# Patient Record
Sex: FEMALE | Race: AMERICAN INDIAN OR ALASKA NATIVE
[De-identification: names, ages, dates, MRNs, and addresses within clinical notes are randomized per-mention and may not be internally consistent; named-entity substitution may affect disease eponyms.]

---

## 2017-04-22 ENCOUNTER — HOSPITAL ENCOUNTER (INPATIENT)
Dept: HOSPITAL 31 - C.ER | Age: 77
LOS: 12 days | Discharge: NURSING, REHAB OR LONG-TERM CARE | DRG: 871 | End: 2017-05-04
Attending: FAMILY MEDICINE | Admitting: FAMILY MEDICINE
Payer: MEDICARE

## 2017-04-22 VITALS — BODY MASS INDEX: 24 KG/M2

## 2017-04-22 DIAGNOSIS — G93.41: ICD-10-CM

## 2017-04-22 DIAGNOSIS — N18.4: ICD-10-CM

## 2017-04-22 DIAGNOSIS — R65.20: ICD-10-CM

## 2017-04-22 DIAGNOSIS — G31.84: ICD-10-CM

## 2017-04-22 DIAGNOSIS — J45.909: ICD-10-CM

## 2017-04-22 DIAGNOSIS — J18.9: ICD-10-CM

## 2017-04-22 DIAGNOSIS — I13.0: ICD-10-CM

## 2017-04-22 DIAGNOSIS — I50.9: ICD-10-CM

## 2017-04-22 DIAGNOSIS — D63.1: ICD-10-CM

## 2017-04-22 DIAGNOSIS — E10.649: ICD-10-CM

## 2017-04-22 DIAGNOSIS — Z79.4: ICD-10-CM

## 2017-04-22 DIAGNOSIS — Z86.73: ICD-10-CM

## 2017-04-22 DIAGNOSIS — M19.90: ICD-10-CM

## 2017-04-22 DIAGNOSIS — N39.0: ICD-10-CM

## 2017-04-22 DIAGNOSIS — E46: ICD-10-CM

## 2017-04-22 DIAGNOSIS — J43.9: ICD-10-CM

## 2017-04-22 DIAGNOSIS — Z88.0: ICD-10-CM

## 2017-04-22 DIAGNOSIS — E87.5: ICD-10-CM

## 2017-04-22 DIAGNOSIS — Z85.038: ICD-10-CM

## 2017-04-22 DIAGNOSIS — E10.22: ICD-10-CM

## 2017-04-22 DIAGNOSIS — A41.9: Primary | ICD-10-CM

## 2017-04-22 DIAGNOSIS — F41.9: ICD-10-CM

## 2017-04-22 DIAGNOSIS — E78.00: ICD-10-CM

## 2017-04-22 LAB
ALBUMIN/GLOB SERPL: 1.2 {RATIO} (ref 1–2.1)
ALP SERPL-CCNC: 83 U/L (ref 38–126)
ALT SERPL-CCNC: 22 U/L (ref 9–52)
AST SERPL-CCNC: 41 U/L (ref 14–36)
BACTERIA #/AREA URNS HPF: (no result) /[HPF]
BASE EXCESS BLDV CALC-SCNC: -6.8 MMOL/L (ref 0–2)
BASOPHILS # BLD AUTO: 0 K/UL (ref 0–0.2)
BASOPHILS NFR BLD: 0.5 % (ref 0–2)
BILIRUB SERPL-MCNC: 0.6 MG/DL (ref 0.2–1.3)
BILIRUB UR-MCNC: NEGATIVE MG/DL
BUN SERPL-MCNC: 44 MG/DL (ref 7–17)
CALCIUM SERPL-MCNC: 9.1 MG/DL (ref 8.6–10.4)
CHLORIDE SERPL-SCNC: 109 MMOL/L (ref 98–107)
CO2 SERPL-SCNC: 20 MMOL/L (ref 22–30)
EOSINOPHIL # BLD AUTO: 0.1 K/UL (ref 0–0.7)
EOSINOPHIL NFR BLD: 1.2 % (ref 0–4)
EOSINOPHIL NFR BLD: 2 % (ref 0–4)
ERYTHROCYTE [DISTWIDTH] IN BLOOD BY AUTOMATED COUNT: 16.1 % (ref 11.5–14.5)
GLOBULIN SER-MCNC: 3.5 GM/DL (ref 2.2–3.9)
GLUCOSE SERPL-MCNC: 118 MG/DL (ref 65–105)
GLUCOSE UR STRIP-MCNC: (no result) MG/DL
HCT VFR BLD CALC: 33.6 % (ref 34–47)
KETONES UR STRIP-MCNC: NEGATIVE MG/DL
LEUKOCYTE ESTERASE UR-ACNC: (no result) LEU/UL
LG PLATELETS BLD QL SMEAR: PRESENT
LYMPHOCYTES # BLD AUTO: 0.7 K/UL (ref 1–4.3)
LYMPHOCYTES NFR BLD AUTO: 7.6 % (ref 20–40)
MCH RBC QN AUTO: 26.7 PG (ref 27–31)
MCHC RBC AUTO-ENTMCNC: 30.9 G/DL (ref 33–37)
MCV RBC AUTO: 86.6 FL (ref 81–99)
MONOCYTES # BLD: 0.2 K/UL (ref 0–0.8)
MONOCYTES NFR BLD: 2.8 % (ref 0–10)
NEUTROPHILS NFR BLD AUTO: 88 % (ref 50–75)
NRBC BLD AUTO-RTO: 0 % (ref 0–2)
PCO2 BLDV: 52 MMHG (ref 40–60)
PH BLDV: 7.22 [PH] (ref 7.32–7.43)
PH UR STRIP: 5 [PH] (ref 5–8)
PLATELET # BLD: 155 K/UL (ref 130–400)
PMV BLD AUTO: 8.9 FL (ref 7.2–11.7)
POTASSIUM SERPL-SCNC: 4.9 MMOL/L (ref 3.6–5.2)
PROT SERPL-MCNC: 7.9 G/DL (ref 6.3–8.3)
PROT UR STRIP-MCNC: (no result) MG/DL
RBC # UR STRIP: (no result) /UL
RBC #/AREA URNS HPF: 4 /HPF (ref 0–3)
SODIUM SERPL-SCNC: 143 MMOL/L (ref 132–148)
SP GR UR STRIP: 1.01 (ref 1–1.03)
TOTAL CELLS COUNTED BLD: 100
TRANS CELLS #/AREA URNS HPF: 3 /HPF (ref 0–3)
TROPONIN I SERPL-MCNC: 0.02 NG/ML (ref 0–0.12)
UROBILINOGEN UR-MCNC: NORMAL MG/DL (ref 0.2–1)
WBC # BLD AUTO: 8.6 K/UL (ref 4.8–10.8)
WBC #/AREA URNS HPF: 53 /HPF (ref 0–5)

## 2017-04-22 RX ADMIN — DEXTROSE AND SODIUM CHLORIDE SCH MLS/HR: 5; 450 INJECTION, SOLUTION INTRAVENOUS at 14:30

## 2017-04-22 NOTE — RAD
PROCEDURE:  CHEST RADIOGRAPH, 1 VIEW



HISTORY:

ams



COMPARISON:

Comparison chest 12/06/2016



FINDINGS:



LUNGS:

Diffuse bilateral infiltrates likely representing pulmonary edema/CHF.



PLEURA:

No pneumothorax or pleural fluid seen.



CARDIOVASCULAR:

Cardiomegaly



OSSEOUS STRUCTURES:

No significant abnormalities.



VISUALIZED UPPER ABDOMEN:

Normal.



OTHER FINDINGS:

None. 



IMPRESSION:

Diffuse bilateral infiltrates likely representing pulmonary edema/CHF.

## 2017-04-22 NOTE — CP.PCM.CON
History of Present Illness





- History of Present Illness


History of Present Illness: 


78 y/o female with Stage 1V kidney disease, HTN , DM TIAs was admitted 

yesterday for change in mental status & severe hypoglycemia. Pt is known to us 

previous admissions & office visits. Last yr a Lt arm AVF was done by Dr Alvarado.   Pt tends to have hyperkalemia & had quite significant HPT She was 

started on Rocaltrol & was councelled on low K+ diet Did not come for f/u for 

sometime





Past Patient History





- Infectious Disease


Hx of Infectious Diseases: None





- Tetanus Immunizations


Tetanus Immunization: Unknown





- Past Medical History & Family History


Past Medical History?: Yes





- Past Social History


Smoking Status: Never Smoked





- CARDIAC


Hx Hypercholesterolemia: Yes


Hx Hypertension: Yes


Hx Peripheral Edema: Yes





- PULMONARY


Hx Asthma: Yes (Dx 15 yrs ago,does not use home o2 anymore)


Hx Pneumonia: Yes (x2)





- NEUROLOGICAL


Hx Transient Ischemic Attacks (TIA): Yes (x3 about 20 yrs ago, 10 yrs ago, 7 

yrs ago)





- HEENT


Hx HEENT Problems: Yes


Hx Cataracts: Yes (Tahir IOL)





- RENAL


Hx Chronic Kidney Disease: Yes





- ENDOCRINE/METABOLIC


Hx Diabetes Mellitus Type 2: Yes





- HEMATOLOGICAL/ONCOLOGICAL


Hx Anemia: Yes





- INTEGUMENTARY


Hx Dermatological Problems: Yes


Other/Comment: Bilateral lower legs dry skin





- MUSCULOSKELETAL/RHEUMATOLOGICAL


Hx Arthritis: Yes


Hx Falls: No


Hx Fractures: Yes (Right ankle. No sx, casted)





- GASTROINTESTINAL


Hx Gastrointestinal Disorders: Yes


Other/Comment: Hx colon cancer





- GENITOURINARY/GYNECOLOGICAL


Hx Genitourinary Disorders: No





- PSYCHIATRIC


Hx Depression: No


Hx Substance Use: No





- SURGICAL HISTORY


Hx Cataract Extraction: Yes (LEFT)





- ANESTHESIA


Hx Anesthesia: Yes


Hx Anesthesia Reactions: No


Hx Malignant Hyperthermia: No





Meds


Allergies/Adverse Reactions: 


 Allergies











Allergy/AdvReac Type Severity Reaction Status Date / Time


 


Penicillins Allergy Intermediate RASH Verified 12/06/16 11:49














- Medications


Medications: 


 Current Medications





Aspirin (Ecotrin)  81 mg PO DAILY [facility]


Heparin Sodium (Porcine) (Heparin)  5,000 units SC Q8 [facility]


Azithromycin 500 mg/ Sodium (Chloride)  250 mls @ 250 mls/hr IVPB Q24H [facility]


Ceftriaxone Sodium 1 gm/ (Sodium Chloride)  100 mls @ 100 mls/hr IVPB DAILY [facility]


Dextrose/Sodium Chloride (Dextrose 5%/0.45% Ns 1000 Ml)  1,000 mls @ 40 mls/hr 

IV .Q24H [facility]


Labetalol HCl (Trandate)  200 mg PO BID [facility]


Lisinopril (Zestril)  5 mg PO DAILY [facility]


   Last Admin: 04/22/17 12:56 Dose:  5 mg


Ondansetron HCl (Zofran Inj)  4 mg IVP Q6 PRN


   PRN Reason: Nausea/Vomiting


Pantoprazole Sodium (Protonix Ec Tab)  20 mg PO DAILY [facility]











Physical Exam





- Constitutional


Appears: No Acute Distress





- Head Exam


Head Exam: ATRAUMATIC, NORMOCEPHALIC





- Neck Exam


Additional comments: 


Neck supple





- Respiratory Exam


Respiratory Exam: NORMAL BREATHING PATTERN


Additional comments: 


Lungs clear





- Cardiovascular Exam


Cardiovascular Exam: REGULAR RHYTHM





- GI/Abdominal Exam


GI & Abdominal Exam: Soft





- Rectal Exam


Rectal Exam: Deferred





- Extremities Exam


Additional comments: 


No eema or cyanosis


+ bruit over Lt arm AVF





Results





- Vital Signs


Recent Vital Signs: 


 Last Vital Signs











Temp  97.7 F   04/22/17 14:04


 


Pulse  90   04/22/17 14:04


 


Resp  20   04/22/17 14:04


 


BP  169/69 H  04/22/17 14:04


 


Pulse Ox  99   04/22/17 14:04














- Labs


Result Diagrams: 


 04/22/17 10:17





 04/22/17 10:17





Assessment & Plan





- Assessment and Plan (Free Text)


Assessment: 


Stage 1V kidney disease.


renal function has remained stable


DM 11. 


HTN


Pneumonitis


Plan: 


Will repeat PTH, phos


Low K+ diet


Monitor BP

## 2017-04-23 LAB
ALBUMIN/GLOB SERPL: 1 {RATIO} (ref 1–2.1)
ALP SERPL-CCNC: 59 U/L (ref 38–126)
ALT SERPL-CCNC: 17 U/L (ref 9–52)
AST SERPL-CCNC: 20 U/L (ref 14–36)
BASOPHILS # BLD AUTO: 0 K/UL (ref 0–0.2)
BASOPHILS NFR BLD: 0.5 % (ref 0–2)
BILIRUB SERPL-MCNC: 0.5 MG/DL (ref 0.2–1.3)
BUN SERPL-MCNC: 43 MG/DL (ref 7–17)
CALCIUM SERPL-MCNC: 8.4 MG/DL (ref 8.6–10.4)
CHLORIDE SERPL-SCNC: 113 MMOL/L (ref 98–107)
CO2 SERPL-SCNC: 16 MMOL/L (ref 22–30)
EOSINOPHIL # BLD AUTO: 0.2 K/UL (ref 0–0.7)
EOSINOPHIL NFR BLD: 2.2 % (ref 0–4)
ERYTHROCYTE [DISTWIDTH] IN BLOOD BY AUTOMATED COUNT: 15.6 % (ref 11.5–14.5)
GLOBULIN SER-MCNC: 3 GM/DL (ref 2.2–3.9)
GLUCOSE SERPL-MCNC: 132 MG/DL (ref 65–105)
HCT VFR BLD CALC: 27.4 % (ref 34–47)
LYMPHOCYTES # BLD AUTO: 1.2 K/UL (ref 1–4.3)
LYMPHOCYTES NFR BLD AUTO: 15.1 % (ref 20–40)
MAGNESIUM SERPL-MCNC: 2 MG/DL (ref 1.6–2.3)
MCH RBC QN AUTO: 27.2 PG (ref 27–31)
MCHC RBC AUTO-ENTMCNC: 31.8 G/DL (ref 33–37)
MCV RBC AUTO: 85.5 FL (ref 81–99)
MONOCYTES # BLD: 0.4 K/UL (ref 0–0.8)
MONOCYTES NFR BLD: 5.5 % (ref 0–10)
NRBC BLD AUTO-RTO: 0.1 % (ref 0–2)
PHOSPHATE SERPL-MCNC: 3.7 MG/DL (ref 2.5–4.5)
PLATELET # BLD: 138 K/UL (ref 130–400)
PMV BLD AUTO: 9.3 FL (ref 7.2–11.7)
POTASSIUM SERPL-SCNC: 4.9 MMOL/L (ref 3.6–5.2)
PROT SERPL-MCNC: 6 G/DL (ref 6.3–8.3)
SODIUM SERPL-SCNC: 140 MMOL/L (ref 132–148)
TSH SERPL-ACNC: 1.44 MIU/L (ref 0.46–4.68)
WBC # BLD AUTO: 7.8 K/UL (ref 4.8–10.8)

## 2017-04-23 RX ADMIN — DEXTROSE AND SODIUM CHLORIDE SCH MLS/HR: 5; 450 INJECTION, SOLUTION INTRAVENOUS at 16:05

## 2017-04-23 RX ADMIN — HUMAN INSULIN SCH UNIT: 100 INJECTION, SOLUTION SUBCUTANEOUS at 12:44

## 2017-04-23 RX ADMIN — PANTOPRAZOLE SODIUM SCH MG: 20 TABLET, DELAYED RELEASE ORAL at 09:38

## 2017-04-23 RX ADMIN — HUMAN INSULIN SCH UNIT: 100 INJECTION, SOLUTION SUBCUTANEOUS at 17:35

## 2017-04-23 RX ADMIN — HUMAN INSULIN SCH: 100 INJECTION, SOLUTION SUBCUTANEOUS at 21:23

## 2017-04-23 NOTE — CP.PCM.PN
<EllisLuacs H - Last Filed: 04/23/17 13:40>





Subjective





- Date & Time of Evaluation


Date of Evaluation: 04/23/17


Time of Evaluation: 13:00





- Subjective


Subjective: 





Dr. Gaxiola progress note:





Patient seen and examined with primary medical attending.  Patient is alert and 

oriented to person, place and time.  She is not complaining of any pain but she 

does say she does not remember how she got the hospital.  





Objective





- Vital Signs/Intake and Output


Vital Signs (last 24 hours): 


 











Temp Pulse Resp BP Pulse Ox


 


 97.8 F   82   20   150/65   98 


 


 04/23/17 00:10  04/23/17 04:20  04/23/17 00:10  04/23/17 00:10  04/23/17 00:10











- Medications


Medications: 


 Current Medications





Aspirin (Ecotrin)  81 mg PO DAILY [facility]


Heparin Sodium (Porcine) (Heparin)  5,000 units SC Q8 [facility]


   Last Admin: 04/23/17 05:19 Dose:  5,000 units


Azithromycin 500 mg/ Sodium (Chloride)  250 mls @ 250 mls/hr IVPB Q24H [facility]


Ceftriaxone Sodium 1 gm/ (Sodium Chloride)  100 mls @ 100 mls/hr IVPB DAILY [facility]


Dextrose/Sodium Chloride (Dextrose 5%/0.45% Ns 1000 Ml)  1,000 mls @ 40 mls/hr 

IV .Q24H [facility]


   Last Admin: 04/22/17 14:30 Dose:  40 mls/hr


Labetalol HCl (Trandate)  200 mg PO BID [facility]


   Last Admin: 04/22/17 20:41 Dose:  200 mg


Lisinopril (Zestril)  5 mg PO DAILY [facility]


   Last Admin: 04/22/17 12:56 Dose:  5 mg


Ondansetron HCl (Zofran Inj)  4 mg IVP Q6 PRN


   PRN Reason: Nausea/Vomiting


Pantoprazole Sodium (Protonix Ec Tab)  20 mg PO DAILY [facility]











- Labs


Labs: 


 





 04/23/17 06:06 











- Constitutional


Appears: Non-toxic, No Acute Distress





- Head Exam


Head Exam: ATRAUMATIC, NORMAL INSPECTION, NORMOCEPHALIC





- Eye Exam


Eye Exam: Normal appearance


Pupil Exam: NORMAL ACCOMODATION





- ENT Exam


ENT Exam: Normal Exam





- Neck Exam


Neck Exam: Normal Inspection





- Respiratory Exam


Respiratory Exam: Clear to Ausculation Bilateral.  absent: Rales, Rhonchi, 

Wheezes





- Cardiovascular Exam


Cardiovascular Exam: REGULAR RHYTHM, RRR, +S1, +S2.  absent: Gallop, Rubs





- GI/Abdominal Exam


GI & Abdominal Exam: Soft, Normal Bowel Sounds.  absent: Tenderness





- Back Exam


Back Exam: NORMAL INSPECTION





- Neurological Exam


Neurological Exam: Alert, CN II-XII Intact, Oriented x3


Neuro motor strength exam: Left Upper Extremity: 5, Right Upper Extremity: 5, 

Left Lower Extremity: 5, Right Lower Extremity: 5





- Psychiatric Exam


Psychiatric exam: Normal Affect, Normal Mood





- Skin


Skin Exam: Dry, Normal Color





Assessment and Plan


(1) Altered mental status


Assessment & Plan: 


Most likely secondary from infection and also hypoglycemia.  Patient did say 

she did not eat the night before admission.  


Status: Resolved





(2) UTI (urinary tract infection)


Assessment & Plan: 


IV Rocephin, urine culture negative, blood culture pending. 


Status: Acute





(3) Pneumonia


Assessment & Plan: 


Zithromax, patient is afebrile with no white count. 


Status: Acute





(4) Diabetic hypoglycemia


Status: Acute





(5) Anemia


Assessment & Plan: 


Most likely anemia of chronic disease


Status: Chronic





(6) Chronic kidney disease (CKD)


Assessment & Plan: 


Dr. Munoz consulted, patient may need dialysisi at some point in the future, 

will follow up consult. 


Status: Chronic





(7) Hypertension


Assessment & Plan: 


continue current htn medication


Status: Chronic





(8) Prophylactic measure


Assessment & Plan: 


continue her current Heparin and protonix. 


Status: Acute








<Samson Gaxiola H - Last Filed: 04/23/17 16:16>





Objective





- Vital Signs/Intake and Output


Vital Signs (last 24 hours): 


 











Temp Pulse Resp BP Pulse Ox


 


 98.6 F   91 H  20   157/68 H  96 


 


 04/23/17 07:00  04/23/17 11:42  04/23/17 07:00  04/23/17 07:00  04/23/17 11:42











- Medications


Medications: 


 Current Medications





Aspirin (Ecotrin)  81 mg PO DAILY [facility]


   Last Admin: 04/23/17 09:38 Dose:  81 mg


Heparin Sodium (Porcine) (Heparin)  5,000 units SC Q8 [facility]


   Last Admin: 04/23/17 13:32 Dose:  5,000 units


Azithromycin 500 mg/ Sodium (Chloride)  250 mls @ 250 mls/hr IVPB Q24H [facility]


   Last Admin: 04/23/17 11:37 Dose:  250 mls/hr


Ceftriaxone Sodium 1 gm/ (Sodium Chloride)  100 mls @ 100 mls/hr IVPB DAILY [facility]


   Last Admin: 04/23/17 09:37 Dose:  100 mls/hr


Dextrose/Sodium Chloride (Dextrose 5%/0.45% Ns 1000 Ml)  1,000 mls @ 40 mls/hr 

IV .Q24H [facility]


   Last Admin: 04/23/17 16:05 Dose:  40 mls/hr


Insulin Human Regular (Novolin R)  0 unit SC ACHS [facility]


   PRN Reason: Protocol


   Last Admin: 04/23/17 12:44 Dose:  1 unit


Labetalol HCl (Trandate)  200 mg PO BID [facility]


   Last Admin: 04/23/17 09:38 Dose:  200 mg


Lisinopril (Zestril)  5 mg PO DAILY [facility]


   Last Admin: 04/23/17 09:38 Dose:  5 mg


Ondansetron HCl (Zofran Inj)  4 mg IVP Q6 PRN


   PRN Reason: Nausea/Vomiting


   Last Admin: 04/23/17 12:47 Dose:  4 mg


Pantoprazole Sodium (Protonix Ec Tab)  20 mg PO DAILY [facility]


   Last Admin: 04/23/17 09:38 Dose:  20 mg











- Labs


Labs: 


 





 04/23/17 06:06 





 04/23/17 06:06 











Attending/Attestation





- Attestation


I have personally seen and examined this patient.: Yes


I have fully participated in the care of the patient.: Yes


I have reviewed all pertinent clinical information, including history, physical 

exam and plan: Yes


Notes (Text): 





04/23/17 16:11


Medical attending: Patient was seen and examined by me, agrees the above note 

by medical resident.


When I saw the patient today she was alert and orientated 3. She was very 

conversational, very pleasant affect. She explains to us that she does not 

remember coming to the hospital. And that she does not remember the medical 

resident admitting her despite me meeting the patient with the same medical 

resident.





Later in the day the patient's family members came, and they explained to me 

that the patient does take insulin. The patient was able to recall to me that 

she took 15 units of insulin earlier in the day but that she had not been 

eating properly. Family members at bedside explained to me that they noticed 

that her appetite was poor that day. But she still took her insulin. The family 

members explained that she now looks back to her baseline mental status





The patient explains that she does not have enough test strips, and she thinks 

that her glucometer is not properly functioning. So she did not actually know 

what her blood sugar was before she gave for self insulin, and on top of that 

her appetite was very poor.





So I explained to the patient as well as the patient's family that at this time 

regarding continue the intravenous fluids with dextrose, I encouraged her to 

eat. And at some point we should turn off the intravenous fluids with dextrose.





Were also continue the antibiotics for the urinary tract infection. I explained 

to them that it is possible that perhaps she was feeling sick from urinary 

tract infection and there by her appetite was very poor. And so when she gave 

herself the insulin injection it made her blood sugar very very low she is not 

eating





Thank you very much,


Samson Gaxiola

## 2017-04-23 NOTE — CP.PCM.PN
Subjective





- Date & Time of Evaluation


Date of Evaluation: 04/23/17


Time of Evaluation: 02:45





- Subjective


Subjective: 


Alert. No complaints





Objective





- Vital Signs/Intake and Output


Vital Signs (last 24 hours): 


 











Temp Pulse Resp BP Pulse Ox


 


 98.6 F   91 H  20   157/68 H  96 


 


 04/23/17 07:00  04/23/17 11:42  04/23/17 07:00  04/23/17 07:00  04/23/17 11:42











- Medications


Medications: 


 Current Medications





Aspirin (Ecotrin)  81 mg PO DAILY [facility]


   Last Admin: 04/23/17 09:38 Dose:  81 mg


Heparin Sodium (Porcine) (Heparin)  5,000 units SC Q8 [facility]


   Last Admin: 04/23/17 13:32 Dose:  5,000 units


Azithromycin 500 mg/ Sodium (Chloride)  250 mls @ 250 mls/hr IVPB Q24H [facility]


   Last Admin: 04/23/17 11:37 Dose:  250 mls/hr


Ceftriaxone Sodium 1 gm/ (Sodium Chloride)  100 mls @ 100 mls/hr IVPB DAILY [facility]


   Last Admin: 04/23/17 09:37 Dose:  100 mls/hr


Dextrose/Sodium Chloride (Dextrose 5%/0.45% Ns 1000 Ml)  1,000 mls @ 40 mls/hr 

IV .Q24H [facility]


   Last Admin: 04/22/17 14:30 Dose:  40 mls/hr


Insulin Human Regular (Novolin R)  0 unit SC ACHS [facility]


   PRN Reason: Protocol


   Last Admin: 04/23/17 12:44 Dose:  1 unit


Labetalol HCl (Trandate)  200 mg PO BID [facility]


   Last Admin: 04/23/17 09:38 Dose:  200 mg


Lisinopril (Zestril)  5 mg PO DAILY [facility]


   Last Admin: 04/23/17 09:38 Dose:  5 mg


Ondansetron HCl (Zofran Inj)  4 mg IVP Q6 PRN


   PRN Reason: Nausea/Vomiting


   Last Admin: 04/23/17 12:47 Dose:  4 mg


Pantoprazole Sodium (Protonix Ec Tab)  20 mg PO DAILY [facility]


   Last Admin: 04/23/17 09:38 Dose:  20 mg











- Labs


Labs: 


 





 04/23/17 06:06 





 04/23/17 06:06 











- Respiratory Exam


Additional comments: 


Lungs clear





- Cardiovascular Exam


Cardiovascular Exam: REGULAR RHYTHM





- Extremities Exam


Additional comments: 


No edema





Assessment and Plan





- Assessment and Plan (Free Text)


Assessment: 


Stage 1V kidney dis. Renalfunction is stable


Bicarb is low today


Anemia of CKD


IDDM


Plan: 


Continue to monitor renal function


Will need procrit

## 2017-04-24 LAB
BUN SERPL-MCNC: 42 MG/DL (ref 7–17)
CALCIUM SERPL-MCNC: 8.5 MG/DL (ref 8.6–10.4)
CHLORIDE SERPL-SCNC: 112 MMOL/L (ref 98–107)
CO2 SERPL-SCNC: 17 MMOL/L (ref 22–30)
ERYTHROCYTE [DISTWIDTH] IN BLOOD BY AUTOMATED COUNT: 15.5 % (ref 11.5–14.5)
GLUCOSE SERPL-MCNC: 246 MG/DL (ref 65–105)
HCT VFR BLD CALC: 27.3 % (ref 34–47)
MCH RBC QN AUTO: 27.7 PG (ref 27–31)
MCHC RBC AUTO-ENTMCNC: 32.4 G/DL (ref 33–37)
MCV RBC AUTO: 85.4 FL (ref 81–99)
PLATELET # BLD: 135 K/UL (ref 130–400)
PMV BLD AUTO: 8.9 FL (ref 7.2–11.7)
POTASSIUM SERPL-SCNC: 5.4 MMOL/L (ref 3.6–5.2)
SODIUM SERPL-SCNC: 140 MMOL/L (ref 132–148)
WBC # BLD AUTO: 9.1 K/UL (ref 4.8–10.8)

## 2017-04-24 RX ADMIN — HUMAN INSULIN SCH UNIT: 100 INJECTION, SOLUTION SUBCUTANEOUS at 22:12

## 2017-04-24 RX ADMIN — HUMAN INSULIN SCH UNIT: 100 INJECTION, SOLUTION SUBCUTANEOUS at 08:51

## 2017-04-24 RX ADMIN — DEXTROSE AND SODIUM CHLORIDE SCH MLS/HR: 5; 450 INJECTION, SOLUTION INTRAVENOUS at 18:15

## 2017-04-24 RX ADMIN — HUMAN INSULIN SCH UNIT: 100 INJECTION, SOLUTION SUBCUTANEOUS at 13:41

## 2017-04-24 RX ADMIN — PANTOPRAZOLE SODIUM SCH MG: 20 TABLET, DELAYED RELEASE ORAL at 09:18

## 2017-04-24 RX ADMIN — HUMAN INSULIN SCH UNIT: 100 INJECTION, SOLUTION SUBCUTANEOUS at 18:06

## 2017-04-24 NOTE — CARD
--------------- APPROVED REPORT --------------





EKG Measurement

Heart [id number]

ID [id number]

[id number]

[id number]

[id number]



<Conclusion>

Sinus rhythm with occasional premature ventricular complexes

Nonspecific T wave abnormality

Prolonged QT

Abnormal ECG

## 2017-04-24 NOTE — CT
PROCEDURE:  CT Chest, Abdomen and Pelvis without intravenous contrast



HISTORY:

weight loss with anorexia, hx of colon cancer



COMPARISON:

None.



TECHNIQUE:

Radiation dose:



Total exam DLP = 716.35 mGy-cm.



This CT exam was performed using one or more of the following dose 

reduction techniques: Automated exposure control, adjustment of the 

mA and/or kV according to patient size, and/or use of iterative 

reconstruction technique.



Please note that due to lack of intravenous and oral contrast, 

evaluation of soft tissue structures and bowel is limited. 



FINDINGS:



CT CHEST WITHOUT CONTRAST:



LUNGS:

Patchy airspace opacities in the bilateral upper lobes.



MEDIASTINUM:

The heart is enlarged.  Coronary artery and valvular calcifications. 

Mitral annular calcifications seen. 



Mild enlargement of the pulmonary artery which can be seen in setting 

of pulmonary arterial hypertension. 



No aneurysmal dilatation of the ascending aorta.  Scattered calcific 

plaque throughout the visualized portions of the aorta. 



Visualized aspects of the esophagus appears thickened. Underlying 

inflammation/infection should be considered. Fluid seen in the distal 

esophagus. 



LYMPH NODES:

Mild mediastinal lymphadenopathy.  Hilar lymphadenopathy suboptimally 

evaluated due to lack of intravenous contrast.



PLEURA:

Moderate-sized bilateral pleural effusions with associated 

compressive atelectasis.



BONES:

Generalized osteopenia.



OTHER FINDINGS:

Scattered left breast calcifications.



CT ABDOMEN AND PELVIS:



LIVER:

Unremarkable. No gross lesion or ductal dilatation. 



GALLBLADDER AND BILE DUCTS:

Near completely collapsed gallbladder limiting evaluation. 



PANCREAS:

Unremarkable. No gross lesion or ductal dilatation.



SPLEEN:

Calcified granulomas was a vascular calcifications in the spleen. 



ADRENALS:

Diffuse thickening of both adrenal glands. 



KIDNEYS AND URETERS:

Shrunken size of both kidneys. No hydronephrosis. The ureters could 

not be evaluated optimally. 



VASCULATURE:

Scattered calcification of the abdominal aorta and its main branches. 

The patency of the vasculature cannot be evaluated. 



IVC filter noted. 



Extensive calcific plaque throughout the abdominal aorta and its main 

branches and the pelvic arteries. 



BOWEL:

Lack of oral contrast and distal bowel limits evaluation.  Small 

hiatal hernia.  High-density material within the stomach could be 

minimally ingested oral contrast. No definite bowel obstruction 

identified. Thickening of the rectal wall noted. This could be due to 

underdistention. There is mild stranding of the perirectal fat.  

Underlying proctitis can be considered if clinically relevant. 

Dilatation focally seen in the descending aorta with surgical suture 

line surrounding it.  This could be the area of anastomosis. Moderate 

amount of stool seen throughout the colon suggestive of constipation. 







APPENDIX:

Normal appendix. 



PERITONEUM:

Unremarkable. No free fluid. No free air. 



LYMPH NODES:

Unremarkable. No enlarged lymph nodes. 



BLADDER:

Unremarkable. 



REPRODUCTIVE:

Please note that evaluation of gynecologic organs is not optimal on 

CT imaging. The uterus is not seen. No suspicious adnexal masses 

identified. 



BONES:

Degenerative changes in the spine most prominent at L4-L5. 



OTHER FINDINGS:

Surgical clips in the abdomen. 



Minimal perihepatic fluid. 



IMPRESSION:

Bilateral moderate-sized pleural effusions with associated 

compressive atelectasis.  Bilateral upper lobe airspace patchy 

opacities.  Underlying infection should be considered.



Minimal perihepatic fluid.



Scattered calcifications in the left breast.

## 2017-04-24 NOTE — CARD
--------------- APPROVED REPORT --------------





EXAM: Two-dimensional and M-mode echocardiogram with Doppler and 

color Doppler.



Other Information 

Quality : GoodRhythm : NSR



INDICATION

ckd



RISK FACTORS

Hypertension 

Hyperlipidemia

Diabetes



M-Mode DIMENSIONS 

RVDd1.17   (2.1-3.2cm)Left Atrium (MM)3.64   (2.5-4.0cm)

IVSd1.11   (0.7-1.1cm)Aortic Root2.77   (2.2-3.7cm)

LVDd4.43   (4.0-5.6cm)Aortic Cusp Exc.1.40   (1.5-2.0cm)

PWd1.27   (0.7-1.1cm)FS (%) 34   %

LVDs2.93   (2.0-3.8cm)LVEF (%)63   (>50%)



Aortic Valve

AoV Peak Ywfbmxbq741.5cm/Mulugeta Peak GR.14mmHgAI P 1/2 [id number]



Mitral Valve

MV E Kqxwipkl601.5cm/sMV A Tscbgjck46.7cm/sE/A ratio1.7



TDI

E/Lateral E'0.0E/Medial E'0.0



Tricuspid Valve

TR Peak Pqihahlf416pk/sTR Peak Gr.43itNeOVNM22hiTz



 LEFT VENTRICLE 

The left ventricle is normal size.

There is mild concentric left ventricular hypertrophy.

The left ventricular function is normal.

The left ventricular ejection fraction is within the normal range.

There is normal LV segmental wall motion.

The left ventricular diastolic function is normal.



 RIGHT VENTRICLE 

The right ventricle is normal size.

There is normal right ventricular wall thickness.

The right ventricular systolic function is normal.



 ATRIA 

The left atrium size is normal.

The right atrium size is normal.



 AORTIC VALVE 

The aortic valve is severely thickened.

There is mild aortic regurgitation.



 MITRAL VALVE 

The mitral valve is severly thickened. a vegitation can not be 

completely ruled out



 TRICUSPID VALVE 

There is moderate pulmonary hypertension.



 PERICARDIAL EFFUSION 

There is a small loculated posterior pericardial effusion.



<Conclusion>

The left ventricle is normal size.

There is mild concentric left ventricular hypertrophy.

The left ventricular function is normal.

The left ventricular ejection fraction is within the normal range.

There is normal LV segmental wall motion.

The aortic valve is severely thickened.

There is mild aortic regurgitation.

The mitral valve is severly thickened. a vegitation can not be 

completely ruled out

## 2017-04-24 NOTE — CP.PCM.PN
Subjective





- Date & Time of Evaluation


Date of Evaluation: 04/24/17


Time of Evaluation: 10:20





- Subjective


Subjective: 


No new complaints reported


Comfortable in bed





Objective





- Vital Signs/Intake and Output


Vital Signs (last 24 hours): 


 











Temp Pulse Resp BP Pulse Ox


 


 98.2 F   97 H  24   161/88 H  95 


 


 04/24/17 07:05  04/24/17 07:05  04/24/17 07:05  04/24/17 07:05  04/24/17 07:05








Intake and Output: 


 











 04/24/17 04/24/17





 06:59 18:59


 


Intake Total 830 


 


Output Total 450 


 


Balance 380 














- Medications


Medications: 


 Current Medications





Aspirin (Ecotrin)  81 mg PO DAILY [facility]


   Last Admin: 04/24/17 09:17 Dose:  81 mg


Heparin Sodium (Porcine) (Heparin)  5,000 units SC Q8 [facility]


   Last Admin: 04/24/17 06:01 Dose:  5,000 units


Azithromycin 500 mg/ Sodium (Chloride)  250 mls @ 250 mls/hr IVPB Q24H [facility]


   Last Admin: 04/23/17 11:37 Dose:  250 mls/hr


Ceftriaxone Sodium 1 gm/ (Sodium Chloride)  100 mls @ 100 mls/hr IVPB DAILY [facility]


   Last Admin: 04/24/17 09:16 Dose:  100 mls/hr


Dextrose/Sodium Chloride (Dextrose 5%/0.45% Ns 1000 Ml)  1,000 mls @ 40 mls/hr 

IV .Q24H [facility]


   Last Admin: 04/23/17 16:05 Dose:  40 mls/hr


Insulin Human Regular (Novolin R)  0 unit SC ACHS [facility]


   PRN Reason: Protocol


   Last Admin: 04/24/17 08:51 Dose:  2 unit


Labetalol HCl (Trandate)  200 mg PO BID [facility]


   Last Admin: 04/24/17 09:17 Dose:  200 mg


Lisinopril (Zestril)  5 mg PO DAILY [facility]


   Last Admin: 04/24/17 09:17 Dose:  5 mg


Ondansetron HCl (Zofran Inj)  4 mg IVP Q6 PRN


   PRN Reason: Nausea/Vomiting


   Last Admin: 04/23/17 12:47 Dose:  4 mg


Pantoprazole Sodium (Protonix Ec Tab)  20 mg PO DAILY [facility]


   Last Admin: 04/24/17 09:18 Dose:  20 mg











- Labs


Labs: 


 





 04/24/17 06:58 





 04/24/17 06:58 











- Respiratory Exam


Respiratory Exam: NORMAL BREATHING PATTERN


Additional comments: 


Lungs clear





- Cardiovascular Exam


Cardiovascular Exam: REGULAR RHYTHM





- Extremities Exam


Additional comments: 


No edema





Assessment and Plan





- Assessment and Plan (Free Text)


Assessment: 


Stage 1V kidney disease


Hyperkalemia on low K+ diet


HTN


IDDM


Plan: 


Kayexallate 15 g today


Start procrit


PTH pending

## 2017-04-24 NOTE — CP.PCM.PN
<Marysol Amor - Last Filed: 04/24/17 18:22>





Subjective





- Date & Time of Evaluation


Date of Evaluation: 04/24/17


Time of Evaluation: 08:59





- Subjective


Subjective: 


PGY 1 Medicine Note- Dr. Gaxiola's service





Pt seen and examined in no acute distress. She states hat her appetite is still 

poor. Patient reiterates that she has lost some weight over the past few 

mmonths. She also states that she has a prior history of colon cancer for which 

she was treated. At the moment, patient denies chest pain, dyspnea, palpitations

, diarrhea, paresthesias, headaches or lightheadedness. 





Objective





- Vital Signs/Intake and Output


Vital Signs (last 24 hours): 


 











Temp Pulse Resp BP Pulse Ox


 


 98.9 F   87   20   149/69   97 


 


 04/24/17 04:00  04/24/17 04:00  04/24/17 04:00  04/24/17 04:00  04/24/17 04:00








Intake and Output: 


 











 04/24/17 04/24/17





 06:59 18:59


 


Intake Total 830 


 


Output Total 450 


 


Balance 380 














- Medications


Medications: 


 Current Medications





Aspirin (Ecotrin)  81 mg PO DAILY [facility]


   Last Admin: 04/23/17 09:38 Dose:  81 mg


Heparin Sodium (Porcine) (Heparin)  5,000 units SC Q8 [facility]


   Last Admin: 04/24/17 06:01 Dose:  5,000 units


Azithromycin 500 mg/ Sodium (Chloride)  250 mls @ 250 mls/hr IVPB Q24H [facility]


   Last Admin: 04/23/17 11:37 Dose:  250 mls/hr


Ceftriaxone Sodium 1 gm/ (Sodium Chloride)  100 mls @ 100 mls/hr IVPB DAILY [facility]


   Last Admin: 04/23/17 09:37 Dose:  100 mls/hr


Dextrose/Sodium Chloride (Dextrose 5%/0.45% Ns 1000 Ml)  1,000 mls @ 40 mls/hr 

IV .Q24H [facility]


   Last Admin: 04/23/17 16:05 Dose:  40 mls/hr


Insulin Human Regular (Novolin R)  0 unit SC ACHS [facility]


   PRN Reason: Protocol


   Last Admin: 04/23/17 21:23 Dose:  Not Given


Labetalol HCl (Trandate)  200 mg PO BID [facility]


   Last Admin: 04/23/17 17:35 Dose:  200 mg


Lisinopril (Zestril)  5 mg PO DAILY [facility]


   Last Admin: 04/23/17 09:38 Dose:  5 mg


Ondansetron HCl (Zofran Inj)  4 mg IVP Q6 PRN


   PRN Reason: Nausea/Vomiting


   Last Admin: 04/23/17 12:47 Dose:  4 mg


Pantoprazole Sodium (Protonix Ec Tab)  20 mg PO DAILY [facility]


   Last Admin: 04/23/17 09:38 Dose:  20 mg











- Labs


Labs: 


 





 04/24/17 06:58 





 04/23/17 06:06 











- Constitutional


Appears: Non-toxic, No Acute Distress





- Head Exam


Head Exam: ATRAUMATIC, NORMAL INSPECTION, NORMOCEPHALIC





- Eye Exam


Eye Exam: EOMI, Normal appearance, PERRL


Pupil Exam: NORMAL ACCOMODATION





- ENT Exam


ENT Exam: Mucous Membranes Moist





- Neck Exam


Neck Exam: Full ROM





- Respiratory Exam


Respiratory Exam: NORMAL BREATHING PATTERN.  absent: Wheezes





- Cardiovascular Exam


Cardiovascular Exam: +S1, +S2





- GI/Abdominal Exam


GI & Abdominal Exam: Soft, Normal Bowel Sounds.  absent: Tenderness





- Extremities Exam


Extremities Exam: Full ROM, Normal Capillary Refill.  absent: Pedal Edema





- Back Exam


Back Exam: Full ROM





- Neurological Exam


Neurological Exam: Alert, Awake, CN II-XII Intact, Oriented x3





- Psychiatric Exam


Psychiatric exam: Normal Affect, Normal Mood





- Skin


Skin Exam: Dry, Intact, Warm


Additional comments: 


venous stasis changes in lower extremities, b/c





Assessment and Plan





- Assessment and Plan (Free Text)


Assessment: 


Diabetes Mellitus


Hypoglycemia now resolved, Cont to monitor.


Encourage intake


Status: Acute





Anorexia with Weight Loss


Hx of Colon cancer- prev treated


F/U CT chest, abd,pelvis w/o contrast


consider GI referral pending results





Pneumonia


Assessment & Plan: 


Zithromax, patient is afebrile with no white count. 


Status: Acute





UTI (urinary tract infection)


Assessment & Plan: 


IV Rocephin, urine culture negative, blood culture no growth to date 


Status: Acute





Chronic kidney disease (CKD)


Assessment & Plan: 


Dr. Munoz consulted, patient may need dialysis at some point in the future; 

Start procrit, PTH pending


Status: Chronic





Hypertension


Assessment & Plan: 


Labetalol 200 mg PO BID, Zestril 5 mg PO daily


Echo (4/24) approx greater than 50% EF; severely thickened mitral valve; cannot 

rule out vegetation; aortic valve severely thickened. refer to full report.


Status: Chronic





Anemia


Assessment & Plan: 


Most likely anemia of chronic disease secondarily to CKD


Ferrous sulfate daily


Status: Chronic





Hyperkalemia


Assessment & Plan: 


Kayexelate administered


Cont to monitor





Prophylactic measure


Assessment & Plan: 


Heparin SC Q8


PPI


PT/OT eval- F/U


OOB to chair


Status: Acute





<GaxiolaSamson gabriel H - Last Filed: 04/29/17 07:21>





Objective





- Vital Signs/Intake and Output


Vital Signs (last 24 hours): 


 











Temp Pulse Resp BP Pulse Ox


 


 99.1 F   86   20   139/69   97 


 


 04/28/17 23:00  04/28/17 23:00  04/28/17 23:00  04/28/17 23:00  04/28/17 23:00








Intake and Output: 


 











 04/29/17 04/29/17





 06:59 18:59


 


Intake Total 100 


 


Balance 100 














- Medications


Medications: 


 Current Medications





Aspirin (Ecotrin)  81 mg PO DAILY [facility]


   Last Admin: 04/28/17 10:00 Dose:  Not Given


Epoetin Vamsi (Procrit)  10,000 unit SC QWK [facility]


Ferrous Sulfate (Feosol)  325 mg PO BID [facility]


   Last Admin: 04/28/17 19:06 Dose:  325 mg


Furosemide (Lasix)  20 mg IVP BID [facility]


   Last Admin: 04/28/17 10:00 Dose:  Not Given


Furosemide (Lasix)  20 mg PO BID [facility]


   Last Admin: 04/28/17 22:23 Dose:  20 mg


Heparin Sodium (Porcine) (Heparin)  5,000 units SC Q8 [facility]


   Last Admin: 04/29/17 06:30 Dose:  Not Given


Azithromycin 500 mg/ Sodium (Chloride)  250 mls @ 250 mls/hr IVPB Q24H [facility]


   Last Admin: 04/28/17 10:00 Dose:  Not Given


Ceftriaxone Sodium 1 gm/ (Sodium Chloride)  100 mls @ 100 mls/hr IVPB DAILY [facility]


   Last Admin: 04/28/17 10:00 Dose:  Not Given


Insulin Aspart (Novolog)  0 unit SC ACHS [facility]


   PRN Reason: Protocol


   Last Admin: 04/28/17 22:25 Dose:  4 unit


Insulin Glargine (Lantus)  10 unit SC HS [facility]


   Last Admin: 04/28/17 22:24 Dose:  10 units


Labetalol HCl (Trandate)  200 mg PO BID [facility]


   Last Admin: 04/28/17 19:06 Dose:  200 mg


Lisinopril (Zestril)  5 mg PO DAILY [facility]


   Last Admin: 04/28/17 10:00 Dose:  Not Given


Megestrol Acetate (Megace)  40 mg PO DAILY [facility]


   Last Admin: 04/28/17 10:00 Dose:  Not Given


Moxifloxacin HCl (Avelox)  400 mg PO DAILY [facility]


   Last Admin: 04/28/17 22:23 Dose:  400 mg


Ondansetron HCl (Zofran Inj)  4 mg IVP Q6 [facility]N


   PRN Reason: Nausea/Vomiting


   Last Admin: 04/23/17 12:47 Dose:  4 mg


Pantoprazole Sodium (Protonix Ec Tab)  20 mg PO DAILY [facility]


   Last Admin: 04/28/17 10:00 Dose:  Not Given


Sodium Bicarbonate (Sodium Bicarbonate Tab)  1,300 mg PO BID [facility]


   Last Admin: 04/28/17 19:06 Dose:  1,300 mg











- Labs


Labs: 


 





 04/28/17 07:12 





 04/28/17 07:12 











Attending/Attestation





- Attestation


I have personally seen and examined this patient.: Yes


I have fully participated in the care of the patient.: Yes


I have reviewed all pertinent clinical information, including history, physical 

exam and plan: Yes

## 2017-04-25 LAB
ALBUMIN/GLOB SERPL: 1.1 {RATIO} (ref 1–2.1)
ALP SERPL-CCNC: 77 U/L (ref 38–126)
ALT SERPL-CCNC: 23 U/L (ref 9–52)
AST SERPL-CCNC: 20 U/L (ref 14–36)
BASOPHILS # BLD AUTO: 0 K/UL (ref 0–0.2)
BASOPHILS NFR BLD: 0.3 % (ref 0–2)
BASOPHILS NFR BLD: 2 % (ref 0–2)
BILIRUB SERPL-MCNC: 0.8 MG/DL (ref 0.2–1.3)
BUN SERPL-MCNC: 42 MG/DL (ref 7–17)
CALCIUM SERPL-MCNC: 8.6 MG/DL (ref 8.6–10.4)
CHLORIDE SERPL-SCNC: 108 MMOL/L (ref 98–107)
CO2 SERPL-SCNC: 17 MMOL/L (ref 22–30)
EOSINOPHIL # BLD AUTO: 0 K/UL (ref 0–0.7)
EOSINOPHIL NFR BLD: 0.4 % (ref 0–4)
ERYTHROCYTE [DISTWIDTH] IN BLOOD BY AUTOMATED COUNT: 15.5 % (ref 11.5–14.5)
GLOBULIN SER-MCNC: 3.1 GM/DL (ref 2.2–3.9)
GLUCOSE SERPL-MCNC: 243 MG/DL (ref 65–105)
HCT VFR BLD CALC: 27 % (ref 34–47)
LYMPHOCYTES # BLD AUTO: 0.7 K/UL (ref 1–4.3)
LYMPHOCYTES NFR BLD AUTO: 6.6 % (ref 20–40)
MAGNESIUM SERPL-MCNC: 1.9 MG/DL (ref 1.6–2.3)
MCH RBC QN AUTO: 27.3 PG (ref 27–31)
MCHC RBC AUTO-ENTMCNC: 32.1 G/DL (ref 33–37)
MCV RBC AUTO: 85.2 FL (ref 81–99)
MONOCYTES # BLD: 0.5 K/UL (ref 0–0.8)
MONOCYTES NFR BLD: 5.4 % (ref 0–10)
NEUTROPHILS NFR BLD AUTO: 83 % (ref 50–75)
NRBC BLD AUTO-RTO: 0 % (ref 0–2)
PHOSPHATE SERPL-MCNC: 4 MG/DL (ref 2.5–4.5)
PLATELET # BLD: 138 K/UL (ref 130–400)
PMV BLD AUTO: 9.3 FL (ref 7.2–11.7)
POTASSIUM SERPL-SCNC: 4.8 MMOL/L (ref 3.6–5.2)
PROT SERPL-MCNC: 6.5 G/DL (ref 6.3–8.3)
SODIUM SERPL-SCNC: 138 MMOL/L (ref 132–148)
TOTAL CELLS COUNTED BLD: 100
WBC # BLD AUTO: 10.1 K/UL (ref 4.8–10.8)

## 2017-04-25 RX ADMIN — HUMAN INSULIN SCH UNIT: 100 INJECTION, SOLUTION SUBCUTANEOUS at 08:05

## 2017-04-25 RX ADMIN — HUMAN INSULIN SCH UNIT: 100 INJECTION, SOLUTION SUBCUTANEOUS at 18:17

## 2017-04-25 RX ADMIN — HUMAN INSULIN SCH UNIT: 100 INJECTION, SOLUTION SUBCUTANEOUS at 22:28

## 2017-04-25 RX ADMIN — PANTOPRAZOLE SODIUM SCH MG: 20 TABLET, DELAYED RELEASE ORAL at 09:20

## 2017-04-25 RX ADMIN — HUMAN INSULIN SCH UNIT: 100 INJECTION, SOLUTION SUBCUTANEOUS at 11:30

## 2017-04-25 NOTE — CP.PCM.PN
Subjective





- Date & Time of Evaluation


Date of Evaluation: 04/25/17


Time of Evaluation: 10:00





- Subjective


Subjective: 





Appears moderately dyspneic @ 45 degrees





Objective





- Vital Signs/Intake and Output


Vital Signs (last 24 hours): 


 











Temp Pulse Resp BP Pulse Ox


 


 98.6 F   101 H  20   178/66 H  95 


 


 04/25/17 07:19  04/25/17 07:19  04/25/17 07:19  04/25/17 07:19  04/25/17 07:19











- Medications


Medications: 


 Current Medications





Aspirin (Ecotrin)  81 mg PO DAILY [facility]


   Last Admin: 04/25/17 09:19 Dose:  81 mg


Ferrous Sulfate (Feosol)  325 mg PO BID [facility]


   Last Admin: 04/25/17 09:19 Dose:  325 mg


Heparin Sodium (Porcine) (Heparin)  5,000 units SC Q8 [facility]


   Last Admin: 04/24/17 21:15 Dose:  Not Given


Azithromycin 500 mg/ Sodium (Chloride)  250 mls @ 250 mls/hr IVPB Q24H [facility]


   Last Admin: 04/24/17 13:42 Dose:  250 mls/hr


Ceftriaxone Sodium 1 gm/ (Sodium Chloride)  100 mls @ 100 mls/hr IVPB DAILY [facility]


   Last Admin: 04/25/17 09:21 Dose:  100 mls/hr


Insulin Human Regular (Novolin R)  0 unit SC ACHS [facility]


   PRN Reason: Protocol


Labetalol HCl (Trandate)  200 mg PO BID [facility]


   Last Admin: 04/25/17 09:19 Dose:  200 mg


Lisinopril (Zestril)  5 mg PO DAILY [facility]


   Last Admin: 04/25/17 09:20 Dose:  5 mg


Ondansetron HCl (Zofran Inj)  4 mg IVP Q6 [facility]N


   PRN Reason: Nausea/Vomiting


   Last Admin: 04/23/17 12:47 Dose:  4 mg


Pantoprazole Sodium (Protonix Ec Tab)  20 mg PO DAILY [facility]


   Last Admin: 04/25/17 09:20 Dose:  20 mg











- Labs


Labs: 


 





 04/25/17 06:30 





 04/25/17 06:48 











- Respiratory Exam


Respiratory Exam: Respiratory Distress


Additional comments: 





bibasilar crackles





- Cardiovascular Exam


Cardiovascular Exam: REGULAR RHYTHM





- Extremities Exam


Additional comments: 





No edema





Assessment and Plan





- Assessment and Plan (Free Text)


Assessment: 





Shortness of breath acute onset. CHF vs pneumonia


B/L pleural effusions


Stage 1V kidney dis stable


K+ is controlled


IDDM


Plan: 





Will order IV Lasix, CXR

## 2017-04-25 NOTE — RAD
HISTORY:

Shortness of breath. Portable study 10:40.



COMPARISON:

04/22/2017. Single-view chest. 



FINDINGS:



LUNGS:

Stable multifocal airspace disease bilaterally.



PLEURA:

Increasing bilateral pleural effusions.



CARDIOVASCULAR:

No significant interval change compared to the prior examination(s).



OSSEOUS STRUCTURES:

No significant abnormalities.



VISUALIZED UPPER ABDOMEN:

Normal.



OTHER FINDINGS:

None.



IMPRESSION:

Persistent bilateral multifocal infiltrates.



New bilateral pleural effusions.

## 2017-04-25 NOTE — CP.PCM.PN
<ZuleymaLctracee - Last Filed: 04/25/17 15:46>





Subjective





- Date & Time of Evaluation


Date of Evaluation: 04/25/17


Time of Evaluation: 07:15





- Subjective


Subjective: 





PGY 1 Medicine note- Dr. Willard's service


Pt seen and examined in no acute distress. She states that her appetite is 

mildly improved. She was able to tolerate most of her dinner per nursing team.  

She denies abdominal pain at this time as well as chest pain, palpitations, 

shortness of breath, urinary changes, diarrhea or constipation at this time.





Objective





- Vital Signs/Intake and Output


Vital Signs (last 24 hours): 


 











Temp Pulse Resp BP Pulse Ox


 


 99.5 F   99 H  20   171/73 H  96 


 


 04/24/17 23:45  04/25/17 00:00  04/24/17 23:35  04/24/17 23:35  04/24/17 23:35











- Medications


Medications: 


 Current Medications





Aspirin (Ecotrin)  81 mg PO DAILY [facility]


   Last Admin: 04/24/17 09:17 Dose:  81 mg


Ferrous Sulfate (Feosol)  325 mg PO BID [facility]


   Last Admin: 04/24/17 18:04 Dose:  325 mg


Heparin Sodium (Porcine) (Heparin)  5,000 units SC Q8 [facility]


   Last Admin: 04/24/17 21:15 Dose:  Not Given


Azithromycin 500 mg/ Sodium (Chloride)  250 mls @ 250 mls/hr IVPB Q24H [facility]


   Last Admin: 04/24/17 13:42 Dose:  250 mls/hr


Ceftriaxone Sodium 1 gm/ (Sodium Chloride)  100 mls @ 100 mls/hr IVPB DAILY [facility]


   Last Admin: 04/24/17 09:16 Dose:  100 mls/hr


Insulin Human Regular (Novolin R)  0 unit SC ACHS [facility]


   PRN Reason: Protocol


   Last Admin: 04/24/17 22:12 Dose:  2 unit


Labetalol HCl (Trandate)  200 mg PO BID [facility]


   Last Admin: 04/24/17 18:04 Dose:  200 mg


Lisinopril (Zestril)  5 mg PO DAILY [facility]


   Last Admin: 04/24/17 09:17 Dose:  5 mg


Ondansetron HCl (Zofran Inj)  4 mg IVP Q6 [facility]


   PRN Reason: Nausea/Vomiting


   Last Admin: 04/23/17 12:47 Dose:  4 mg


Pantoprazole Sodium (Protonix Ec Tab)  20 mg PO DAILY [facility]


   Last Admin: 04/24/17 09:18 Dose:  20 mg











- Labs


Labs: 


 





 04/25/17 06:30 





 04/24/17 06:58 











- Constitutional


Appears: Non-toxic, No Acute Distress





- Head Exam


Head Exam: ATRAUMATIC, NORMAL INSPECTION, NORMOCEPHALIC





- Eye Exam


Eye Exam: EOMI, Normal appearance, PERRL


Pupil Exam: NORMAL ACCOMODATION, PERRL





- ENT Exam


ENT Exam: Mucous Membranes Moist, Normal Exam





- Neck Exam


Neck Exam: Normal Inspection





- Respiratory Exam


Respiratory Exam: Wheezes, NORMAL BREATHING PATTERN





- Cardiovascular Exam


Cardiovascular Exam: +S1, +S2





- GI/Abdominal Exam


GI & Abdominal Exam: Soft, Normal Bowel Sounds





- Extremities Exam


Extremities Exam: Full ROM, Normal Capillary Refill.  absent: Pedal Edema





- Back Exam


Back Exam: Full ROM





- Neurological Exam


Neurological Exam: Alert, Awake, CN II-XII Intact, Oriented x3





- Psychiatric Exam


Psychiatric exam: Normal Affect, Normal Mood





- Skin


Skin Exam: Dry, Intact, Normal Color, Warm


Additional comments: 





venous stasis changes in lower extremities, b/l





Assessment and Plan





- Assessment and Plan (Free Text)


Assessment: 





Diabetes Mellitus


Hypoglycemia on admission now resolved, Cont to monitor.


Patient's sugar on the higher end of normal. Previously on ISS Low dose- 

increased to high dosing.


Encourage intake. Diet improved last night per nursing although patient states 

that she still does not have an appetite. She is unsure of the nature of this 

presentation.





Status: Acute





Anorexia with Weight Loss


Hx of colon or stomach cancer- prev treated


CT chest, abd,pelvis w/o contrast- no apparent mass noted. thickening of rectal 

wall noted; mild stranding of perirectal fat; underlying proctitis can be 

considered. Surgical suture lines noted in bowel area. Refer to complete report


Dietary supplements. 


Calorie count


GI referral to Dr. Kulkarni- F/U





Pneumonia


Assessment & Plan: 


Zithromax, patient is afebrile with no white count. 


Chest XRAY 4/25: Bilateral pleural effusions ( new )


Lasix 40mg IV once given


Monitor


Status: Acute





UTI (urinary tract infection)


Assessment & Plan: 


IV Rocephin, urine culture negative, blood culture no growth to date 


Status: Acute





Chronic kidney disease (CKD)


Assessment & Plan: 


Dr. Munoz consulted, patient may need dialysis at some point in the future; On 

procrit, 


Status: Chronic





Hypertension


Assessment & Plan: 


Labetalol 200 mg PO BID, Zestril 5 mg PO daily


Echo (4/24) approx greater than 50% EF; severely thickened mitral valve; cannot 

rule out vegetation; aortic valve severely thickened. refer to full report.


Status: Chronic





Anemia


Assessment & Plan: 


Most likely anemia of chronic disease secondarily to CKD


Ferrous sulfate daily


Monitor


Status: Chronic





Hyperkalemia


Assessment & Plan: 


Resolved


Cont to monitor





Prophylactic measure


Assessment & Plan: 


Heparin SC Q8


PPI


PT/OT eval- F/U


OOB to chair


Status: Acute








<Vera Willard V - Last Filed: 04/26/17 15:24>





Objective





- Vital Signs/Intake and Output


Vital Signs (last 24 hours): 


 











Temp Pulse Resp BP Pulse Ox


 


 99.8 F H  94 H  18   150/67   95 


 


 04/26/17 08:43  04/26/17 08:43  04/26/17 08:43  04/26/17 08:43  04/26/17 08:43








Intake and Output: 


 











 04/26/17 04/26/17





 06:59 18:59


 


Intake Total 240 


 


Balance 240 














- Medications


Medications: 


 Current Medications





Aspirin (Ecotrin)  81 mg PO DAILY [facility]


   Last Admin: 04/26/17 10:56 Dose:  81 mg


Ferrous Sulfate (Feosol)  325 mg PO BID [facility]


   Last Admin: 04/26/17 10:56 Dose:  325 mg


Heparin Sodium (Porcine) (Heparin)  5,000 units SC Q8 [facility]


   Last Admin: 04/26/17 13:15 Dose:  5,000 units


Azithromycin 500 mg/ Sodium (Chloride)  250 mls @ 250 mls/hr IVPB Q24H [facility]


   Last Admin: 04/26/17 10:59 Dose:  250 mls/hr


Ceftriaxone Sodium 1 gm/ (Sodium Chloride)  100 mls @ 100 mls/hr IVPB DAILY [facility]


   Last Admin: 04/26/17 10:56 Dose:  100 mls/hr


Insulin Human Regular (Novolin R)  0 unit SC ACHS [facility]


   PRN Reason: Protocol


   Last Admin: 04/26/17 11:10 Dose:  12 unit


Labetalol HCl (Trandate)  200 mg PO BID [facility]


   Last Admin: 04/26/17 10:56 Dose:  200 mg


Lisinopril (Zestril)  5 mg PO DAILY [facility]


   Last Admin: 04/26/17 10:56 Dose:  5 mg


Megestrol Acetate (Megace)  40 mg PO DAILY [facility]


   Last Admin: 04/26/17 10:55 Dose:  40 mg


Ondansetron HCl (Zofran Inj)  4 mg IVP Q6 [facility]


   PRN Reason: Nausea/Vomiting


   Last Admin: 04/23/17 12:47 Dose:  4 mg


Pantoprazole Sodium (Protonix Ec Tab)  20 mg PO DAILY [facility]


   Last Admin: 04/26/17 10:56 Dose:  20 mg











- Labs


Labs: 


 





 04/26/17 07:16 





 04/26/17 07:16 











Attending/Attestation





- Attestation


I have personally seen and examined this patient.: Yes


I have fully participated in the care of the patient.: Yes


I have reviewed all pertinent clinical information, including history, physical 

exam and plan: Yes


Notes (Text): 


This is late computer entry for 4/25/17.


Patient seen, examined, and case discussed with day-time resident.


Patient reporting lack of appetite and not eating. Patient ordered for calorie 

count, nutritional supplements, and GI consult to see if patient requires peg 

tube.


Will follow-up with patient's primary care doctor and family in regards to 

prior history of colon vs stomach cancer.





Assessment/Plan


1) Pneumonia 


* Chest xray (4/22/17) Diffuse bilateral infiltrates likely pulmonary edema/CHF


* Chest xray (4/25/17): new bilateral pleural effusions; persistent bilateral 

multifocal infiltrates


* Chest/Abdomen/Pelvis (4/24/17): bilateral moderate size pleural effusion with 

associated compressive atelectasis. Bilateral upper lobe airspace patchy 

opacities. Minimal perihepatic fluid. Scattered calcifications in the left 

breast


* Rocephin 1 gram IV qdaily (active since 4/23/17)


* Azithromycin 500mg IV Q24 hours (active since 4/23/17)


* Blood cultures (4/22/17): no growth thus far X2


* Urine culture (4/22/17): no growth


* monitor CBC, and temperature





2) Metabolic encephalopathy


* Etiologies including: hypoglycemia, pneumonia, and urinary tract infection





3) Chronic Kidney Disease


* Nephrology (Dr. Munoz) on the case


* monitor BUN/Cr





4) UTI (urinary tract infection)


* Rocephin 1 gram IV qdaily (active since 4/23/17)


* Urine culture (4/22/17): no growth 


* Abnormal UA (4/22/17): positive protein, glucose, blood, and pyuria, hematuria





5) Hypertension


Assessment & Plan: 


* Labetalol 200 mg PO BID, Zestril 5 mg PO daily


* Monitor vital signs


* Echo (4/24) approx greater than 50% EF; severely thickened mitral valve; 

cannot rule out vegetation; aortic valve severely thickened. refer to full 

report





6) Anemia


Assessment & Plan: 


* suspecting secondary to anemia of chronic kidney disease


* monitor H/H


* Feosol 325mg PO bid





7) Unintentional weight loss; hx of colon cancer


* Monitor calorie counts


* GI consult (Dr. Kulkarni) on board


* CT chest, abd,pelvis w/o contrast- no apparent mass noted. thickening of 

rectal wall noted; mild stranding of perirectal fat; underlying proctitis can 

bconsidered. Surgical suture lines noted in bowel area. Refer to complete report





8) Prophylactic measure


Assessment & Plan: 


* Heparin SC Q8 hours 


* Protonix 40mg PO daily


* PT/OT eval- F/U


* OOB to chair


Status: Acute

## 2017-04-25 NOTE — CP.PCM.CON
History of Present Illness





- History of Present Illness


History of Present Illness: 





I was asked today tosee patient for poor appetite and consider PEG.


Pt was admitted a few days ago with confusion and hypoglycemia.  Found to have 

pneumonia, UTI,


She has had a poor appetite- not eating well.


She reports SOB this am.


Reports colon cancer surgery years ago- had last follow up coloocopy at [facility] 3 

years ago.


SH: No smoking. no etoh.  FH- noted.





Review of Systems





- Constitutional


Constitutional: Anorexia, Fatigue, Weakness.  absent: Weight Gain





- Cardiovascular


Cardiovascular: Dyspnea





- Respiratory


Respiratory: Dyspnea.  absent: Hemoptysis, Wheezing





- Gastrointestinal


Gastrointestinal: absent: Abdominal Pain, Coffee Ground Emesis, Constipation, 

Diarrhea, Dysphagia, Hematemesis, Hematochezia, Loose Stools, Melena, Vomiting





- Genitourinary


Genitourinary: absent: Hematuria





- Musculoskeletal


Musculoskeletal: absent: Myalgias





- Integumentary


Integumentary: absent: Jaundice





- Neurological


Neurological: Confusion.  absent: Convulsions





Past Patient History





- Infectious Disease


Hx of Infectious Diseases: None





- Tetanus Immunizations


Tetanus Immunization: Unknown





- Past Medical History & Family History


Past Medical History?: Yes





- Past Social History


Smoking Status: Never Smoked





- CARDIAC


Hx Hypercholesterolemia: Yes


Hx Hypertension: Yes


Hx Peripheral Edema: Yes





- PULMONARY


Hx Asthma: Yes (Dx 15 yrs ago,does not use home o2 anymore)


Hx Pneumonia: Yes (x2)





- NEUROLOGICAL


Hx Transient Ischemic Attacks (TIA): Yes (x3 about 20 yrs ago, 10 yrs ago, 7 

yrs ago)





- HEENT


Hx HEENT Problems: Yes


Hx Cataracts: Yes (Tahir IOL)





- RENAL


Hx Chronic Kidney Disease: Yes





- ENDOCRINE/METABOLIC


Hx Diabetes Mellitus Type 2: Yes





- HEMATOLOGICAL/ONCOLOGICAL


Hx Anemia: Yes





- INTEGUMENTARY


Hx Dermatological Problems: Yes


Other/Comment: Bilateral lower legs dry skin





- MUSCULOSKELETAL/RHEUMATOLOGICAL


Hx Arthritis: Yes


Hx Fractures: Yes (Right ankle. No sx, casted)





- GASTROINTESTINAL


Hx Gastrointestinal Disorders: Yes


Other/Comment: Hx colon cancer





- GENITOURINARY/GYNECOLOGICAL


Hx Genitourinary Disorders: No





- PSYCHIATRIC


Hx Substance Use: No





- SURGICAL HISTORY


Hx Cataract Extraction: Yes (LEFT)





- ANESTHESIA


Hx Anesthesia: Yes


Hx Anesthesia Reactions: No


Hx Malignant Hyperthermia: No





Meds


Allergies/Adverse Reactions: 


 Allergies











Allergy/AdvReac Type Severity Reaction Status Date / Time


 


Penicillins Allergy Intermediate RASH Verified 12/06/16 11:49














- Medications


Medications: 


 Current Medications





Aspirin (Ecotrin)  81 mg PO DAILY [facility]


   Last Admin: 04/25/17 09:19 Dose:  81 mg


Ferrous Sulfate (Feosol)  325 mg PO BID [facility]


   Last Admin: 04/25/17 09:19 Dose:  325 mg


Heparin Sodium (Porcine) (Heparin)  5,000 units SC Q8 [facility]


   Last Admin: 04/25/17 13:29 Dose:  5,000 units


Azithromycin 500 mg/ Sodium (Chloride)  250 mls @ 250 mls/hr IVPB Q24H [facility]


   Last Admin: 04/25/17 11:32 Dose:  250 mls/hr


Ceftriaxone Sodium 1 gm/ (Sodium Chloride)  100 mls @ 100 mls/hr IVPB DAILY [facility]


   Last Admin: 04/25/17 09:21 Dose:  100 mls/hr


Insulin Human Regular (Novolin R)  0 unit SC ACHS [facility]


   PRN Reason: Protocol


   Last Admin: 04/25/17 11:30 Dose:  4 unit


Labetalol HCl (Trandate)  200 mg PO BID [facility]


   Last Admin: 04/25/17 09:19 Dose:  200 mg


Lisinopril (Zestril)  5 mg PO DAILY [facility]


   Last Admin: 04/25/17 09:20 Dose:  5 mg


Ondansetron HCl (Zofran Inj)  4 mg IVP Q6 PRN


   PRN Reason: Nausea/Vomiting


   Last Admin: 04/23/17 12:47 Dose:  4 mg


Pantoprazole Sodium (Protonix Ec Tab)  20 mg PO DAILY [facility]


   Last Admin: 04/25/17 09:20 Dose:  20 mg











Physical Exam





- Constitutional


Additional comments: 





weak





- Neck Exam


Neck exam: Negative for: Tenderness





- Respiratory Exam


Respiratory Exam: Rales





- Cardiovascular Exam


Cardiovascular Exam: RRR





- GI/Abdominal Exam


GI & Abdominal Exam: Normal Bowel Sounds, Soft.  absent: Guarding, Mass, Rebound

, Tenderness





- Extremities Exam


Extremities exam: Positive for: pedal edema





- Neurological Exam


Neurological exam: Alert, Oriented x3


Additional comments: 





weak





Results





- Vital Signs


Recent Vital Signs: 


 Last Vital Signs











Temp  98.6 F   04/25/17 15:00


 


Pulse  91 H  04/25/17 16:00


 


Resp  20   04/25/17 15:00


 


BP  148/71   04/25/17 15:00


 


Pulse Ox  96   04/25/17 16:35














- Labs


Result Diagrams: 


 04/25/17 06:30





 04/25/17 06:48


Labs: 


 Laboratory Results - last 24 hr











  04/24/17 04/25/17 04/25/17





  21:40 01:48 06:30


 


WBC    10.1


 


RBC    3.17 L


 


Hgb    8.7 L


 


Hct    27.0 L


 


MCV    85.2


 


MCH    27.3


 


MCHC    32.1 L


 


RDW    15.5 H


 


Plt Count    138


 


MPV    9.3


 


Neut % (Auto)    87.3 H


 


Lymph % (Auto)    6.6 L


 


Mono % (Auto)    5.4


 


Eos % (Auto)    0.4


 


Baso % (Auto)    0.3


 


Neut #    8.9 H


 


Lymph #    0.7 L


 


Mono #    0.5


 


Eos #    0.0


 


Baso #    0.0


 


Neutrophils % (Manual)    83 H


 


Lymphocytes % (Manual)    10 L


 


Monocytes % (Manual)    5


 


Basophils % (Manual)    2


 


Platelet Estimate    Normal


 


Hypochromasia (manual)    Slight


 


Poikilocytosis (manual    Slight


 


Anisocytosis (manual)    Slight


 


Tear Drop Cells    Slight


 


Ovalocytes    Slight


 


[place] Cells    Slight


 


Sodium   


 


Potassium   


 


Chloride   


 


Carbon Dioxide   


 


Anion Gap   


 


BUN   


 


Creatinine   


 


Est GFR ( Amer)   


 


Est GFR (Non-Af Amer)   


 


POC Glucose (mg/dL)  371 H  302 H 


 


Random Glucose   


 


Calcium   


 


Phosphorus   


 


Magnesium   


 


Total Bilirubin   


 


AST   


 


ALT   


 


Alkaline Phosphatase   


 


NT-Pro-B Natriuret Pep   


 


Total Protein   


 


Albumin   


 


Globulin   


 


Albumin/Globulin Ratio   














  04/25/17 04/25/17 04/25/17





  06:33 06:48 11:53


 


WBC   


 


RBC   


 


Hgb   


 


Hct   


 


MCV   


 


MCH   


 


MCHC   


 


RDW   


 


Plt Count   


 


MPV   


 


Neut % (Auto)   


 


Lymph % (Auto)   


 


Mono % (Auto)   


 


Eos % (Auto)   


 


Baso % (Auto)   


 


Neut #   


 


Lymph #   


 


Mono #   


 


Eos #   


 


Baso #   


 


Neutrophils % (Manual)   


 


Lymphocytes % (Manual)   


 


Monocytes % (Manual)   


 


Basophils % (Manual)   


 


Platelet Estimate   


 


Hypochromasia (manual)   


 


Poikilocytosis (manual   


 


Anisocytosis (manual)   


 


Tear Drop Cells   


 


Ovalocytes   


 


[place] Cells   


 


Sodium   138 


 


Potassium   4.8 


 


Chloride   108 H 


 


Carbon Dioxide   17 L 


 


Anion Gap   18 


 


BUN   42 H 


 


Creatinine   3.5 H 


 


Est GFR ( Amer)   15 


 


Est GFR (Non-Af Amer)   13 


 


POC Glucose (mg/dL)  254 H   315 H


 


Random Glucose   243 H 


 


Calcium   8.6 


 


Phosphorus   4.0 


 


Magnesium   1.9 


 


Total Bilirubin   0.8 


 


AST   20 


 


ALT   23 


 


Alkaline Phosphatase   77 


 


NT-Pro-B Natriuret Pep   


 


Total Protein   6.5 


 


Albumin   3.4 L 


 


Globulin   3.1 


 


Albumin/Globulin Ratio   1.1 














  04/25/17 04/25/17





  14:07 16:02


 


WBC  


 


RBC  


 


Hgb  


 


Hct  


 


MCV  


 


MCH  


 


MCHC  


 


RDW  


 


Plt Count  


 


MPV  


 


Neut % (Auto)  


 


Lymph % (Auto)  


 


Mono % (Auto)  


 


Eos % (Auto)  


 


Baso % (Auto)  


 


Neut #  


 


Lymph #  


 


Mono #  


 


Eos #  


 


Baso #  


 


Neutrophils % (Manual)  


 


Lymphocytes % (Manual)  


 


Monocytes % (Manual)  


 


Basophils % (Manual)  


 


Platelet Estimate  


 


Hypochromasia (manual)  


 


Poikilocytosis (manual  


 


Anisocytosis (manual)  


 


Tear Drop Cells  


 


Ovalocytes  


 


[place] Cells  


 


Sodium  


 


Potassium  


 


Chloride  


 


Carbon Dioxide  


 


Anion Gap  


 


BUN  


 


Creatinine  


 


Est GFR ( Amer)  


 


Est GFR (Non-Af Amer)  


 


POC Glucose (mg/dL)   396 H


 


Random Glucose  


 


Calcium  


 


Phosphorus  


 


Magnesium  


 


Total Bilirubin  


 


AST  


 


ALT  


 


Alkaline Phosphatase  


 


NT-Pro-B Natriuret Pep  45539 H 


 


Total Protein  


 


Albumin  


 


Globulin  


 


Albumin/Globulin Ratio  














Assessment & Plan


(1) Poor appetite


Assessment and Plan: 


Likely related to underlying current medical conditions.  I feel when her 

medical condition improves, she will eat better.


Status: Acute   





(2) Altered mental status


Assessment and Plan: 


Had hypoglycemia.  Also pneumonia and pl effusions. and CRF, and anemia


Status: Acute   





(3) Diabetic hypoglycemia


Status: Acute   





(4) Pneumonia


Status: Acute   





(5) UTI (urinary tract infection)


Status: Acute   





(6) Anemia


Assessment and Plan: 


Axel chronic disease.  CRF.


Status: Chronic   





(7) Chronic kidney disease (CKD)


Assessment and Plan: 


Had recent AV shunt placed LUE


Status: Chronic   





(8) Hypertension


Status: Chronic   





(9) Colon cancer


Assessment and Plan: 


Pt reports h/o colon cancer in past.  She reports had f/u colonoscopy 3 yrs ago.


Status: Acute

## 2017-04-26 LAB
ALBUMIN/GLOB SERPL: 1 {RATIO} (ref 1–2.1)
ALP SERPL-CCNC: 89 U/L (ref 38–126)
ALT SERPL-CCNC: 23 U/L (ref 9–52)
AST SERPL-CCNC: 15 U/L (ref 14–36)
BASOPHILS # BLD AUTO: 0 K/UL (ref 0–0.2)
BASOPHILS NFR BLD: 0.5 % (ref 0–2)
BILIRUB SERPL-MCNC: 0.9 MG/DL (ref 0.2–1.3)
BUN SERPL-MCNC: 48 MG/DL (ref 7–17)
CALCIUM SERPL-MCNC: 8.8 MG/DL (ref 8.6–10.4)
CHLORIDE SERPL-SCNC: 106 MMOL/L (ref 98–107)
CO2 SERPL-SCNC: 17 MMOL/L (ref 22–30)
EOSINOPHIL # BLD AUTO: 0.1 K/UL (ref 0–0.7)
EOSINOPHIL NFR BLD: 1 % (ref 0–4)
EOSINOPHIL NFR BLD: 1.3 % (ref 0–4)
ERYTHROCYTE [DISTWIDTH] IN BLOOD BY AUTOMATED COUNT: 15.4 % (ref 11.5–14.5)
FOLATE SERPL-MCNC: 10.6 NG/ML
GLOBULIN SER-MCNC: 3.3 GM/DL (ref 2.2–3.9)
GLUCOSE SERPL-MCNC: 269 MG/DL (ref 65–105)
HCT VFR BLD CALC: 26.8 % (ref 34–47)
IRON SERPL-MCNC: 22 UG/DL (ref 37–170)
LYMPHOCYTES # BLD AUTO: 0.8 K/UL (ref 1–4.3)
LYMPHOCYTES NFR BLD AUTO: 8.7 % (ref 20–40)
MAGNESIUM SERPL-MCNC: 1.9 MG/DL (ref 1.6–2.3)
MCH RBC QN AUTO: 27 PG (ref 27–31)
MCHC RBC AUTO-ENTMCNC: 31.5 G/DL (ref 33–37)
MCV RBC AUTO: 85.8 FL (ref 81–99)
MONOCYTES # BLD: 0.5 K/UL (ref 0–0.8)
MONOCYTES NFR BLD: 5.5 % (ref 0–10)
NEUTROPHILS NFR BLD AUTO: 86 % (ref 50–75)
NRBC BLD AUTO-RTO: 0 % (ref 0–2)
PHOSPHATE SERPL-MCNC: 3.9 MG/DL (ref 2.5–4.5)
PLATELET # BLD: 142 K/UL (ref 130–400)
PMV BLD AUTO: 9.6 FL (ref 7.2–11.7)
POTASSIUM SERPL-SCNC: 4.5 MMOL/L (ref 3.6–5.2)
PROT SERPL-MCNC: 6.6 G/DL (ref 6.3–8.3)
SODIUM SERPL-SCNC: 138 MMOL/L (ref 132–148)
TOTAL CELLS COUNTED BLD: 100
WBC # BLD AUTO: 9.4 K/UL (ref 4.8–10.8)

## 2017-04-26 RX ADMIN — HUMAN INSULIN SCH UNIT: 100 INJECTION, SOLUTION SUBCUTANEOUS at 11:10

## 2017-04-26 RX ADMIN — INSULIN ASPART SCH UNIT: 100 INJECTION, SOLUTION INTRAVENOUS; SUBCUTANEOUS at 22:32

## 2017-04-26 RX ADMIN — PANTOPRAZOLE SODIUM SCH MG: 20 TABLET, DELAYED RELEASE ORAL at 10:56

## 2017-04-26 RX ADMIN — INSULIN GLARGINE SCH UNITS: 100 INJECTION, SOLUTION SUBCUTANEOUS at 22:31

## 2017-04-26 RX ADMIN — HUMAN INSULIN SCH UNIT: 100 INJECTION, SOLUTION SUBCUTANEOUS at 08:15

## 2017-04-26 NOTE — CP.PCM.PN
<Vera Willard V - Last Filed: 04/26/17 15:49>





Objective





- Vital Signs/Intake and Output


Vital Signs (last 24 hours): 


 











Temp Pulse Resp BP Pulse Ox


 


 99.8 F H  94 H  18   150/67   95 


 


 04/26/17 08:43  04/26/17 08:43  04/26/17 08:43  04/26/17 08:43  04/26/17 08:43








Intake and Output: 


 











 04/26/17 04/26/17





 06:59 18:59


 


Intake Total 240 


 


Balance 240 














- Medications


Medications: 


 Current Medications





Aspirin (Ecotrin)  81 mg PO DAILY [facility]


   Last Admin: 04/26/17 10:56 Dose:  81 mg


Ferrous Sulfate (Feosol)  325 mg PO BID [facility]


   Last Admin: 04/26/17 10:56 Dose:  325 mg


Heparin Sodium (Porcine) (Heparin)  5,000 units SC Q8 [facility]


   Last Admin: 04/26/17 13:15 Dose:  5,000 units


Azithromycin 500 mg/ Sodium (Chloride)  250 mls @ 250 mls/hr IVPB Q24H [facility]


   Last Admin: 04/26/17 10:59 Dose:  250 mls/hr


Ceftriaxone Sodium 1 gm/ (Sodium Chloride)  100 mls @ 100 mls/hr IVPB DAILY [facility]


   Last Admin: 04/26/17 10:56 Dose:  100 mls/hr


Insulin Human Regular (Novolin R)  0 unit SC ACHS [facility]


   PRN Reason: Protocol


   Last Admin: 04/26/17 11:10 Dose:  12 unit


Labetalol HCl (Trandate)  200 mg PO BID [facility]


   Last Admin: 04/26/17 10:56 Dose:  200 mg


Lisinopril (Zestril)  5 mg PO DAILY [facility]


   Last Admin: 04/26/17 10:56 Dose:  5 mg


Megestrol Acetate (Megace)  40 mg PO DAILY [facility]


   Last Admin: 04/26/17 10:55 Dose:  40 mg


Ondansetron HCl (Zofran Inj)  4 mg IVP Q6 PRN


   PRN Reason: Nausea/Vomiting


   Last Admin: 04/23/17 12:47 Dose:  4 mg


Pantoprazole Sodium (Protonix Ec Tab)  20 mg PO DAILY [facility]


   Last Admin: 04/26/17 10:56 Dose:  20 mg











- Labs


Labs: 


 





 04/26/17 07:16 





 04/26/17 07:16 











Attending/Attestation





- Attestation


I have personally seen and examined this patient.: Yes


I have fully participated in the care of the patient.: Yes


I have reviewed all pertinent clinical information, including history, physical 

exam and plan: Yes


Notes (Text): 


Patient seen, examined, and case discussed with day-time resident.


Patient started on nutritional supplement, tolerating over night, monitor 

calorie counts. Patient reports lack of desire to eat. No family present at 

bedside. Patient not appropriate for peg at this supplement. Will monitor eating

; may need NGT tube if does have adequate nutrition. Patient started on Megace 

to help stimulant appetite. Psych consulted to determine if there is an element 

of depression as well.





Assessment/Plan


1) Pneumonia 


* Chest xray (4/22/17) Diffuse bilateral infiltrates likely pulmonary edema/CHF


* Chest xray (4/25/17): new bilateral pleural effusions; persistent bilateral 

multifocal infiltrates


* Chest/Abdomen/Pelvis (4/24/17): bilateral moderate size pleural effusion with 

associated compressive atelectasis. Bilateral upper lobe airspace patchy 

opacities. Minimal perihepatic fluid. Scattered calcifications in the left 

breast


* Rocephin 1 gram IV qdaily (active since 4/23/17)


* Azithromycin 500mg IV Q24 hours (active since 4/23/17)


* Blood cultures (4/22/17): no growth X2


* Urine culture (4/22/17): no growth


* monitor CBC, and temperature


* Tmax: 99.8F; order for procalcitonin





2) Pleural effusions


* Chest xray (4/22/17) Diffuse bilateral infiltrates likely pulmonary edema/CHF


* Chest xray (4/25/17): new bilateral pleural effusions; persistent bilateral 

multifocal infiltrates


* Chest/Abdomen/Pelvis (4/24/17): bilateral moderate size pleural effusion with 

associated compressive atelectasis. Bilateral upper lobe airspace patchy 

opacities. Minimal perihepatic fluid. Scattered calcifications in the left 

breast


* Lasix 40mg IV q daily 





3) Metabolic encephalopathy


* Etiologies including: hypoglycemia, pneumonia, and urinary tract infection





4) Chronic Kidney Disease


* Nephrology (Dr. Munoz) on the case


* monitor BUN/Cr





5) UTI (urinary tract infection)


* Rocephin 1 gram IV qdaily (active since 4/23/17)


* Urine culture (4/22/17): no growth 


* Abnormal UA (4/22/17): positive protein, glucose, blood, and pyuria, hematuria





6) Hypertension


Assessment & Plan: 


* Labetalol 200 mg PO BID, Zestril 5 mg PO daily


* Monitor vital signs


* Echo (4/24) approx greater than 50% EF; severely thickened mitral valve; 

cannot rule out vegetation; aortic valve severely thickened. refer to full 

report





7) Diabetes 


* [id number]: 7.0


* Patient is off insulin regimen given she came in for altered mental status 

secondary to hypoglycemia


* Monitor nutrition prior to starting insulin regimen


* Start Lantus 5 units subqHS


* start novolog insulin sliding scale


* monitor accuchecks QAC and HS





8) Anemia


Assessment & Plan: 


* suspecting secondary to anemia of chronic kidney disease


* monitor H/H


* Feosol 325mg PO bid


* Order for iron studies, reti count, b12, folate, haptoglobin, stool occult 

blood, ferritin





9) Unintentional weight loss; hx of colon cancer


* Monitor calorie counts


* GI consult (Dr. Kulkarni) on board


* CT chest, abd,pelvis w/o contrast- no apparent mass noted. thickening of 

rectal wall noted; mild stranding of perirectal fat; underlying proctitis can 

bconsidered. Surgical suture lines noted in bowel area. Refer to complete report





10) Prophylactic measure


Assessment & Plan: 


* Heparin SC Q8 hours 


* Protonix 40mg PO daily


* PT/OT eval- F/U


* OOB to chair


* IVC filter


Status: Acute





<Marysol Amor - Last Filed: 04/26/17 16:27>





Subjective





- Date & Time of Evaluation


Date of Evaluation: 04/26/17


Time of Evaluation: 07:40





- Subjective


Subjective: 





PGY 1 Medicine Note- Dr. Willard's service





Pt seen and examined earlier. Patient still a bit somnolent. Patient states 

that she consumed the dietary supplement given. She still admits to having poor 

appetite. She denies any abdominal pain, subjective chills, nausea, vomiting, 

chest pain, dyspnea, palpitations, paresthesias, headaches at this time. 





Objective





- Vital Signs/Intake and Output


Vital Signs (last 24 hours): 


 











Temp Pulse Resp BP Pulse Ox


 


 99.0 F   88   20   151/69 H  96 


 


 04/25/17 23:35  04/26/17 00:10  04/25/17 23:35  04/25/17 23:35  04/25/17 23:35








Intake and Output: 


 











 04/26/17 04/26/17





 06:59 18:59


 


Intake Total 240 


 


Balance 240 














- Medications


Medications: 


 Current Medications





Aspirin (Ecotrin)  81 mg PO DAILY [facility]


   Last Admin: 04/25/17 09:19 Dose:  81 mg


Ferrous Sulfate (Feosol)  325 mg PO BID [facility]


   Last Admin: 04/25/17 18:17 Dose:  325 mg


Heparin Sodium (Porcine) (Heparin)  5,000 units SC Q8 [facility]


   Last Admin: 04/26/17 05:40 Dose:  Not Given


Azithromycin 500 mg/ Sodium (Chloride)  250 mls @ 250 mls/hr IVPB Q24H [facility]


   Last Admin: 04/25/17 11:32 Dose:  250 mls/hr


Ceftriaxone Sodium 1 gm/ (Sodium Chloride)  100 mls @ 100 mls/hr IVPB DAILY [facility]


   Last Admin: 04/25/17 09:21 Dose:  100 mls/hr


Insulin Human Regular (Novolin R)  0 unit SC ACHS [facility]


   PRN Reason: Protocol


   Last Admin: 04/25/17 22:28 Dose:  2 unit


Labetalol HCl (Trandate)  200 mg PO BID [facility]


   Last Admin: 04/25/17 18:17 Dose:  200 mg


Lisinopril (Zestril)  5 mg PO DAILY [facility]


   Last Admin: 04/25/17 09:20 Dose:  5 mg


Ondansetron HCl (Zofran Inj)  4 mg IVP Q6 PRN


   PRN Reason: Nausea/Vomiting


   Last Admin: 04/23/17 12:47 Dose:  4 mg


Pantoprazole Sodium (Protonix Ec Tab)  20 mg PO DAILY [facility]


   Last Admin: 04/25/17 09:20 Dose:  20 mg











- Labs


Labs: 


 





 04/25/17 06:30 





 04/25/17 06:48 











- Constitutional


Appears: Non-toxic, No Acute Distress





- Head Exam


Head Exam: ATRAUMATIC, NORMAL INSPECTION, NORMOCEPHALIC





- Eye Exam


Eye Exam: EOMI, Normal appearance, PERRL


Pupil Exam: NORMAL ACCOMODATION, PERRL





- ENT Exam


ENT Exam: Mucous Membranes Moist, Normal Exam





- Neck Exam


Neck Exam: Full ROM, Normal Inspection





- Respiratory Exam


Respiratory Exam: Wheezes, NORMAL BREATHING PATTERN





- Cardiovascular Exam


Cardiovascular Exam: REGULAR RHYTHM, +S1, +S2





- GI/Abdominal Exam


GI & Abdominal Exam: Soft, Normal Bowel Sounds





- Extremities Exam


Extremities Exam: Full ROM, Normal Inspection





- Back Exam


Back Exam: Full ROM, NORMAL INSPECTION





- Neurological Exam


Neurological Exam: Alert, Awake, CN II-XII Intact, Oriented x3





- Psychiatric Exam


Psychiatric exam: Normal Affect, Normal Mood





- Skin


Skin Exam: Dry, Intact, Normal Color, Warm





Assessment and Plan





- Assessment and Plan (Free Text)


Assessment: 





Pneumonia w/ pleural effusions noted


Assessment & Plan: 


Chest xray (4/25/17): new bilateral pleural effusions; persistent bilateral 

multifocal infiltrates


Zithromax,(Started 4/23/17)  Patient with low grade fever of 99.8 in AM. Will 

monitor  temps and check for procalcitonin and repeat cultures if febrile.


Rocephin 1 gram IV daily (Started 4/23/17)


Blood, urine cultures negative to date.


One dose of Lasix 40 mg IV today. Lasix 20 mg IV BID  daily


Elevated BNP. 


Status: Acute





Diabetes Mellitus


Hypoglycemia now resolved, Cont to monitor.


Begin Lantus 5 units SC HS


ISS high dose


Encourage intake


Monitor


Status: Acute





Anorexia with Weight Loss


Hx of Colon cancer- prev [place] [facility] [place] be located 

per GI.


Monitor calorie counts, Megace given.


GI consult (Dr. Kulkarni/Lori) on board. No immediate plans for PEG tube. 

Encourage patient to eat. Appetite may return upon treatment of underling 

pneumonia/UTI. NG tube feeds suggested if patient's lack of appetite continued.


CT chest, abd,pelvis w/o contrast- no apparent mass noted. thickening of rectal 

wall noted; mild stranding of perirectal fat; underlying proctitis can 

bconsidered. Surgical suture lines noted in bowel area. Refer to complete report


Consult to Psych (Dr. Parikh) to rule out depression as reason for sudden lack 

of appetite.





UTI (urinary tract infection)


Assessment & Plan: 


IV Rocephin, urine culture negative, blood culture no growth to date 


Status: Acute





Chronic kidney disease (CKD)


Assessment & Plan: 


Dr. Munoz consulted, patient may need dialysis at some point in the future; 

Start procrit, PTH pending


Status: Chronic





Hypertension


Assessment & Plan: 


Labetalol 200 mg PO BID, Zestril 5 mg PO daily


Echo (4/24) approx greater than 50% EF; severely thickened mitral valve; cannot 

rule out vegetation; aortic valve severely thickened. refer to full report.


Status: Chronic





Anemia


Assessment & Plan: 


Most likely anemia of chronic disease secondarily to CKD


Ferrous sulfate daily


Iron studies- F/U


Status: Chronic





Hyperkalemia


Assessment & Plan: 


Resolved.


Cont to monitor





Prophylactic measure


Assessment & Plan: 


Heparin SC Q8


PPI


PT/OT eval- F/U


OOB to chair


Status: Acute

## 2017-04-26 NOTE — CP.PCM.PN
Subjective





- Date & Time of Evaluation


Date of Evaluation: 04/26/17


Time of Evaluation: 10:13





- Subjective


Subjective: 





CC: follow up poor feeding





Ate a little bit. Dyspneic, Chronically ill appearing.





Objective





- Vital Signs/Intake and Output


Vital Signs (last 24 hours): 


 











Temp Pulse Resp BP Pulse Ox


 


 99.8 F H  94 H  18   150/67   95 


 


 04/26/17 08:43  04/26/17 08:43  04/26/17 08:43  04/26/17 08:43  04/26/17 08:43








Intake and Output: 


 











 04/26/17 04/26/17





 06:59 18:59


 


Intake Total 240 


 


Balance 240 














- Medications


Medications: 


 Current Medications





Aspirin (Ecotrin)  81 mg PO DAILY [facility]


   Last Admin: 04/25/17 09:19 Dose:  81 mg


Ferrous Sulfate (Feosol)  325 mg PO BID [facility]


   Last Admin: 04/25/17 18:17 Dose:  325 mg


Heparin Sodium (Porcine) (Heparin)  5,000 units SC Q8 [facility]


   Last Admin: 04/26/17 05:40 Dose:  Not Given


Azithromycin 500 mg/ Sodium (Chloride)  250 mls @ 250 mls/hr IVPB Q24H [facility]


   Last Admin: 04/25/17 11:32 Dose:  250 mls/hr


Ceftriaxone Sodium 1 gm/ (Sodium Chloride)  100 mls @ 100 mls/hr IVPB DAILY [facility]


   Last Admin: 04/25/17 09:21 Dose:  100 mls/hr


Insulin Human Regular (Novolin R)  0 unit SC ACHS [facility]


   PRN Reason: Protocol


   Last Admin: 04/25/17 22:28 Dose:  2 unit


Labetalol HCl (Trandate)  200 mg PO BID [facility]


   Last Admin: 04/25/17 18:17 Dose:  200 mg


Lisinopril (Zestril)  5 mg PO DAILY [facility]


   Last Admin: 04/25/17 09:20 Dose:  5 mg


Megestrol Acetate (Megace)  40 mg PO DAILY [facility]


Ondansetron HCl (Zofran Inj)  4 mg IVP Q6 PRN


   PRN Reason: Nausea/Vomiting


   Last Admin: 04/23/17 12:47 Dose:  4 mg


Pantoprazole Sodium (Protonix Ec Tab)  20 mg PO DAILY [facility]


   Last Admin: 04/25/17 09:20 Dose:  20 mg











- Labs


Labs: 


 





 04/26/17 07:16 





 04/26/17 07:16 











- Constitutional


Appears: Chronically Ill





- Head Exam


Additional comments: 





Bitemporal wasting





- Cardiovascular Exam


Cardiovascular Exam: REGULAR RHYTHM





- GI/Abdominal Exam


GI & Abdominal Exam: Soft.  absent: Tenderness





Assessment and Plan


(1) CHF (congestive heart failure)


Status: Acute   





(2) Colon cancer


Assessment & Plan: 


Attempted to get records from [facility] [place], but they say they have no records on this 

patient


Had Colonoscopy 3 years ago


Status: Inactive   





(3) Poor appetite


Assessment & Plan: 


Due to acute illness


Recommend NG feeds if not eating adequately


Would not recommend PEG at this point. Will follow.


Status: Acute   





(4) Chronic kidney disease (CKD)


Status: Chronic

## 2017-04-26 NOTE — CP.PCM.PN
Subjective





- Date & Time of Evaluation


Date of Evaluation: 04/26/17


Time of Evaluation: 04:45





- Subjective


Subjective: 





Less sob today





Objective





- Vital Signs/Intake and Output


Vital Signs (last 24 hours): 


 











Temp Pulse Resp BP Pulse Ox


 


 99.8 F H  94 H  18   150/67   95 


 


 04/26/17 08:43  04/26/17 08:43  04/26/17 08:43  04/26/17 08:43  04/26/17 08:43








Intake and Output: 


 











 04/26/17 04/26/17





 06:59 18:59


 


Intake Total 240 


 


Balance 240 














- Medications


Medications: 


 Current Medications





Aspirin (Ecotrin)  81 mg PO DAILY [facility]


   Last Admin: 04/26/17 10:56 Dose:  81 mg


Ferrous Sulfate (Feosol)  325 mg PO BID [facility]


   Last Admin: 04/26/17 10:56 Dose:  325 mg


Furosemide (Lasix)  20 mg IVP BID [facility]


Heparin Sodium (Porcine) (Heparin)  5,000 units SC Q8 [facility]


   Last Admin: 04/26/17 13:15 Dose:  5,000 units


Azithromycin 500 mg/ Sodium (Chloride)  250 mls @ 250 mls/hr IVPB Q24H [facility]


   Last Admin: 04/26/17 10:59 Dose:  250 mls/hr


Ceftriaxone Sodium 1 gm/ (Sodium Chloride)  100 mls @ 100 mls/hr IVPB DAILY [facility]


   Last Admin: 04/26/17 10:56 Dose:  100 mls/hr


Insulin Aspart (Novolog)  0 unit SC ACHS [facility]


   PRN Reason: Protocol


Insulin Glargine (Lantus)  5 unit SC HS [facility]


Labetalol HCl (Trandate)  200 mg PO BID [facility]


   Last Admin: 04/26/17 10:56 Dose:  200 mg


Lisinopril (Zestril)  5 mg PO DAILY [facility]


   Last Admin: 04/26/17 10:56 Dose:  5 mg


Megestrol Acetate (Megace)  40 mg PO DAILY [facility]


   Last Admin: 04/26/17 10:55 Dose:  40 mg


Ondansetron HCl (Zofran Inj)  4 mg IVP Q6 PRN


   PRN Reason: Nausea/Vomiting


   Last Admin: 04/23/17 12:47 Dose:  4 mg


Pantoprazole Sodium (Protonix Ec Tab)  20 mg PO DAILY [facility]


   Last Admin: 04/26/17 10:56 Dose:  20 mg











- Labs


Labs: 


 





 04/26/17 07:16 





 04/26/17 07:16 











- Respiratory Exam


Additional comments: 





Lungs clear





- Cardiovascular Exam


Cardiovascular Exam: REGULAR RHYTHM





- Extremities Exam


Additional comments: 





No edema





Assessment and Plan





- Assessment and Plan (Free Text)


Assessment: 





Stable Stage 1V kidney disease


? pneumonia


B/L pleural effusions


Anemia of CKD


Plan: 





Continue to monitor renal function


Maintain on lasix

## 2017-04-26 NOTE — PCM.PSYCH
Initial Psychiatric Evaluation





- Initial Psychiatric Evaluation


Type of Admission: Voluntary


Chief Complaint (in patient's own words): 





"tired"


History of Present Illness and Precipitating Events: 





The pt is seen, chart reviewed. Consult was requested for her psych 

presentation.





The pt is a 78 yo AAF, living alone, retired 


Writer spoke to her daughter too, with pt's permission


She is forgetful and inattentive. However, she is oriented x3


Feels anxious but denies depressiona nd no AVH/del elicited


She denies drug/alcohol use 


Self care is questionable but daughter says she was "OK" so far but 

deteriorating and that she will need home aide..


It's likely she is mixing her meds and "forgetting"





Past psych hx: Denied


Medical hx: Multiple 


Family psych hx: Denied


Current Medications: 





Active Medications











Generic Name Dose Route Start Last Admin





  Trade Name Freq  PRN Reason Stop Dose Admin


 


Aspirin  81 mg  04/23/17 10:00  04/26/17 10:56





  Ecotrin  PO   81 mg





  DAILY ABHILASH   Administration


 


Ferrous Sulfate  325 mg  04/24/17 18:00  04/26/17 10:56





  Feosol  PO   325 mg





  BID ABHILASH   Administration


 


Heparin Sodium (Porcine)  5,000 units  04/22/17 14:00  04/26/17 05:40





  Heparin  SC   Not Given





  Q8 ABHILASH   


 


Azithromycin 500 mg/ Sodium  250 mls @ 250 mls/hr  04/23/17 12:00  04/26/17 10:

59





  Chloride  IVPB   250 mls/hr





  Q24H ABHILASH   Administration


 


Ceftriaxone Sodium 1 gm/  100 mls @ 100 mls/hr  04/23/17 10:00  04/26/17 10:56





  Sodium Chloride  IVPB   100 mls/hr





  DAILY ABHILASH   Administration


 


Insulin Human Regular  0 unit  04/25/17 11:00  04/26/17 11:10





  Novolin R  SC   12 unit





  [facility]   Administration





  Protocol   


 


Labetalol HCl  200 mg  04/22/17 18:00  04/26/17 10:56





  Trandate  PO   200 mg





  BID ABHILASH   Administration


 


Lisinopril  5 mg  04/22/17 12:15  04/26/17 10:56





  Zestril  PO   5 mg





  DAILY ABHILASH   Administration


 


Megestrol Acetate  40 mg  04/26/17 10:30  04/26/17 10:55





  Megace  PO   40 mg





  DAILY ABHILASH   Administration


 


Ondansetron HCl  4 mg  04/22/17 12:55  04/23/17 12:47





  Zofran Inj  IVP   4 mg





  Q6 PRN   Administration





  Nausea/Vomiting   


 


Pantoprazole Sodium  20 mg  04/23/17 10:00  04/26/17 10:56





  Protonix Ec Tab  PO   20 mg





  DAILY ABHILASH   Administration














Past Psychiatric History





- Past Psychiatric History


Previous Treatment History: None


Pertinent Medical Hx (Current Medical&Sleep Prob, Allergies): 





 Allergies











Allergy/AdvReac Type Severity Reaction Status Date / Time


 


Penicillins Allergy Intermediate RASH Verified 12/06/16 11:49








 





Aspirin [Ecotrin] 81 mg PO DAILY #0 tabec 11/22/16 


Calcitriol [Rocaltrol] 0.25 mcg PO MWF #12 sgl 11/22/16 


Furosemide [Lasix] 40 mg PO DAILY #30 tab 11/22/16 


Insulin Aspart [Novolog Flexpen] 10 unit SQ BID #0  11/22/16 


Labetalol [Trandate] 200 mg PO BID #0 tab 11/22/16 


Lisinopril [Zestril] 5 mg PO DAILY #30 tab 11/22/16 











Review of Systems





- Psychiatric


Psychiatric: Change in Appetite, Memory Loss





Mental Status Examination





- Personal Presentation


Personal Presentation: Looks stated age





- Affect


Affect: Constricted





- Reliability in Providing Information


Reliability in Providing Information: Poor, due to cognitve impairment





- Speech


Speech: Organized





- Mood


Mood: Anxious





- Formal Thought Process


Formal Thought Process: Perservation





- Cognitive Functions


Orientation: Person, Place, Time


Sensorium: Drowsy


Attention/Concentration: Easily distracted


Estimate of Intelligence: Average


Judgement: Intact, as evidence by: Insight regarding need for hospitalization


Memory: Recent impaired, as evidence by: Inability to recall events of the day (

mild), Remote impaired as evidenced by: Inability to recall sig life events





- Risk


Risk: Diminished functioning





DSM 5 DX





- DSM 5


DSM 5 Diagnosis: 





Mild cognitive impairment


Anxiety unspecified





- Recommended/Plan of Treatment


Treatment Recommendations and Plan of Treatment: 





No psych meds needed for now


Support and psychoed


Monitor for developing dementia


Daughter says she needs home health aid





32 min

## 2017-04-27 LAB
ALBUMIN/GLOB SERPL: 1 {RATIO} (ref 1–2.1)
ALP SERPL-CCNC: 103 U/L (ref 38–126)
ALT SERPL-CCNC: 21 U/L (ref 9–52)
AST SERPL-CCNC: 21 U/L (ref 14–36)
BASOPHILS # BLD AUTO: 0 K/UL (ref 0–0.2)
BASOPHILS NFR BLD: 0.6 % (ref 0–2)
BILIRUB SERPL-MCNC: 0.9 MG/DL (ref 0.2–1.3)
BUN SERPL-MCNC: 58 MG/DL (ref 7–17)
CALCIUM SERPL-MCNC: 9 MG/DL (ref 8.6–10.4)
CHLORIDE SERPL-SCNC: 108 MMOL/L (ref 98–107)
CO2 SERPL-SCNC: 19 MMOL/L (ref 22–30)
EOSINOPHIL # BLD AUTO: 0.3 K/UL (ref 0–0.7)
EOSINOPHIL NFR BLD: 3.3 % (ref 0–4)
ERYTHROCYTE [DISTWIDTH] IN BLOOD BY AUTOMATED COUNT: 15.4 % (ref 11.5–14.5)
GLOBULIN SER-MCNC: 3.3 GM/DL (ref 2.2–3.9)
GLUCOSE SERPL-MCNC: 179 MG/DL (ref 65–105)
HCT VFR BLD CALC: 24.4 % (ref 34–47)
LYMPHOCYTES # BLD AUTO: 0.9 K/UL (ref 1–4.3)
LYMPHOCYTES NFR BLD AUTO: 10.7 % (ref 20–40)
MAGNESIUM SERPL-MCNC: 2.1 MG/DL (ref 1.6–2.3)
MCH RBC QN AUTO: 27.1 PG (ref 27–31)
MCHC RBC AUTO-ENTMCNC: 32.1 G/DL (ref 33–37)
MCV RBC AUTO: 84.5 FL (ref 81–99)
MONOCYTES # BLD: 0.4 K/UL (ref 0–0.8)
MONOCYTES NFR BLD: 5.3 % (ref 0–10)
NRBC BLD AUTO-RTO: 0 % (ref 0–2)
PHOSPHATE SERPL-MCNC: 3.2 MG/DL (ref 2.5–4.5)
PLATELET # BLD: 151 K/UL (ref 130–400)
PMV BLD AUTO: 9.5 FL (ref 7.2–11.7)
POTASSIUM SERPL-SCNC: 4.6 MMOL/L (ref 3.6–5.2)
PROT SERPL-MCNC: 6.4 G/DL (ref 6.3–8.3)
SODIUM SERPL-SCNC: 139 MMOL/L (ref 132–148)
WBC # BLD AUTO: 8.1 K/UL (ref 4.8–10.8)

## 2017-04-27 RX ADMIN — INSULIN ASPART SCH UNIT: 100 INJECTION, SOLUTION INTRAVENOUS; SUBCUTANEOUS at 18:00

## 2017-04-27 RX ADMIN — INSULIN ASPART SCH UNIT: 100 INJECTION, SOLUTION INTRAVENOUS; SUBCUTANEOUS at 08:16

## 2017-04-27 RX ADMIN — INSULIN ASPART SCH UNIT: 100 INJECTION, SOLUTION INTRAVENOUS; SUBCUTANEOUS at 22:57

## 2017-04-27 RX ADMIN — INSULIN GLARGINE SCH UNITS: 100 INJECTION, SOLUTION SUBCUTANEOUS at 22:57

## 2017-04-27 RX ADMIN — INSULIN ASPART SCH UNIT: 100 INJECTION, SOLUTION INTRAVENOUS; SUBCUTANEOUS at 12:30

## 2017-04-27 RX ADMIN — PANTOPRAZOLE SODIUM SCH MG: 20 TABLET, DELAYED RELEASE ORAL at 09:36

## 2017-04-27 NOTE — CP.PCM.PN
<Vera Willard V - Last Filed: 04/27/17 16:09>





Objective





- Vital Signs/Intake and Output


Vital Signs (last 24 hours): 


 











Temp Pulse Resp BP Pulse Ox


 


 98.7 F   98 H  20   152/67 H  99 


 


 04/27/17 15:29  04/27/17 15:29  04/27/17 15:29  04/27/17 15:29  04/27/17 15:29








Intake and Output: 


 











 04/27/17 04/27/17





 06:59 18:59


 


Intake Total 120 


 


Balance 120 














- Medications


Medications: 


 Current Medications





Aspirin (Ecotrin)  81 mg PO DAILY [facility]


   Last Admin: 04/27/17 09:36 Dose:  81 mg


Epoetin Vamsi (Procrit)  10,000 unit SC QWK [facility]


Ferrous Sulfate (Feosol)  325 mg PO BID [facility]


   Last Admin: 04/27/17 09:35 Dose:  325 mg


Furosemide (Lasix)  20 mg IVP BID [facility]


   Last Admin: 04/27/17 09:39 Dose:  Not Given


Heparin Sodium (Porcine) (Heparin)  5,000 units SC Q8 [facility]


   Last Admin: 04/27/17 14:12 Dose:  Not Given


Azithromycin 500 mg/ Sodium (Chloride)  250 mls @ 250 mls/hr IVPB Q24H [facility]


   Last Admin: 04/27/17 12:00 Dose:  Not Given


Ceftriaxone Sodium 1 gm/ (Sodium Chloride)  100 mls @ 100 mls/hr IVPB DAILY [facility]


   Last Admin: 04/27/17 09:34 Dose:  100 mls/hr


Insulin Aspart (Novolog)  0 unit SC ACHS [facility]


   PRN Reason: Protocol


   Last Admin: 04/27/17 12:30 Dose:  1 unit


Insulin Glargine (Lantus)  5 unit SC HS [facility]


   Last Admin: 04/26/17 22:31 Dose:  5 units


Labetalol HCl (Trandate)  200 mg PO BID [facility]


   Last Admin: 04/27/17 09:36 Dose:  200 mg


Lisinopril (Zestril)  5 mg PO DAILY [facility]


   Last Admin: 04/27/17 09:36 Dose:  5 mg


Megestrol Acetate (Megace)  40 mg PO DAILY [facility]


   Last Admin: 04/27/17 09:36 Dose:  40 mg


Ondansetron HCl (Zofran Inj)  4 mg IVP Q6 PRN


   PRN Reason: Nausea/Vomiting


   Last Admin: 04/23/17 12:47 Dose:  4 mg


Pantoprazole Sodium (Protonix Ec Tab)  20 mg PO DAILY [facility]


   Last Admin: 04/27/17 09:36 Dose:  20 mg


Sodium Bicarbonate (Sodium Bicarbonate Tab)  1,300 mg PO BID [facility]











- Labs


Labs: 


 





 04/27/17 06:56 





 04/27/17 06:56 











Attending/Attestation





- Attestation


I have personally seen and examined this patient.: Yes


I have fully participated in the care of the patient.: Yes


I have reviewed all pertinent clinical information, including history, physical 

exam and plan: Yes


Notes (Text): 


Patient seen, examined, and case discussed with day-time resident.


Patient looks better, more lively than previously seen yesterday. Patient 

sitting upright. Patient very conversant. Patient reports everyone is 

encouraging her to eat. Discussed with patient she may need NGT tube if her 

nutrition does not improve and patient reports she does not want one and 

reports ate dinner and breakfast this morning. Patient's hemoglobin 7.8, type 

and cross 1 unit of PRBC for today.


Monitor calorie count


Continue IV abx for community acquired pneumonia; lung exam clinically improving

, repeat chest xray tomorrow.





Assessment/Plan


1) Pneumonia 


* Chest xray (4/22/17) Diffuse bilateral infiltrates likely pulmonary edema/CHF


* Chest xray (4/25/17): new bilateral pleural effusions; persistent bilateral 

multifocal infiltrates


* Chest/Abdomen/Pelvis (4/24/17): bilateral moderate size pleural effusion with 

associated compressive atelectasis. Bilateral upper lobe airspace patchy 

opacities. Minimal perihepatic fluid. Scattered calcifications in the left 

breast


* Rocephin 1 gram IV qdaily (active since 4/23/17)


* Azithromycin 500mg IV Q24 hours (active since 4/23/17)


* Blood cultures (4/22/17): no growth X2


* Urine culture (4/22/17): no growth


* monitor CBC, and temperature


* Tmax: 99.8F; order for procalcitonin--pending





2) Pleural effusions


* Chest xray (4/22/17) Diffuse bilateral infiltrates likely pulmonary edema/CHF


* Chest xray (4/25/17): new bilateral pleural effusions; persistent bilateral 

multifocal infiltrates


* Chest/Abdomen/Pelvis (4/24/17): bilateral moderate size pleural effusion with 

associated compressive atelectasis. Bilateral upper lobe airspace patchy 

opacities. Minimal perihepatic fluid. Scattered calcifications in the left 

breast


* Lasix 40mg IV q daily 





3) Metabolic encephalopathy


* Etiologies including: hypoglycemia, pneumonia, and urinary tract infection





4) Chronic Kidney Disease


* Nephrology (Dr. Munoz) on the case


* monitor BUN/Cr





5) UTI (urinary tract infection)


* Rocephin 1 gram IV qdaily (active since 4/23/17)


* Urine culture (4/22/17): no growth 


* Abnormal UA (4/22/17): positive protein, glucose, blood, and pyuria, hematuria





6) Hypertension


Assessment & Plan: 


* Labetalol 200 mg PO BID, Zestril 5 mg PO daily


* Monitor vital signs


* Echo (4/24) approx greater than 50% EF; severely thickened mitral valve; 

cannot rule out vegetation; aortic valve severely thickened. refer to full 

report





7) Diabetes 


* [id number]: 7.0


* Patient is off insulin regimen given she came in for altered mental status 

secondary to hypoglycemia


* Monitor nutrition prior to starting insulin regimen


* Start Lantus 5 units subqHS


* start novolog insulin sliding scale


* monitor accuchecks QAC and HS





8) Anemia


Assessment & Plan: 


* suspecting secondary to anemia of chronic kidney disease


* monitor H/H


* Feosol 325mg PO bid (active since 4/24/17)


* low iron and low iron stores


* type and cross 1 unit of PRBC for today





9) Unintentional weight loss; hx of colon cancer


* Monitor calorie counts


* GI consult (Dr. Kulkarni) on board


* CT chest, abd,pelvis w/o contrast- no apparent mass noted. thickening of 

rectal wall noted; mild stranding of perirectal fat; underlying proctitis can 

bconsidered. Surgical suture lines noted in bowel area. Refer to complete report


* Monitor calorie count





10) Prophylactic measure


Assessment & Plan: 


* Heparin SC Q8 hours 


* Protonix 40mg PO daily


* PT/OT eval- F/U


* OOB to chair


* IVC filter


* Monitor calorie count











<Marysol Amor - Last Filed: 04/27/17 20:07>





Subjective





- Date & Time of Evaluation


Date of Evaluation: 04/27/17


Time of Evaluation: 07:30





- Subjective


Subjective: 





PGY 1 Medicine Note- Dr. Willard's service





Pt seen and examined in no acute distress. Patient conversational and shared 

that she was able to eat more of her dinner last night with family present. 

Patient expressed not wanting an ng tube placed to facilitate feedings. She 

agreed to attempt eating more despite lack of appetite. Management was 

discussed with nephew in person and daughter over the phone. Concerns were 

addressed. At this time patient denied subjective fevers or chills, nausea, 

vomiting, abdominal pain, diarrhea, constipation, shortness of breath, chest 

pain, palpitations or paresthesias.





Objective





- Vital Signs/Intake and Output


Vital Signs (last 24 hours): 


 











Temp Pulse Resp BP Pulse Ox


 


 99.0 F   93 H  20   146/60   99 


 


 04/27/17 07:25  04/27/17 07:25  04/27/17 07:25  04/27/17 07:25  04/27/17 07:25








Intake and Output: 


 











 04/27/17 04/27/17





 06:59 18:59


 


Intake Total 120 


 


Balance 120 














- Medications


Medications: 


 Current Medications





Aspirin (Ecotrin)  81 mg PO DAILY [facility]


   Last Admin: 04/26/17 10:56 Dose:  81 mg


Ferrous Sulfate (Feosol)  325 mg PO BID [facility]


   Last Admin: 04/26/17 17:59 Dose:  325 mg


Furosemide (Lasix)  20 mg IVP BID [facility]


Heparin Sodium (Porcine) (Heparin)  5,000 units SC Q8 [facility]


   Last Admin: 04/27/17 06:27 Dose:  Not Given


Azithromycin 500 mg/ Sodium (Chloride)  250 mls @ 250 mls/hr IVPB Q24H [facility]


   Last Admin: 04/26/17 10:59 Dose:  250 mls/hr


Ceftriaxone Sodium 1 gm/ (Sodium Chloride)  100 mls @ 100 mls/hr IVPB DAILY [facility]


   Last Admin: 04/27/17 09:34 Dose:  100 mls/hr


Insulin Aspart (Novolog)  0 unit SC ACHS [facility]


   PRN Reason: Protocol


   Last Admin: 04/27/17 08:16 Dose:  2 unit


Insulin Glargine (Lantus)  5 unit SC HS [facility]


   Last Admin: 04/26/17 22:31 Dose:  5 units


Labetalol HCl (Trandate)  200 mg PO BID [facility]


   Last Admin: 04/26/17 17:59 Dose:  200 mg


Lisinopril (Zestril)  5 mg PO DAILY [facility]


   Last Admin: 04/26/17 10:56 Dose:  5 mg


Megestrol Acetate (Megace)  40 mg PO DAILY [facility]


   Last Admin: 04/26/17 10:55 Dose:  40 mg


Ondansetron HCl (Zofran Inj)  4 mg IVP Q6 PRN


   PRN Reason: Nausea/Vomiting


   Last Admin: 04/23/17 12:47 Dose:  4 mg


Pantoprazole Sodium (Protonix Ec Tab)  20 mg PO DAILY [facility]


   Last Admin: 04/26/17 10:56 Dose:  20 mg











- Labs


Labs: 


 





 04/27/17 06:56 





 04/27/17 06:56 











- Constitutional


Appears: Non-toxic, No Acute Distress





- Head Exam


Head Exam: ATRAUMATIC, NORMAL INSPECTION, NORMOCEPHALIC





- Eye Exam


Eye Exam: EOMI, Normal appearance, PERRL


Pupil Exam: NORMAL ACCOMODATION





- ENT Exam


ENT Exam: Mucous Membranes Moist





- Neck Exam


Neck Exam: Full ROM





- Respiratory Exam


Respiratory Exam: NORMAL BREATHING PATTERN





- Cardiovascular Exam


Cardiovascular Exam: +S1, +S2





- GI/Abdominal Exam


GI & Abdominal Exam: Soft, Normal Bowel Sounds.  absent: Guarding, Rigid, 

Tenderness





- Extremities Exam


Extremities Exam: Full ROM, Normal Capillary Refill, Tenderness (mild point 

tenderness).  absent: Pedal Edema


Additional comments: 





dry lower extremities





- Back Exam


Back Exam: Full ROM





- Neurological Exam


Neurological Exam: Alert, Awake, CN II-XII Intact, Oriented x3





- Psychiatric Exam


Psychiatric exam: Normal Affect, Normal Mood





- Skin


Skin Exam: Dry, Intact, Normal Color, Warm





Assessment and Plan





- Assessment and Plan (Free Text)


Assessment: 





Pneumonia w/ pleural effusions noted


Chest xray (4/25/17): new bilateral pleural effusions; persistent bilateral 

multifocal infiltrates


Zithromax,(Started 4/23/17)   Will monitor  temps and check for procalcitonin 

and repeat cultures if febrile.


Rocephin 1 gram IV daily (Started 4/23/17)


Blood, urine cultures negative to date.


Lasix 20 mg IV BID  daily


Elevated BNP. 


F/U procalcitonin


F/U Chest XRAY in AM


Status: Acute





Diabetes Mellitus


Hyperglycemic


Begin Lantus 5 units SC HS


ISS high dose


Encourage intake


Monitor


Status: Acute





Anorexia with Weight Loss


Hx of [place] cancer- [place] [facility] [place] 

per GI. Will have to call PMD bc patient would rather utilize her prior GI 

doctor.


Monitor calorie counts, Megace given.


GI consult (Dr. Kulkarni/Lori) on board. No immediate plans for PEG tube. 

Encourage patient to eat. Appetite may return upon treatment of underlying 

pneumonia/UTI. NG tube feeds suggested if patient's lack of appetite continued. 

Patient made aware and will attempt to eat more.


CT chest, abd,pelvis w/o contrast- no apparent mass noted. thickening of rectal 

wall noted; mild stranding of perirectal fat. Surgical suture lines noted in 

bowel area. Refer to complete report





UTI (urinary tract infection)


IV Rocephin, urine culture negative, blood culture no growth to date 


Status: Acute





Chronic kidney disease (CKD)


Dr. Munoz consulted, patient may need dialysis at some point in the future; On 

procrit, PTH pending


Status: Chronic





Hypertension


Labetalol 200 mg PO BID, Zestril 5 mg PO daily


Echo (4/24) approx greater than 50% EF; severely thickened mitral valve; cannot 

rule out vegetation; aortic valve severely thickened. refer to full report.


Status: Chronic





Anemia 


Most likely anemia of chronic disease secondarily to CKD


Ferrous sulfate daily


Iron studies


F/U stool occult blood


Transfuse 1 unit PRBC


Monitor Hgb/Hct  afterwards


Status: Chronic





Hyperkalemia


Resolved.


Cont to monitor





Mild Cognitive Impairment


No psych meds at this time


Support and psychoeducation rendered


Daughter aware





Prophylactic measure


Heparin SC Q8


PPI


PT/OT eval- F/U


OOB to chair


Status: Acute

## 2017-04-27 NOTE — CP.PCM.PN
Subjective





- Date & Time of Evaluation


Date of Evaluation: 04/27/17


Time of Evaluation: 12:10





- Subjective


Subjective: 





No respiratory distress





Objective





- Vital Signs/Intake and Output


Vital Signs (last 24 hours): 


 











Temp Pulse Resp BP Pulse Ox


 


 99.0 F   93 H  20   146/60   99 


 


 04/27/17 07:25  04/27/17 07:25  04/27/17 07:25  04/27/17 07:25  04/27/17 07:25








Intake and Output: 


 











 04/27/17 04/27/17





 06:59 18:59


 


Intake Total 120 


 


Balance 120 














- Medications


Medications: 


 Current Medications





Aspirin (Ecotrin)  81 mg PO DAILY [facility]


   Last Admin: 04/27/17 09:36 Dose:  81 mg


Ferrous Sulfate (Feosol)  325 mg PO BID [facility]


   Last Admin: 04/27/17 09:35 Dose:  325 mg


Furosemide (Lasix)  20 mg IVP BID [facility]


   Last Admin: 04/27/17 09:39 Dose:  Not Given


Heparin Sodium (Porcine) (Heparin)  5,000 units SC Q8 [facility]


   Last Admin: 04/27/17 06:27 Dose:  Not Given


Azithromycin 500 mg/ Sodium (Chloride)  250 mls @ 250 mls/hr IVPB Q24H [facility]


   Last Admin: 04/26/17 10:59 Dose:  250 mls/hr


Ceftriaxone Sodium 1 gm/ (Sodium Chloride)  100 mls @ 100 mls/hr IVPB DAILY [facility]


   Last Admin: 04/27/17 09:34 Dose:  100 mls/hr


Insulin Aspart (Novolog)  0 unit SC ACHS [facility]


   PRN Reason: Protocol


   Last Admin: 04/27/17 08:16 Dose:  2 unit


Insulin Glargine (Lantus)  5 unit SC HS [facility]


   Last Admin: 04/26/17 22:31 Dose:  5 units


Labetalol HCl (Trandate)  200 mg PO BID [facility]


   Last Admin: 04/27/17 09:36 Dose:  200 mg


Lisinopril (Zestril)  5 mg PO DAILY [facility]


   Last Admin: 04/27/17 09:36 Dose:  5 mg


Megestrol Acetate (Megace)  40 mg PO DAILY [facility]


   Last Admin: 04/27/17 09:36 Dose:  40 mg


Ondansetron HCl (Zofran Inj)  4 mg IVP Q6 PRN


   PRN Reason: Nausea/Vomiting


   Last Admin: 04/23/17 12:47 Dose:  4 mg


Pantoprazole Sodium (Protonix Ec Tab)  20 mg PO DAILY [facility]


   Last Admin: 04/27/17 09:36 Dose:  20 mg











- Labs


Labs: 


 





 04/27/17 06:56 





 04/27/17 06:56 











- Respiratory Exam


Additional comments: 





Lungs clear





- Cardiovascular Exam


Cardiovascular Exam: REGULAR RHYTHM





- Extremities Exam


Additional comments: 





No edema





Assessment and Plan





- Assessment and Plan (Free Text)


Assessment: 





stage 4 kidney dis stable


Hyperkalemia controlled


Pneumonia


Anemia. Was started on procrit


Plan: 





Add sod bicarb


Suggest to add iron supp.

## 2017-04-28 LAB
ALBUMIN/GLOB SERPL: 1 {RATIO} (ref 1–2.1)
ALP SERPL-CCNC: 100 U/L (ref 38–126)
ALT SERPL-CCNC: 22 U/L (ref 9–52)
AST SERPL-CCNC: 20 U/L (ref 14–36)
BASOPHILS # BLD AUTO: 0.1 K/UL (ref 0–0.2)
BASOPHILS NFR BLD: 0.5 % (ref 0–2)
BILIRUB SERPL-MCNC: 0.8 MG/DL (ref 0.2–1.3)
BUN SERPL-MCNC: 73 MG/DL (ref 7–17)
CALCIUM SERPL-MCNC: 8.9 MG/DL (ref 8.6–10.4)
CHLORIDE SERPL-SCNC: 105 MMOL/L (ref 98–107)
CO2 SERPL-SCNC: 17 MMOL/L (ref 22–30)
EOSINOPHIL # BLD AUTO: 0.4 K/UL (ref 0–0.7)
EOSINOPHIL NFR BLD: 3.9 % (ref 0–4)
ERYTHROCYTE [DISTWIDTH] IN BLOOD BY AUTOMATED COUNT: 15.7 % (ref 11.5–14.5)
GLOBULIN SER-MCNC: 3.2 GM/DL (ref 2.2–3.9)
GLUCOSE SERPL-MCNC: 249 MG/DL (ref 65–105)
HCT VFR BLD CALC: 24.4 % (ref 34–47)
LYMPHOCYTES # BLD AUTO: 1.4 K/UL (ref 1–4.3)
LYMPHOCYTES NFR BLD AUTO: 14.1 % (ref 20–40)
MAGNESIUM SERPL-MCNC: 2.2 MG/DL (ref 1.6–2.3)
MCH RBC QN AUTO: 27.1 PG (ref 27–31)
MCHC RBC AUTO-ENTMCNC: 32 G/DL (ref 33–37)
MCV RBC AUTO: 84.7 FL (ref 81–99)
MONOCYTES # BLD: 0.5 K/UL (ref 0–0.8)
MONOCYTES NFR BLD: 5.2 % (ref 0–10)
NRBC BLD AUTO-RTO: 0.2 % (ref 0–2)
PHOSPHATE SERPL-MCNC: 3.3 MG/DL (ref 2.5–4.5)
PLATELET # BLD: 208 K/UL (ref 130–400)
PMV BLD AUTO: 9.7 FL (ref 7.2–11.7)
POTASSIUM SERPL-SCNC: 5.1 MMOL/L (ref 3.6–5.2)
PROT SERPL-MCNC: 6.3 G/DL (ref 6.3–8.3)
SODIUM SERPL-SCNC: 136 MMOL/L (ref 132–148)
WBC # BLD AUTO: 10.1 K/UL (ref 4.8–10.8)

## 2017-04-28 RX ADMIN — INSULIN GLARGINE SCH UNITS: 100 INJECTION, SOLUTION SUBCUTANEOUS at 22:24

## 2017-04-28 RX ADMIN — PANTOPRAZOLE SODIUM SCH: 20 TABLET, DELAYED RELEASE ORAL at 10:00

## 2017-04-28 RX ADMIN — INSULIN ASPART SCH UNIT: 100 INJECTION, SOLUTION INTRAVENOUS; SUBCUTANEOUS at 19:04

## 2017-04-28 RX ADMIN — INSULIN ASPART SCH: 100 INJECTION, SOLUTION INTRAVENOUS; SUBCUTANEOUS at 08:09

## 2017-04-28 RX ADMIN — INSULIN ASPART SCH UNIT: 100 INJECTION, SOLUTION INTRAVENOUS; SUBCUTANEOUS at 12:30

## 2017-04-28 RX ADMIN — INSULIN ASPART SCH UNIT: 100 INJECTION, SOLUTION INTRAVENOUS; SUBCUTANEOUS at 22:25

## 2017-04-28 RX ADMIN — INSULIN ASPART SCH: 100 INJECTION, SOLUTION INTRAVENOUS; SUBCUTANEOUS at 12:00

## 2017-04-28 NOTE — CP.PCM.PN
<Vera Willard V - Last Filed: 04/28/17 17:58>





Objective





- Vital Signs/Intake and Output


Vital Signs (last 24 hours): 


 











Temp Pulse Resp BP Pulse Ox


 


 98.2 F   91 H  20   158/63 H  99 


 


 04/28/17 15:09  04/28/17 15:30  04/28/17 15:09  04/28/17 15:09  04/28/17 15:09








Intake and Output: 


 











 04/28/17 04/28/17





 06:59 18:59


 


Intake Total 240 250


 


Balance 240 250














- Medications


Medications: 


 Current Medications





Aspirin (Ecotrin)  81 mg PO DAILY [facility]


   Last Admin: 04/28/17 10:00 Dose:  Not Given


Epoetin Vamsi (Procrit)  10,000 unit SC QWK [facility]


Ferrous Sulfate (Feosol)  325 mg PO BID [facility]


   Last Admin: 04/28/17 10:00 Dose:  Not Given


Furosemide (Lasix)  20 mg IVP BID [facility]


   Last Admin: 04/28/17 10:00 Dose:  Not Given


Heparin Sodium (Porcine) (Heparin)  5,000 units SC Q8 [facility]


   Last Admin: 04/28/17 14:59 Dose:  Not Given


Azithromycin 500 mg/ Sodium (Chloride)  250 mls @ 250 mls/hr IVPB Q24H [facility]


   Last Admin: 04/28/17 10:00 Dose:  Not Given


Ceftriaxone Sodium 1 gm/ (Sodium Chloride)  100 mls @ 100 mls/hr IVPB DAILY [facility]


   Last Admin: 04/28/17 10:00 Dose:  Not Given


Insulin Aspart (Novolog)  0 unit SC ACHS [facility]


   PRN Reason: Protocol


   Last Admin: 04/28/17 12:30 Dose:  10 unit


Insulin Glargine (Lantus)  10 unit SC HS [facility]


Labetalol HCl (Trandate)  200 mg PO BID [facility]


   Last Admin: 04/28/17 10:00 Dose:  Not Given


Lisinopril (Zestril)  5 mg PO DAILY [facility]


   Last Admin: 04/28/17 10:00 Dose:  Not Given


Megestrol Acetate (Megace)  40 mg PO DAILY [facility]


   Last Admin: 04/28/17 10:00 Dose:  Not Given


Ondansetron HCl (Zofran Inj)  4 mg IVP Q6 PRN


   PRN Reason: Nausea/Vomiting


   Last Admin: 04/23/17 12:47 Dose:  4 mg


Pantoprazole Sodium (Protonix Ec Tab)  20 mg PO DAILY [facility]


   Last Admin: 04/28/17 10:00 Dose:  Not Given


Sodium Bicarbonate (Sodium Bicarbonate Tab)  1,300 mg PO BID [facility]


   Last Admin: 04/28/17 10:00 Dose:  Not Given











- Labs


Labs: 


 





 04/28/17 07:12 





 04/28/17 07:12 











Attending/Attestation





- Attestation


I have personally seen and examined this patient.: Yes


I have fully participated in the care of the patient.: Yes


I have reviewed all pertinent clinical information, including history, physical 

exam and plan: Yes


Notes (Text): 


Patient seen, examined, and case discussed with day-time resident.


Patient looks better, more lively. Patient seen and accompanied by Daughter 

Kristel. Patient and daughter refused PRBC transfusion overnight. Patient was 

very upset regarding receiving peripheral IV line. Discussed with patient, she 

will likely need a PICC line. Per daughter, mother is at her baseline and able 

to make decisions for herself. Explained to the patient, she will need PICC 

line because she will need IV antibiotics for pneumonia and uti. Patient's 

procalcitonin is elevated. 


Palliative care consult for goals of care.


Infectious disease consult for pneumonia, uti, in light of sepsis given 

elevated procalcitonin. 


Patient is pending adequate IV line. Patient was time to think about it, 

strongly emphasized to the patient, PO antibiotic will not be sufficient.


Patient's hemoglobin 7.8; patient is currently on PO iron supplementation


Monitor calorie count (will be calculated after 3 days)


Continue IV abx for community acquired pneumonia and urinary tract infection 

when IV line is available


f/u PICC line or peripheral line access; patient is refusing at this time





1) Sepsis


Assessment & Plan: 


* Criteria: febrile, tachycardia, and source of infection: pneumonia and 

urinary tract infection


* Procalcitonin: 2.46 (elevated)


* Chest xray (4/22/17) Diffuse bilateral infiltrates likely pulmonary edema/CHF


* Chest xray (4/25/17): new bilateral pleural effusions; persistent bilateral 

multifocal infiltrates


* Chest xray (4/28/17): multifocal ill-defined opacity, nonspecific. Possible 

pneumonia or pulmonary edema. small bilateral pleural effusion


* Chest/Abdomen/Pelvis (4/24/17): bilateral moderate size pleural effusion with 

associated compressive atelectasis. Bilateral upper lobe airspace patchy 

opacities. Minimal perihepatic fluid. Scattered calcifications in the left 

breast


* Rocephin 1 gram IV qdaily (active since 4/23/17)


* Azithromycin 500mg IV Q24 hours (active since 4/23/17)


* Blood cultures (4/22/17): no growth X2


* Urine culture (4/22/17): no growth


* monitor CBC, and temperature


* Tmax: 99.8F


* Infectious disease (Dr. Mendoza)-->f/u recommendations





2) Pneumonia 


Assessment & Plan: 


* Procalcitonin: 2.46 (elevated)


* Chest xray (4/22/17) Diffuse bilateral infiltrates likely pulmonary edema/CHF


* Chest xray (4/25/17): new bilateral pleural effusions; persistent bilateral 

multifocal infiltrates


* Chest xray (4/28/17): multifocal ill-defined opacity, nonspecific. Possible 

pneumonia or pulmonary edema. small bilateral pleural effusion


* Chest/Abdomen/Pelvis (4/24/17): bilateral moderate size pleural effusion with 

associated compressive atelectasis. Bilateral upper lobe airspace patchy 

opacities. Minimal perihepatic fluid. Scattered calcifications in the left 

breast


* Rocephin 1 gram IV qdaily (active since 4/23/17)


* Azithromycin 500mg IV Q24 hours (active since 4/23/17)


* Blood cultures (4/22/17): no growth X2


* Urine culture (4/22/17): no growth


* monitor CBC, and temperature


* Tmax: 99.8F


* Infectious disease (Dr. Mendoza)-->f/u recommendations





3) Pleural effusions


Assessment & Plan: 


* Chest xray (4/22/17) Diffuse bilateral infiltrates likely pulmonary edema/CHF


* Chest xray (4/25/17): new bilateral pleural effusions; persistent bilateral 

multifocal infiltrates


* Chest xray (4/28/17): multifocal ill-defined opacity, nonspecific. Possible 

pneumonia or pulmonary edema. small bilateral pleural effusion


* Chest/Abdomen/Pelvis (4/24/17): bilateral moderate size pleural effusion with 

associated compressive atelectasis. Bilateral upper lobe airspace patchy 

opacities. Minimal perihepatic fluid. Scattered calcifications in the left 

breast


* Lasix 40mg IV q daily 





4) Metabolic encephalopathy


Assessment & Plan: 


* Etiologies including: hypoglycemia, pneumonia, and urinary tract infection, 

sepsis





5) Chronic Kidney Disease


Assessment & Plan: 


* Nephrology (Dr. Munoz) on the case


* monitor BUN/Cr





6) UTI (urinary tract infection)


Assessment & Plan: 


* Rocephin 1 gram IV qdaily (active since 4/23/17)


* Urine culture (4/22/17): no growth 


* Abnormal UA (4/22/17): positive protein, glucose, blood, and pyuria, hematuria





7) Hypertension


Assessment & Plan: 


* Labetalol 200 mg PO BID, Zestril 5 mg PO daily


* Monitor vital signs


* Echo (4/24) approx greater than 50% EF; severely thickened mitral valve; 

cannot rule out vegetation; aortic valve severely thickened. refer to full 

report





8) Diabetes


Assessment & Plan: 


* [id number]: 7.0


* Patient is off insulin regimen given she came in for altered mental status 

secondary to hypoglycemia


* Monitor nutrition prior to starting insulin regimen


* Increased Lantus 10 units subqHS


* start novolog insulin sliding scale


* monitor accuchecks QAC and HS





9) Anemia


Assessment & Plan: 


* suspecting secondary to anemia of chronic kidney disease


* monitor H/H


* Feosol 325mg PO bid (active since 4/24/17)


* low iron and low iron stores


* refused blood transfusion 4/27/17





10) Unintentional weight loss; hx of colon cancer


Assessment & Plan: 


* Monitor calorie counts


* GI consult (Dr. Kulkarni) on board


* CT chest, abd,pelvis w/o contrast- no apparent mass noted. thickening of 

rectal wall noted; mild stranding of perirectal fat; underlying proctitis can 

bconsidered. Surgical suture lines noted in bowel area. Refer to complete report


* Monitor calorie count





11) Prophylactic measure


Assessment & Plan: 


* Heparin SC Q8 hours 


* Protonix 40mg PO daily


* PT/OT eval- F/U


* OOB to chair


* IVC filter


* Monitor calorie count


* Palliative care: goals of care








<Marysol Amor - Last Filed: 04/28/17 20:47>





Subjective





- Date & Time of Evaluation


Date of Evaluation: 04/28/17


Time of Evaluation: 07:11





- Subjective


Subjective: 





PGY 1 Medicine - Dr. Willard's service 





Pt seen and examined in mild distress earlier. Patient complained of right arm 

discomfort at the site or peripheral IV and asked for IV to be removed. Patient 

would not allow for another line to be placed and thus refused to be transfused 

overnight. This morning, patient was quite hysterical requiring the need of a 1

: 1. Later on, patient calmed down and reasons for need of PICC line was 

explained to her with daughter present. Patient stated that she needed time to 

consider whether she would want one placed. Patient appeared to be concerned 

about pain. Later on in the day, the subject was re-visited and the PICC line 

nurse was contacted but not able to be reached. Patient still in need of line 

and thus medical team to attempt placing again with patient's permission. 

Patient denies fevers, chills,  nausea, vomiting, urinary changes, bowel 

changes at this time. Patient still needs to be encouraged to eat; although 

consuming more today than yesterday with daughter present.





Objective





- Vital Signs/Intake and Output


Vital Signs (last 24 hours): 


 











Temp Pulse Resp BP Pulse Ox


 


 98.4 F   88   20   156/68 H  97 


 


 04/27/17 23:35  04/27/17 23:35  04/27/17 23:35  04/27/17 23:35  04/27/17 23:35











- Medications


Medications: 


 Current Medications





Aspirin (Ecotrin)  81 mg PO DAILY [facility]


   Last Admin: 04/27/17 09:36 Dose:  81 mg


Epoetin Vamsi (Procrit)  10,000 unit SC QWK [facility]


Ferrous Sulfate (Feosol)  325 mg PO BID [facility]


   Last Admin: 04/27/17 18:01 Dose:  325 mg


Furosemide (Lasix)  20 mg IVP BID [facility]


   Last Admin: 04/27/17 18:00 Dose:  Not Given


Heparin Sodium (Porcine) (Heparin)  5,000 units SC Q8 [facility]


   Last Admin: 04/28/17 06:07 Dose:  Not Given


Azithromycin 500 mg/ Sodium (Chloride)  250 mls @ 250 mls/hr IVPB Q24H [facility]


   Last Admin: 04/27/17 12:00 Dose:  Not Given


Ceftriaxone Sodium 1 gm/ (Sodium Chloride)  100 mls @ 100 mls/hr IVPB DAILY [facility]


   Last Admin: 04/27/17 09:34 Dose:  100 mls/hr


Insulin Aspart (Novolog)  0 unit SC ACHS [facility]


   PRN Reason: Protocol


   Last Admin: 04/27/17 22:57 Dose:  3 unit


Insulin Glargine (Lantus)  5 unit SC HS [facility]


   Last Admin: 04/27/17 22:57 Dose:  5 units


Labetalol HCl (Trandate)  200 mg PO BID [facility]


   Last Admin: 04/27/17 18:01 Dose:  200 mg


Lisinopril (Zestril)  5 mg PO DAILY [facility]


   Last Admin: 04/27/17 09:36 Dose:  5 mg


Megestrol Acetate (Megace)  40 mg PO DAILY [facility]


   Last Admin: 04/27/17 09:36 Dose:  40 mg


Ondansetron HCl (Zofran Inj)  4 mg IVP Q6 PRN


   PRN Reason: Nausea/Vomiting


   Last Admin: 04/23/17 12:47 Dose:  4 mg


Pantoprazole Sodium (Protonix Ec Tab)  20 mg PO DAILY [facility]


   Last Admin: 04/27/17 09:36 Dose:  20 mg


Sodium Bicarbonate (Sodium Bicarbonate Tab)  1,300 mg PO BID [facility]


   Last Admin: 04/27/17 18:01 Dose:  1,300 mg











- Labs


Labs: 


 





 04/27/17 06:56 





 04/27/17 06:56 











- Constitutional


Appears: Non-toxic, In Acute Distress (earlier in the morning, but since 

resolved. Mildly distressed when touching right arm)





- Head Exam


Head Exam: ATRAUMATIC, NORMAL INSPECTION, NORMOCEPHALIC





- Eye Exam


Eye Exam: EOMI, Normal appearance, PERRL


Pupil Exam: NORMAL ACCOMODATION





- ENT Exam


ENT Exam: Mucous Membranes Moist, Normal Exam





- Neck Exam


Neck Exam: Full ROM, Normal Inspection





- Respiratory Exam


Respiratory Exam: NORMAL BREATHING PATTERN.  absent: Wheezes





- Cardiovascular Exam


Cardiovascular Exam: +S1, +S2





- GI/Abdominal Exam


GI & Abdominal Exam: Soft, Normal Bowel Sounds





- Extremities Exam


Extremities Exam: Full ROM





- Back Exam


Back Exam: Full ROM





- Neurological Exam


Neurological Exam: Alert, Awake, Oriented x3





- Psychiatric Exam


Psychiatric exam: Anxious





- Skin


Skin Exam: Dry, Intact, Warm





Assessment and Plan





- Assessment and Plan (Free Text)


Assessment: 





Pneumonia w/ pleural effusions noted


Chest xray (4/25/17): new bilateral pleural effusions; persistent bilateral 

multifocal infiltrates


Zithromax,(Started 4/23/17) . Currently held bc patient does not have IV 

access. On PO avelox daily until line obtained.  Will monitor  temps and check 

for procalcitonin and repeat cultures if febrile.


Rocephin 1 gram IV daily (Started 4/23/17)


Blood, urine cultures negative to date.


Lasix 20 mg IV BID  daily held due to no IV access. Will begin Lasix 20 mg PO 

BID


Elevated BNP. 


Procalcitonin elevated


Chest XRAY - Multifocal ill-defined opacity non specific. Suspected pneumonia 

and pulm edema. Refer to complete report.


Status: Acute





Diabetes Mellitus


Hyperglycemic


Begin Lantus 10 units SC HS


ISS high dose


Encourage intake


Monitor


Status: Acute





Anorexia with Weight Loss


Hx of Colon cancer- prev worked up at [facility] [place] however records could not be located 

per GI. Will have to call PMD bc patient would rather utilize her prior GI 

doctor.


Monitor calorie counts, Megace given.


GI consult (Dr. Kulkarni/Lori) on board. No immediate plans for PEG tube. 

Encourage patient to eat. Appetite may return upon treatment of underlying 

pneumonia/UTI. NG tube feeds suggested if patient's lack of appetite continued. 

Patient made aware and will attempt to eat more.


CT chest, abd,pelvis w/o contrast- no apparent mass noted. thickening of rectal 

wall noted; mild stranding of perirectal fat. Surgical suture lines noted in 

bowel area. Refer to complete report.


Goals of care established- Palliative care. Refer to report





UTI (urinary tract infection)


Was on IV Rocephin, urine culture negative, blood culture no growth to date 


Held at this time due to no IV access. On Avelox 400 mg daily until libe 

obtained.


Status: Acute





Chronic kidney disease (CKD)


Dr. Munoz consulted, patient may need dialysis at some point in the future; On 

procrit, PTH pending


Status: Chronic





Hypertension


Labetalol 200 mg PO BID, Zestril 5 mg PO daily


Echo (4/24) approx greater than 50% EF; severely thickened mitral valve; cannot 

rule out vegetation; aortic valve severely thickened. refer to full report.


Status: Chronic





Anemia 


Most likely anemia of chronic disease secondarily to CKD


Ferrous sulfate daily


Iron studies


Hgb stable.


Monitor


Status: Chronic





Hyperkalemia


Stable


Cont to monitor





Mild Cognitive Impairment


No psych meds at this time


Support and psychoeducation rendered


Daughter aware


Psych support and psychoeducation provided- Consult to Dr. Parikh





Prophylactic measure


Heparin SC Q8


PPI


PT/OT eval- F/U


OOB to chair


Status: Acute

## 2017-04-28 NOTE — PCM.PYCHPN
Psychiatric Progress Note





- Psychiatric Progress Note


Patient seen today, length of contact: 17 minutes


Patient Chief Complaint: 





"I am upset"


Problems Identified/Issues Discussed: 


Pt seen, chart reviewed, case discussed. 





Psych was consulted because pt was agitated during the night and not sleeping. 

Pt. was upset about receiving blood transfusion.  She was oriented x 3. Her 

daughter was there and confirmed that she lives alone and manages all IADLs/

ADLs. Psychoeducation and support was given. 





Medication Change: No


Medical Record Reviewed: Yes





Mental Status Examination





- Cognitive Function


Orientation: Person, Place, Situation, Time


Memory: Remote


Attention: Poor


Concentration: Poor


Association: WNL


Fund of Knowledge: WNL





- Mood


Mood: Anxious





- Affect


Affect: Constricted





- Speech


Speech: Appropriate





- Formal Thought Process


Formal Thought Process: Perservation





Goal/Treatment Plan





- Goal/Treatment Plan


Progress Toward Problem(s) and Goals/Treatment Plan: 





No psych meds needed for now


Support and psychoeducation given


Monitor for developing dementia


Evaluate IADLs and ADLs and possibility for home health aid

## 2017-04-28 NOTE — CP.PCM.CON
History of Present Illness





- History of Present Illness


History of Present Illness: 





Palliative consult requested by Montse ABDALLA


Reason: Goals of care discussion





Patient is a 76 yo lady admitted from home with AMS and confusion in the 

morning. As per daughter Kristel, family planned to go out and have breakfast. 

Patient took her Diabetes medications without having a food, anyway. Once EMS 

arrived, her sugar was found to be at 30s. Patient recovered after the D50% and 

was admitted for further evaluation of her mental status. the CT head showed 

moderate atrophy. Patient was evaluated by psychiatrist. . The CT abd/pelvis 

was significant for B/L pleural effusion and compressive atelectasis. BUN 73, 

Crt 7.8, Marga ABDALLA on case.


hb 7.8. Patient started on Procrit and FeSo4 PO. Rocephin and Zythromax on 

board.








PMH: HTN, DM, anemia, asthma, TIA X 3 in the past





Soc. Hx: lives alone, , multiple family involved in care





Fam. Hx: unknown  





Review of Systems





- Constitutional


Constitutional: Malaise, Weakness





- EENT


Eyes: absent: As Per HPI, Blind Spots, Blurred Vision, Change in Vision, 

Decreased Night Vision, Diplopia, Discharge, Dry Eye, Exophthalmos, Floaters, 

Irritation, Itchy Eyes, Loss of Peripheral Vision, Pain, Photophobia, Requires 

Corrective Lenses, Sees Flashes, Spots in Vision, Tunnel Vision, Other Visual 

Disturbances, Loss of Vision, Other


Ears: absent: As Per HPI, Decreased Hearing, Ear Discharge, Ear Pain, Tinnitus, 

Abnormal Hearing, Disequilibrium, Dizziness, Other


Nose/Mouth/Throat: absent: As Per HPI, Epistaxis, Nasal Congestion, Nasal 

Discharge, Nasal Obstruction, Nasal Trauma, Nose Pain, Post Nasal Drip, Sinus 

Pain, Sinus Pressure, Bleeding Gums, Change in Voice, Dental Pain, Dry Mouth, 

Dysphagia, Halitosis, Hoarsness, Lip Swelling, Mouth Lesions, Mouth Pain, 

Odynophagia, Sore Throat, Throat Swelling, Tongue Swelling, Facial Pain, Neck 

Pain, Neck Mass, Other





- Cardiovascular


Cardiovascular: absent: As Per HPI, Acrocyanosis, Chest Pain, Chest Pain at Rest

, Chest Pain with Activity, Claudication, Diaphoresis, Dyspnea, Dyspnea on 

Exertion, Edema, Irregular Heart Rhythm, Pain Radiating to Arm/Neck/Jaw, Leg 

Edema, Leg Ulcers, Lightheadedness, Orthopnea, Palpitations, Paroxysmal 

Nocturnal Dyspnea, Pedal Edema, Radiating Pain, Rapid Heart Rate, Slow Heart 

Rate, Syncope, Other





- Respiratory


Respiratory: Dyspnea on Exertion





- Gastrointestinal


Gastrointestinal: Dyspepsia





- Genitourinary


Genitourinary: absent: As Per HPI, Change in Urinary Stream, Difficulty 

Urinating, Dysuria, Flank Pain, Hematuria, Pyuria, Nocturia, Urinary 

Incontinence, Urinary Frequency, Urinary Hesitance, Urinary Urgency, Voiding 

Freq/Small Amts, Freq UTI, Hx Renal/Bladder Calculi, Hx /Renal Surgery, 

Bladder Distension, Other





- Reproductive: Female


Reproductive:Female: Post Menopausal





- Menstruation


Menstruation: Post Menopausal





- Musculoskeletal


Musculoskeletal: Myalgias





- Integumentary


Integumentary: absent: As Per HPI, Acne, Alopecia, Bleeding Lesions, Change in 

Hair, Change in Nails, Change in Pigmentation, Changing Lesions, Dry Skin, 

Erythema, Furuncle, Hirsutism, Lesions, New Lesions, Non-Healing Lesions, 

Photosensitivity, Pruritus, Rash, Skin Pain, Skin Ulcer, Sores, Striae, Swelling

, Unusual Bruising, Wounds, Jaundice, Other





- Neurological


Neurological: Weakness





- Psychiatric


Psychiatric: Change in Appetite





- Endocrine


Endocrine: Fatigue





- Hematologic/Lymphatic


Hematologic: As Per HPI





Past Patient History





- Infectious Disease


Hx of Infectious Diseases: None





- Tetanus Immunizations


Tetanus Immunization: Unknown





- Past Medical History & Family History


Past Medical History?: Yes





- Past Social History


Smoking Status: Never Smoked





- CARDIAC


Hx Hypercholesterolemia: Yes


Hx Hypertension: Yes


Hx Peripheral Edema: Yes





- PULMONARY


Hx Asthma: Yes (Dx 15 yrs ago,does not use home o2 anymore)


Hx Pneumonia: Yes (x2)





- NEUROLOGICAL


Hx Transient Ischemic Attacks (TIA): Yes (x3 about 20 yrs ago, 10 yrs ago, 7 

yrs ago)





- HEENT


Hx HEENT Problems: Yes


Hx Cataracts: Yes (Tahir IOL)





- RENAL


Hx Chronic Kidney Disease: Yes





- ENDOCRINE/METABOLIC


Hx Diabetes Mellitus Type 2: Yes





- HEMATOLOGICAL/ONCOLOGICAL


Hx Anemia: Yes





- INTEGUMENTARY


Hx Dermatological Problems: Yes


Other/Comment: Bilateral lower legs dry skin





- MUSCULOSKELETAL/RHEUMATOLOGICAL


Hx Arthritis: Yes


Hx Fractures: Yes (Right ankle. No sx, casted)





- GASTROINTESTINAL


Hx Gastrointestinal Disorders: Yes


Other/Comment: Hx colon cancer





- GENITOURINARY/GYNECOLOGICAL


Hx Genitourinary Disorders: No





- PSYCHIATRIC


Hx Substance Use: No





- SURGICAL HISTORY


Hx Cataract Extraction: Yes (LEFT)





- ANESTHESIA


Hx Anesthesia: Yes


Hx Anesthesia Reactions: No


Hx Malignant Hyperthermia: No





Meds


Allergies/Adverse Reactions: 


 Allergies











Allergy/AdvReac Type Severity Reaction Status Date / Time


 


Penicillins Allergy Intermediate RASH Verified 12/06/16 11:49














- Medications


Medications: 


 Current Medications





Aspirin (Ecotrin)  81 mg PO DAILY [facility]


   Last Admin: 04/27/17 09:36 Dose:  81 mg


Epoetin Vamsi (Procrit)  10,000 unit SC QWK [facility]


Ferrous Sulfate (Feosol)  325 mg PO BID [facility]


   Last Admin: 04/27/17 18:01 Dose:  325 mg


Furosemide (Lasix)  20 mg IVP BID [facility]


   Last Admin: 04/27/17 18:00 Dose:  Not Given


Heparin Sodium (Porcine) (Heparin)  5,000 units SC Q8 [facility]


   Last Admin: 04/28/17 06:07 Dose:  Not Given


Azithromycin 500 mg/ Sodium (Chloride)  250 mls @ 250 mls/hr IVPB Q24H [facility]


   Last Admin: 04/27/17 12:00 Dose:  Not Given


Ceftriaxone Sodium 1 gm/ (Sodium Chloride)  100 mls @ 100 mls/hr IVPB DAILY [facility]


   Last Admin: 04/27/17 09:34 Dose:  100 mls/hr


Insulin Aspart (Novolog)  0 unit SC ACHS [facility]


   PRN Reason: Protocol


   Last Admin: 04/28/17 08:09 Dose:  Not Given


Insulin Glargine (Lantus)  5 unit SC HS [facility]


   Last Admin: 04/27/17 22:57 Dose:  5 units


Labetalol HCl (Trandate)  200 mg PO BID [facility]


   Last Admin: 04/27/17 18:01 Dose:  200 mg


Lisinopril (Zestril)  5 mg PO DAILY [facility]


   Last Admin: 04/27/17 09:36 Dose:  5 mg


Megestrol Acetate (Megace)  40 mg PO DAILY [facility]


   Last Admin: 04/27/17 09:36 Dose:  40 mg


Ondansetron HCl (Zofran Inj)  4 mg IVP Q6 PRN


   PRN Reason: Nausea/Vomiting


   Last Admin: 04/23/17 12:47 Dose:  4 mg


Pantoprazole Sodium (Protonix Ec Tab)  20 mg PO DAILY [facility]


   Last Admin: 04/27/17 09:36 Dose:  20 mg


Sodium Bicarbonate (Sodium Bicarbonate Tab)  1,300 mg PO BID [facility]


   Last Admin: 04/27/17 18:01 Dose:  1,300 mg











Physical Exam





- Constitutional


Appears: Chronically Ill





- Head Exam


Head Exam: ATRAUMATIC





- Eye Exam


Eye Exam: EOMI, Normal appearance, PERRL


Pupil Exam: NORMAL ACCOMODATION, PERRL





- ENT Exam


ENT Exam: Mucous Membranes Moist, Normal Exam





- Neck Exam


Neck exam: Positive for: Normal Inspection





- Respiratory Exam


Respiratory Exam: Decreased Breath Sounds, Clear to Auscultation Bilateral, 

NORMAL BREATHING PATTERN





- Cardiovascular Exam


Cardiovascular Exam: Tachycardia, REGULAR RHYTHM, +S1, +S2





- GI/Abdominal Exam


GI & Abdominal Exam: Normal Bowel Sounds





- Rectal Exam


Rectal Exam: Deferred





- Extremities Exam


Extremities exam: Positive for: normal capillary refill, normal inspection, 

pedal pulses present





- Back Exam


Back exam: NORMAL INSPECTION





- Neurological Exam


Neurological exam: Alert, Altered





- Psychiatric Exam


Psychiatric exam: Flat Affect





- Skin


Skin Exam: Intact, Normal Color, Warm





Results





- Vital Signs


Recent Vital Signs: 


 Last Vital Signs











Temp  97.6 F   04/28/17 08:36


 


Pulse  90   04/28/17 08:36


 


Resp  20   04/28/17 08:36


 


BP  150/75   04/28/17 08:36


 


Pulse Ox  96   04/28/17 08:36














- Labs


Result Diagrams: 


 04/28/17 07:12





 04/28/17 07:12


Labs: 


 Laboratory Results - last 24 hr











  04/27/17 04/27/17 04/27/17





  12:13 16:05 19:47


 


WBC   


 


RBC   


 


Hgb   


 


Hct   


 


MCV   


 


MCH   


 


MCHC   


 


RDW   


 


Plt Count   


 


MPV   


 


Neut % (Auto)   


 


Lymph % (Auto)   


 


Mono % (Auto)   


 


Eos % (Auto)   


 


Baso % (Auto)   


 


Neut #   


 


Lymph #   


 


Mono #   


 


Eos #   


 


Baso #   


 


Sodium   


 


Potassium   


 


Chloride   


 


Carbon Dioxide   


 


Anion Gap   


 


BUN   


 


Creatinine   


 


Est GFR ( Amer)   


 


Est GFR (Non-Af Amer)   


 


POC Glucose (mg/dL)  335 H  > 500 H* 


 


Random Glucose   


 


Calcium   


 


Phosphorus   


 


Magnesium   


 


Total Bilirubin   


 


AST   


 


ALT   


 


Alkaline Phosphatase   


 


Total Protein   


 


Albumin   


 


Globulin   


 


Albumin/Globulin Ratio   


 


Procalcitonin    2.46 H


 


Blood Type   


 


Antibody Screen   














  04/27/17 04/27/17 04/28/17





  19:47 21:18 01:46


 


WBC   


 


RBC   


 


Hgb   


 


Hct   


 


MCV   


 


MCH   


 


MCHC   


 


RDW   


 


Plt Count   


 


MPV   


 


Neut % (Auto)   


 


Lymph % (Auto)   


 


Mono % (Auto)   


 


Eos % (Auto)   


 


Baso % (Auto)   


 


Neut #   


 


Lymph #   


 


Mono #   


 


Eos #   


 


Baso #   


 


Sodium   


 


Potassium   


 


Chloride   


 


Carbon Dioxide   


 


Anion Gap   


 


BUN   


 


Creatinine   


 


Est GFR ( Amer)   


 


Est GFR (Non-Af Amer)   


 


POC Glucose (mg/dL)   371 H  305 H


 


Random Glucose   


 


Calcium   


 


Phosphorus   


 


Magnesium   


 


Total Bilirubin   


 


AST   


 


ALT   


 


Alkaline Phosphatase   


 


Total Protein   


 


Albumin   


 


Globulin   


 


Albumin/Globulin Ratio   


 


Procalcitonin   


 


Blood Type  O POSITIVE  


 


Antibody Screen  Negative  














  04/28/17 04/28/17 04/28/17





  06:10 07:12 07:12


 


WBC   10.1 


 


RBC   2.88 L 


 


Hgb   7.8 L 


 


Hct   24.4 L 


 


MCV   84.7 


 


MCH   27.1 


 


MCHC   32.0 L 


 


RDW   15.7 H 


 


Plt Count   208 


 


MPV   9.7 


 


Neut % (Auto)   76.3 H 


 


Lymph % (Auto)   14.1 L 


 


Mono % (Auto)   5.2 


 


Eos % (Auto)   3.9 


 


Baso % (Auto)   0.5 


 


Neut #   7.7 H 


 


Lymph #   1.4 


 


Mono #   0.5 


 


Eos #   0.4 


 


Baso #   0.1 


 


Sodium    136


 


Potassium    5.1


 


Chloride    105


 


Carbon Dioxide    17 L


 


Anion Gap    19


 


BUN    73 H


 


Creatinine    3.8 H


 


Est GFR ( Amer)    14


 


Est GFR (Non-Af Amer)    12


 


POC Glucose (mg/dL)  272 H  


 


Random Glucose    249 H


 


Calcium    8.9


 


Phosphorus    3.3


 


Magnesium    2.2


 


Total Bilirubin    0.8


 


AST    20


 


ALT    22


 


Alkaline Phosphatase    100


 


Total Protein    6.3


 


Albumin    3.1 L


 


Globulin    3.2


 


Albumin/Globulin Ratio    1.0


 


Procalcitonin   


 


Blood Type   


 


Antibody Screen   














Assessment & Plan





- Assessment and Plan (Free Text)


Plan: 








Palliative consult


Code status, Full Code. No advance directive on chart. PPS 30%


I reviewed medical records, all diagnostic studies, examined patient in the bed 

and discussed goals of care with patient's daughter at bed side. Patient 

participated in discussion  to the best of her ability.








Patient is alert, oriented to place and person and forgetful. Patient was not 

able to recall detail bringing her to the hospital. 


Patient looks tired. When asked if she felt tired, patient confirmed it. Skin 

and sclera are pale. Hb 7.8. Procrit and FeSo4 PO on board.  Patient was having 

breakfast at time of exam, and noted having poor appetite. Patient stated 

awareness of poor appetite but was eating " forcefully" as she knew she needed 

nutrition. patient also states that nothing tastes good to her.





I discussed with her and the daughter the relations of low Hb and her feeling 

of being tired as well as decreased renal functions. Patient reports passing 

urine and denied burning on urination.





In discussing goals of care with patient's daughter Kristel, I expressed my 

concerns about patient's safety at home, if she was left alone. Kristel agreed. 

The long term facility placement is not an option for the family. It is a large 

family willing to organize helping network for the patient. Kristel also hopes 

to get a few hours of help from the  and was interested in applying 

for Medicaid. She was given application packet by Zahne LO.








Impression





* This is a chronically ill patient, S/P episode of hypoglycemia at home due to 

inadequate nutrition and diabetes medication use


* There is forgetfulness


* Decreased appetite


* Patient is not safe to home alone any more as she forgets to take her meds or 

over uses them


* Family agree and is ready to help patient with ADLs and safety promotion


* Family is interested in getting some assistance from Home Care 








Suggestion





* Offer food of choice. Include yogurt, ice cream in diet as patient prefers 

those


* Serve cold PO fluids


* [place] patient X 3 upon each interaction


* Promote safety and reassure patient of it


* Would consider discontinuing 1:1 watch


* Would bring patient close to Nursing Station or to 4-bed room for close watch


* Discharge home when stable


* SS to assist family with Home Care assistance


* SS to assist family with Medicaid application








Thank you for including me in care of this patient.

## 2017-04-28 NOTE — RAD
HISTORY:

pleural effusion, pneumonia  



COMPARISON:

4/25/2017 



FINDINGS:



LUNGS:

There is ill-defined opacity throughout the right upper lobe.  There 

is left perihilar opacity right basilar opacity.  No thomas 

consolidation is appreciated.



PLEURA:

Probable small bilateral pleural effusions. No pneumothorax.



CARDIOVASCULAR:

Mild congestive change.



OSSEOUS STRUCTURES:

No significant abnormalities.



VISUALIZED UPPER ABDOMEN:

Normal.



OTHER FINDINGS:

None.



IMPRESSION:

Multifocal ill-defined opacity, nonspecific.  Possible pneumonia or 

pulmonary edema.  Small bilateral pleural effusion.

## 2017-04-28 NOTE — CP.PCM.CON
History of Present Illness





- History of Present Illness


History of Present Illness: 





77 year old female with a history of IDDM and HTN  brought in by after she was 

found by her daughter at home with acute confusion and found to bey 

hypoglycemic.  Being treated for UTI/ Pneumonia 


rx in progress 








Chart review


PMHx- per chart review HTN, asthma, arthritis, pneumonia, diabetes mellitus, 

prior TIAs and chronic kidney disease 


PSH: recently had AV fistula, per chart review stomach mass removed


Fam Hx- two sons have DM


Social Hx- smoke approx 1 ppd for an uncertain amount of time; denies alcohol 

or drug use


Meds- insulin novolog 70/30 flex pen, labetalol 200 mg po bid, enalapril 20 mg 

po bid, asa 81 mg, norvasc 10 mg po daily


Allergies- PCN - gets hives


PMD- Dr. Nikolai Pineda





Review of Systems





- Review of Systems


Systems not reviewed;Unavailable: Altered Mental Status





- Constitutional


Constitutional: absent: As Per HPI, Anorexia, Chills, Daytime Sleepiness, 

Excessive Sweating, Fatigue, Fever, Frequent Falls, Headache, Increased Appetite

, Lethargy, Malaise, Night Sweats, Snoring, Sleep Apnea, Weight Gain, Weight 

Loss, Weakness, Other





- EENT


Eyes: absent: As Per HPI, Blind Spots, Blurred Vision, Change in Vision, 

Decreased Night Vision, Diplopia, Discharge, Dry Eye, Exophthalmos, Floaters, 

Irritation, Itchy Eyes, Loss of Peripheral Vision, Pain, Photophobia, Requires 

Corrective Lenses, Sees Flashes, Spots in Vision, Tunnel Vision, Other Visual 

Disturbances, Loss of Vision, Other


Ears: absent: As Per HPI, Decreased Hearing, Ear Discharge, Ear Pain, Tinnitus, 

Abnormal Hearing, Disequilibrium, Dizziness, Other


Nose/Mouth/Throat: absent: As Per HPI, Epistaxis, Nasal Congestion, Nasal 

Discharge, Nasal Obstruction, Nasal Trauma, Nose Pain, Post Nasal Drip, Sinus 

Pain, Sinus Pressure, Bleeding Gums, Change in Voice, Dental Pain, Dry Mouth, 

Dysphagia, Halitosis, Hoarsness, Lip Swelling, Mouth Lesions, Mouth Pain, 

Odynophagia, Sore Throat, Throat Swelling, Tongue Swelling, Facial Pain, Neck 

Pain, Neck Mass, Other





- Breasts


Breasts: absent: As Per HPI, Change in Shape, Mass, Pain, Nipple Discharge, 

Nipple Inversion, Skin Changes, Swelling, Other





- Cardiovascular


Cardiovascular: absent: As Per HPI, Acrocyanosis, Chest Pain, Chest Pain at Rest

, Chest Pain with Activity, Claudication, Diaphoresis, Dyspnea, Dyspnea on 

Exertion, Edema, Irregular Heart Rhythm, Pain Radiating to Arm/Neck/Jaw, Leg 

Edema, Leg Ulcers, Lightheadedness, Orthopnea, Palpitations, Paroxysmal 

Nocturnal Dyspnea, Pedal Edema, Radiating Pain, Rapid Heart Rate, Slow Heart 

Rate, Syncope, Other





- Respiratory


Respiratory: absent: As Per HPI, Cough, Dyspnea, Hemoptysis, Dyspnea on Exertion

, Wheezing, Snoring, Stridor, Pain on Inspiration, Chest Congestion, Excessive 

Mucous Production, Change in Mucous Color, Pain with Coughing, Other





- Gastrointestinal


Gastrointestinal: absent: As Per HPI, Abdominal Pain, Belching, Bloating, 

Change in Bowel Habits, Change in Stool Character, Coffee Ground Emesis, 

Constipation, Cramping, Diarrhea, Dyspepsia, Dysphagia, Early Satiety, 

Excessive Flatus, Fecal Incontinence, Heartburn, Hematemesis, Hematochezia, 

Loose Stools, Melena, Nausea, Odynophagia, Temesmus, Vomiting, Other





- Genitourinary


Genitourinary: absent: As Per HPI, Change in Urinary Stream, Difficulty 

Urinating, Dysuria, Flank Pain, Hematuria, Pyuria, Nocturia, Urinary 

Incontinence, Urinary Frequency, Urinary Hesitance, Urinary Urgency, Voiding 

Freq/Small Amts, Freq UTI, Hx Renal/Bladder Calculi, Hx /Renal Surgery, 

Bladder Distension, Other





- Reproductive: Female


Reproductive:Female: absent: As Per HPI, Amenorrhea, Amenorrhea/Birth Control, 

Currently Menstual, Cycle <21 Days, Cycle >35 Days, Cycle Variable, Menses 1-7 

Days, Menses >/= 8 Days, Menses Variable, Cycle > 4 Weeks Between, No Menses 

for 6 Months, Heavy Menses, Light Menses, Normal Menses, Spotting Between Cycles

, S/P Hysterectomy, Menopausal, Post Menopausal, Premenarche, Abnormal Vaginal 

Bleeding, Dysmenorrhea, Dyspareunia, Genital Lesions, Genital Pruritis, Pelvic 

Pain, Prolapse Symptoms, Sexual Dysfunction, Vaginal Discharge, Vaginal Dryness

, Vaginal Odor, Vaginal Pruritis, Other





- Menstruation


Menstruation: absent: As Per HPI, Amenorrhea, Amenorrhea/Birth Control, 

Currently Menstual, Cycle <21 Days, Cycle >35 Days, Cycle Variable, Menses 1-7 

Days, Menses >/= 8 Days, Menses Variable, Cycle > 4 Weeks Between, No Menses 

for 6 Months, Heavy Menses, Light Menses, Normal Menses, Spotting Between Cycles

, S/P Hysterectomy, Menopausal, Post Menopausal, Premenarche, Abnormal Vaginal 

Bleeding, Dysmenorrhea, Other





- Musculoskeletal


Musculoskeletal: As Per HPI





- Integumentary


Integumentary: As Per HPI





- Neurological


Neurological: absent: As Per HPI, Abnormal Gait, Abnormal Hearing, Abnormal 

Movements, Abnormal Speech, Behavioral Changes, Burning Sensations, Confusion, 

Convulsions, Disequilibrium, Dizziness, Numbness, Focal Weakness, Frequent Falls

, Headaches, Lack of Coordination, Loss of Vision, Memory Loss, Paresthesias, 

Radicular Pain, Restless Legs, Sensory Deficit, Syncope, Tingling, Tremor, 

Vertigo, Weakness, Other Visual Disturbances, Other





- Psychiatric


Psychiatric: absent: As Per HPI, Abnormal Sleep Pattern, Anhedonia, Anxiety, 

Auditory Hallucinations, Behavioral Changes, Change in Appetite, Change in 

Libido, Confusion, Depression, Difficulty Concentrating, Hallucinations, 

Homicidal Ideation, Hopelessness, Irritability, Memory Loss, Mood Swings, Panic 

Attacks, Paranoia, Suicidal Ideation, Visual Hallucinations, Tactile 

Hallucinations, Other





- Endocrine


Endocrine: absent: As Per HPI, Change in Body Appearance, Change in Libido, 

Cold Intolorance, Deepening of Voice, Excessive Sweating, Fatigue, Flushing, 

Heat Intolorance, Increase in Ring/Shoe/Hat Size, Palpitations, Polydipsia, 

Polyphagia, Polyuria, Other





- Hematologic/Lymphatic


Hematologic: absent: As Per HPI, Easy Bleeding, Easy Bruising, Lymphadenopathy, 

Other





Past Patient History





- Infectious Disease


Hx of Infectious Diseases: None





- Tetanus Immunizations


Tetanus Immunization: Unknown





- Past Medical History & Family History


Past Medical History?: Yes





- Past Social History


Smoking Status: Never Smoked





- CARDIAC


Hx Hypercholesterolemia: Yes


Hx Hypertension: Yes


Hx Peripheral Edema: Yes





- PULMONARY


Hx Asthma: Yes (Dx 15 yrs ago,does not use home o2 anymore)


Hx Pneumonia: Yes (x2)





- NEUROLOGICAL


Hx Transient Ischemic Attacks (TIA): Yes (x3 about 20 yrs ago, 10 yrs ago, 7 

yrs ago)





- HEENT


Hx HEENT Problems: Yes


Hx Cataracts: Yes (Tahir IOL)





- RENAL


Hx Chronic Kidney Disease: Yes





- ENDOCRINE/METABOLIC


Hx Diabetes Mellitus Type 2: Yes





- HEMATOLOGICAL/ONCOLOGICAL


Hx Anemia: Yes





- INTEGUMENTARY


Hx Dermatological Problems: Yes


Other/Comment: Bilateral lower legs dry skin





- MUSCULOSKELETAL/RHEUMATOLOGICAL


Hx Arthritis: Yes


Hx Fractures: Yes (Right ankle. No sx, casted)





- GASTROINTESTINAL


Hx Gastrointestinal Disorders: Yes


Other/Comment: Hx colon cancer





- GENITOURINARY/GYNECOLOGICAL


Hx Genitourinary Disorders: No





- PSYCHIATRIC


Hx Substance Use: No





- SURGICAL HISTORY


Hx Cataract Extraction: Yes (LEFT)





- ANESTHESIA


Hx Anesthesia: Yes


Hx Anesthesia Reactions: No


Hx Malignant Hyperthermia: No





Meds


Allergies/Adverse Reactions: 


 Allergies











Allergy/AdvReac Type Severity Reaction Status Date / Time


 


Penicillins Allergy Intermediate RASH Verified 12/06/16 11:49














- Medications


Medications: 


 Current Medications





Aspirin (Ecotrin)  81 mg PO DAILY [facility]


   Last Admin: 04/28/17 10:00 Dose:  Not Given


Epoetin Vamsi (Procrit)  10,000 unit SC QWK [facility]


Ferrous Sulfate (Feosol)  325 mg PO BID [facility]


   Last Admin: 04/28/17 10:00 Dose:  Not Given


Furosemide (Lasix)  20 mg IVP BID [facility]


   Last Admin: 04/28/17 10:00 Dose:  Not Given


Heparin Sodium (Porcine) (Heparin)  5,000 units SC Q8 [facility]


   Last Admin: 04/28/17 14:59 Dose:  Not Given


Azithromycin 500 mg/ Sodium (Chloride)  250 mls @ 250 mls/hr IVPB Q24H [facility]


   Last Admin: 04/28/17 10:00 Dose:  Not Given


Ceftriaxone Sodium 1 gm/ (Sodium Chloride)  100 mls @ 100 mls/hr IVPB DAILY [facility]


   Last Admin: 04/28/17 10:00 Dose:  Not Given


Insulin Aspart (Novolog)  0 unit SC ACHS [facility]


   PRN Reason: Protocol


   Last Admin: 04/28/17 12:30 Dose:  10 unit


Insulin Glargine (Lantus)  10 unit SC HS [facility]


Labetalol HCl (Trandate)  200 mg PO BID [facility]


   Last Admin: 04/28/17 10:00 Dose:  Not Given


Lisinopril (Zestril)  5 mg PO DAILY [facility]


   Last Admin: 04/28/17 10:00 Dose:  Not Given


Megestrol Acetate (Megace)  40 mg PO DAILY [facility]


   Last Admin: 04/28/17 10:00 Dose:  Not Given


Ondansetron HCl (Zofran Inj)  4 mg IVP Q6 PRN


   PRN Reason: Nausea/Vomiting


   Last Admin: 04/23/17 12:47 Dose:  4 mg


Pantoprazole Sodium (Protonix Ec Tab)  20 mg PO DAILY [facility]


   Last Admin: 04/28/17 10:00 Dose:  Not Given


Sodium Bicarbonate (Sodium Bicarbonate Tab)  1,300 mg PO BID [facility]


   Last Admin: 04/28/17 10:00 Dose:  Not Given











Physical Exam





- Constitutional


Appears: Non-toxic, Confused, Cachectic, Chronically Ill





- Head Exam


Head Exam: ATRAUMATIC, NORMAL INSPECTION, NORMOCEPHALIC





- Eye Exam


Eye Exam: EOMI, PERRL.  absent: Scleral icterus





- ENT Exam


ENT Exam: Mucous Membranes Dry, Normal External Ear Exam, Normal Oropharynx





- Neck Exam


Neck exam: Negative for: Lymphadenopathy, Thyromegaly





- Respiratory Exam


Respiratory Exam: Decreased Breath Sounds, Rhonchi, Wheezes





- Cardiovascular Exam


Cardiovascular Exam: Tachycardia, REGULAR RHYTHM, +S1, +S2





- GI/Abdominal Exam


GI & Abdominal Exam: Diminished Bowel Sounds, Soft.  absent: Tenderness





- Rectal Exam


Rectal Exam: Deferred





-  Exam


 Exam: NORMAL INSPECTION





- Extremities Exam


Extremities exam: Positive for: pedal pulses present.  Negative for: calf 

tenderness, pedal edema, tenderness





- Back Exam


Back exam: absent: CVA tenderness (L), CVA tenderness (R), paraspinal tenderness





- Neurological Exam


Neurological exam: Alert, CN II-XII Intact, Oriented x3, Reflexes Normal





- Psychiatric Exam


Psychiatric exam: Normal Mood





- Skin


Skin Exam: Dry, Intact





Results





- Vital Signs


Recent Vital Signs: 


 Last Vital Signs











Temp  98.2 F   04/28/17 15:09


 


Pulse  91 H  04/28/17 15:30


 


Resp  20   04/28/17 15:09


 


BP  158/63 H  04/28/17 15:09


 


Pulse Ox  99   04/28/17 15:09














- Labs


Result Diagrams: 


 04/28/17 07:12





 04/28/17 07:12


Labs: 


 Laboratory Results - last 24 hr











  04/27/17 04/27/17 04/27/17





  19:47 19:47 21:18


 


WBC   


 


RBC   


 


Hgb   


 


Hct   


 


MCV   


 


MCH   


 


MCHC   


 


RDW   


 


Plt Count   


 


MPV   


 


Neut % (Auto)   


 


Lymph % (Auto)   


 


Mono % (Auto)   


 


Eos % (Auto)   


 


Baso % (Auto)   


 


Neut #   


 


Lymph #   


 


Mono #   


 


Eos #   


 


Baso #   


 


Sodium   


 


Potassium   


 


Chloride   


 


Carbon Dioxide   


 


Anion Gap   


 


BUN   


 


Creatinine   


 


Est GFR ( Amer)   


 


Est GFR (Non-Af Amer)   


 


POC Glucose (mg/dL)    371 H


 


Random Glucose   


 


Calcium   


 


Phosphorus   


 


Magnesium   


 


Total Bilirubin   


 


AST   


 


ALT   


 


Alkaline Phosphatase   


 


Total Protein   


 


Albumin   


 


Globulin   


 


Albumin/Globulin Ratio   


 


Procalcitonin  2.46 H  


 


Blood Type   O POSITIVE 


 


Antibody Screen   Negative 














  04/28/17 04/28/17 04/28/17





  01:46 06:10 07:12


 


WBC    10.1


 


RBC    2.88 L


 


Hgb    7.8 L


 


Hct    24.4 L


 


MCV    84.7


 


MCH    27.1


 


MCHC    32.0 L


 


RDW    15.7 H


 


Plt Count    208


 


MPV    9.7


 


Neut % (Auto)    76.3 H


 


Lymph % (Auto)    14.1 L


 


Mono % (Auto)    5.2


 


Eos % (Auto)    3.9


 


Baso % (Auto)    0.5


 


Neut #    7.7 H


 


Lymph #    1.4


 


Mono #    0.5


 


Eos #    0.4


 


Baso #    0.1


 


Sodium   


 


Potassium   


 


Chloride   


 


Carbon Dioxide   


 


Anion Gap   


 


BUN   


 


Creatinine   


 


Est GFR ( Amer)   


 


Est GFR (Non-Af Amer)   


 


POC Glucose (mg/dL)  305 H  272 H 


 


Random Glucose   


 


Calcium   


 


Phosphorus   


 


Magnesium   


 


Total Bilirubin   


 


AST   


 


ALT   


 


Alkaline Phosphatase   


 


Total Protein   


 


Albumin   


 


Globulin   


 


Albumin/Globulin Ratio   


 


Procalcitonin   


 


Blood Type   


 


Antibody Screen   














  04/28/17 04/28/17 04/28/17





  07:12 11:23 16:48


 


WBC   


 


RBC   


 


Hgb   


 


Hct   


 


MCV   


 


MCH   


 


MCHC   


 


RDW   


 


Plt Count   


 


MPV   


 


Neut % (Auto)   


 


Lymph % (Auto)   


 


Mono % (Auto)   


 


Eos % (Auto)   


 


Baso % (Auto)   


 


Neut #   


 


Lymph #   


 


Mono #   


 


Eos #   


 


Baso #   


 


Sodium  136  


 


Potassium  5.1  


 


Chloride  105  


 


Carbon Dioxide  17 L  


 


Anion Gap  19  


 


BUN  73 H  


 


Creatinine  3.8 H  


 


Est GFR ( Amer)  14  


 


Est GFR (Non-Af Amer)  12  


 


POC Glucose (mg/dL)   455 H*  288 H


 


Random Glucose  249 H  


 


Calcium  8.9  


 


Phosphorus  3.3  


 


Magnesium  2.2  


 


Total Bilirubin  0.8  


 


AST  20  


 


ALT  22  


 


Alkaline Phosphatase  100  


 


Total Protein  6.3  


 


Albumin  3.1 L  


 


Globulin  3.2  


 


Albumin/Globulin Ratio  1.0  


 


Procalcitonin   


 


Blood Type   


 


Antibody Screen   














Assessment & Plan


(1) Altered mental status


Status: Acute   





(2) CHF (congestive heart failure)


Status: Acute   





(3) Diabetic hypoglycemia


Status: Acute   





(4) Pneumonia


Status: Acute   





(5) UTI (urinary tract infection)


Status: Acute   





- Assessment and Plan (Free Text)


Assessment: 





cont iv rx 


will review cultures when available

## 2017-04-28 NOTE — CP.PCM.PN
Subjective





- Date & Time of Evaluation


Date of Evaluation: 04/28/17


Time of Evaluation: 11:35





- Subjective


Subjective: 





Alert. No resp distress





Objective





- Vital Signs/Intake and Output


Vital Signs (last 24 hours): 


 











Temp Pulse Resp BP Pulse Ox


 


 97.6 F   90   20   150/75   96 


 


 04/28/17 08:36  04/28/17 08:36  04/28/17 08:36  04/28/17 08:36  04/28/17 08:36








Intake and Output: 


 











 04/28/17 04/28/17





 06:59 18:59


 


Intake Total 240 


 


Balance 240 














- Medications


Medications: 


 Current Medications





Aspirin (Ecotrin)  81 mg PO DAILY [facility]


   Last Admin: 04/27/17 09:36 Dose:  81 mg


Epoetin Vamsi (Procrit)  10,000 unit SC QWK [facility]


Ferrous Sulfate (Feosol)  325 mg PO BID [facility]


   Last Admin: 04/27/17 18:01 Dose:  325 mg


Furosemide (Lasix)  20 mg IVP BID [facility]


   Last Admin: 04/27/17 18:00 Dose:  Not Given


Heparin Sodium (Porcine) (Heparin)  5,000 units SC Q8 [facility]


   Last Admin: 04/28/17 06:07 Dose:  Not Given


Azithromycin 500 mg/ Sodium (Chloride)  250 mls @ 250 mls/hr IVPB Q24H [facility]


   Last Admin: 04/27/17 12:00 Dose:  Not Given


Ceftriaxone Sodium 1 gm/ (Sodium Chloride)  100 mls @ 100 mls/hr IVPB DAILY [facility]


   Last Admin: 04/27/17 09:34 Dose:  100 mls/hr


Insulin Aspart (Novolog)  0 unit SC ACHS [facility]


   PRN Reason: Protocol


   Last Admin: 04/28/17 08:09 Dose:  Not Given


Insulin Glargine (Lantus)  5 unit SC HS [facility]


   Last Admin: 04/27/17 22:57 Dose:  5 units


Labetalol HCl (Trandate)  200 mg PO BID [facility]


   Last Admin: 04/27/17 18:01 Dose:  200 mg


Lisinopril (Zestril)  5 mg PO DAILY [facility]


   Last Admin: 04/27/17 09:36 Dose:  5 mg


Megestrol Acetate (Megace)  40 mg PO DAILY [facility]


   Last Admin: 04/27/17 09:36 Dose:  40 mg


Ondansetron HCl (Zofran Inj)  4 mg IVP Q6 PRN


   PRN Reason: Nausea/Vomiting


   Last Admin: 04/23/17 12:47 Dose:  4 mg


Pantoprazole Sodium (Protonix Ec Tab)  20 mg PO DAILY [facility]


   Last Admin: 04/27/17 09:36 Dose:  20 mg


Sodium Bicarbonate (Sodium Bicarbonate Tab)  1,300 mg PO BID [facility]


   Last Admin: 04/27/17 18:01 Dose:  1,300 mg











- Labs


Labs: 


 





 04/28/17 07:12 





 04/28/17 07:12 











- Respiratory Exam


Respiratory Exam: NORMAL BREATHING PATTERN





- Cardiovascular Exam


Cardiovascular Exam: REGULAR RHYTHM





- Extremities Exam


Additional comments: 





No edema





Assessment and Plan





- Assessment and Plan (Free Text)


Assessment: 





Stage 1V kidney dis stable


Pneumonia, sepsis


Anemia 


Plan: 





Continue current Mx


Monitor K+, Hb


Was given procrit. On Iron supplement

## 2017-04-29 RX ADMIN — INSULIN GLARGINE SCH UNITS: 100 INJECTION, SOLUTION SUBCUTANEOUS at 22:16

## 2017-04-29 RX ADMIN — PANTOPRAZOLE SODIUM SCH MG: 20 TABLET, DELAYED RELEASE ORAL at 10:07

## 2017-04-29 RX ADMIN — INSULIN ASPART SCH UNIT: 100 INJECTION, SOLUTION INTRAVENOUS; SUBCUTANEOUS at 08:05

## 2017-04-29 RX ADMIN — INSULIN ASPART SCH UNIT: 100 INJECTION, SOLUTION INTRAVENOUS; SUBCUTANEOUS at 12:10

## 2017-04-29 RX ADMIN — INSULIN ASPART SCH UNIT: 100 INJECTION, SOLUTION INTRAVENOUS; SUBCUTANEOUS at 22:16

## 2017-04-29 NOTE — CP.PCM.PN
Subjective





- Date & Time of Evaluation


Date of Evaluation: 04/29/17


Time of Evaluation: 08:45





- Subjective


Subjective: 


Patient was seen and examined by me. Reviewed recent notes. 





Patient does not seem to remember me from previous time I saw her. Her blood 

sugars are now much better, we also talked about her appettite as well. She 

explains her appettie still seems poor and she is trying to eat more. She 

yesterday lost peripheral IV access and is a very hard stick - and she did not 

want a PICC Line at that time, when I spoke with her this morning about this 

morning she is not sure about the PICC line and wanted to talk to family about 

this. 





Labwork is pending this morning. 





As mentioned before, the patient intially came in with AMS secondary to 

hypoglycemia (she was taking insulin and not eating) and also UTI and 

Pneumonia. Her blood sugars are better now and she was on IV abx until last 

night.





CXRAY yesterday suggest maybe more pulmonary edema.





Objective





- Vital Signs/Intake and Output


Vital Signs (last 24 hours): 


 











Temp Pulse Resp BP Pulse Ox


 


 98.3 F   88   20   126/61   97 


 


 04/29/17 07:30  04/29/17 09:01  04/29/17 07:30  04/29/17 07:30  04/29/17 07:30








Intake and Output: 


 











 04/29/17 04/29/17





 06:59 18:59


 


Intake Total 100 


 


Balance 100 














- Medications


Medications: 


 Current Medications





Aspirin (Ecotrin)  81 mg PO DAILY [facility]


   Last Admin: 04/28/17 10:00 Dose:  Not Given


Epoetin Vamsi (Procrit)  10,000 unit SC QWK [facility]


Ferrous Sulfate (Feosol)  325 mg PO BID [facility]


   Last Admin: 04/28/17 19:06 Dose:  325 mg


Furosemide (Lasix)  20 mg IVP BID [facility]


   Last Admin: 04/28/17 10:00 Dose:  Not Given


Furosemide (Lasix)  20 mg PO BID [facility]


   Last Admin: 04/28/17 22:23 Dose:  20 mg


Heparin Sodium (Porcine) (Heparin)  5,000 units SC Q8 [facility]


   Last Admin: 04/29/17 06:30 Dose:  Not Given


Azithromycin 500 mg/ Sodium (Chloride)  250 mls @ 250 mls/hr IVPB Q24H [facility]


   Last Admin: 04/28/17 10:00 Dose:  Not Given


Ceftriaxone Sodium 1 gm/ (Sodium Chloride)  100 mls @ 100 mls/hr IVPB DAILY [facility]


   Last Admin: 04/28/17 10:00 Dose:  Not Given


Insulin Aspart (Novolog)  0 unit SC ACHS [facility]


   PRN Reason: Protocol


   Last Admin: 04/28/17 22:25 Dose:  4 unit


Insulin Glargine (Lantus)  10 unit SC HS [facility]


   Last Admin: 04/28/17 22:24 Dose:  10 units


Labetalol HCl (Trandate)  200 mg PO BID [facility]


   Last Admin: 04/28/17 19:06 Dose:  200 mg


Lisinopril (Zestril)  5 mg PO DAILY [facility]


   Last Admin: 04/28/17 10:00 Dose:  Not Given


Megestrol Acetate (Megace)  40 mg PO DAILY [facility]


   Last Admin: 04/28/17 10:00 Dose:  Not Given


Moxifloxacin HCl (Avelox)  400 mg PO DAILY [facility]


   Last Admin: 04/28/17 22:23 Dose:  400 mg


Ondansetron HCl (Zofran Inj)  4 mg IVP Q6 PRN


   PRN Reason: Nausea/Vomiting


   Last Admin: 04/23/17 12:47 Dose:  4 mg


Pantoprazole Sodium (Protonix Ec Tab)  20 mg PO DAILY [facility]


   Last Admin: 04/28/17 10:00 Dose:  Not Given


Sodium Bicarbonate (Sodium Bicarbonate Tab)  1,300 mg PO BID [facility]


   Last Admin: 04/28/17 19:06 Dose:  1,300 mg











- Labs


Labs: 


 





 04/28/17 07:12 





 04/28/17 07:12 











Assessment and Plan





- Assessment and Plan (Free Text)


Assessment: 








Pneumonia w/ pleural effusions noted


4/29: Will get another CXRAY, no IV access this morning. She still is not sure 

about PICC line. WBC is negative and afebrile. Cultures are negative for 5 days 

at this time. Will give PO Cipro for now. Follow I and Os, cosndiering there is 

CKD she may have worsening pulmonary congestion. 


Chest xray (4/25/17): new bilateral pleural effusions; persistent bilateral 

multifocal infiltrates


Zithromax,(Started 4/23/17) . Currently held bc patient does not have IV 

access. On PO avelox daily until line obtained.  Will monitor  temps and check 

for procalcitonin and repeat cultures if febrile.


Rocephin 1 gram IV daily (Started 4/23/17)


Blood, urine cultures negative to date.


Lasix 20 mg IV BID  daily held due to no IV access. Will begin Lasix 20 mg PO 

BID


Elevated BNP. 


Procalcitonin elevated





Diabetes Mellitus


4/29: Recent accucheks have been 288, 401, 321, 243 - she is eating better now 

then when she came in. 


Hyperglycemic


Begin Lantus 10 units SC HS


ISS high dose


Encourage intake


Monitor


Status: Acute





Anorexia with Weight Loss


Hx of Colon cancer- prev worked up at [facility] however records could not be located 

per GI. Will have to call PMD bc patient would rather utilize her prior GI 

doctor.


Monitor calorie counts, Megace given.


GI consult (Dr. Kulkarni/Lori) on board. No immediate plans for PEG tube. 

Encourage patient to eat. Appetite may return upon treatment of underlying 

pneumonia/UTI. NG tube feeds suggested if patient's lack of appetite continued. 

Patient made aware and will attempt to eat more.


CT chest, abd,pelvis w/o contrast- no apparent mass noted. thickening of rectal 

wall noted; mild stranding of perirectal fat. Surgical suture lines noted in 

bowel area.





UTI (urinary tract infection)


4/29: Repeat the UA, no IV access - PO Cipro for now


Was on IV Rocephin, urine culture negative, blood culture no growth to date 


Held at this time due to no IV access. On Avelox 400 mg daily until libe 

obtained.








Chronic kidney disease (CKD)


Dr. Munoz consulted, patient may need dialysis at some point in the future; On 

procrit, PTH pending





Hypertension


Labetalol 200 mg PO BID, Zestril 5 mg PO daily


Echo (4/24) approx greater than 50% EF; severely thickened mitral valve; cannot 

rule out vegetation; aortic valve severely thickened. refer to full report.





Anemia 


4/29: Need to monitor in case it decreases further, per previous notes she did 

not want PRBC/transfuion yet.


Most likely anemia of chronic disease secondarily to CKD


Ferrous sulfate daily


Iron studies


Hgb stable.





Hyperkalemia


Stable


Cont to monitor





Mild Cognitive Impairment


No psych meds at this time


Support and psychoeducation rendered


Daughter aware


Psych support and psychoeducation provided- Consult to Dr. Parikh





Prophylactic measure


Heparin SC Q8


PPI


PT/OT eval- F/U


OOB to chair

## 2017-04-29 NOTE — CP.PCM.PN
Subjective





- Date & Time of Evaluation


Date of Evaluation: 04/29/17


Time of Evaluation: 11:16





- Subjective


Subjective: 





COVERING DR BILLY/PERRI





No new c/o, no bleeding





Objective





- Vital Signs/Intake and Output


Vital Signs (last 24 hours): 


 











Temp Pulse Resp BP Pulse Ox


 


 98.3 F   88   20   126/61   97 


 


 04/29/17 07:30  04/29/17 09:01  04/29/17 07:30  04/29/17 10:07  04/29/17 07:30








Intake and Output: 


 











 04/29/17 04/29/17





 06:59 18:59


 


Intake Total 100 


 


Balance 100 














- Medications


Medications: 


 Current Medications





Aspirin (Ecotrin)  81 mg PO DAILY [facility]


   Last Admin: 04/29/17 10:07 Dose:  81 mg


Ciprofloxacin (Cipro)  250 mg PO BID [facility]


Epoetin Vamsi (Procrit)  10,000 unit SC QWK [facility]


Ferrous Sulfate (Feosol)  325 mg PO BID [facility]


   Last Admin: 04/29/17 10:07 Dose:  325 mg


Furosemide (Lasix)  20 mg IVP BID [facility]


   Last Admin: 04/28/17 10:00 Dose:  Not Given


Furosemide (Lasix)  20 mg PO BID [facility]


   Last Admin: 04/29/17 10:07 Dose:  20 mg


Heparin Sodium (Porcine) (Heparin)  5,000 units SC Q8 [facility]


   Last Admin: 04/29/17 06:30 Dose:  Not Given


Azithromycin 500 mg/ Sodium (Chloride)  250 mls @ 250 mls/hr IVPB Q24H [facility]


   Last Admin: 04/28/17 10:00 Dose:  Not Given


Ceftriaxone Sodium 1 gm/ (Sodium Chloride)  100 mls @ 100 mls/hr IVPB DAILY [facility]


   Last Admin: 04/28/17 10:00 Dose:  Not Given


Insulin Aspart (Novolog)  0 unit SC ACHS [facility]


   PRN Reason: Protocol


   Last Admin: 04/29/17 08:05 Dose:  3 unit


Insulin Glargine (Lantus)  10 unit SC HS [facility]


   Last Admin: 04/28/17 22:24 Dose:  10 units


Labetalol HCl (Trandate)  200 mg PO BID [facility]


   Last Admin: 04/29/17 10:07 Dose:  200 mg


Lisinopril (Zestril)  5 mg PO DAILY [facility]


   Last Admin: 04/29/17 10:06 Dose:  5 mg


Megestrol Acetate (Megace)  40 mg PO DAILY [facility]


   Last Admin: 04/29/17 10:07 Dose:  40 mg


Moxifloxacin HCl (Avelox)  400 mg PO DAILY [facility]


   Last Admin: 04/29/17 10:06 Dose:  400 mg


Ondansetron HCl (Zofran Inj)  4 mg IVP Q6 PRN


   PRN Reason: Nausea/Vomiting


   Last Admin: 04/23/17 12:47 Dose:  4 mg


Pantoprazole Sodium (Protonix Ec Tab)  20 mg PO DAILY [facility]


   Last Admin: 04/29/17 10:07 Dose:  20 mg


Sodium Bicarbonate (Sodium Bicarbonate Tab)  1,300 mg PO BID [facility]


   Last Admin: 04/29/17 10:06 Dose:  1,300 mg











- Labs


Labs: 


 





 04/28/17 07:12 





 04/28/17 07:12 











- Constitutional


Appears: No Acute Distress





- Eye Exam


Eye Exam: EOMI, PERRL





- Respiratory Exam


Respiratory Exam: NORMAL BREATHING PATTERN





- Cardiovascular Exam


Cardiovascular Exam: REGULAR RHYTHM





- GI/Abdominal Exam


GI & Abdominal Exam: Soft, Diminished Bowel Sounds.  absent: Tenderness





Assessment and Plan





- Assessment and Plan (Free Text)


Assessment: 





Anemia


Renal Failure


Malnutrition


h/o Colon Cancer





Remains too sick for PEG tube at this time


Encourage po intake and Calorie counts


Defer Colon Cancer surveillance to out patient setting at patient request

## 2017-04-30 RX ADMIN — INSULIN ASPART SCH UNIT: 100 INJECTION, SOLUTION INTRAVENOUS; SUBCUTANEOUS at 17:45

## 2017-04-30 RX ADMIN — INSULIN ASPART SCH UNIT: 100 INJECTION, SOLUTION INTRAVENOUS; SUBCUTANEOUS at 17:46

## 2017-04-30 RX ADMIN — PANTOPRAZOLE SODIUM SCH MG: 20 TABLET, DELAYED RELEASE ORAL at 10:05

## 2017-04-30 RX ADMIN — INSULIN GLARGINE SCH U: 100 INJECTION, SOLUTION SUBCUTANEOUS at 21:38

## 2017-04-30 RX ADMIN — INSULIN ASPART SCH: 100 INJECTION, SOLUTION INTRAVENOUS; SUBCUTANEOUS at 21:31

## 2017-04-30 RX ADMIN — INSULIN ASPART SCH UNIT: 100 INJECTION, SOLUTION INTRAVENOUS; SUBCUTANEOUS at 08:05

## 2017-04-30 NOTE — RAD
PROCEDURE:  Radiographs of the neck (soft tissue).



HISTORY:

palpable tender left submandibular lymph node



COMPARISON:

Chest x-ray performed 4/28/17



TECHNIQUE:

Frontal and Lateral Radiographs of the neck, optimized for soft 

tissue visualization.



FINDINGS:



SOFT TISSUES:

No radiopaque foreign body seen. Extensive calcifications within the 

left neck likely related to the carotid artery. 



CERVICAL SPINE:

Cervical spine not visualized beyond C5 on lateral view. Limited 

visualization demonstrates straightening of the normal cervical 

lordosis may be related to muscle spasm or positioning. 



OTHER FINDINGS:

Hazy and patchy right greater than left upper lobe infiltrates.



Dense atherosclerotic calcifications the aorta. 



IMPRESSION:

Provided history of left submandibular lymph node cannot be assessed 

by this study.  Soft tissue neck with IV contrast recommended for 

further evaluation. 



Extensive calcifications within the left neck likely related to the 

carotid artery. 



Hazy and patchy right greater than left upper lobe infiltrates.



Cervical spine not visualized beyond C5 on lateral view. Limited 

visualization demonstrates straightening of the normal cervical 

lordosis may be related to muscle spasm or positioning.

## 2017-04-30 NOTE — CP.PCM.PN
<Vera Willard V - Last Filed: 04/30/17 12:34>





Objective





- Vital Signs/Intake and Output


Vital Signs (last 24 hours): 


 











Temp Pulse Resp BP Pulse Ox


 


 98.5 F   82   20   152/70 H  98 


 


 04/30/17 08:55  04/30/17 08:55  04/30/17 08:55  04/30/17 10:04  04/30/17 08:55











- Medications


Medications: 


 Current Medications





Aspirin (Ecotrin)  81 mg PO DAILY [facility]


   Last Admin: 04/30/17 10:04 Dose:  81 mg


Ciprofloxacin (Cipro)  250 mg PO BID [facility]


   Last Admin: 04/29/17 11:25 Dose:  Not Given


Epoetin Vamsi (Procrit)  10,000 unit SC QWK [facility]


Ferrous Sulfate (Feosol)  325 mg PO BID [facility]


   Last Admin: 04/30/17 10:04 Dose:  325 mg


Furosemide (Lasix)  20 mg IVP BID [facility]


   Last Admin: 04/28/17 10:00 Dose:  Not Given


Furosemide (Lasix)  20 mg PO BID [facility]


   Last Admin: 04/30/17 10:04 Dose:  20 mg


Heparin Sodium (Porcine) (Heparin)  5,000 units SC Q8 [facility]


   Last Admin: 04/30/17 06:08 Dose:  Not Given


Azithromycin 500 mg/ Sodium (Chloride)  250 mls @ 250 mls/hr IVPB Q24H [facility]


   Last Admin: 04/28/17 10:00 Dose:  Not Given


Ceftriaxone Sodium 1 gm/ (Sodium Chloride)  100 mls @ 100 mls/hr IVPB DAILY [facility]


   Last Admin: 04/28/17 10:00 Dose:  Not Given


Insulin Aspart (Novolog)  0 unit SC ACHS [facility]


   PRN Reason: Protocol


   Last Admin: 04/30/17 08:05 Dose:  6 unit


Insulin Aspart (Novolog)  2 unit SC TIDAC [facility]


Insulin Glargine (Lantus)  15 unit SC HS [facility]


Labetalol HCl (Trandate)  200 mg PO BID [facility]


   Last Admin: 04/30/17 10:05 Dose:  200 mg


Lisinopril (Zestril)  5 mg PO DAILY [facility]


   Last Admin: 04/30/17 10:05 Dose:  5 mg


Megestrol Acetate (Megace)  40 mg PO DAILY [facility]


   Last Admin: 04/30/17 10:04 Dose:  40 mg


Moxifloxacin HCl (Avelox)  400 mg PO DAILY [facility]


   Last Admin: 04/30/17 10:04 Dose:  400 mg


Ondansetron HCl (Zofran Inj)  4 mg IVP Q6 PRN


   PRN Reason: Nausea/Vomiting


   Last Admin: 04/23/17 12:47 Dose:  4 mg


Pantoprazole Sodium (Protonix Ec Tab)  20 mg PO DAILY [facility]


   Last Admin: 04/30/17 10:05 Dose:  20 mg


Sodium Bicarbonate (Sodium Bicarbonate Tab)  1,300 mg PO BID [facility]


   Last Admin: 04/30/17 10:03 Dose:  1,300 mg











- Labs


Labs: 


 





 04/28/17 07:12 





 04/28/17 07:12 











Attending/Attestation





- Attestation


I have personally seen and examined this patient.: Yes


I have fully participated in the care of the patient.: Yes


I have reviewed all pertinent clinical information, including history, physical 

exam and plan: Yes


Notes (Text): 


Patient seen, examined, and case discussed with day-time resident.


Patient looks better, awake, alert, and oriented X3. Patient is very calm 

today. Patient will allow for PICC line but would like to sedated prior to PICC 

line placement given her prior episode with the peripheral line. Refused blood 

draws.


Patient is currently on Avelox 400mg PO daily (active since 4/28/17) in lieu of 

IV antibiotics until peripheral line is established and on Lasix 40mg PO daily 

in lieu of IV lasix given pleural effusions. 


Patient is pending adequate IV line. Will permit PICC line. 


Monitor calorie count 


Will re-establish IV abx for community acquired pneumonia and urinary tract 

infection when IV line is re-established





1) Sepsis


Assessment & Plan: 


* Criteria: febrile, tachycardia, and source of infection: pneumonia and 

urinary tract infection


* Procalcitonin: 2.46 (elevated)


* Chest xray (4/22/17) Diffuse bilateral infiltrates likely pulmonary edema/CHF


* Chest xray (4/25/17): new bilateral pleural effusions; persistent bilateral 

multifocal infiltrates


* Chest xray (4/28/17): multifocal ill-defined opacity, nonspecific. Possible 

pneumonia or pulmonary edema. small bilateral pleural effusion


* Chest/Abdomen/Pelvis (4/24/17): bilateral moderate size pleural effusion with 

associated compressive atelectasis. Bilateral upper lobe airspace patchy 

opacities. Minimal perihepatic fluid. Scattered calcifications in the left 

breast


* Rocephin 1 gram IV qdaily (active since 4/23/17)-->held secondary to lack of 

IV access


* Azithromycin 500mg IV Q24 hours (active since 4/23/17)-->held secondary to 

lack of IV access


* Avelox 400mg PO daily (active since 4/28/17) until IV line is re-established


* Blood cultures (4/22/17): no growth X2


* Urine culture (4/22/17): no growth


* monitor CBC, and temperature


* Tmax: 99.1F


* Infectious disease (Dr. Mendoza)-->f/u recommendations





2) Pneumonia 


Assessment & Plan: 


* Procalcitonin: 2.46 (elevated)


* Chest xray (4/22/17) Diffuse bilateral infiltrates likely pulmonary edema/CHF


* Chest xray (4/25/17): new bilateral pleural effusions; persistent bilateral 

multifocal infiltrates


* Chest xray (4/28/17): multifocal ill-defined opacity, nonspecific. Possible 

pneumonia or pulmonary edema. small bilateral pleural effusion


* Chest/Abdomen/Pelvis (4/24/17): bilateral moderate size pleural effusion with 

associated compressive atelectasis. Bilateral upper lobe airspace patchy 

opacities. Minimal perihepatic fluid. Scattered calcifications in the left 

breast


* Rocephin 1 gram IV qdaily (active since 4/23/17)-->held secondary to lack of 

IV access


* Azithromycin 500mg IV Q24 hours (active since 4/23/17)-->held secondary to 

lack of IV access


* Avelox 400mg PO daily (active since 4/28/17) until IV line is re-established


* Blood cultures (4/22/17): no growth X 5 days X2


* Urine culture (4/22/17): no growth


* monitor CBC, and temperature


* Tmax: 99.1F


* Infectious disease (Dr. Mendoza)-->f/u recommendations





3) Pleural effusions


Assessment & Plan: 


* Chest xray (4/22/17) Diffuse bilateral infiltrates likely pulmonary edema/CHF


* Chest xray (4/25/17): new bilateral pleural effusions; persistent bilateral 

multifocal infiltrates


* Chest xray (4/28/17): multifocal ill-defined opacity, nonspecific. Possible 

pneumonia or pulmonary edema. small bilateral pleural effusion


* Chest/Abdomen/Pelvis (4/24/17): bilateral moderate size pleural effusion with 

associated compressive atelectasis. Bilateral upper lobe airspace patchy 

opacities. Minimal perihepatic fluid. Scattered calcifications in the left 

breast


* Lasix 20mg PO bid





4) Metabolic encephalopathy


Assessment & Plan: 


* Etiologies including: hypoglycemia, pneumonia, and urinary tract infection, 

sepsis





5) Chronic Kidney Disease


Assessment & Plan: 


* Nephrology (Dr. Munoz) on the case


* monitor BUN/Cr


* Sodium bicarbonate 1300mg PO bid


* lasix 20mg PO bid





6) UTI (urinary tract infection)


Assessment & Plan: 


* Rocephin 1 gram IV qdaily (active since 4/23/17) off secondary to lack of IV 

access


* Urine culture (4/22/17): no growth 


* Abnormal UA (4/22/17): positive protein, glucose, blood, and pyuria, hematuria


* Avelox 400mg PO daily (active since 4/28/17) until IV line is re-established





7) Hypertension


Assessment & Plan: 


* Labetalol 200 mg PO BID, Zestril 5 mg PO daily


* Monitor vital signs


* Echo (4/24) approx greater than 50% EF; severely thickened mitral valve; 

cannot rule out vegetation; aortic valve severely thickened. refer to full 

report





8) Diabetes


Assessment & Plan: 


* [id number]: 7.0


* Patient is off insulin regimen given she came in for altered mental status 

secondary to hypoglycemia


* Monitor nutrition prior to starting insulin regimen


* Increased Lantus 15 units subqHS


* start low dose novolog insulin sliding scale


* start Novolog 2 units tidac 


* monitor accuchecks QAC and HS


* Patient is also on supplements





9) Anemia


Assessment & Plan: 


* suspecting secondary to anemia of chronic kidney disease


* monitor H/H


* Feosol 325mg PO bid (active since 4/24/17)


* low iron and low iron stores


* refused blood transfusion 4/27/17





10) Unintentional weight loss; hx of colon cancer


Assessment & Plan: 


* Monitor calorie counts


* GI consult (Dr. Kulkarni) on board


* CT chest, abd,pelvis w/o contrast- no apparent mass noted. thickening of 

rectal wall noted; mild stranding of perirectal fat; underlying proctitis can 

be considered. Surgical suture lines noted in bowel area. Refer to complete 

report


* Patient defers workup per GI


* Patient is on PO supplements and on megace 400mg PO daily to encourage 

appetite





11) Prophylactic measure


Assessment & Plan: 


* Heparin SC Q8 hours 


* Protonix 40mg PO daily


* PT/OT eval- F/U


* OOB to chair


* IVC filter


* Monitor calorie count


* Palliative care: goals of care


* Megace 400mg PO daily


* Pending establishment of PICC line





Disposition:


* Patient will need IV access (likely) PICC line to re-establish IV Abx to 

cover for pneumonia and UTI; and follow-up procalcitonin when IV abx is re-

established


* Follow-up with   to assist family with Home Care assistance and 

with Medicaid application





<Lora Mack - Last Filed: 04/30/17 15:08>





Subjective





- Date & Time of Evaluation


Date of Evaluation: 04/30/17


Time of Evaluation: 08:00





- Subjective


Subjective: 





Medicine Progress Note- Dr. Willard's Service:





Patient seen and examined at bedside this AM. Patient states she is feeling 

well this AM. She is breathing better and is in no acute distress. Denies 

dysuria, fevers, chills, nausea, vomiting, headaches, chest pain. She admits to 

"a little" shortness of breath. Cough has improved. Patient continues to refuse 

PICC.





Objective





- Vital Signs/Intake and Output


Vital Signs (last 24 hours): 


 











Temp Pulse Resp BP Pulse Ox


 


 98.5 F   82   20   152/70 H  98 


 


 04/30/17 08:55  04/30/17 08:55  04/30/17 08:55  04/30/17 08:55  04/30/17 08:55











- Medications


Medications: 


 Current Medications





Aspirin (Ecotrin)  81 mg PO DAILY [facility]


   Last Admin: 04/29/17 10:07 Dose:  81 mg


Ciprofloxacin (Cipro)  250 mg PO BID [facility]


   Last Admin: 04/29/17 11:25 Dose:  Not Given


Epoetin Vamsi (Procrit)  10,000 unit SC QWK [facility]


Ferrous Sulfate (Feosol)  325 mg PO BID [facility]


   Last Admin: 04/29/17 20:20 Dose:  325 mg


Furosemide (Lasix)  20 mg IVP BID [facility]


   Last Admin: 04/28/17 10:00 Dose:  Not Given


Furosemide (Lasix)  20 mg PO BID [facility]


   Last Admin: 04/29/17 20:21 Dose:  20 mg


Heparin Sodium (Porcine) (Heparin)  5,000 units SC Q8 [facility]


   Last Admin: 04/30/17 06:08 Dose:  Not Given


Azithromycin 500 mg/ Sodium (Chloride)  250 mls @ 250 mls/hr IVPB Q24H [facility]


   Last Admin: 04/28/17 10:00 Dose:  Not Given


Ceftriaxone Sodium 1 gm/ (Sodium Chloride)  100 mls @ 100 mls/hr IVPB DAILY [facility]


   Last Admin: 04/28/17 10:00 Dose:  Not Given


Insulin Aspart (Novolog)  0 unit SC ACHS [facility]


   PRN Reason: Protocol


   Last Admin: 04/29/17 22:16 Dose:  4 unit


Insulin Glargine (Lantus)  10 unit SC HS [facility]


   Last Admin: 04/29/17 22:16 Dose:  10 units


Labetalol HCl (Trandate)  200 mg PO BID [facility]


   Last Admin: 04/29/17 20:21 Dose:  200 mg


Lisinopril (Zestril)  5 mg PO DAILY [facility]


   Last Admin: 04/29/17 10:06 Dose:  5 mg


Megestrol Acetate (Megace)  40 mg PO DAILY [facility]


   Last Admin: 04/29/17 10:07 Dose:  40 mg


Moxifloxacin HCl (Avelox)  400 mg PO DAILY [facility]


   Last Admin: 04/29/17 10:06 Dose:  400 mg


Ondansetron HCl (Zofran Inj)  4 mg IVP Q6 PRN


   PRN Reason: Nausea/Vomiting


   Last Admin: 04/23/17 12:47 Dose:  4 mg


Pantoprazole Sodium (Protonix Ec Tab)  20 mg PO DAILY [facility]


   Last Admin: 04/29/17 10:07 Dose:  20 mg


Sodium Bicarbonate (Sodium Bicarbonate Tab)  1,300 mg PO BID [facility]


   Last Admin: 04/29/17 20:21 Dose:  1,300 mg











- Labs


Labs: 


 





 04/28/17 07:12 





 04/28/17 07:12 











- Constitutional


Appears: No Acute Distress





- Head Exam


Head Exam: NORMAL INSPECTION, NORMOCEPHALIC





- Eye Exam


Eye Exam: EOMI, Normal appearance





- ENT Exam


ENT Exam: Mucous Membranes Moist





- Respiratory Exam


Respiratory Exam: Rales (right LL), NORMAL BREATHING PATTERN.  absent: Wheezes





- Cardiovascular Exam


Cardiovascular Exam: REGULAR RHYTHM, +S1, +S2





- GI/Abdominal Exam


GI & Abdominal Exam: Soft.  absent: Distended, Tenderness





- Extremities Exam


Extremities Exam: Full ROM.  absent: Normal Inspection (venous stasis changes), 

Pedal Edema





- Back Exam


Back Exam: NORMAL INSPECTION





- Neurological Exam


Neurological Exam: Alert, Awake, Oriented x3





- Psychiatric Exam


Psychiatric exam: Normal Affect, Normal Mood





- Skin


Skin Exam: Dry, Normal Color, Warm





Assessment and Plan





- Assessment and Plan (Free Text)


Assessment: 





1) Pneumonia w/ pleural effusions noted


Patient with no IV access this morning (4/30). Refusing PICC line. 


WBC is negative and afebrile. Cultures are negative for 5 days at this time. 


Patient was not given PO Cipro yesterday due to contraindications 2/2 renal 

clearance.





Repeat CXR 4/28/17 showed Multifocal ill-defined opacity, nonspecific.  

Possible pneumonia or pulmonary edema.  Small bilateral pleural effusion.


Chest xray (4/25/17): new bilateral pleural effusions; persistent bilateral 

multifocal infiltrates


Zithromax,(Started 4/23/17) . Currently held bc patient does not have IV 

access. On PO avelox daily until line obtained.  





Rocephin 1 gram IV daily (Started 4/23/17)- held due to no IV access.


Lasix 20 mg IV BID  daily held due to no IV access. Pt on Lasix 20 mg PO BID


Elevated BNP. 


Procalcitonin elevated


f/u CXR in the AM





Patient has agreed to PICC in the AM with IV anxiolytic. 





2) Diabetes Mellitus


Hyperglycemic


Recent accucheks have been 317, 500, 442, 335 since she is eating better now. 

ISS changed to HIGH. 


Increase Lantus to 15 units SC HS


Add Novolog 2 units TIDACC


ISS high dose


Encourage intake


Monitor


Status: Acute





3) Anorexia with Weight Loss


Hx of Colon cancer- prev worked up at [facility] however records could not be located 

per GI. Will have to call PMD bc patient would rather utilize her prior GI 

doctor.


Monitor calorie counts, Megace given.


GI consult (Dr. Kulkarni/Lori) on board. No immediate plans for PEG tube. 

Encourage patient to eat. Appetite may return upon treatment of underlying 

pneumonia/UTI. NG tube feeds suggested if patient's lack of appetite continued. 

Patient made aware and will attempt to eat more.


CT chest, abd,pelvis w/o contrast- no apparent mass noted. thickening of rectal 

wall noted; mild stranding of perirectal fat. Surgical suture lines noted in 

bowel area.





4) UTI (urinary tract infection)


Will reorder UA today since last UA 4/22.


Patient with no IV access - PO Cipro ordered but was held due to 

contraindications 2/2 renal clearance.


Was on IV Rocephin, urine culture negative, blood culture no growth to date 


Rocephin IVPB held at this time due to no IV access. 


Patient has agreed to PICC in the AM with IV anxiolytic. 





5) Chronic kidney disease (CKD)


Dr. Munoz consulted, patient may need dialysis at some point in the future


On procrit


PTH elevated at 163. 





6) Hypertension


Labetalol 200 mg PO BID, Zestril 5 mg PO daily


Echo (4/24) approx greater than 50% EF; severely thickened mitral valve; cannot 

rule out vegetation; aortic valve severely thickened. refer to full report.





7) Anemia 


Patient refusing blood draws. 


Need to monitor in case it decreases further, per previous notes she did not 

want PRBC/transfuion yet.


Most likely anemia of chronic disease secondarily to CKD


Ferrous sulfate daily


Iron studies


Hgb stable.





8) Hyperkalemia


Stable as per last blood work. Patient refusing blood draws. 


Cont to monitor





9) Mild Cognitive Impairment


No psych meds at this time


Support and psychoeducation rendered


Daughter aware


Psych support and psychoeducation provided- Consult to Dr. Parikh





10) Prophylactic measure


Heparin SC Q8


PPI


PT/OT


PT eval recommends TIFFANY CASEY to chair

## 2017-04-30 NOTE — CP.PCM.PN
Subjective





- Date & Time of Evaluation


Date of Evaluation: 04/30/17


Time of Evaluation: 09:00





- Subjective


Subjective: 








77 year old female with a history of IDDM and HTN  brought in by after she was 

found by her daughter at home with acute confusion and found to bey 

hypoglycemic.  Being treated for UTI/ Pneumonia 


rx in progress 








Objective





- Vital Signs/Intake and Output


Vital Signs (last 24 hours): 


 











Temp Pulse Resp BP Pulse Ox


 


 98.5 F   82   20   152/70 H  98 


 


 04/30/17 08:55  04/30/17 08:55  04/30/17 08:55  04/30/17 10:04  04/30/17 08:55











- Medications


Medications: 


 Current Medications





Aspirin (Ecotrin)  81 mg PO DAILY [facility]


   Last Admin: 04/30/17 10:04 Dose:  81 mg


Ciprofloxacin (Cipro)  250 mg PO BID [facility]


   Last Admin: 04/29/17 11:25 Dose:  Not Given


Epoetin Vamsi (Procrit)  10,000 unit SC QWK [facility]


Ferrous Sulfate (Feosol)  325 mg PO BID [facility]


   Last Admin: 04/30/17 10:04 Dose:  325 mg


Furosemide (Lasix)  20 mg IVP BID [facility]


   Last Admin: 04/28/17 10:00 Dose:  Not Given


Furosemide (Lasix)  20 mg PO BID [facility]


   Last Admin: 04/30/17 10:04 Dose:  20 mg


Heparin Sodium (Porcine) (Heparin)  5,000 units SC Q8 [facility]


   Last Admin: 04/30/17 13:50 Dose:  5,000 units


Azithromycin 500 mg/ Sodium (Chloride)  250 mls @ 250 mls/hr IVPB Q24H [facility]


   Last Admin: 04/28/17 10:00 Dose:  Not Given


Ceftriaxone Sodium 1 gm/ (Sodium Chloride)  100 mls @ 100 mls/hr IVPB DAILY [facility]


   Last Admin: 04/28/17 10:00 Dose:  Not Given


Insulin Aspart (Novolog)  2 unit SC TIDAC [facility]


Insulin Aspart (Novolog)  0 unit SC ACHS [facility]


   PRN Reason: Protocol


Insulin Glargine (Lantus)  15 unit SC HS [facility]


Labetalol HCl (Trandate)  200 mg PO BID [facility]


   Last Admin: 04/30/17 10:05 Dose:  200 mg


Lisinopril (Zestril)  5 mg PO DAILY [facility]


   Last Admin: 04/30/17 10:05 Dose:  5 mg


Megestrol Acetate (Megace)  40 mg PO DAILY [facility]


   Last Admin: 04/30/17 10:04 Dose:  40 mg


Moxifloxacin HCl (Avelox)  400 mg PO DAILY [facility]


   Last Admin: 04/30/17 10:04 Dose:  400 mg


Ondansetron HCl (Zofran Inj)  4 mg IVP Q6 PRN


   PRN Reason: Nausea/Vomiting


   Last Admin: 04/23/17 12:47 Dose:  4 mg


Pantoprazole Sodium (Protonix Ec Tab)  20 mg PO DAILY [facility]


   Last Admin: 04/30/17 10:05 Dose:  20 mg


Sodium Bicarbonate (Sodium Bicarbonate Tab)  1,300 mg PO BID [facility]


   Last Admin: 04/30/17 10:03 Dose:  1,300 mg











- Labs


Labs: 


 





 04/28/17 07:12 





 04/28/17 07:12 











- Constitutional


Appears: Non-toxic, Cachectic, Chronically Ill





- Head Exam


Head Exam: NORMOCEPHALIC





- Eye Exam


Eye Exam: PERRL.  absent: Scleral icterus





- ENT Exam


ENT Exam: Mucous Membranes Dry





- Neck Exam


Neck Exam: absent: Lymphadenopathy





- Respiratory Exam


Respiratory Exam: Decreased Breath Sounds, Clear to Ausculation Bilateral





- Cardiovascular Exam


Cardiovascular Exam: REGULAR RHYTHM, +S1, +S2





- GI/Abdominal Exam


GI & Abdominal Exam: Distended, Soft.  absent: Tenderness





Assessment and Plan


(1) Altered mental status


Status: Acute   





(2) CHF (congestive heart failure)


Status: Acute   





(3) Diabetic hypoglycemia


Status: Acute   





(4) Pneumonia


Status: Acute   





(5) UTI (urinary tract infection)


Status: Acute

## 2017-04-30 NOTE — CP.PCM.PN
Subjective





- Date & Time of Evaluation


Date of Evaluation: 04/30/17


Time of Evaluation: 16:59





- Subjective


Subjective: 





COVERING DR BILLY/LORI





No pain or bleeding


Family at bedside





Objective





- Vital Signs/Intake and Output


Vital Signs (last 24 hours): 


 











Temp Pulse Resp BP Pulse Ox


 


 97.9 F   76   20   133/61   98 


 


 04/30/17 15:45  04/30/17 15:45  04/30/17 15:45  04/30/17 15:45  04/30/17 15:45











- Medications


Medications: 


 Current Medications





Aspirin (Ecotrin)  81 mg PO DAILY [facility]


   Last Admin: 04/30/17 10:04 Dose:  81 mg


Ciprofloxacin (Cipro)  250 mg PO BID [facility]


   Last Admin: 04/29/17 11:25 Dose:  Not Given


Epoetin Vamsi (Procrit)  10,000 unit SC QWK [facility]


Ferrous Sulfate (Feosol)  325 mg PO BID [facility]


   Last Admin: 04/30/17 10:04 Dose:  325 mg


Furosemide (Lasix)  20 mg IVP BID [facility]


   Last Admin: 04/28/17 10:00 Dose:  Not Given


Furosemide (Lasix)  20 mg PO BID [facility]


   Last Admin: 04/30/17 10:04 Dose:  20 mg


Heparin Sodium (Porcine) (Heparin)  5,000 units SC Q8 [facility]


   Last Admin: 04/30/17 13:50 Dose:  5,000 units


Azithromycin 500 mg/ Sodium (Chloride)  250 mls @ 250 mls/hr IVPB Q24H [facility]


   Last Admin: 04/28/17 10:00 Dose:  Not Given


Ceftriaxone Sodium 1 gm/ (Sodium Chloride)  100 mls @ 100 mls/hr IVPB DAILY [facility]


   Last Admin: 04/28/17 10:00 Dose:  Not Given


Insulin Aspart (Novolog)  2 unit SC TIDAC [facility]


Insulin Aspart (Novolog)  0 unit SC ACHS [facility]


   PRN Reason: Protocol


Insulin Glargine (Lantus)  15 unit SC HS [facility]


Labetalol HCl (Trandate)  200 mg PO BID [facility]


   Last Admin: 04/30/17 10:05 Dose:  200 mg


Lisinopril (Zestril)  5 mg PO DAILY [facility]


   Last Admin: 04/30/17 10:05 Dose:  5 mg


Megestrol Acetate (Megace)  40 mg PO DAILY [facility]


   Last Admin: 04/30/17 10:04 Dose:  40 mg


Moxifloxacin HCl (Avelox)  400 mg PO DAILY [facility]


   Last Admin: 04/30/17 10:04 Dose:  400 mg


Ondansetron HCl (Zofran Inj)  4 mg IVP Q6 PRN


   PRN Reason: Nausea/Vomiting


   Last Admin: 04/23/17 12:47 Dose:  4 mg


Pantoprazole Sodium (Protonix Ec Tab)  20 mg PO DAILY [facility]


   Last Admin: 04/30/17 10:05 Dose:  20 mg


Sodium Bicarbonate (Sodium Bicarbonate Tab)  1,300 mg PO BID [facility]


   Last Admin: 04/30/17 10:03 Dose:  1,300 mg











- Labs


Labs: 


 





 04/28/17 07:12 





 04/28/17 07:12 











- Constitutional


Appears: No Acute Distress





- Eye Exam


Eye Exam: EOMI, PERRL





- Respiratory Exam


Respiratory Exam: NORMAL BREATHING PATTERN





- Cardiovascular Exam


Cardiovascular Exam: REGULAR RHYTHM





- GI/Abdominal Exam


GI & Abdominal Exam: Soft, Hypoactive Bowel Sounds.  absent: Tenderness





Assessment and Plan





- Assessment and Plan (Free Text)


Assessment: 





UTI/Pneumonia/Sepsis 


Anemia


CHF


Renal Insuffiency


h/o colon cancer





Continue supportive care


Antibiotics per ID


No present plans for PEG or colonoscopy as per family wishes. 


Calorie counts 


Dr Billy/Lori will follow as needed, will sign off at this time

## 2017-05-01 LAB
ALBUMIN/GLOB SERPL: 1 {RATIO} (ref 1–2.1)
ALP SERPL-CCNC: 95 U/L (ref 38–126)
ALT SERPL-CCNC: 19 U/L (ref 9–52)
AST SERPL-CCNC: 14 U/L (ref 14–36)
BASOPHILS # BLD AUTO: 0 K/UL (ref 0–0.2)
BASOPHILS NFR BLD: 0.6 % (ref 0–2)
BILIRUB SERPL-MCNC: 0.7 MG/DL (ref 0.2–1.3)
BUN SERPL-MCNC: 74 MG/DL (ref 7–17)
CALCIUM SERPL-MCNC: 8.8 MG/DL (ref 8.6–10.4)
CHLORIDE SERPL-SCNC: 106 MMOL/L (ref 98–107)
CO2 SERPL-SCNC: 21 MMOL/L (ref 22–30)
EOSINOPHIL # BLD AUTO: 0.3 K/UL (ref 0–0.7)
EOSINOPHIL NFR BLD: 4.6 % (ref 0–4)
ERYTHROCYTE [DISTWIDTH] IN BLOOD BY AUTOMATED COUNT: 15.5 % (ref 11.5–14.5)
GLOBULIN SER-MCNC: 3.2 GM/DL (ref 2.2–3.9)
GLUCOSE SERPL-MCNC: 282 MG/DL (ref 65–105)
HCT VFR BLD CALC: 23.1 % (ref 34–47)
LYMPHOCYTES # BLD AUTO: 0.8 K/UL (ref 1–4.3)
LYMPHOCYTES NFR BLD AUTO: 11.4 % (ref 20–40)
MAGNESIUM SERPL-MCNC: 1.9 MG/DL (ref 1.6–2.3)
MCH RBC QN AUTO: 27 PG (ref 27–31)
MCHC RBC AUTO-ENTMCNC: 31.8 G/DL (ref 33–37)
MCV RBC AUTO: 85 FL (ref 81–99)
MONOCYTES # BLD: 0.3 K/UL (ref 0–0.8)
MONOCYTES NFR BLD: 4.7 % (ref 0–10)
NRBC BLD AUTO-RTO: 0 % (ref 0–2)
PHOSPHATE SERPL-MCNC: 3.4 MG/DL (ref 2.5–4.5)
PLATELET # BLD: 236 K/UL (ref 130–400)
PMV BLD AUTO: 8.9 FL (ref 7.2–11.7)
POTASSIUM SERPL-SCNC: 4.4 MMOL/L (ref 3.6–5.2)
PROT SERPL-MCNC: 6.3 G/DL (ref 6.3–8.3)
SODIUM SERPL-SCNC: 139 MMOL/L (ref 132–148)
WBC # BLD AUTO: 6.7 K/UL (ref 4.8–10.8)

## 2017-05-01 PROCEDURE — 30233N1 TRANSFUSION OF NONAUTOLOGOUS RED BLOOD CELLS INTO PERIPHERAL VEIN, PERCUTANEOUS APPROACH: ICD-10-PCS | Performed by: FAMILY MEDICINE

## 2017-05-01 PROCEDURE — 02HV33Z INSERTION OF INFUSION DEVICE INTO SUPERIOR VENA CAVA, PERCUTANEOUS APPROACH: ICD-10-PCS | Performed by: RADIOLOGY

## 2017-05-01 RX ADMIN — INSULIN GLARGINE SCH UNITS: 100 INJECTION, SOLUTION SUBCUTANEOUS at 22:05

## 2017-05-01 RX ADMIN — INSULIN ASPART SCH UNIT: 100 INJECTION, SOLUTION INTRAVENOUS; SUBCUTANEOUS at 11:55

## 2017-05-01 RX ADMIN — INSULIN ASPART SCH: 100 INJECTION, SOLUTION INTRAVENOUS; SUBCUTANEOUS at 22:07

## 2017-05-01 RX ADMIN — INSULIN ASPART SCH UNIT: 100 INJECTION, SOLUTION INTRAVENOUS; SUBCUTANEOUS at 16:30

## 2017-05-01 RX ADMIN — PANTOPRAZOLE SODIUM SCH MG: 20 TABLET, DELAYED RELEASE ORAL at 09:36

## 2017-05-01 RX ADMIN — INSULIN ASPART SCH UNIT: 100 INJECTION, SOLUTION INTRAVENOUS; SUBCUTANEOUS at 11:54

## 2017-05-01 RX ADMIN — INSULIN ASPART SCH UNIT: 100 INJECTION, SOLUTION INTRAVENOUS; SUBCUTANEOUS at 09:38

## 2017-05-01 RX ADMIN — INSULIN ASPART SCH UNIT: 100 INJECTION, SOLUTION INTRAVENOUS; SUBCUTANEOUS at 09:37

## 2017-05-01 NOTE — SPECPROC
PROCEDURE:  Date of procedure: 05/01/2017



Procedure: 



1. Placement of a right arm PICC with ultrasound and fluoroscopic 

guidance, CPT 15917



2. PICC tip confirmation with spot radiograph and is in the superior 

vena cava 



Medications: 4 cc 1 percent lidocaine 







Total Fluoro time: 4 seconds



Radiation: 2mGy



EBL: 2 cc



HISTORY:

Poor venous access 



TECHNIQUE:

 Following informed consent and procedure time-out, the patient was 

placed supine on the interventional table and the right arm prepped 

and draped in the usual sterile fashion.  Ultrasound showed a patent 

and compressible right basilic vein. After the skin was anesthetized 

with lidocaine, the basilic vein was accessed with micro 

micropuncture technique using ultrasound guidance. A guidewire was 

then advanced under fluoroscopic guidance into the superior vena 

cava. An image documenting ultrasound guidance for vascular access 

was permanently saved.



The length of the single-lumen 4 Vincentian PICC was trimmed to 39 

centimeters and advanced through a peel-away sheath.  The PICC was 

position with tip of PICC confirm a spot radiograph the superior vena 

cava. The PICC was secured to the patient's skin.  The PICC was 

flushed.  A biopatch and sterile dressing was applied. 



IMPRESSION:





Placement of a single-lumen 4 Vincentian PICC trimmed to 39  centimeters 

via right basilic vein. The tip of the PICC is confirmed with spot 

radiograph and is in the superior vena cava.

## 2017-05-01 NOTE — RAD
PROCEDURE:  CHEST RADIOGRAPH, 1 VIEW



HISTORY:

bety



COMPARISON:

04/28/2017



FINDINGS:



LUNGS:

Prominent bibasilar airspace opacities with bilateral pleural 

effusions ; left greater than right.  Right midlung atelectasis.  

Biapical pleural thickening with upper lobe granulomatous changes.



PLEURA:

As above.



CARDIOVASCULAR:

Cardiomegaly.  Calcification at the aortic knob.



OSSEOUS STRUCTURES:

No significant abnormalities.



VISUALIZED UPPER ABDOMEN:

Normal.



OTHER FINDINGS:

None. 



IMPRESSION:

Prominent bibasilar airspace opacities with bilateral pleural 

effusions ; left greater than right.  Right midlung atelectasis.  

Biapical pleural thickening with upper lobe granulomatous changes.

## 2017-05-01 NOTE — CP.PCM.PN
Subjective





- Date & Time of Evaluation


Date of Evaluation: 05/01/17


Time of Evaluation: 10:50





- Subjective


Subjective: 





No respiratory distress





Objective





- Vital Signs/Intake and Output


Vital Signs (last 24 hours): 


 











Temp Pulse Resp BP Pulse Ox


 


 98 F   90   20   128/59 L  95 


 


 05/01/17 08:40  05/01/17 08:40  05/01/17 08:40  05/01/17 09:35  05/01/17 08:40








Intake and Output: 


 











 05/01/17 05/01/17





 06:59 18:59


 


Output Total 500 


 


Balance -500 














- Medications


Medications: 


 Current Medications





Aspirin (Ecotrin)  81 mg PO DAILY [facility]


   Last Admin: 05/01/17 09:35 Dose:  81 mg


Ciprofloxacin (Cipro)  250 mg PO BID [facility]


   Last Admin: 04/29/17 11:25 Dose:  Not Given


Epoetin Vamsi (Procrit)  10,000 unit SC QWK [facility]


Ferrous Sulfate (Feosol)  325 mg PO BID [facility]


   Last Admin: 05/01/17 09:35 Dose:  325 mg


Furosemide (Lasix)  20 mg IVP BID [facility]


   Last Admin: 04/28/17 10:00 Dose:  Not Given


Furosemide (Lasix)  20 mg PO BID [facility]


   Last Admin: 05/01/17 09:35 Dose:  20 mg


Heparin Sodium (Porcine) (Heparin)  5,000 units SC Q8 [facility]


   Last Admin: 05/01/17 05:21 Dose:  Not Given


Azithromycin 500 mg/ Sodium (Chloride)  250 mls @ 250 mls/hr IVPB Q24H [facility]


   Last Admin: 04/28/17 10:00 Dose:  Not Given


Ceftriaxone Sodium 1 gm/ (Sodium Chloride)  100 mls @ 100 mls/hr IVPB DAILY [facility]


   Last Admin: 04/28/17 10:00 Dose:  Not Given


Insulin Aspart (Novolog)  2 unit SC TIDAC [facility]


   Last Admin: 05/01/17 09:37 Dose:  2 unit


Insulin Aspart (Novolog)  0 unit SC ACHS [facility]


   PRN Reason: Protocol


   Last Admin: 05/01/17 09:38 Dose:  3 unit


Insulin Glargine (Lantus)  15 unit SC HS [facility]


   Last Admin: 04/30/17 21:38 Dose:  15 u


Labetalol HCl (Trandate)  200 mg PO BID [facility]


   Last Admin: 05/01/17 09:35 Dose:  200 mg


Lisinopril (Zestril)  5 mg PO DAILY [facility]


   Last Admin: 05/01/17 09:36 Dose:  5 mg


Megestrol Acetate (Megace)  40 mg PO DAILY [facility]


   Last Admin: 05/01/17 09:34 Dose:  40 mg


Moxifloxacin HCl (Avelox)  400 mg PO DAILY [facility]


   Last Admin: 05/01/17 09:34 Dose:  400 mg


Ondansetron HCl (Zofran Inj)  4 mg IVP Q6 PRN


   PRN Reason: Nausea/Vomiting


   Last Admin: 04/23/17 12:47 Dose:  4 mg


Pantoprazole Sodium (Protonix Ec Tab)  20 mg PO DAILY [facility]


   Last Admin: 05/01/17 09:36 Dose:  20 mg


Sodium Bicarbonate (Sodium Bicarbonate Tab)  1,300 mg PO BID [facility]


   Last Admin: 05/01/17 09:36 Dose:  1,300 mg











- Labs


Labs: 


 





 04/28/17 07:12 





 04/28/17 07:12 











- Respiratory Exam


Additional comments: 





Lungs clear





- Cardiovascular Exam


Cardiovascular Exam: REGULAR RHYTHM





- Extremities Exam


Additional comments: 





No edema





Assessment and Plan





- Assessment and Plan (Free Text)


Assessment: 





Stage 1V kidney disease. Renal function has been stable


High BUN noted today ? prerenal from not eating. Check FOB to r/o blood in GI 

tract


Anemia


Pneumonia


DM


Plan: 





Continue to monitor renal function


Monitor Hb

## 2017-05-01 NOTE — CP.PCM.PN
<ZuleymaLctracee - Last Filed: 05/01/17 15:30>





Subjective





- Date & Time of Evaluation


Date of Evaluation: 05/01/17


Time of Evaluation: 11:39





- Subjective


Subjective: 





PGY 1 Med Note- Dr. Willard's service





Pt seen and examined in no apparent acute distress. Patient received PICC line 

earlier today. Patient still a little tearful with palpation of right arm but 

consolable. Patient denies any chest pain, palpations, shortness of breath, 

fevers, chils, nausea, vomiting or diarrhea at this time.





Objective





- Vital Signs/Intake and Output


Vital Signs (last 24 hours): 


 











Temp Pulse Resp BP Pulse Ox


 


 98 F   90   20   128/59 L  95 


 


 05/01/17 08:40  05/01/17 08:40  05/01/17 08:40  05/01/17 09:35  05/01/17 08:40








Intake and Output: 


 











 05/01/17 05/01/17





 06:59 18:59


 


Output Total 500 


 


Balance -500 














- Medications


Medications: 


 Current Medications





Aspirin (Ecotrin)  81 mg PO DAILY [facility]


   Last Admin: 05/01/17 09:35 Dose:  81 mg


Ciprofloxacin (Cipro)  250 mg PO BID [facility]


   Last Admin: 04/29/17 11:25 Dose:  Not Given


Epoetin Vamsi (Procrit)  10,000 unit SC QWK [facility]


Ferrous Sulfate (Feosol)  325 mg PO BID [facility]


   Last Admin: 05/01/17 09:35 Dose:  325 mg


Furosemide (Lasix)  20 mg IVP BID [facility]


   Last Admin: 04/28/17 10:00 Dose:  Not Given


Furosemide (Lasix)  20 mg PO BID [facility]


   Last Admin: 05/01/17 09:35 Dose:  20 mg


Heparin Sodium (Porcine) (Heparin)  5,000 units SC Q8 [facility]


   Last Admin: 05/01/17 05:21 Dose:  Not Given


Azithromycin 500 mg/ Sodium (Chloride)  250 mls @ 250 mls/hr IVPB Q24H [facility]


   Last Admin: 04/28/17 10:00 Dose:  Not Given


Ceftriaxone Sodium 1 gm/ (Sodium Chloride)  100 mls @ 100 mls/hr IVPB DAILY [facility]


   Last Admin: 04/28/17 10:00 Dose:  Not Given


Insulin Aspart (Novolog)  2 unit SC TIDAC [facility]


   Last Admin: 05/01/17 09:37 Dose:  2 unit


Insulin Aspart (Novolog)  0 unit SC ACHS [facility]


   PRN Reason: Protocol


   Last Admin: 05/01/17 09:38 Dose:  3 unit


Insulin Glargine (Lantus)  15 unit SC HS [facility]


   Last Admin: 04/30/17 21:38 Dose:  15 u


Labetalol HCl (Trandate)  200 mg PO BID [facility]


   Last Admin: 05/01/17 09:35 Dose:  200 mg


Lisinopril (Zestril)  5 mg PO DAILY [facility]


   Last Admin: 05/01/17 09:36 Dose:  5 mg


Megestrol Acetate (Megace)  40 mg PO DAILY [facility]


   Last Admin: 05/01/17 09:34 Dose:  40 mg


Moxifloxacin HCl (Avelox)  400 mg PO DAILY [facility]


   Last Admin: 05/01/17 09:34 Dose:  400 mg


Ondansetron HCl (Zofran Inj)  4 mg IVP Q6 PRN


   PRN Reason: Nausea/Vomiting


   Last Admin: 04/23/17 12:47 Dose:  4 mg


Pantoprazole Sodium (Protonix Ec Tab)  20 mg PO DAILY [facility]


   Last Admin: 05/01/17 09:36 Dose:  20 mg


Sodium Bicarbonate (Sodium Bicarbonate Tab)  1,300 mg PO BID [facility]


   Last Admin: 05/01/17 09:36 Dose:  1,300 mg











- Labs


Labs: 


 





 04/28/17 07:12 





 04/28/17 07:12 











- Constitutional


Appears: Non-toxic, No Acute Distress





- Head Exam


Head Exam: ATRAUMATIC, NORMAL INSPECTION, NORMOCEPHALIC





- Eye Exam


Eye Exam: EOMI, Normal appearance, PERRL


Pupil Exam: NORMAL ACCOMODATION





- ENT Exam


ENT Exam: Mucous Membranes Moist





- Neck Exam


Neck Exam: Full ROM





- Respiratory Exam


Respiratory Exam: NORMAL BREATHING PATTERN.  absent: Wheezes





- Cardiovascular Exam


Cardiovascular Exam: +S1, +S2





- GI/Abdominal Exam


GI & Abdominal Exam: Soft, Normal Bowel Sounds





- Extremities Exam


Extremities Exam: Full ROM.  absent: Pedal Edema


Additional comments: 





PICC line in right arm in place, site, c/d/i





- Back Exam


Back Exam: Full ROM





- Neurological Exam


Neurological Exam: Alert, Awake, CN II-XII Intact, Oriented x3





- Psychiatric Exam


Psychiatric exam: Anxious, Normal Affect, Normal Mood





- Skin


Skin Exam: Dry, Warm





Assessment and Plan





- Assessment and Plan (Free Text)


Assessment: 





 Pneumonia w/ pleural effusions noted


PICC line placed by IR this morning 5/1/17


WBC is negative and afebrile. Cultures remain negative at this time. 


IV abx restarted Azithromycin and Rocephin 


5/1 XRAY: F/U final report


Repeat CXR 4/28/17 showed Multifocal ill-defined opacity, nonspecific.  

Possible pneumonia or pulmonary edema.  Small bilateral pleural effusion.


Chest xray (4/25/17): new bilateral pleural effusions; persistent bilateral 

multifocal infiltrates


Lasix 20 mg IV BID  continued


Elevated BNP. 


Procalcitonin elevated





Diabetes Mellitus


Hyperglycemic


Recent accucheks improved to 200s ISS high dose with improved intake.


Lantus to 15 units SC HS


 Novolog 2 units TIDACC


ISS high dose


Encourage intake


Monitor


Status: Acute





Anorexia with Weight Loss


Hx of Colon [place]- [place] [facility] [place] 

per GI. Will have to call PMD bc patient would rather utilize her prior GI 

doctor.


Monitor calorie counts, Megace given.


GI consult (Dr. Kulkarni/Lori) on board. No immediate plans for PEG tube. 

Encourage patient to eat. Appetite may return upon treatment of underlying 

pneumonia/UTI. NG tube feeds suggested if patient's lack of appetite continued. 

Patient made aware and will attempt to eat more.


CT chest, abd,pelvis w/o contrast- no apparent mass noted. thickening of rectal 

wall noted; mild stranding of perirectal fat. Surgical suture lines noted in 

bowel area.





UTI (urinary tract infection)


F/U UA studies. Still uncollected.


Patient with no IV access - PO Cipro ordered but was held due to 

contraindications 2/2 renal clearance.


Was on IV Rocephin, urine culture negative, blood culture no growth to date 


Rocephin IVPB held at this time due to no IV access. 


Patient has agreed to PICC in the AM with IV anxiolytic. 





Chronic kidney disease (CKD)


Dr. Munoz consulted, patient may need dialysis at some point in the future


On procrit


PTH elevated at 163. 





Hypertension


Labetalol 200 mg PO BID, Zestril 5 mg PO daily


Echo (4/24) approx greater than 50% EF; severely thickened mitral valve; cannot 

rule out vegetation; aortic valve severely thickened. refer to full report.





Anemia 


May require transfusion. Hgb decreased to 7.3. Will re-visit conversation with 

patient and patient's daughter.


Consent obtained from last week prior to need for new IV access.


Most likely anemia of chronic disease secondarily to CKD


Ferrous sulfate daily


Iron studies


Hgb stable.


F/U stool occult





Hyperkalemia


Stable as per last blood work.


Cont to monitor





Mild Cognitive Impairment


No psych meds at this time


Support and psychoeducation rendered


Daughter aware


Psych support and psychoeducation provided- Consult to Dr. Parikh





Prophylactic measure


Heparin SC Q8


PPI


PT/OT


PT efrain recommends TIFFANY CASEY to chair





<MontseVera V - Last Filed: 05/02/17 00:42>





Objective





- Vital Signs/Intake and Output


Vital Signs (last 24 hours): 


 











Temp Pulse Resp BP Pulse Ox


 


 98.9 F   82   20   159/71 H  96 


 


 05/01/17 22:32  05/01/17 22:32  05/01/17 22:32  05/01/17 22:32  05/01/17 15:42








Intake and Output: 


 











 05/01/17 05/02/17





 18:59 06:59


 


Intake Total 480 0


 


Balance 480 0














- Medications


Medications: 


 Current Medications





Aspirin (Ecotrin)  81 mg PO DAILY [facility]


   Last Admin: 05/01/17 09:35 Dose:  81 mg


Epoetin Vamsi (Procrit)  10,000 unit SC QWK [facility]


Ferrous Sulfate (Feosol)  325 mg PO BID [facility]


   Last Admin: 05/01/17 20:40 Dose:  325 mg


Furosemide (Lasix)  20 mg IVP BID [facility]


   Last Admin: 05/01/17 20:48 Dose:  20 mg


Heparin Sodium (Porcine) (Heparin)  5,000 units SC Q8 [facility]


   Last Admin: 05/01/17 22:02 Dose:  Not Given


Azithromycin 500 mg/ Sodium (Chloride)  250 mls @ 250 mls/hr IVPB Q24H [facility]


   Last Admin: 04/28/17 10:00 Dose:  Not Given


Ceftriaxone Sodium 1 gm/ (Sodium Chloride)  100 mls @ 100 mls/hr IVPB DAILY [facility]


   Last Admin: 04/28/17 10:00 Dose:  Not Given


Insulin Aspart (Novolog)  0 unit SC ACHS [facility]


   PRN Reason: Protocol


   Last Admin: 05/01/17 22:07 Dose:  Not Given


Insulin Aspart (Novolog)  5 unit SC TIDAC [facility]


Insulin Glargine (Lantus)  20 unit SC HS [facility]


Labetalol HCl (Trandate)  200 mg PO BID [facility]


   Last Admin: 05/01/17 20:40 Dose:  200 mg


Lisinopril (Zestril)  5 mg PO DAILY [facility]


   Last Admin: 05/01/17 09:36 Dose:  5 mg


Megestrol Acetate (Megace)  40 mg PO DAILY [facility]


   Last Admin: 05/01/17 09:34 Dose:  40 mg


Ondansetron HCl (Zofran Inj)  4 mg IVP Q6 PRN


   PRN Reason: Nausea/Vomiting


   Last Admin: 04/23/17 12:47 Dose:  4 mg


Pantoprazole Sodium (Protonix Ec Tab)  20 mg PO DAILY [facility]


   Last Admin: 05/01/17 09:36 Dose:  20 mg


Sodium Bicarbonate (Sodium Bicarbonate Tab)  1,300 mg PO BID [facility]


   Last Admin: 05/01/17 20:40 Dose:  1,300 mg











- Labs


Labs: 


 





 05/01/17 11:40 





 05/01/17 11:40 











Attending/Attestation





- Attestation


I have personally seen and examined this patient.: Yes


I have fully participated in the care of the patient.: Yes


I have reviewed all pertinent clinical information, including history, physical 

exam and plan: Yes


Notes (Text): 


This is late computer entry for 5/1/17.


Patient seen, examined, and case discussed with day-time resident.


Patient looks better, awake, alert, and oriented X3. Patient is very calm 

today. Patient seen status post placement of PICC line. Patient denies acute 

complaints. Reports she would like to sleep but everyone keeps waking her.


Patient ordered for blood work since have not able to draw since lack of access.


Reviewed chest xray noting for opacities and pleural effusions.


Restarted IV abx and IV Lasix for CAP and pleural effusions


Ordered for repeat procalcitonin and chest xray portable for tomorrow.


Patient's hgb 7.3; recommend for type 1unit PRBC to transfuse. 


Soft tissue xray no acute findings; noted possible reactive, lymph node pain 

considering patient is being treated for pneumonia, will need monitoring





1) Sepsis


Assessment & Plan: 


* Criteria: febrile, tachycardia, and source of infection: pneumonia and 

urinary tract infection


* Procalcitonin: 2.46 (elevated)-->f/u repeat procalcitionin


* Chest xray (4/22/17) Diffuse bilateral infiltrates likely pulmonary edema/CHF


* Chest xray (4/25/17): new bilateral pleural effusions; persistent bilateral 

multifocal infiltrates


* Chest xray (4/28/17): multifocal ill-defined opacity, nonspecific. Possible 

pneumonia or pulmonary edema. small bilateral pleural effusion


* Chest/Abdomen/Pelvis (4/24/17): bilateral moderate size pleural effusion with 

associated compressive atelectasis. Bilateral upper lobe airspace patchy 

opacities. Minimal perihepatic fluid. Scattered calcifications in the left 

breast


* Rocephin 1 gram IV qdaily (active since 4/23/17)--> restarted on 5/1/17


* Azithromycin 500mg IV Q24 hours (active since 4/23/17)-->restarted on 5/1/17


* Blood cultures (4/22/17): no growth X2


* Urine culture (4/22/17): no growth


* monitor CBC, and temperature


* Infectious disease (Dr. Mendoza) on the case





2) Pneumonia 


Assessment & Plan: 


* Procalcitonin: 2.46 (elevated)


* Chest xray (4/22/17) Diffuse bilateral infiltrates likely pulmonary edema/CHF


* Chest xray (4/25/17): new bilateral pleural effusions; persistent bilateral 

multifocal infiltrates


* Chest xray (4/28/17): multifocal ill-defined opacity, nonspecific. Possible 

pneumonia or pulmonary edema. small bilateral pleural effusion


* Chest/Abdomen/Pelvis (4/24/17): bilateral moderate size pleural effusion with 

associated compressive atelectasis. Bilateral upper lobe airspace patchy 

opacities. Minimal perihepatic fluid. Scattered calcifications in the left 

breast


* Chest xray (5/1/17): b/l airspace opacities and b/l pleural effusions


* Rocephin 1 gram IV qdaily (active since 4/23/17)


* Azithromycin 500mg IV Q24 hours (active since 4/23/17)


* Blood cultures (4/22/17): no growth X 5 days X2


* Urine culture (4/22/17): no growth


* monitor CBC, and temperature


* Tmax: 99.1F


* Infectious disease (Dr. Mendoza)-->on board





3) Pleural effusions


Assessment & Plan: 


* Chest xray (4/22/17) Diffuse bilateral infiltrates likely pulmonary edema/CHF


* Chest xray (4/25/17): new bilateral pleural effusions; persistent bilateral 

multifocal infiltrates


* Chest xray (4/28/17): multifocal ill-defined opacity, nonspecific. Possible 

pneumonia or pulmonary edema. small bilateral pleural effusion


* Chest/Abdomen/Pelvis (4/24/17): bilateral moderate size pleural effusion with 

associated compressive atelectasis. Bilateral upper lobe airspace patchy 

opacities. Minimal perihepatic fluid. Scattered calcifications in the left 

breast


* Chest xray (5/1/17): b/l airspace opacities and b/l pleural effusions


* Lasix 20mg IV bid





4) Metabolic encephalopathy


Assessment & Plan: 


* Etiologies including: hypoglycemia, pneumonia, and urinary tract infection, 

sepsis





5) Chronic Kidney Disease


Assessment & Plan: 


* Nephrology (Dr. Munoz) on the case


* monitor BUN/Cr


* Sodium bicarbonate 1300mg PO bid


* lasix 20mg IV bid





6) UTI (urinary tract infection)


Assessment & Plan: 


* Rocephin 1 gram IV qdaily (active since 4/23/17) off secondary to lack of IV 

access


* Urine culture (4/22/17): no growth 


* Abnormal UA (4/22/17): positive protein, glucose, blood, and pyuria, hematuria


* Avelox 400mg PO daily (active since 4/28/17) until IV line is re-established





7) Hypertension


Assessment & Plan: 


* Labetalol 200 mg PO BID, Zestril 5 mg PO daily


* Monitor vital signs


* Echo (4/24) approx greater than 50% EF; severely thickened mitral valve; 

cannot rule out vegetation; aortic valve severely thickened. refer to full 

report





8) Diabetes


Assessment & Plan: 


* [id number]: 7.0


* Patient is off insulin regimen given she came in for altered mental status 

secondary to hypoglycemia


* Monitor nutrition prior to starting insulin regimen


* Increased Lantus 15 units subqHS


* start low dose novolog insulin sliding scale


* start Novolog 2 units tidac 


* monitor accuchecks QAC and HS


* Patient is also on supplements





9) Anemia


Assessment & Plan: 


* suspecting secondary to anemia of chronic kidney disease


* monitor H/H


* Feosol 325mg PO bid (active since 4/24/17)


* low iron and low iron stores


* refused blood transfusion 4/27/17; permitted for 1 unit of PRBC to be 

transfused


* ordered for stool occult blood





10) Unintentional weight loss; hx of colon cancer


Assessment & Plan: 


* Monitor calorie counts


* GI consult (Dr. Kulkarni) on board


* CT chest, abd,pelvis w/o contrast- no apparent mass noted. thickening of 

rectal wall noted; mild stranding of perirectal fat; underlying proctitis can 

be considered. Surgical suture lines noted in bowel area. Refer to complete 

report


* Patient defers workup per GI


* Patient is on PO supplements and on megace 400mg PO daily to encourage 

appetite





11) Prophylactic measure


Assessment & Plan: 


* Heparin SC Q8 hours 


* Protonix 40mg PO daily


* PT/OT eval- F/U


* OOB to chair


* IVC filter


* Monitor calorie count


* Palliative care: goals of care


* Megace 400mg PO daily


* PICC line





Disposition:


* Patient has IV access, restart IV abx see if effective for patient's 

pneumonia and UTI; and follow-up procalcitonin when IV abx is re-established 

and portable chest xray for pleural effusion


* Follow-up with   to assist family with Home Care assistance and 

with Medicaid application


* Discharge planning likely to subacute rehab when procalcitonin improves and 

patient is clincally improving

## 2017-05-01 NOTE — PCM.SURG1
Surgeon's Initial Post Op Note





- Surgeon's Notes


Surgeon: Gray Mckeon MD


Assistant: NONE


Type of Anesthesia: Local


Pre-Operative Diagnosis: Poor venous access


Operative Findings: Small right basilic vein. Patent right  brachial vein.


Post-Operative Diagnosis: Poor venous access


Operation Performed: Right brachial vein single lumen picc placement, 39 cm. 

Tip in SVC.


Specimen/Specimens Removed: NONE


Estimated Blood Loss: EBL {In ML}: 2


Blood Products Given: N/A


Drains Used: No Drains


Post-Op Condition: Fair


Date of Surgery/Procedure: 05/01/17


Time of Surgery/Procedure: 10:20

## 2017-05-02 LAB
ALBUMIN/GLOB SERPL: 1 {RATIO} (ref 1–2.1)
ALP SERPL-CCNC: 92 U/L (ref 38–126)
ALT SERPL-CCNC: 20 U/L (ref 9–52)
AST SERPL-CCNC: 15 U/L (ref 14–36)
BASOPHILS # BLD AUTO: 0.1 K/UL (ref 0–0.2)
BASOPHILS NFR BLD: 0.8 % (ref 0–2)
BILIRUB SERPL-MCNC: 0.9 MG/DL (ref 0.2–1.3)
BUN SERPL-MCNC: 74 MG/DL (ref 7–17)
CALCIUM SERPL-MCNC: 8.5 MG/DL (ref 8.6–10.4)
CHLORIDE SERPL-SCNC: 106 MMOL/L (ref 98–107)
CO2 SERPL-SCNC: 24 MMOL/L (ref 22–30)
EOSINOPHIL # BLD AUTO: 0.3 K/UL (ref 0–0.7)
EOSINOPHIL NFR BLD: 4.2 % (ref 0–4)
ERYTHROCYTE [DISTWIDTH] IN BLOOD BY AUTOMATED COUNT: 15.2 % (ref 11.5–14.5)
GLOBULIN SER-MCNC: 3.2 GM/DL (ref 2.2–3.9)
GLUCOSE SERPL-MCNC: 96 MG/DL (ref 65–105)
HCT VFR BLD CALC: 26.9 % (ref 34–47)
LYMPHOCYTES # BLD AUTO: 1.3 K/UL (ref 1–4.3)
LYMPHOCYTES NFR BLD AUTO: 18.4 % (ref 20–40)
MAGNESIUM SERPL-MCNC: 1.9 MG/DL (ref 1.6–2.3)
MCH RBC QN AUTO: 27.3 PG (ref 27–31)
MCHC RBC AUTO-ENTMCNC: 32.5 G/DL (ref 33–37)
MCV RBC AUTO: 83.8 FL (ref 81–99)
MONOCYTES # BLD: 0.4 K/UL (ref 0–0.8)
MONOCYTES NFR BLD: 5.1 % (ref 0–10)
NRBC BLD AUTO-RTO: 0 % (ref 0–2)
PHOSPHATE SERPL-MCNC: 4.2 MG/DL (ref 2.5–4.5)
PLATELET # BLD: 245 K/UL (ref 130–400)
PMV BLD AUTO: 8.8 FL (ref 7.2–11.7)
POTASSIUM SERPL-SCNC: 4.2 MMOL/L (ref 3.6–5.2)
PROT SERPL-MCNC: 6.4 G/DL (ref 6.3–8.3)
SODIUM SERPL-SCNC: 141 MMOL/L (ref 132–148)
WBC # BLD AUTO: 6.9 K/UL (ref 4.8–10.8)

## 2017-05-02 RX ADMIN — INSULIN ASPART SCH UNIT: 100 INJECTION, SOLUTION INTRAVENOUS; SUBCUTANEOUS at 18:00

## 2017-05-02 RX ADMIN — INSULIN ASPART SCH UNIT: 100 INJECTION, SOLUTION INTRAVENOUS; SUBCUTANEOUS at 08:38

## 2017-05-02 RX ADMIN — PANTOPRAZOLE SODIUM SCH MG: 20 TABLET, DELAYED RELEASE ORAL at 10:34

## 2017-05-02 RX ADMIN — INSULIN ASPART SCH: 100 INJECTION, SOLUTION INTRAVENOUS; SUBCUTANEOUS at 12:37

## 2017-05-02 RX ADMIN — INSULIN ASPART SCH: 100 INJECTION, SOLUTION INTRAVENOUS; SUBCUTANEOUS at 07:59

## 2017-05-02 RX ADMIN — INSULIN GLARGINE SCH UNITS: 100 INJECTION, SOLUTION SUBCUTANEOUS at 21:58

## 2017-05-02 RX ADMIN — INSULIN ASPART SCH: 100 INJECTION, SOLUTION INTRAVENOUS; SUBCUTANEOUS at 21:51

## 2017-05-02 NOTE — CP.PCM.PN
Subjective





- Date & Time of Evaluation


Date of Evaluation: 05/02/17


Time of Evaluation: 10:00





- Subjective


Subjective: 





Mildly dyspneic. Pt states that her breathing is better than before





Objective





- Vital Signs/Intake and Output


Vital Signs (last 24 hours): 


 











Temp Pulse Resp BP Pulse Ox


 


 97.3 F L  85   18   167/79 H  97 


 


 05/02/17 07:05  05/02/17 07:05  05/02/17 07:05  05/02/17 10:44  05/02/17 07:05








Intake and Output: 


 











 05/02/17 05/02/17





 06:59 18:59


 


Intake Total 685 


 


Balance 685 














- Medications


Medications: 


 Current Medications





Aspirin (Ecotrin)  81 mg PO DAILY [facility]


   Last Admin: 05/02/17 10:33 Dose:  81 mg


Epoetin Vamsi (Procrit)  10,000 unit SC QWK [facility]


Ferrous Sulfate (Feosol)  325 mg PO BID [facility]


   Last Admin: 05/02/17 10:33 Dose:  325 mg


Furosemide (Lasix)  20 mg IVP BID [facility]


   Last Admin: 05/02/17 10:44 Dose:  20 mg


Heparin Sodium (Porcine) (Heparin)  5,000 units SC Q8 [facility]


   Last Admin: 05/02/17 06:18 Dose:  Not Given


Azithromycin 500 mg/ Sodium (Chloride)  250 mls @ 250 mls/hr IVPB Q24H [facility]


   Last Admin: 04/28/17 10:00 Dose:  Not Given


Ceftriaxone Sodium 1 gm/ (Sodium Chloride)  100 mls @ 100 mls/hr IVPB DAILY [facility]


   Last Admin: 05/02/17 10:34 Dose:  100 mls/hr


Insulin Aspart (Novolog)  0 unit SC ACHS [facility]


   PRN Reason: Protocol


   Last Admin: 05/02/17 07:59 Dose:  Not Given


Insulin Aspart (Novolog)  5 unit SC TIDAC [facility]


   Last Admin: 05/02/17 08:38 Dose:  5 unit


Insulin Glargine (Lantus)  20 unit SC HS [facility]


Labetalol HCl (Trandate)  200 mg PO BID [facility]


   Last Admin: 05/02/17 10:36 Dose:  200 mg


Lisinopril (Zestril)  5 mg PO DAILY [facility]


   Last Admin: 05/02/17 10:36 Dose:  5 mg


Megestrol Acetate (Megace)  40 mg PO DAILY [facility]


   Last Admin: 05/02/17 10:34 Dose:  40 mg


Ondansetron HCl (Zofran Inj)  4 mg IVP Q6 PRN


   PRN Reason: Nausea/Vomiting


   Last Admin: 04/23/17 12:47 Dose:  4 mg


Pantoprazole Sodium (Protonix Ec Tab)  20 mg PO DAILY [facility]


   Last Admin: 05/02/17 10:34 Dose:  20 mg


Sodium Bicarbonate (Sodium Bicarbonate Tab)  1,300 mg PO BID [facility]


   Last Admin: 05/02/17 10:35 Dose:  1,300 mg











- Labs


Labs: 


 





 05/02/17 06:28 





 05/02/17 06:28 











- Cardiovascular Exam


Cardiovascular Exam: REGULAR RHYTHM





- Rectal Exam


Additional comments: 





No rales noted No wheezing





- Extremities Exam


Additional comments: 





No edema





Assessment and Plan





- Assessment and Plan (Free Text)


Assessment: 





Stage !V kidney diseas


K+ is controlled. Acidosis imroved with bicarb supplementation


Anemia


Pneumonia B/L pleural effusions ? volume overload


IDDM


Plan: 





Will order 24 hr urine for creat. clearance & total protein


Continue current Mx

## 2017-05-02 NOTE — RAD
HISTORY:

pleural effusions  



COMPARISON:

05/01/2017 



FINDINGS:



LUNGS:

Persistent moderate venous congestion with small bilateral pleural 

effusions.  Persistent right hilar consolidation and or prominence. 

Right PICC line with tip extending to the cavoatrial junction. 



PLEURA:

As above.



CARDIOVASCULAR:

Cardiomegaly.  Calcification at the aortic knob.



OSSEOUS STRUCTURES:

No significant abnormalities.



VISUALIZED UPPER ABDOMEN:

Normal.



OTHER FINDINGS:

None.



IMPRESSION:

Persistent moderate venous congestion with small bilateral pleural 

effusions.  Persistent right hilar consolidation and or prominence. 

Right PICC line with tip extending to the cavoatrial junction.

## 2017-05-02 NOTE — CP.PCM.PN
<ZuleymaLctracee - Last Filed: 05/02/17 14:43>





Subjective





- Date & Time of Evaluation


Date of Evaluation: 05/02/17


Time of Evaluation: 06:20





- Subjective


Subjective: 





PGY 1 Medicine Note- Dr. Gaxiola's service





Pt seen and examined in no acute distress. Pt alert and conversational.  

Patient received one unit of PRBC yesterday and tolerated the procedure without 

complaints. At this time, she is tolerating more of her diet. Family present 

bedside. Patient counseled on benefits of going to subacute rehab. Patient 

previously was not interested however she would like some time to think it 

over. Case management also on the case and has been speaking with patient in 

hopes of encouraging her.  She denies chest pain, palpitations, dyspnea, nausea

, vomiting, diarrhea or constipation at time. 





Objective





- Vital Signs/Intake and Output


Vital Signs (last 24 hours): 


 











Temp Pulse Resp BP Pulse Ox


 


 98.3 F   80   20   154/74 H  98 


 


 05/02/17 00:40  05/02/17 00:40  05/02/17 00:40  05/02/17 00:40  05/01/17 23:30








Intake and Output: 


 











 05/02/17 05/02/17





 06:59 18:59


 


Intake Total 685 


 


Balance 685 














- Medications


Medications: 


 Current Medications





Aspirin (Ecotrin)  81 mg PO DAILY [facility]


   Last Admin: 05/01/17 09:35 Dose:  81 mg


Epoetin Vamsi (Procrit)  10,000 unit SC QWK [facility]


Ferrous Sulfate (Feosol)  325 mg PO BID [facility]


   Last Admin: 05/01/17 20:40 Dose:  325 mg


Furosemide (Lasix)  20 mg IVP BID [facility]


   Last Admin: 05/01/17 20:48 Dose:  20 mg


Heparin Sodium (Porcine) (Heparin)  5,000 units SC Q8 [facility]


   Last Admin: 05/02/17 06:18 Dose:  Not Given


Azithromycin 500 mg/ Sodium (Chloride)  250 mls @ 250 mls/hr IVPB Q24H [facility]


   Last Admin: 04/28/17 10:00 Dose:  Not Given


Ceftriaxone Sodium 1 gm/ (Sodium Chloride)  100 mls @ 100 mls/hr IVPB DAILY [facility]


   Last Admin: 04/28/17 10:00 Dose:  Not Given


Insulin Aspart (Novolog)  0 unit SC ACHS [facility]


   PRN Reason: Protocol


   Last Admin: 05/01/17 22:07 Dose:  Not Given


Insulin Aspart (Novolog)  5 unit SC TIDAC [facility]


Insulin Glargine (Lantus)  20 unit SC HS [facility]


Labetalol HCl (Trandate)  200 mg PO BID [facility]


   Last Admin: 05/01/17 20:40 Dose:  200 mg


Lisinopril (Zestril)  5 mg PO DAILY [facility]


   Last Admin: 05/01/17 09:36 Dose:  5 mg


Megestrol Acetate (Megace)  40 mg PO DAILY [facility]


   Last Admin: 05/01/17 09:34 Dose:  40 mg


Ondansetron HCl (Zofran Inj)  4 mg IVP Q6 PRN


   PRN Reason: Nausea/Vomiting


   Last Admin: 04/23/17 12:47 Dose:  4 mg


Pantoprazole Sodium (Protonix Ec Tab)  20 mg PO DAILY [facility]


   Last Admin: 05/01/17 09:36 Dose:  20 mg


Sodium Bicarbonate (Sodium Bicarbonate Tab)  1,300 mg PO BID [facility]


   Last Admin: 05/01/17 20:40 Dose:  1,300 mg











- Labs


Labs: 


 





 05/01/17 11:40 





 05/01/17 11:40 











- Constitutional


Appears: Non-toxic, No Acute Distress





- Head Exam


Head Exam: ATRAUMATIC, NORMAL INSPECTION, NORMOCEPHALIC





- Eye Exam


Eye Exam: EOMI, Normal appearance, PERRL


Pupil Exam: NORMAL ACCOMODATION





- ENT Exam


ENT Exam: Mucous Membranes Moist





- Neck Exam


Neck Exam: Full ROM





- Respiratory Exam


Respiratory Exam: NORMAL BREATHING PATTERN.  absent: Wheezes





- Cardiovascular Exam


Cardiovascular Exam: +S1, +S2





- GI/Abdominal Exam


GI & Abdominal Exam: Soft, Normal Bowel Sounds





- Extremities Exam


Extremities Exam: Full ROM, Normal Capillary Refill, Normal Inspection





- Back Exam


Back Exam: Full ROM





- Neurological Exam


Neurological Exam: Alert, Awake, CN II-XII Intact, Oriented x3





- Psychiatric Exam


Psychiatric exam: Normal Affect, Normal Mood





- Skin


Skin Exam: Dry, Intact, Warm





Assessment and Plan





- Assessment and Plan (Free Text)


Assessment: 





Pneumonia w/ pleural effusions noted


PICC line placed by IR  5/1/17


WBC is negative and afebrile. Cultures remain negative at this time. 


IV abx restarted Azithromycin and Rocephin 


5/1 XRAY:  prominent bibasilar airspace opacities w/ bilateral pleural effusions

; left greater than right. Right midlung atelectasis. Biapical pleural 

thickening with upper lobe granulomatous changes.


Repeat CXR 4/28/17 showed Multifocal ill-defined opacity, nonspecific.  

Possible pneumonia or pulmonary edema.  Small bilateral pleural effusion.


Chest xray (4/25/17): new bilateral pleural effusions; persistent bilateral 

multifocal infiltrates


Lasix 20 mg IV BID  continued


Elevated BNP. 


Procalcitonin elevated





Diabetes Mellitus


Hyperglycemic


Recent accuchecks improved. ISS high dose with improved intake.


Lantus to 20 units SC HS


Novolog 5 units TIDACC


ISS high dose


Encourage intake


Monitor


Status: Acute





Anorexia with Weight Loss


Hx of Colon cancer- prev worked up at [facility] however records could not be located 

per GI. Will have to call PMD bc patient would rather utilize her prior GI 

doctor.


Monitor calorie counts, Megace given.


GI consult (Dr. Kulkarni/Lori) on board. No immediate plans for PEG tube. 

Encourage patient to eat. Appetite may return upon treatment of underlying 

pneumonia/UTI. NG tube feeds suggested if patient's lack of appetite continued. 

Patient made aware and will attempt to eat more.


CT chest, abd,pelvis w/o contrast- no apparent mass noted. thickening of rectal 

wall noted; mild stranding of perirectal fat. Surgical suture lines noted in 

bowel area.





UTI (urinary tract infection)


F/U UA and UC studies. Will be obtained via brewster.


Patient on IV Rocephin, urine culture negative, blood culture no growth to date 

. F/U repeat





Chronic kidney disease (CKD)


Dr. Munoz consulted, patient may need dialysis at some point in the future


On procrit


PTH elevated at 163. 


Will order 24 hr urine for creat. clearance & total protein


Brewster to be inserted for urine collection and discontinued after 24 hrs.





Hypertension


Labetalol 200 mg PO BID, Zestril 5 mg PO daily


Echo (4/24) approx greater than 50% EF; severely thickened mitral valve; cannot 

rule out vegetation; aortic valve severely thickened. refer to full report.





Anemia 


Hgb 5/1 7.3. s/p transfusion. Hgb 8.8


Most likely anemia of chronic disease secondarily to CKD


Ferrous sulfate daily


Iron studies


F/U stool occult





Hyperkalemia


Stable as per last blood work.


Cont to monitor





Mild Cognitive Impairment


No psych meds at this time


Support and psychoeducation rendered


Emotional at times


Daughter aware


Psych support and psychoeducation provided- Consult to Dr. Parikh





Prophylactic measure


Heparin SC Q8


PPI


PT/OT


PT efrain recommends TIFFANY CASEY to chair





Disp: TIFFANY once patient is agreeable. She would like to think about it today. At 

this point, it would be unsafe to discharge patient immediately home as she 

would be in need of physical support services.





<Samson Gaxiola H - Last Filed: 05/02/17 17:45>





Objective





- Vital Signs/Intake and Output


Vital Signs (last 24 hours): 


 











Temp Pulse Resp BP Pulse Ox


 


 97.8 F   85   20   149/71   98 


 


 05/02/17 16:29  05/02/17 16:29  05/02/17 16:29  05/02/17 16:29  05/02/17 16:29








Intake and Output: 


 











 05/02/17 05/02/17





 06:59 18:59


 


Intake Total 685 1200


 


Balance 685 1200














- Medications


Medications: 


 Current Medications





Aspirin (Ecotrin)  81 mg PO DAILY [facility]


   Last Admin: 05/02/17 10:33 Dose:  81 mg


Epoetin Vamsi (Procrit)  10,000 unit SC QWK [facility]


Ferrous Sulfate (Feosol)  325 mg PO BID [facility]


   Last Admin: 05/02/17 10:33 Dose:  325 mg


Furosemide (Lasix)  20 mg IVP BID [facility]


   Last Admin: 05/02/17 10:44 Dose:  20 mg


Heparin Sodium (Porcine) (Heparin)  5,000 units SC Q8 [facility]


   Last Admin: 05/02/17 14:19 Dose:  Not Given


Azithromycin 500 mg/ Sodium (Chloride)  250 mls @ 250 mls/hr IVPB Q24H [facility]


   Last Admin: 05/02/17 12:54 Dose:  250 mls/hr


Ceftriaxone Sodium 1 gm/ (Sodium Chloride)  100 mls @ 100 mls/hr IVPB DAILY [facility]


   Last Admin: 05/02/17 10:34 Dose:  100 mls/hr


Insulin Aspart (Novolog)  0 unit SC ACHS [facility]


   PRN Reason: Protocol


   Last Admin: 05/02/17 12:37 Dose:  Not Given


Insulin Aspart (Novolog)  5 unit SC TIDAC [facility]


   Last Admin: 05/02/17 12:37 Dose:  Not Given


Insulin Glargine (Lantus)  20 unit SC HS [facility]


Labetalol HCl (Trandate)  200 mg PO BID [facility]


   Last Admin: 05/02/17 10:36 Dose:  200 mg


Lisinopril (Zestril)  5 mg PO DAILY [facility]


   Last Admin: 05/02/17 10:36 Dose:  5 mg


Megestrol Acetate (Megace)  40 mg PO DAILY [facility]


   Last Admin: 05/02/17 10:34 Dose:  40 mg


Ondansetron HCl (Zofran Inj)  4 mg IVP Q6 PRN


   PRN Reason: Nausea/Vomiting


   Last Admin: 04/23/17 12:47 Dose:  4 mg


Pantoprazole Sodium (Protonix Ec Tab)  20 mg PO DAILY [facility]


   Last Admin: 05/02/17 10:34 Dose:  20 mg


Sodium Bicarbonate (Sodium Bicarbonate Tab)  1,300 mg PO BID [facility]


   Last Admin: 05/02/17 10:35 Dose:  1,300 mg











- Labs


Labs: 


 





 05/02/17 06:28 





 05/02/17 06:28 











Attending/Attestation





- Attestation


I have personally seen and examined this patient.: Yes


I have fully participated in the care of the patient.: Yes


I have reviewed all pertinent clinical information, including history, physical 

exam and plan: Yes


Notes (Text): 





05/02/17 17:40


Medical attending: Patient was seen and examined by me, agrees the above note 

by medical resident. We have came and spoke with with the patient as well as 

family members at bedside yesterday she was convinced to have a PICC line 

placed. She also agreed to have a blood transfusion as well since she was 

anemic. She is now back on IV a biotics for her pneumonia as well as UTI. 

Furthermore the patient is eating more her blood sugars have been higher. Her 

family has been faithfully encouraging her to eat and we are very fortunate 

that she has very good family help.





I explained to the patient as well as to the family members present that at 

some point she would very much benefit from going to a subacute rehabilitation. 

The reason being is I did not feel that she would do well if she were to go 

home upon discharge from hospital. Since my concern is she's been mostly 

bedbound here at the hospital besides a couple physical therapy treatments and 

evaluations.





Thank you very much,  has been discussing the patient with regards 

to rehabilitation hopefully she'll be able to go soon





Samson Gaxiola

## 2017-05-03 VITALS — OXYGEN SATURATION: 96 %

## 2017-05-03 LAB
ALBUMIN/GLOB SERPL: 1 {RATIO} (ref 1–2.1)
ALP SERPL-CCNC: 92 U/L (ref 38–126)
ALT SERPL-CCNC: 26 U/L (ref 9–52)
AST SERPL-CCNC: 26 U/L (ref 14–36)
BASOPHILS # BLD AUTO: 0 K/UL (ref 0–0.2)
BASOPHILS NFR BLD: 0.7 % (ref 0–2)
BILIRUB SERPL-MCNC: 0.8 MG/DL (ref 0.2–1.3)
BUN SERPL-MCNC: 75 MG/DL (ref 7–17)
CALCIUM SERPL-MCNC: 8.3 MG/DL (ref 8.6–10.4)
CHLORIDE SERPL-SCNC: 102 MMOL/L (ref 98–107)
CO2 SERPL-SCNC: 25 MMOL/L (ref 22–30)
EOSINOPHIL # BLD AUTO: 0.3 K/UL (ref 0–0.7)
EOSINOPHIL NFR BLD: 3.7 % (ref 0–4)
ERYTHROCYTE [DISTWIDTH] IN BLOOD BY AUTOMATED COUNT: 15.2 % (ref 11.5–14.5)
GLOBULIN SER-MCNC: 3.1 GM/DL (ref 2.2–3.9)
GLUCOSE SERPL-MCNC: 97 MG/DL (ref 65–105)
HCT VFR BLD CALC: 26.7 % (ref 34–47)
LYMPHOCYTES # BLD AUTO: 1.4 K/UL (ref 1–4.3)
LYMPHOCYTES NFR BLD AUTO: 19.5 % (ref 20–40)
MAGNESIUM SERPL-MCNC: 1.8 MG/DL (ref 1.6–2.3)
MCH RBC QN AUTO: 27.5 PG (ref 27–31)
MCHC RBC AUTO-ENTMCNC: 32.6 G/DL (ref 33–37)
MCV RBC AUTO: 84.2 FL (ref 81–99)
MONOCYTES # BLD: 0.3 K/UL (ref 0–0.8)
MONOCYTES NFR BLD: 4.5 % (ref 0–10)
NRBC BLD AUTO-RTO: 0 % (ref 0–2)
PHOSPHATE SERPL-MCNC: 4.2 MG/DL (ref 2.5–4.5)
PLATELET # BLD: 240 K/UL (ref 130–400)
PMV BLD AUTO: 8.5 FL (ref 7.2–11.7)
POTASSIUM SERPL-SCNC: 4.3 MMOL/L (ref 3.6–5.2)
PROT SERPL-MCNC: 6.2 G/DL (ref 6.3–8.3)
SODIUM SERPL-SCNC: 137 MMOL/L (ref 132–148)
WBC # BLD AUTO: 7.1 K/UL (ref 4.8–10.8)

## 2017-05-03 RX ADMIN — INSULIN ASPART SCH: 100 INJECTION, SOLUTION INTRAVENOUS; SUBCUTANEOUS at 12:04

## 2017-05-03 RX ADMIN — INSULIN ASPART SCH: 100 INJECTION, SOLUTION INTRAVENOUS; SUBCUTANEOUS at 07:52

## 2017-05-03 RX ADMIN — INSULIN ASPART SCH: 100 INJECTION, SOLUTION INTRAVENOUS; SUBCUTANEOUS at 22:24

## 2017-05-03 RX ADMIN — INSULIN ASPART SCH UNIT: 100 INJECTION, SOLUTION INTRAVENOUS; SUBCUTANEOUS at 13:11

## 2017-05-03 RX ADMIN — INSULIN ASPART SCH: 100 INJECTION, SOLUTION INTRAVENOUS; SUBCUTANEOUS at 16:30

## 2017-05-03 RX ADMIN — INSULIN GLARGINE SCH UNITS: 100 INJECTION, SOLUTION SUBCUTANEOUS at 21:41

## 2017-05-03 RX ADMIN — PANTOPRAZOLE SODIUM SCH MG: 20 TABLET, DELAYED RELEASE ORAL at 09:17

## 2017-05-03 RX ADMIN — INSULIN ASPART SCH UNIT: 100 INJECTION, SOLUTION INTRAVENOUS; SUBCUTANEOUS at 08:35

## 2017-05-03 NOTE — CP.PCM.PN
<Marysol Amor - Last Filed: 05/03/17 13:37>





Subjective





- Date & Time of Evaluation


Date of Evaluation: 05/03/17


Time of Evaluation: 06:25





- Subjective


Subjective: 








PGY 1 Medicine Note- Dr. Gaxiola's service





Pt seen and examined in no acute distress. Patient states that she is agreeable 

to TIFFANY. Patient having 24 hr urine studies which will end after 6pm tonight. 

She is tolerating more of a diet. Pt admitted to some left sided neck 

discomfort.  Pt denies fevers, chills, chest pain, palpitations, headaches, 

dyspnea, abdominal pain, urinary frequency or urgency at this time.





Objective





- Vital Signs/Intake and Output


Vital Signs (last 24 hours): 


 











Temp Pulse Resp BP Pulse Ox


 


 98.0 F   83   18   145/55 L  94 L


 


 05/03/17 07:07  05/03/17 07:55  05/03/17 07:07  05/03/17 07:07  05/03/17 07:07








Intake and Output: 


 











 05/03/17 05/03/17





 06:59 18:59


 


Intake Total 240 


 


Balance 240 














- Medications


Medications: 


 Current Medications





Aspirin (Ecotrin)  81 mg PO DAILY [facility]


   Last Admin: 05/02/17 10:33 Dose:  81 mg


Epoetin Vamsi (Procrit)  10,000 unit SC QWK [facility]


Ferrous Sulfate (Feosol)  325 mg PO BID [facility]


   Last Admin: 05/02/17 17:58 Dose:  325 mg


Furosemide (Lasix)  20 mg IVP BID [facility]


   Last Admin: 05/02/17 17:59 Dose:  20 mg


Heparin Sodium (Porcine) (Heparin)  5,000 units SC Q8 [facility]


   Last Admin: 05/02/17 21:59 Dose:  Not Given


Azithromycin 500 mg/ Sodium (Chloride)  250 mls @ 250 mls/hr IVPB Q24H [facility]


   Last Admin: 05/02/17 12:54 Dose:  250 mls/hr


Ceftriaxone Sodium 1 gm/ (Sodium Chloride)  100 mls @ 100 mls/hr IVPB DAILY [facility]


   Last Admin: 05/02/17 10:34 Dose:  100 mls/hr


Insulin Aspart (Novolog)  0 unit SC ACHS [facility]


   PRN Reason: Protocol


   Last Admin: 05/03/17 07:52 Dose:  Not Given


Insulin Aspart (Novolog)  5 unit SC TIDAC [facility]


   Last Admin: 05/03/17 08:35 Dose:  5 unit


Insulin Glargine (Lantus)  20 unit SC HS [facility]


   Last Admin: 05/02/17 21:58 Dose:  10 units


Labetalol HCl (Trandate)  200 mg PO BID [facility]


   Last Admin: 05/02/17 17:59 Dose:  200 mg


Lisinopril (Zestril)  5 mg PO DAILY [facility]


   Last Admin: 05/02/17 10:36 Dose:  5 mg


Megestrol Acetate (Megace)  40 mg PO DAILY [facility]


   Last Admin: 05/02/17 10:34 Dose:  40 mg


Ondansetron HCl (Zofran Inj)  4 mg IVP Q6 PRN


   PRN Reason: Nausea/Vomiting


   Last Admin: 04/23/17 12:47 Dose:  4 mg


Pantoprazole Sodium (Protonix Ec Tab)  20 mg PO DAILY [facility]


   Last Admin: 05/02/17 10:34 Dose:  20 mg


Sodium Bicarbonate (Sodium Bicarbonate Tab)  1,300 mg PO BID [facility]


   Last Admin: 05/02/17 17:59 Dose:  1,300 mg











- Labs


Labs: 


 





 05/03/17 06:27 





 05/03/17 06:27 











- Constitutional


Appears: Non-toxic, No Acute Distress





- Head Exam


Head Exam: ATRAUMATIC, NORMAL INSPECTION, NORMOCEPHALIC





- Eye Exam


Eye Exam: EOMI, Normal appearance, PERRL


Pupil Exam: NORMAL ACCOMODATION, PERRL





- ENT Exam


ENT Exam: Mucous Membranes Moist, Normal Exam





- Neck Exam


Neck Exam: Full ROM, Normal Inspection





- Cardiovascular Exam


Cardiovascular Exam: REGULAR RHYTHM, +S1, +S2





- GI/Abdominal Exam


GI & Abdominal Exam: Soft, Normal Bowel Sounds





- Extremities Exam


Extremities Exam: Full ROM, Normal Capillary Refill, Normal Inspection





- Back Exam


Back Exam: Full ROM, NORMAL INSPECTION





- Neurological Exam


Neurological Exam: Alert, Awake





- Psychiatric Exam


Psychiatric exam: Normal Affect, Normal Mood





- Skin


Skin Exam: Dry, Warm


Additional comments: 





dec skin turgor





Assessment and Plan





- Assessment and Plan (Free Text)


Assessment: 





Pneumonia w/ pleural effusions noted


PICC line placed by IR  5/1/17


WBC is negative and afebrile. Cultures remain negative at this time. 


IV abx  Azithromycin and Rocephin 


5/1 XRAY:  prominent bibasilar airspace opacities w/ bilateral pleural effusions

; left greater than right. Right midlung atelectasis. Biapical pleural 

thickening with upper lobe granulomatous changes.


Repeat CXR 4/28/17 showed Multifocal ill-defined opacity, nonspecific.  

Possible pneumonia or pulmonary edema.  Small bilateral pleural effusion.


Chest xray (4/25/17): new bilateral pleural effusions; persistent bilateral 

multifocal infiltrates


Lasix 20 mg IV BID  continued


Elevated BNP. 


Procalcitonin elevated





Diabetes Mellitus


Hypoglycemic once yesterday but resolved with administration of juice.


Recent accuchecks improved. ISS high dose with improved intake.


Lantus to 20 units SC HS


Novolog 5 units TIDACC


ISS high dose


Encourage intake


Monitor


Status: Acute





Anorexia with Weight Loss


H[place] [facility] [place] be [place] 

per GI. Will have to call PMD bc patient would rather utilize her prior GI 

doctor.


Monitor calorie counts, Megace given.


GI consult (Dr. Kulkarni/Lori) on board. No immediate plans for PEG tube. 

Encourage patient to eat. Appetite may return upon treatment of underlying 

pneumonia/UTI. NG tube feeds suggested if patient's lack of appetite continued. 

Patient made aware and will attempt to eat more.


CT chest, abd,pelvis w/o contrast- no apparent mass noted. thickening of rectal 

wall noted; mild stranding of perirectal fat. Surgical suture lines noted in 

bowel area.





UTI (urinary tract infection)


F/U UA and UC studies. Will be obtained via brewster.


Patient on IV Rocephin, urine culture negative, blood culture no growth to date 

. F/U repeat





Chronic kidney disease (CKD)


Dr. Munoz consulted, patient may need dialysis at some point in the future


On procrit


PTH elevated at 163. 


Currently undergoing 24 urine studies- Will end after 6pm tonight.





Neck Discomfort


Noted discomfort with coughs


Soft Tissue Neck Xray- Left submandibular lymph node not assessed by study. IV 

contrast CT may help/ Extensive calcifications noted likely related to carotid 

etiology. 


Will follow up with CT soft tissue neck (no IV contrast due to poor creatinine 

clearance). F/U results





Hypertension


Labetalol 200 mg PO BID, Zestril 5 mg PO daily


Echo (4/24) approx greater than 50% EF; severely thickened mitral valve; cannot 

rule out vegetation; aortic valve severely thickened. refer to full report.





Anemia 


Hgb stable


Most likely anemia of chronic disease secondarily to CKD


Ferrous sulfate daily


Iron studies


F/U stool occult





Hyperkalemia


Stable as per last blood work.


Cont to monitor





Mild Cognitive Impairment


No psych meds at this time


Support and psychoeducation rendered


Emotional at times


Daughter aware


Psych support and psychoeducation provided- Consult to Dr. Parikh





Prophylactic measure


Heparin SC Q8


PPI


PT/OT


PT tommyal recommends TIFFANY 


OOB to chair





Disp: Patient agreeable to TIFFANY. Likely discharge after pre-auth is finalized.








<Samson Gaxiola H - Last Filed: 05/03/17 14:43>





Objective





- Vital Signs/Intake and Output


Vital Signs (last 24 hours): 


 











Temp Pulse Resp BP Pulse Ox


 


 98.0 F   77   18   156/54 H  96 


 


 05/03/17 07:07  05/03/17 13:29  05/03/17 07:07  05/03/17 09:15  05/03/17 13:29








Intake and Output: 


 











 05/03/17 05/03/17





 06:59 18:59


 


Intake Total 240 


 


Balance 240 














- Medications


Medications: 


 Current Medications





Aspirin (Ecotrin)  81 mg PO DAILY [facility]


   Last Admin: 05/03/17 09:14 Dose:  81 mg


Epoetin Vamsi (Procrit)  10,000 unit SC QWK [facility]


Ferrous Sulfate (Feosol)  325 mg PO BID [facility]


   Last Admin: 05/03/17 09:14 Dose:  325 mg


Furosemide (Lasix)  20 mg IVP BID [facility]


   Last Admin: 05/03/17 09:15 Dose:  20 mg


Heparin Sodium (Porcine) (Heparin)  5,000 units SC Q8 [facility]


   Last Admin: 05/03/17 13:34 Dose:  Not Given


Azithromycin 500 mg/ Sodium (Chloride)  250 mls @ 250 mls/hr IVPB Q24H [facility]


   Last Admin: 05/03/17 12:06 Dose:  250 mls/hr


Ceftriaxone Sodium 1 gm/ (Sodium Chloride)  100 mls @ 100 mls/hr IVPB DAILY [facility]


   Last Admin: 05/03/17 09:17 Dose:  100 mls/hr


Insulin Aspart (Novolog)  0 unit SC ACHS [facility]


   PRN Reason: Protocol


   Last Admin: 05/03/17 12:04 Dose:  Not Given


Insulin Aspart (Novolog)  5 unit SC TIDAC [facility]


   Last Admin: 05/03/17 13:11 Dose:  5 unit


Insulin Glargine (Lantus)  20 unit SC HS [facility]


   Last Admin: 05/02/17 21:58 Dose:  10 units


Labetalol HCl (Trandate)  200 mg PO BID [facility]


   Last Admin: 05/03/17 09:16 Dose:  200 mg


Lisinopril (Zestril)  5 mg PO DAILY [facility]


   Last Admin: 05/03/17 09:14 Dose:  5 mg


Megestrol Acetate (Megace)  40 mg PO DAILY [facility]


   Last Admin: 05/03/17 09:17 Dose:  40 mg


Ondansetron HCl (Zofran Inj)  4 mg IVP Q6 PRN


   PRN Reason: Nausea/Vomiting


   Last Admin: 04/23/17 12:47 Dose:  4 mg


Pantoprazole Sodium (Protonix Ec Tab)  20 mg PO DAILY [facility]


   Last Admin: 05/03/17 09:17 Dose:  20 mg


Sodium Bicarbonate (Sodium Bicarbonate Tab)  1,300 mg PO BID [facility]


   Last Admin: 05/03/17 09:14 Dose:  1,300 mg











- Labs


Labs: 


 





 05/03/17 06:27 





 05/03/17 06:27 











Attending/Attestation





- Attestation


I have personally seen and examined this patient.: Yes


I have fully participated in the care of the patient.: Yes


I have reviewed all pertinent clinical information, including history, physical 

exam and plan: Yes


Notes (Text): 





05/03/17 14:41


Medical attending: Patient was seen and examined by me, agree with the above 

note by medical resident. She is currently doing okay at this time, her lab 

work appears to be stable she does not have white blood cell count does not 

have fevers however she still mostly bedbound she does come out with physical 

therapy.





Family was not present today however as mentioned yesterday we did have a 

meeting with family bedside explained that she would benefit from subacute 

rehabilitation she needs additional days of IV biotics. The hemoglobin as well 

as the creatinine are currently stable at this time. She does have underlying 

CK D and probably anemia of chronic disease. As mentioned before she is had a 

blood transfusion earlier





Thank you very much, hopefully she'll be able to be moved to rehabilitation soon





Samson Gaxiola

## 2017-05-03 NOTE — CP.PCM.PN
Subjective





- Date & Time of Evaluation


Date of Evaluation: 05/03/17


Time of Evaluation: 03:30





- Subjective


Subjective: 





Ledd sob today





Objective





- Vital Signs/Intake and Output


Vital Signs (last 24 hours): 


 











Temp Pulse Resp BP Pulse Ox


 


 98.0 F   77   18   156/54 H  96 


 


 05/03/17 07:07  05/03/17 13:29  05/03/17 07:07  05/03/17 09:15  05/03/17 13:29








Intake and Output: 


 











 05/03/17 05/03/17





 06:59 18:59


 


Intake Total 240 720


 


Balance 240 720














- Medications


Medications: 


 Current Medications





Aspirin (Ecotrin)  81 mg PO DAILY [facility]


   Last Admin: 05/03/17 09:14 Dose:  81 mg


Epoetin Vamsi (Procrit)  10,000 unit SC QWK [facility]


Ferrous Sulfate (Feosol)  325 mg PO BID [facility]


   Last Admin: 05/03/17 09:14 Dose:  325 mg


Furosemide (Lasix)  20 mg IVP BID [facility]


   Last Admin: 05/03/17 09:15 Dose:  20 mg


Heparin Sodium (Porcine) (Heparin)  5,000 units SC Q8 [facility]


   Last Admin: 05/03/17 13:34 Dose:  Not Given


Azithromycin 500 mg/ Sodium (Chloride)  250 mls @ 250 mls/hr IVPB Q24H [facility]


   Last Admin: 05/03/17 12:06 Dose:  250 mls/hr


Ceftriaxone Sodium 1 gm/ (Sodium Chloride)  100 mls @ 100 mls/hr IVPB DAILY [facility]


   Last Admin: 05/03/17 09:17 Dose:  100 mls/hr


Insulin Aspart (Novolog)  0 unit SC ACHS [facility]


   PRN Reason: Protocol


   Last Admin: 05/03/17 12:04 Dose:  Not Given


Insulin Aspart (Novolog)  5 unit SC TIDAC [facility]


   Last Admin: 05/03/17 13:11 Dose:  5 unit


Insulin Glargine (Lantus)  20 unit SC HS [facility]


   Last Admin: 05/02/17 21:58 Dose:  10 units


Labetalol HCl (Trandate)  200 mg PO BID [facility]


   Last Admin: 05/03/17 09:16 Dose:  200 mg


Lisinopril (Zestril)  5 mg PO DAILY [facility]


   Last Admin: 05/03/17 09:14 Dose:  5 mg


Megestrol Acetate (Megace)  40 mg PO DAILY [facility]


   Last Admin: 05/03/17 09:17 Dose:  40 mg


Ondansetron HCl (Zofran Inj)  4 mg IVP Q6 PRN


   PRN Reason: Nausea/Vomiting


   Last Admin: 04/23/17 12:47 Dose:  4 mg


Pantoprazole Sodium (Protonix Ec Tab)  20 mg PO DAILY [facility]


   Last Admin: 05/03/17 09:17 Dose:  20 mg


Sodium Bicarbonate (Sodium Bicarbonate Tab)  1,300 mg PO BID [facility]


   Last Admin: 05/03/17 09:14 Dose:  1,300 mg











- Labs


Labs: 


 





 05/03/17 06:27 





 05/03/17 06:27 











- Respiratory Exam


Additional comments: 





Fine crackles  at bases





- Cardiovascular Exam


Cardiovascular Exam: REGULAR RHYTHM





- Extremities Exam


Additional comments: 





No pedal edema





Assessment and Plan





- Assessment and Plan (Free Text)


Assessment: 





Stage 1V kidney dis Renal function is stable


K+ controlled


Anemia


HTN


Pneumonia/? volume overload


DM


Plan: 





Continue to monitor renal parameters


24 hr urine in progress

## 2017-05-04 VITALS
RESPIRATION RATE: 18 BRPM | DIASTOLIC BLOOD PRESSURE: 63 MMHG | TEMPERATURE: 98.9 F | SYSTOLIC BLOOD PRESSURE: 164 MMHG | HEART RATE: 86 BPM

## 2017-05-04 LAB
ALBUMIN/GLOB SERPL: 1 {RATIO} (ref 1–2.1)
ALP SERPL-CCNC: 93 U/L (ref 38–126)
ALT SERPL-CCNC: 27 U/L (ref 9–52)
AST SERPL-CCNC: 24 U/L (ref 14–36)
BASOPHILS # BLD AUTO: 0.1 K/UL (ref 0–0.2)
BASOPHILS NFR BLD: 0.8 % (ref 0–2)
BILIRUB SERPL-MCNC: 0.7 MG/DL (ref 0.2–1.3)
BUN SERPL-MCNC: 73 MG/DL (ref 7–17)
CALCIUM SERPL-MCNC: 8.3 MG/DL (ref 8.6–10.4)
CHLORIDE SERPL-SCNC: 102 MMOL/L (ref 98–107)
CO2 SERPL-SCNC: 23 MMOL/L (ref 22–30)
COLLECT DURATION TIME UR: 24 HRS
EOSINOPHIL # BLD AUTO: 0.2 K/UL (ref 0–0.7)
EOSINOPHIL NFR BLD: 2.9 % (ref 0–4)
ERYTHROCYTE [DISTWIDTH] IN BLOOD BY AUTOMATED COUNT: 15.2 % (ref 11.5–14.5)
GLOBULIN SER-MCNC: 3.1 GM/DL (ref 2.2–3.9)
GLUCOSE SERPL-MCNC: 125 MG/DL (ref 65–105)
HCT VFR BLD CALC: 26.7 % (ref 34–47)
LYMPHOCYTES # BLD AUTO: 1.1 K/UL (ref 1–4.3)
LYMPHOCYTES NFR BLD AUTO: 14.5 % (ref 20–40)
MAGNESIUM SERPL-MCNC: 1.8 MG/DL (ref 1.6–2.3)
MCH RBC QN AUTO: 27.6 PG (ref 27–31)
MCHC RBC AUTO-ENTMCNC: 33 G/DL (ref 33–37)
MCV RBC AUTO: 83.7 FL (ref 81–99)
MONOCYTES # BLD: 0.4 K/UL (ref 0–0.8)
MONOCYTES NFR BLD: 5.4 % (ref 0–10)
NRBC BLD AUTO-RTO: 0 % (ref 0–2)
PHOSPHATE SERPL-MCNC: 4.6 MG/DL (ref 2.5–4.5)
PLATELET # BLD: 244 K/UL (ref 130–400)
PMV BLD AUTO: 8.6 FL (ref 7.2–11.7)
POTASSIUM SERPL-SCNC: 4.6 MMOL/L (ref 3.6–5.2)
PROT SERPL-MCNC: 6.3 G/DL (ref 6.3–8.3)
SODIUM SERPL-SCNC: 138 MMOL/L (ref 132–148)
WBC # BLD AUTO: 7.3 K/UL (ref 4.8–10.8)

## 2017-05-04 RX ADMIN — INSULIN ASPART SCH: 100 INJECTION, SOLUTION INTRAVENOUS; SUBCUTANEOUS at 10:17

## 2017-05-04 RX ADMIN — INSULIN ASPART SCH: 100 INJECTION, SOLUTION INTRAVENOUS; SUBCUTANEOUS at 07:44

## 2017-05-04 RX ADMIN — INSULIN ASPART SCH UNIT: 100 INJECTION, SOLUTION INTRAVENOUS; SUBCUTANEOUS at 11:44

## 2017-05-04 RX ADMIN — PANTOPRAZOLE SODIUM SCH MG: 20 TABLET, DELAYED RELEASE ORAL at 10:03

## 2017-05-04 RX ADMIN — INSULIN ASPART SCH UNIT: 100 INJECTION, SOLUTION INTRAVENOUS; SUBCUTANEOUS at 11:45

## 2017-05-04 NOTE — CT
PROCEDURE:  CT NECK WITHOUT  CONTRAST



HISTORY:

neck tenderness ;not well visualized on xray



COMPARISON:

None.



TECHNIQUE:

CT of the neck without intravenous contrast. Coronal and sagittal 

reformats generated. 



Radiation dose: .50 mGy-cm



This CT exam was performed using one or more of the following dose 

reduction techniques: Automated exposure control, adjustment of the 

mA and/or kV according to patient size, and/or use of iterative 

reconstruction technique.



FINDINGS:



NASOPHARYNX:

Unremarkable.



SUPRAHYOID NECK:

Unremarkable oropharynx, oral cavity, parapharyngeal space and 

retropharyngeal space.



INFRAHYOID NECK:

Unremarkable larynx, hypopharynx, and supraglottic space. Vocal cords 

intact.



MASS:

None.



GLANDS:

Parotid and submandibular glands unremarkable. Normal size thyroid 

gland, without nodule.



LYMPH NODES:

Normal. No lymphadenopathy.



CERVICAL SPINE:

No fracture or focal lesion. 



OTHER FINDINGS:

Incompletely visualized pleural effusions. Lung apices demonstrate 

focal scarring, of emphysematous changes/bronchitis.



IMPRESSION:

No significant or acute  findings to account for/ related to the 

clinical presentation.



________________________________________________



Concordant results (preliminary interpretation) provided by [employer].



Procedure Completed: 20:09.



Preliminary ([facility]) Report: Dictated and Authenticated: 20:51.



Final Interpretation: 17:47. May 4, 2017.

## 2017-05-04 NOTE — PCM.HF
Heart Failure Core Measure





- Heart Failure


Ejection Fraction: 40 % or Greater (LVEF 63%)


ACE Inhibitor Prescribed: Yes


Beta-Blocker Prescribed: Metoprolol Succinate


Angiotensin II Receptor Blocker Prescribed: No


Contraindication/Reason for not providing: ON ACE


AnticoagulationTherapy for Atrial Fibrillation/Atrialflutter: No


Contraindication/Reason for not providing: NO AFIB


Aldosterone Antagonist Prescribed: No


Contraindication/Reason for not providing: LVEF >40% 


Hydralazine Nitrate Prescribed: No


Contraindication/Reason for not providing: LVEF >40% 


Implantable Cardioverter Defibrillator Therapy: No


Contraindication/Reason for not providing: LVEF >40% 


Cardiac Resynchronization Therapy Prescribed: No


Contraindication/Reason for not providing: LVEF >40% 





- Follow up


Will be discharged to: Skilled Nursing Facility


Follow Up Date (must be within 7 days from discharge): 05/08/17


Follow Up Time: 09:00

## 2017-05-04 NOTE — CP.PCM.PN
Subjective





- Date & Time of Evaluation


Date of Evaluation: 05/04/17


Time of Evaluation: 12:20





- Subjective


Subjective: 





Appears comfortable sitting up in bed





Objective





- Vital Signs/Intake and Output


Vital Signs (last 24 hours): 


 











Temp Pulse Resp BP Pulse Ox


 


 98.9 F   86   18   164/63 H  96 


 


 05/04/17 07:07  05/04/17 07:07  05/04/17 07:07  05/04/17 10:02  05/04/17 07:07








Intake and Output: 


 











 05/04/17 05/04/17





 06:59 18:59


 


Intake Total 240 


 


Balance 240 














- Medications


Medications: 


 Current Medications





Aspirin (Ecotrin)  81 mg PO DAILY [facility]


   Last Admin: 05/04/17 10:03 Dose:  81 mg


Epoetin Vamsi (Procrit)  10,000 unit SC QWK [facility]


   Last Admin: 05/04/17 10:20 Dose:  10,000 unit


Ferrous Sulfate (Feosol)  325 mg PO BID [facility]


   Last Admin: 05/04/17 10:02 Dose:  325 mg


Furosemide (Lasix)  20 mg IVP BID [facility]


   Last Admin: 05/04/17 10:02 Dose:  20 mg


Heparin Sodium (Porcine) (Heparin)  5,000 units SC Q8 [facility]


   Last Admin: 05/03/17 21:48 Dose:  Not Given


Azithromycin 500 mg/ Sodium (Chloride)  250 mls @ 250 mls/hr IVPB Q24H [facility]


   Last Admin: 05/04/17 11:51 Dose:  250 mls/hr


Ceftriaxone Sodium 1 gm/ (Sodium Chloride)  100 mls @ 100 mls/hr IVPB DAILY [facility]


   Last Admin: 05/04/17 10:19 Dose:  100 mls/hr


Insulin Aspart (Novolog)  0 unit SC ACHS [facility]


   PRN Reason: Protocol


   Last Admin: 05/04/17 11:44 Dose:  4 unit


Insulin Aspart (Novolog)  5 unit SC TIDAC [facility]


   Last Admin: 05/04/17 11:45 Dose:  5 unit


Insulin Glargine (Lantus)  20 unit SC HS [facility]


   Last Admin: 05/03/17 21:41 Dose:  20 units


Labetalol HCl (Trandate)  200 mg PO BID [facility]


   Last Admin: 05/04/17 10:03 Dose:  200 mg


Lisinopril (Zestril)  5 mg PO DAILY [facility]


   Last Admin: 05/04/17 10:03 Dose:  5 mg


Megestrol Acetate (Megace)  40 mg PO DAILY [facility]


   Last Admin: 05/04/17 10:02 Dose:  40 mg


Ondansetron HCl (Zofran Inj)  4 mg IVP Q6 PRN


   PRN Reason: Nausea/Vomiting


   Last Admin: 04/23/17 12:47 Dose:  4 mg


Pantoprazole Sodium (Protonix Ec Tab)  20 mg PO DAILY [facility]


   Last Admin: 05/04/17 10:03 Dose:  20 mg


Sodium Bicarbonate (Sodium Bicarbonate Tab)  1,300 mg PO BID [facility]


   Last Admin: 05/04/17 10:02 Dose:  1,300 mg











- Labs


Labs: 


 





 05/04/17 06:12 





 05/04/17 06:12 











- Respiratory Exam


Additional comments: 





Lungs clear





- Cardiovascular Exam


Cardiovascular Exam: REGULAR RHYTHM





- Extremities Exam


Additional comments: 





No edema or cyanosis





Assessment and Plan





- Assessment and Plan (Free Text)


Assessment: 





Stage 1V kidney Dis stable


24 hr urine results noted. Urine volume is 2250 ml & 


calculated Creat Clearance is 15


Anemia. Hb is stable


Pneumonia


Plan: 





Continue to monitor renal function


Phos is low secodary to poor oral intake

## 2017-05-04 NOTE — CP.PCM.DIS
<Marysol Amor - Last Filed: 05/05/17 03:31>





Provider





- Provider


Date of Admission: 


04/22/17 11:45





Attending physician: 


DO Samson Rodríguez DO


Primary care physician: 





Dr. Pineda


Consults: 





GI: Dr. Kulkarni/Lori


Nephro: Dr. Munoz


Palliative: Sherleyholden Rivera


Psych: Ozden


ID: [id number]


Time Spent in preparation of Discharge (in minutes): 40





Diagnosis





- Discharge Diagnosis


(1) Mild cognitive impairment


Status: Acute   





(2) Anorexia


Status: Resolved   





(3) Diabetic hypoglycemia


Status: Acute   





(4) Pneumonia


Status: Acute   





(5) UTI (urinary tract infection)


Status: Acute   





(6) Anemia


Status: Chronic   





(7) Chronic kidney disease (CKD)


Status: Chronic   





(8) Hypertension


Status: Chronic   





Hospital Course





- Lab Results


Lab Results: 


 Micro Results





04/22/17 Unknown   Urine   Urine Culture - Final


                                No Growth (<1,000 CFU/ML)





 Most Recent Lab Values











WBC  7.3 K/uL (4.8-10.8)   05/04/17  06:12    


 


RBC  3.19 Mil/uL (3.80-5.20)  L  05/04/17  06:12    


 


Hgb  8.8 g/dL (11.0-16.0)  L  05/04/17  06:12    


 


Hct  26.7 % (34.0-47.0)  L  05/04/17  06:12    


 


MCV  83.7 fL (81.0-99.0)   05/04/17  06:12    


 


MCH  27.6 pg (27.0-31.0)   05/04/17  06:12    


 


MCHC  33.0 g/dL (33.0-37.0)   05/04/17  06:12    


 


RDW  15.2 % (11.5-14.5)  H  05/04/17  06:12    


 


Plt Count  244 K/uL (130-400)   05/04/17  06:12    


 


MPV  8.6 fL (7.2-11.7)   05/04/17  06:12    


 


Neut % (Auto)  76.4 % (50.0-75.0)  H  05/04/17  06:12    


 


Lymph % (Auto)  14.5 % (20.0-40.0)  L  05/04/17  06:12    


 


Mono % (Auto)  5.4 % (0.0-10.0)   05/04/17  06:12    


 


Eos % (Auto)  2.9 % (0.0-4.0)   05/04/17  06:12    


 


Baso % (Auto)  0.8 % (0.0-2.0)   05/04/17  06:12    


 


Neut #  5.6 K/uL (1.8-7.0)   05/04/17  06:12    


 


Lymph #  1.1 K/uL (1.0-4.3)   05/04/17  06:12    


 


Mono #  0.4 K/uL (0.0-0.8)   05/04/17  06:12    


 


Eos #  0.2 K/uL (0.0-0.7)   05/04/17  06:12    


 


Baso #  0.1 K/uL (0.0-0.2)   05/04/17  06:12    


 


Neutrophils % (Manual)  86 % (50-75)  H  04/26/17  07:16    


 


Lymphocytes % (Manual)  5 % (20-40)  L  04/26/17  07:16    


 


Monocytes % (Manual)  8 % (0-10)   04/26/17  07:16    


 


Eosinophils % (Manual)  1 % (0-4)   04/26/17  07:16    


 


Basophils % (Manual)  2 % (0-2)   04/25/17  06:30    


 


Toxic Granulation  Present   04/22/17  10:17    


 


Platelet Estimate  Normal  (NORMAL)   04/26/17  07:16    


 


Large Platelets  Present   04/22/17  10:17    


 


Polychromasia  Slight   04/22/17  10:17    


 


Hypochromasia (manual)  Slight   04/26/17  07:16    


 


Poikilocytosis (manual  Slight   04/26/17  07:16    


 


Anisocytosis (manual)  Slight   04/26/17  07:16    


 


Microcytosis (manual)  Slight   04/26/17  07:16    


 


Macrocytosis (manual)  Slight   04/26/17  07:16    


 


Target Cells  Slight   04/26/17  07:16    


 


Tear Drop Cells  Slight   04/25/17  06:30    


 


Ovalocytes  Slight   04/26/17  07:16    


 


Pray Cells  Slight   04/26/17  07:16    


 


Schistocytes  Slight   04/22/17  10:17    


 


Retic Count  1.1 % (0.5-1.5)   04/26/17  16:37    


 


pO2  15 mm/Hg (30-55)  L  04/22/17  10:15    


 


VBG pH  7.22  (7.32-7.43)  L  04/22/17  10:15    


 


VBG pCO2  52 mmHg (40-60)   04/22/17  10:15    


 


VBG HCO3  17.3 mmol/L  04/22/17  10:15    


 


VBG Total CO2  22.9 mmol/L (22-28)   04/22/17  10:15    


 


VBG O2 Sat (Calc)  22.4 % (40-65)  L  04/22/17  10:15    


 


VBG Base Excess  -6.8 mmol/L (0.0-2.0)  L  04/22/17  10:15    


 


VBG Potassium  5.1 mmol/L (3.6-5.2)   04/22/17  10:15    


 


Sodium  142.0 mmol/l (132-148)   04/22/17  10:15    


 


Chloride  113.0 mmol/L ()  H  04/22/17  10:15    


 


Glucose  121 mg/dl ()  H  04/22/17  10:15    


 


Lactate  1.3 mmol/L (0.7-2.1)   04/22/17  10:15    


 


Sodium  138 mmol/L (132-148)   05/04/17  06:12    


 


Potassium  4.6 mmol/L (3.6-5.2)   05/04/17  06:12    


 


Chloride  102 mmol/L ()   05/04/17  06:12    


 


Carbon Dioxide  23 mmol/L (22-30)   05/04/17  06:12    


 


Anion Gap  17  (10-20)   05/04/17  06:12    


 


BUN  73 mg/dL (7-17)  H  05/04/17  06:12    


 


Creatinine  4.0 MG/DL (0.7-1.2)  H  05/04/17  06:12    


 


Est GFR ( Amer)  13   05/04/17  06:12    


 


Est GFR (Non-Af Amer)  11   05/04/17  06:12    


 


POC Glucose (mg/dL)  336 mg/dL ()  H  05/04/17  11:25    


 


Random Glucose  125 mg/dL ()  H  05/04/17  06:12    


 


Hemoglobin A1c  7.0 % (4.2-6.5)  H  04/23/17  06:06    


 


Calcium  8.3 mg/dl (8.6-10.4)  L  05/04/17  06:12    


 


Phosphorus  4.6 mg/dL (2.5-4.5)  H  05/04/17  06:12    


 


Magnesium  1.8 mg/dL (1.6-2.3)   05/04/17  06:12    


 


Iron  22 ug/dL ()  L  04/26/17  16:37    


 


TIBC  185 ug/dL (250-450)  L  04/26/17  16:37    


 


% Saturation  12  (20-55)  L  04/26/17  16:37    


 


Ferritin  179.0 ng/mL  04/26/17  16:37    


 


Total Bilirubin  0.7 mg/dL (0.2-1.3)   05/04/17  06:12    


 


AST  24 U/L (14-36)   05/04/17  06:12    


 


ALT  27 U/L (9-52)   05/04/17  06:12    


 


Alkaline Phosphatase  93 U/L ()   05/04/17  06:12    


 


Total Creatine Kinase  383 U/L ()  H  04/22/17  23:05    


 


CK-MB (Mass)  2.79 ng/mL (0.0-3.38)   04/22/17  23:05    


 


Troponin I  0.0230 ng/mL (0.00-0.120)   04/22/17  10:17    


 


Troponin I, Quant  0.0240 ng/mL (0.00-0.120)   04/22/17  23:05    


 


NT-Pro-B Natriuret Pep  91619 pg/mL (0-900)  H  04/25/17  14:07    


 


Total Protein  6.3 g/dL (6.3-8.3)   05/04/17  06:12    


 


Albumin  3.2 g/dL (3.5-5.0)  L  05/04/17  06:12    


 


Globulin  3.1 gm/dL (2.2-3.9)   05/04/17  06:12    


 


Albumin/Globulin Ratio  1.0  (1.0-2.1)   05/04/17  06:12    


 


Vitamin B12  680 pg/mL (239-931)   04/26/17  16:37    


 


Folate  10.6 ng/mL  04/26/17  16:37    


 


Procalcitonin  0.52 NG/ML (0.19-0.49)  H  05/02/17  06:28    


 


TSH 3rd Generation  1.44 mIU/L (0.46-4.68)   04/23/17  06:06    


 


PTH Intact Whole Molec  163 pg/mL (14-64)  H  04/22/17  16:54    


 


Venous Blood Potassium  5.1 mmol/L (3.6-5.2)   04/22/17  10:15    


 


Urine Color  Yellow  (YELLOW)   04/22/17  10:29    


 


Urine Clarity  Hazy  (Clear)   04/22/17  10:29    


 


Urine pH  5.0  (5.0-8.0)   04/22/17  10:29    


 


Ur Specific Gravity  1.013  (1.003-1.030)   04/22/17  10:29    


 


Urine Protein  2+ mg/dL (NEGATIVE)  H  04/22/17  10:29    


 


Urine Glucose (UA)  2+ mg/dL (Normal)  H  04/22/17  10:29    


 


Urine Ketones  Negative mg/dL (NEGATIVE)   04/22/17  10:29    


 


Urine Blood  1+  (NEGATIVE)  H  04/22/17  10:29    


 


Urine Nitrate  Negative  (NEGATIVE)   04/22/17  10:29    


 


Urine Bilirubin  Negative  (NEGATIVE)   04/22/17  10:29    


 


Urine Urobilinogen  Normal mg/dL (0.2-1.0)   04/22/17  10:29    


 


Ur Leukocyte Esterase  3+ Arlene/uL (Negative)  H  04/22/17  10:29    


 


Urine WBC (Auto)  53 /hpf (0-5)  H  04/22/17  10:29    


 


Urine RBC (Auto)  4 /hpf (0-3)  H  04/22/17  10:29    


 


Ur Squamous Epith Cells  4 /hpf (0-5)   04/22/17  10:29    


 


Ur Transition Epith Cell  3 /hpf (0-3)   04/22/17  10:29    


 


Amorphous Sediment  Rare /ul (<OCC)  H  04/22/17  10:29    


 


Urine Bacteria  Rare  (<OCC)   04/22/17  10:29    


 


Urine Collection Time  24 HRS  05/03/17  19:48    


 


Urine Total Volume  2250 mL  05/03/17  19:48    


 


Creatinine Clearance  15.0 mL/min ()  L  05/03/17  19:48    


 


Ur Protein 24 Hr Calc  855.0 mg/24hr ()  H  05/03/17  19:48    


 


Stool Occult Blood  Negative  (NEGATIVE)   05/03/17  22:57    


 


Blood Type  O POSITIVE   05/01/17  14:41    


 


Antibody Screen  Negative   05/01/17  14:41    














- Hospital Course


Hospital Course: 





On admission:


Patient is a 77 year old female with a history of IDDM and HTN who lives alone 

is brought in by ambulance after she was found by her daughter at home with 

acute confusion and found to bey hypoglycemic.  Patient is seen at bedside with 

daughter present who came over around 7 am to her mother's house.  She found 

her mother was very confused and to have very low blood sugar.  She then called 

911 and she was then taken to the hospital.  Patient's daughter was unsure of 

her medical history or her medications.  Patient's daughter reports no 

shortness of breath, cough, fever, chills, vomiting





Hospital Course:





Patient initially presented for management of hypoglycemia. Patient responded 

to D50 and monitored. Patient did become hyperglycemic during  later course 

requiring tighter monitoring of blood glucose levels. At this time, patient's 

intake was quite poor and thus she was encouraged to eat day by day. This was 

difficult because patient stated that she did not have much of an appetite and 

thus family helped to encourage her to eat as well. Patient stated that she 

lost weight as well over the past couple of weeks while at home which was 

concerning for other pathology at play. Thus, GI was consulted with 

recommendations for management of patient's decreased appetite. PEG was 

suggested at the time; however it was decided that if patient could be 

encouraged to eat, that procedure could likely be avoided. CT imaging was also 

ordered because patient stated that she had a history of colon cancer in the 

past. It was unclear what kind of cancer; and records were unable to be 

obtained from [facility] where a prior procedure to remove a suspected mass was 

stated to be performed. Patient did state that she wanted to follow up with her 

primary care physician as well as GI doctor regarding that matter. 


In addition, patient was found to have a pneumonia and concurrent UTI for which 

she was treated on IV antibiotics. Of note, patient did lose IV access for a 

few days due to complaints of a malfunctioning IV and thus a PICC line ended up 

being placed. Patient had to be counseled on the benefits of a PICC line. 

Patient was at first very hesitant due aversion of needles and complaints of 

arm soreness at IV insertion site.


Psych was consulted as well, because patient became quite hysterical  during 

the situation regarding the PICC line. It was noted that patient may have had 

some beginning stages of mid cognitive defects. 


Nephrology also consulted regarding worsening kidney functioning. Urine studies 

were collected which confirmed worsening creatinine clearance. Patient to 

follow up outpatient . Patient will likely require dialysis at some point in 

the future if compliant.


Patient complained of left sided neck discomfort associated with coughs for 

which thyroid studies and imaging was performed. Thyroid studies came back 

negative. XRAY imaging recommended CT with IV contrast however due to poor GFR 

and creatinine levels, CT w/o IV contrast was performed. Again, there were no 

clear identifiable structures and thus patient to follow up outpatient for 

further workup if deemed appropriate.


Patient deemed stable for discharge to subacute rehab for further strengthening 

and completion of antibiotics.


This is a brief summary of events. For a complete course, refer to the medical 

record.





Discharge Exam





- Head Exam


Head Exam: ATRAUMATIC, NORMAL INSPECTION, NORMOCEPHALIC





- Eye Exam


Eye Exam: EOMI, Normal appearance, PERRL


Pupil Exam: NORMAL ACCOMODATION, PERRL





- ENT Exam


ENT Exam: Mucous Membranes Moist





- Neck Exam


Neck exam: Full Rom





- Respiratory Exam


Respiratory Exam: NORMAL BREATHING PATTERN





- Cardiovascular Exam


Cardiovascular Exam: +S1, +S2





- GI/Abdominal Exam


GI & Abdominal Exam: Normal Bowel Sounds





- Extremities Exam


Extremities exam: full ROM, pedal pulses present





- Back Exam


Back exam: FULL ROM





- Neurological Exam


Neurological exam: Alert, CN II-XII Intact





- Psychiatric Exam


Psychiatric exam: Normal Affect, Normal Mood





- Skin


Skin Exam: Dry, Intact, Warm





Discharge Plan





- Follow Up Plan


Condition: IMPROVED


Disposition: REHAB FACILITY/REHAB UNIT


Instructions:  Heart Failure (DC), Urinary Tract Infection in Women (DC), 

Altered Mental Status (GEN), Pneumonia (DC)


Additional Instructions: 


Patient is medically stable for discharge to [facility].


Patient should follow up with Primary Medical doctor within one week of 

discharge from [facility]. Patient should follow up with Nephrologist Dr. Munoz within 

two weeks of discharge from rehab. Patient may resume medications that were 

listed in discharge list 


Patient will take antibiotics for two more days. She will take her last 

antibiotic dose on Saturday. Patient should eat a probiotic yogurt while 

finishing up antibiotics (once daily).


If symptoms return, go to the emergency room.


Instructions were explained to patient and family member who are aware.


Referrals: 


Marga Munoz MD [Staff Provider] - 





Clinical Quality Measures





- CQM - Heart Failure


Ejection Fraction: 40 % or Greater





<Samson Gaxiola - Last Filed: 05/05/17 16:03>





Provider





- Provider


Date of Admission: 


04/22/17 11:45





Attending physician: 


Vera Willard DO








Hospital Course





- Lab Results


Lab Results: 


 Micro Results





04/22/17 Unknown   Urine   Urine Culture - Final


                                No Growth (<1,000 CFU/ML)





 Most Recent Lab Values











WBC  7.3 K/uL (4.8-10.8)   05/04/17  06:12    


 


RBC  3.19 Mil/uL (3.80-5.20)  L  05/04/17  06:12    


 


Hgb  8.8 g/dL (11.0-16.0)  L  05/04/17  06:12    


 


Hct  26.7 % (34.0-47.0)  L  05/04/17  06:12    


 


MCV  83.7 fL (81.0-99.0)   05/04/17  06:12    


 


MCH  27.6 pg (27.0-31.0)   05/04/17  06:12    


 


MCHC  33.0 g/dL (33.0-37.0)   05/04/17  06:12    


 


RDW  15.2 % (11.5-14.5)  H  05/04/17  06:12    


 


Plt Count  244 K/uL (130-400)   05/04/17  06:12    


 


MPV  8.6 fL (7.2-11.7)   05/04/17  06:12    


 


Neut % (Auto)  76.4 % (50.0-75.0)  H  05/04/17  06:12    


 


Lymph % (Auto)  14.5 % (20.0-40.0)  L  05/04/17  06:12    


 


Mono % (Auto)  5.4 % (0.0-10.0)   05/04/17  06:12    


 


Eos % (Auto)  2.9 % (0.0-4.0)   05/04/17  06:12    


 


Baso % (Auto)  0.8 % (0.0-2.0)   05/04/17  06:12    


 


Neut #  5.6 K/uL (1.8-7.0)   05/04/17  06:12    


 


Lymph #  1.1 K/uL (1.0-4.3)   05/04/17  06:12    


 


Mono #  0.4 K/uL (0.0-0.8)   05/04/17  06:12    


 


Eos #  0.2 K/uL (0.0-0.7)   05/04/17  06:12    


 


Baso #  0.1 K/uL (0.0-0.2)   05/04/17  06:12    


 


Neutrophils % (Manual)  86 % (50-75)  H  04/26/17  07:16    


 


Lymphocytes % (Manual)  5 % (20-40)  L  04/26/17  07:16    


 


Monocytes % (Manual)  8 % (0-10)   04/26/17  07:16    


 


Eosinophils % (Manual)  1 % (0-4)   04/26/17  07:16    


 


Basophils % (Manual)  2 % (0-2)   04/25/17  06:30    


 


Toxic Granulation  Present   04/22/17  10:17    


 


Platelet Estimate  Normal  (NORMAL)   04/26/17  07:16    


 


Large Platelets  Present   04/22/17  10:17    


 


Polychromasia  Slight   04/22/17  10:17    


 


Hypochromasia (manual)  Slight   04/26/17  07:16    


 


Poikilocytosis (manual  Slight   04/26/17  07:16    


 


Anisocytosis (manual)  Slight   04/26/17  07:16    


 


Microcytosis (manual)  Slight   04/26/17  07:16    


 


Macrocytosis (manual)  Slight   04/26/17  07:16    


 


Target Cells  Slight   04/26/17  07:16    


 


Tear Drop Cells  Slight   04/25/17  06:30    


 


Ovalocytes  Slight   04/26/17  07:16    


 


Claar Cells  Slight   04/26/17  07:16    


 


Schistocytes  Slight   04/22/17  10:17    


 


Retic Count  1.1 % (0.5-1.5)   04/26/17  16:37    


 


pO2  15 mm/Hg (30-55)  L  04/22/17  10:15    


 


VBG pH  7.22  (7.32-7.43)  L  04/22/17  10:15    


 


VBG pCO2  52 mmHg (40-60)   04/22/17  10:15    


 


VBG HCO3  17.3 mmol/L  04/22/17  10:15    


 


VBG Total CO2  22.9 mmol/L (22-28)   04/22/17  10:15    


 


VBG O2 Sat (Calc)  22.4 % (40-65)  L  04/22/17  10:15    


 


VBG Base Excess  -6.8 mmol/L (0.0-2.0)  L  04/22/17  10:15    


 


VBG Potassium  5.1 mmol/L (3.6-5.2)   04/22/17  10:15    


 


Sodium  142.0 mmol/l (132-148)   04/22/17  10:15    


 


Chloride  113.0 mmol/L ()  H  04/22/17  10:15    


 


Glucose  121 mg/dl ()  H  04/22/17  10:15    


 


Lactate  1.3 mmol/L (0.7-2.1)   04/22/17  10:15    


 


Sodium  138 mmol/L (132-148)   05/04/17  06:12    


 


Potassium  4.6 mmol/L (3.6-5.2)   05/04/17  06:12    


 


Chloride  102 mmol/L ()   05/04/17  06:12    


 


Carbon Dioxide  23 mmol/L (22-30)   05/04/17  06:12    


 


Anion Gap  17  (10-20)   05/04/17  06:12    


 


BUN  73 mg/dL (7-17)  H  05/04/17  06:12    


 


Creatinine  4.0 MG/DL (0.7-1.2)  H  05/04/17  06:12    


 


Est GFR ( Amer)  13   05/04/17  06:12    


 


Est GFR (Non-Af Amer)  11   05/04/17  06:12    


 


POC Glucose (mg/dL)  336 mg/dL ()  H  05/04/17  11:25    


 


Random Glucose  125 mg/dL ()  H  05/04/17  06:12    


 


Hemoglobin A1c  7.0 % (4.2-6.5)  H  04/23/17  06:06    


 


Calcium  8.3 mg/dl (8.6-10.4)  L  05/04/17  06:12    


 


Phosphorus  4.6 mg/dL (2.5-4.5)  H  05/04/17  06:12    


 


Magnesium  1.8 mg/dL (1.6-2.3)   05/04/17  06:12    


 


Iron  22 ug/dL ()  L  04/26/17  16:37    


 


TIBC  185 ug/dL (250-450)  L  04/26/17  16:37    


 


% Saturation  12  (20-55)  L  04/26/17  16:37    


 


Ferritin  179.0 ng/mL  04/26/17  16:37    


 


Total Bilirubin  0.7 mg/dL (0.2-1.3)   05/04/17  06:12    


 


AST  24 U/L (14-36)   05/04/17  06:12    


 


ALT  27 U/L (9-52)   05/04/17  06:12    


 


Alkaline Phosphatase  93 U/L ()   05/04/17  06:12    


 


Total Creatine Kinase  383 U/L ()  H  04/22/17  23:05    


 


CK-MB (Mass)  2.79 ng/mL (0.0-3.38)   04/22/17  23:05    


 


Troponin I  0.0230 ng/mL (0.00-0.120)   04/22/17  10:17    


 


Troponin I, Quant  0.0240 ng/mL (0.00-0.120)   04/22/17  23:05    


 


NT-Pro-B Natriuret Pep  11766 pg/mL (0-900)  H  04/25/17  14:07    


 


Total Protein  6.3 g/dL (6.3-8.3)   05/04/17  06:12    


 


Albumin  3.2 g/dL (3.5-5.0)  L  05/04/17  06:12    


 


Globulin  3.1 gm/dL (2.2-3.9)   05/04/17  06:12    


 


Albumin/Globulin Ratio  1.0  (1.0-2.1)   05/04/17  06:12    


 


Vitamin B12  680 pg/mL (239-931)   04/26/17  16:37    


 


Folate  10.6 ng/mL  04/26/17  16:37    


 


Procalcitonin  0.52 NG/ML (0.19-0.49)  H  05/02/17  06:28    


 


TSH 3rd Generation  1.44 mIU/L (0.46-4.68)   04/23/17  06:06    


 


PTH Intact Whole Molec  163 pg/mL (14-64)  H  04/22/17  16:54    


 


Venous Blood Potassium  5.1 mmol/L (3.6-5.2)   04/22/17  10:15    


 


Urine Color  Yellow  (YELLOW)   04/22/17  10:29    


 


Urine Clarity  Hazy  (Clear)   04/22/17  10:29    


 


Urine pH  5.0  (5.0-8.0)   04/22/17  10:29    


 


Ur Specific Gravity  1.013  (1.003-1.030)   04/22/17  10:29    


 


Urine Protein  2+ mg/dL (NEGATIVE)  H  04/22/17  10:29    


 


Urine Glucose (UA)  2+ mg/dL (Normal)  H  04/22/17  10:29    


 


Urine Ketones  Negative mg/dL (NEGATIVE)   04/22/17  10:29    


 


Urine Blood  1+  (NEGATIVE)  H  04/22/17  10:29    


 


Urine Nitrate  Negative  (NEGATIVE)   04/22/17  10:29    


 


Urine Bilirubin  Negative  (NEGATIVE)   04/22/17  10:29    


 


Urine Urobilinogen  Normal mg/dL (0.2-1.0)   04/22/17  10:29    


 


Ur Leukocyte Esterase  3+ Arlene/uL (Negative)  H  04/22/17  10:29    


 


Urine WBC (Auto)  53 /hpf (0-5)  H  04/22/17  10:29    


 


Urine RBC (Auto)  4 /hpf (0-3)  H  04/22/17  10:29    


 


Ur Squamous Epith Cells  4 /hpf (0-5)   04/22/17  10:29    


 


Ur Transition Epith Cell  3 /hpf (0-3)   04/22/17  10:29    


 


Amorphous Sediment  Rare /ul (<OCC)  H  04/22/17  10:29    


 


Urine Bacteria  Rare  (<OCC)   04/22/17  10:29    


 


Urine Collection Time  24 HRS  05/03/17  19:48    


 


Urine Total Volume  2250 mL  05/03/17  19:48    


 


Creatinine Clearance  15.0 mL/min ()  L  05/03/17  19:48    


 


Ur Protein 24 Hr Calc  855.0 mg/24hr ()  H  05/03/17  19:48    


 


Stool Occult Blood  Negative  (NEGATIVE)   05/03/17  22:57    


 


Blood Type  O POSITIVE   05/01/17  14:41    


 


Antibody Screen  Negative   05/01/17  14:41    














Attending/Attestation





- Attestation


I have personally seen and examined this patient.: Yes


I have fully participated in the care of the patient.: Yes


I have reviewed all pertinent clinical information, including history, physical 

exam and plan: Yes


Notes (Text): 





05/05/17 15:56


Medical Attending: Patient was seen and examined by me. Agree with the above 

note by the resident. This is a later entry. Pt was discharged previous day.





She was tolerating diet - and we had to urge her to eat. Her blood glucoses 

were not low. Her family has been faithfully encouraging her to eat.


She also had a blood transfusion and her Hgb has been stable for several days 

as well


She also will be continued with several more days of IV abx, she currently has 

a PICC line for easier access - it took a while to convince her to have PICC 

line


She is also needs to have blood work to monitor her CKD as well





thank you


Samson Gaxiola

## 2017-08-28 ENCOUNTER — HOSPITAL ENCOUNTER (INPATIENT)
Dept: HOSPITAL 31 - C.ER | Age: 77
LOS: 4 days | Discharge: HOME | DRG: 638 | End: 2017-09-01
Attending: INTERNAL MEDICINE | Admitting: INTERNAL MEDICINE
Payer: MEDICARE

## 2017-08-28 VITALS — BODY MASS INDEX: 24 KG/M2

## 2017-08-28 DIAGNOSIS — E87.2: ICD-10-CM

## 2017-08-28 DIAGNOSIS — I27.2: ICD-10-CM

## 2017-08-28 DIAGNOSIS — I13.0: ICD-10-CM

## 2017-08-28 DIAGNOSIS — I50.9: ICD-10-CM

## 2017-08-28 DIAGNOSIS — D64.9: ICD-10-CM

## 2017-08-28 DIAGNOSIS — Z86.73: ICD-10-CM

## 2017-08-28 DIAGNOSIS — E11.649: Primary | ICD-10-CM

## 2017-08-28 DIAGNOSIS — N18.4: ICD-10-CM

## 2017-08-28 DIAGNOSIS — Z87.891: ICD-10-CM

## 2017-08-28 DIAGNOSIS — J45.909: ICD-10-CM

## 2017-08-28 DIAGNOSIS — Z79.4: ICD-10-CM

## 2017-08-28 DIAGNOSIS — Z88.0: ICD-10-CM

## 2017-08-28 DIAGNOSIS — I08.1: ICD-10-CM

## 2017-08-28 DIAGNOSIS — L60.2: ICD-10-CM

## 2017-08-28 DIAGNOSIS — E11.22: ICD-10-CM

## 2017-08-28 DIAGNOSIS — M19.90: ICD-10-CM

## 2017-08-28 LAB
ALBUMIN/GLOB SERPL: 1.1 {RATIO} (ref 1–2.1)
ALP SERPL-CCNC: 100 U/L (ref 38–126)
ALT SERPL-CCNC: 32 U/L (ref 9–52)
AST SERPL-CCNC: 22 U/L (ref 14–36)
BACTERIA #/AREA URNS HPF: (no result) /[HPF]
BASE EXCESS BLDV CALC-SCNC: -3 MMOL/L (ref 0–2)
BASOPHILS # BLD AUTO: 0.1 K/UL (ref 0–0.2)
BASOPHILS NFR BLD: 0.7 % (ref 0–2)
BILIRUB SERPL-MCNC: 1.1 MG/DL (ref 0.2–1.3)
BILIRUB UR-MCNC: NEGATIVE MG/DL
BUN SERPL-MCNC: 52 MG/DL (ref 7–17)
CALCIUM SERPL-MCNC: 10.4 MG/DL (ref 8.6–10.4)
CHLORIDE SERPL-SCNC: 103 MMOL/L (ref 98–107)
CO2 SERPL-SCNC: 22 MMOL/L (ref 22–30)
EOSINOPHIL # BLD AUTO: 0 K/UL (ref 0–0.7)
EOSINOPHIL NFR BLD: 0.6 % (ref 0–4)
ERYTHROCYTE [DISTWIDTH] IN BLOOD BY AUTOMATED COUNT: 16.5 % (ref 11.5–14.5)
GLOBULIN SER-MCNC: 4.1 GM/DL (ref 2.2–3.9)
GLUCOSE SERPL-MCNC: 47 MG/DL (ref 65–105)
GLUCOSE UR STRIP-MCNC: (no result) MG/DL
HCT VFR BLD CALC: 35.3 % (ref 34–47)
KETONES UR STRIP-MCNC: NEGATIVE MG/DL
LEUKOCYTE ESTERASE UR-ACNC: (no result) LEU/UL
LYMPHOCYTES # BLD AUTO: 0.8 K/UL (ref 1–4.3)
LYMPHOCYTES NFR BLD AUTO: 9.3 % (ref 20–40)
MCH RBC QN AUTO: 28.2 PG (ref 27–31)
MCHC RBC AUTO-ENTMCNC: 32.2 G/DL (ref 33–37)
MCV RBC AUTO: 87.5 FL (ref 81–99)
MONOCYTES # BLD: 0.2 K/UL (ref 0–0.8)
MONOCYTES NFR BLD: 2.6 % (ref 0–10)
NEUTROPHILS NFR BLD AUTO: 93 % (ref 50–75)
NRBC BLD AUTO-RTO: 0.2 % (ref 0–2)
PCO2 BLDV: 52 MMHG (ref 40–60)
PH BLDV: 7.28 [PH] (ref 7.32–7.43)
PH UR STRIP: 7 [PH] (ref 5–8)
PLATELET # BLD: 279 K/UL (ref 130–400)
PMV BLD AUTO: 8.3 FL (ref 7.2–11.7)
POTASSIUM SERPL-SCNC: 4.4 MMOL/L (ref 3.6–5.2)
PROT SERPL-MCNC: 8.6 G/DL (ref 6.3–8.3)
PROT UR STRIP-MCNC: (no result) MG/DL
RBC # UR STRIP: (no result) /UL
RBC #/AREA URNS HPF: 1 /HPF (ref 0–3)
SODIUM SERPL-SCNC: 142 MMOL/L (ref 132–148)
SP GR UR STRIP: 1.01 (ref 1–1.03)
TOTAL CELLS COUNTED BLD: 100
TROPONIN I SERPL-MCNC: 0.03 NG/ML (ref 0–0.12)
UROBILINOGEN UR-MCNC: NORMAL MG/DL (ref 0.2–1)
WBC # BLD AUTO: 8.4 K/UL (ref 4.8–10.8)
WBC #/AREA URNS HPF: < 1 /HPF (ref 0–5)

## 2017-08-28 RX ADMIN — INSULIN ASPART SCH: 100 INJECTION, SOLUTION INTRAVENOUS; SUBCUTANEOUS at 22:31

## 2017-08-28 NOTE — CT
EXAM:

  CT Head Without Intravenous Contrast



EXAM DATE/TIME:

  8/28/2017 8:10 PM



CLINICAL HISTORY:

  77 years old, female; Signs and symptoms; Altered mental status/memory loss; 

Additional info: AMS



TECHNIQUE:

  Axial computed tomography images of the head/brain without intravenous 

contrast.  All CT scans at this facility use one or more dose reduction 

techniques, viz.: automated exposure control; ma/kV adjustment per patient size 

(including targeted exams where dose is matched to indication; ie. head); or 

iterative reconstruction technique.



COMPARISON:

  No relevant prior studies available.



FINDINGS:

  BRAIN:  Areas of encephalomalacia in the right frontal lobe and the right 

occipital lobe, which are most likely related to old/chronic infarcts.  

Evidence of a large chronic lacunar infarct in the right basal ganglia, with 

associated ex vacuo dilatation of the right lateral ventricle.  Findings 

compatible with an old lacunar infarct in the right cerebellum inferiorly.  

Areas of hypodensity seen in the white matter bilaterally, nonspecific in 

appearance, but most likely representing chronic small vessel ischemic changes, 

in a patient of this age.  Diffuse, marked, age-related cortical atrophy and 

ventriculomegaly. 

   No significant acute abnormality identified.  No acute hemorrhage seen 

within the brain.  No acute extra-axial fluid collections visualized.  No 

evidence of significant mass effect within the brain. No CT findings to suggest 

an acute, large territorial infarct, however, small or early acute infarcts may 

not be visible on CT.  

  VENTRICLES:  See above.

  BONES/JOINTS:  No acute fractures or other acute bony abnormality noted.

  SOFT TISSUES:  No acute abnormality of the visualized soft tissues is seen.

  SINUSES:  Visualized paranasal sinuses appear clear.

  MASTOID AIR CELLS:  Mastoid air cells appear clear.



IMPRESSION:     

- No acute findings seen within the brain.

- Evidence of chronic ischemic changes, as described.

- See above for remaining findings.

## 2017-08-28 NOTE — CP.PCM.HP
<Mandy Son E - Last Filed: 08/28/17 23:25>





History of Present Illness





- History of Present Illness


History of Present Illness: 


CC:  Transient altered mental status change 





HPI: 76 year old female with PMHx significant for HTN, asthma, arthritis, 

pneumonia, diabetes mellitus, prior TIAs and chronic kidney disease presents to 

the ED with complaint of transient altered mental status. Patient was found to 

be confused this morning by her son. Thereafter, patient's son called his 

sister who then instructed him to call the ambulance. As per EMS, patient was 

found to have a blood glucose of 42 and was administered D50 on the field. Upon 

arrival to the ED, patient's blood glucose was 118. Currently, patient is 

without symptoms and she is awake, alert and oriented. Patient's daughter 

states that her mother has been without visiting care givers for approximately 

a week and she is unsure of the reason. Patient reports that she has not been 

eating well and tends to skip meals even though she is hungry. Patient denies 

fever, chills, nausea, vomiting, headache, visual disturbances, chest pain, 

palpitations, diarrhea, urinary symptoms but does have complains of bilateral 

leg cramping. 





PMD: Dr. Vickers 


PMHx: As per chart review and patient's daughter: HTN, asthma, arthritis, 

pneumonia, diabetes mellitus, prior TIAs and chronic kidney disease 


PSHx: left arm AV fistula, and as per chart review: stomach mass removal


FHx: Positive for DM


Medications: Aspirin 81mg PO daily, Calcitriol 0.25mcg MWF, Feosol 325mg PO BID

, Lasix 40mg PO Daily, Novolog 100units TIDAC, Lantus 20 units SC HS, Labetalol 

200mg PO BID, Lisinopril 5mg PO daily, Megace 40mg PO daily, Pantoprazole 20mg 

PO daily 


Allergies: Penicillin with rash reaction 





Social History: Lives alone, former smoker (for 20 years, 1PPD), denies ETOH 

and illicit drugs. 





Medication given: D50 by EMS, labetalol 100mg PO, Tylenol 650mg PO, 

Azithromycin 500mg IVPB, Rocephin 1gm IVPB and Dextrose 50ml IVP














Present on Admission





- Present on Admission


Any Indicators Present on Admission: No





Review of Systems





- Constitutional


Constitutional: absent: Chills, Fever, Headache, Malaise, Night Sweats, Weakness





- EENT


Eyes: absent: Blurred Vision, Change in Vision


Ears: absent: Dizziness





- Cardiovascular


Cardiovascular: absent: Chest Pain, Diaphoresis, Dyspnea, Leg Edema, 

Palpitations





- Respiratory


Respiratory: absent: Cough, Dyspnea





- Gastrointestinal


Gastrointestinal: absent: Abdominal Pain, Diarrhea, Nausea, Vomiting





- Genitourinary


Genitourinary: absent: Dysuria, Pyuria, Urinary Frequency





- Musculoskeletal


Musculoskeletal: Muscle Cramps.  absent: Numbness, Tingling


Additional comments: 


B/L leg cramps 








- Neurological


Neurological: absent: Dizziness, Numbness, Focal Weakness, Headaches, 

Paresthesias, Syncope, Tingling





- Psychiatric


Psychiatric: Change in Appetite, Confusion





- Endocrine


Endocrine: absent: Palpitations





Past Patient History





- Infectious Disease


Hx of Infectious Diseases: None





- Tetanus Immunizations


Tetanus Immunization: Unknown





- Past Medical History & Family History


Past Medical History?: Yes





- Past Social History


Smoking Status: Never Smoked





- CARDIAC


Hx Hypercholesterolemia: Yes


Hx Hypertension: Yes


Hx Peripheral Edema: Yes





- PULMONARY


Hx Asthma: Yes (Dx 15 yrs ago,does not use home o2 anymore)


Hx Pneumonia: Yes (x2)





- NEUROLOGICAL


Hx Transient Ischemic Attacks (TIA): Yes (x3 about 20 yrs ago, 10 yrs ago, 7 

yrs ago)





- HEENT


Hx HEENT Problems: Yes


Hx Cataracts: Yes (Tahir IOL)





- RENAL


Hx Chronic Kidney Disease: Yes





- HEMATOLOGICAL/ONCOLOGICAL


Hx Anemia: Yes





- INTEGUMENTARY


Hx Dermatological Problems: Yes


Other/Comment: Bilateral lower legs dry skin





- MUSCULOSKELETAL/RHEUMATOLOGICAL


Hx Arthritis: Yes


Hx Fractures: Yes (Right ankle. No sx, casted)





- GASTROINTESTINAL


Hx Gastrointestinal Disorders: Yes


Other/Comment: Hx colon cancer





- GENITOURINARY/GYNECOLOGICAL


Hx Genitourinary Disorders: No





- PSYCHIATRIC


Hx Substance Use: No





- SURGICAL HISTORY


Hx Cataract Extraction: Yes (LEFT)





- ANESTHESIA


Hx Anesthesia: Yes


Hx Anesthesia Reactions: No


Hx Malignant Hyperthermia: No





Meds


Allergies/Adverse Reactions: 


 Allergies











Allergy/AdvReac Type Severity Reaction Status Date / Time


 


Penicillins Allergy Intermediate RASH Verified 12/06/16 11:49














Physical Exam





- Constitutional


Appears: No Acute Distress





- Head Exam


Head Exam: ATRAUMATIC, NORMAL INSPECTION





- Eye Exam


Eye Exam: EOMI, Normal appearance





- Respiratory Exam


Respiratory Exam: Clear to Auscultation Bilateral, NORMAL BREATHING PATTERN





- Cardiovascular Exam


Cardiovascular Exam: REGULAR RHYTHM, +S1, +S2





- GI/Abdominal Exam


GI & Abdominal Exam: Normal Bowel Sounds, Soft.  absent: Distended, Tenderness





- Extremities Exam


Extremities exam: Negative for: pedal edema


Additional comments: 


Severe B/L leg dryness 








- Neurological Exam


Neurological exam: Alert, CN II-XII Intact, Oriented x3


Additional comments: 


Motor strength intact (+5/5)








- Psychiatric Exam


Psychiatric exam: Normal Affect, Normal Mood





- Skin


Skin Exam: Dry, Normal Color, Warm





Results





- Vital Signs


Recent Vital Signs: 





 Last Vital Signs











Temp  96.0 F L  08/28/17 18:33


 


Pulse  83   08/28/17 18:33


 


Resp  24   08/28/17 18:33


 


BP  161/93 H  08/28/17 18:33


 


Pulse Ox  92 L  08/28/17 18:51














- Labs


Result Diagrams: 


 08/28/17 18:11





 08/28/17 17:14





Assessment & Plan


(1) Altered mental status


Assessment and Plan: 


Possibly secondary to diabetic hypoglycemic episode


* Dextrose 50mg IVP given 


* Blood glucose stable 


* Supplement diet ( due to decrease PO intake)





Labs:


UA: Negative 


F/U urine culture and blood culture 





Imaging:


-CT head without contrast: No acute findings seen within the brain. Evidence of 

chronic ischemic changes


- Chest X-ray: The central pulmonary vasculature is slightly increased though 

improved from prior study ; rule out chronic compensated pulmonary edema/CHF. 

Suspect minor right basilar atelectasis 





Status: Acute   





(2) Insulin dependent diabetes mellitus


Assessment and Plan: 


Hemoglobin A1c (4/23/17): 7.0


* F/u repeat HgbA1c 


ISS ( Moderate) 


Accuchecks


Encourage intake 


Monitor 


Status: Acute   





(3) Hypertension


Assessment and Plan: 


Continue home medication:


* Labetatol 200mg PO BID 


* Lisinopril 5mg PO daily 


* Monitor BP 





Status: Acute   





(4) Chronic kidney disease


Assessment and Plan: 


Nephrology Consult---> Dr. Marga Munoz ( Help appreciated)


BUN/CR:


* 52/3.8


GFR: 14


- Left AV fistula in place for possible future dialysis 





Continue home medication:


* Calcitriol 0.25mcg PO MWF 


* Feosol 325mg PO BID 


Status: Acute   





(5) History of anemia


Assessment and Plan: 


H/H stable 


Continue home medication:


* Feosol 325mg PO BID 


Status: Acute   





(6) Prophylactic measure


Assessment and Plan: 


Scds


Heparin 5000 units SC Q8H 


Protonix 20mg PO daily





Status: Acute   





<Abundio Prado - Last Filed: 08/29/17 06:04>





Results





- Vital Signs


Recent Vital Signs: 





 Last Vital Signs











Temp  97.5 F L  08/29/17 04:34


 


Pulse  83   08/29/17 04:34


 


Resp  20   08/28/17 23:05


 


BP  178/69 H  08/29/17 04:34


 


Pulse Ox  98   08/29/17 04:34














- Labs


Result Diagrams: 


 08/28/17 18:11





 08/28/17 17:14


Labs: 





 Laboratory Results - last 24 hr











  08/28/17 08/29/17





  20:31 02:10


 


POC Glucose (mg/dL)  184 H  197 H














Assessment & Plan





- Date & Time


Date: 08/29/17 (I have seen and examined the patient.  I agree with the 

findings and plan of care as documented by Dr. Son.  Patient with change in 

mental status.  History of diabetes and chronic kidney disease.  Consult to 

nephro.  Poor oral intake.  Ensure with meals.  Monitor blood sugar for 

hypoglycemia.  CT head.  Also with uncontrolled hypertension.  Continue home 

meds and adjust as necessary.  Monitor for acute changes.)


Time: 06:02





Attending/Attestation





- Attestation


I have personally seen and examined this patient.: Yes


I have fully participated in the care of the patient.: Yes


I have reviewed all pertinent clinical information: Yes

## 2017-08-28 NOTE — RAD
PROCEDURE:  CHEST RADIOGRAPH, 1 VIEW



HISTORY:

SOB



COMPARISON:

Comparison made with prior chest radiograph 05/02/2017 



FINDINGS:



LUNGS:

The central pulmonary vasculature is slightly increased though 

improved from prior study ; rule out chronic compensated pulmonary 

edema/CHF 



Left lower lobe opacity which may represent some combination of 

effusion with atelectasis and/or infiltrate.  The central pulmonary 

vasculature is minimally increased.  Suspect minor right basilar 

atelectasis. 



PLEURA:

No pneumothorax or pleural fluid seen.



CARDIOVASCULAR:

Heart size is upper limits of normal/ borderline enlarged.



OSSEOUS STRUCTURES:

No significant abnormalities.



VISUALIZED UPPER ABDOMEN:

Normal.



OTHER FINDINGS:

None. 



IMPRESSION:

The central pulmonary vasculature is slightly increased though 

improved from prior study ; rule out chronic compensated pulmonary 

edema/CHF Left lower lobe opacity likely representing some 

combination of effusion with atelectasis/ infiltrate. Suspect minor 

right basilar atelectasis

## 2017-08-28 NOTE — C.PDOC
History Of Present Illness





Patient BIBA for evaluation of hypoglycemia.  Patient was found appearing 

confused, less responsive than usual at home by a family member.  Patient has h/

o IDDM, asthma, HTN, hyperlipidemia, PNA, CKD (has AV fistula but not currently 

on HD).  Accucheck was 46 on EMS arrival, patient given an amp of D50 in the 

field.  Accucheck 118 on ED arrival.


Time Seen by Provider: 08/28/17 16:05


Chief Complaint (Nursing): High Blood Sugar


History Per: Patient, EMS, Family


History/Exam Limitations: clinical condition


Current Symptoms Are (Timing): Still Present


Severity: Moderate


Current Diabetic Medications: Insulin





Past Medical History


Reviewed: Historical Data, Nursing Documentation, Vital Signs


Vital Signs: 


 Last Vital Signs











Temp  98.1 F   09/01/17 07:00


 


Pulse  79   09/01/17 07:00


 


Resp  18   09/01/17 07:00


 


BP  147/61   09/01/17 09:12


 


Pulse Ox  97   09/01/17 07:00














- Medical History


PMH: Anemia, Arthritis, Asthma (Dx 15 yrs ago,does not use home o2 anymore), 

Diabetes, Fractures (Right ankle. No sx, casted), HTN, Hypercholesterolemia, 

Peripheral Edema, Pneumonia (x2), Chronic Kidney Disease, TIA (x3 about 20 yrs 

ago, 10 yrs ago, 7 yrs ago)


Surgical History: Endoscopy





- [facility] Procedures








INJECT/INFUSE NEC (01/22/15)


INSERTION OF INFUSION DEV INTO SUP VENA CAVA, PERC APPROACH (04/22/17)


TRANSFUSE NONAUT RED BLOOD CELLS IN PERIPH VEIN, PERC (04/22/17)








Family History: States: No Known Family Hx





- Social History


Hx Tobacco Use: No


Hx Alcohol Use: No


Hx Substance Use: No





- Immunization History


Hx Tetanus Toxoid Vaccination: No


Hx Influenza Vaccination: No


Hx Pneumococcal Vaccination: No





Review Of Systems


Review Of Systems: ROS cannot be obtained secondary to pt's inabilty to answer 

questions.





Physical Exam





- Physical Exam


Appears: Non-toxic, In Acute Distress (moaning, awake, alert)


Skin: Normal Color, Dry, Other (skin cool to the touch)


Eye(s): bilateral: Normal Inspection, PERRL, EOMI


Oral Mucosa: Moist


Cardiovascular: Rhythm Regular


Respiratory: Normal Breath Sounds, No Rales, No Rhonchi, No Wheezing


Gastrointestinal/Abdominal: Normal Exam, Bowel Sounds, Soft, No Tenderness


Extremity: Normal ROM, No Tenderness, No Calf Tenderness, Other (AV fistula 

left arm with palpable thrill)


Neurological/Psych: Other (awake, alert, confused, moving all 4 extremities 

spontaneously)





ED Course And Treatment





- Laboratory Results


Result Diagrams: 


 08/31/17 06:10





 08/31/17 06:10


ECG: Interpreted By Me, Viewed By Me (NSR 70 bpm, normal axis, no acute ST/T 

wave changes)


ECG Interpretation: Normal


O2 Sat by Pulse Oximetry: 92 (RA)


Pulse Ox Interpretation: Abnormal





- Other Rad


  ** CXR 


X-Ray: Viewed By Me, Read By Radiologist


Interpretation: Accession No. : [id number].  Patient Name / ID : LATRICE URRUTIA  / [id number].  Exam Date : 08/28/2017 16:37:15 ( Approved ).  Study 

Comment :  Sex / Age : F  / 77Y.  Creator : Frank Rayo MD.  Dictator : 

Frank Rayo MD.   :  Approver : Frank Rayo MD.  

Approver2 :  Report Date : 08/28/2017 17:17:10.  My Comment :  *****************

******************************************************************.  PROCEDURE:

  CHEST RADIOGRAPH, 1 VIEW.  HISTORY:  SOB.  COMPARISON:  Comparison made with 

prior chest radiograph 05/02/2017.  FINDINGS:  LUNGS:  The central pulmonary 

vasculature is slightly increased though improved from prior study ; rule out 

chronic compensated pulmonary edema/CHF.  Left lower lobe opacity which may 

represent some combination of effusion with atelectasis and/or infiltrate.  The 

central pulmonary vasculature is minimally increased.  Suspect minor right 

basilar atelectasis.  PLEURA:  No pneumothorax or pleural fluid seen.  

CARDIOVASCULAR:  Heart size is upper limits of normal/ borderline enlarged.  

OSSEOUS STRUCTURES:  No significant abnormalities.  VISUALIZED UPPER ABDOMEN:  

Normal.  OTHER FINDINGS:  None.  IMPRESSION:  The central pulmonary vasculature 

is slightly increased though improved from prior study ; rule out chronic 

compensated pulmonary edema/CHF Left lower lobe opacity likely representing 

some combination of effusion with atelectasis/ infiltrate. Suspect minor right 

basilar atelectasis


Progress Note: Blood work, UA, CXR, EKG ordered and reviewed.    Blood sugar on 

VBG/CMP again in 40s  - amp D50 given and IV NS + D5 ordered.  CXr shows left 

sided effusion, ? possible infiltrate - IV rocephin and Azithromycin ordered.  

Avelox currently back ordered and unavailable.  Patient states her PCN allergy 

is rash/itching.  Will order benadryl IV PRN in case of cross reaction with 

rocephin.


Reevaluation Time: 18:00


Reassessment Condition: Improved (Patient appears significantly improved, awake 

& alert.)





- Physician Consult Information


Physician Contacted: Vera Willard


Outcome Of Conversation: Discussed patient with hospitalist, agrees with 

admission to their service.





Critical Care Time





- Critical Care Note


Total Time (in mins): 40


Documented critical care: time excludes all time spent performing seperately 

billable procedures.





Disposition





- Disposition


Disposition: HOSPITALIZED


Disposition Time: 18:42


Condition: STABLE





- Clinical Impression


Clinical Impression: 


 Pneumonia, Pleural effusion, left, Elevated brain natriuretic peptide (BNP) 

level, Hypothermia, Hypoglycemia








Decision To Admit





- Pt Status Changed To:


Hospital Disposition Of: Inpatient





- Admit Certification


Admit to Inpatient:: After my assessment, the patient will require 

hospitalization for at least two midnights.  This is because of the severity of 

symptoms shown, intensity of services needed, and/or the medical risk in this 

patient being treated as an outpatient.





- InPatient:


Physician Admission Certification: I certify that this patient requires 2 or 

more midnights of care for the following reason:: see notes





- .


Bed Request Type: Telemetry


Admitting Physician: Vera Willard


Patient Diagnosis: 


 Hypoglycemia, Pleural effusion, left, Elevated brain natriuretic peptide (BNP) 

level, Hypothermia, Pneumonia

## 2017-08-29 LAB
ALBUMIN/GLOB SERPL: 1.1 {RATIO} (ref 1–2.1)
ALP SERPL-CCNC: 84 U/L (ref 38–126)
ALT SERPL-CCNC: 22 U/L (ref 9–52)
AST SERPL-CCNC: 20 U/L (ref 14–36)
BASOPHILS # BLD AUTO: 0.1 K/UL (ref 0–0.2)
BASOPHILS NFR BLD: 1 % (ref 0–2)
BILIRUB SERPL-MCNC: 0.8 MG/DL (ref 0.2–1.3)
BUN SERPL-MCNC: 50 MG/DL (ref 7–17)
CALCIUM SERPL-MCNC: 9.5 MG/DL (ref 8.6–10.4)
CHLORIDE SERPL-SCNC: 105 MMOL/L (ref 98–107)
CO2 SERPL-SCNC: 23 MMOL/L (ref 22–30)
EOSINOPHIL # BLD AUTO: 0.1 K/UL (ref 0–0.7)
EOSINOPHIL NFR BLD: 1.9 % (ref 0–4)
ERYTHROCYTE [DISTWIDTH] IN BLOOD BY AUTOMATED COUNT: 16 % (ref 11.5–14.5)
GLOBULIN SER-MCNC: 3.2 GM/DL (ref 2.2–3.9)
GLUCOSE SERPL-MCNC: 137 MG/DL (ref 65–105)
HCT VFR BLD CALC: 29.1 % (ref 34–47)
LYMPHOCYTES # BLD AUTO: 1.1 K/UL (ref 1–4.3)
LYMPHOCYTES NFR BLD AUTO: 16.9 % (ref 20–40)
MCH RBC QN AUTO: 28.3 PG (ref 27–31)
MCHC RBC AUTO-ENTMCNC: 32.6 G/DL (ref 33–37)
MCV RBC AUTO: 86.9 FL (ref 81–99)
MONOCYTES # BLD: 0.3 K/UL (ref 0–0.8)
MONOCYTES NFR BLD: 4.8 % (ref 0–10)
NRBC BLD AUTO-RTO: 0 % (ref 0–2)
PLATELET # BLD: 199 K/UL (ref 130–400)
PMV BLD AUTO: 8.3 FL (ref 7.2–11.7)
POTASSIUM SERPL-SCNC: 4.2 MMOL/L (ref 3.6–5.2)
PROT SERPL-MCNC: 6.7 G/DL (ref 6.3–8.3)
SODIUM SERPL-SCNC: 141 MMOL/L (ref 132–148)
WBC # BLD AUTO: 6.5 K/UL (ref 4.8–10.8)

## 2017-08-29 RX ADMIN — INSULIN ASPART SCH UNIT: 100 INJECTION, SOLUTION INTRAVENOUS; SUBCUTANEOUS at 12:30

## 2017-08-29 RX ADMIN — PANTOPRAZOLE SODIUM SCH MG: 20 TABLET, DELAYED RELEASE ORAL at 10:13

## 2017-08-29 RX ADMIN — INSULIN ASPART SCH UNIT: 100 INJECTION, SOLUTION INTRAVENOUS; SUBCUTANEOUS at 08:40

## 2017-08-29 RX ADMIN — INSULIN ASPART SCH UNIT: 100 INJECTION, SOLUTION INTRAVENOUS; SUBCUTANEOUS at 17:29

## 2017-08-29 RX ADMIN — INSULIN ASPART SCH: 100 INJECTION, SOLUTION INTRAVENOUS; SUBCUTANEOUS at 22:18

## 2017-08-29 NOTE — CP.PCM.CON
History of Present Illness





- History of Present Illness


History of Present Illness: 





76 y/o female with Hx/o Stage 1V kidney disease s/p placement of Lt 

brachiobasilic AV shunt in 2/2017,HTN,DM,Dementia hx/o TIAs, CHF is admitted 

for altered mental status & hypoglycemia ( BS 42 ).


Pt was followed in our office for CKD . 





Past Patient History





- Infectious Disease


Hx of Infectious Diseases: None





- Tetanus Immunizations


Tetanus Immunization: Unknown





- Past Medical History & Family History


Past Medical History?: Yes





- Past Social History


Smoking Status: Former Smoker





- CARDIAC


Hx Hypercholesterolemia: Yes


Hx Hypertension: Yes


Hx Peripheral Edema: Yes





- PULMONARY


Hx Asthma: Yes (Dx 15 yrs ago,does not use home o2 anymore)


Hx Pneumonia: Yes (x2)





- NEUROLOGICAL


Hx Transient Ischemic Attacks (TIA): Yes (x3 about 20 yrs ago, 10 yrs ago, 7 

yrs ago)





- HEENT


Hx HEENT Problems: Yes


Hx Cataracts: Yes (Tahir IOL)





- RENAL


Hx Chronic Kidney Disease: Yes





- ENDOCRINE/METABOLIC


Hx Diabetes Mellitus Type 2: Yes





- HEMATOLOGICAL/ONCOLOGICAL


Hx Anemia: Yes





- INTEGUMENTARY


Hx Dermatological Problems: Yes


Other/Comment: Bilateral lower legs dry skin





- MUSCULOSKELETAL/RHEUMATOLOGICAL


Hx Falls: No





- GASTROINTESTINAL


Hx Gastrointestinal Disorders: Yes


Other/Comment: Hx colon cancer





- GENITOURINARY/GYNECOLOGICAL


Hx Genitourinary Disorders: No





- PSYCHIATRIC


Hx Substance Use: No





- SURGICAL HISTORY


Hx Cataract Extraction: Yes (LEFT)





- ANESTHESIA


Hx Anesthesia: Yes


Hx Anesthesia Reactions: No


Hx Malignant Hyperthermia: No





Meds


Allergies/Adverse Reactions: 


 Allergies











Allergy/AdvReac Type Severity Reaction Status Date / Time


 


Penicillins Allergy Intermediate RASH Verified 12/06/16 11:49














- Medications


Medications: 


 Current Medications





Calcitriol (Rocaltrol)  0.25 mcg PO MWF [facility]


Ferrous Sulfate (Feosol)  325 mg PO BID [facility]


   Last Admin: 08/29/17 10:12 Dose:  325 mg


Heparin Sodium (Porcine) (Heparin)  5,000 units SC Q8 [facility]


   Last Admin: 08/29/17 06:53 Dose:  5,000 units


Insulin Aspart (Novolog)  0 unit SC ACHS [facility]


   PRN Reason: Protocol


   Last Admin: 08/29/17 08:40 Dose:  2 unit


Labetalol HCl (Trandate)  200 mg PO BID [facility]


   Last Admin: 08/29/17 10:13 Dose:  200 mg


Lisinopril (Zestril)  10 mg PO DAILY [facility]


Megestrol Acetate (Megace)  40 mg PO DAILY [facility]


   Last Admin: 08/29/17 10:13 Dose:  40 mg


Pantoprazole Sodium (Protonix Ec Tab)  20 mg PO DAILY [facility]


   Last Admin: 08/29/17 10:13 Dose:  20 mg


Pneumococcal Polyvalent Vaccine (Pneumovax 23 Vaccine)  0.5 ml IM .ONCE ONE


   Stop: 08/31/17 10:01











Physical Exam





- Constitutional


Appears: No Acute Distress


Additional comments: 





Pt is alert but slow in answering questions


No dyspnea noted





- Head Exam


Head Exam: ATRAUMATIC, NORMOCEPHALIC





- Eye Exam


Additional comments: 





Sclerae anicteric





- ENT Exam


ENT Exam: Mucous Membranes Dry





- Respiratory Exam


Additional comments: 





Clear. Poor inspiratory effort





- Cardiovascular Exam


Cardiovascular Exam: REGULAR RHYTHM


Additional comments: 





No rub





- GI/Abdominal Exam


GI & Abdominal Exam: Soft


Additional comments: 





Abd. nontender





- Extremities Exam


Additional comments: 





No pedal edema. Good bruit over Lt arm AV shunt. Lt radial palp & hand is warm





Results





- Vital Signs


Recent Vital Signs: 


 Last Vital Signs











Temp  98.3 F   08/29/17 07:45


 


Pulse  83   08/29/17 07:45


 


Resp  18   08/29/17 07:45


 


BP  162/64 H  08/29/17 07:45


 


Pulse Ox  95   08/29/17 07:45














- Labs


Result Diagrams: 


 08/29/17 06:25





 08/29/17 07:12


Labs: 


 Laboratory Results - last 24 hr











  08/28/17 08/29/17 08/29/17





  20:31 02:10 06:25


 


WBC    6.5


 


RBC    3.35 L


 


Hgb    9.5 L


 


Hct    29.1 L


 


MCV    86.9


 


MCH    28.3


 


MCHC    32.6 L


 


RDW    16.0 H


 


Plt Count    199


 


MPV    8.3


 


Neut % (Auto)    75.4 H


 


Lymph % (Auto)    16.9 L


 


Mono % (Auto)    4.8


 


Eos % (Auto)    1.9


 


Baso % (Auto)    1.0


 


Neut #    4.9


 


Lymph #    1.1


 


Mono #    0.3


 


Eos #    0.1


 


Baso #    0.1


 


Sodium   


 


Potassium   


 


Chloride   


 


Carbon Dioxide   


 


Anion Gap   


 


BUN   


 


Creatinine   


 


Est GFR ( Amer)   


 


Est GFR (Non-Af Amer)   


 


POC Glucose (mg/dL)  184 H  197 H 


 


Random Glucose   


 


Hemoglobin A1c   


 


Calcium   


 


Total Bilirubin   


 


AST   


 


ALT   


 


Alkaline Phosphatase   


 


Total Protein   


 


Albumin   


 


Globulin   


 


Albumin/Globulin Ratio   














  08/29/17 08/29/17 08/29/17





  07:12 07:12 07:36


 


WBC   


 


RBC   


 


Hgb   


 


Hct   


 


MCV   


 


MCH   


 


MCHC   


 


RDW   


 


Plt Count   


 


MPV   


 


Neut % (Auto)   


 


Lymph % (Auto)   


 


Mono % (Auto)   


 


Eos % (Auto)   


 


Baso % (Auto)   


 


Neut #   


 


Lymph #   


 


Mono #   


 


Eos #   


 


Baso #   


 


Sodium  141  


 


Potassium  4.2  


 


Chloride  105  


 


Carbon Dioxide  23  


 


Anion Gap  18  


 


BUN  50 H  


 


Creatinine  3.7 H  


 


Est GFR ( Amer)  14  


 


Est GFR (Non-Af Amer)  12  


 


POC Glucose (mg/dL)    153 H


 


Random Glucose  137 H  


 


Hemoglobin A1c   7.6 H 


 


Calcium  9.5  


 


Total Bilirubin  0.8  


 


AST  20  


 


ALT  22  


 


Alkaline Phosphatase  84  


 


Total Protein  6.7  


 


Albumin  3.4 L D  


 


Globulin  3.2  


 


Albumin/Globulin Ratio  1.1  














Assessment & Plan





- Assessment and Plan (Free Text)


Assessment: 





Stage 1V kidney disease. Labs are stable


HTN uncontrolled


CHF


Aortic insufficiency, ? mitral valve vegetation


DM11


Dementia


Plan: 





Suggest to maintain on Lasix


Will order 24 hr urine for Cr Clearance PTH


Will need dialysis if no satisfactory response to Lasix

## 2017-08-29 NOTE — CP.PCM.CON
History of Present Illness





- History of Present Illness


History of Present Illness: 





Consulation for evaluation of thickened mitral valve, concern for vegetation 





HPI : 


77 year old -American female with past medical history significant for 

hypertension and diabetes mellitus chronic kidney disease prior TIA asthma 

arthritis presented to the ED with altered mental status. Patient apparently 

was found by her son who stated that she was confused patient's son called her 

a sister for EMS. Patient on presentation by EMS was noted to be hypoglycemic 

with a blood glucose of 42 for which she was given an amp of D50 in the field. 

At the time of my evaluation she denies having any complaints denies having any 

chest pain shortness of breath palpitations fevers chills. As per review of 

records family members mentioned that she was without any caregivers for a week 

and has been feeling very lethargic and tired. I was concentrated for 

evaluation of mitral regurgitation and concerns for mitral valve endocarditis. 

She had an echocardiogram done on the last hospital that hospitalization which 

raised concerns for thickened mitral valve leaflets. On examination she is 

noted to have a tricuspid regurgitation murmur with a loud P2 component of the 

second heart sound.


Past surgical history significant for left arm AV fistula and is stomach mass 

removal.


Family history significant for diabetes mellitus.


Patient's home medications: Aspirin 81 mg by mouth daily, calcitriol 0.25 g on 

Mondays Wednesdays and Fridays, iron sulfate 325 mg by mouth twice a day, Lasix 

40 mg by mouth daily, insulin labetalol 200 mg by mouth twice a day, lisinopril 

5 mg by mouth daily, Megace and Protonix.


Allergies allergic to penicillin.


Social history ex-smoker 20-pack-year history of smoking denies any alcohol or 

illicit drug use.


Medications given by EMS amp of D50 labetalol 100 mg azithromycin and 

ceftriaxone.








Review of Systems





- Review of Systems


Systems not reviewed;Unavailable: Altered Mental Status


All systems: reviewed and no additional remarkable complaints except





- Constitutional


Constitutional: As Per HPI, Fatigue, Lethargy, Malaise





- EENT


Eyes: As Per HPI


Ears: As Per HPI, Decreased Hearing


Nose/Mouth/Throat: As Per HPI





- Breasts


Breasts: As Per HPI





- Cardiovascular


Cardiovascular: As Per HPI





- Respiratory


Respiratory: As Per HPI





- Gastrointestinal


Gastrointestinal: As Per HPI





- Genitourinary


Genitourinary: As Per HPI





- Reproductive: Female


Reproductive:Female: As Per HPI





- Menstruation


Menstruation: As Per HPI





- Musculoskeletal


Musculoskeletal: As Per HPI





- Integumentary


Integumentary: As Per HPI





- Neurological


Neurological: As Per HPI





- Psychiatric


Psychiatric: As Per HPI





- Endocrine


Endocrine: As Per HPI





- Hematologic/Lymphatic


Hematologic: As Per HPI





Past Patient History





- Infectious Disease


Hx of Infectious Diseases: None





- Tetanus Immunizations


Tetanus Immunization: Unknown





- Past Medical History & Family History


Past Medical History?: Yes





- Past Social History


Smoking Status: Former Smoker





- CARDIAC


Hx Hypercholesterolemia: Yes


Hx Hypertension: Yes


Hx Peripheral Edema: Yes





- PULMONARY


Hx Asthma: Yes (Dx 15 yrs ago,does not use home o2 anymore)


Hx Pneumonia: Yes (x2)





- NEUROLOGICAL


Hx Transient Ischemic Attacks (TIA): Yes (x3 about 20 yrs ago, 10 yrs ago, 7 

yrs ago)





- HEENT


Hx HEENT Problems: Yes


Hx Cataracts: Yes (Tahir IOL)





- RENAL


Hx Chronic Kidney Disease: Yes





- ENDOCRINE/METABOLIC


Hx Diabetes Mellitus Type 2: Yes





- HEMATOLOGICAL/ONCOLOGICAL


Hx Anemia: Yes





- INTEGUMENTARY


Hx Dermatological Problems: Yes


Other/Comment: Bilateral lower legs dry skin





- MUSCULOSKELETAL/RHEUMATOLOGICAL


Hx Falls: No





- GASTROINTESTINAL


Hx Gastrointestinal Disorders: Yes


Other/Comment: Hx colon cancer





- GENITOURINARY/GYNECOLOGICAL


Hx Genitourinary Disorders: No





- PSYCHIATRIC


Hx Substance Use: No





- SURGICAL HISTORY


Hx Cataract Extraction: Yes (LEFT)





- ANESTHESIA


Hx Anesthesia: Yes


Hx Anesthesia Reactions: No


Hx Malignant Hyperthermia: No





Meds


Allergies/Adverse Reactions: 


 Allergies











Allergy/AdvReac Type Severity Reaction Status Date / Time


 


Penicillins Allergy Intermediate RASH Verified 12/06/16 11:49














- Medications


Medications: 


 Current Medications





Calcitriol (Rocaltrol)  0.25 mcg PO MWF [facility]


Ferrous Sulfate (Feosol)  325 mg PO BID [facility]


   Last Admin: 08/29/17 10:12 Dose:  325 mg


Heparin Sodium (Porcine) (Heparin)  5,000 units SC Q8 [facility]


   Last Admin: 08/29/17 14:35 Dose:  5,000 units


Insulin Aspart (Novolog)  0 unit SC ACHS [facility]


   PRN Reason: Protocol


   Last Admin: 08/29/17 12:30 Dose:  3 unit


Labetalol HCl (Trandate)  200 mg PO BID [facility]


   Last Admin: 08/29/17 10:13 Dose:  200 mg


Lisinopril (Zestril)  10 mg PO DAILY [facility]


Megestrol Acetate (Megace)  40 mg PO DAILY [facility]


   Last Admin: 08/29/17 10:13 Dose:  40 mg


Pantoprazole Sodium (Protonix Ec Tab)  20 mg PO DAILY [facility]


   Last Admin: 08/29/17 10:13 Dose:  20 mg


Pneumococcal Polyvalent Vaccine (Pneumovax 23 Vaccine)  0.5 ml IM .ONCE ONE


   Stop: 08/31/17 10:01











Physical Exam





- Constitutional


Appears: Well





- Head Exam


Head Exam: ATRAUMATIC, NORMAL INSPECTION, NORMOCEPHALIC





- Eye Exam


Eye Exam: EOMI, Normal appearance, PERRL


Pupil Exam: NORMAL ACCOMODATION, PERRL





- ENT Exam


ENT Exam: Mucous Membranes Moist, Normal Exam





- Neck Exam


Neck exam: Positive for: Normal Inspection





- Respiratory Exam


Respiratory Exam: Clear to Auscultation Bilateral, NORMAL BREATHING PATTERN





- Cardiovascular Exam


Cardiovascular Exam: REGULAR RHYTHM, RRR, +S1, +S2, Systolic Murmur





- GI/Abdominal Exam


GI & Abdominal Exam: Normal Bowel Sounds, Soft.  absent: Tenderness





- Extremities Exam


Extremities exam: Positive for: normal inspection





- Back Exam


Back exam: NORMAL INSPECTION





- Neurological Exam


Neurological exam: Alert, CN II-XII Intact, Oriented x3, Reflexes Normal





- Psychiatric Exam


Psychiatric exam: Normal Affect, Normal Mood





- Skin


Skin Exam: Dry, Intact, Normal Color, Warm





Results





- Vital Signs


Recent Vital Signs: 


 Last Vital Signs











Temp  98.3 F   08/29/17 15:14


 


Pulse  85   08/29/17 15:14


 


Resp  20   08/29/17 15:14


 


BP  161/68 H  08/29/17 15:14


 


Pulse Ox  94 L  08/29/17 15:14














- Labs


Result Diagrams: 


 08/30/17 07:52





 08/30/17 21:29


Labs: 


 Laboratory Results - last 24 hr











  08/28/17 08/29/17 08/29/17





  20:31 02:10 06:25


 


WBC    6.5


 


RBC    3.35 L


 


Hgb    9.5 L


 


Hct    29.1 L


 


MCV    86.9


 


MCH    28.3


 


MCHC    32.6 L


 


RDW    16.0 H


 


Plt Count    199


 


MPV    8.3


 


Neut % (Auto)    75.4 H


 


Lymph % (Auto)    16.9 L


 


Mono % (Auto)    4.8


 


Eos % (Auto)    1.9


 


Baso % (Auto)    1.0


 


Neut #    4.9


 


Lymph #    1.1


 


Mono #    0.3


 


Eos #    0.1


 


Baso #    0.1


 


Sodium   


 


Potassium   


 


Chloride   


 


Carbon Dioxide   


 


Anion Gap   


 


BUN   


 


Creatinine   


 


Est GFR ( Amer)   


 


Est GFR (Non-Af Amer)   


 


POC Glucose (mg/dL)  184 H  197 H 


 


Random Glucose   


 


Hemoglobin A1c   


 


Calcium   


 


Phosphorus   


 


Total Bilirubin   


 


AST   


 


ALT   


 


Alkaline Phosphatase   


 


Total Protein   


 


Albumin   


 


Globulin   


 


Albumin/Globulin Ratio   














  08/29/17 08/29/17 08/29/17





  07:12 07:12 07:36


 


WBC   


 


RBC   


 


Hgb   


 


Hct   


 


MCV   


 


MCH   


 


MCHC   


 


RDW   


 


Plt Count   


 


MPV   


 


Neut % (Auto)   


 


Lymph % (Auto)   


 


Mono % (Auto)   


 


Eos % (Auto)   


 


Baso % (Auto)   


 


Neut #   


 


Lymph #   


 


Mono #   


 


Eos #   


 


Baso #   


 


Sodium  141  


 


Potassium  4.2  


 


Chloride  105  


 


Carbon Dioxide  23  


 


Anion Gap  18  


 


BUN  50 H  


 


Creatinine  3.7 H  


 


Est GFR ( Amer)  14  


 


Est GFR (Non-Af Amer)  12  


 


POC Glucose (mg/dL)    153 H


 


Random Glucose  137 H  


 


Hemoglobin A1c   7.6 H 


 


Calcium  9.5  


 


Phosphorus   


 


Total Bilirubin  0.8  


 


AST  20  


 


ALT  22  


 


Alkaline Phosphatase  84  


 


Total Protein  6.7  


 


Albumin  3.4 L D  


 


Globulin  3.2  


 


Albumin/Globulin Ratio  1.1  














  08/29/17 08/29/17





  11:06 13:47


 


WBC  


 


RBC  


 


Hgb  


 


Hct  


 


MCV  


 


MCH  


 


MCHC  


 


RDW  


 


Plt Count  


 


MPV  


 


Neut % (Auto)  


 


Lymph % (Auto)  


 


Mono % (Auto)  


 


Eos % (Auto)  


 


Baso % (Auto)  


 


Neut #  


 


Lymph #  


 


Mono #  


 


Eos #  


 


Baso #  


 


Sodium  


 


Potassium  


 


Chloride  


 


Carbon Dioxide  


 


Anion Gap  


 


BUN  


 


Creatinine  


 


Est GFR ( Amer)  


 


Est GFR (Non-Af Amer)  


 


POC Glucose (mg/dL)  225 H 


 


Random Glucose  


 


Hemoglobin A1c  


 


Calcium  


 


Phosphorus   3.9


 


Total Bilirubin  


 


AST  


 


ALT  


 


Alkaline Phosphatase  


 


Total Protein  


 


Albumin  


 


Globulin  


 


Albumin/Globulin Ratio  














Assessment & Plan


(1) Cardiac murmur due to mitral valve disorder


Assessment and Plan: 


echo reviewed from last admission - mitral valve shows normal structure with 

MAC 


also has moderate TR 2' to pulmonary HTN


no evidence clinically to suggest endocarditis 


Status: Acute   





(2) Tricuspid regurgitation


Assessment and Plan: 


2' to pulmonary HTN 





Status: Acute   





(3) Pulmonary HTN


Assessment and Plan: 


may benefit from RHCx 


Status: Acute   





(4) CHF (congestive heart failure)


Assessment and Plan: 


diastolic 


GDMT for HTN 


lasix


lisinopril , change labetalol to coreg 


Status: Acute   





(5) Weakness


Status: Acute   





(6) Chronic kidney disease (CKD)


Status: Chronic   





(7) Hypertension


Status: Chronic

## 2017-08-29 NOTE — CP.PCM.PN
<PereyraGayle lopes - Last Filed: 08/29/17 16:13>





Subjective





- Date & Time of Evaluation


Date of Evaluation: 08/29/17


Time of Evaluation: 09:30





- Subjective


Subjective: 





Medicine Progress Note:





Patient was seen and examined at bedside in the AM.  Patient denies any 

complaints of chest pain, difficulty breathing, shortness of breath, pain on 

urination, nausea or vomiting.  Patient states she does live home alone and 

does cook, clean, complete the grocery shopping and does take her medications.  

Patient states she sometimes forgets to take her medications in the morning and 

will take them at night.  Patient states she does not drive and her three 

children live in close by her. Patient states she did not receive dialysis yet 

and is still confused as to why she does need dialysis.  Patient denies any 

other complaints. 





Objective





- Vital Signs/Intake and Output


Vital Signs (last 24 hours): 


 











Temp Pulse Resp BP Pulse Ox


 


 97.5 F L  83   20   178/69 H  98 


 


 08/29/17 04:34  08/29/17 04:34  08/28/17 23:05  08/29/17 04:34  08/29/17 04:34








Intake and Output: 


 











 08/29/17 08/29/17





 06:59 18:59


 


Intake Total 500 


 


Output Total 400 


 


Balance 100 














- Medications


Medications: 


 Current Medications





Calcitriol (Rocaltrol)  0.25 mcg PO MWF [facility]


Ferrous Sulfate (Feosol)  325 mg PO BID [facility]


Heparin Sodium (Porcine) (Heparin)  5,000 units SC Q8 [facility]


   Last Admin: 08/29/17 06:53 Dose:  5,000 units


Insulin Aspart (Novolog)  0 unit SC ACHS [facility]


   PRN Reason: Protocol


   Last Admin: 08/28/17 22:31 Dose:  Not Given


Labetalol HCl (Trandate)  200 mg PO BID [facility]


Lisinopril (Zestril)  5 mg PO DAILY [facility]


Megestrol Acetate (Megace)  40 mg PO DAILY [facility]


Pantoprazole Sodium (Protonix Ec Tab)  20 mg PO DAILY [facility]


Pneumococcal Polyvalent Vaccine (Pneumovax 23 Vaccine)  0.5 ml IM .ONCE ONE


   Stop: 08/31/17 10:01











- Labs


Labs: 


 





 08/29/17 06:25 





 08/29/17 07:12 











- Constitutional


Appears: Well, No Acute Distress





- Head Exam


Head Exam: ATRAUMATIC, NORMAL INSPECTION, NORMOCEPHALIC





- Eye Exam


Eye Exam: EOMI, Normal appearance, PERRL


Pupil Exam: NORMAL ACCOMODATION





- ENT Exam


ENT Exam: Mucous Membranes Moist





- Respiratory Exam


Respiratory Exam: Clear to Ausculation Bilateral, NORMAL BREATHING PATTERN.  

absent: Rales, Rhonchi, Wheezes, Respiratory Distress, Stridor





- Cardiovascular Exam


Cardiovascular Exam: REGULAR RHYTHM, RRR, +S1, +S2, Murmur (systolic murmur )





- GI/Abdominal Exam


GI & Abdominal Exam: Soft, Normal Bowel Sounds.  absent: Distended, Tenderness





- Extremities Exam


Extremities Exam: Normal Inspection.  absent: Pedal Edema, Tenderness





- Neurological Exam


Neurological Exam: Alert, Awake, Oriented x3





- Psychiatric Exam


Psychiatric exam: Normal Affect, Normal Mood





- Skin


Skin Exam: Normal Color, Warm





Assessment and Plan





- Assessment and Plan (Free Text)


Plan: 





1.) Altered mental status - possibly secondary to diabetic hypoglycemic episode


* Dextrose 50mg IVP given 


* Blood glucose stable 


* Supplement diet ( due to decrease PO intake)


UA: Negative 


Urine culture: negative


f/u blood culture 


CT head without contrast: No acute findings seen within the brain. Evidence of 

chronic ischemic changes


Chest X-ray: The central pulmonary vasculature is slightly increased though 

improved from prior study ; rule out chronic compensated pulmonary edema/CHF. 

Suspect minor right basilar atelectasis 





2.) Aortic Stenosis - possible CHF


Chest X-ray: The central pulmonary vasculature is slightly increased though 

improved from prior study ; rule out chronic compensated pulmonary edema/CHF. 

Suspect minor right basilar atelectasis 


Previous ECHO 4/24/17: EF 63%, aortic valve is severely thickened; mild aortic 

regurgitation; Mitral valve is severely thickened, a vegetation cannot be ruled 

out 


Cardiology Consult: Dr. Tavarez --> help appreciated





3.) History of Diabetes 


Hemoglobin A1c (4/23/17): 7.0


* F/u repeat HgbA1c 


ISS ( Moderate) 


Accuchecks


Encourage intake 


Monitor 





4.) History of Hypertension


Labetatol 200mg PO BID 


Increased Lisinopril 5mg to 10mg PO daily on 8/29


Monitor BP 





5.) History of chronic kidney disease - Stage V


Nephrology Consult---> Dr. Marga Munoz --> Help appreciated


   - f/u 24 hour urine for Cr Clearance PTH


BUN/CR:


* 50/3.7


GFR: 14


- Left AV fistula in place for possible future dialysis 





Continue home medication:


* Calcitriol 0.25mcg PO MWF 


* Feosol 325mg PO BID 





6.) History of Anemia


H/H stable 


Continue home medication:


* Feosol 325mg PO BID 





7.) Prophylactic measure


Scds


Heparin 5000 units SC Q8H 


Protonix 20mg PO daily


Patient is also currently taking 40mg of Megestrol from home.  Per her pharmacy 

states she did not fill that medication at her current pharmacy ([facility] [phone]).  Will follow up with her PMD Dr. Roscoe Vickers 

tomorrow as she was out of the office 8/29.





Case discussed with Dr. Polly Pereyra PGY-1





<Bakari Matias - Last Filed: 08/30/17 12:48>





Objective





- Vital Signs/Intake and Output


Vital Signs (last 24 hours): 


 











Temp Pulse Resp BP Pulse Ox


 


 98.4 F   85   18   191/73 H  95 


 


 08/30/17 07:45  08/30/17 07:50  08/30/17 07:45  08/30/17 07:45  08/30/17 07:45








Intake and Output: 


 











 08/30/17 08/30/17





 06:59 18:59


 


Output Total 500 


 


Balance -500 














- Medications


Medications: 


 Current Medications





Calcitriol (Rocaltrol)  0.25 mcg PO MWF [facility]


   Last Admin: 08/30/17 09:24 Dose:  0.25 mcg


Ferrous Sulfate (Feosol)  325 mg PO BID [facility]


   Last Admin: 08/30/17 09:24 Dose:  325 mg


Heparin Sodium (Porcine) (Heparin)  5,000 units SC Q8 [facility]


   Last Admin: 08/30/17 05:16 Dose:  5,000 units


Insulin Aspart (Novolog)  0 unit SC ACHS [facility]


   PRN Reason: Protocol


   Last Admin: 08/30/17 08:30 Dose:  2 unit


Insulin Glargine (Lantus)  10 unit SC DAILY [facility]


Labetalol HCl (Normodyne)  300 mg PO BID [facility]


Lisinopril (Zestril)  10 mg PO DAILY [facility]


   Last Admin: 08/30/17 09:24 Dose:  10 mg


Pantoprazole Sodium (Protonix Ec Tab)  20 mg PO DAILY [facility]


   Last Admin: 08/30/17 09:25 Dose:  20 mg


Pneumococcal Polyvalent Vaccine (Pneumovax 23 Vaccine)  0.5 ml IM .ONCE ONE


   Stop: 08/31/17 10:01











- Labs


Labs: 


 





 08/30/17 07:52 





 08/30/17 07:52 











Attending/Attestation





- Attestation


I have personally seen and examined this patient.: Yes


I have fully participated in the care of the patient.: Yes


I have reviewed all pertinent clinical information, including history, physical 

exam and plan: Yes


Notes (Text): 





08/30/17 12:47


Patient was seen and examined at bedside with the resident


Patient appears comfortable.


Follow up cardiology recommendations the for valvular disease.


Renal workup in progress for possible hemodialysis.


I discussed the plan of care with the resident and I agree with the assessment 

and plan documented

## 2017-08-30 LAB
ALBUMIN/GLOB SERPL: 1 {RATIO} (ref 1–2.1)
ALP SERPL-CCNC: 77 U/L (ref 38–126)
ALT SERPL-CCNC: 24 U/L (ref 9–52)
AST SERPL-CCNC: 19 U/L (ref 14–36)
BASOPHILS # BLD AUTO: 0.1 K/UL (ref 0–0.2)
BASOPHILS NFR BLD: 1 % (ref 0–2)
BILIRUB SERPL-MCNC: 0.6 MG/DL (ref 0.2–1.3)
BUN SERPL-MCNC: 58 MG/DL (ref 7–17)
CALCIUM SERPL-MCNC: 9.4 MG/DL (ref 8.6–10.4)
CHLORIDE SERPL-SCNC: 106 MMOL/L (ref 98–107)
CO2 SERPL-SCNC: 20 MMOL/L (ref 22–30)
COLLECT DURATION TIME UR: 24 HRS
EOSINOPHIL # BLD AUTO: 0.2 K/UL (ref 0–0.7)
EOSINOPHIL NFR BLD: 2.4 % (ref 0–4)
ERYTHROCYTE [DISTWIDTH] IN BLOOD BY AUTOMATED COUNT: 16 % (ref 11.5–14.5)
GLOBULIN SER-MCNC: 3.3 GM/DL (ref 2.2–3.9)
GLUCOSE SERPL-MCNC: 178 MG/DL (ref 65–105)
HCT VFR BLD CALC: 27.6 % (ref 34–47)
LYMPHOCYTES # BLD AUTO: 1.4 K/UL (ref 1–4.3)
LYMPHOCYTES NFR BLD AUTO: 20.7 % (ref 20–40)
MAGNESIUM SERPL-MCNC: 1.9 MG/DL (ref 1.6–2.3)
MCH RBC QN AUTO: 28.6 PG (ref 27–31)
MCHC RBC AUTO-ENTMCNC: 33.1 G/DL (ref 33–37)
MCV RBC AUTO: 86.5 FL (ref 81–99)
MONOCYTES # BLD: 0.3 K/UL (ref 0–0.8)
MONOCYTES NFR BLD: 4.9 % (ref 0–10)
NRBC BLD AUTO-RTO: 0 % (ref 0–2)
PHOSPHATE SERPL-MCNC: 3.7 MG/DL (ref 2.5–4.5)
PLATELET # BLD: 204 K/UL (ref 130–400)
PMV BLD AUTO: 8.2 FL (ref 7.2–11.7)
POTASSIUM SERPL-SCNC: 4.5 MMOL/L (ref 3.6–5.2)
PROT SERPL-MCNC: 6.5 G/DL (ref 6.3–8.3)
SODIUM SERPL-SCNC: 141 MMOL/L (ref 132–148)
WBC # BLD AUTO: 6.6 K/UL (ref 4.8–10.8)

## 2017-08-30 PROCEDURE — 0HBRXZZ EXCISION OF TOE NAIL, EXTERNAL APPROACH: ICD-10-PCS

## 2017-08-30 RX ADMIN — PANTOPRAZOLE SODIUM SCH MG: 20 TABLET, DELAYED RELEASE ORAL at 09:25

## 2017-08-30 RX ADMIN — INSULIN ASPART SCH UNIT: 100 INJECTION, SOLUTION INTRAVENOUS; SUBCUTANEOUS at 13:00

## 2017-08-30 RX ADMIN — INSULIN ASPART SCH UNIT: 100 INJECTION, SOLUTION INTRAVENOUS; SUBCUTANEOUS at 18:45

## 2017-08-30 RX ADMIN — LABETALOL HYDROCHLORIDE SCH MG: 300 TABLET, FILM COATED ORAL at 18:45

## 2017-08-30 RX ADMIN — INSULIN ASPART SCH: 100 INJECTION, SOLUTION INTRAVENOUS; SUBCUTANEOUS at 21:55

## 2017-08-30 RX ADMIN — INSULIN ASPART SCH UNIT: 100 INJECTION, SOLUTION INTRAVENOUS; SUBCUTANEOUS at 08:30

## 2017-08-30 NOTE — CP.PCM.CON
<Erinn Abarca - Last Filed: 08/30/17 12:27>





History of Present Illness





- History of Present Illness


History of Present Illness: 


Consult note for Dr. Xiao





77 year old female with PMHx including HTN, DM, asthma, arthritis, CKD, TIA was 

seen at bedside for complaint of painful toenails.  Patient states that her 

toes also hurt because they are overlapping.  She states that the last time her 

toenails were cut was a few months ago when she was in the hospital.  She 

denies any n/v/f/c/sob/cp.





Past Patient History





- Infectious Disease


Hx of Infectious Diseases: None





- Tetanus Immunizations


Tetanus Immunization: Unknown





- Past Medical History & Family History


Past Medical History?: Yes





- Past Social History


Smoking Status: Former Smoker





- CARDIAC


Hx Hypercholesterolemia: Yes


Hx Hypertension: Yes


Hx Peripheral Edema: Yes





- PULMONARY


Hx Asthma: Yes (Dx 15 yrs ago,does not use home o2 anymore)


Hx Pneumonia: Yes (x2)





- NEUROLOGICAL


Hx Transient Ischemic Attacks (TIA): Yes (x3 about 20 yrs ago, 10 yrs ago, 7 

yrs ago)





- HEENT


Hx HEENT Problems: Yes


Hx Cataracts: Yes (Tahir IOL)





- RENAL


Hx Chronic Kidney Disease: Yes





- ENDOCRINE/METABOLIC


Hx Diabetes Mellitus Type 2: Yes





- HEMATOLOGICAL/ONCOLOGICAL


Hx Anemia: Yes





- INTEGUMENTARY


Hx Dermatological Problems: Yes


Other/Comment: Bilateral lower legs dry skin





- MUSCULOSKELETAL/RHEUMATOLOGICAL


Hx Falls: No





- GASTROINTESTINAL


Hx Gastrointestinal Disorders: Yes


Other/Comment: Hx colon cancer





- GENITOURINARY/GYNECOLOGICAL


Hx Genitourinary Disorders: No





- PSYCHIATRIC


Hx Substance Use: No





- SURGICAL HISTORY


Hx Cataract Extraction: Yes (LEFT)





- ANESTHESIA


Hx Anesthesia: Yes


Hx Anesthesia Reactions: No


Hx Malignant Hyperthermia: No





Meds


Allergies/Adverse Reactions: 


 Allergies











Allergy/AdvReac Type Severity Reaction Status Date / Time


 


Penicillins Allergy Intermediate RASH Verified 12/06/16 11:49














- Medications


Medications: 


 Current Medications





Calcitriol (Rocaltrol)  0.25 mcg PO MWF [facility]


   Last Admin: 08/30/17 09:24 Dose:  0.25 mcg


Ferrous Sulfate (Feosol)  325 mg PO BID [facility]


   Last Admin: 08/30/17 09:24 Dose:  325 mg


Heparin Sodium (Porcine) (Heparin)  5,000 units SC Q8 [facility]


   Last Admin: 08/30/17 05:16 Dose:  5,000 units


Insulin Aspart (Novolog)  0 unit SC ACHS [facility]


   PRN Reason: Protocol


   Last Admin: 08/30/17 08:30 Dose:  2 unit


Insulin Glargine (Lantus)  10 unit SC DAILY [facility]


Labetalol HCl (Normodyne)  300 mg PO BID [facility]


Lisinopril (Zestril)  10 mg PO DAILY [facility]


   Last Admin: 08/30/17 09:24 Dose:  10 mg


Pantoprazole Sodium (Protonix Ec Tab)  20 mg PO DAILY [facility]


   Last Admin: 08/30/17 09:25 Dose:  20 mg


Pneumococcal Polyvalent Vaccine (Pneumovax 23 Vaccine)  0.5 ml IM .ONCE ONE


   Stop: 08/31/17 10:01











Physical Exam





- Constitutional


Appears: Non-toxic, No Acute Distress





- Extremities Exam


Additional comments: 


Lower extremity focused exam:





Vasc:DP and PT pulses non-palpable b/l.  CFT > 3 seconds to all digits.  Skin 

temperature is warm to cool from proximal to distal b/l.





Neuro: Gross sensation intact b/l





Derm: Nails are thickened, elongated, discolored, incurvated 1-5 b/l.  

Webspaces are clean, dry, intact 1-4 b/l.  No open lesions.  Skin is diffusely 

xerotic.





Ortho: Contracted digits 2-5 b/l.  Digit 1 is overriding digit 2 b/l.  Pain on 

palpation to nails 1-5 b/l





- Neurological Exam


Neurological exam: Alert, Oriented x3





- Psychiatric Exam


Psychiatric exam: Normal Affect, Normal Mood





Results





- Vital Signs


Recent Vital Signs: 


 Last Vital Signs











Temp  98.4 F   08/30/17 07:45


 


Pulse  85   08/30/17 07:50


 


Resp  18   08/30/17 07:45


 


BP  191/73 H  08/30/17 07:45


 


Pulse Ox  95   08/30/17 07:45














- Labs


Result Diagrams: 


 08/30/17 07:52





 08/30/17 07:52


Labs: 


 Laboratory Results - last 24 hr











  08/29/17 08/29/17 08/29/17





  13:47 16:50 21:18


 


WBC   


 


RBC   


 


Hgb   


 


Hct   


 


MCV   


 


MCH   


 


MCHC   


 


RDW   


 


Plt Count   


 


MPV   


 


Neut % (Auto)   


 


Lymph % (Auto)   


 


Mono % (Auto)   


 


Eos % (Auto)   


 


Baso % (Auto)   


 


Neut #   


 


Lymph #   


 


Mono #   


 


Eos #   


 


Baso #   


 


Sodium   


 


Potassium   


 


Chloride   


 


Carbon Dioxide   


 


Anion Gap   


 


BUN   


 


Creatinine   


 


Est GFR ( Amer)   


 


Est GFR (Non-Af Amer)   


 


POC Glucose (mg/dL)   223 H  192 H


 


Random Glucose   


 


Calcium   


 


Phosphorus  3.9  


 


Magnesium   


 


Total Bilirubin   


 


AST   


 


ALT   


 


Alkaline Phosphatase   


 


Total Protein   


 


Albumin   


 


Globulin   


 


Albumin/Globulin Ratio   














  08/30/17 08/30/17 08/30/17





  06:25 07:52 07:52


 


WBC   6.6 


 


RBC   3.19 L 


 


Hgb   9.1 L 


 


Hct   27.6 L 


 


MCV   86.5 


 


MCH   28.6 


 


MCHC   33.1 


 


RDW   16.0 H 


 


Plt Count   204 


 


MPV   8.2 


 


Neut % (Auto)   71.0 


 


Lymph % (Auto)   20.7 


 


Mono % (Auto)   4.9 


 


Eos % (Auto)   2.4 


 


Baso % (Auto)   1.0 


 


Neut #   4.7 


 


Lymph #   1.4 


 


Mono #   0.3 


 


Eos #   0.2 


 


Baso #   0.1 


 


Sodium    141


 


Potassium    4.5


 


Chloride    106


 


Carbon Dioxide    20 L


 


Anion Gap    20


 


BUN    58 H


 


Creatinine    4.2 H


 


Est GFR ( Amer)    12


 


Est GFR (Non-Af Amer)    10


 


POC Glucose (mg/dL)  182 H  


 


Random Glucose    178 H


 


Calcium    9.4


 


Phosphorus    3.7


 


Magnesium    1.9


 


Total Bilirubin    0.6


 


AST    19


 


ALT    24


 


Alkaline Phosphatase    77


 


Total Protein    6.5


 


Albumin    3.2 L


 


Globulin    3.3


 


Albumin/Globulin Ratio    1.0














  08/30/17





  11:57


 


WBC 


 


RBC 


 


Hgb 


 


Hct 


 


MCV 


 


MCH 


 


MCHC 


 


RDW 


 


Plt Count 


 


MPV 


 


Neut % (Auto) 


 


Lymph % (Auto) 


 


Mono % (Auto) 


 


Eos % (Auto) 


 


Baso % (Auto) 


 


Neut # 


 


Lymph # 


 


Mono # 


 


Eos # 


 


Baso # 


 


Sodium 


 


Potassium 


 


Chloride 


 


Carbon Dioxide 


 


Anion Gap 


 


BUN 


 


Creatinine 


 


Est GFR ( Amer) 


 


Est GFR (Non-Af Amer) 


 


POC Glucose (mg/dL)  240 H


 


Random Glucose 


 


Calcium 


 


Phosphorus 


 


Magnesium 


 


Total Bilirubin 


 


AST 


 


ALT 


 


Alkaline Phosphatase 


 


Total Protein 


 


Albumin 


 


Globulin 


 


Albumin/Globulin Ratio 














Assessment & Plan





- Assessment and Plan (Free Text)


Assessment: 


77 year old female with thickened, elongated, painful toenails 1-10


Plan: 


patient examined and evaluated


discussed in detail with attending, Dr. Xiao


labs, chart, vitals reviewed


nails 1-5 b/l were debrided in thickness and lenght without incident


discussed padding to help patients contracted digit pain


patient to f/u with Dr. Xiao as outpatient


thank you for allowing us to participate in the care for your patient


please re-consult as necessary





<Rashawn iXao - Last Filed: 08/30/17 18:48>





Meds





- Medications


Medications: 


 Current Medications





Calcitriol (Rocaltrol)  0.25 mcg PO MWF [facility]


   Last Admin: 08/30/17 09:24 Dose:  0.25 mcg


Ferrous Sulfate (Feosol)  325 mg PO BID [facility]


   Last Admin: 08/30/17 18:44 Dose:  325 mg


Furosemide (Lasix)  40 mg PO DAILY [facility]


Heparin Sodium (Porcine) (Heparin)  5,000 units SC Q8 [facility]


   Last Admin: 08/30/17 14:57 Dose:  5,000 units


Insulin Aspart (Novolog)  0 unit SC ACHS [facility]


   PRN Reason: Protocol


   Last Admin: 08/30/17 18:45 Dose:  4 unit


Insulin Glargine (Lantus)  10 unit SC DAILY [facility]


Labetalol HCl (Normodyne)  300 mg PO BID [facility]


   Last Admin: 08/30/17 18:45 Dose:  300 mg


Lisinopril (Zestril)  10 mg PO DAILY [facility]


   Last Admin: 08/30/17 09:24 Dose:  10 mg


Pantoprazole Sodium (Protonix Ec Tab)  20 mg PO DAILY [facility]


   Last Admin: 08/30/17 09:25 Dose:  20 mg


Pneumococcal Polyvalent Vaccine (Pneumovax 23 Vaccine)  0.5 ml IM .ONCE ONE


   Stop: 08/31/17 10:01











Results





- Vital Signs


Recent Vital Signs: 


 Last Vital Signs











Temp  97.4 F L  08/30/17 15:13


 


Pulse  80   08/30/17 15:13


 


Resp  20   08/30/17 15:13


 


BP  148/65   08/30/17 15:13


 


Pulse Ox  97   08/30/17 15:13














- Labs


Result Diagrams: 


 08/30/17 07:52





 08/30/17 07:52


Labs: 


 Laboratory Results - last 24 hr











  08/29/17 08/29/17 08/30/17





  13:47 21:18 06:25


 


WBC   


 


RBC   


 


Hgb   


 


Hct   


 


MCV   


 


MCH   


 


MCHC   


 


RDW   


 


Plt Count   


 


MPV   


 


Neut % (Auto)   


 


Lymph % (Auto)   


 


Mono % (Auto)   


 


Eos % (Auto)   


 


Baso % (Auto)   


 


Neut #   


 


Lymph #   


 


Mono #   


 


Eos #   


 


Baso #   


 


Sodium   


 


Potassium   


 


Chloride   


 


Carbon Dioxide   


 


Anion Gap   


 


BUN   


 


Creatinine   


 


Est GFR ( Amer)   


 


Est GFR (Non-Af Amer)   


 


POC Glucose (mg/dL)   192 H  182 H


 


Random Glucose   


 


Calcium   


 


Phosphorus   


 


Magnesium   


 


Total Bilirubin   


 


AST   


 


ALT   


 


Alkaline Phosphatase   


 


Total Protein   


 


Albumin   


 


Globulin   


 


Albumin/Globulin Ratio   


 


PTH Intact Whole Molec  297 H  














  08/30/17 08/30/17 08/30/17





  07:52 07:52 11:57


 


WBC  6.6  


 


RBC  3.19 L  


 


Hgb  9.1 L  


 


Hct  27.6 L  


 


MCV  86.5  


 


MCH  28.6  


 


MCHC  33.1  


 


RDW  16.0 H  


 


Plt Count  204  


 


MPV  8.2  


 


Neut % (Auto)  71.0  


 


Lymph % (Auto)  20.7  


 


Mono % (Auto)  4.9  


 


Eos % (Auto)  2.4  


 


Baso % (Auto)  1.0  


 


Neut #  4.7  


 


Lymph #  1.4  


 


Mono #  0.3  


 


Eos #  0.2  


 


Baso #  0.1  


 


Sodium   141 


 


Potassium   4.5 


 


Chloride   106 


 


Carbon Dioxide   20 L 


 


Anion Gap   20 


 


BUN   58 H 


 


Creatinine   4.2 H 


 


Est GFR ( Amer)   12 


 


Est GFR (Non-Af Amer)   10 


 


POC Glucose (mg/dL)    240 H


 


Random Glucose   178 H 


 


Calcium   9.4 


 


Phosphorus   3.7 


 


Magnesium   1.9 


 


Total Bilirubin   0.6 


 


AST   19 


 


ALT   24 


 


Alkaline Phosphatase   77 


 


Total Protein   6.5 


 


Albumin   3.2 L 


 


Globulin   3.3 


 


Albumin/Globulin Ratio   1.0 


 


PTH Intact Whole Molec   














  08/30/17





  16:44


 


WBC 


 


RBC 


 


Hgb 


 


Hct 


 


MCV 


 


MCH 


 


MCHC 


 


RDW 


 


Plt Count 


 


MPV 


 


Neut % (Auto) 


 


Lymph % (Auto) 


 


Mono % (Auto) 


 


Eos % (Auto) 


 


Baso % (Auto) 


 


Neut # 


 


Lymph # 


 


Mono # 


 


Eos # 


 


Baso # 


 


Sodium 


 


Potassium 


 


Chloride 


 


Carbon Dioxide 


 


Anion Gap 


 


BUN 


 


Creatinine 


 


Est GFR ( Amer) 


 


Est GFR (Non-Af Amer) 


 


POC Glucose (mg/dL)  268 H


 


Random Glucose 


 


Calcium 


 


Phosphorus 


 


Magnesium 


 


Total Bilirubin 


 


AST 


 


ALT 


 


Alkaline Phosphatase 


 


Total Protein 


 


Albumin 


 


Globulin 


 


Albumin/Globulin Ratio 


 


PTH Intact Whole Molec 














Assessment & Plan





- Assessment and Plan (Free Text)


Assessment: 





Pt seen and evaluated at bedside .agree with above evaluation and treatment .Dr Rashawn Xiao.

## 2017-08-30 NOTE — CP.PCM.PN
Subjective





- Date & Time of Evaluation


Date of Evaluation: 08/30/17


Time of Evaluation: 20:45





- Subjective


Subjective: 





Cr worsening


possible HD 





Objective





- Vital Signs/Intake and Output


Vital Signs (last 24 hours): 


 











Temp Pulse Resp BP Pulse Ox


 


 97.4 F L  80   20   148/65   97 


 


 08/30/17 15:13  08/30/17 15:13  08/30/17 15:13  08/30/17 15:13  08/30/17 15:13











- Medications


Medications: 


 Current Medications





Calcitriol (Rocaltrol)  0.25 mcg PO MWF [facility]


   Last Admin: 08/30/17 09:24 Dose:  0.25 mcg


Ferrous Sulfate (Feosol)  325 mg PO BID [facility]


   Last Admin: 08/30/17 18:44 Dose:  325 mg


Furosemide (Lasix)  40 mg PO DAILY [facility]


Heparin Sodium (Porcine) (Heparin)  5,000 units SC Q8 [facility]


   Last Admin: 08/30/17 14:57 Dose:  5,000 units


Insulin Aspart (Novolog)  0 unit SC ACHS [facility]


   PRN Reason: Protocol


   Last Admin: 08/30/17 18:45 Dose:  4 unit


Insulin Glargine (Lantus)  10 unit SC DAILY [facility]


Labetalol HCl (Normodyne)  300 mg PO BID [facility]


   Last Admin: 08/30/17 18:45 Dose:  300 mg


Lisinopril (Zestril)  10 mg PO DAILY [facility]


   Last Admin: 08/30/17 09:24 Dose:  10 mg


Pantoprazole Sodium (Protonix Ec Tab)  20 mg PO DAILY [facility]


   Last Admin: 08/30/17 09:25 Dose:  20 mg


Pneumococcal Polyvalent Vaccine (Pneumovax 23 Vaccine)  0.5 ml IM .ONCE ONE


   Stop: 08/31/17 10:01











- Labs


Labs: 


 





 08/30/17 07:52 





 08/30/17 07:52 











- Constitutional


Appears: Well





- Head Exam


Head Exam: ATRAUMATIC, NORMAL INSPECTION, NORMOCEPHALIC





- Eye Exam


Eye Exam: EOMI, Normal appearance, PERRL


Pupil Exam: NORMAL ACCOMODATION, PERRL





- ENT Exam


ENT Exam: Mucous Membranes Moist, Normal Exam





- Neck Exam


Neck Exam: Full ROM, Normal Inspection.  absent: Lymphadenopathy





- Respiratory Exam


Respiratory Exam: Clear to Ausculation Bilateral, NORMAL BREATHING PATTERN





- Cardiovascular Exam


Cardiovascular Exam: REGULAR RHYTHM, +S1, +S2, Murmur





- GI/Abdominal Exam


GI & Abdominal Exam: Soft, Normal Bowel Sounds.  absent: Tenderness





- Extremities Exam


Extremities Exam: Full ROM, Normal Capillary Refill, Normal Inspection.  absent

: Joint Swelling, Pedal Edema





- Back Exam


Back Exam: NORMAL INSPECTION





- Neurological Exam


Neurological Exam: Alert, Awake, CN II-XII Intact, Oriented x3





- Psychiatric Exam


Psychiatric exam: Normal Affect, Normal Mood





- Skin


Skin Exam: Dry, Intact, Normal Color, Warm





Assessment and Plan


(1) Cardiac murmur due to mitral valve disorder


Assessment & Plan: 


benign


no evidence of vegetatoin 


moderate MAC - check iPTH and vit D levels 


Status: Acute   





(2) Hypertension


Assessment & Plan: 


consider coreg 


add norvasc for BP control 


Status: Acute   





(3) Pulmonary HTN


Assessment & Plan: 


may benefit from RHCx 


Status: Acute   





(4) Tricuspid regurgitation


Status: Acute   





(5) Chronic kidney disease (CKD)


Status: Chronic

## 2017-08-30 NOTE — CP.PCM.PN
Subjective





- Date & Time of Evaluation


Date of Evaluation: 08/30/17


Time of Evaluation: 02:00





- Subjective


Subjective: 





Daniel pace No c/o SOB





Objective





- Vital Signs/Intake and Output


Vital Signs (last 24 hours): 


 











Temp Pulse Resp BP Pulse Ox


 


 98.4 F   85   18   191/73 H  95 


 


 08/30/17 07:45  08/30/17 07:50  08/30/17 07:45  08/30/17 07:45  08/30/17 07:45








Intake and Output: 


 











 08/30/17 08/30/17





 06:59 18:59


 


Output Total 500 


 


Balance -500 














- Medications


Medications: 


 Current Medications





Calcitriol (Rocaltrol)  0.25 mcg PO MWF [facility]


   Last Admin: 08/30/17 09:24 Dose:  0.25 mcg


Ferrous Sulfate (Feosol)  325 mg PO BID [facility]


   Last Admin: 08/30/17 09:24 Dose:  325 mg


Heparin Sodium (Porcine) (Heparin)  5,000 units SC Q8 [facility]


   Last Admin: 08/30/17 05:16 Dose:  5,000 units


Insulin Aspart (Novolog)  0 unit SC ACHS [facility]


   PRN Reason: Protocol


   Last Admin: 08/30/17 13:00 Dose:  2 unit


Insulin Glargine (Lantus)  10 unit SC DAILY [facility]


Labetalol HCl (Normodyne)  300 mg PO BID [facility]


Lisinopril (Zestril)  10 mg PO DAILY [facility]


   Last Admin: 08/30/17 09:24 Dose:  10 mg


Pantoprazole Sodium (Protonix Ec Tab)  20 mg PO DAILY [facility]


   Last Admin: 08/30/17 09:25 Dose:  20 mg


Pneumococcal Polyvalent Vaccine (Pneumovax 23 Vaccine)  0.5 ml IM .ONCE ONE


   Stop: 08/31/17 10:01











- Labs


Labs: 


 





 08/30/17 07:52 





 08/30/17 07:52 











- Respiratory Exam


Additional comments: 





Lungs clear





- Cardiovascular Exam


Cardiovascular Exam: REGULAR RHYTHM





- Extremities Exam


Additional comments: 





No edema





Assessment and Plan





- Assessment and Plan (Free Text)


Assessment: 





Stage 1V kidney dis. BUN/Creat are slightly higher& developing  metabolic 

acidosis


Will need to continue  Lasix


HTN uncontrolled


DM


Plan: 





24 hr urine collection in progress \


Monitor renal function


Suggest to resume Lasix. Pt was  on maintenance lasix at home

## 2017-08-30 NOTE — CP.PCM.PN
<Gayle Pereyra - Last Filed: 08/30/17 17:36>





Subjective





- Date & Time of Evaluation


Date of Evaluation: 08/30/17


Time of Evaluation: 07:30





- Subjective


Subjective: 





Medicine Progress Note:





Patient was seen and examined at bedside in the AM. Per nurse there were no 

acute events overnight.  Per medical student the patient was attempted to get 

out of bed.  The medical student states she appeared very unsteady and needed 

assistance walking to her chair.  Per patient she denies using a walker or cane 

at home.  Patient is noted to be fall risk and was told again not to get out of 

bed without assistance.  Patient denies shortness of breath, palpitations, 

chest pain, dysuria, constipation, or diarrhea.  Patient states she would like 

her toe nails to be clipped because they are bothering her.  Patient denies any 

other complaints at the time.





Objective





- Vital Signs/Intake and Output


Vital Signs (last 24 hours): 


 











Temp Pulse Resp BP Pulse Ox


 


 98.4 F   85   18   191/73 H  95 


 


 08/30/17 07:45  08/30/17 07:50  08/30/17 07:45  08/30/17 07:45  08/30/17 07:45








Intake and Output: 


 











 08/30/17 08/30/17





 06:59 18:59


 


Output Total 500 


 


Balance -500 














- Medications


Medications: 


 Current Medications





Calcitriol (Rocaltrol)  0.25 mcg PO MWF [facility]


   Last Admin: 08/30/17 09:24 Dose:  0.25 mcg


Ferrous Sulfate (Feosol)  325 mg PO BID [facility]


   Last Admin: 08/30/17 09:24 Dose:  325 mg


Heparin Sodium (Porcine) (Heparin)  5,000 units SC Q8 [facility]


   Last Admin: 08/30/17 05:16 Dose:  5,000 units


Insulin Aspart (Novolog)  0 unit SC ACHS [facility]


   PRN Reason: Protocol


   Last Admin: 08/30/17 08:30 Dose:  2 unit


Insulin Glargine (Lantus)  10 unit SC DAILY [facility]


Labetalol HCl (Normodyne)  300 mg PO BID [facility]


Lisinopril (Zestril)  10 mg PO DAILY [facility]


   Last Admin: 08/30/17 09:24 Dose:  10 mg


Megestrol Acetate (Megace)  40 mg PO DAILY [facility]


   Last Admin: 08/30/17 09:25 Dose:  40 mg


Pantoprazole Sodium (Protonix Ec Tab)  20 mg PO DAILY [facility]


   Last Admin: 08/30/17 09:25 Dose:  20 mg


Pneumococcal Polyvalent Vaccine (Pneumovax 23 Vaccine)  0.5 ml IM .ONCE ONE


   Stop: 08/31/17 10:01











- Labs


Labs: 


 





 08/30/17 07:52 





 08/30/17 07:52 











- Constitutional


Appears: Well, No Acute Distress





- Head Exam


Head Exam: ATRAUMATIC, NORMAL INSPECTION, NORMOCEPHALIC





- Eye Exam


Eye Exam: EOMI, Normal appearance, PERRL


Pupil Exam: NORMAL ACCOMODATION





- ENT Exam


ENT Exam: Mucous Membranes Moist





- Respiratory Exam


Respiratory Exam: Clear to Ausculation Bilateral, NORMAL BREATHING PATTERN.  

absent: Rales, Rhonchi, Wheezes, Stridor





- Cardiovascular Exam


Cardiovascular Exam: REGULAR RHYTHM, RRR, +S1, +S2





- GI/Abdominal Exam


GI & Abdominal Exam: Soft, Normal Bowel Sounds.  absent: Tenderness





- Extremities Exam


Extremities Exam: Normal Inspection.  absent: Pedal Edema, Tenderness


Additional comments: 





Long toe nails bilaterally.  Dry feet bilaterally. 





- Neurological Exam


Neurological Exam: Alert, Awake, Oriented x3





- Psychiatric Exam


Psychiatric exam: Normal Affect, Normal Mood





- Skin


Skin Exam: Normal Color, Warm





Assessment and Plan





- Assessment and Plan (Free Text)


Plan: 





1.) Altered mental status - possibly secondary to diabetic hypoglycemic episode


* Dextrose 50mg IVP given 


* Blood glucose stable 


* Supplement diet ( due to decrease PO intake)


UA: Negative 


Urine culture: negative


f/u blood culture 


CT head without contrast: No acute findings seen within the brain. Evidence of 

chronic ischemic changes


Chest X-ray: The central pulmonary vasculature is slightly increased though 

improved from prior study ; rule out chronic compensated pulmonary edema/CHF. 

Suspect minor right basilar atelectasis 





2.) Aortic Stenosis - possible CHF


Chest X-ray: The central pulmonary vasculature is slightly increased though 

improved from prior study ; rule out chronic compensated pulmonary edema/CHF. 

Suspect minor right basilar atelectasis 


Previous ECHO 4/24/17: EF 63%, aortic valve is severely thickened; mild aortic 

regurgitation; Mitral valve is severely thickened, a vegetation cannot be ruled 

out 


Cardiology Consult: Dr. Tavarez --> help appreciated





3.) History of Diabetes 


Hemoglobin A1c (4/23/17): 7.0


* repeat HgbA1c (8/29): 7.6


ISS ( Moderate) 


Insulin Glargine 10units daily


Accuchecks


Encourage intake 


Monitor 





4.) History of Hypertension


Labetatol 300mg PO BID 


Increased Lisinopril 5mg to 10mg PO daily on 8/29


Monitor BP 





5.) History of chronic kidney disease - Stage V


Nephrology Consult---> Dr. Marga Munoz --> Help appreciated


   - f/u 24 hour urine for Cr Clearance PTH


   - per Dr. Munoz - resumed Lasix 40mg PO daily


BUN/CR:


* 58/4.2


GFR: 14


- Left AV fistula in place for possible future dialysis 





Continue home medication:


* Calcitriol 0.25mcg PO MWF 


* Feosol 325mg PO BID 





6.) History of Anemia


H/H stable 


Continue home medication:


* Feosol 325mg PO BID 





7.) Bilateral Nail Care


* Podiatry Consult: Dr. Xiao --> help appreciated  





8.) Prophylactic measure


Scds


Heparin 5000 units SC Q8H 


Protonix 20mg PO daily


Patient is also currently taking 40mg of Megestrol from home.  Per her pharmacy 

states she did not fill that medication at her current pharmacy ([facility] [phone]).  Spoke with PMD Dr. Roscoe Vickers and patient was 

never prescribed Megestrol.  Therefore Megestrol 40mg once daily was 

discontinued.  





Case discussed with Dr. Polly Pereyra PGY-1





<Bakari Matias - Last Filed: 08/31/17 16:53>





Objective





- Vital Signs/Intake and Output


Vital Signs (last 24 hours): 


 











Temp Pulse Resp BP Pulse Ox


 


 97.6 F   76   20   126/60   96 


 


 08/31/17 15:32  08/31/17 15:32  08/31/17 15:32  08/31/17 15:32  08/31/17 15:32








Intake and Output: 


 











 08/31/17 08/31/17





 06:59 18:59


 


Intake Total 150 250


 


Balance 150 250














- Medications


Medications: 


 Current Medications





Amlodipine Besylate (Norvasc)  10 mg PO DAILY [facility]


   Last Admin: 08/31/17 10:08 Dose:  10 mg


Calcitriol (Rocaltrol)  0.25 mcg PO MWF [facility]


   Last Admin: 08/30/17 09:24 Dose:  0.25 mcg


Epoetin Vamsi (Procrit)  10,000 unit IV TTS [facility]


Ferrous Sulfate (Feosol)  325 mg PO BID [facility]


   Last Admin: 08/31/17 10:05 Dose:  325 mg


Furosemide (Lasix)  40 mg PO DAILY [facility]


   Last Admin: 08/31/17 10:06 Dose:  40 mg


Heparin Sodium (Porcine) (Heparin)  5,000 units SC Q8 [facility]


   Last Admin: 08/31/17 13:42 Dose:  5,000 units


Insulin Aspart (Novolog)  0 unit SC ACHS [facility]


   PRN Reason: Protocol


   Last Admin: 08/31/17 12:30 Dose:  2 unit


Insulin Aspart (Novolog)  3 unit SC TIDAC [facility]


   Last Admin: 08/31/17 12:31 Dose:  3 unit


Insulin Glargine (Lantus)  10 unit SC DAILY [facility]


   Last Admin: 08/31/17 10:09 Dose:  10 units


Labetalol HCl (Normodyne)  300 mg PO BID [facility]


   Last Admin: 08/31/17 10:05 Dose:  300 mg


Lisinopril (Zestril)  10 mg PO DAILY [facility]


   Last Admin: 08/31/17 10:06 Dose:  10 mg


Pantoprazole Sodium (Protonix Ec Tab)  20 mg PO DAILY [facility]


   Last Admin: 08/31/17 10:05 Dose:  20 mg











- Labs


Labs: 


 





 08/31/17 06:10 





 08/31/17 06:10 











Attending/Attestation





- Attestation


I have personally seen and examined this patient.: Yes


I have fully participated in the care of the patient.: Yes


I have reviewed all pertinent clinical information, including history, physical 

exam and plan: Yes


Notes (Text): 





08/31/17 16:52


Patient was seen and examined at bedside with the resident


Patient is undergoing renal workup for possible hemodialysis


Cardiology evaluation seen and appreciated. No further intervention suggested


I discussed the plan of care with the resident and agree with the assessment 

and plan documented.

## 2017-08-30 NOTE — CARD
--------------- APPROVED REPORT --------------





EKG Measurement

Heart [id number]

NV [id number]

[id number]

[id number]

[id number]



<Conclusion>

Normal sinus rhythm

Normal ECG

## 2017-08-31 LAB
ALBUMIN/GLOB SERPL: 1.2 {RATIO} (ref 1–2.1)
ALP SERPL-CCNC: 69 U/L (ref 38–126)
ALT SERPL-CCNC: 20 U/L (ref 9–52)
AST SERPL-CCNC: 15 U/L (ref 14–36)
BASOPHILS # BLD AUTO: 0.1 K/UL (ref 0–0.2)
BASOPHILS NFR BLD: 0.8 % (ref 0–2)
BILIRUB SERPL-MCNC: 0.7 MG/DL (ref 0.2–1.3)
BUN SERPL-MCNC: 57 MG/DL (ref 7–17)
CALCIUM SERPL-MCNC: 9.4 MG/DL (ref 8.6–10.4)
CHLORIDE SERPL-SCNC: 105 MMOL/L (ref 98–107)
CO2 SERPL-SCNC: 19 MMOL/L (ref 22–30)
EOSINOPHIL # BLD AUTO: 0.1 K/UL (ref 0–0.7)
EOSINOPHIL NFR BLD: 2.2 % (ref 0–4)
ERYTHROCYTE [DISTWIDTH] IN BLOOD BY AUTOMATED COUNT: 15.5 % (ref 11.5–14.5)
GLOBULIN SER-MCNC: 2.7 GM/DL (ref 2.2–3.9)
GLUCOSE SERPL-MCNC: 158 MG/DL (ref 65–105)
HCT VFR BLD CALC: 27.7 % (ref 34–47)
IRON SERPL-MCNC: 31 UG/DL (ref 37–170)
LYMPHOCYTES # BLD AUTO: 1.3 K/UL (ref 1–4.3)
LYMPHOCYTES NFR BLD AUTO: 21.4 % (ref 20–40)
MAGNESIUM SERPL-MCNC: 1.9 MG/DL (ref 1.6–2.3)
MCH RBC QN AUTO: 28 PG (ref 27–31)
MCHC RBC AUTO-ENTMCNC: 32.7 G/DL (ref 33–37)
MCV RBC AUTO: 85.7 FL (ref 81–99)
MONOCYTES # BLD: 0.3 K/UL (ref 0–0.8)
MONOCYTES NFR BLD: 5.3 % (ref 0–10)
NRBC BLD AUTO-RTO: 0 % (ref 0–2)
PHOSPHATE SERPL-MCNC: 3.7 MG/DL (ref 2.5–4.5)
PLATELET # BLD: 195 K/UL (ref 130–400)
PMV BLD AUTO: 7.9 FL (ref 7.2–11.7)
POTASSIUM SERPL-SCNC: 4.7 MMOL/L (ref 3.6–5.2)
PROT SERPL-MCNC: 6 G/DL (ref 6.3–8.3)
SODIUM SERPL-SCNC: 135 MMOL/L (ref 132–148)
WBC # BLD AUTO: 6.2 K/UL (ref 4.8–10.8)

## 2017-08-31 RX ADMIN — LABETALOL HYDROCHLORIDE SCH MG: 300 TABLET, FILM COATED ORAL at 10:05

## 2017-08-31 RX ADMIN — PANTOPRAZOLE SODIUM SCH MG: 20 TABLET, DELAYED RELEASE ORAL at 10:05

## 2017-08-31 RX ADMIN — INSULIN ASPART SCH: 100 INJECTION, SOLUTION INTRAVENOUS; SUBCUTANEOUS at 16:30

## 2017-08-31 RX ADMIN — INSULIN ASPART SCH UNIT: 100 INJECTION, SOLUTION INTRAVENOUS; SUBCUTANEOUS at 12:31

## 2017-08-31 RX ADMIN — INSULIN ASPART SCH UNIT: 100 INJECTION, SOLUTION INTRAVENOUS; SUBCUTANEOUS at 12:30

## 2017-08-31 RX ADMIN — LABETALOL HYDROCHLORIDE SCH MG: 300 TABLET, FILM COATED ORAL at 20:16

## 2017-08-31 RX ADMIN — INSULIN GLARGINE SCH UNITS: 100 INJECTION, SOLUTION SUBCUTANEOUS at 10:09

## 2017-08-31 RX ADMIN — INSULIN ASPART SCH UNIT: 100 INJECTION, SOLUTION INTRAVENOUS; SUBCUTANEOUS at 08:02

## 2017-08-31 RX ADMIN — INSULIN ASPART SCH: 100 INJECTION, SOLUTION INTRAVENOUS; SUBCUTANEOUS at 22:48

## 2017-08-31 NOTE — CP.PCM.PN
<Gayle Pereyra - Last Filed: 08/31/17 17:22>





Subjective





- Date & Time of Evaluation


Date of Evaluation: 08/31/17


Time of Evaluation: 07:45





- Subjective


Subjective: 


Medicine Progress Note:





Patient was seen and examined at bedside in the AM.  Per nurse there were no 

acute events overnight.  Patient states she has no complaints overnight.  

Patient denies headache, chest pain, shortness of breath, palpitations, 

dizziness, or difficultly urinating.  Patient states she has not had a bowel 

movement because she feels embarrassed to use the commode. Discussed with 

patient that she can call the nurse to assist her to use the bathroom in the 

room.  Patient stated she understands and will do that today.   








Objective





- Vital Signs/Intake and Output


Vital Signs (last 24 hours): 


 











Temp Pulse Resp BP Pulse Ox


 


 98 F   86   20   177/67 H  95 


 


 08/31/17 07:05  08/31/17 07:05  08/31/17 07:05  08/31/17 07:05  08/31/17 07:05








Intake and Output: 


 











 08/31/17 08/31/17





 06:59 18:59


 


Intake Total 150 


 


Balance 150 














- Medications


Medications: 


 Current Medications





Calcitriol (Rocaltrol)  0.25 mcg PO MWF [facility]


   Last Admin: 08/30/17 09:24 Dose:  0.25 mcg


Ferrous Sulfate (Feosol)  325 mg PO BID [facility]


   Last Admin: 08/30/17 18:44 Dose:  325 mg


Furosemide (Lasix)  40 mg PO DAILY [facility]


Heparin Sodium (Porcine) (Heparin)  5,000 units SC Q8 [facility]


   Last Admin: 08/31/17 05:37 Dose:  5,000 units


Insulin Aspart (Novolog)  0 unit SC ACHS [facility]


   PRN Reason: Protocol


   Last Admin: 08/30/17 21:55 Dose:  Not Given


Insulin Glargine (Lantus)  10 unit SC DAILY [facility]


Labetalol HCl (Normodyne)  300 mg PO BID [facility]


   Last Admin: 08/30/17 18:45 Dose:  300 mg


Lisinopril (Zestril)  10 mg PO DAILY [facility]


   Last Admin: 08/30/17 09:24 Dose:  10 mg


Pantoprazole Sodium (Protonix Ec Tab)  20 mg PO DAILY [facility]


   Last Admin: 08/30/17 09:25 Dose:  20 mg


Pneumococcal Polyvalent Vaccine (Pneumovax 23 Vaccine)  0.5 ml IM .ONCE ONE


   Stop: 08/31/17 10:01











- Labs


Labs: 


 





 08/31/17 06:10 





 08/31/17 06:10 











- Constitutional


Appears: Well, Non-toxic, No Acute Distress





- Head Exam


Head Exam: ATRAUMATIC, NORMAL INSPECTION, NORMOCEPHALIC





- Eye Exam


Eye Exam: EOMI, Normal appearance, PERRL


Pupil Exam: NORMAL ACCOMODATION, PERRL





- ENT Exam


ENT Exam: Mucous Membranes Moist





- Respiratory Exam


Respiratory Exam: Clear to Ausculation Bilateral, NORMAL BREATHING PATTERN





- Cardiovascular Exam


Cardiovascular Exam: REGULAR RHYTHM, RRR, +S1, +S2, Murmur





- GI/Abdominal Exam


GI & Abdominal Exam: Soft, Normal Bowel Sounds.  absent: Distended, Tenderness





- Extremities Exam


Extremities Exam: Normal Inspection.  absent: Pedal Edema, Tenderness





- Neurological Exam


Neurological Exam: Alert, Awake, Oriented x3





- Psychiatric Exam


Psychiatric exam: Normal Affect, Normal Mood





- Skin


Skin Exam: Dry, Intact, Normal Color, Warm





Assessment and Plan





- Assessment and Plan (Free Text)


Plan: 





1.) Altered mental status - possibly secondary to diabetic hypoglycemic episode


UA: Negative 


Urine culture: No growth 


Blood culture: No growth - preliminary  


CT head without contrast: No acute findings seen within the brain. Evidence of 

chronic ischemic changes


Chest X-ray: The central pulmonary vasculature is slightly increased though 

improved from prior study ; rule out chronic compensated pulmonary edema/CHF. 

Suspect minor right basilar atelectasis 





2.) Aortic Stenosis - possible CHF


Chest X-ray: The central pulmonary vasculature is slightly increased though 

improved from prior study ; rule out chronic compensated pulmonary edema/CHF. 

Suspect minor right basilar atelectasis 


Previous ECHO 4/24/17: EF 63%, aortic valve is severely thickened; mild aortic 

regurgitation; Mitral valve is severely thickened, a vegetation cannot be ruled 

out 


Cardiology Consult: Dr. Tavarez --> help appreciated


   - Per Dr. Tavarez's note - no evidence of vegetation; 


      - f/u PTH and vit d levels 





3.) History of Diabetes 


Hemoglobin A1c (4/23/17): 7.0


* repeat HgbA1c (8/29): 7.6


ISS ( Moderate) 


Insulin Glargine 10units daily


Insulin Aspart 3 units TIDAC


Accuchecks


Monitor 





4.) History of Hypertension


Labetatol 300mg PO BID 


Lisinopril 10mg PO daily


Norvasc 10mg PO daily 


Monitor BP 


Per Dr. Tavarez - consider coreg 





5.) History of chronic kidney disease - Stage V


Nephrology Consult---> Dr. Marga Munoz --> Help appreciated


   - 24 hour urine for Cr Clearance: Cr clearance of 17


   - per Dr. Munoz - Continue calcitriol, lasix, started on Procrit 8/31


BUN/CR:


* 57/3.8


* Continue to monitor renal function 





6.) History of Anemia


H/H stable 


Continue home medication:


* Feosol 325mg PO BID 





7.) Bilateral Nail Care


* Podiatry Consult: Dr. Xiao --> help appreciated  





8.) Prophylactic measure


Scds


Heparin 5000 units SC Q8H 


Protonix 20mg PO daily





Case discussed with Dr. Polly Pereyra PGY-1





<Bakari Matias M - Last Filed: 08/31/17 18:28>





Objective





- Vital Signs/Intake and Output


Vital Signs (last 24 hours): 


 











Temp Pulse Resp BP Pulse Ox


 


 97.6 F   76   20   126/60   96 


 


 08/31/17 15:32  08/31/17 15:32  08/31/17 15:32  08/31/17 15:32  08/31/17 15:32








Intake and Output: 


 











 08/31/17 08/31/17





 06:59 18:59


 


Intake Total 150 250


 


Balance 150 250














- Medications


Medications: 


 Current Medications





Amlodipine Besylate (Norvasc)  10 mg PO DAILY [facility]


   Last Admin: 08/31/17 10:08 Dose:  10 mg


Calcitriol (Rocaltrol)  0.25 mcg PO MWF [facility]


   Last Admin: 08/30/17 09:24 Dose:  0.25 mcg


Epoetin Vamsi (Procrit)  10,000 unit IV TTS [facility]


Ferrous Sulfate (Feosol)  325 mg PO BID [facility]


   Last Admin: 08/31/17 10:05 Dose:  325 mg


Furosemide (Lasix)  40 mg PO DAILY [facility]


   Last Admin: 08/31/17 10:06 Dose:  40 mg


Heparin Sodium (Porcine) (Heparin)  5,000 units SC Q8 [facility]


   Last Admin: 08/31/17 13:42 Dose:  5,000 units


Insulin Aspart (Novolog)  0 unit SC ACHS [facility]


   PRN Reason: Protocol


   Last Admin: 08/31/17 12:30 Dose:  2 unit


Insulin Aspart (Novolog)  3 unit SC TIDAC [facility]


   Last Admin: 08/31/17 12:31 Dose:  3 unit


Insulin Glargine (Lantus)  10 unit SC DAILY [facility]


   Last Admin: 08/31/17 10:09 Dose:  10 units


Labetalol HCl (Normodyne)  300 mg PO BID [facility]


   Last Admin: 08/31/17 10:05 Dose:  300 mg


Lisinopril (Zestril)  10 mg PO DAILY [facility]


   Last Admin: 08/31/17 10:06 Dose:  10 mg


Pantoprazole Sodium (Protonix Ec Tab)  20 mg PO DAILY [facility]


   Last Admin: 08/31/17 10:05 Dose:  20 mg











- Labs


Labs: 


 





 08/31/17 06:10 





 08/31/17 06:10 











Attending/Attestation





- Attestation


I have personally seen and examined this patient.: Yes


I have fully participated in the care of the patient.: Yes


I have reviewed all pertinent clinical information, including history, physical 

exam and plan: Yes


Notes (Text): 





08/31/17 18:22


Patient was seen and examined at bedside with the resident.


Patient denies any new complaints.


Renal workup in progress for possible hemodialysis


No further cardiac workup indicated.


I discussed the plan of care with the resident and agree with the history and 

physical and assessment/plan documented

## 2017-08-31 NOTE — CP.PCM.PN
Subjective





- Date & Time of Evaluation


Date of Evaluation: 08/31/17


Time of Evaluation: 10:45





- Subjective


Subjective: 





No c/o SOB


Appears comfortable





Objective





- Vital Signs/Intake and Output


Vital Signs (last 24 hours): 


 











Temp Pulse Resp BP Pulse Ox


 


 98 F   86   20   132/58 L  95 


 


 08/31/17 07:05  08/31/17 07:05  08/31/17 07:05  08/31/17 10:06  08/31/17 07:05








Intake and Output: 


 











 08/31/17 08/31/17





 06:59 18:59


 


Intake Total 150 


 


Balance 150 














- Medications


Medications: 


 Current Medications





Amlodipine Besylate (Norvasc)  10 [facility]ILY [facility]


   Last Admin: 08/31/17 10:08 Dose:  10 mg


Calcitriol (Rocaltrol)  0.25 mcg PO MWF [facility]


   Last Admin: 08/30/17 09:24 Dose:  0.25 mcg


Ferrous Sulfate (Feosol)  325 mg PO BID [facility]


   Last Admin: 08/31/17 10:05 Dose:  325 mg


Furosemide (Lasix)  40 mg PO DAILY [facility]


   Last Admin: 08/31/17 10:06 Dose:  40 mg


Heparin Sodium (Porcine) (Heparin)  5,000 units SC Q8 [facility]


   Last Admin: 08/31/17 05:37 Dose:  5,000 units


Insulin Aspart (Novolog)  0 unit SC ACHS [facility]


   PRN Reason: Protocol


   Last Admin: 08/31/17 08:02 Dose:  2 unit


Insulin Aspart (Novolog)  3 unit SC TIDAC [facility]


Insulin Glargine (Lantus)  10 unit SC DAILY [facility]


   Last Admin: 08/31/17 10:09 Dose:  10 units


Labetalol HCl (Normodyne)  300 mg PO BID [facility]


   Last Admin: 08/31/17 10:05 Dose:  300 mg


Lisinopril (Zestril)  10 mg PO DAILY [facility]


   Last Admin: 08/31/17 10:06 Dose:  10 mg


Pantoprazole Sodium (Protonix Ec Tab)  20 mg PO DAILY [facility]


   Last Admin: 08/31/17 10:05 Dose:  20 mg











- Labs


Labs: 


 





 08/31/17 06:10 





 08/31/17 06:10 











- Respiratory Exam


Additional comments: 





Lungs clear





- Cardiovascular Exam


Cardiovascular Exam: REGULAR RHYTHM





- Extremities Exam


Additional comments: 





No edema





Assessment and Plan





- Assessment and Plan (Free Text)


Assessment: 





Stage 1V kidney disease Cr CL 17 on 24 hr urine collectionHTN BP control is 

improving


Secondary htperparathyroidism


CHF


DM


Plan: 





Continue to monitor renal function


Continue Calcitriol & Lasix. Needs procrit

## 2017-08-31 NOTE — CP.PCM.PN
Subjective





- Date & Time of Evaluation


Date of Evaluation: 08/31/17


Time of Evaluation: 07:26





Objective





- Vital Signs/Intake and Output


Vital Signs (last 24 hours): 


 











Temp Pulse Resp BP Pulse Ox


 


 98.7 F   81   20   162/73 H  97 


 


 08/31/17 04:00  08/31/17 04:03  08/31/17 04:00  08/31/17 04:00  08/31/17 04:00








Intake and Output: 


 











 08/31/17 08/31/17





 06:59 18:59


 


Intake Total 150 


 


Balance 150 














- Medications


Medications: 


 Current Medications





Calcitriol (Rocaltrol)  0.25 mcg PO MWF [facility]


   Last Admin: 08/30/17 09:24 Dose:  0.25 mcg


Ferrous Sulfate (Feosol)  325 mg PO BID [facility]


   Last Admin: 08/30/17 18:44 Dose:  325 mg


Furosemide (Lasix)  40 mg PO DAILY [facility]


Heparin Sodium (Porcine) (Heparin)  5,000 units SC Q8 [facility]


   Last Admin: 08/31/17 05:37 Dose:  5,000 units


Insulin Aspart (Novolog)  0 unit SC ACHS [facility]


   PRN Reason: Protocol


   Last Admin: 08/30/17 21:55 Dose:  Not Given


Insulin Glargine (Lantus)  10 unit SC DAILY [facility]


Labetalol HCl (Normodyne)  300 mg PO BID [facility]


   Last Admin: 08/30/17 18:45 Dose:  300 mg


Lisinopril (Zestril)  10 mg PO DAILY [facility]


   Last Admin: 08/30/17 09:24 Dose:  10 mg


Pantoprazole Sodium (Protonix Ec Tab)  20 mg PO DAILY [facility]


   Last Admin: 08/30/17 09:25 Dose:  20 mg


Pneumococcal Polyvalent Vaccine (Pneumovax 23 Vaccine)  0.5 ml IM .ONCE ONE


   Stop: 08/31/17 10:01











- Labs


Labs: 


 





 08/31/17 06:10 





 08/31/17 06:10 











Assessment and Plan


(1) Cardiac murmur due to mitral valve disorder


Status: Acute   





(2) Hypertension


Status: Acute   





(3) Pulmonary HTN


Status: Acute   





(4) Tricuspid regurgitation


Status: Acute   





(5) Chronic kidney disease (CKD)


Status: Chronic

## 2017-09-01 VITALS
RESPIRATION RATE: 20 BRPM | DIASTOLIC BLOOD PRESSURE: 67 MMHG | SYSTOLIC BLOOD PRESSURE: 147 MMHG | HEART RATE: 77 BPM | TEMPERATURE: 97.7 F

## 2017-09-01 VITALS — OXYGEN SATURATION: 92 %

## 2017-09-01 RX ADMIN — INSULIN ASPART SCH UNIT: 100 INJECTION, SOLUTION INTRAVENOUS; SUBCUTANEOUS at 08:28

## 2017-09-01 RX ADMIN — INSULIN ASPART SCH UNIT: 100 INJECTION, SOLUTION INTRAVENOUS; SUBCUTANEOUS at 12:30

## 2017-09-01 RX ADMIN — INSULIN ASPART SCH: 100 INJECTION, SOLUTION INTRAVENOUS; SUBCUTANEOUS at 12:30

## 2017-09-01 RX ADMIN — INSULIN ASPART SCH UNIT: 100 INJECTION, SOLUTION INTRAVENOUS; SUBCUTANEOUS at 08:27

## 2017-09-01 RX ADMIN — LABETALOL HYDROCHLORIDE SCH MG: 300 TABLET, FILM COATED ORAL at 09:13

## 2017-09-01 RX ADMIN — INSULIN GLARGINE SCH UNITS: 100 INJECTION, SOLUTION SUBCUTANEOUS at 09:13

## 2017-09-01 RX ADMIN — PANTOPRAZOLE SODIUM SCH MG: 20 TABLET, DELAYED RELEASE ORAL at 09:12

## 2017-09-01 NOTE — CP.PCM.DIS
<Abadier,Michael - Last Filed: 09/01/17 15:05>





Provider





- Provider


Date of Admission: 


08/28/17 18:42





Attending physician: 


Bakari Matias MD





Primary care physician: 





Rancho





Consults: 





Nephro: Alexirana


Cards: Corby


Time Spent in preparation of Discharge (in minutes): 45





Diagnosis





- Discharge Diagnosis


(1) Hypoglycemia


Status: Resolved   Priority: Medium   


Comment: patients premeal and long lasting insulin was changed to 3 and 15 

respectively, Please follow up with PMD   





Hospital Course





- Lab Results


Lab Results: 


 Most Recent Lab Values











WBC  6.2 K/uL (4.8-10.8)   08/31/17  06:10    


 


RBC  3.23 Mil/uL (3.80-5.20)  L  08/31/17  06:10    


 


Hgb  9.1 g/dL (11.0-16.0)  L  08/31/17  06:10    


 


Hct  27.7 % (34.0-47.0)  L  08/31/17  06:10    


 


MCV  85.7 fL (81.0-99.0)   08/31/17  06:10    


 


MCH  28.0 pg (27.0-31.0)   08/31/17  06:10    


 


MCHC  32.7 g/dL (33.0-37.0)  L  08/31/17  06:10    


 


RDW  15.5 % (11.5-14.5)  H  08/31/17  06:10    


 


Plt Count  195 K/uL (130-400)   08/31/17  06:10    


 


MPV  7.9 fL (7.2-11.7)   08/31/17  06:10    


 


Neut % (Auto)  70.3 % (50.0-75.0)   08/31/17  06:10    


 


Lymph % (Auto)  21.4 % (20.0-40.0)   08/31/17  06:10    


 


Mono % (Auto)  5.3 % (0.0-10.0)   08/31/17  06:10    


 


Eos % (Auto)  2.2 % (0.0-4.0)   08/31/17  06:10    


 


Baso % (Auto)  0.8 % (0.0-2.0)   08/31/17  06:10    


 


Neut #  4.4 K/uL (1.8-7.0)   08/31/17  06:10    


 


Lymph #  1.3 K/uL (1.0-4.3)   08/31/17  06:10    


 


Mono #  0.3 K/uL (0.0-0.8)   08/31/17  06:10    


 


Eos #  0.1 K/uL (0.0-0.7)   08/31/17  06:10    


 


Baso #  0.1 K/uL (0.0-0.2)   08/31/17  06:10    


 


Neutrophils % (Manual)  93 % (50-75)  H  08/28/17  18:11    


 


Lymphocytes % (Manual)  6 % (20-40)  L  08/28/17  18:11    


 


Monocytes % (Manual)  1 % (0-10)   08/28/17  18:11    


 


Platelet Estimate  Normal  (NORMAL)   08/28/17  18:11    


 


Hypochromasia (manual)  Slight   08/28/17  18:11    


 


Anisocytosis (manual)  Slight   08/28/17  18:11    


 


Ovalocytes  Slight   08/28/17  18:11    


 


pO2  26 mm/Hg (30-55)  L  08/28/17  17:15    


 


VBG pH  7.28  (7.32-7.43)  L  08/28/17  17:15    


 


VBG pCO2  52 mmHg (40-60)   08/28/17  17:15    


 


VBG HCO3  21.0 mmol/L  08/28/17  17:15    


 


VBG Total CO2  26.0 mmol/L (22-28)   08/28/17  17:15    


 


VBG O2 Sat (Calc)  47.6 % (40-65)   08/28/17  17:15    


 


VBG Base Excess  -3.0 mmol/L (0.0-2.0)  L  08/28/17  17:15    


 


VBG Potassium  4.1 mmol/L (3.6-5.2)   08/28/17  17:15    


 


Sodium  144.0 mmol/l (132-148)   08/28/17  17:15    


 


Chloride  109.0 mmol/L ()  H  08/28/17  17:15    


 


Glucose  45 mg/dl ()  L  08/28/17  17:15    


 


Lactate  1.8 mmol/L (0.7-2.1)   08/28/17  17:15    


 


FiO2  21.0 %  08/28/17  17:15    


 


Sodium  135 mmol/L (132-148)   08/31/17  06:10    


 


Potassium  4.7 mmol/L (3.6-5.2)   08/31/17  06:10    


 


Chloride  105 mmol/L ()   08/31/17  06:10    


 


Carbon Dioxide  19 mmol/L (22-30)  L  08/31/17  06:10    


 


Anion Gap  16  (10-20)   08/31/17  06:10    


 


BUN  57 mg/dL (7-17)  H  08/31/17  06:10    


 


Creatinine  3.8 MG/DL (0.7-1.2)  H  08/31/17  06:10    


 


Est GFR ( Amer)  14   08/31/17  06:10    


 


Est GFR (Non-Af Amer)  12   08/31/17  06:10    


 


POC Glucose (mg/dL)  101 mg/dL ()   09/01/17  11:41    


 


Random Glucose  158 mg/dL ()  H  08/31/17  06:10    


 


Hemoglobin A1c  7.6 % (4.2-6.5)  H  08/29/17  07:12    


 


Calcium  9.4 mg/dl (8.6-10.4)   08/31/17  06:10    


 


Phosphorus  3.7 mg/dL (2.5-4.5)   08/31/17  06:10    


 


Magnesium  1.9 mg/dL (1.6-2.3)   08/31/17  06:10    


 


Iron  31 ug/dL ()  L  08/31/17  17:21    


 


TIBC  289 ug/dL (250-450)   08/31/17  17:21    


 


% Saturation  11  (20-55)  L  08/31/17  17:21    


 


Ferritin  99.4 ng/mL  08/31/17  17:21    


 


Total Bilirubin  0.7 mg/dL (0.2-1.3)   08/31/17  06:10    


 


AST  15 U/L (14-36)   08/31/17  06:10    


 


ALT  20 U/L (9-52)   08/31/17  06:10    


 


Alkaline Phosphatase  69 U/L ()   08/31/17  06:10    


 


Total Creatine Kinase  340 U/L ()  H  08/28/17  17:14    


 


CK-MB (Mass)  6.09 ng/mL (0.0-3.38)  H  08/28/17  17:14    


 


Troponin I  0.0250 ng/mL (0.00-0.120)   08/28/17  17:14    


 


NT-Pro-B Natriuret Pep  08202 pg/mL (0-900)  H  08/28/17  17:31    


 


Total Protein  6.0 g/dL (6.3-8.3)  L  08/31/17  06:10    


 


Albumin  3.3 g/dL (3.5-5.0)  L  08/31/17  06:10    


 


Globulin  2.7 gm/dL (2.2-3.9)   08/31/17  06:10    


 


Albumin/Globulin Ratio  1.2  (1.0-2.1)   08/31/17  06:10    


 


PTH Intact Whole Molec  297 pg/mL (14-64)  H  08/29/17  13:47    


 


Venous Blood Potassium  4.1 mmol/L (3.6-5.2)   08/28/17  17:15    


 


Urine Color  Colorless  (YELLOW)   08/28/17  18:00    


 


Urine Clarity  Clear  (Clear)   08/28/17  18:00    


 


Urine pH  7.0  (5.0-8.0)   08/28/17  18:00    


 


Ur Specific Gravity  1.006  (1.003-1.030)   08/28/17  18:00    


 


Urine Protein  1+ mg/dL (NEGATIVE)  H  08/28/17  18:00    


 


Urine Glucose (UA)  1+ mg/dL (Normal)   08/28/17  18:00    


 


Urine Ketones  Negative mg/dL (NEGATIVE)   08/28/17  18:00    


 


Urine Blood  1+  (NEGATIVE)  H  08/28/17  18:00    


 


Urine Nitrate  Negative  (NEGATIVE)   08/28/17  18:00    


 


Urine Bilirubin  Negative  (NEGATIVE)   08/28/17  18:00    


 


Urine Urobilinogen  Normal mg/dL (0.2-1.0)   08/28/17  18:00    


 


Ur Leukocyte Esterase  Neg Arlene/uL (Negative)   08/28/17  18:00    


 


Urine WBC (Auto)  < 1 /hpf (0-5)   08/28/17  18:00    


 


Urine RBC (Auto)  1 /hpf (0-3)   08/28/17  18:00    


 


Urine Bacteria  Rare  (<OCC)   08/28/17  18:00    


 


Urine Collection Time  24 HRS  08/30/17  21:29    


 


Urine Total Volume  1400 mL  08/30/17  21:29    


 


Creatinine Clearance  17.0 mL/min ()  L  08/30/17  21:29    


 


Ur Protein 24 Hr Calc  1190.0 mg/24hr ()  H  08/30/17  21:29    














- Hospital Course


Hospital Course: 





On admission: 76 year old female with PMHx significant for HTN, asthma, 

arthritis, pneumonia, diabetes mellitus, prior TIAs and chronic kidney disease 

presents to the ED with complaint of transient altered mental status. Patient 

was found to be confused this morning by her son. Thereafter, patient's son 

called his sister who then instructed him to call the ambulance. As per EMS, 

patient was found to have a blood glucose of 42 and was administered D50 on the 

field. Upon arrival to the ED, patient's blood glucose was 118. Currently, 

patient is without symptoms and she is awake, alert and oriented. Patient's 

daughter states that her mother has been without visiting care givers for 

approximately a week and she is unsure of the reason. Patient reports that she 

has not been eating well and tends to skip meals even though she is hungry. 

Patient denies fever, chills, nausea, vomiting, headache, visual disturbances, 

chest pain, palpitations, diarrhea, urinary symptoms but does have complains of 

bilateral leg cramping.








Patient presented to the ED for hypoglycemia. Patient explains that she 

sometime forgets to take her insulin in the morning and takes them at nigh 

instead. Dr. Tavarez was consulted for a noted thickened mitral valve found on 

previous echo with a possible vegetation but was later ruled out. Erinn Abarca

, the podiatry resident, was consulted for a complaint of painful toenails. Dr. Munoz was consulted for CKD and the possible need for dialysis.Dr. Munoz 

followed up and noted that the patient does not need dialysis because it seemed 

that her kidney function was improving and advised that she should continue her 

home meds. Patient was still unsteady but refused to go to a subacute rehab 

facility as she has been several times and did not have a good experience. 

Patient did agree however to at home rehab and a skilled nurse to come and 

manage her diabetes. Patient does live alone but she says her children are 

often in and out of the house so she "dosnt really live alone". Patient looks 

well at bedside and is ready for discharge. 





- Date & Time of H&P


Date of H&P: 08/28/17


Time of H&P: 19:58





Discharge Exam





- Head Exam


Head Exam: ATRAUMATIC, NORMAL INSPECTION, NORMOCEPHALIC





- Eye Exam


Eye Exam: EOMI





- ENT Exam


ENT Exam: Mucous Membranes Moist





- Respiratory Exam


Respiratory Exam: NORMAL BREATHING PATTERN.  absent: Rhonchi





- Cardiovascular Exam


Cardiovascular Exam: REGULAR RHYTHM, RRR.  absent: Systolic Murmur





- GI/Abdominal Exam


GI & Abdominal Exam: Normal Bowel Sounds, Soft.  absent: Distended, Tenderness





- Neurological Exam


Neurological exam: Alert, Oriented x3





- Psychiatric Exam


Psychiatric exam: Normal Affect, Normal Mood





- Skin


Skin Exam: Dry, Intact, Normal Color, Warm





Discharge Plan





- Discharge Medications


Prescriptions: 


Ferrous Sulfate [Feosol] 325 mg PO BID #60 tab


Furosemide [Lasix] 40 mg PO DAILY #30 tab


Insulin Aspart, Recombinant [Novolog] 3 unit SC TIDAC #120 unit


Insulin Glargine, Recombina [Lantus] 15 unit SC HS #30 unit


Labetalol Hydrochloride [Normodyne] 300 mg PO BID #60 tab


Lisinopril [Zestril] 10 mg PO DAILY #30 tab





- Follow Up Plan


Condition: STABLE


Disposition: HOME/ ROUTINE


Instructions:  Iron Supplements (By mouth), Labetalol (By mouth), Lisinopril (

By mouth), Nifedipine (By mouth), Furosemide (By mouth), Insulin Aspart, 

Recombinant (By injection), Insulin Glargine (By injection), Heart Failure (DC)

, Chronic Kidney Disease (DC), Diabetic Foot Care (DC), Diabetic Hypoglycemia (

DC), Pleural Effusion (DC), Basic Carbohydrate Counting (DC), Meal Planning 

with the Plate Method (DC), Meal Planning with Diabetes Exchanges (DC), Anemia (

GEN)


Additional Instructions: 


From a Nephro standpoint, patient is stable and clear for discharge. Please 

make an appointment with Dr. Munoz in one weeks time for follow up of your 

chronic kidney disease.





From a Cardio standpoint, patient is stable and clear for discharge





From a medical standpoint patient is stable and clear for discharge. We would 

like the patient to be sent to a subacute rehab facility in order to get her 

strength back but the patient refused. We will have home PT sent to her home 

and a skilled nurse come to the house to help her with her Diabetes medication. 

She should follow up with her primary care doctor Dr. Vickers for management 

of her Diabetes. Please call the office to make an appointment. 





Please come back to the ER if symptoms return or worsen





Prescription instructions will be provided at discharge. 


Referrals: 


Norris Vickers MD [Medical Doctor] - 


Marga Munoz MD [Staff Provider] - 


Raulito Tavarez MD [Staff Provider] - 





<Bakari Matias - Last Filed: 09/01/17 15:55>





Provider





- Provider


Date of Admission: 


08/28/17 18:42





Attending physician: 


Bakari Matias MD








Hospital Course





- Lab Results


Lab Results: 


 Most Recent Lab Values











WBC  6.2 K/uL (4.8-10.8)   08/31/17  06:10    


 


RBC  3.23 Mil/uL (3.80-5.20)  L  08/31/17  06:10    


 


Hgb  9.1 g/dL (11.0-16.0)  L  08/31/17  06:10    


 


Hct  27.7 % (34.0-47.0)  L  08/31/17  06:10    


 


MCV  85.7 fL (81.0-99.0)   08/31/17  06:10    


 


MCH  28.0 pg (27.0-31.0)   08/31/17  06:10    


 


MCHC  32.7 g/dL (33.0-37.0)  L  08/31/17  06:10    


 


RDW  15.5 % (11.5-14.5)  H  08/31/17  06:10    


 


Plt Count  195 K/uL (130-400)   08/31/17  06:10    


 


MPV  7.9 fL (7.2-11.7)   08/31/17  06:10    


 


Neut % (Auto)  70.3 % (50.0-75.0)   08/31/17  06:10    


 


Lymph % (Auto)  21.4 % (20.0-40.0)   08/31/17  06:10    


 


Mono % (Auto)  5.3 % (0.0-10.0)   08/31/17  06:10    


 


Eos % (Auto)  2.2 % (0.0-4.0)   08/31/17  06:10    


 


Baso % (Auto)  0.8 % (0.0-2.0)   08/31/17  06:10    


 


Neut #  4.4 K/uL (1.8-7.0)   08/31/17  06:10    


 


Lymph #  1.3 K/uL (1.0-4.3)   08/31/17  06:10    


 


Mono #  0.3 K/uL (0.0-0.8)   08/31/17  06:10    


 


Eos #  0.1 K/uL (0.0-0.7)   08/31/17  06:10    


 


Baso #  0.1 K/uL (0.0-0.2)   08/31/17  06:10    


 


Neutrophils % (Manual)  93 % (50-75)  H  08/28/17  18:11    


 


Lymphocytes % (Manual)  6 % (20-40)  L  08/28/17  18:11    


 


Monocytes % (Manual)  1 % (0-10)   08/28/17  18:11    


 


Platelet Estimate  Normal  (NORMAL)   08/28/17  18:11    


 


Hypochromasia (manual)  Slight   08/28/17  18:11    


 


Anisocytosis (manual)  Slight   08/28/17  18:11    


 


Ovalocytes  Slight   08/28/17  18:11    


 


pO2  26 mm/Hg (30-55)  L  08/28/17  17:15    


 


VBG pH  7.28  (7.32-7.43)  L  08/28/17  17:15    


 


VBG pCO2  52 mmHg (40-60)   08/28/17  17:15    


 


VBG HCO3  21.0 mmol/L  08/28/17  17:15    


 


VBG Total CO2  26.0 mmol/L (22-28)   08/28/17  17:15    


 


VBG O2 Sat (Calc)  47.6 % (40-65)   08/28/17  17:15    


 


VBG Base Excess  -3.0 mmol/L (0.0-2.0)  L  08/28/17  17:15    


 


VBG Potassium  4.1 mmol/L (3.6-5.2)   08/28/17  17:15    


 


Sodium  144.0 mmol/l (132-148)   08/28/17  17:15    


 


Chloride  109.0 mmol/L ()  H  08/28/17  17:15    


 


Glucose  45 mg/dl ()  L  08/28/17  17:15    


 


Lactate  1.8 mmol/L (0.7-2.1)   08/28/17  17:15    


 


FiO2  21.0 %  08/28/17  17:15    


 


Sodium  135 mmol/L (132-148)   08/31/17  06:10    


 


Potassium  4.7 mmol/L (3.6-5.2)   08/31/17  06:10    


 


Chloride  105 mmol/L ()   08/31/17  06:10    


 


Carbon Dioxide  19 mmol/L (22-30)  L  08/31/17  06:10    


 


Anion Gap  16  (10-20)   08/31/17  06:10    


 


BUN  57 mg/dL (7-17)  H  08/31/17  06:10    


 


Creatinine  3.8 MG/DL (0.7-1.2)  H  08/31/17  06:10    


 


Est GFR ( Amer)  14   08/31/17  06:10    


 


Est GFR (Non-Af Amer)  12   08/31/17  06:10    


 


POC Glucose (mg/dL)  101 mg/dL ()   09/01/17  11:41    


 


Random Glucose  158 mg/dL ()  H  08/31/17  06:10    


 


Hemoglobin A1c  7.6 % (4.2-6.5)  H  08/29/17  07:12    


 


Calcium  9.4 mg/dl (8.6-10.4)   08/31/17  06:10    


 


Phosphorus  3.7 mg/dL (2.5-4.5)   08/31/17  06:10    


 


Magnesium  1.9 mg/dL (1.6-2.3)   08/31/17  06:10    


 


Iron  31 ug/dL ()  L  08/31/17  17:21    


 


TIBC  289 ug/dL (250-450)   08/31/17  17:21    


 


% Saturation  11  (20-55)  L  08/31/17  17:21    


 


Ferritin  99.4 ng/mL  08/31/17  17:21    


 


Total Bilirubin  0.7 mg/dL (0.2-1.3)   08/31/17  06:10    


 


AST  15 U/L (14-36)   08/31/17  06:10    


 


ALT  20 U/L (9-52)   08/31/17  06:10    


 


Alkaline Phosphatase  69 U/L ()   08/31/17  06:10    


 


Total Creatine Kinase  340 U/L ()  H  08/28/17  17:14    


 


CK-MB (Mass)  6.09 ng/mL (0.0-3.38)  H  08/28/17  17:14    


 


Troponin I  0.0250 ng/mL (0.00-0.120)   08/28/17  17:14    


 


NT-Pro-B Natriuret Pep  78018 pg/mL (0-900)  H  08/28/17  17:31    


 


Total Protein  6.0 g/dL (6.3-8.3)  L  08/31/17  06:10    


 


Albumin  3.3 g/dL (3.5-5.0)  L  08/31/17  06:10    


 


Globulin  2.7 gm/dL (2.2-3.9)   08/31/17  06:10    


 


Albumin/Globulin Ratio  1.2  (1.0-2.1)   08/31/17  06:10    


 


PTH Intact Whole Molec  297 pg/mL (14-64)  H  08/29/17  13:47    


 


Venous Blood Potassium  4.1 mmol/L (3.6-5.2)   08/28/17  17:15    


 


Urine Color  Colorless  (YELLOW)   08/28/17  18:00    


 


Urine Clarity  Clear  (Clear)   08/28/17  18:00    


 


Urine pH  7.0  (5.0-8.0)   08/28/17  18:00    


 


Ur Specific Gravity  1.006  (1.003-1.030)   08/28/17  18:00    


 


Urine Protein  1+ mg/dL (NEGATIVE)  H  08/28/17  18:00    


 


Urine Glucose (UA)  1+ mg/dL (Normal)   08/28/17  18:00    


 


Urine Ketones  Negative mg/dL (NEGATIVE)   08/28/17  18:00    


 


Urine Blood  1+  (NEGATIVE)  H  08/28/17  18:00    


 


Urine Nitrate  Negative  (NEGATIVE)   08/28/17  18:00    


 


Urine Bilirubin  Negative  (NEGATIVE)   08/28/17  18:00    


 


Urine Urobilinogen  Normal mg/dL (0.2-1.0)   08/28/17  18:00    


 


Ur Leukocyte Esterase  Neg Arlene/uL (Negative)   08/28/17  18:00    


 


Urine WBC (Auto)  < 1 /hpf (0-5)   08/28/17  18:00    


 


Urine RBC (Auto)  1 /hpf (0-3)   08/28/17  18:00    


 


Urine Bacteria  Rare  (<OCC)   08/28/17  18:00    


 


Urine Collection Time  24 HRS  08/30/17  21:29    


 


Urine Total Volume  1400 mL  08/30/17  21:29    


 


Creatinine Clearance  17.0 mL/min ()  L  08/30/17  21:29    


 


Ur Protein 24 Hr Calc  1190.0 mg/24hr ()  H  08/30/17  21:29    














Attending/Attestation





- Attestation


I have personally seen and examined this patient.: Yes


I have fully participated in the care of the patient.: Yes


I have reviewed all pertinent clinical information, including history, physical 

exam and plan: Yes


Notes (Text): 





09/01/17 15:54


Patient was seen and examined at bedside with the resident.


Patient has no new complaints. Blood sugar is well controlled. Blood pressure 

is well controlled.


I discussed with nephrology. Patient is not a candidate for hemodialysis at 

this time.


I also discussed with cardiology and patient has chronic valvular disease which 

does not need any intervention at this time.


Patient is cleared for discharge. Patient does not want to go to rehabilitation 

center.


She will be discharged to home. Prescriptions provided for all the new 

medications started during the hospital stay.


I discussed the discharge plan with the patient and she verbalized 

understanding.


I also discussed the plan with the medical resident and I agree with the 

discharge note by the resident.

## 2017-09-01 NOTE — CP.PCM.PN
Subjective





- Date & Time of Evaluation


Date of Evaluation: 09/01/17


Time of Evaluation: 07:30





- Subjective


Subjective: 





PGY1 Note for Dr. Matias





HPI: Patient seen and examined at bedside. Doing well. Said hat the kidney 

doctor told her she could go home yesterday. Patient wants to go home. Says she 

needs to get her balance but she said her sister has a walker she can have to 

get around. Had a BM yesterday. Denies any chest pain or SOB. Patient is making 

urine. No fever, N/V/D. 





Objective





- Vital Signs/Intake and Output


Vital Signs (last 24 hours): 


 











Temp Pulse Resp BP Pulse Ox


 


 98.4 F   82   20   148/67   95 


 


 08/31/17 23:00  09/01/17 04:30  08/31/17 23:00  08/31/17 23:00  08/31/17 23:00











- Medications


Medications: 


 Current Medications





Amlodipine Besylate (Norvasc)  10 mg PO DAILY [facility]


   Last Admin: 08/31/17 10:08 Dose:  10 mg


Calcitriol (Rocaltrol)  0.25 mcg PO MWF [facility]


   Last Admin: 08/30/17 09:24 Dose:  0.25 mcg


Epoetin Vamsi (Procrit)  10,000 unit IV TTS [facility]


Ferrous Sulfate (Feosol)  325 mg PO BID [facility]


   Last Admin: 08/31/17 20:15 Dose:  325 mg


Furosemide (Lasix)  40 mg PO DAILY [facility]


   Last Admin: 08/31/17 10:06 Dose:  40 mg


Heparin Sodium (Porcine) (Heparin)  5,000 units SC Q8 [facility]


   Last Admin: 09/01/17 06:00 Dose:  5,000 units


Insulin Aspart (Novolog)  0 unit SC ACHS [facility]


   PRN Reason: Protocol


   Last Admin: 08/31/17 22:48 Dose:  Not Given


Insulin Aspart (Novolog)  3 unit SC TIDAC [facility]


   Last Admin: 08/31/17 16:30 Dose:  Not Given


Insulin Glargine (Lantus)  10 unit SC DAILY [facility]


   Last Admin: 08/31/17 10:09 Dose:  10 units


Labetalol HCl (Normodyne)  300 mg PO BID [facility]


   Last Admin: 08/31/17 20:16 Dose:  300 mg


Lisinopril (Zestril)  10 mg PO DAILY [facility]


   Last Admin: 08/31/17 10:06 Dose:  10 mg


Pantoprazole Sodium (Protonix Ec Tab)  20 mg PO DAILY [facility]


   Last Admin: 08/31/17 10:05 Dose:  20 mg











- Labs


Labs: 


 





 08/31/17 06:10 





 08/31/17 06:10 











- Constitutional


Appears: Well, Non-toxic, No Acute Distress





- Head Exam


Head Exam: ATRAUMATIC, NORMAL INSPECTION, NORMOCEPHALIC





- Eye Exam


Eye Exam: EOMI





- ENT Exam


ENT Exam: Mucous Membranes Moist





- Respiratory Exam


Respiratory Exam: Clear to Ausculation Bilateral, NORMAL BREATHING PATTERN.  

absent: Rales, Rhonchi, Wheezes, Stridor





- Cardiovascular Exam


Cardiovascular Exam: REGULAR RHYTHM.  absent: Bradycardia, Tachycardia, Gallop, 

Rubs, Murmur





- GI/Abdominal Exam


GI & Abdominal Exam: Distended, Soft, Normal Bowel Sounds.  absent: Tenderness





- Neurological Exam


Neurological Exam: Alert, Awake, Oriented x3





- Psychiatric Exam


Psychiatric exam: Normal Affect, Normal Mood





- Skin


Skin Exam: Dry, Intact, Normal Color, Warm

## 2017-09-01 NOTE — CP.PCM.PN
Subjective





- Date & Time of Evaluation


Date of Evaluation: 09/01/17


Time of Evaluation: 10:00





- Subjective


Subjective: 





No c/o SOB


Feels better





Objective





- Vital Signs/Intake and Output


Vital Signs (last 24 hours): 


 











Temp Pulse Resp BP Pulse Ox


 


 98.1 F   79   18   147/61   97 


 


 09/01/17 07:00  09/01/17 07:00  09/01/17 07:00  09/01/17 09:12  09/01/17 07:00











- Medications


Medications: 


 Current Medications





Amlodipine Besylate (Norvasc)  10 mg PO DAILY [facility]


   Last Admin: 09/01/17 09:13 Dose:  10 mg


Calcitriol (Rocaltrol)  0.25 mcg PO MWF [facility]


   Last Admin: 09/01/17 09:12 Dose:  0.25 mcg


Epoetin Vamsi (Procrit)  10,000 unit IV TTS [facility]


Ferrous Sulfate (Feosol)  325 mg PO BID [facility]


   Last Admin: 09/01/17 09:13 Dose:  325 mg


Furosemide (Lasix)  40 mg PO DAILY [facility]


   Last Admin: 09/01/17 09:12 Dose:  40 mg


Heparin Sodium (Porcine) (Heparin)  5,000 units SC Q8 [facility]


   Last Admin: 09/01/17 06:00 Dose:  5,000 units


Insulin Aspart (Novolog)  0 unit SC ACHS [facility]


   PRN Reason: Protocol


   Last Admin: 09/01/17 08:27 Dose:  3 unit


Insulin Aspart (Novolog)  3 unit SC TIDAC [facility]


   Last Admin: 09/01/17 08:28 Dose:  3 unit


Insulin Glargine (Lantus)  10 unit SC DAILY [facility]


   Last Admin: 09/01/17 09:13 Dose:  10 units


Labetalol HCl (Normodyne)  300 mg PO BID [facility]


   Last Admin: 09/01/17 09:13 Dose:  300 mg


Lisinopril (Zestril)  10 mg PO DAILY [facility]


   Last Admin: 09/01/17 09:12 Dose:  10 mg


Pantoprazole Sodium (Protonix Ec Tab)  20 mg PO DAILY [facility]


   Last Admin: 09/01/17 09:12 Dose:  20 mg











- Labs


Labs: 


 





 08/31/17 06:10 





 08/31/17 06:10 











- Respiratory Exam


Additional comments: 





Lungs clear





- Cardiovascular Exam


Cardiovascular Exam: REGULAR RHYTHM





- Extremities Exam


Additional comments: 





No edema





Assessment and Plan





- Assessment and Plan (Free Text)


Assessment: 





Stage 1V kidney dis stable with creat clearance of 17


CHF


IDDM


Hx/o TIA


Plan: 





No dialysis is needed at this time


BP is controlled


Continue current meds

## 2018-03-01 ENCOUNTER — HOSPITAL ENCOUNTER (INPATIENT)
Dept: HOSPITAL 31 - C.ER | Age: 78
LOS: 22 days | Discharge: HOSPICE | DRG: 870 | End: 2018-03-23
Attending: HOSPITALIST | Admitting: HOSPITALIST
Payer: MEDICARE

## 2018-03-01 VITALS — BODY MASS INDEX: 24 KG/M2

## 2018-03-01 DIAGNOSIS — I95.3: ICD-10-CM

## 2018-03-01 DIAGNOSIS — D63.1: ICD-10-CM

## 2018-03-01 DIAGNOSIS — I07.1: ICD-10-CM

## 2018-03-01 DIAGNOSIS — I25.5: ICD-10-CM

## 2018-03-01 DIAGNOSIS — C18.4: ICD-10-CM

## 2018-03-01 DIAGNOSIS — R65.20: ICD-10-CM

## 2018-03-01 DIAGNOSIS — Z79.4: ICD-10-CM

## 2018-03-01 DIAGNOSIS — J96.00: ICD-10-CM

## 2018-03-01 DIAGNOSIS — J15.1: ICD-10-CM

## 2018-03-01 DIAGNOSIS — E78.00: ICD-10-CM

## 2018-03-01 DIAGNOSIS — I27.20: ICD-10-CM

## 2018-03-01 DIAGNOSIS — E87.5: ICD-10-CM

## 2018-03-01 DIAGNOSIS — I21.4: ICD-10-CM

## 2018-03-01 DIAGNOSIS — I47.2: ICD-10-CM

## 2018-03-01 DIAGNOSIS — I50.43: ICD-10-CM

## 2018-03-01 DIAGNOSIS — I25.10: ICD-10-CM

## 2018-03-01 DIAGNOSIS — N18.6: ICD-10-CM

## 2018-03-01 DIAGNOSIS — I26.99: ICD-10-CM

## 2018-03-01 DIAGNOSIS — Z86.73: ICD-10-CM

## 2018-03-01 DIAGNOSIS — I13.2: ICD-10-CM

## 2018-03-01 DIAGNOSIS — Z87.891: ICD-10-CM

## 2018-03-01 DIAGNOSIS — I48.91: ICD-10-CM

## 2018-03-01 DIAGNOSIS — A41.9: Primary | ICD-10-CM

## 2018-03-01 DIAGNOSIS — Z99.2: ICD-10-CM

## 2018-03-01 DIAGNOSIS — E11.22: ICD-10-CM

## 2018-03-01 DIAGNOSIS — E11.65: ICD-10-CM

## 2018-03-01 DIAGNOSIS — N17.9: ICD-10-CM

## 2018-03-01 LAB
ALBUMIN SERPL-MCNC: 3.6 G/DL (ref 3.5–5)
ALBUMIN/GLOB SERPL: 1 {RATIO} (ref 1–2.1)
ALT SERPL-CCNC: 54 U/L (ref 9–52)
AST SERPL-CCNC: 193 U/L (ref 14–36)
BASE EXCESS BLDV CALC-SCNC: -13.8 MMOL/L (ref 0–2)
BASOPHILS # BLD AUTO: 0.1 K/UL (ref 0–0.2)
BASOPHILS NFR BLD: 0.9 % (ref 0–2)
BNP SERPL-MCNC: (no result) PG/ML (ref 0–900)
BUN SERPL-MCNC: 74 MG/DL (ref 7–17)
CALCIUM SERPL-MCNC: 8 MG/DL (ref 8.6–10.4)
EOSINOPHIL # BLD AUTO: 0 K/UL (ref 0–0.7)
EOSINOPHIL NFR BLD: 0 % (ref 0–4)
ERYTHROCYTE [DISTWIDTH] IN BLOOD BY AUTOMATED COUNT: 16.7 % (ref 11.5–14.5)
GFR NON-AFRICAN AMERICAN: 9
HGB BLD-MCNC: 10.3 G/DL (ref 11–16)
LYMPHOCYTE: 5 % (ref 20–40)
LYMPHOCYTES # BLD AUTO: 0.6 K/UL (ref 1–4.3)
LYMPHOCYTES NFR BLD AUTO: 3.8 % (ref 20–40)
MCH RBC QN AUTO: 27.7 PG (ref 27–31)
MCHC RBC AUTO-ENTMCNC: 31.6 G/DL (ref 33–37)
MCV RBC AUTO: 87.4 FL (ref 81–99)
MONOCYTE: 3 % (ref 0–10)
MONOCYTES # BLD: 0.8 K/UL (ref 0–0.8)
MONOCYTES NFR BLD: 4.8 % (ref 0–10)
NEUTROPHILS # BLD: 14.8 K/UL (ref 1.8–7)
NEUTROPHILS NFR BLD AUTO: 90.5 % (ref 50–75)
NEUTROPHILS NFR BLD AUTO: 92 % (ref 50–75)
NRBC BLD AUTO-RTO: 0 % (ref 0–2)
PCO2 BLDV: 40 MMHG (ref 40–60)
PH BLDV: 7.16 [PH] (ref 7.32–7.43)
PLATELET # BLD EST: NORMAL 10*3/UL
PLATELET # BLD: 156 K/UL (ref 130–400)
PMV BLD AUTO: 9.4 FL (ref 7.2–11.7)
RBC # BLD AUTO: 3.72 MIL/UL (ref 3.8–5.2)
TOTAL CELLS COUNTED BLD: 100
VENOUS BLOOD GAS PO2: 16 MM/HG (ref 30–55)
WBC # BLD AUTO: 16.3 K/UL (ref 4.8–10.8)

## 2018-03-01 NOTE — CT
EXAM:

  CT Chest Without Intravenous Contrast



EXAM DATE/TIME:

  3/1/2018 9:31 PM



CLINICAL HISTORY:

  77 years old, female; Pain and signs and symptoms; Shortness of breath; Chest 

pain; Type not specified; Additional info: Chest pain/ SOB



TECHNIQUE:

  Axial computed tomography images of the chest without intravenous contrast.  

All CT scans at this facility use one or more dose reduction techniques, viz.: 

automated exposure control; ma/kV adjustment per patient size (including 

targeted exams where dose is matched to indication; i.e. head); or iterative 

reconstruction technique.

  Coronal and sagittal reformatted images were created and reviewed.



COMPARISON:

  No relevant prior studies available.



FINDINGS:

  LUNGS:  Areas of dense consolidation in the lungs bilaterally, greater on the 

left. Most likely, the findings represent compressive atelectasis related to 

the bilateral pleural effusions, although superimposed pneumonia in the left 

lung is difficult to exclude. There is also mild, diffuse ground glass density 

in the left lung apex.  Mild emphysematous changes.

  PLEURAL SPACE:  Bilateral pleural effusions, moderate to large on the left 

and moderate on the right. These do not appear significantly loculated.  No 

pneumothorax is seen. 

  HEART:  Heart appears moderately enlarged.  Coronary artery calcification.  

No evidence of significant pericardial effusion.

  BONES/JOINTS:  Bony structures appear demineralized.

  SOFT TISSUES:  Mild, diffuse subcutaneous edema/anasarca.  Bilateral breast 

calcifications. These appear dense and smoothly marginated, and are likely 

benign calcifications, however, recommend correlation with mammography 

findings, on a nonemergent basis.

  VASCULATURE:  Diffuse atherosclerotic calcification of the thoracic aorta.  

Exam is nondiagnostic for aortic dissection, secondary to unenhanced technique. 

 No evidence of thoracic aortic aneurysm.

  LYMPH NODES:  No evidence of diffuse lymphadenopathy.

  UPPER ABDOMEN: Postoperative changes in the abdomen. Multiple surgical clips 

are noted in the left abdomen, and there is surgical suture in the descending 

colon. 



IMPRESSION:     

- Bilateral pleural effusions, moderate to large on the left and moderate on 

the right.

- Areas of consolidation in the left lung, most likely representing compressive 

atelectasis, although superimposed pneumonia is not excluded. Recommend 

clinical correlation.

- Mild, diffuse subcutaneous edema/anasarca.

- Otherwise, no evidence of significant acute process.

- Cardiomegaly.

- Bilateral breast calcifications. See recommendations above.

- See above for remaining findings.

## 2018-03-01 NOTE — C.PDOC
History Of Present Illness


76yo female, presents to ER via EMS for evaluation of chest pain located in her 

anterior chest wall for the past day. Patient also reported to have elevated 

blood sugar levels at home, prompting ER visit. No shortness of breath, 

abdominal pain, nausea or vomiting. No other complaints. 


Time Seen by Provider: 03/01/18 20:00


Chief Complaint (Nursing): Chest Pain


History Per: Patient, Family


History/Exam Limitations: no limitations


Onset/Duration Of Symptoms: Days


Current Symptoms Are (Timing): Still Present


Quality: "Pain"





Past Medical History


Reviewed: Historical Data, Nursing Documentation, Vital Signs


Vital Signs: 


 Last Vital Signs











Temp  97.8 F   03/01/18 22:51


 


Pulse  90   03/01/18 22:51


 


Resp  22   03/01/18 22:51


 


BP  147/75   03/01/18 22:51


 


Pulse Ox  90 L  03/02/18 00:09














- Medical History


PMH: Anemia, Arthritis, Asthma (Dx 15 yrs ago,does not use home o2 anymore), 

Diabetes, Fractures (Right ankle), HTN, Hypercholesterolemia, Peripheral Edema, 

Pneumonia (x2), End Stage Renal Disease, Chronic Kidney Disease, TIA (x3 about 

20 yrs ago, 10 yrs ago, 7 yrs ago)


   Denies: Depression


Surgical History: Endoscopy





- [facility] Procedures








EXCISION OF TOE NAIL, EXTERNAL APPROACH (08/28/17)


INJECT/INFUSE NEC (01/22/15)


INSERTION OF INFUSION DEV INTO SUP VENA CAVA, PERC APPROACH (04/22/17)


TRANSFUSE NONAUT RED BLOOD CELLS IN PERIPH VEIN, PERC (04/22/17)








Family History: States: Unknown Family Hx





- Social History


Hx Tobacco Use: No


Hx Alcohol Use: No


Hx Substance Use: No





- Immunization History


Hx Tetanus Toxoid Vaccination: No


Hx Influenza Vaccination: No


Hx Pneumococcal Vaccination: No





Review Of Systems


Except As Marked, All Systems Reviewed And Found Negative.


Constitutional: Positive for: Other (elevated blood sugar).  Negative for: Fever

, Chills


Cardiovascular: Positive for: Chest Pain


Respiratory: Negative for: Shortness of Breath


Gastrointestinal: Negative for: Nausea, Vomiting, Abdominal Pain





Physical Exam





- Physical Exam


Appears: Non-toxic, No Acute Distress


Skin: Normal Color, Warm, Dry


Head: Atraumatic, Normacephalic


Eye(s): bilateral: Normal Inspection


Oral Mucosa: Moist


Neck: Normal ROM, Supple


Chest: Symmetrical


Cardiovascular: Rhythm Regular, No Murmur


Respiratory: Decreased Breath Sounds (bilateral lowere lung field), No Wheezing


Gastrointestinal/Abdominal: Normal Exam, Soft, No Tenderness


Neurological/Psych: Normal Motor, Normal Sensation, Other (confused)





ED Course And Treatment





- Laboratory Results


Result Diagrams: 


 03/01/18 22:51





 03/01/18 22:51


O2 Sat by Pulse Oximetry: 90





Medical Decision Making


Medical Decision Making: 


Impression: Chest pain, elevated blood sugar


Plan:


-- EKG


-- Labs


-- CXR


-- IV Fluids





Disposition


Discussed With : Abundio Prado


Doctor Will See Patient In The: Hospital


Counseled Patient/Family Regarding: Diagnosis





- Disposition


Disposition: HOSPITALIZED


Disposition Time: 00:07


Condition: GUARDED


Forms:  [facility] (English)





- Clinical Impression


Clinical Impression: 


 Renal failure, Chest pain, Congestive heart failure, Pleural effusion, 

Hyperglycemia without ketosis, Pneumonia








- Scribe Statement


The provider has reviewed the documentation as recorded by the Scribe (Angela Morgan)


Provider Attestation: 


All medical record entries made by the Scribe were at my direction and 

personally dictated by me. I have reviewed the chart and agree that the record 

accurately reflects my personal performance of the history, physical exam, 

medical decision making, and the department course for this patient. I have 

also personally directed, reviewed, and agree with the discharge instructions 

and disposition.

## 2018-03-02 LAB
% IRON SATURATION: 9 (ref 20–55)
ANISOCYTOSIS BLD QL SMEAR: SLIGHT
APTT BLD: 29 SECONDS (ref 21–34)
ARTERIAL BLOOD GAS HEMOGLOBIN: 11.4 G/DL (ref 11.7–17.4)
ARTERIAL BLOOD GAS O2 SAT: 99.7 % (ref 95–98)
ARTERIAL BLOOD GAS PCO2: 26 MM/HG (ref 35–45)
ARTERIAL BLOOD GAS TCO2: 9.9 MMOL/L (ref 22–28)
ARTERIAL PATENCY WRIST A: (no result)
BASOPHILS # BLD AUTO: 0.1 K/UL (ref 0–0.2)
BASOPHILS NFR BLD: 0.5 % (ref 0–2)
BUN SERPL-MCNC: 79 MG/DL (ref 7–17)
BUN SERPL-MCNC: 91 MG/DL (ref 7–17)
CALCIUM SERPL-MCNC: 8.4 MG/DL (ref 8.6–10.4)
CALCIUM SERPL-MCNC: 8.6 MG/DL (ref 8.6–10.4)
EOSINOPHIL # BLD AUTO: 0 K/UL (ref 0–0.7)
EOSINOPHIL NFR BLD: 0 % (ref 0–4)
ERYTHROCYTE [DISTWIDTH] IN BLOOD BY AUTOMATED COUNT: 16.2 % (ref 11.5–14.5)
ERYTHROCYTE [DISTWIDTH] IN BLOOD BY AUTOMATED COUNT: 16.6 % (ref 11.5–14.5)
FERRITIN SERPL-MCNC: 195 NG/ML
GFR NON-AFRICAN AMERICAN: 7
GFR NON-AFRICAN AMERICAN: 8
HCO3 BLDA-SCNC: 10.7 MMOL/L (ref 21–28)
HDLC SERPL-MCNC: 61 MG/DL (ref 30–70)
HEPATITIS B CORE AB: NEGATIVE
HEPATITIS C ANTIBODY: NEGATIVE
HGB BLD-MCNC: 9.2 G/DL (ref 11–16)
HGB BLD-MCNC: 9.6 G/DL (ref 11–16)
HYPOCHROMIC: SLIGHT
INHALED O2 CONCENTRATION: 100 %
INR PPP: 1.3
IRON SERPL-MCNC: 18 UG/DL (ref 37–170)
LDLC SERPL-MCNC: 68 MG/DL (ref 0–129)
LYMPHOCYTE: 4 % (ref 20–40)
LYMPHOCYTES # BLD AUTO: 0.5 K/UL (ref 1–4.3)
LYMPHOCYTES NFR BLD AUTO: 3.4 % (ref 20–40)
MCH RBC QN AUTO: 27.7 PG (ref 27–31)
MCH RBC QN AUTO: 27.8 PG (ref 27–31)
MCHC RBC AUTO-ENTMCNC: 31.5 G/DL (ref 33–37)
MCHC RBC AUTO-ENTMCNC: 32.5 G/DL (ref 33–37)
MCV RBC AUTO: 85.2 FL (ref 81–99)
MCV RBC AUTO: 88.2 FL (ref 81–99)
MONOCYTE: 3 % (ref 0–10)
MONOCYTES # BLD: 0.9 K/UL (ref 0–0.8)
MONOCYTES NFR BLD: 5.6 % (ref 0–10)
NEUTROPHILS # BLD: 14.2 K/UL (ref 1.8–7)
NEUTROPHILS NFR BLD AUTO: 90.5 % (ref 50–75)
NEUTROPHILS NFR BLD AUTO: 93 % (ref 50–75)
NRBC BLD AUTO-RTO: 0.1 % (ref 0–2)
OVALOCYTES BLD QL SMEAR: SLIGHT
PH BLDA: 7.15 [PH] (ref 7.35–7.45)
PLATELET # BLD EST: NORMAL 10*3/UL
PLATELET # BLD: 127 K/UL (ref 130–400)
PLATELET # BLD: 143 K/UL (ref 130–400)
PMV BLD AUTO: 8.9 FL (ref 7.2–11.7)
PMV BLD AUTO: 9.2 FL (ref 7.2–11.7)
PO2 BLDA: 202 MM/HG (ref 80–100)
POIKILOCYTOSIS BLD QL SMEAR: SLIGHT
PROTHROMBIN TIME: 14.6 SECONDS (ref 9.7–12.2)
RBC # BLD AUTO: 3.31 MIL/UL (ref 3.8–5.2)
RBC # BLD AUTO: 3.48 MIL/UL (ref 3.8–5.2)
TIBC SERPL-MCNC: 196 UG/DL (ref 250–450)
TOTAL CELLS COUNTED BLD: 100
TROPONIN I SERPL-MCNC: 79.7 NG/ML (ref 0–0.12)
TROPONIN I SERPL-MCNC: 96.9 NG/ML (ref 0–0.12)
WBC # BLD AUTO: 15.7 K/UL (ref 4.8–10.8)
WBC # BLD AUTO: 16.1 K/UL (ref 4.8–10.8)

## 2018-03-02 PROCEDURE — 05HM33Z INSERTION OF INFUSION DEVICE INTO RIGHT INTERNAL JUGULAR VEIN, PERCUTANEOUS APPROACH: ICD-10-PCS

## 2018-03-02 PROCEDURE — 5A1955Z RESPIRATORY VENTILATION, GREATER THAN 96 CONSECUTIVE HOURS: ICD-10-PCS | Performed by: HOSPITALIST

## 2018-03-02 PROCEDURE — 0BH17EZ INSERTION OF ENDOTRACHEAL AIRWAY INTO TRACHEA, VIA NATURAL OR ARTIFICIAL OPENING: ICD-10-PCS | Performed by: HOSPITALIST

## 2018-03-02 RX ADMIN — HEPARIN SODIUM PRN MLS/HR: 10000 INJECTION, SOLUTION INTRAVENOUS at 05:00

## 2018-03-02 RX ADMIN — INSULIN ASPART SCH UNIT: 100 INJECTION, SOLUTION INTRAVENOUS; SUBCUTANEOUS at 18:14

## 2018-03-02 RX ADMIN — INSULIN GLARGINE SCH U: 100 INJECTION, SOLUTION SUBCUTANEOUS at 23:38

## 2018-03-02 RX ADMIN — MOXIFLOXACIN HYDROCHLORIDE SCH MLS/HR: 400 INJECTION, SOLUTION INTRAVENOUS at 23:30

## 2018-03-02 NOTE — CT
PROCEDURE:  CT HEAD WITHOUT CONTRAST.



HISTORY:

Altered mental status



COMPARISON:

8/28/2017 



TECHNIQUE:

Axial computed tomography images were obtained through the head/brain 

without intravenous contrast.  



Radiation dose:



Total exam DLP = 865.26 mGy-cm.



This CT exam was performed using one or more of the following dose 

reduction techniques: Automated exposure control, adjustment of the 

mA and/or kV according to patient size, and/or use of iterative 

reconstruction technique.



FINDINGS:



HEMORRHAGE:

No intracranial hemorrhage. 



BRAIN:

No mass effect or edema.  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 

parietal infarcts.  Small old left temporal infarct. Old small right 

cerebellar hemispheric lacunar. Right Flolan neck lacune, likely old 

though possibly subacute. This was not evident on prior CT 

examination of 8/28/2017. No evidence of acute infarct moderate to 

severe patchy and confluent periventricular and deep white matter 

lucency consistent with microvascular ischemic change. 



VENTRICLES:

Unremarkable. No hydrocephalus. 



CALVARIUM:

Unremarkable.



PARANASAL SINUSES:

Unremarkable as visualized. No significant inflammatory changes.



MASTOID AIR CELLS:

Unremarkable as visualized. No inflammatory changes.



OTHER FINDINGS:

None.



IMPRESSION:

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  Subacute to old right colonic 

lacunar infarct, most likely old though not evident on prior 

examination of 8/28/2017. No intracranial mass or hemorrhage. 

Moderate to severe chronic white matter ischemic change.

## 2018-03-02 NOTE — PCM.ANES
Anesthesia Emergent Intubation





- Diagnosis


Working Diagnosis:: Respiratory Failure





- Intubation Attempts


Previous Number of Intubation Attempts:: 0





- Pre-Intubation Vital Signs


Blood Pressure: 74/34


Heart Rate: 134


Oxygen Delivery Method: Mask


Level Of Consciousness: Comatose/Unresponsive





- Method of Intubation


Intubation Method: Oral ETT


ETT Size: 7.5


Easy: Yes


Atramatic: Yes





- Intubation Devices


Sanjeev Blade Size Used: 3





- Placement Confirmation


Breath Sounds Present & Equal Bilaterally: Yes


Gurgling Sounds Not Audible at Epigastrum: Yes


Positive EtCO2: Yes


Recommendations: Ventilator

## 2018-03-02 NOTE — CP.CCUPN
CCU Subjective





- Physician Review


Subjective (Free Text): 





03/02/18 02:21





The Patient was seen and examined at the bedside, Medical records reviewed, and 

management issues were discussed and formulated with the house staff.


77 years old female With PMHx of PMH controlled Asthma (Dx 15 yrs ago), HTN, 

Hypercholesterolemia, Diabetes, Peripheral Edema, Pneumonia (x2), Chronic 

Kidney Disease (S/P AV fistula, not on dialysis yet), TIA (x3 about 20 yrs ago, 

10 yrs ago, 7 yrs ago), Anemia, Arthritis ad Fractures (Right ankle)


Who presents to ER via EMS for evaluation of chest pain located in her anterior 

chest wall for the past day. 


Patient has been having elevated blood sugar levels at home since yesterday 

despite receiving SQ insulin, plus daughter noted that Pt is confused and not 

being herself  


In the Er she was noted to be critically ill, confused drawsy, 


Denies chest pain at this time 


No fever/chills


Labs markedly abnormal for severe metabolic acidosis, Troponin elevated to 103 

with worsening renal function 


ABG PH 7.16, Lactate of 2.5


EKG NSR with no acute ST or T wave changes, CXR revealed acute pulmonary edema, 

B/L effusion 


Pt received calcium gluconate, sodium Bicard, Iv insulin


Also ASA, crestor and started on Heparin Drip  























Critical Care Time Spent (in minutes): 56





CCU Objective





- Vital Signs / Intake & Output


Vital Signs (Last 4 hours): 


Vital Signs











  Temp Pulse Resp BP Pulse Ox


 


 03/02/18 00:10      90 L


 


 03/01/18 22:51  97.8 F  90  22  147/75  100











Intake and Output (Last 8hrs): 


 Intake & Output











 03/01/18 03/01/18 03/02/18





 14:59 22:59 06:59


 


Weight  130 lb 














- Physical Exam


Physical Exam Limitations: Positive for: Altered Mental Status, Clinical 

Condition, Uncooperative


Head: Positive for: Atraumatic, Normocephalic.  Negative for: Tenderness, 

Contusion, Swelling


Pupils: Positive for: PERRL.  Negative for: Sluggish, Non-Reactive


Extroacular Muscles: Positive for: EOMI


Conjunctiva: Positive for: Normal


Mouth: Positive for: Dry


Pharnyx: Positive for: Normal


Nose (External): Positive for: Atraumatic


Neck: Positive for: Normal Range of Motion, Trachea Midline.  Negative for: 

Meningeal Signs, MIDLINE TENDERNESS, Paraspinal Tenderness, JVD, Lymphadenopathy


Respiratory/Chest: Positive for: Good Air Exchange, Decreased Breath Sounds (on 

bilateral bases).  Negative for: Accessory Muscle Use, Wheezes


Cardiovascular: Positive for: Regular Rate and Rhythm, Murmurs (Ejection 

systolic ), Normal S1, S2, Peripheal Pulses Present.  Negative for: Tachycardic

, Bradycardic


Abdomen: Positive for: Normal Bowel Sounds.  Negative for: Tenderness, 

Distention, Peritoneal Signs


Upper Extremity: Positive for: NORMAL PULSES, Capillary Refill < 2s, Other.  

Negative for: Cyanosis, Edema


Lower Extremity: Positive for: Normal Inspection, NORMAL PULSES.  Negative for: 

Edema, CALF TENDERNESS





- Medications


Active Medications: 


Active Medications











Generic Name Dose Route Start Last Admin





  Trade Name Freq  PRN Reason Stop Dose Admin


 


Sodium Chloride  1,000 mls @ 100 mls/hr  03/01/18 20:24  03/01/18 22:50





  Sodium Chloride 0.9%  IV  03/02/18 06:23  100 mls/hr





  .Q10H ONE   Administration


 


Azithromycin  500 mg in 250 mls @ 166.667 mls/hr  03/01/18 23:47  





  Zithromax 500mg In Ns Addvantage  IV  03/02/18 01:16  





  STAT STA   


 


Moxifloxacin HCl  400 mg in 250 mls @ 167 mls/hr  03/01/18 23:47  03/02/18 00:07





  Avelox Iv 400mg/250ml Ns  IVPB  03/02/18 01:16  167 mls/hr





  ONCE ONE   Administration














- Patient Studies


Lab Studies: 


 Lab Studies











  03/01/18 03/01/18 03/01/18 Range/Units





  23:25 22:51 22:51 


 


WBC     (4.8-10.8)  K/uL


 


RBC     (3.80-5.20)  Mil/uL


 


Hgb     (11.0-16.0)  g/dL


 


Hct     (34.0-47.0)  %


 


MCV     (81.0-99.0)  fL


 


MCH     (27.0-31.0)  pg


 


MCHC     (33.0-37.0)  g/dL


 


RDW     (11.5-14.5)  %


 


Plt Count     (130-400)  K/uL


 


MPV     (7.2-11.7)  fL


 


Neut % (Auto)     (50.0-75.0)  %


 


Lymph % (Auto)     (20.0-40.0)  %


 


Mono % (Auto)     (0.0-10.0)  %


 


Eos % (Auto)     (0.0-4.0)  %


 


Baso % (Auto)     (0.0-2.0)  %


 


Neut # (Auto)     (1.8-7.0)  K/uL


 


Lymph # (Auto)     (1.0-4.3)  K/uL


 


Mono # (Auto)     (0.0-0.8)  K/uL


 


Eos # (Auto)     (0.0-0.7)  K/uL


 


Baso # (Auto)     (0.0-0.2)  K/uL


 


Neutrophils % (Manual)     (50-75)  %


 


Lymphocytes % (Manual)     (20-40)  %


 


Monocytes % (Manual)     (0-10)  %


 


Platelet Estimate     (NORMAL)  


 


D-Dimer, Quantitative    4388 H  (0-243)  ng/mlDDU


 


pO2  16 L    (30-55)  mm/Hg


 


VBG pH  7.16 L*    (7.32-7.43)  


 


VBG pCO2  40    (40-60)  mmHg


 


VBG HCO3  11.9    mmol/L


 


VBG Total CO2  15.5 L    (22-28)  mmol/L


 


VBG O2 Sat (Calc)  35.2 L    (40-65)  %


 


VBG Base Excess  -13.8 L    (0.0-2.0)  mmol/L


 


VBG Potassium  5.8 H    (3.6-5.2)  mmol/L


 


Glucose  562 H* D    ()  mg/dl


 


Lactate  2.5 H    (0.7-2.1)  mmol/L


 


Crit Value Called To  Karmen moreno/rn    


 


Crit Value Called By  Olu gallegos/rt    


 


Crit Value Read Back  Y    


 


Blood Gas Notified Time  2335    


 


Sodium  139.0  137   (132-148)  mmol/L


 


Potassium   6.5 H* D   (3.6-5.2)  mmol/L


 


Chloride  103.0  105   ()  mmol/L


 


Carbon Dioxide   11 L* D   (22-30)  mmol/L


 


Anion Gap   28 H   (10-20)  


 


BUN   74 H   (7-17)  mg/dL


 


Creatinine   4.7 H   (0.7-1.2)  mg/dL


 


Est GFR ( Amer)   11   


 


Est GFR (Non-Af Amer)   9   


 


POC Glucose (mg/dL)     ()  mg/dL


 


Random Glucose   559 H* D   ()  mg/dL


 


Calcium   8.0 L   (8.6-10.4)  mg/dl


 


Total Bilirubin   1.6 H   (0.2-1.3)  mg/dL


 


AST   193 H   (14-36)  U/L


 


ALT   54 H D   (9-52)  U/L


 


Alkaline Phosphatase   96   ()  U/L


 


Troponin I   103.0000 H*   (0.00-0.120)  ng/mL


 


NT-Pro-B Natriuret Pep   30686 H   (0-900)  pg/mL


 


Total Protein   7.3   (6.3-8.3)  g/dL


 


Albumin   3.6   (3.5-5.0)  g/dL


 


Globulin   3.7   (2.2-3.9)  gm/dL


 


Albumin/Globulin Ratio   1.0   (1.0-2.1)  


 


Venous Blood Potassium  5.8 H    (3.6-5.2)  mmol/L


 


Serum Ketones   Negative   (NEGATIVE)  














  03/01/18 03/01/18 Range/Units





  22:51 22:18 


 


WBC  16.3 H D   (4.8-10.8)  K/uL


 


RBC  3.72 L   (3.80-5.20)  Mil/uL


 


Hgb  10.3 L   (11.0-16.0)  g/dL


 


Hct  32.5 L   (34.0-47.0)  %


 


MCV  87.4   (81.0-99.0)  fL


 


MCH  27.7   (27.0-31.0)  pg


 


MCHC  31.6 L   (33.0-37.0)  g/dL


 


RDW  16.7 H   (11.5-14.5)  %


 


Plt Count  156   (130-400)  K/uL


 


MPV  9.4   (7.2-11.7)  fL


 


Neut % (Auto)  90.5 H   (50.0-75.0)  %


 


Lymph % (Auto)  3.8 L   (20.0-40.0)  %


 


Mono % (Auto)  4.8   (0.0-10.0)  %


 


Eos % (Auto)  0.0   (0.0-4.0)  %


 


Baso % (Auto)  0.9   (0.0-2.0)  %


 


Neut # (Auto)  14.8 H   (1.8-7.0)  K/uL


 


Lymph # (Auto)  0.6 L   (1.0-4.3)  K/uL


 


Mono # (Auto)  0.8   (0.0-0.8)  K/uL


 


Eos # (Auto)  0.0   (0.0-0.7)  K/uL


 


Baso # (Auto)  0.1   (0.0-0.2)  K/uL


 


Neutrophils % (Manual)  92 H   (50-75)  %


 


Lymphocytes % (Manual)  5 L   (20-40)  %


 


Monocytes % (Manual)  3   (0-10)  %


 


Platelet Estimate  Normal   (NORMAL)  


 


D-Dimer, Quantitative    (0-243)  ng/mlDDU


 


pO2    (30-55)  mm/Hg


 


VBG pH    (7.32-7.43)  


 


VBG pCO2    (40-60)  mmHg


 


VBG HCO3    mmol/L


 


VBG Total CO2    (22-28)  mmol/L


 


VBG O2 Sat (Calc)    (40-65)  %


 


VBG Base Excess    (0.0-2.0)  mmol/L


 


VBG Potassium    (3.6-5.2)  mmol/L


 


Glucose    ()  mg/dl


 


Lactate    (0.7-2.1)  mmol/L


 


Crit Value Called To    


 


Crit Value Called By    


 


Crit Value Read Back    


 


Blood Gas Notified Time    


 


Sodium    (132-148)  mmol/L


 


Potassium    (3.6-5.2)  mmol/L


 


Chloride    ()  mmol/L


 


Carbon Dioxide    (22-30)  mmol/L


 


Anion Gap    (10-20)  


 


BUN    (7-17)  mg/dL


 


Creatinine    (0.7-1.2)  mg/dL


 


Est GFR (African Amer)    


 


Est GFR (Non-Af Amer)    


 


POC Glucose (mg/dL)   467 H*  ()  mg/dL


 


Random Glucose    ()  mg/dL


 


Calcium    (8.6-10.4)  mg/dl


 


Total Bilirubin    (0.2-1.3)  mg/dL


 


AST    (14-36)  U/L


 


ALT    (9-52)  U/L


 


Alkaline Phosphatase    ()  U/L


 


Troponin I    (0.00-0.120)  ng/mL


 


NT-Pro-B Natriuret Pep    (0-900)  pg/mL


 


Total Protein    (6.3-8.3)  g/dL


 


Albumin    (3.5-5.0)  g/dL


 


Globulin    (2.2-3.9)  gm/dL


 


Albumin/Globulin Ratio    (1.0-2.1)  


 


Venous Blood Potassium    (3.6-5.2)  mmol/L


 


Serum Ketones    (NEGATIVE)  








 Laboratory Results - last 24 hr











  03/01/18 03/01/18 03/01/18





  22:18 22:51 22:51


 


WBC   16.3 H D 


 


RBC   3.72 L 


 


Hgb   10.3 L 


 


Hct   32.5 L 


 


MCV   87.4 


 


MCH   27.7 


 


MCHC   31.6 L 


 


RDW   16.7 H 


 


Plt Count   156 


 


MPV   9.4 


 


Neut % (Auto)   90.5 H 


 


Lymph % (Auto)   3.8 L 


 


Mono % (Auto)   4.8 


 


Eos % (Auto)   0.0 


 


Baso % (Auto)   0.9 


 


Neut # (Auto)   14.8 H 


 


Lymph # (Auto)   0.6 L 


 


Mono # (Auto)   0.8 


 


Eos # (Auto)   0.0 


 


Baso # (Auto)   0.1 


 


Neutrophils % (Manual)   92 H 


 


Lymphocytes % (Manual)   5 L 


 


Monocytes % (Manual)   3 


 


Platelet Estimate   Normal 


 


D-Dimer, Quantitative    4388 H


 


pO2   


 


VBG pH   


 


VBG pCO2   


 


VBG HCO3   


 


VBG Total CO2   


 


VBG O2 Sat (Calc)   


 


VBG Base Excess   


 


VBG Potassium   


 


Glucose   


 


Lactate   


 


Crit Value Called To   


 


Crit Value Called By   


 


Crit Value Read Back   


 


Blood Gas Notified Time   


 


Sodium   


 


Potassium   


 


Chloride   


 


Carbon Dioxide   


 


Anion Gap   


 


BUN   


 


Creatinine   


 


Est GFR ( Amer)   


 


Est GFR (Non-Af Amer)   


 


POC Glucose (mg/dL)  467 H*  


 


Random Glucose   


 


Calcium   


 


Total Bilirubin   


 


AST   


 


ALT   


 


Alkaline Phosphatase   


 


Troponin I   


 


NT-Pro-B Natriuret Pep   


 


Total Protein   


 


Albumin   


 


Globulin   


 


Albumin/Globulin Ratio   


 


Venous Blood Potassium   


 


Serum Ketones   














  03/01/18 03/01/18





  22:51 23:25


 


WBC  


 


RBC  


 


Hgb  


 


Hct  


 


MCV  


 


MCH  


 


MCHC  


 


RDW  


 


Plt Count  


 


MPV  


 


Neut % (Auto)  


 


Lymph % (Auto)  


 


Mono % (Auto)  


 


Eos % (Auto)  


 


Baso % (Auto)  


 


Neut # (Auto)  


 


Lymph # (Auto)  


 


Mono # (Auto)  


 


Eos # (Auto)  


 


Baso # (Auto)  


 


Neutrophils % (Manual)  


 


Lymphocytes % (Manual)  


 


Monocytes % (Manual)  


 


Platelet Estimate  


 


D-Dimer, Quantitative  


 


pO2   16 L


 


VBG pH   7.16 L*


 


VBG pCO2   40


 


VBG HCO3   11.9


 


VBG Total CO2   15.5 L


 


VBG O2 Sat (Calc)   35.2 L


 


VBG Base Excess   -13.8 L


 


VBG Potassium   5.8 H


 


Glucose   562 H* D


 


Lactate   2.5 H


 


Crit Value Called To   Karmen moreno/rn


 


Crit Value Called By   Olu gallegos/rt


 


Crit Value Read Back   Y


 


Blood Gas Notified Time   2335


 


Sodium  137  139.0


 


Potassium  6.5 H* D 


 


Chloride  105  103.0


 


Carbon Dioxide  11 L* D 


 


Anion Gap  28 H 


 


BUN  74 H 


 


Creatinine  4.7 H 


 


Est GFR ( Amer)  11 


 


Est GFR (Non-Af Amer)  9 


 


POC Glucose (mg/dL)  


 


Random Glucose  559 H* D 


 


Calcium  8.0 L 


 


Total Bilirubin  1.6 H 


 


AST  193 H 


 


ALT  54 H D 


 


Alkaline Phosphatase  96 


 


Troponin I  103.0000 H* 


 


NT-Pro-B Natriuret Pep  71935 H 


 


Total Protein  7.3 


 


Albumin  3.6 


 


Globulin  3.7 


 


Albumin/Globulin Ratio  1.0 


 


Venous Blood Potassium   5.8 H


 


Serum Ketones  Negative 











EKG/Cardiology Studies: 


Cardiology / EKG Studies





03/01/18 20:10


ELECTROCARDIOGRAM Stat 


   Comment: 


   Mode Of Transportation: BED


   Reason For Exam: chest pain





03/01/18 20:22


ELECTROCARDIOGRAM Stat 


   Comment: 


   Mode Of Transportation: BED


   Reason For Exam: chest pain











Fingerstick Blood Sugar Results: 467





Review of Systems





- Review of Systems


Systems not reviewed;Unavailable: Altered Mental Status





- Constitutional


Constitutional: Weakness, Malaise.  absent: Fever, Chills, Sweats





- Cardiovascular


Cardiovascular: Chest Pain





- Respiratory


Respiratory: Cough, Dyspnea, Dyspnea on Exertion.  absent: Hemoptysis





- Gastrointestinal


Gastrointestinal: absent: Abdominal Pain, Hematemesis, Melena, Nausea, Vomiting





Critical Care Progress Note





- Extremities/Vascular


Does the Patient need a Central Venous Catheter?: No


Does the Patient have a Brewster Catheter?: No


Does the Patient need a Brewster Catheter?: No





Assessment/Plan


(1) NSTEMI (non-ST elevated myocardial infarction)


Current Visit: Yes   Status: Acute   Comment: 


Troponin elevated 103, EKG with no acute changes, patient is chest pain free


NSREMI Vs Myopcarditis


Cardiology consult


Aspirin 81mg daily 


Crestor 5mg PO HS


Heparin Drip 


F/U serial TROP, and EKG


ECHO   





(2) Acute on chronic renal failure


Current Visit: Yes   Status: Acute   Comment: - Acute kidney injury with 

Hyperkalemia, mostly prerenal disease also sec to sepsis and cardioreal need to 

R/O obstruction.


- Metabolic acidosis (anion gap)


- Hyperkalemia, EKG without peaked t-waves.


- Repeat Labs, EKG, lactic acid, CPK, phosphate


- Place a brewster and monitor urine outpu


- po bicarbonate


- Renal (low K) diet


- Cont outpatient meds (adjust for GFR)   





(3) Hyperglycemia without ketosis


Current Visit: Yes   Status: Acute   Comment: 


Insulin drip


Acch check Q 1H   





(4) Metabolic acidosis


Current Visit: Yes   Status: Acute   





(5) Pleural effusion


Current Visit: Yes   Status: Acute   





(6) Pneumonia


Current Visit: Yes   Status: Acute   Comment: 


IV Vanco and Avelox


F/U Blood Cx   





(7) Altered mental status


Current Visit: No   Status: Acute   





(8) Congestive heart failure


Current Visit: Yes   Status: Acute

## 2018-03-02 NOTE — CP.PCM.CON
History of Present Illness





- History of Present Illness


History of Present Illness: 





Vascular Surgery Consult note. Dr. Alvarado





History obtained from chart review due to current condition. 





78yo F with PMHx of CKD, DM, HTN, HLD, Colonic malignancy s/p resection  

here for AMS. Vascular surgery consulted for HD access and evaluation of left 

AVF. Patient is also with NSTEMI with episodes of sustained VTACH, s/p ACLS and 

ROSC this evening. Patient also with evidence of acute on chronic kidney 

disease. Recent ECHO with evidence of severely decreased EF (30%). Patient is 

acidotic and is on a bicarb drip. Patient's family is at bedside and requesting 

full code status. Failed Attempts at HD access bilateral femoral veins. 

Currently on heparin drip for NSTEMI. 


12-point ROS unable to be obtained due to patient's current status. 





PMHx: CKD, DM, HTN, HLD, colonic malignancy, Severely decreased EF, MI, TIA, PAD

, CHF, asthma, arthritis


PSHx: Colonic malignancy s/p resection (unknown type). Left AVF


Family Hx: Mother-  of CA (unknown type)


Social Hx: Lives alone in [place]. Former heavy tobacco use


Allergy: PCNs








Review of Systems





- Review of Systems


Systems not reviewed;Unavailable: Acuity of Condition, Unstable Vital Signs, 

Altered Mental Status, Intubated





Past Patient History





- Infectious Disease


Hx of Infectious Diseases: None





- Tetanus Immunizations


Tetanus Immunization: Unknown





- Past Medical History & Family History


Past Medical History?: Yes





- Past Social History


Smoking Status: Former Smoker





- CARDIAC


Hx Hypercholesterolemia: Yes


Hx Hypertension: Yes


Hx Peripheral Edema: Yes





- PULMONARY


Hx Asthma: Yes (Dx 15 yrs ago,does not use home o2 anymore)


Hx Pneumonia: Yes (x2)





- NEUROLOGICAL


Hx Transient Ischemic Attacks (TIA): Yes (x3 about 20 yrs ago, 10 yrs ago, 7 

yrs ago)





- HEENT


Hx HEENT Problems: Yes


Hx Cataracts: Yes





- RENAL


Hx Chronic Kidney Disease: Yes





- ENDOCRINE/METABOLIC


Hx Diabetes Mellitus Type 1: Yes





- HEMATOLOGICAL/ONCOLOGICAL


Hx Anemia: Yes





- INTEGUMENTARY


Hx Dermatological Problems: No





- MUSCULOSKELETAL/RHEUMATOLOGICAL


Hx Arthritis: Yes


Hx Fractures: Yes (Right ankle)





- GASTROINTESTINAL


Hx Gastrointestinal Disorders: Yes


Other/Comment: Hx colon cancer





- GENITOURINARY/GYNECOLOGICAL


Hx Genitourinary Disorders: Yes


Other/Comment: Colon CA





- PSYCHIATRIC


Hx Depression: No


Hx Substance Use: No





- SURGICAL HISTORY


Hx Cataract Extraction: Yes (LEFT)





- ANESTHESIA


Hx Anesthesia: Yes


Hx Anesthesia Reactions: No


Hx Malignant Hyperthermia: No





Meds


Allergies/Adverse Reactions: 


 Allergies











Allergy/AdvReac Type Severity Reaction Status Date / Time


 


Penicillins Allergy Intermediate RASH Verified 18 20:11














- Medications


Medications: 


 Current Medications





Aspirin (Aspirin Chewable)  81 mg PO DAILY [facility]


   Last Admin: 18 09:49 Dose:  81 mg


Calcium Acetate (Phoslo)  667 mg PO TID [facility]


   Last Admin: 18 18:00 Dose:  Not Given


Epoetin Vamsi (Procrit)  4,000 unit IV MWF [facility]


   Last Admin: 18 23:34 Dose:  Not Given


Ergocalciferol (Drisdol 50,000 Intl Units Cap)  1 cap PO Q7D [facility]


Ferrous Gluconate (Fergon)  324 mg PO TID [facility]


Heparin Sodium/Sodium Chloride (Heparin 61824 Units/250ml 1/2 Normal Saline)  25

,000 units in 250 mls @ 7.076 mls/hr IV .Q24H PRN; Protocol; 12 UNITS/KG/HR


   PRN Reason: PROTOCOL


   Last Titration: 18 19:00 Dose:  20 units/kg/hr, 11.793 mls/hr


Moxifloxacin HCl (Avelox Iv 400mg/250ml Ns)  400 mg in 250 mls @ 167 mls/hr 

IVPB Q24H [facility]


Norepinephrine Bitartrate 8 mg (/ Sodium Chloride)  258 mls @ 7.74 mls/hr IV 

.Q24H PRN; Protocol; 4 MCG/MIN


   PRN Reason: TITRATE PER MD ORDER


   Last Titration: 18 23:34 Dose:  25 mcg/min, 48.37 mls/hr


Sodium Bicarbonate 150 meq/ (Dextrose)  500 mls @ 40 mls/hr IV .[id number] [facility]


   Stop: 18 09:29


   Last Admin: 18 21:27 Dose:  40 mls/hr


Amiodarone HCl 900 mg/ (Dextrose)  500 mls @ 33.33 mls/hr IV .Q15H1M [facility]; 1 MG/

MIN


   PRN Reason: Protocol


   Stop: 18 03:00


   Last Admin: 18 21:32 Dose:  33.33 mls/hr


Amiodarone HCl 900 mg/ (Dextrose)  500 mls @ 16.66 mls/hr IV .Q24H [facility]; 0.5 MG/

MIN


   PRN Reason: Protocol


   Stop: 18 21:00


Insulin Aspart (Novolog)  0 unit SC Q6 [facility]


   PRN Reason: Protocol


   Last Admin: 18 18:14 Dose:  5 unit


Insulin Glargine (Lantus)  15 unit SC HS [facility]


   Last Admin: 18 23:38 Dose:  15 u


Metoprolol Tartrate (Lopressor)  25 mg PO BID [facility]


   Last Admin: 18 18:14 Dose:  25 mg


Nitroglycerin (Nitrostat Sl Tab)  0.4 mg SL Q5M PRN


   PRN Reason: Other


Pantoprazole Sodium (Protonix Inj)  40 mg IVP DAILY [facility]


Rosuvastatin Calcium (Crestor)  5 mg PO HS [facility]


   Last Admin: 18 01:29 Dose:  5 mg


Vitamin B Complex/Vit C/Folic Acid (Nephro-Nneka)  1 tab PO 0800 [facility]











Physical Exam





- Constitutional


Appears: Older Than Stated Age, Chronically Ill





- Head Exam


Head Exam: ATRAUMATIC, NORMAL INSPECTION, NORMOCEPHALIC





- ENT Exam


Additional comments: 





intubated and on vent





- Respiratory Exam


Respiratory Exam: absent: Accessory Muscle Use


Additional comments: 





intubated and on vent





- GI/Abdominal Exam


GI & Abdominal Exam: Soft.  absent: Distended, Firm, Guarding, Rebound, Rigid, 

Tenderness





- Extremities Exam


Additional comments: 





Bilateral palpable femoral pulses. RIJ TLC in place





- Neurological Exam


Additional comments: 





intubated





Results





- Vital Signs


Recent Vital Signs: 


 Last Vital Signs











Temp  98.4 F   18 20:00


 


Pulse  75   18 23:15


 


Resp  22   18 23:15


 


BP  153/104 H  18 23:04


 


Pulse Ox  98   18 23:15














- Labs


Result Diagrams: 


 18 20:41





 18 20:41


Labs: 


 Laboratory Results - last 24 hr











  18





  22:51 01:18 02:01


 


WBC   


 


RBC   


 


Hgb   


 


Hct   


 


MCV   


 


MCH   


 


MCHC   


 


RDW   


 


Plt Count   


 


MPV   


 


Neut % (Auto)   


 


Lymph % (Auto)   


 


Mono % (Auto)   


 


Eos % (Auto)   


 


Baso % (Auto)   


 


Neut # (Auto)   


 


Lymph # (Auto)   


 


Mono # (Auto)   


 


Eos # (Auto)   


 


Baso # (Auto)   


 


Neutrophils % (Manual)   


 


Lymphocytes % (Manual)   


 


Monocytes % (Manual)   


 


Differential Comment   


 


Platelet Estimate   


 


Hypochromasia (manual)   


 


Poikilocytosis (manual   


 


Anisocytosis (manual)   


 


Ovalocytes   


 


PT   


 


INR   


 


APTT   


 


Puncture Site   


 


pCO2   


 


pO2   


 


HCO3   


 


ABG pH   


 


ABG Total CO2   


 


ABG O2 Saturation   


 


ABG Base Excess   


 


ABG Hemoglobin   


 


ABG Carboxyhemoglobin   


 


POC ABG HHb (Measured)   


 


ABG Methemoglobin   


 


Jesus Test   


 


A-a O2 Difference   


 


Respiratory Index   


 


Hgb O2 Saturation   


 


Vent Mode   


 


Mechanical Rate   


 


FiO2   


 


Tidal Volume   


 


PEEP   


 


Crit Value Called To   


 


Crit Value Called By   


 


Crit Value Read Back   


 


Blood Gas Notified Time   


 


Sodium   


 


Potassium  6.5 H* D  


 


Chloride   


 


Carbon Dioxide  11 L* D  


 


Anion Gap  28 H  


 


BUN   


 


Creatinine   


 


Est GFR ( Amer)   


 


Est GFR (Non-Af Amer)   


 


POC Glucose (mg/dL)   362 H  374 H


 


Random Glucose  559 H* D  


 


Lactic Acid   


 


Calcium   


 


Phosphorus   


 


Magnesium   


 


Iron   


 


TIBC   


 


% Saturation   


 


Ferritin   


 


Troponin I  103.0000 H*  


 


NT-Pro-B Natriuret Pep  43350 H  


 


Triglycerides   


 


Cholesterol   


 


LDL Cholesterol Direct   


 


HDL Cholesterol   


 


25-OH Vitamin D Total   


 


Procalcitonin   


 


Vancomycin Trough   


 


Hep Bs Antigen   


 


Hep Bs Antibody   


 


Hep B Core IgM Ab   


 


Hepatitis C Antibody   














  18





  02:17 03:49 03:49


 


WBC   15.7 H 


 


RBC   3.48 L 


 


Hgb   9.6 L 


 


Hct   29.6 L 


 


MCV   85.2  D 


 


MCH   27.7 


 


MCHC   32.5 L 


 


RDW   16.2 H 


 


Plt Count   143 


 


MPV   8.9 


 


Neut % (Auto)   90.5 H 


 


Lymph % (Auto)   3.4 L 


 


Mono % (Auto)   5.6 


 


Eos % (Auto)   0.0 


 


Baso % (Auto)   0.5 


 


Neut # (Auto)   14.2 H 


 


Lymph # (Auto)   0.5 L 


 


Mono # (Auto)   0.9 H 


 


Eos # (Auto)   0.0 


 


Baso # (Auto)   0.1 


 


Neutrophils % (Manual)   93 H 


 


Lymphocytes % (Manual)   4 L 


 


Monocytes % (Manual)   3 


 


Differential Comment   


 


Platelet Estimate   Normal 


 


Hypochromasia (manual)   Slight 


 


Poikilocytosis (manual   Slight 


 


Anisocytosis (manual)   Slight 


 


Ovalocytes   Slight 


 


PT  14.6 H  


 


INR  1.3  


 


APTT  29  


 


Puncture Site   


 


pCO2   


 


pO2   


 


HCO3   


 


ABG pH   


 


ABG Total CO2   


 


ABG O2 Saturation   


 


ABG Base Excess   


 


ABG Hemoglobin   


 


ABG Carboxyhemoglobin   


 


POC ABG HHb (Measured)   


 


ABG Methemoglobin   


 


Jesus Test   


 


A-a O2 Difference   


 


Respiratory Index   


 


Hgb O2 Saturation   


 


Vent Mode   


 


Mechanical Rate   


 


FiO2   


 


Tidal Volume   


 


PEEP   


 


Crit Value Called To   


 


Crit Value Called By   


 


Crit Value Read Back   


 


Blood Gas Notified Time   


 


Sodium    139


 


Potassium    5.9 H


 


Chloride    106


 


Carbon Dioxide    16 L


 


Anion Gap    24 H


 


BUN    79 H


 


Creatinine    5.2 H


 


Est GFR ( Amer)    10


 


Est GFR (Non-Af Amer)    8


 


POC Glucose (mg/dL)   


 


Random Glucose    415 H* D


 


Lactic Acid   


 


Calcium    8.6


 


Phosphorus    5.8 H


 


Magnesium    2.1


 


Iron   


 


TIBC   


 


% Saturation   


 


Ferritin   


 


Troponin I    96.9000 H*


 


NT-Pro-B Natriuret Pep   


 


Triglycerides   


 


Cholesterol   


 


LDL Cholesterol Direct   


 


HDL Cholesterol   


 


25-OH Vitamin D Total   


 


Procalcitonin   


 


Vancomycin Trough   


 


Hep Bs Antigen   


 


Hep Bs Antibody   


 


Hep B Core IgM Ab   


 


Hepatitis C Antibody   














  18





  03:49 05:00 07:47


 


WBC   


 


RBC   


 


Hgb   


 


Hct   


 


MCV   


 


MCH   


 


MCHC   


 


RDW   


 


Plt Count   


 


MPV   


 


Neut % (Auto)   


 


Lymph % (Auto)   


 


Mono % (Auto)   


 


Eos % (Auto)   


 


Baso % (Auto)   


 


Neut # (Auto)   


 


Lymph # (Auto)   


 


Mono # (Auto)   


 


Eos # (Auto)   


 


Baso # (Auto)   


 


Neutrophils % (Manual)   


 


Lymphocytes % (Manual)   


 


Monocytes % (Manual)   


 


Differential Comment   


 


Platelet Estimate   


 


Hypochromasia (manual)   


 


Poikilocytosis (manual   


 


Anisocytosis (manual)   


 


Ovalocytes   


 


PT   


 


INR   


 


APTT   


 


Puncture Site   


 


pCO2   


 


pO2   


 


HCO3   


 


ABG pH   


 


ABG Total CO2   


 


ABG O2 Saturation   


 


ABG Base Excess   


 


ABG Hemoglobin   


 


ABG Carboxyhemoglobin   


 


POC ABG HHb (Measured)   


 


ABG Methemoglobin   


 


Jesus Test   


 


A-a O2 Difference   


 


Respiratory Index   


 


Hgb O2 Saturation   


 


Vent Mode   


 


Mechanical Rate   


 


FiO2   


 


Tidal Volume   


 


PEEP   


 


Crit Value Called To   


 


Crit Value Called By   


 


Crit Value Read Back   


 


Blood Gas Notified Time   


 


Sodium   


 


Potassium   


 


Chloride   


 


Carbon Dioxide   


 


Anion Gap   


 


BUN   


 


Creatinine   


 


Est GFR ( Amer)   


 


Est GFR (Non-Af Amer)   


 


POC Glucose (mg/dL)    349 H


 


Random Glucose   


 


Lactic Acid  3.4 H  


 


Calcium   


 


Phosphorus   


 


Magnesium   


 


Iron   


 


TIBC   


 


% Saturation   


 


Ferritin   


 


Troponin I   


 


NT-Pro-B Natriuret Pep   


 


Triglycerides   170 H D 


 


Cholesterol   228 H 


 


LDL Cholesterol Direct   68 


 


HDL Cholesterol   61 


 


25-OH Vitamin D Total   


 


Procalcitonin   


 


Vancomycin Trough   


 


Hep Bs Antigen   


 


Hep Bs Antibody   


 


Hep B Core IgM Ab   


 


Hepatitis C Antibody   














  18





  08:34 11:15 12:19


 


WBC   


 


RBC   


 


Hgb   


 


Hct   


 


MCV   


 


MCH   


 


MCHC   


 


RDW   


 


Plt Count   


 


MPV   


 


Neut % (Auto)   


 


Lymph % (Auto)   


 


Mono % (Auto)   


 


Eos % (Auto)   


 


Baso % (Auto)   


 


Neut # (Auto)   


 


Lymph # (Auto)   


 


Mono # (Auto)   


 


Eos # (Auto)   


 


Baso # (Auto)   


 


Neutrophils % (Manual)   


 


Lymphocytes % (Manual)   


 


Monocytes % (Manual)   


 


Differential Comment   


 


Platelet Estimate   


 


Hypochromasia (manual)   


 


Poikilocytosis (manual   


 


Anisocytosis (manual)   


 


Ovalocytes   


 


PT   


 


INR   


 


APTT    31


 


Puncture Site   


 


pCO2   


 


pO2   


 


HCO3   


 


ABG pH   


 


ABG Total CO2   


 


ABG O2 Saturation   


 


ABG Base Excess   


 


ABG Hemoglobin   


 


ABG Carboxyhemoglobin   


 


POC ABG HHb (Measured)   


 


ABG Methemoglobin   


 


Jesus Test   


 


A-a O2 Difference   


 


Respiratory Index   


 


Hgb O2 Saturation   


 


Vent Mode   


 


Mechanical Rate   


 


FiO2   


 


Tidal Volume   


 


PEEP   


 


Crit Value Called To   


 


Crit Value Called By   


 


Crit Value Read Back   


 


Blood Gas Notified Time   


 


Sodium   


 


Potassium   


 


Chloride   


 


Carbon Dioxide   


 


Anion Gap   


 


BUN   


 


Creatinine   


 


Est GFR ( Amer)   


 


Est GFR (Non-Af Amer)   


 


POC Glucose (mg/dL)   345 H 


 


Random Glucose   


 


Lactic Acid   


 


Calcium   


 


Phosphorus   


 


Magnesium   


 


Iron   


 


TIBC   


 


% Saturation   


 


Ferritin   


 


Troponin I   


 


NT-Pro-B Natriuret Pep   


 


Triglycerides   


 


Cholesterol   


 


LDL Cholesterol Direct   


 


HDL Cholesterol   


 


25-OH Vitamin D Total   


 


Procalcitonin  54.73 H  


 


Vancomycin Trough   


 


Hep Bs Antigen   


 


Hep Bs Antibody   


 


Hep B Core IgM Ab   


 


Hepatitis C Antibody   














  18





  12:19 12:19 12:19


 


WBC   


 


RBC   


 


Hgb   


 


Hct   


 


MCV   


 


MCH   


 


MCHC   


 


RDW   


 


Plt Count   


 


MPV   


 


Neut % (Auto)   


 


Lymph % (Auto)   


 


Mono % (Auto)   


 


Eos % (Auto)   


 


Baso % (Auto)   


 


Neut # (Auto)   


 


Lymph # (Auto)   


 


Mono # (Auto)   


 


Eos # (Auto)   


 


Baso # (Auto)   


 


Neutrophils % (Manual)   


 


Lymphocytes % (Manual)   


 


Monocytes % (Manual)   


 


Differential Comment   


 


Platelet Estimate   


 


Hypochromasia (manual)   


 


Poikilocytosis (manual   


 


Anisocytosis (manual)   


 


Ovalocytes   


 


PT   


 


INR   


 


APTT   


 


Puncture Site   


 


pCO2   


 


pO2   


 


HCO3   


 


ABG pH   


 


ABG Total CO2   


 


ABG O2 Saturation   


 


ABG Base Excess   


 


ABG Hemoglobin   


 


ABG Carboxyhemoglobin   


 


POC ABG HHb (Measured)   


 


ABG Methemoglobin   


 


Jesus Test   


 


A-a O2 Difference   


 


Respiratory Index   


 


Hgb O2 Saturation   


 


Vent Mode   


 


Mechanical Rate   


 


FiO2   


 


Tidal Volume   


 


PEEP   


 


Crit Value Called To   


 


Crit Value Called By   


 


Crit Value Read Back   


 


Blood Gas Notified Time   


 


Sodium   


 


Potassium   


 


Chloride   


 


Carbon Dioxide   


 


Anion Gap   


 


BUN   


 


Creatinine   


 


Est GFR ( Amer)   


 


Est GFR (Non-Af Amer)   


 


POC Glucose (mg/dL)   


 


Random Glucose   


 


Lactic Acid   


 


Calcium   


 


Phosphorus   


 


Magnesium   


 


Iron  18 L  


 


TIBC  196 L  


 


% Saturation  9 L  


 


Ferritin   195.0 


 


Troponin I   


 


NT-Pro-B Natriuret Pep   


 


Triglycerides   


 


Cholesterol   


 


LDL Cholesterol Direct   


 


HDL Cholesterol   


 


25-OH Vitamin D Total  27.1 L  


 


Procalcitonin   


 


Vancomycin Trough   


 


Hep Bs Antigen   Negative 


 


Hep Bs Antibody    Negative


 


Hep B Core IgM Ab   Negative 


 


Hepatitis C Antibody   Negative 














  18





  16:13 17:41 18:25


 


WBC   


 


RBC   


 


Hgb   


 


Hct   


 


MCV   


 


MCH   


 


MCHC   


 


RDW   


 


Plt Count   


 


MPV   


 


Neut % (Auto)   


 


Lymph % (Auto)   


 


Mono % (Auto)   


 


Eos % (Auto)   


 


Baso % (Auto)   


 


Neut # (Auto)   


 


Lymph # (Auto)   


 


Mono # (Auto)   


 


Eos # (Auto)   


 


Baso # (Auto)   


 


Neutrophils % (Manual)   


 


Lymphocytes % (Manual)   


 


Monocytes % (Manual)   


 


Differential Comment   


 


Platelet Estimate   


 


Hypochromasia (manual)   


 


Poikilocytosis (manual   


 


Anisocytosis (manual)   


 


Ovalocytes   


 


PT   


 


INR   


 


APTT    40 H D


 


Puncture Site   


 


pCO2   


 


pO2   


 


HCO3   


 


ABG pH   


 


ABG Total CO2   


 


ABG O2 Saturation   


 


ABG Base Excess   


 


ABG Hemoglobin   


 


ABG Carboxyhemoglobin   


 


POC ABG HHb (Measured)   


 


ABG Methemoglobin   


 


Jesus Test   


 


A-a O2 Difference   


 


Respiratory Index   


 


Hgb O2 Saturation   


 


Vent Mode   


 


Mechanical Rate   


 


FiO2   


 


Tidal Volume   


 


PEEP   


 


Crit Value Called To   


 


Crit Value Called By   


 


Crit Value Read Back   


 


Blood Gas Notified Time   


 


Sodium   


 


Potassium   


 


Chloride   


 


Carbon Dioxide   


 


Anion Gap   


 


BUN   


 


Creatinine   


 


Est GFR ( Amer)   


 


Est GFR (Non-Af Amer)   


 


POC Glucose (mg/dL)  384 H  


 


Random Glucose   


 


Lactic Acid   


 


Calcium   


 


Phosphorus   


 


Magnesium   


 


Iron   


 


TIBC   


 


% Saturation   


 


Ferritin   


 


Troponin I   


 


NT-Pro-B Natriuret Pep   


 


Triglycerides   


 


Cholesterol   


 


LDL Cholesterol Direct   


 


HDL Cholesterol   


 


25-OH Vitamin D Total   


 


Procalcitonin   


 


Vancomycin Trough   < 5.0 L 


 


Hep Bs Antigen   


 


Hep Bs Antibody   


 


Hep B Core IgM Ab   


 


Hepatitis C Antibody   














  18





  20:30 20:41 20:41


 


WBC   16.1 H 


 


RBC   3.31 L 


 


Hgb   9.2 L 


 


Hct   29.2 L 


 


MCV   88.2  D 


 


MCH   27.8 


 


MCHC   31.5 L 


 


RDW   16.6 H 


 


Plt Count   127 L 


 


MPV   9.2 


 


Neut % (Auto)   


 


Lymph % (Auto)   


 


Mono % (Auto)   


 


Eos % (Auto)   


 


Baso % (Auto)   


 


Neut # (Auto)   


 


Lymph # (Auto)   


 


Mono # (Auto)   


 


Eos # (Auto)   


 


Baso # (Auto)   


 


Neutrophils % (Manual)   


 


Lymphocytes % (Manual)   


 


Monocytes % (Manual)   


 


Differential Comment    


 


Platelet Estimate   


 


Hypochromasia (manual)   


 


Poikilocytosis (manual   


 


Anisocytosis (manual)   


 


Ovalocytes   


 


PT   


 


INR   


 


APTT   


 


Puncture Site  Rba  


 


pCO2  26 L  


 


pO2  202 H  


 


HCO3  10.7 L  


 


ABG pH  7.15 L*  


 


ABG Total CO2  9.9 L  


 


ABG O2 Saturation  99.7 H  


 


ABG Base Excess  -18.2 L  


 


ABG Hemoglobin  11.4 L  


 


ABG Carboxyhemoglobin  1.7 H  


 


POC ABG HHb (Measured)  0.3  


 


ABG Methemoglobin  1.5  


 


Jesus Test  Pos  


 


A-a O2 Difference  479.0  


 


Respiratory Index  2.4  


 


Hgb O2 Saturation  96.5  


 


Vent Mode  Prvc  


 


Mechanical Rate  14  


 


FiO2  100.0  


 


Tidal Volume  500  


 


PEEP  5  


 


Crit Value Called To  Dr liu  


 


Crit Value Called By  [facility]  


 


Crit Value Read Back  Y  


 


Blood Gas Notified Time    


 


Sodium    146


 


Potassium    5.1


 


Chloride    108 H


 


Carbon Dioxide    12 L


 


Anion Gap    31 H


 


BUN    91 H


 


Creatinine    6.2 H


 


Est GFR ( Amer)    8


 


Est GFR (Non-Af Amer)    7


 


POC Glucose (mg/dL)   


 


Random Glucose    333 H


 


Lactic Acid   


 


Calcium    8.4 L


 


Phosphorus   


 


Magnesium    2.5 H


 


Iron   


 


TIBC   


 


% Saturation   


 


Ferritin   


 


Troponin I    79.7000 H*


 


NT-Pro-B Natriuret Pep   


 


Triglycerides   


 


Cholesterol   


 


LDL Cholesterol Direct   


 


HDL Cholesterol   


 


25-OH Vitamin D Total   


 


Procalcitonin   


 


Vancomycin Trough   


 


Hep Bs Antigen   


 


Hep Bs Antibody   


 


Hep B Core IgM Ab   


 


Hepatitis C Antibody   














Assessment & Plan





- Assessment and Plan (Free Text)


Assessment: 





78yo F with ESRD. Vascular surgery consulted for HD access and Left AVF 

evaluation. 


Plan: 





- Will obtain US for left AVF evaluation.


- Possible vascular surg intervention for temp HD access


- We will follow


- Medical mgmt as per primary and ICU teams





Further recs as per Dr. Jenny Delarosa PGY1


surgery pager: [phone]

## 2018-03-02 NOTE — PCM.SEPTIC
Sepsis Progress Note





- Reassessment Type


Date of Evaluation: 03/02/18


Time of Evaluation: 03:00


Reassessment Type: Non-invasive reassessment





- Non Invasive Reassessment


Were the most recent vital sign reviewed: Yes


Vital Sign (Latest): 


 











Temp Pulse Resp BP Pulse Ox


 


 98.2 F   101 H  18   133/76   100 


 


 03/02/18 04:00  03/02/18 06:00  03/02/18 06:00  03/02/18 05:50  03/02/18 06:00








Cardiovascular: Yes: Regular Rate, Rhythm, Murmur.  No: Chest Non Tender, Edema

, Gallop, JVD, Bradycardia, Tachycardia


Respiratory: Yes: Normal Breath Sounds.  No: Crackles, Rales


Capillary Refill: Normal (Less than 2 sec)


Pulses: Normal Radial, Normal Dorsalis Pedis, Normal Posterior Tibialis


Skin: Warm

## 2018-03-02 NOTE — PCM.PROC
Procedures


Attestation:: I certify that I have explained the specified Operation(s) or 

Procedure(s), risks, benefits and reasonable alternatives to the Patient and/or 

other person responsible. The opportunity was given to ask questions and all 

questions answered





- Central Line Placement


  ** Right Internal Jugular Triple Lumen Catheter


Aseptic technique was employed throughout the procedure: Full sterile barriers (

mask, hair cover, sterile gown, sterile gloves), Chloraprep Antiseptic: 30 

second prep for IJ or SC sites


CVP Time Out Performed: Yes


Pt. Placed on Pulse Ox Monitor: Yes


Central Line Prep: Chlorhexidine-Alcohol Combination


Local Anesthesia Used: Lidocaine 1%


Ultrasound Used for Placement: Yes


Central Line Lumen Inserted: triple


Central Line Length: 20 cm


Post Procedure: Sutured in Place, Good Blood Return, All Ports Aspirated, 

Flushed, Capped, Sterile Dressing Applied


Secured by: Suture


Post procedure dressing: Clear vapor permeable, Chlorhexidine disc (Biopatch)


Post Procedure X-Ray: Yes


Patient Tolerated Procedure: Well, No Complications


Immediate Complications: None

## 2018-03-02 NOTE — CP.PCM.HP
<Doug Glover RENEE - Last Filed: 18 03:47>





History of Present Illness





- History of Present Illness


History of Present Illness: 


All history was obtained from medical record and patient's daughter at bedside, 

secondary to patient's mental status. 





CC: altered mental status 





HPI: Patient is a 77 year old  female with a PMH significant 

for CKD stage V (not on dialysis), Diastolic CHF, DM, HTN, hypercholesterolemia

, MI (many years ago, no known intervention), TIA x 3 (years ago), colon CA who 

presents for altered mental status. 





Patient's son noted her blood sugar to be high today (500+) and gave her 

insulin. Patient's daughter came to check on her at 4pm and noted her to be "

not acting right" and "looking at her funny." Blood sugar was noted to be 500+ 

at that time, and patient's daughter decided to bring her to the hospital. 

Daughter states that patient began complaining of chest pain yesterday, 

accompanied by chills, abdominal pain and dry cough. No fever at home. She also 

notes recent weakness, headache, dizziness, and decreased appetite. Patient had 

an AV graft placed in 2016 but she has never had dialysis (unclear why)

. Patient still makes urine; daughter states that she last urinated and had a 

bowel movement this morning. 





Daughter denies any recent illness, sick contacts, vomiting, melena/hematochezia

, bruising/bleeding, leg swelling. 





PMD: Yuli 


PMH: ESRD (not yet on dialysis), DM, HTN, hypercholesterolemia, MI (many years 

ago, no known intervention), TIA x 3 (years ago), PAD, CHF, asthma, colon CA in 

 (treated with chemotherapy and surgery), arthritis


PSH: unknown surgery for colon CA (); endoscopy 


Medications: Aspirin 81mg PO daily, Amlodipine (Norvasc) 10mg PO daily, 

Lisinopril (Zestril) 10mg PO daily, Labetalol Hydrochloride (Normodyne) 300mg 

PO BID, Lantus 15 unit SC HS, Novolog 3 unit SC TIDAC, Furosemide (Lasix) 40mg 

PO daily, Ferrous Sulfate (Feosol) 325mg PO BID, Calcitriol (Rocaltrol) 0.25 

mcg PO MWF


Allergies: Penicillin (rash)


Family History: Limited; Mother- of cancer; Father- for unknown reason. 


Social History: Lives alone in [place] (son and daughter visit daily to 

assist); ambulates without assistance; at baseline is able to walk around and 

cook without difficulty; retired; former smoker (25+ pack years); no current 

ETOH use; no current recreational drug use 


Code status: Full Code


No advance directive


In the event of emergency, daughter Huber Corral () will make 

healthcare decisions. Patient's son is also involved in her care: Bryle Burke (

[phone]) 








Present on Admission





- Present on Admission


Any Indicators Present on Admission: No





Review of Systems





- Review of Systems


Systems not reviewed;Unavailable: Altered Mental Status





Past Patient History





- Infectious Disease


Hx of Infectious Diseases: None





- Tetanus Immunizations


Tetanus Immunization: Unknown





- Past Medical History & Family History


Past Medical History?: Yes





- Past Social History


Smoking Status: Former Smoker





- CARDIAC


Hx Hypercholesterolemia: Yes


Hx Hypertension: Yes


Hx Peripheral Edema: Yes





- PULMONARY


Hx Asthma: Yes (Dx 15 yrs ago,does not use home o2 anymore)


Hx Pneumonia: Yes (x2)





- NEUROLOGICAL


Hx Transient Ischemic Attacks (TIA): Yes (x3 about 20 yrs ago, 10 yrs ago, 7 

yrs ago)





- HEENT


Hx HEENT Problems: Yes


Hx Cataracts: Yes





- RENAL


Hx Chronic Kidney Disease: Yes





- ENDOCRINE/METABOLIC


Hx Diabetes Mellitus Type 1: Yes





- HEMATOLOGICAL/ONCOLOGICAL


Hx Anemia: Yes





- INTEGUMENTARY


Hx Dermatological Problems: No





- MUSCULOSKELETAL/RHEUMATOLOGICAL


Hx Arthritis: Yes


Hx Fractures: Yes (Right ankle)





- GASTROINTESTINAL


Hx Gastrointestinal Disorders: Yes


Other/Comment: Hx colon cancer





- GENITOURINARY/GYNECOLOGICAL


Hx Genitourinary Disorders: Yes


Other/Comment: Colon CA





- PSYCHIATRIC


Hx Depression: No


Hx Substance Use: No





- SURGICAL HISTORY


Hx Cataract Extraction: Yes (LEFT)





- ANESTHESIA


Hx Anesthesia: Yes


Hx Anesthesia Reactions: No


Hx Malignant Hyperthermia: No





Meds


Allergies/Adverse Reactions: 


 Allergies











Allergy/AdvReac Type Severity Reaction Status Date / Time


 


Penicillins Allergy Intermediate RASH Verified 18 20:11














Physical Exam





- Additional Findings


Additional findings: 


Physical Exam: 





Gen: drowsy, arousable to voice and touch but quickly falls asleep





HEENT: NCAT, mucosal membranes dry





Skin: (+) swelling of eyelids; (+) multiple hypopigmented areas to abdomen; (+) 

leathery skin to bilateral legs and feet. 





Cardio: tachycardic; +S1, S2; 2/6 systolic ejection murmur to right sternal 

border





Resp: diffuse wheezing and rales, most prominent in bases bilaterally 





Abd: Soft, nondistended. Bowel sounds present. Midline vertical healed surgical 

scar. 





Extremities: Radial pulses present bilaterally. DP/PT pulses intact but weak. 

Lower extremities cool to touch. 





Neuro: awake but not alert; not able to follow commands








Results





- Vital Signs


Recent Vital Signs: 





 Last Vital Signs











Temp  98 F   18 00:47


 


Pulse  91 H  18 00:47


 


Resp  23   18 00:47


 


BP  105/56 L  18 00:47


 


Pulse Ox  100   18 00:47














- Labs


Result Diagrams: 


 18 22:51





 18 22:51


Labs: 





 Laboratory Results - last 24 hr











  18





  22:18 22:51 22:51


 


WBC   16.3 H D 


 


RBC   3.72 L 


 


Hgb   10.3 L 


 


Hct   32.5 L 


 


MCV   87.4 


 


MCH   27.7 


 


MCHC   31.6 L 


 


RDW   16.7 H 


 


Plt Count   156 


 


MPV   9.4 


 


Neut % (Auto)   90.5 H 


 


Lymph % (Auto)   3.8 L 


 


Mono % (Auto)   4.8 


 


Eos % (Auto)   0.0 


 


Baso % (Auto)   0.9 


 


Neut # (Auto)   14.8 H 


 


Lymph # (Auto)   0.6 L 


 


Mono # (Auto)   0.8 


 


Eos # (Auto)   0.0 


 


Baso # (Auto)   0.1 


 


Neutrophils % (Manual)   92 H 


 


Lymphocytes % (Manual)   5 L 


 


Monocytes % (Manual)   3 


 


Platelet Estimate   Normal 


 


D-Dimer, Quantitative    4388 H


 


pO2   


 


VBG pH   


 


VBG pCO2   


 


VBG HCO3   


 


VBG Total CO2   


 


VBG O2 Sat (Calc)   


 


VBG Base Excess   


 


VBG Potassium   


 


Glucose   


 


Lactate   


 


Crit Value Called To   


 


Crit Value Called By   


 


Crit Value Read Back   


 


Blood Gas Notified Time   


 


Sodium   


 


Potassium   


 


Chloride   


 


Carbon Dioxide   


 


Anion Gap   


 


BUN   


 


Creatinine   


 


Est GFR ( Amer)   


 


Est GFR (Non-Af Amer)   


 


POC Glucose (mg/dL)  467 H*  


 


Random Glucose   


 


Calcium   


 


Total Bilirubin   


 


AST   


 


ALT   


 


Alkaline Phosphatase   


 


Troponin I   


 


NT-Pro-B Natriuret Pep   


 


Total Protein   


 


Albumin   


 


Globulin   


 


Albumin/Globulin Ratio   


 


Venous Blood Potassium   


 


Serum Ketones   














  18





  22:51 23:25 01:18


 


WBC   


 


RBC   


 


Hgb   


 


Hct   


 


MCV   


 


MCH   


 


MCHC   


 


RDW   


 


Plt Count   


 


MPV   


 


Neut % (Auto)   


 


Lymph % (Auto)   


 


Mono % (Auto)   


 


Eos % (Auto)   


 


Baso % (Auto)   


 


Neut # (Auto)   


 


Lymph # (Auto)   


 


Mono # (Auto)   


 


Eos # (Auto)   


 


Baso # (Auto)   


 


Neutrophils % (Manual)   


 


Lymphocytes % (Manual)   


 


Monocytes % (Manual)   


 


Platelet Estimate   


 


D-Dimer, Quantitative   


 


pO2   16 L 


 


VBG pH   7.16 L* 


 


VBG pCO2   40 


 


VBG HCO3   11.9 


 


VBG Total CO2   15.5 L 


 


VBG O2 Sat (Calc)   35.2 L 


 


VBG Base Excess   -13.8 L 


 


VBG Potassium   5.8 H 


 


Glucose   562 H* D 


 


Lactate   2.5 H 


 


Crit Value Called To   Karmen moreno/rn 


 


Crit Value Called By   Olu gallegos/rt 


 


Crit Value Read Back   Y 


 


Blood Gas Notified Time   2335 


 


Sodium  137  139.0 


 


Potassium  6.5 H* D  


 


Chloride  105  103.0 


 


Carbon Dioxide  11 L* D  


 


Anion Gap  28 H  


 


BUN  74 H  


 


Creatinine  4.7 H  


 


Est GFR ( Amer)  11  


 


Est GFR (Non-Af Amer)  9  


 


POC Glucose (mg/dL)    362 H


 


Random Glucose  559 H* D  


 


Calcium  8.0 L  


 


Total Bilirubin  1.6 H  


 


AST  193 H  


 


ALT  54 H D  


 


Alkaline Phosphatase  96  


 


Troponin I  103.0000 H*  


 


NT-Pro-B Natriuret Pep  33493 H  


 


Total Protein  7.3  


 


Albumin  3.6  


 


Globulin  3.7  


 


Albumin/Globulin Ratio  1.0  


 


Venous Blood Potassium   5.8 H 


 


Serum Ketones  Negative  














Assessment & Plan


(1) NSTEMI (non-ST elevated myocardial infarction)


Assessment and Plan: 


Troponin ELEVATED 103


EKG NSR, no ST or T wave changes


Cardiology consult, Dr Nichole


Aspirin 325mg PO ONCE 


Heparin Drip - Cardiac Protocol


Nitorglycerin 0.4mg SL Q5M PRN


Crestor 5mg PO HS


F/U serial ROMIs


Status: Acute   





(2) Metabolic acidosis


Assessment and Plan: 


W/ respiratory compensation


Likely due to elevated Lactate, Uremia, Renal Failure


Ketones NEGATIVE


Sodium Bicarb 8.4% 50meq given in ED


Calcium gluconate 4.65meq IV ONCE


Status: Acute   





(3) Chronic kidney disease


Assessment and Plan: 


Per daughter, patient still able to make urine


Stage V CKD


Patient with placement of left AV graft in past (2016) which was thrombosed 

and repaired in (2017)


Nephrology consult, Dr Graham Wheeler


Kayexalate 30g given ONCE


At home patient takes calcitriol 0.25mcg MWF and lasix 40mg QD


Status: Acute   





(4) Diastolic CHF


Assessment and Plan: 


Last ECHO : Aortic and Mitral Valve severely thickened


* Endocarditis was ruled out at that time


W/ tricuspid regurgitation and pulmonary HTN


Home meds include amlodipine 10mg, lisinopril 10mg, labetalol 300mg BID, 

aspirin 81mg, lasix 40mg 


Status: Acute   





(5) Pleural effusion


Assessment and Plan: 


CT Chest: "Bilateral pleural effusions, moderate to large on the left and 

moderate on the right. Areas of consolidation in the left lung, most likely 

representing compressive atelectasis, although superimposed pneumonia is not 

excluded."


In ED patient received Moxifloxacin 400mg IV ONCE, Azithromycin 500mg IV ONCE, 

Lasix 40mg IV ONCE and Albuterol INH


F/U Blood Cx


Status: Acute   





(6) Insulin dependent diabetes mellitus


Assessment and Plan: 


Ketones NEGATIVE


At home patient takes Lantus 15u SC HS and Novolog 3u SC TIDAC


Status: Acute   





(7) Anemia


Assessment and Plan: 


Secondary to CKD Stage V


H/H stable from prior admissions


Monitor for drops


Status: Chronic   





(8) Hypertension


Assessment and Plan: 


BP borderline low


At home patient takes norvasc 10mg, lisinopril 10mg


Status: Chronic   





(9) Prophylactic measure


Assessment and Plan: 


On Heparin Drip


NPO for now


Status: Acute   





<Abundio Prado - Last Filed: 18 06:40>





Results





- Vital Signs


Recent Vital Signs: 





 Last Vital Signs











Temp  98 F   18 00:47


 


Pulse  101 H  18 06:00


 


Resp  18   18 06:00


 


BP  133/76   18 05:50


 


Pulse Ox  100   18 06:00














- Labs


Result Diagrams: 


 18 03:49





 18 03:49


Labs: 





 Laboratory Results - last 24 hr











  18





  22:18 22:51 22:51


 


WBC   16.3 H D 


 


RBC   3.72 L 


 


Hgb   10.3 L 


 


Hct   32.5 L 


 


MCV   87.4 


 


MCH   27.7 


 


MCHC   31.6 L 


 


RDW   16.7 H 


 


Plt Count   156 


 


MPV   9.4 


 


Neut % (Auto)   90.5 H 


 


Lymph % (Auto)   3.8 L 


 


Mono % (Auto)   4.8 


 


Eos % (Auto)   0.0 


 


Baso % (Auto)   0.9 


 


Neut # (Auto)   14.8 H 


 


Lymph # (Auto)   0.6 L 


 


Mono # (Auto)   0.8 


 


Eos # (Auto)   0.0 


 


Baso # (Auto)   0.1 


 


Neutrophils % (Manual)   92 H 


 


Lymphocytes % (Manual)   5 L 


 


Monocytes % (Manual)   3 


 


Platelet Estimate   Normal 


 


Hypochromasia (manual)   


 


Poikilocytosis (manual   


 


Anisocytosis (manual)   


 


Ovalocytes   


 


PT   


 


INR   


 


APTT   


 


D-Dimer, Quantitative    4388 H


 


pO2   


 


VBG pH   


 


VBG pCO2   


 


VBG HCO3   


 


VBG Total CO2   


 


VBG O2 Sat (Calc)   


 


VBG Base Excess   


 


VBG Potassium   


 


Glucose   


 


Lactate   


 


Crit Value Called To   


 


Crit Value Called By   


 


Crit Value Read Back   


 


Blood Gas Notified Time   


 


Sodium   


 


Potassium   


 


Chloride   


 


Carbon Dioxide   


 


Anion Gap   


 


BUN   


 


Creatinine   


 


Est GFR ( Amer)   


 


Est GFR (Non-Af Amer)   


 


POC Glucose (mg/dL)  467 H*  


 


Random Glucose   


 


Lactic Acid   


 


Calcium   


 


Phosphorus   


 


Magnesium   


 


Total Bilirubin   


 


AST   


 


ALT   


 


Alkaline Phosphatase   


 


Troponin I   


 


NT-Pro-B Natriuret Pep   


 


Total Protein   


 


Albumin   


 


Globulin   


 


Albumin/Globulin Ratio   


 


Triglycerides   


 


Cholesterol   


 


LDL Cholesterol Direct   


 


HDL Cholesterol   


 


Venous Blood Potassium   


 


Serum Ketones   














  18





  22:51 23:25 01:18


 


WBC   


 


RBC   


 


Hgb   


 


Hct   


 


MCV   


 


MCH   


 


MCHC   


 


RDW   


 


Plt Count   


 


MPV   


 


Neut % (Auto)   


 


Lymph % (Auto)   


 


Mono % (Auto)   


 


Eos % (Auto)   


 


Baso % (Auto)   


 


Neut # (Auto)   


 


Lymph # (Auto)   


 


Mono # (Auto)   


 


Eos # (Auto)   


 


Baso # (Auto)   


 


Neutrophils % (Manual)   


 


Lymphocytes % (Manual)   


 


Monocytes % (Manual)   


 


Platelet Estimate   


 


Hypochromasia (manual)   


 


Poikilocytosis (manual   


 


Anisocytosis (manual)   


 


Ovalocytes   


 


PT   


 


INR   


 


APTT   


 


D-Dimer, Quantitative   


 


pO2   16 L 


 


VBG pH   7.16 L* 


 


VBG pCO2   40 


 


VBG HCO3   11.9 


 


VBG Total CO2   15.5 L 


 


VBG O2 Sat (Calc)   35.2 L 


 


VBG Base Excess   -13.8 L 


 


VBG Potassium   5.8 H 


 


Glucose   562 H* D 


 


Lactate   2.5 H 


 


Crit Value Called To   Karmen moreno/rn 


 


Crit Value Called By   Olu gallegos/rt 


 


Crit Value Read Back   Y 


 


Blood Gas Notified Time   2335 


 


Sodium  137  139.0 


 


Potassium  6.5 H* D  


 


Chloride  105  103.0 


 


Carbon Dioxide  11 L* D  


 


Anion Gap  28 H  


 


BUN  74 H  


 


Creatinine  4.7 H  


 


Est GFR ( Amer)  11  


 


Est GFR (Non-Af Amer)  9  


 


POC Glucose (mg/dL)    362 H


 


Random Glucose  559 H* D  


 


Lactic Acid   


 


Calcium  8.0 L  


 


Phosphorus   


 


Magnesium   


 


Total Bilirubin  1.6 H  


 


AST  193 H  


 


ALT  54 H D  


 


Alkaline Phosphatase  96  


 


Troponin I  103.0000 H*  


 


NT-Pro-B Natriuret Pep  04657 H  


 


Total Protein  7.3  


 


Albumin  3.6  


 


Globulin  3.7  


 


Albumin/Globulin Ratio  1.0  


 


Triglycerides   


 


Cholesterol   


 


LDL Cholesterol Direct   


 


HDL Cholesterol   


 


Venous Blood Potassium   5.8 H 


 


Serum Ketones  Negative  














  18





  02:01 02:17 03:49


 


WBC    15.7 H


 


RBC    3.48 L


 


Hgb    9.6 L


 


Hct    29.6 L


 


MCV    85.2  D


 


MCH    27.7


 


MCHC    32.5 L


 


RDW    16.2 H


 


Plt Count    143


 


MPV    8.9


 


Neut % (Auto)    90.5 H


 


Lymph % (Auto)    3.4 L


 


Mono % (Auto)    5.6


 


Eos % (Auto)    0.0


 


Baso % (Auto)    0.5


 


Neut # (Auto)    14.2 H


 


Lymph # (Auto)    0.5 L


 


Mono # (Auto)    0.9 H


 


Eos # (Auto)    0.0


 


Baso # (Auto)    0.1


 


Neutrophils % (Manual)    93 H


 


Lymphocytes % (Manual)    4 L


 


Monocytes % (Manual)    3


 


Platelet Estimate    Normal


 


Hypochromasia (manual)    Slight


 


Poikilocytosis (manual    Slight


 


Anisocytosis (manual)    Slight


 


Ovalocytes    Slight


 


PT   14.6 H 


 


INR   1.3 


 


APTT   29 


 


D-Dimer, Quantitative   


 


pO2   


 


VBG pH   


 


VBG pCO2   


 


VBG HCO3   


 


VBG Total CO2   


 


VBG O2 Sat (Calc)   


 


VBG Base Excess   


 


VBG Potassium   


 


Glucose   


 


Lactate   


 


Crit Value Called To   


 


Crit Value Called By   


 


Crit Value Read Back   


 


Blood Gas Notified Time   


 


Sodium   


 


Potassium   


 


Chloride   


 


Carbon Dioxide   


 


Anion Gap   


 


BUN   


 


Creatinine   


 


Est GFR ( Amer)   


 


Est GFR (Non-Af Amer)   


 


POC Glucose (mg/dL)  374 H  


 


Random Glucose   


 


Lactic Acid   


 


Calcium   


 


Phosphorus   


 


Magnesium   


 


Total Bilirubin   


 


AST   


 


ALT   


 


Alkaline Phosphatase   


 


Troponin I   


 


NT-Pro-B Natriuret Pep   


 


Total Protein   


 


Albumin   


 


Globulin   


 


Albumin/Globulin Ratio   


 


Triglycerides   


 


Cholesterol   


 


LDL Cholesterol Direct   


 


HDL Cholesterol   


 


Venous Blood Potassium   


 


Serum Ketones   














  18





  03:49 03:49 05:00


 


WBC   


 


RBC   


 


Hgb   


 


Hct   


 


MCV   


 


MCH   


 


MCHC   


 


RDW   


 


Plt Count   


 


MPV   


 


Neut % (Auto)   


 


Lymph % (Auto)   


 


Mono % (Auto)   


 


Eos % (Auto)   


 


Baso % (Auto)   


 


Neut # (Auto)   


 


Lymph # (Auto)   


 


Mono # (Auto)   


 


Eos # (Auto)   


 


Baso # (Auto)   


 


Neutrophils % (Manual)   


 


Lymphocytes % (Manual)   


 


Monocytes % (Manual)   


 


Platelet Estimate   


 


Hypochromasia (manual)   


 


Poikilocytosis (manual   


 


Anisocytosis (manual)   


 


Ovalocytes   


 


PT   


 


INR   


 


APTT   


 


D-Dimer, Quantitative   


 


pO2   


 


VBG pH   


 


VBG pCO2   


 


VBG HCO3   


 


VBG Total CO2   


 


VBG O2 Sat (Calc)   


 


VBG Base Excess   


 


VBG Potassium   


 


Glucose   


 


Lactate   


 


Crit Value Called To   


 


Crit Value Called By   


 


Crit Value Read Back   


 


Blood Gas Notified Time   


 


Sodium  139  


 


Potassium  5.9 H  


 


Chloride  106  


 


Carbon Dioxide  16 L  


 


Anion Gap  24 H  


 


BUN  79 H  


 


Creatinine  5.2 H  


 


Est GFR ( Amer)  10  


 


Est GFR (Non-Af Amer)  8  


 


POC Glucose (mg/dL)   


 


Random Glucose  415 H* D  


 


Lactic Acid   3.4 H 


 


Calcium  8.6  


 


Phosphorus  5.8 H  


 


Magnesium  2.1  


 


Total Bilirubin   


 


AST   


 


ALT   


 


Alkaline Phosphatase   


 


Troponin I  96.9000 H*  


 


NT-Pro-B Natriuret Pep   


 


Total Protein   


 


Albumin   


 


Globulin   


 


Albumin/Globulin Ratio   


 


Triglycerides    170 H D


 


Cholesterol    228 H


 


LDL Cholesterol Direct    68


 


HDL Cholesterol    61


 


Venous Blood Potassium   


 


Serum Ketones   














Assessment & Plan





- Date & Time


Date: 18 (I have seen and examined the patient.  I agree with the 

findings and plan of care as documented by Dr. Glover.  Patient with NSTEMI.  

Consult to Cardio.  Nitro and Heparin.  Aspirin, Statin.  ROMIx3 with EKG.  

ESRD.  Metabolic acidosis.  Has graft for dialysis but not currently in use.  

Consult to Nephro.  Bilateral pleural effusion.  Lasix.  Monitor breathing.  

Monitor for acute changes.  Admit to ICU for close monitoring.)


Time: 06:38





Attending/Attestation





- Attestation


I have personally seen and examined this patient.: Yes


I have fully participated in the care of the patient.: Yes


I have reviewed all pertinent clinical information: Yes

## 2018-03-02 NOTE — CP.[email or web address].CON
History of Present Illness





- History of Present Illness


History of Present Illness: 


Initial Nephrology Consultation:





Assessment: critical


CKD stage 5 (N18.5) with hyperkalemia, acidosis ? uremia 


pulm edema, CHF, NSTEMI, pleural effusions, pneumonia


AMS, hyperglycemia





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 


Hypertensive Chronic Kidney Disease (I12.9)


Anemia (D64.9), Hyperphosphatemia (E83.39), Secondary Hyperparathyroidism (E21.1

), HTN (I12.9), vit D insuff, hx of colon CA, TIAs





Plan


renal replacement therapy initiation indicated at this time. d/w cardiology and 

pt is not planned for invasive cardiac intervention at this time hence will 

plan for HD today and tomorrow as ordered


maintain hemodynamic stable. Patient not on ACEI/ARB due to hyperkalemia


started iron supplements, MVI, epogen with HD, phos binders and Vit D





Monitor Input/Output, daily weights and renal function with basic metabolic 

panel


Check for 25-OH vitamin D, iPTH, phosphorus level


Check Hep B and Hep C serology, iron studies





Dose meds/antibiotics for reduced GFR. Avoid fleets enema/magnesium based 

laxatives. Avoid nephrotoxins/NSAIDs


Glycemic control


Further work up/management as per primary team





Thanks for allowing me to participate in care of your patient. Will follow 

patient with you. Please call if any Qs. d/w team and family


Dr Sahd Panchal


Office: [phone] Cell: [phone] Fax: [phone]





Chief Complaint; unable to obtain


reason for consult: CKD management


source of Info: EMR


HPI: Pt is a 77 F with hx of diabetes Mellitus ( years), hypertension (years), 

CHF, TIA, Colon CA, CKD stage 4/5, has matured AVG in left arm presented with 

complaints of AMS and hyperglycemia as brought by family. now has elevated trop 

100, hyperkalemia, AMS and elevated sugar, pulm edema. renal consult for CKD 

management. 


pt with AMS unable to provide any hx. she had received medical management for 

hyperkalemia. 





ROS: unable to obtain





Physical Examination: 


General Appearance: ill appearing, lethargic, mostly unresponsive


Vitals reviewed and noted as below


Head; Atraumatic, normocephalic


ENT: unable


EYES: pt resisting eye opening.


Neck; supple no lymphadenopathy, no thyromegaly or bruit


Lungs: Normal respiratory rate/effort. Breath sounds bilateral decreased at 

bases with crackles


Heart: Normal rate. s1s2 normal. No rub or gallop. 


Extremities: no edema. No varicose veins. chronic hyperpigmented changes in legs


Neurological: Patient is  lethargic, mostly unresponsive, non communicative


Skin: Warm and dry. Normal turgor. No rash. Palpitation: Normal elasticity for 

age


Abdomen: Abdomen is soft. Bowel sounds +. There is no abdominal tenderness, no 

guarding/rigidity no organomegaly


Psych: unable


MSK: no joint tenderness or swelling. Digits and nails normal, no deformity


: kidney or bladder not palpable


Access: Left AVG with bruit





Labs/imaging reviewed.


Past medical history, past surgical history, family history, social history, 

allergy reviewed and noted as below


Family hx: no hx of CKD. Rest non-contributory








Past Patient History





- Infectious Disease


Hx of Infectious Diseases: None





- Tetanus Immunizations


Tetanus Immunization: Unknown





- Past Medical History & Family History


Past Medical History?: Yes





- Past Social History


Smoking Status: Former Smoker





- CARDIAC


Hx Hypercholesterolemia: Yes


Hx Hypertension: Yes


Hx Peripheral Edema: Yes





- PULMONARY


Hx Asthma: Yes (Dx 15 yrs ago,does not use home o2 anymore)


Hx Pneumonia: Yes (x2)





- NEUROLOGICAL


Hx Transient Ischemic Attacks (TIA): Yes (x3 about 20 yrs ago, 10 yrs ago, 7 

yrs ago)





- HEENT


Hx HEENT Problems: Yes


Hx Cataracts: Yes





- RENAL


Hx Chronic Kidney Disease: Yes





- ENDOCRINE/METABOLIC


Hx Diabetes Mellitus Type 1: Yes





- HEMATOLOGICAL/ONCOLOGICAL


Hx Anemia: Yes





- INTEGUMENTARY


Hx Dermatological Problems: No





- MUSCULOSKELETAL/RHEUMATOLOGICAL


Hx Arthritis: Yes


Hx Fractures: Yes (Right ankle)





- GASTROINTESTINAL


Hx Gastrointestinal Disorders: Yes


Other/Comment: Hx colon cancer





- GENITOURINARY/GYNECOLOGICAL


Hx Genitourinary Disorders: Yes


Other/Comment: Colon CA





- PSYCHIATRIC


Hx Depression: No


Hx Substance Use: No





- SURGICAL HISTORY


Hx Cataract Extraction: Yes (LEFT)





- ANESTHESIA


Hx Anesthesia: Yes


Hx Anesthesia Reactions: No


Hx Malignant Hyperthermia: No





Meds


Allergies/Adverse Reactions: 


 Allergies











Allergy/AdvReac Type Severity Reaction Status Date / Time


 


Penicillins Allergy Intermediate RASH Verified 03/01/18 20:11














- Medications


Medications: 


 Current Medications





Aspirin (Aspirin Chewable)  81 mg PO DAILY [facility]


   Last Admin: 03/02/18 09:49 Dose:  81 mg


Calcium Acetate (Phoslo)  667 mg PO TID [facility]


   Last Admin: 03/02/18 13:08 Dose:  Not Given


Epoetin Vamsi (Procrit)  4,000 unit IV MWF [facility]


Ergocalciferol (Drisdol 50,000 Intl Units Cap)  1 cap PO Q7D [facility]


Ferrous Gluconate (Fergon)  324 mg PO TID [facility]


Heparin Sodium/Sodium Chloride (Heparin 79479 Units/250ml 1/2 Normal Saline)  25

,000 units in 250 mls @ 7.076 mls/hr IV .Q24H PRN; Protocol; 12 UNITS/KG/HR


   PRN Reason: PROTOCOL


   Last Titration: 03/02/18 12:55 Dose:  16 units/kg/hr, 9.435 mls/hr


Moxifloxacin HCl (Avelox Iv 400mg/250ml Ns)  400 mg in 250 mls @ 167 mls/hr 

IVPB Q24H [facility]


Insulin Aspart (Novolog)  0 unit SC Q6 [facility]


   PRN Reason: Protocol


Insulin Glargine (Lantus)  15 unit SC [facility]


Metoprolol Tartrate (Lopressor)  25 mg PO BID [facility]


   Last Admin: 03/02/18 09:49 Dose:  25 mg


Nitroglycerin (Nitrostat Sl Tab)  0.4 mg SL Q5M PRN


   PRN Reason: Other


Rosuvastatin Calcium (Crestor)  5 mg PO [facility]


   Last Admin: 03/02/18 01:29 Dose:  5 mg


Vitamin B Complex/Vit C/Folic Acid (Nephro-Nneka)  1 tab PO 0800 [facility]











Results





- Vital Signs


Recent Vital Signs: 


 Last Vital Signs











Temp  98.4 F   03/02/18 12:00


 


Pulse  76   03/02/18 11:00


 


Resp  19   03/02/18 11:00


 


BP  117/71   03/02/18 12:26


 


Pulse Ox  100   03/02/18 11:00














- Labs


Result Diagrams: 


 03/02/18 03:49





 03/02/18 03:49


Labs: 


 Laboratory Results - last 24 hr











  03/01/18 03/01/18 03/01/18





  22:18 22:51 22:51


 


WBC   16.3 H D 


 


RBC   3.72 L 


 


Hgb   10.3 L 


 


Hct   32.5 L 


 


MCV   87.4 


 


MCH   27.7 


 


MCHC   31.6 L 


 


RDW   16.7 H 


 


Plt Count   156 


 


MPV   9.4 


 


Neut % (Auto)   90.5 H 


 


Lymph % (Auto)   3.8 L 


 


Mono % (Auto)   4.8 


 


Eos % (Auto)   0.0 


 


Baso % (Auto)   0.9 


 


Neut # (Auto)   14.8 H 


 


Lymph # (Auto)   0.6 L 


 


Mono # (Auto)   0.8 


 


Eos # (Auto)   0.0 


 


Baso # (Auto)   0.1 


 


Neutrophils % (Manual)   92 H 


 


Lymphocytes % (Manual)   5 L 


 


Monocytes % (Manual)   3 


 


Platelet Estimate   Normal 


 


Hypochromasia (manual)   


 


Poikilocytosis (manual   


 


Anisocytosis (manual)   


 


Ovalocytes   


 


PT   


 


INR   


 


APTT   


 


D-Dimer, Quantitative    4388 H


 


pO2   


 


VBG pH   


 


VBG pCO2   


 


VBG HCO3   


 


VBG Total CO2   


 


VBG O2 Sat (Calc)   


 


VBG Base Excess   


 


VBG Potassium   


 


Glucose   


 


Lactate   


 


Crit Value Called To   


 


Crit Value Called By   


 


Crit Value Read Back   


 


Blood Gas Notified Time   


 


Sodium   


 


Potassium   


 


Chloride   


 


Carbon Dioxide   


 


Anion Gap   


 


BUN   


 


Creatinine   


 


Est GFR ( Amer)   


 


Est GFR (Non-Af Amer)   


 


POC Glucose (mg/dL)  467 H*  


 


Random Glucose   


 


Lactic Acid   


 


Calcium   


 


Phosphorus   


 


Magnesium   


 


Iron   


 


TIBC   


 


% Saturation   


 


Ferritin   


 


Total Bilirubin   


 


AST   


 


ALT   


 


Alkaline Phosphatase   


 


Troponin I   


 


NT-Pro-B Natriuret Pep   


 


Total Protein   


 


Albumin   


 


Globulin   


 


Albumin/Globulin Ratio   


 


Triglycerides   


 


Cholesterol   


 


LDL Cholesterol Direct   


 


HDL Cholesterol   


 


25-OH Vitamin D Total   


 


Procalcitonin   


 


Venous Blood Potassium   


 


Serum Ketones   


 


Hep Bs Antigen   


 


Hep Bs Antibody   


 


Hep B Core IgM Ab   


 


Hepatitis C Antibody   














  03/01/18 03/01/18 03/02/18





  22:51 23:25 01:18


 


WBC   


 


RBC   


 


Hgb   


 


Hct   


 


MCV   


 


MCH   


 


MCHC   


 


RDW   


 


Plt Count   


 


MPV   


 


Neut % (Auto)   


 


Lymph % (Auto)   


 


Mono % (Auto)   


 


Eos % (Auto)   


 


Baso % (Auto)   


 


Neut # (Auto)   


 


Lymph # (Auto)   


 


Mono # (Auto)   


 


Eos # (Auto)   


 


Baso # (Auto)   


 


Neutrophils % (Manual)   


 


Lymphocytes % (Manual)   


 


Monocytes % (Manual)   


 


Platelet Estimate   


 


Hypochromasia (manual)   


 


Poikilocytosis (manual   


 


Anisocytosis (manual)   


 


Ovalocytes   


 


PT   


 


INR   


 


APTT   


 


D-Dimer, Quantitative   


 


pO2   16 L 


 


VBG pH   7.16 L* 


 


VBG pCO2   40 


 


VBG HCO3   11.9 


 


VBG Total CO2   15.5 L 


 


VBG O2 Sat (Calc)   35.2 L 


 


VBG Base Excess   -13.8 L 


 


VBG Potassium   5.8 H 


 


Glucose   562 H* D 


 


Lactate   2.5 H 


 


Crit Value Called To   Karmen moreno/rn 


 


Crit Value Called By   Olu gallegos/rt 


 


Crit Value Read Back   Y 


 


Blood Gas Notified Time   2335 


 


Sodium  137  139.0 


 


Potassium  6.5 H* D  


 


Chloride  105  103.0 


 


Carbon Dioxide  11 L* D  


 


Anion Gap  28 H  


 


BUN  74 H  


 


Creatinine  4.7 H  


 


Est GFR ( Amer)  11  


 


Est GFR (Non-Af Amer)  9  


 


POC Glucose (mg/dL)    362 H


 


Random Glucose  559 H* D  


 


Lactic Acid   


 


Calcium  8.0 L  


 


Phosphorus   


 


Magnesium   


 


Iron   


 


TIBC   


 


% Saturation   


 


Ferritin   


 


Total Bilirubin  1.6 H  


 


AST  193 H  


 


ALT  54 H D  


 


Alkaline Phosphatase  96  


 


Troponin I  103.0000 H*  


 


NT-Pro-B Natriuret Pep  12786 H  


 


Total Protein  7.3  


 


Albumin  3.6  


 


Globulin  3.7  


 


Albumin/Globulin Ratio  1.0  


 


Triglycerides   


 


Cholesterol   


 


LDL Cholesterol Direct   


 


HDL Cholesterol   


 


25-OH Vitamin D Total   


 


Procalcitonin   


 


Venous Blood Potassium   5.8 H 


 


Serum Ketones  Negative  


 


Hep Bs Antigen   


 


Hep Bs Antibody   


 


Hep B Core IgM Ab   


 


Hepatitis C Antibody   














  03/02/18 03/02/18 03/02/18





  02:01 02:17 03:49


 


WBC    15.7 H


 


RBC    3.48 L


 


Hgb    9.6 L


 


Hct    29.6 L


 


MCV    85.2  D


 


MCH    27.7


 


MCHC    32.5 L


 


RDW    16.2 H


 


Plt Count    143


 


MPV    8.9


 


Neut % (Auto)    90.5 H


 


Lymph % (Auto)    3.4 L


 


Mono % (Auto)    5.6


 


Eos % (Auto)    0.0


 


Baso % (Auto)    0.5


 


Neut # (Auto)    14.2 H


 


Lymph # (Auto)    0.5 L


 


Mono # (Auto)    0.9 H


 


Eos # (Auto)    0.0


 


Baso # (Auto)    0.1


 


Neutrophils % (Manual)    93 H


 


Lymphocytes % (Manual)    4 L


 


Monocytes % (Manual)    3


 


Platelet Estimate    Normal


 


Hypochromasia (manual)    Slight


 


Poikilocytosis (manual    Slight


 


Anisocytosis (manual)    Slight


 


Ovalocytes    Slight


 


PT   14.6 H 


 


INR   1.3 


 


APTT   29 


 


D-Dimer, Quantitative   


 


pO2   


 


VBG pH   


 


VBG pCO2   


 


VBG HCO3   


 


VBG Total CO2   


 


VBG O2 Sat (Calc)   


 


VBG Base Excess   


 


VBG Potassium   


 


Glucose   


 


Lactate   


 


Crit Value Called To   


 


Crit Value Called By   


 


Crit Value Read Back   


 


Blood Gas Notified Time   


 


Sodium   


 


Potassium   


 


Chloride   


 


Carbon Dioxide   


 


Anion Gap   


 


BUN   


 


Creatinine   


 


Est GFR ( Amer)   


 


Est GFR (Non-Af Amer)   


 


POC Glucose (mg/dL)  374 H  


 


Random Glucose   


 


Lactic Acid   


 


Calcium   


 


Phosphorus   


 


Magnesium   


 


Iron   


 


TIBC   


 


% Saturation   


 


Ferritin   


 


Total Bilirubin   


 


AST   


 


ALT   


 


Alkaline Phosphatase   


 


Troponin I   


 


NT-Pro-B Natriuret Pep   


 


Total Protein   


 


Albumin   


 


Globulin   


 


Albumin/Globulin Ratio   


 


Triglycerides   


 


Cholesterol   


 


LDL Cholesterol Direct   


 


HDL Cholesterol   


 


25-OH Vitamin D Total   


 


Procalcitonin   


 


Venous Blood Potassium   


 


Serum Ketones   


 


Hep Bs Antigen   


 


Hep Bs Antibody   


 


Hep B Core IgM Ab   


 


Hepatitis C Antibody   














  03/02/18 03/02/18 03/02/18





  03:49 03:49 05:00


 


WBC   


 


RBC   


 


Hgb   


 


Hct   


 


MCV   


 


MCH   


 


MCHC   


 


RDW   


 


Plt Count   


 


MPV   


 


Neut % (Auto)   


 


Lymph % (Auto)   


 


Mono % (Auto)   


 


Eos % (Auto)   


 


Baso % (Auto)   


 


Neut # (Auto)   


 


Lymph # (Auto)   


 


Mono # (Auto)   


 


Eos # (Auto)   


 


Baso # (Auto)   


 


Neutrophils % (Manual)   


 


Lymphocytes % (Manual)   


 


Monocytes % (Manual)   


 


Platelet Estimate   


 


Hypochromasia (manual)   


 


Poikilocytosis (manual   


 


Anisocytosis (manual)   


 


Ovalocytes   


 


PT   


 


INR   


 


APTT   


 


D-Dimer, Quantitative   


 


pO2   


 


VBG pH   


 


VBG pCO2   


 


VBG HCO3   


 


VBG Total CO2   


 


VBG O2 Sat (Calc)   


 


VBG Base Excess   


 


VBG Potassium   


 


Glucose   


 


Lactate   


 


Crit Value Called To   


 


Crit Value Called By   


 


Crit Value Read Back   


 


Blood Gas Notified Time   


 


Sodium  139  


 


Potassium  5.9 H  


 


Chloride  106  


 


Carbon Dioxide  16 L  


 


Anion Gap  24 H  


 


BUN  79 H  


 


Creatinine  5.2 H  


 


Est GFR ( Amer)  10  


 


Est GFR (Non-Af Amer)  8  


 


POC Glucose (mg/dL)   


 


Random Glucose  415 H* D  


 


Lactic Acid   3.4 H 


 


Calcium  8.6  


 


Phosphorus  5.8 H  


 


Magnesium  2.1  


 


Iron   


 


TIBC   


 


% Saturation   


 


Ferritin   


 


Total Bilirubin   


 


AST   


 


ALT   


 


Alkaline Phosphatase   


 


Troponin I  96.9000 H*  


 


NT-Pro-B Natriuret Pep   


 


Total Protein   


 


Albumin   


 


Globulin   


 


Albumin/Globulin Ratio   


 


Triglycerides    170 H D


 


Cholesterol    228 H


 


LDL Cholesterol Direct    68


 


HDL Cholesterol    61


 


25-OH Vitamin D Total   


 


Procalcitonin   


 


Venous Blood Potassium   


 


Serum Ketones   


 


Hep Bs Antigen   


 


Hep Bs Antibody   


 


Hep B Core IgM Ab   


 


Hepatitis C Antibody   














  03/02/18 03/02/18 03/02/18





  07:47 08:34 11:15


 


WBC   


 


RBC   


 


Hgb   


 


Hct   


 


MCV   


 


MCH   


 


MCHC   


 


RDW   


 


Plt Count   


 


MPV   


 


Neut % (Auto)   


 


Lymph % (Auto)   


 


Mono % (Auto)   


 


Eos % (Auto)   


 


Baso % (Auto)   


 


Neut # (Auto)   


 


Lymph # (Auto)   


 


Mono # (Auto)   


 


Eos # (Auto)   


 


Baso # (Auto)   


 


Neutrophils % (Manual)   


 


Lymphocytes % (Manual)   


 


Monocytes % (Manual)   


 


Platelet Estimate   


 


Hypochromasia (manual)   


 


Poikilocytosis (manual   


 


Anisocytosis (manual)   


 


Ovalocytes   


 


PT   


 


INR   


 


APTT   


 


D-Dimer, Quantitative   


 


pO2   


 


VBG pH   


 


VBG pCO2   


 


VBG HCO3   


 


VBG Total CO2   


 


VBG O2 Sat (Calc)   


 


VBG Base Excess   


 


VBG Potassium   


 


Glucose   


 


Lactate   


 


Crit Value Called To   


 


Crit Value Called By   


 


Crit Value Read Back   


 


Blood Gas Notified Time   


 


Sodium   


 


Potassium   


 


Chloride   


 


Carbon Dioxide   


 


Anion Gap   


 


BUN   


 


Creatinine   


 


Est GFR ( Amer)   


 


Est GFR (Non-Af Amer)   


 


POC Glucose (mg/dL)  349 H   345 H


 


Random Glucose   


 


Lactic Acid   


 


Calcium   


 


Phosphorus   


 


Magnesium   


 


Iron   


 


TIBC   


 


% Saturation   


 


Ferritin   


 


Total Bilirubin   


 


AST   


 


ALT   


 


Alkaline Phosphatase   


 


Troponin I   


 


NT-Pro-B Natriuret Pep   


 


Total Protein   


 


Albumin   


 


Globulin   


 


Albumin/Globulin Ratio   


 


Triglycerides   


 


Cholesterol   


 


LDL Cholesterol Direct   


 


HDL Cholesterol   


 


25-OH Vitamin D Total   


 


Procalcitonin   54.73 H 


 


Venous Blood Potassium   


 


Serum Ketones   


 


Hep Bs Antigen   


 


Hep Bs Antibody   


 


Hep B Core IgM Ab   


 


Hepatitis C Antibody   














  03/02/18 03/02/18 03/02/18





  12:19 12:19 12:19


 


WBC   


 


RBC   


 


Hgb   


 


Hct   


 


MCV   


 


MCH   


 


MCHC   


 


RDW   


 


Plt Count   


 


MPV   


 


Neut % (Auto)   


 


Lymph % (Auto)   


 


Mono % (Auto)   


 


Eos % (Auto)   


 


Baso % (Auto)   


 


Neut # (Auto)   


 


Lymph # (Auto)   


 


Mono # (Auto)   


 


Eos # (Auto)   


 


Baso # (Auto)   


 


Neutrophils % (Manual)   


 


Lymphocytes % (Manual)   


 


Monocytes % (Manual)   


 


Platelet Estimate   


 


Hypochromasia (manual)   


 


Poikilocytosis (manual   


 


Anisocytosis (manual)   


 


Ovalocytes   


 


PT   


 


INR   


 


APTT  31  


 


D-Dimer, Quantitative   


 


pO2   


 


VBG pH   


 


VBG pCO2   


 


VBG HCO3   


 


VBG Total CO2   


 


VBG O2 Sat (Calc)   


 


VBG Base Excess   


 


VBG Potassium   


 


Glucose   


 


Lactate   


 


Crit Value Called To   


 


Crit Value Called By   


 


Crit Value Read Back   


 


Blood Gas Notified Time   


 


Sodium   


 


Potassium   


 


Chloride   


 


Carbon Dioxide   


 


Anion Gap   


 


BUN   


 


Creatinine   


 


Est GFR ( Amer)   


 


Est GFR (Non-Af Amer)   


 


POC Glucose (mg/dL)   


 


Random Glucose   


 


Lactic Acid   


 


Calcium   


 


Phosphorus   


 


Magnesium   


 


Iron   18 L 


 


TIBC   196 L 


 


% Saturation   9 L 


 


Ferritin    195.0


 


Total Bilirubin   


 


AST   


 


ALT   


 


Alkaline Phosphatase   


 


Troponin I   


 


NT-Pro-B Natriuret Pep   


 


Total Protein   


 


Albumin   


 


Globulin   


 


Albumin/Globulin Ratio   


 


Triglycerides   


 


Cholesterol   


 


LDL Cholesterol Direct   


 


HDL Cholesterol   


 


25-OH Vitamin D Total   27.1 L 


 


Procalcitonin   


 


Venous Blood Potassium   


 


Serum Ketones   


 


Hep Bs Antigen    Negative


 


Hep Bs Antibody   


 


Hep B Core IgM Ab    Negative


 


Hepatitis C Antibody    Negative














  03/02/18





  12:19


 


WBC 


 


RBC 


 


Hgb 


 


Hct 


 


MCV 


 


MCH 


 


MCHC 


 


RDW 


 


Plt Count 


 


MPV 


 


Neut % (Auto) 


 


Lymph % (Auto) 


 


Mono % (Auto) 


 


Eos % (Auto) 


 


Baso % (Auto) 


 


Neut # (Auto) 


 


Lymph # (Auto) 


 


Mono # (Auto) 


 


Eos # (Auto) 


 


Baso # (Auto) 


 


Neutrophils % (Manual) 


 


Lymphocytes % (Manual) 


 


Monocytes % (Manual) 


 


Platelet Estimate 


 


Hypochromasia (manual) 


 


Poikilocytosis (manual 


 


Anisocytosis (manual) 


 


Ovalocytes 


 


PT 


 


INR 


 


APTT 


 


D-Dimer, Quantitative 


 


pO2 


 


VBG pH 


 


VBG pCO2 


 


VBG HCO3 


 


VBG Total CO2 


 


VBG O2 Sat (Calc) 


 


VBG Base Excess 


 


VBG Potassium 


 


Glucose 


 


Lactate 


 


Crit Value Called To 


 


Crit Value Called By 


 


Crit Value Read Back 


 


Blood Gas Notified Time 


 


Sodium 


 


Potassium 


 


Chloride 


 


Carbon Dioxide 


 


Anion Gap 


 


BUN 


 


Creatinine 


 


Est GFR ( Amer) 


 


Est GFR (Non-Af Amer) 


 


POC Glucose (mg/dL) 


 


Random Glucose 


 


Lactic Acid 


 


Calcium 


 


Phosphorus 


 


Magnesium 


 


Iron 


 


TIBC 


 


% Saturation 


 


Ferritin 


 


Total Bilirubin 


 


AST 


 


ALT 


 


Alkaline Phosphatase 


 


Troponin I 


 


NT-Pro-B Natriuret Pep 


 


Total Protein 


 


Albumin 


 


Globulin 


 


Albumin/Globulin Ratio 


 


Triglycerides 


 


Cholesterol 


 


LDL Cholesterol Direct 


 


HDL Cholesterol 


 


25-OH Vitamin D Total 


 


Procalcitonin 


 


Venous Blood Potassium 


 


Serum Ketones 


 


Hep Bs Antigen 


 


Hep Bs Antibody  Negative


 


Hep B Core IgM Ab 


 


Hepatitis C Antibody

## 2018-03-02 NOTE — CP.PCM.CON
History of Present Illness





- History of Present Illness


History of Present Illness: 





Ms. Corral is basically a 77-year-old lady with history of diabetes mellitus, 

hypertension, and dyslipidemia. She is also with history of colon malignancy 

post resection in 2010. 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 

moderate to severe pulmonary hypertension. I last follow-up with her since 

2013. Apparently since then she had deteriorated renal function and interested 

a was placed not on hemodialysis yet. At this time she was brought to the 

emergency room with change of mentation and addition to hyperglycemia. She was 

with severe metabolic acidosis in addition to pulmonary infiltrate and pleural 

effusion. Her white count is elevated significant for severe sepsis. She's been 

treated for such in addition troponin was elevated and she's been treated as a 

non-ST elevation myocardial infarction. On prior evaluation she had normal left 

ventricular contractility on echo namely in May 2017. At this time repeat of on 

echo revealed severely depressed left ventricular contractility with ejection 

fraction about 30 % and inferior akinesia, also with severe pulmonary 

hypertension and an estimated right ventricular systolic pressure of 60 mmHg 

with severely dilated RA and RV. No pericardial effusion however with large 

pleural septated effusion.


With altered mentation, elevated white count, severe acidosis, lactic acid, D 

Dimer it's probably severe sepsis that led to her complaints and presentation. 

Acute on chronic kidney injury.


the new depression of myocardiac contractility with severely elevated troponin 

and proBNP, speaks highly of acute coronary event without acute EKG changes 

however MI and CHF Does not explain the above finding.


With severe sepsis and severe pulmonary hypertension acute coronary 

intervention is probably with a high risk and would not help us with the 

overall picture at this time. We will go ahead with the treatment of sepsis, 

attempt to evaluate the pleural effusion, hemodialysis. And eventually 

interventional cardiac management would be attempted.





Review of Systems





- Review of Systems


Systems not reviewed;Unavailable: Acuity of Condition, Altered Mental Status





- Constitutional


Constitutional: Anorexia, Weakness





- EENT


Eyes: absent: Discharge


Ears: absent: Ear Discharge, Dizziness


Nose/Mouth/Throat: absent: Epistaxis





- Cardiovascular


Cardiovascular: absent: Acrocyanosis, Chest Pain, Diaphoresis, Palpitations, 

Syncope





- Respiratory


Respiratory: absent: Cough, Dyspnea, Hemoptysis





- Gastrointestinal


Gastrointestinal: absent: Abdominal Pain, Diarrhea, Vomiting





- Genitourinary


Genitourinary: absent: Change in Urinary Stream





- Reproductive: Female


Reproductive:Female: Post Menopausal





- Neurological


Neurological: Behavioral Changes





Past Patient History





- Infectious Disease


Hx of Infectious Diseases: None





- Tetanus Immunizations


Tetanus Immunization: Unknown





- Past Medical History & Family History


Past Medical History?: Yes





- Past Social History


Smoking Status: Former Smoker





- CARDIAC


Hx Hypercholesterolemia: Yes


Hx Hypertension: Yes


Hx Peripheral Edema: Yes





- PULMONARY


Hx Asthma: Yes (Dx 15 yrs ago,does not use home o2 anymore)


Hx Pneumonia: Yes (x2)





- NEUROLOGICAL


Hx Transient Ischemic Attacks (TIA): Yes (x3 about 20 yrs ago, 10 yrs ago, 7 

yrs ago)





- HEENT


Hx HEENT Problems: Yes


Hx Cataracts: Yes





- RENAL


Hx Chronic Kidney Disease: Yes





- ENDOCRINE/METABOLIC


Hx Diabetes Mellitus Type 1: Yes





- HEMATOLOGICAL/ONCOLOGICAL


Hx Anemia: Yes





- INTEGUMENTARY


Hx Dermatological Problems: No





- MUSCULOSKELETAL/RHEUMATOLOGICAL


Hx Arthritis: Yes


Hx Fractures: Yes (Right ankle)





- GASTROINTESTINAL


Hx Gastrointestinal Disorders: Yes


Other/Comment: Hx colon cancer





- GENITOURINARY/GYNECOLOGICAL


Hx Genitourinary Disorders: Yes


Other/Comment: Colon CA





- PSYCHIATRIC


Hx Depression: No


Hx Substance Use: No





- SURGICAL HISTORY


Hx Cataract Extraction: Yes (LEFT)





- ANESTHESIA


Hx Anesthesia: Yes


Hx Anesthesia Reactions: No


Hx Malignant Hyperthermia: No





Meds


Allergies/Adverse Reactions: 


 Allergies











Allergy/AdvReac Type Severity Reaction Status Date / Time


 


Penicillins Allergy Intermediate RASH Verified 03/01/18 20:11














- Medications


Medications: 


 Current Medications





Aspirin (Aspirin Chewable)  81 mg PO DAILY [facility]


   Last Admin: 03/02/18 09:49 Dose:  81 mg


Calcium Acetate (Phoslo)  667 mg PO TID [facility]


Epoetin Vamsi (Procrit)  4,000 unit IV [facility]


Heparin Sodium/Sodium Chloride (Heparin 53064 Units/250ml 1/2 Normal Saline)  25

,000 units in 250 mls @ 7.076 mls/hr IV .Q24H PRN; Protocol; 12 UNITS/KG/HR


   PRN Reason: PROTOCOL


   Last Admin: 03/02/18 05:00 Dose:  12 units/kg/hr, 7.076 mls/hr


Insulin Aspart (Novolog)  0 unit SC Q6 [facility]


   PRN Reason: Protocol


Insulin Glargine (Lantus)  15 unit SC [facility]


Metoprolol Tartrate (Lopressor)  25 mg PO BID [facility]


   Last Admin: 03/02/18 09:49 Dose:  25 mg


Nitroglycerin (Nitrostat Sl Tab)  0.4 mg SL Q5M PRN


   PRN Reason: Other


Rosuvastatin Calcium (Crestor)  5 mg PO HS [facility]


   Last Admin: 03/02/18 01:29 Dose:  5 mg


Vitamin B Complex/Vit C/Folic Acid (Nephro-Nneka)  1 tab PO 0800 [facility]











Physical Exam





- Constitutional


Appears: Toxic





- Head Exam


Head Exam: ATRAUMATIC





- Eye Exam


Eye Exam: EOMI





- ENT Exam


ENT Exam: Mucous Membranes Moist





- Neck Exam


Neck exam: Negative for: Lymphadenopathy, Thyromegaly





- Respiratory Exam


Respiratory Exam: Clear to Auscultation Bilateral.  absent: Rales





- Cardiovascular Exam


Cardiovascular Exam: REGULAR RHYTHM, Systolic Murmur





- GI/Abdominal Exam


GI & Abdominal Exam: Normal Bowel Sounds.  absent: Organomegaly





- Rectal Exam


Rectal Exam: Deferred





- Extremities Exam


Extremities exam: Positive for: normal capillary refill.  Negative for: calf 

tenderness





- Neurological Exam


Neurological exam: Alert, Altered





- Psychiatric Exam


Psychiatric exam: Normal Mood





- Skin


Skin Exam: Dry





Results





- Vital Signs


Recent Vital Signs: 


 Last Vital Signs











Temp  98.5 F   03/02/18 08:00


 


Pulse  76   03/02/18 11:00


 


Resp  19   03/02/18 11:00


 


BP  92/48 L  03/02/18 10:51


 


Pulse Ox  100   03/02/18 11:00














- Labs


Result Diagrams: 


 03/02/18 03:49





 03/02/18 03:49


Labs: 


 Laboratory Results - last 24 hr











  03/01/18 03/01/18 03/01/18





  22:18 22:51 22:51


 


WBC   16.3 H D 


 


RBC   3.72 L 


 


Hgb   10.3 L 


 


Hct   32.5 L 


 


MCV   87.4 


 


MCH   27.7 


 


MCHC   31.6 L 


 


RDW   16.7 H 


 


Plt Count   156 


 


MPV   9.4 


 


Neut % (Auto)   90.5 H 


 


Lymph % (Auto)   3.8 L 


 


Mono % (Auto)   4.8 


 


Eos % (Auto)   0.0 


 


Baso % (Auto)   0.9 


 


Neut # (Auto)   14.8 H 


 


Lymph # (Auto)   0.6 L 


 


Mono # (Auto)   0.8 


 


Eos # (Auto)   0.0 


 


Baso # (Auto)   0.1 


 


Neutrophils % (Manual)   92 H 


 


Lymphocytes % (Manual)   5 L 


 


Monocytes % (Manual)   3 


 


Platelet Estimate   Normal 


 


Hypochromasia (manual)   


 


Poikilocytosis (manual   


 


Anisocytosis (manual)   


 


Ovalocytes   


 


PT   


 


INR   


 


APTT   


 


D-Dimer, Quantitative    4388 H


 


pO2   


 


VBG pH   


 


VBG pCO2   


 


VBG HCO3   


 


VBG Total CO2   


 


VBG O2 Sat (Calc)   


 


VBG Base Excess   


 


VBG Potassium   


 


Glucose   


 


Lactate   


 


Crit Value Called To   


 


Crit Value Called By   


 


Crit Value Read Back   


 


Blood Gas Notified Time   


 


Sodium   


 


Potassium   


 


Chloride   


 


Carbon Dioxide   


 


Anion Gap   


 


BUN   


 


Creatinine   


 


Est GFR ( Amer)   


 


Est GFR (Non-Af Amer)   


 


POC Glucose (mg/dL)  467 H*  


 


Random Glucose   


 


Lactic Acid   


 


Calcium   


 


Phosphorus   


 


Magnesium   


 


Total Bilirubin   


 


AST   


 


ALT   


 


Alkaline Phosphatase   


 


Troponin I   


 


NT-Pro-B Natriuret Pep   


 


Total Protein   


 


Albumin   


 


Globulin   


 


Albumin/Globulin Ratio   


 


Triglycerides   


 


Cholesterol   


 


LDL Cholesterol Direct   


 


HDL Cholesterol   


 


Venous Blood Potassium   


 


Serum Ketones   














  03/01/18 03/01/18 03/02/18





  22:51 23:25 01:18


 


WBC   


 


RBC   


 


Hgb   


 


Hct   


 


MCV   


 


MCH   


 


MCHC   


 


RDW   


 


Plt Count   


 


MPV   


 


Neut % (Auto)   


 


Lymph % (Auto)   


 


Mono % (Auto)   


 


Eos % (Auto)   


 


Baso % (Auto)   


 


Neut # (Auto)   


 


Lymph # (Auto)   


 


Mono # (Auto)   


 


Eos # (Auto)   


 


Baso # (Auto)   


 


Neutrophils % (Manual)   


 


Lymphocytes % (Manual)   


 


Monocytes % (Manual)   


 


Platelet Estimate   


 


Hypochromasia (manual)   


 


Poikilocytosis (manual   


 


Anisocytosis (manual)   


 


Ovalocytes   


 


PT   


 


INR   


 


APTT   


 


D-Dimer, Quantitative   


 


pO2   16 L 


 


VBG pH   7.16 L* 


 


VBG pCO2   40 


 


VBG HCO3   11.9 


 


VBG Total CO2   15.5 L 


 


VBG O2 Sat (Calc)   35.2 L 


 


VBG Base Excess   -13.8 L 


 


VBG Potassium   5.8 H 


 


Glucose   562 H* D 


 


Lactate   2.5 H 


 


Crit Value Called To   Karmen moreno/rn 


 


Crit Value Called By   Olu gallegos/rt 


 


Crit Value Read Back   Y 


 


Blood Gas Notified Time   2335 


 


Sodium  137  139.0 


 


Potassium  6.5 H* D  


 


Chloride  105  103.0 


 


Carbon Dioxide  11 L* D  


 


Anion Gap  28 H  


 


BUN  74 H  


 


Creatinine  4.7 H  


 


Est GFR ( Amer)  11  


 


Est GFR (Non-Af Amer)  9  


 


POC Glucose (mg/dL)    362 H


 


Random Glucose  559 H* D  


 


Lactic Acid   


 


Calcium  8.0 L  


 


Phosphorus   


 


Magnesium   


 


Total Bilirubin  1.6 H  


 


AST  193 H  


 


ALT  54 H D  


 


Alkaline Phosphatase  96  


 


Troponin I  103.0000 H*  


 


NT-Pro-B Natriuret Pep  84809 H  


 


Total Protein  7.3  


 


Albumin  3.6  


 


Globulin  3.7  


 


Albumin/Globulin Ratio  1.0  


 


Triglycerides   


 


Cholesterol   


 


LDL Cholesterol Direct   


 


HDL Cholesterol   


 


Venous Blood Potassium   5.8 H 


 


Serum Ketones  Negative  














  03/02/18 03/02/18 03/02/18





  02:01 02:17 03:49


 


WBC    15.7 H


 


RBC    3.48 L


 


Hgb    9.6 L


 


Hct    29.6 L


 


MCV    85.2  D


 


MCH    27.7


 


MCHC    32.5 L


 


RDW    16.2 H


 


Plt Count    143


 


MPV    8.9


 


Neut % (Auto)    90.5 H


 


Lymph % (Auto)    3.4 L


 


Mono % (Auto)    5.6


 


Eos % (Auto)    0.0


 


Baso % (Auto)    0.5


 


Neut # (Auto)    14.2 H


 


Lymph # (Auto)    0.5 L


 


Mono # (Auto)    0.9 H


 


Eos # (Auto)    0.0


 


Baso # (Auto)    0.1


 


Neutrophils % (Manual)    93 H


 


Lymphocytes % (Manual)    4 L


 


Monocytes % (Manual)    3


 


Platelet Estimate    Normal


 


Hypochromasia (manual)    Slight


 


Poikilocytosis (manual    Slight


 


Anisocytosis (manual)    Slight


 


Ovalocytes    Slight


 


PT   14.6 H 


 


INR   1.3 


 


APTT   29 


 


D-Dimer, Quantitative   


 


pO2   


 


VBG pH   


 


VBG pCO2   


 


VBG HCO3   


 


VBG Total CO2   


 


VBG O2 Sat (Calc)   


 


VBG Base Excess   


 


VBG Potassium   


 


Glucose   


 


Lactate   


 


Crit Value Called To   


 


Crit Value Called By   


 


Crit Value Read Back   


 


Blood Gas Notified Time   


 


Sodium   


 


Potassium   


 


Chloride   


 


Carbon Dioxide   


 


Anion Gap   


 


BUN   


 


Creatinine   


 


Est GFR ( Amer)   


 


Est GFR (Non-Af Amer)   


 


POC Glucose (mg/dL)  374 H  


 


Random Glucose   


 


Lactic Acid   


 


Calcium   


 


Phosphorus   


 


Magnesium   


 


Total Bilirubin   


 


AST   


 


ALT   


 


Alkaline Phosphatase   


 


Troponin I   


 


NT-Pro-B Natriuret Pep   


 


Total Protein   


 


Albumin   


 


Globulin   


 


Albumin/Globulin Ratio   


 


Triglycerides   


 


Cholesterol   


 


LDL Cholesterol Direct   


 


HDL Cholesterol   


 


Venous Blood Potassium   


 


Serum Ketones   














  03/02/18 03/02/18 03/02/18





  03:49 03:49 05:00


 


WBC   


 


RBC   


 


Hgb   


 


Hct   


 


MCV   


 


MCH   


 


MCHC   


 


RDW   


 


Plt Count   


 


MPV   


 


Neut % (Auto)   


 


Lymph % (Auto)   


 


Mono % (Auto)   


 


Eos % (Auto)   


 


Baso % (Auto)   


 


Neut # (Auto)   


 


Lymph # (Auto)   


 


Mono # (Auto)   


 


Eos # (Auto)   


 


Baso # (Auto)   


 


Neutrophils % (Manual)   


 


Lymphocytes % (Manual)   


 


Monocytes % (Manual)   


 


Platelet Estimate   


 


Hypochromasia (manual)   


 


Poikilocytosis (manual   


 


Anisocytosis (manual)   


 


Ovalocytes   


 


PT   


 


INR   


 


APTT   


 


D-Dimer, Quantitative   


 


pO2   


 


VBG pH   


 


VBG pCO2   


 


VBG HCO3   


 


VBG Total CO2   


 


VBG O2 Sat (Calc)   


 


VBG Base Excess   


 


VBG Potassium   


 


Glucose   


 


Lactate   


 


Crit Value Called To   


 


Crit Value Called By   


 


Crit Value Read Back   


 


Blood Gas Notified Time   


 


Sodium  139  


 


Potassium  5.9 H  


 


Chloride  106  


 


Carbon Dioxide  16 L  


 


Anion Gap  24 H  


 


BUN  79 H  


 


Creatinine  5.2 H  


 


Est GFR ( Amer)  10  


 


Est GFR (Non-Af Amer)  8  


 


POC Glucose (mg/dL)   


 


Random Glucose  415 H* D  


 


Lactic Acid   3.4 H 


 


Calcium  8.6  


 


Phosphorus  5.8 H  


 


Magnesium  2.1  


 


Total Bilirubin   


 


AST   


 


ALT   


 


Alkaline Phosphatase   


 


Troponin I  96.9000 H*  


 


NT-Pro-B Natriuret Pep   


 


Total Protein   


 


Albumin   


 


Globulin   


 


Albumin/Globulin Ratio   


 


Triglycerides    170 H D


 


Cholesterol    228 H


 


LDL Cholesterol Direct    68


 


HDL Cholesterol    61


 


Venous Blood Potassium   


 


Serum Ketones   














  03/02/18 03/02/18 03/02/18





  07:47 11:15 12:19


 


WBC   


 


RBC   


 


Hgb   


 


Hct   


 


MCV   


 


MCH   


 


MCHC   


 


RDW   


 


Plt Count   


 


MPV   


 


Neut % (Auto)   


 


Lymph % (Auto)   


 


Mono % (Auto)   


 


Eos % (Auto)   


 


Baso % (Auto)   


 


Neut # (Auto)   


 


Lymph # (Auto)   


 


Mono # (Auto)   


 


Eos # (Auto)   


 


Baso # (Auto)   


 


Neutrophils % (Manual)   


 


Lymphocytes % (Manual)   


 


Monocytes % (Manual)   


 


Platelet Estimate   


 


Hypochromasia (manual)   


 


Poikilocytosis (manual   


 


Anisocytosis (manual)   


 


Ovalocytes   


 


PT   


 


INR   


 


APTT    31


 


D-Dimer, Quantitative   


 


pO2   


 


VBG pH   


 


VBG pCO2   


 


VBG HCO3   


 


VBG Total CO2   


 


VBG O2 Sat (Calc)   


 


VBG Base Excess   


 


VBG Potassium   


 


Glucose   


 


Lactate   


 


Crit Value Called To   


 


Crit Value Called By   


 


Crit Value Read Back   


 


Blood Gas Notified Time   


 


Sodium   


 


Potassium   


 


Chloride   


 


Carbon Dioxide   


 


Anion Gap   


 


BUN   


 


Creatinine   


 


Est GFR ( Amer)   


 


Est GFR (Non-Af Amer)   


 


POC Glucose (mg/dL)  349 H  345 H 


 


Random Glucose   


 


Lactic Acid   


 


Calcium   


 


Phosphorus   


 


Magnesium   


 


Total Bilirubin   


 


AST   


 


ALT   


 


Alkaline Phosphatase   


 


Troponin I   


 


NT-Pro-B Natriuret Pep   


 


Total Protein   


 


Albumin   


 


Globulin   


 


Albumin/Globulin Ratio   


 


Triglycerides   


 


Cholesterol   


 


LDL Cholesterol Direct   


 


HDL Cholesterol   


 


Venous Blood Potassium   


 


Serum Ketones   














Assessment & Plan


(1) Severe sepsis


Status: Acute   


Comment: With change of mental status, elevated white count, pleural effusion, 

severe metabolic acidosis with uncontrolled diabetes   





(2) Acute on chronic renal failure


Status: Acute   


Comment: Discussed with renal for hemodialysis   





(3) Congestive heart failure


Status: Acute   


Comment: No acute over chronic systolic left ventricular heart failure in 

addition to right ventricular heart failure would need ACE inhibitor and beta 

blocker once stable   





(4) NSTEMI (non-ST elevated myocardial infarction)


Status: Acute   


Comment: No acute EKG changes however with inferior hypokinesia and severely 

depressed left ventricular contractility. Acute versus old myocardial 

infarction. With troponin of 100 myocarditis should be considered   





(5) Pulmonary HTN


Status: Chronic   


Comment: Severe pulmonary hypertension with right ventricular failure, post DVT

, pulmonary infarct. Pulmonary pressure 62 mmHg on echo now.   





(6) Malignant neoplasm of transverse colon


Status: Chronic   


Comment: Had resection 2010

## 2018-03-02 NOTE — CP.CCUPN
CCU Subjective





- Physician Review


Events Since Last Encounter (Free Text): 





03/02/18 20:29


patient was hypotensive,levophed initiated,patient became asystolic,CPR intiated

,intubated


received 2 epi and 1 bicarb iv,regained pulse and BP








later developed sustained V tach ,shocked,became asystolic,CPR initiated


1 epi and 2 IV bicarb given,patient regained pulse


started IV amiodarone and bicarb drips








CCU Objective





- Vital Signs / Intake & Output


Vital Signs (Last 4 hours): 


Vital Signs











  Pulse Resp BP


 


 03/02/18 20:27  134 H   74/34 L


 


 03/02/18 18:14    117/72


 


 03/02/18 18:00  90  19 


 


 03/02/18 17:49  89  20  117/72


 


 03/02/18 17:00  88  19 


 


 03/02/18 16:49  89  19  117/67











Intake and Output (Last 8hrs): 


 Intake & Output











 03/02/18 03/02/18 03/02/18





 06:59 14:59 22:59


 


Intake Total 207.1 105.8 84.5


 


Output Total 0 0 0


 


Balance 207.1 105.8 84.5


 


Weight 130 lb 4.8 oz  


 


Intake:   


 


  IV  56 56


 


  Intake, IV Amount 207.1 49.8 28.5


 


    Right Forearm 7.1 49.8 28.5


 


    Right Hand 100 0 0


 


    Right Internal Jugular 100  


 


  Oral 0 0 0


 


Output:   


 


  Urine 0 0 0


 


    Urethral ([place]) 0 0 0


 


  Stool 0 0 0


 


Other:   


 


  Voiding Method Indwelling Catheter  














- Physical Exam


Narrative Physical Exam (Free Text): 





03/02/18 21:03


orally intubated


Head: Positive for: Atraumatic, Normocephalic.  Negative for: Tenderness, 

Contusion, Swelling


Pupils: Positive for: PERRL.  Negative for: Sluggish, Non-Reactive


Conjunctiva: Positive for: Normal


Mouth: Positive for: Dry


Nose (External): Positive for: Atraumatic


Neck: Positive for: Trachea Midline.  Negative for: JVD


Respiratory/Chest: Positive for: Good Air Exchange, Accessory Muscle Use, 

Decreased Breath Sounds (on bilateral bases).  Negative for: Wheezes


Cardiovascular: Positive for: Tachycardic


Abdomen: Positive for: Distention, Normal Bowel Sounds.  Negative for: 

Peritoneal Signs


Upper Extremity: Negative for: Cyanosis, Edema


Lower Extremity: Negative for: Edema





- Medications


Active Medications: 


Active Medications











Generic Name Dose Route Start Last Admin





  Trade Name Freq  PRN Reason Stop Dose Admin


 


Aspirin  81 mg  03/02/18 10:00  03/02/18 09:49





  Aspirin Chewable  PO   81 mg





  DAILY [facility]   Administration


 


Calcium Acetate  667 mg  03/02/18 14:00  03/02/18 18:00





  Phoslo  PO   Not Given





  TID [facility]   


 


Epoetin Vamsi  4,000 unit  03/02/18 15:00  





  Procrit  IV   





  MWF [facility]   


 


Ergocalciferol  1 cap  03/03/18 14:45  





  Drisdol 50,000 Intl Units Cap  PO   





  Q7D [facility]   


 


Ferrous Gluconate  324 mg  03/03/18 10:00  





  Fergon  PO   





  TID [facility]   


 


Heparin Sodium/Sodium Chloride  25,000 units in 250 mls @ 7.076 mls/hr  03/02/ 18 00:56  03/02/18 19:00





  Heparin 04364 Units/250ml 1/2 Normal Saline  IV   20 units/kg/hr





  .Q24H PRN   11.793 mls/hr





  PROTOCOL   Titration





  Protocol   





  12 UNITS/KG/HR   


 


Moxifloxacin HCl  400 mg in 250 mls @ 167 mls/hr  03/02/18 23:00  





  Avelox Iv 400mg/250ml Ns  IVPB   





  Q24H [facility]   


 


Norepinephrine Bitartrate 8 mg  258 mls @ 7.74 mls/hr  03/02/18 19:56  





  / Sodium Chloride  IV   





  .Q24H PRN   





  TITRATE PER MD ORDER   





  Protocol   





  4 MCG/MIN   


 


Insulin Aspart  0 unit  03/02/18 18:00  03/02/18 18:14





  Novolog  SC   5 unit





  Q6 [facility]   Administration





  Protocol   


 


Insulin Glargine  15 unit  03/02/18 22:00  





  Lantus  SC   





  HS [facility]   


 


Metoprolol Tartrate  25 mg  03/02/18 10:00  03/02/18 18:14





  Lopressor  PO   25 mg





  BID [facility]   Administration


 


Nitroglycerin  0.4 mg  03/02/18 00:49  





  Nitrostat Sl Tab  SL   





  Q5M PRN   





  Other   


 


Pantoprazole Sodium  40 mg  03/03/18 10:00  





  Protonix Inj  IVP   





  DAILY [facility]   


 


Rosuvastatin Calcium  5 mg  03/02/18 01:15  03/02/18 01:29





  Crestor  PO   5 mg





  HS [facility]   Administration


 


Vitamin B Complex/Vit C/Folic Acid  1 tab  03/03/18 08:00  





  Nephro-Nneka  PO   





  0800 [facility]   














- Patient Studies


Lab Studies: 


 Lab Studies











  03/02/18 03/02/18 03/02/18 Range/Units





  18:25 17:41 16:13 


 


WBC     (4.8-10.8)  K/uL


 


RBC     (3.80-5.20)  Mil/uL


 


Hgb     (11.0-16.0)  g/dL


 


Hct     (34.0-47.0)  %


 


MCV     (81.0-99.0)  fL


 


MCH     (27.0-31.0)  pg


 


MCHC     (33.0-37.0)  g/dL


 


RDW     (11.5-14.5)  %


 


Plt Count     (130-400)  K/uL


 


MPV     (7.2-11.7)  fL


 


Neut % (Auto)     (50.0-75.0)  %


 


Lymph % (Auto)     (20.0-40.0)  %


 


Mono % (Auto)     (0.0-10.0)  %


 


Eos % (Auto)     (0.0-4.0)  %


 


Baso % (Auto)     (0.0-2.0)  %


 


Neut # (Auto)     (1.8-7.0)  K/uL


 


Lymph # (Auto)     (1.0-4.3)  K/uL


 


Mono # (Auto)     (0.0-0.8)  K/uL


 


Eos # (Auto)     (0.0-0.7)  K/uL


 


Baso # (Auto)     (0.0-0.2)  K/uL


 


Neutrophils % (Manual)     (50-75)  %


 


Lymphocytes % (Manual)     (20-40)  %


 


Monocytes % (Manual)     (0-10)  %


 


Platelet Estimate     (NORMAL)  


 


Hypochromasia (manual)     


 


Poikilocytosis (manual     


 


Anisocytosis (manual)     


 


Ovalocytes     


 


PT     (9.7-12.2)  SECONDS


 


INR     


 


APTT  40 H D    (21-34)  SECONDS


 


D-Dimer, Quantitative     (0-243)  ng/mlDDU


 


pO2     (30-55)  mm/Hg


 


VBG pH     (7.32-7.43)  


 


VBG pCO2     (40-60)  mmHg


 


VBG HCO3     mmol/L


 


VBG Total CO2     (22-28)  mmol/L


 


VBG O2 Sat (Calc)     (40-65)  %


 


VBG Base Excess     (0.0-2.0)  mmol/L


 


VBG Potassium     (3.6-5.2)  mmol/L


 


Glucose     ()  mg/dl


 


Lactate     (0.7-2.1)  mmol/L


 


Crit Value Called To     


 


Crit Value Called By     


 


Crit Value Read Back     


 


Blood Gas Notified Time     


 


Sodium     (132-148)  mmol/L


 


Potassium     (3.6-5.2)  mmol/L


 


Chloride     ()  mmol/L


 


Carbon Dioxide     (22-30)  mmol/L


 


Anion Gap     (10-20)  


 


BUN     (7-17)  mg/dL


 


Creatinine     (0.7-1.2)  mg/dL


 


Est GFR (African Amer)     


 


Est GFR (Non-Af Amer)     


 


POC Glucose (mg/dL)    384 H  ()  mg/dL


 


Random Glucose     ()  mg/dL


 


Lactic Acid     (0.7-2.1)  mmol/L


 


Calcium     (8.6-10.4)  mg/dl


 


Phosphorus     (2.5-4.5)  mg/dL


 


Magnesium     (1.6-2.3)  mg/dL


 


Iron     ()  ug/dL


 


TIBC     (250-450)  ug/dL


 


% Saturation     (20-55)  


 


Ferritin     ng/mL


 


Total Bilirubin     (0.2-1.3)  mg/dL


 


AST     (14-36)  U/L


 


ALT     (9-52)  U/L


 


Alkaline Phosphatase     ()  U/L


 


Troponin I     (0.00-0.120)  ng/mL


 


NT-Pro-B Natriuret Pep     (0-900)  pg/mL


 


Total Protein     (6.3-8.3)  g/dL


 


Albumin     (3.5-5.0)  g/dL


 


Globulin     (2.2-3.9)  gm/dL


 


Albumin/Globulin Ratio     (1.0-2.1)  


 


Triglycerides     (0-149)  mg/dL


 


Cholesterol     (0-199)  mg/dL


 


LDL Cholesterol Direct     (0-129)  mg/dL


 


HDL Cholesterol     (30-70)  mg/dL


 


25-OH Vitamin D Total     (30.0-100.0)  NG/ML


 


Procalcitonin     (0.19-0.49)  NG/ML


 


Venous Blood Potassium     (3.6-5.2)  mmol/L


 


Vancomycin Trough   < 5.0 L   (5.0-10.0)  ug/mL


 


Serum Ketones     (NEGATIVE)  


 


Hep Bs Antigen     (NEGATIVE)  


 


Hep Bs Antibody     (NEGATIVE)  


 


Hep B Core IgM Ab     (NEGATIVE)  


 


Hepatitis C Antibody     (NEGATIVE)  














  03/02/18 03/02/18 03/02/18 Range/Units





  12:19 12:19 12:19 


 


WBC     (4.8-10.8)  K/uL


 


RBC     (3.80-5.20)  Mil/uL


 


Hgb     (11.0-16.0)  g/dL


 


Hct     (34.0-47.0)  %


 


MCV     (81.0-99.0)  fL


 


MCH     (27.0-31.0)  pg


 


MCHC     (33.0-37.0)  g/dL


 


RDW     (11.5-14.5)  %


 


Plt Count     (130-400)  K/uL


 


MPV     (7.2-11.7)  fL


 


Neut % (Auto)     (50.0-75.0)  %


 


Lymph % (Auto)     (20.0-40.0)  %


 


Mono % (Auto)     (0.0-10.0)  %


 


Eos % (Auto)     (0.0-4.0)  %


 


Baso % (Auto)     (0.0-2.0)  %


 


Neut # (Auto)     (1.8-7.0)  K/uL


 


Lymph # (Auto)     (1.0-4.3)  K/uL


 


Mono # (Auto)     (0.0-0.8)  K/uL


 


Eos # (Auto)     (0.0-0.7)  K/uL


 


Baso # (Auto)     (0.0-0.2)  K/uL


 


Neutrophils % (Manual)     (50-75)  %


 


Lymphocytes % (Manual)     (20-40)  %


 


Monocytes % (Manual)     (0-10)  %


 


Platelet Estimate     (NORMAL)  


 


Hypochromasia (manual)     


 


Poikilocytosis (manual     


 


Anisocytosis (manual)     


 


Ovalocytes     


 


PT     (9.7-12.2)  SECONDS


 


INR     


 


APTT     (21-34)  SECONDS


 


D-Dimer, Quantitative     (0-243)  ng/mlDDU


 


pO2     (30-55)  mm/Hg


 


VBG pH     (7.32-7.43)  


 


VBG pCO2     (40-60)  mmHg


 


VBG HCO3     mmol/L


 


VBG Total CO2     (22-28)  mmol/L


 


VBG O2 Sat (Calc)     (40-65)  %


 


VBG Base Excess     (0.0-2.0)  mmol/L


 


VBG Potassium     (3.6-5.2)  mmol/L


 


Glucose     ()  mg/dl


 


Lactate     (0.7-2.1)  mmol/L


 


Crit Value Called To     


 


Crit Value Called By     


 


Crit Value Read Back     


 


Blood Gas Notified Time     


 


Sodium     (132-148)  mmol/L


 


Potassium     (3.6-5.2)  mmol/L


 


Chloride     ()  mmol/L


 


Carbon Dioxide     (22-30)  mmol/L


 


Anion Gap     (10-20)  


 


BUN     (7-17)  mg/dL


 


Creatinine     (0.7-1.2)  mg/dL


 


Est GFR (African Amer)     


 


Est GFR (Non-Af Amer)     


 


POC Glucose (mg/dL)     ()  mg/dL


 


Random Glucose     ()  mg/dL


 


Lactic Acid     (0.7-2.1)  mmol/L


 


Calcium     (8.6-10.4)  mg/dl


 


Phosphorus     (2.5-4.5)  mg/dL


 


Magnesium     (1.6-2.3)  mg/dL


 


Iron    18 L  ()  ug/dL


 


TIBC    196 L  (250-450)  ug/dL


 


% Saturation    9 L  (20-55)  


 


Ferritin   195.0   ng/mL


 


Total Bilirubin     (0.2-1.3)  mg/dL


 


AST     (14-36)  U/L


 


ALT     (9-52)  U/L


 


Alkaline Phosphatase     ()  U/L


 


Troponin I     (0.00-0.120)  ng/mL


 


NT-Pro-B Natriuret Pep     (0-900)  pg/mL


 


Total Protein     (6.3-8.3)  g/dL


 


Albumin     (3.5-5.0)  g/dL


 


Globulin     (2.2-3.9)  gm/dL


 


Albumin/Globulin Ratio     (1.0-2.1)  


 


Triglycerides     (0-149)  mg/dL


 


Cholesterol     (0-199)  mg/dL


 


LDL Cholesterol Direct     (0-129)  mg/dL


 


HDL Cholesterol     (30-70)  mg/dL


 


25-OH Vitamin D Total    27.1 L  (30.0-100.0)  NG/ML


 


Procalcitonin     (0.19-0.49)  NG/ML


 


Venous Blood Potassium     (3.6-5.2)  mmol/L


 


Vancomycin Trough     (5.0-10.0)  ug/mL


 


Serum Ketones     (NEGATIVE)  


 


Hep Bs Antigen   Negative   (NEGATIVE)  


 


Hep Bs Antibody  Negative    (NEGATIVE)  


 


Hep B Core IgM Ab   Negative   (NEGATIVE)  


 


Hepatitis C Antibody   Negative   (NEGATIVE)  














  03/02/18 03/02/18 03/02/18 Range/Units





  12:19 11:15 08:34 


 


WBC     (4.8-10.8)  K/uL


 


RBC     (3.80-5.20)  Mil/uL


 


Hgb     (11.0-16.0)  g/dL


 


Hct     (34.0-47.0)  %


 


MCV     (81.0-99.0)  fL


 


MCH     (27.0-31.0)  pg


 


MCHC     (33.0-37.0)  g/dL


 


RDW     (11.5-14.5)  %


 


Plt Count     (130-400)  K/uL


 


MPV     (7.2-11.7)  fL


 


Neut % (Auto)     (50.0-75.0)  %


 


Lymph % (Auto)     (20.0-40.0)  %


 


Mono % (Auto)     (0.0-10.0)  %


 


Eos % (Auto)     (0.0-4.0)  %


 


Baso % (Auto)     (0.0-2.0)  %


 


Neut # (Auto)     (1.8-7.0)  K/uL


 


Lymph # (Auto)     (1.0-4.3)  K/uL


 


Mono # (Auto)     (0.0-0.8)  K/uL


 


Eos # (Auto)     (0.0-0.7)  K/uL


 


Baso # (Auto)     (0.0-0.2)  K/uL


 


Neutrophils % (Manual)     (50-75)  %


 


Lymphocytes % (Manual)     (20-40)  %


 


Monocytes % (Manual)     (0-10)  %


 


Platelet Estimate     (NORMAL)  


 


Hypochromasia (manual)     


 


Poikilocytosis (manual     


 


Anisocytosis (manual)     


 


Ovalocytes     


 


PT     (9.7-12.2)  SECONDS


 


INR     


 


APTT  31    (21-34)  SECONDS


 


D-Dimer, Quantitative     (0-243)  ng/mlDDU


 


pO2     (30-55)  mm/Hg


 


VBG pH     (7.32-7.43)  


 


VBG pCO2     (40-60)  mmHg


 


VBG HCO3     mmol/L


 


VBG Total CO2     (22-28)  mmol/L


 


VBG O2 Sat (Calc)     (40-65)  %


 


VBG Base Excess     (0.0-2.0)  mmol/L


 


VBG Potassium     (3.6-5.2)  mmol/L


 


Glucose     ()  mg/dl


 


Lactate     (0.7-2.1)  mmol/L


 


Crit Value Called To     


 


Crit Value Called By     


 


Crit Value Read Back     


 


Blood Gas Notified Time     


 


Sodium     (132-148)  mmol/L


 


Potassium     (3.6-5.2)  mmol/L


 


Chloride     ()  mmol/L


 


Carbon Dioxide     (22-30)  mmol/L


 


Anion Gap     (10-20)  


 


BUN     (7-17)  mg/dL


 


Creatinine     (0.7-1.2)  mg/dL


 


Est GFR (African Amer)     


 


Est GFR (Non-Af Amer)     


 


POC Glucose (mg/dL)   345 H   ()  mg/dL


 


Random Glucose     ()  mg/dL


 


Lactic Acid     (0.7-2.1)  mmol/L


 


Calcium     (8.6-10.4)  mg/dl


 


Phosphorus     (2.5-4.5)  mg/dL


 


Magnesium     (1.6-2.3)  mg/dL


 


Iron     ()  ug/dL


 


TIBC     (250-450)  ug/dL


 


% Saturation     (20-55)  


 


Ferritin     ng/mL


 


Total Bilirubin     (0.2-1.3)  mg/dL


 


AST     (14-36)  U/L


 


ALT     (9-52)  U/L


 


Alkaline Phosphatase     ()  U/L


 


Troponin I     (0.00-0.120)  ng/mL


 


NT-Pro-B Natriuret Pep     (0-900)  pg/mL


 


Total Protein     (6.3-8.3)  g/dL


 


Albumin     (3.5-5.0)  g/dL


 


Globulin     (2.2-3.9)  gm/dL


 


Albumin/Globulin Ratio     (1.0-2.1)  


 


Triglycerides     (0-149)  mg/dL


 


Cholesterol     (0-199)  mg/dL


 


LDL Cholesterol Direct     (0-129)  mg/dL


 


HDL Cholesterol     (30-70)  mg/dL


 


25-OH Vitamin D Total     (30.0-100.0)  NG/ML


 


Procalcitonin    54.73 H  (0.19-0.49)  NG/ML


 


Venous Blood Potassium     (3.6-5.2)  mmol/L


 


Vancomycin Trough     (5.0-10.0)  ug/mL


 


Serum Ketones     (NEGATIVE)  


 


Hep Bs Antigen     (NEGATIVE)  


 


Hep Bs Antibody     (NEGATIVE)  


 


Hep B Core IgM Ab     (NEGATIVE)  


 


Hepatitis C Antibody     (NEGATIVE)  














  03/02/18 03/02/18 03/02/18 Range/Units





  07:47 05:00 03:49 


 


WBC     (4.8-10.8)  K/uL


 


RBC     (3.80-5.20)  Mil/uL


 


Hgb     (11.0-16.0)  g/dL


 


Hct     (34.0-47.0)  %


 


MCV     (81.0-99.0)  fL


 


MCH     (27.0-31.0)  pg


 


MCHC     (33.0-37.0)  g/dL


 


RDW     (11.5-14.5)  %


 


Plt Count     (130-400)  K/uL


 


MPV     (7.2-11.7)  fL


 


Neut % (Auto)     (50.0-75.0)  %


 


Lymph % (Auto)     (20.0-40.0)  %


 


Mono % (Auto)     (0.0-10.0)  %


 


Eos % (Auto)     (0.0-4.0)  %


 


Baso % (Auto)     (0.0-2.0)  %


 


Neut # (Auto)     (1.8-7.0)  K/uL


 


Lymph # (Auto)     (1.0-4.3)  K/uL


 


Mono # (Auto)     (0.0-0.8)  K/uL


 


Eos # (Auto)     (0.0-0.7)  K/uL


 


Baso # (Auto)     (0.0-0.2)  K/uL


 


Neutrophils % (Manual)     (50-75)  %


 


Lymphocytes % (Manual)     (20-40)  %


 


Monocytes % (Manual)     (0-10)  %


 


Platelet Estimate     (NORMAL)  


 


Hypochromasia (manual)     


 


Poikilocytosis (manual     


 


Anisocytosis (manual)     


 


Ovalocytes     


 


PT     (9.7-12.2)  SECONDS


 


INR     


 


APTT     (21-34)  SECONDS


 


D-Dimer, Quantitative     (0-243)  ng/mlDDU


 


pO2     (30-55)  mm/Hg


 


VBG pH     (7.32-7.43)  


 


VBG pCO2     (40-60)  mmHg


 


VBG HCO3     mmol/L


 


VBG Total CO2     (22-28)  mmol/L


 


VBG O2 Sat (Calc)     (40-65)  %


 


VBG Base Excess     (0.0-2.0)  mmol/L


 


VBG Potassium     (3.6-5.2)  mmol/L


 


Glucose     ()  mg/dl


 


Lactate     (0.7-2.1)  mmol/L


 


Crit Value Called To     


 


Crit Value Called By     


 


Crit Value Read Back     


 


Blood Gas Notified Time     


 


Sodium     (132-148)  mmol/L


 


Potassium     (3.6-5.2)  mmol/L


 


Chloride     ()  mmol/L


 


Carbon Dioxide     (22-30)  mmol/L


 


Anion Gap     (10-20)  


 


BUN     (7-17)  mg/dL


 


Creatinine     (0.7-1.2)  mg/dL


 


Est GFR (African Amer)     


 


Est GFR (Non-Af Amer)     


 


POC Glucose (mg/dL)  349 H    ()  mg/dL


 


Random Glucose     ()  mg/dL


 


Lactic Acid    3.4 H  (0.7-2.1)  mmol/L


 


Calcium     (8.6-10.4)  mg/dl


 


Phosphorus     (2.5-4.5)  mg/dL


 


Magnesium     (1.6-2.3)  mg/dL


 


Iron     ()  ug/dL


 


TIBC     (250-450)  ug/dL


 


% Saturation     (20-55)  


 


Ferritin     ng/mL


 


Total Bilirubin     (0.2-1.3)  mg/dL


 


AST     (14-36)  U/L


 


ALT     (9-52)  U/L


 


Alkaline Phosphatase     ()  U/L


 


Troponin I     (0.00-0.120)  ng/mL


 


NT-Pro-B Natriuret Pep     (0-900)  pg/mL


 


Total Protein     (6.3-8.3)  g/dL


 


Albumin     (3.5-5.0)  g/dL


 


Globulin     (2.2-3.9)  gm/dL


 


Albumin/Globulin Ratio     (1.0-2.1)  


 


Triglycerides   170 H D   (0-149)  mg/dL


 


Cholesterol   228 H   (0-199)  mg/dL


 


LDL Cholesterol Direct   68   (0-129)  mg/dL


 


HDL Cholesterol   61   (30-70)  mg/dL


 


25-OH Vitamin D Total     (30.0-100.0)  NG/ML


 


Procalcitonin     (0.19-0.49)  NG/ML


 


Venous Blood Potassium     (3.6-5.2)  mmol/L


 


Vancomycin Trough     (5.0-10.0)  ug/mL


 


Serum Ketones     (NEGATIVE)  


 


Hep Bs Antigen     (NEGATIVE)  


 


Hep Bs Antibody     (NEGATIVE)  


 


Hep B Core IgM Ab     (NEGATIVE)  


 


Hepatitis C Antibody     (NEGATIVE)  














  03/02/18 03/02/18 03/02/18 Range/Units





  03:49 03:49 02:17 


 


WBC   15.7 H   (4.8-10.8)  K/uL


 


RBC   3.48 L   (3.80-5.20)  Mil/uL


 


Hgb   9.6 L   (11.0-16.0)  g/dL


 


Hct   29.6 L   (34.0-47.0)  %


 


MCV   85.2  D   (81.0-99.0)  fL


 


MCH   27.7   (27.0-31.0)  pg


 


MCHC   32.5 L   (33.0-37.0)  g/dL


 


RDW   16.2 H   (11.5-14.5)  %


 


Plt Count   143   (130-400)  K/uL


 


MPV   8.9   (7.2-11.7)  fL


 


Neut % (Auto)   90.5 H   (50.0-75.0)  %


 


Lymph % (Auto)   3.4 L   (20.0-40.0)  %


 


Mono % (Auto)   5.6   (0.0-10.0)  %


 


Eos % (Auto)   0.0   (0.0-4.0)  %


 


Baso % (Auto)   0.5   (0.0-2.0)  %


 


Neut # (Auto)   14.2 H   (1.8-7.0)  K/uL


 


Lymph # (Auto)   0.5 L   (1.0-4.3)  K/uL


 


Mono # (Auto)   0.9 H   (0.0-0.8)  K/uL


 


Eos # (Auto)   0.0   (0.0-0.7)  K/uL


 


Baso # (Auto)   0.1   (0.0-0.2)  K/uL


 


Neutrophils % (Manual)   93 H   (50-75)  %


 


Lymphocytes % (Manual)   4 L   (20-40)  %


 


Monocytes % (Manual)   3   (0-10)  %


 


Platelet Estimate   Normal   (NORMAL)  


 


Hypochromasia (manual)   Slight   


 


Poikilocytosis (manual   Slight   


 


Anisocytosis (manual)   Slight   


 


Ovalocytes   Slight   


 


PT    14.6 H  (9.7-12.2)  SECONDS


 


INR    1.3  


 


APTT    29  (21-34)  SECONDS


 


D-Dimer, Quantitative     (0-243)  ng/mlDDU


 


pO2     (30-55)  mm/Hg


 


VBG pH     (7.32-7.43)  


 


VBG pCO2     (40-60)  mmHg


 


VBG HCO3     mmol/L


 


VBG Total CO2     (22-28)  mmol/L


 


VBG O2 Sat (Calc)     (40-65)  %


 


VBG Base Excess     (0.0-2.0)  mmol/L


 


VBG Potassium     (3.6-5.2)  mmol/L


 


Glucose     ()  mg/dl


 


Lactate     (0.7-2.1)  mmol/L


 


Crit Value Called To     


 


Crit Value Called By     


 


Crit Value Read Back     


 


Blood Gas Notified Time     


 


Sodium  139    (132-148)  mmol/L


 


Potassium  5.9 H    (3.6-5.2)  mmol/L


 


Chloride  106    ()  mmol/L


 


Carbon Dioxide  16 L    (22-30)  mmol/L


 


Anion Gap  24 H    (10-20)  


 


BUN  79 H    (7-17)  mg/dL


 


Creatinine  5.2 H    (0.7-1.2)  mg/dL


 


Est GFR ( Amer)  10    


 


Est GFR (Non-Af Amer)  8    


 


POC Glucose (mg/dL)     ()  mg/dL


 


Random Glucose  415 H* D    ()  mg/dL


 


Lactic Acid     (0.7-2.1)  mmol/L


 


Calcium  8.6    (8.6-10.4)  mg/dl


 


Phosphorus  5.8 H    (2.5-4.5)  mg/dL


 


Magnesium  2.1    (1.6-2.3)  mg/dL


 


Iron     ()  ug/dL


 


TIBC     (250-450)  ug/dL


 


% Saturation     (20-55)  


 


Ferritin     ng/mL


 


Total Bilirubin     (0.2-1.3)  mg/dL


 


AST     (14-36)  U/L


 


ALT     (9-52)  U/L


 


Alkaline Phosphatase     ()  U/L


 


Troponin I  96.9000 H*    (0.00-0.120)  ng/mL


 


NT-Pro-B Natriuret Pep     (0-900)  pg/mL


 


Total Protein     (6.3-8.3)  g/dL


 


Albumin     (3.5-5.0)  g/dL


 


Globulin     (2.2-3.9)  gm/dL


 


Albumin/Globulin Ratio     (1.0-2.1)  


 


Triglycerides     (0-149)  mg/dL


 


Cholesterol     (0-199)  mg/dL


 


LDL Cholesterol Direct     (0-129)  mg/dL


 


HDL Cholesterol     (30-70)  mg/dL


 


25-OH Vitamin D Total     (30.0-100.0)  NG/ML


 


Procalcitonin     (0.19-0.49)  NG/ML


 


Venous Blood Potassium     (3.6-5.2)  mmol/L


 


Vancomycin Trough     (5.0-10.0)  ug/mL


 


Serum Ketones     (NEGATIVE)  


 


Hep Bs Antigen     (NEGATIVE)  


 


Hep Bs Antibody     (NEGATIVE)  


 


Hep B Core IgM Ab     (NEGATIVE)  


 


Hepatitis C Antibody     (NEGATIVE)  














  03/02/18 03/02/18 03/01/18 Range/Units





  02:01 01:18 23:25 


 


WBC     (4.8-10.8)  K/uL


 


RBC     (3.80-5.20)  Mil/uL


 


Hgb     (11.0-16.0)  g/dL


 


Hct     (34.0-47.0)  %


 


MCV     (81.0-99.0)  fL


 


MCH     (27.0-31.0)  pg


 


MCHC     (33.0-37.0)  g/dL


 


RDW     (11.5-14.5)  %


 


Plt Count     (130-400)  K/uL


 


MPV     (7.2-11.7)  fL


 


Neut % (Auto)     (50.0-75.0)  %


 


Lymph % (Auto)     (20.0-40.0)  %


 


Mono % (Auto)     (0.0-10.0)  %


 


Eos % (Auto)     (0.0-4.0)  %


 


Baso % (Auto)     (0.0-2.0)  %


 


Neut # (Auto)     (1.8-7.0)  K/uL


 


Lymph # (Auto)     (1.0-4.3)  K/uL


 


Mono # (Auto)     (0.0-0.8)  K/uL


 


Eos # (Auto)     (0.0-0.7)  K/uL


 


Baso # (Auto)     (0.0-0.2)  K/uL


 


Neutrophils % (Manual)     (50-75)  %


 


Lymphocytes % (Manual)     (20-40)  %


 


Monocytes % (Manual)     (0-10)  %


 


Platelet Estimate     (NORMAL)  


 


Hypochromasia (manual)     


 


Poikilocytosis (manual     


 


Anisocytosis (manual)     


 


Ovalocytes     


 


PT     (9.7-12.2)  SECONDS


 


INR     


 


APTT     (21-34)  SECONDS


 


D-Dimer, Quantitative     (0-243)  ng/mlDDU


 


pO2    16 L  (30-55)  mm/Hg


 


VBG pH    7.16 L*  (7.32-7.43)  


 


VBG pCO2    40  (40-60)  mmHg


 


VBG HCO3    11.9  mmol/L


 


VBG Total CO2    15.5 L  (22-28)  mmol/L


 


VBG O2 Sat (Calc)    35.2 L  (40-65)  %


 


VBG Base Excess    -13.8 L  (0.0-2.0)  mmol/L


 


VBG Potassium    5.8 H  (3.6-5.2)  mmol/L


 


Glucose    562 H* D  ()  mg/dl


 


Lactate    2.5 H  (0.7-2.1)  mmol/L


 


Crit Value Called To    Karmen moreno/rn  


 


Crit Value Called By    Olu gallegos/rt  


 


Crit Value Read Back    Y  


 


Blood Gas Notified Time    2335  


 


Sodium    139.0  (132-148)  mmol/L


 


Potassium     (3.6-5.2)  mmol/L


 


Chloride    103.0  ()  mmol/L


 


Carbon Dioxide     (22-30)  mmol/L


 


Anion Gap     (10-20)  


 


BUN     (7-17)  mg/dL


 


Creatinine     (0.7-1.2)  mg/dL


 


Est GFR (African Amer)     


 


Est GFR (Non-Af Amer)     


 


POC Glucose (mg/dL)  374 H  362 H   ()  mg/dL


 


Random Glucose     ()  mg/dL


 


Lactic Acid     (0.7-2.1)  mmol/L


 


Calcium     (8.6-10.4)  mg/dl


 


Phosphorus     (2.5-4.5)  mg/dL


 


Magnesium     (1.6-2.3)  mg/dL


 


Iron     ()  ug/dL


 


TIBC     (250-450)  ug/dL


 


% Saturation     (20-55)  


 


Ferritin     ng/mL


 


Total Bilirubin     (0.2-1.3)  mg/dL


 


AST     (14-36)  U/L


 


ALT     (9-52)  U/L


 


Alkaline Phosphatase     ()  U/L


 


Troponin I     (0.00-0.120)  ng/mL


 


NT-Pro-B Natriuret Pep     (0-900)  pg/mL


 


Total Protein     (6.3-8.3)  g/dL


 


Albumin     (3.5-5.0)  g/dL


 


Globulin     (2.2-3.9)  gm/dL


 


Albumin/Globulin Ratio     (1.0-2.1)  


 


Triglycerides     (0-149)  mg/dL


 


Cholesterol     (0-199)  mg/dL


 


LDL Cholesterol Direct     (0-129)  mg/dL


 


HDL Cholesterol     (30-70)  mg/dL


 


25-OH Vitamin D Total     (30.0-100.0)  NG/ML


 


Procalcitonin     (0.19-0.49)  NG/ML


 


Venous Blood Potassium    5.8 H  (3.6-5.2)  mmol/L


 


Vancomycin Trough     (5.0-10.0)  ug/mL


 


Serum Ketones     (NEGATIVE)  


 


Hep Bs Antigen     (NEGATIVE)  


 


Hep Bs Antibody     (NEGATIVE)  


 


Hep B Core IgM Ab     (NEGATIVE)  


 


Hepatitis C Antibody     (NEGATIVE)  














  03/01/18 03/01/18 03/01/18 Range/Units





  22:51 22:51 22:51 


 


WBC    16.3 H D  (4.8-10.8)  K/uL


 


RBC    3.72 L  (3.80-5.20)  Mil/uL


 


Hgb    10.3 L  (11.0-16.0)  g/dL


 


Hct    32.5 L  (34.0-47.0)  %


 


MCV    87.4  (81.0-99.0)  fL


 


MCH    27.7  (27.0-31.0)  pg


 


MCHC    31.6 L  (33.0-37.0)  g/dL


 


RDW    16.7 H  (11.5-14.5)  %


 


Plt Count    156  (130-400)  K/uL


 


MPV    9.4  (7.2-11.7)  fL


 


Neut % (Auto)    90.5 H  (50.0-75.0)  %


 


Lymph % (Auto)    3.8 L  (20.0-40.0)  %


 


Mono % (Auto)    4.8  (0.0-10.0)  %


 


Eos % (Auto)    0.0  (0.0-4.0)  %


 


Baso % (Auto)    0.9  (0.0-2.0)  %


 


Neut # (Auto)    14.8 H  (1.8-7.0)  K/uL


 


Lymph # (Auto)    0.6 L  (1.0-4.3)  K/uL


 


Mono # (Auto)    0.8  (0.0-0.8)  K/uL


 


Eos # (Auto)    0.0  (0.0-0.7)  K/uL


 


Baso # (Auto)    0.1  (0.0-0.2)  K/uL


 


Neutrophils % (Manual)    92 H  (50-75)  %


 


Lymphocytes % (Manual)    5 L  (20-40)  %


 


Monocytes % (Manual)    3  (0-10)  %


 


Platelet Estimate    Normal  (NORMAL)  


 


Hypochromasia (manual)     


 


Poikilocytosis (manual     


 


Anisocytosis (manual)     


 


Ovalocytes     


 


PT     (9.7-12.2)  SECONDS


 


INR     


 


APTT     (21-34)  SECONDS


 


D-Dimer, Quantitative   4388 H   (0-243)  ng/mlDDU


 


pO2     (30-55)  mm/Hg


 


VBG pH     (7.32-7.43)  


 


VBG pCO2     (40-60)  mmHg


 


VBG HCO3     mmol/L


 


VBG Total CO2     (22-28)  mmol/L


 


VBG O2 Sat (Calc)     (40-65)  %


 


VBG Base Excess     (0.0-2.0)  mmol/L


 


VBG Potassium     (3.6-5.2)  mmol/L


 


Glucose     ()  mg/dl


 


Lactate     (0.7-2.1)  mmol/L


 


Crit Value Called To     


 


Crit Value Called By     


 


Crit Value Read Back     


 


Blood Gas Notified Time     


 


Sodium  137    (132-148)  mmol/L


 


Potassium  6.5 H* D    (3.6-5.2)  mmol/L


 


Chloride  105    ()  mmol/L


 


Carbon Dioxide  11 L* D    (22-30)  mmol/L


 


Anion Gap  28 H    (10-20)  


 


BUN  74 H    (7-17)  mg/dL


 


Creatinine  4.7 H    (0.7-1.2)  mg/dL


 


Est GFR ( Amer)  11    


 


Est GFR (Non-Af Amer)  9    


 


POC Glucose (mg/dL)     ()  mg/dL


 


Random Glucose  559 H* D    ()  mg/dL


 


Lactic Acid     (0.7-2.1)  mmol/L


 


Calcium  8.0 L    (8.6-10.4)  mg/dl


 


Phosphorus     (2.5-4.5)  mg/dL


 


Magnesium     (1.6-2.3)  mg/dL


 


Iron     ()  ug/dL


 


TIBC     (250-450)  ug/dL


 


% Saturation     (20-55)  


 


Ferritin     ng/mL


 


Total Bilirubin  1.6 H    (0.2-1.3)  mg/dL


 


AST  193 H    (14-36)  U/L


 


ALT  54 H D    (9-52)  U/L


 


Alkaline Phosphatase  96    ()  U/L


 


Troponin I  103.0000 H*    (0.00-0.120)  ng/mL


 


NT-Pro-B Natriuret Pep  02810 H    (0-900)  pg/mL


 


Total Protein  7.3    (6.3-8.3)  g/dL


 


Albumin  3.6    (3.5-5.0)  g/dL


 


Globulin  3.7    (2.2-3.9)  gm/dL


 


Albumin/Globulin Ratio  1.0    (1.0-2.1)  


 


Triglycerides     (0-149)  mg/dL


 


Cholesterol     (0-199)  mg/dL


 


LDL Cholesterol Direct     (0-129)  mg/dL


 


HDL Cholesterol     (30-70)  mg/dL


 


25-OH Vitamin D Total     (30.0-100.0)  NG/ML


 


Procalcitonin     (0.19-0.49)  NG/ML


 


Venous Blood Potassium     (3.6-5.2)  mmol/L


 


Vancomycin Trough     (5.0-10.0)  ug/mL


 


Serum Ketones  Negative    (NEGATIVE)  


 


Hep Bs Antigen     (NEGATIVE)  


 


Hep Bs Antibody     (NEGATIVE)  


 


Hep B Core IgM Ab     (NEGATIVE)  


 


Hepatitis C Antibody     (NEGATIVE)  














  03/01/18 Range/Units





  22:18 


 


WBC   (4.8-10.8)  K/uL


 


RBC   (3.80-5.20)  Mil/uL


 


Hgb   (11.0-16.0)  g/dL


 


Hct   (34.0-47.0)  %


 


MCV   (81.0-99.0)  fL


 


MCH   (27.0-31.0)  pg


 


MCHC   (33.0-37.0)  g/dL


 


RDW   (11.5-14.5)  %


 


Plt Count   (130-400)  K/uL


 


MPV   (7.2-11.7)  fL


 


Neut % (Auto)   (50.0-75.0)  %


 


Lymph % (Auto)   (20.0-40.0)  %


 


Mono % (Auto)   (0.0-10.0)  %


 


Eos % (Auto)   (0.0-4.0)  %


 


Baso % (Auto)   (0.0-2.0)  %


 


Neut # (Auto)   (1.8-7.0)  K/uL


 


Lymph # (Auto)   (1.0-4.3)  K/uL


 


Mono # (Auto)   (0.0-0.8)  K/uL


 


Eos # (Auto)   (0.0-0.7)  K/uL


 


Baso # (Auto)   (0.0-0.2)  K/uL


 


Neutrophils % (Manual)   (50-75)  %


 


Lymphocytes % (Manual)   (20-40)  %


 


Monocytes % (Manual)   (0-10)  %


 


Platelet Estimate   (NORMAL)  


 


Hypochromasia (manual)   


 


Poikilocytosis (manual   


 


Anisocytosis (manual)   


 


Ovalocytes   


 


PT   (9.7-12.2)  SECONDS


 


INR   


 


APTT   (21-34)  SECONDS


 


D-Dimer, Quantitative   (0-243)  ng/mlDDU


 


pO2   (30-55)  mm/Hg


 


VBG pH   (7.32-7.43)  


 


VBG pCO2   (40-60)  mmHg


 


VBG HCO3   mmol/L


 


VBG Total CO2   (22-28)  mmol/L


 


VBG O2 Sat (Calc)   (40-65)  %


 


VBG Base Excess   (0.0-2.0)  mmol/L


 


VBG Potassium   (3.6-5.2)  mmol/L


 


Glucose   ()  mg/dl


 


Lactate   (0.7-2.1)  mmol/L


 


Crit Value Called To   


 


Crit Value Called By   


 


Crit Value Read Back   


 


Blood Gas Notified Time   


 


Sodium   (132-148)  mmol/L


 


Potassium   (3.6-5.2)  mmol/L


 


Chloride   ()  mmol/L


 


Carbon Dioxide   (22-30)  mmol/L


 


Anion Gap   (10-20)  


 


BUN   (7-17)  mg/dL


 


Creatinine   (0.7-1.2)  mg/dL


 


Est GFR (African Amer)   


 


Est GFR (Non-Af Amer)   


 


POC Glucose (mg/dL)  467 H*  ()  mg/dL


 


Random Glucose   ()  mg/dL


 


Lactic Acid   (0.7-2.1)  mmol/L


 


Calcium   (8.6-10.4)  mg/dl


 


Phosphorus   (2.5-4.5)  mg/dL


 


Magnesium   (1.6-2.3)  mg/dL


 


Iron   ()  ug/dL


 


TIBC   (250-450)  ug/dL


 


% Saturation   (20-55)  


 


Ferritin   ng/mL


 


Total Bilirubin   (0.2-1.3)  mg/dL


 


AST   (14-36)  U/L


 


ALT   (9-52)  U/L


 


Alkaline Phosphatase   ()  U/L


 


Troponin I   (0.00-0.120)  ng/mL


 


NT-Pro-B Natriuret Pep   (0-900)  pg/mL


 


Total Protein   (6.3-8.3)  g/dL


 


Albumin   (3.5-5.0)  g/dL


 


Globulin   (2.2-3.9)  gm/dL


 


Albumin/Globulin Ratio   (1.0-2.1)  


 


Triglycerides   (0-149)  mg/dL


 


Cholesterol   (0-199)  mg/dL


 


LDL Cholesterol Direct   (0-129)  mg/dL


 


HDL Cholesterol   (30-70)  mg/dL


 


25-OH Vitamin D Total   (30.0-100.0)  NG/ML


 


Procalcitonin   (0.19-0.49)  NG/ML


 


Venous Blood Potassium   (3.6-5.2)  mmol/L


 


Vancomycin Trough   (5.0-10.0)  ug/mL


 


Serum Ketones   (NEGATIVE)  


 


Hep Bs Antigen   (NEGATIVE)  


 


Hep Bs Antibody   (NEGATIVE)  


 


Hep B Core IgM Ab   (NEGATIVE)  


 


Hepatitis C Antibody   (NEGATIVE)  








 Laboratory Results - last 24 hr











  03/01/18 03/01/18 03/01/18





  22:18 22:51 22:51


 


WBC   16.3 H D 


 


RBC   3.72 L 


 


Hgb   10.3 L 


 


Hct   32.5 L 


 


MCV   87.4 


 


MCH   27.7 


 


MCHC   31.6 L 


 


RDW   16.7 H 


 


Plt Count   156 


 


MPV   9.4 


 


Neut % (Auto)   90.5 H 


 


Lymph % (Auto)   3.8 L 


 


Mono % (Auto)   4.8 


 


Eos % (Auto)   0.0 


 


Baso % (Auto)   0.9 


 


Neut # (Auto)   14.8 H 


 


Lymph # (Auto)   0.6 L 


 


Mono # (Auto)   0.8 


 


Eos # (Auto)   0.0 


 


Baso # (Auto)   0.1 


 


Neutrophils % (Manual)   92 H 


 


Lymphocytes % (Manual)   5 L 


 


Monocytes % (Manual)   3 


 


Platelet Estimate   Normal 


 


Hypochromasia (manual)   


 


Poikilocytosis (manual   


 


Anisocytosis (manual)   


 


Ovalocytes   


 


PT   


 


INR   


 


APTT   


 


D-Dimer, Quantitative    4388 H


 


pO2   


 


VBG pH   


 


VBG pCO2   


 


VBG HCO3   


 


VBG Total CO2   


 


VBG O2 Sat (Calc)   


 


VBG Base Excess   


 


VBG Potassium   


 


Glucose   


 


Lactate   


 


Crit Value Called To   


 


Crit Value Called By   


 


Crit Value Read Back   


 


Blood Gas Notified Time   


 


Sodium   


 


Potassium   


 


Chloride   


 


Carbon Dioxide   


 


Anion Gap   


 


BUN   


 


Creatinine   


 


Est GFR ( Amer)   


 


Est GFR (Non-Af Amer)   


 


POC Glucose (mg/dL)  467 H*  


 


Random Glucose   


 


Lactic Acid   


 


Calcium   


 


Phosphorus   


 


Magnesium   


 


Iron   


 


TIBC   


 


% Saturation   


 


Ferritin   


 


Total Bilirubin   


 


AST   


 


ALT   


 


Alkaline Phosphatase   


 


Troponin I   


 


NT-Pro-B Natriuret Pep   


 


Total Protein   


 


Albumin   


 


Globulin   


 


Albumin/Globulin Ratio   


 


Triglycerides   


 


Cholesterol   


 


LDL Cholesterol Direct   


 


HDL Cholesterol   


 


25-OH Vitamin D Total   


 


Procalcitonin   


 


Venous Blood Potassium   


 


Vancomycin Trough   


 


Serum Ketones   


 


Hep Bs Antigen   


 


Hep Bs Antibody   


 


Hep B Core IgM Ab   


 


Hepatitis C Antibody   














  03/01/18 03/01/18 03/02/18





  22:51 23:25 01:18


 


WBC   


 


RBC   


 


Hgb   


 


Hct   


 


MCV   


 


MCH   


 


MCHC   


 


RDW   


 


Plt Count   


 


MPV   


 


Neut % (Auto)   


 


Lymph % (Auto)   


 


Mono % (Auto)   


 


Eos % (Auto)   


 


Baso % (Auto)   


 


Neut # (Auto)   


 


Lymph # (Auto)   


 


Mono # (Auto)   


 


Eos # (Auto)   


 


Baso # (Auto)   


 


Neutrophils % (Manual)   


 


Lymphocytes % (Manual)   


 


Monocytes % (Manual)   


 


Platelet Estimate   


 


Hypochromasia (manual)   


 


Poikilocytosis (manual   


 


Anisocytosis (manual)   


 


Ovalocytes   


 


PT   


 


INR   


 


APTT   


 


D-Dimer, Quantitative   


 


pO2   16 L 


 


VBG pH   7.16 L* 


 


VBG pCO2   40 


 


VBG HCO3   11.9 


 


VBG Total CO2   15.5 L 


 


VBG O2 Sat (Calc)   35.2 L 


 


VBG Base Excess   -13.8 L 


 


VBG Potassium   5.8 H 


 


Glucose   562 H* D 


 


Lactate   2.5 H 


 


Crit Value Called To   Karmen moreno/rn 


 


Crit Value Called By   Olu gallegos/rt 


 


Crit Value Read Back   Y 


 


Blood Gas Notified Time   2335 


 


Sodium  137  139.0 


 


Potassium  6.5 H* D  


 


Chloride  105  103.0 


 


Carbon Dioxide  11 L* D  


 


Anion Gap  28 H  


 


BUN  74 H  


 


Creatinine  4.7 H  


 


Est GFR ( Amer)  11  


 


Est GFR (Non-Af Amer)  9  


 


POC Glucose (mg/dL)    362 H


 


Random Glucose  559 H* D  


 


Lactic Acid   


 


Calcium  8.0 L  


 


Phosphorus   


 


Magnesium   


 


Iron   


 


TIBC   


 


% Saturation   


 


Ferritin   


 


Total Bilirubin  1.6 H  


 


AST  193 H  


 


ALT  54 H D  


 


Alkaline Phosphatase  96  


 


Troponin I  103.0000 H*  


 


NT-Pro-B Natriuret Pep  59531 H  


 


Total Protein  7.3  


 


Albumin  3.6  


 


Globulin  3.7  


 


Albumin/Globulin Ratio  1.0  


 


Triglycerides   


 


Cholesterol   


 


LDL Cholesterol Direct   


 


HDL Cholesterol   


 


25-OH Vitamin D Total   


 


Procalcitonin   


 


Venous Blood Potassium   5.8 H 


 


Vancomycin Trough   


 


Serum Ketones  Negative  


 


Hep Bs Antigen   


 


Hep Bs Antibody   


 


Hep B Core IgM Ab   


 


Hepatitis C Antibody   














  03/02/18 03/02/18 03/02/18





  02:01 02:17 03:49


 


WBC    15.7 H


 


RBC    3.48 L


 


Hgb    9.6 L


 


Hct    29.6 L


 


MCV    85.2  D


 


MCH    27.7


 


MCHC    32.5 L


 


RDW    16.2 H


 


Plt Count    143


 


MPV    8.9


 


Neut % (Auto)    90.5 H


 


Lymph % (Auto)    3.4 L


 


Mono % (Auto)    5.6


 


Eos % (Auto)    0.0


 


Baso % (Auto)    0.5


 


Neut # (Auto)    14.2 H


 


Lymph # (Auto)    0.5 L


 


Mono # (Auto)    0.9 H


 


Eos # (Auto)    0.0


 


Baso # (Auto)    0.1


 


Neutrophils % (Manual)    93 H


 


Lymphocytes % (Manual)    4 L


 


Monocytes % (Manual)    3


 


Platelet Estimate    Normal


 


Hypochromasia (manual)    Slight


 


Poikilocytosis (manual    Slight


 


Anisocytosis (manual)    Slight


 


Ovalocytes    Slight


 


PT   14.6 H 


 


INR   1.3 


 


APTT   29 


 


D-Dimer, Quantitative   


 


pO2   


 


VBG pH   


 


VBG pCO2   


 


VBG HCO3   


 


VBG Total CO2   


 


VBG O2 Sat (Calc)   


 


VBG Base Excess   


 


VBG Potassium   


 


Glucose   


 


Lactate   


 


Crit Value Called To   


 


Crit Value Called By   


 


Crit Value Read Back   


 


Blood Gas Notified Time   


 


Sodium   


 


Potassium   


 


Chloride   


 


Carbon Dioxide   


 


Anion Gap   


 


BUN   


 


Creatinine   


 


Est GFR ( Amer)   


 


Est GFR (Non-Af Amer)   


 


POC Glucose (mg/dL)  374 H  


 


Random Glucose   


 


Lactic Acid   


 


Calcium   


 


Phosphorus   


 


Magnesium   


 


Iron   


 


TIBC   


 


% Saturation   


 


Ferritin   


 


Total Bilirubin   


 


AST   


 


ALT   


 


Alkaline Phosphatase   


 


Troponin I   


 


NT-Pro-B Natriuret Pep   


 


Total Protein   


 


Albumin   


 


Globulin   


 


Albumin/Globulin Ratio   


 


Triglycerides   


 


Cholesterol   


 


LDL Cholesterol Direct   


 


HDL Cholesterol   


 


25-OH Vitamin D Total   


 


Procalcitonin   


 


Venous Blood Potassium   


 


Vancomycin Trough   


 


Serum Ketones   


 


Hep Bs Antigen   


 


Hep Bs Antibody   


 


Hep B Core IgM Ab   


 


Hepatitis C Antibody   














  03/02/18 03/02/18 03/02/18





  03:49 03:49 05:00


 


WBC   


 


RBC   


 


Hgb   


 


Hct   


 


MCV   


 


MCH   


 


MCHC   


 


RDW   


 


Plt Count   


 


MPV   


 


Neut % (Auto)   


 


Lymph % (Auto)   


 


Mono % (Auto)   


 


Eos % (Auto)   


 


Baso % (Auto)   


 


Neut # (Auto)   


 


Lymph # (Auto)   


 


Mono # (Auto)   


 


Eos # (Auto)   


 


Baso # (Auto)   


 


Neutrophils % (Manual)   


 


Lymphocytes % (Manual)   


 


Monocytes % (Manual)   


 


Platelet Estimate   


 


Hypochromasia (manual)   


 


Poikilocytosis (manual   


 


Anisocytosis (manual)   


 


Ovalocytes   


 


PT   


 


INR   


 


APTT   


 


D-Dimer, Quantitative   


 


pO2   


 


VBG pH   


 


VBG pCO2   


 


VBG HCO3   


 


VBG Total CO2   


 


VBG O2 Sat (Calc)   


 


VBG Base Excess   


 


VBG Potassium   


 


Glucose   


 


Lactate   


 


Crit Value Called To   


 


Crit Value Called By   


 


Crit Value Read Back   


 


Blood Gas Notified Time   


 


Sodium  139  


 


Potassium  5.9 H  


 


Chloride  106  


 


Carbon Dioxide  16 L  


 


Anion Gap  24 H  


 


BUN  79 H  


 


Creatinine  5.2 H  


 


Est GFR ( Amer)  10  


 


Est GFR (Non-Af Amer)  8  


 


POC Glucose (mg/dL)   


 


Random Glucose  415 H* D  


 


Lactic Acid   3.4 H 


 


Calcium  8.6  


 


Phosphorus  5.8 H  


 


Magnesium  2.1  


 


Iron   


 


TIBC   


 


% Saturation   


 


Ferritin   


 


Total Bilirubin   


 


AST   


 


ALT   


 


Alkaline Phosphatase   


 


Troponin I  96.9000 H*  


 


NT-Pro-B Natriuret Pep   


 


Total Protein   


 


Albumin   


 


Globulin   


 


Albumin/Globulin Ratio   


 


Triglycerides    170 H D


 


Cholesterol    228 H


 


LDL Cholesterol Direct    68


 


HDL Cholesterol    61


 


25-OH Vitamin D Total   


 


Procalcitonin   


 


Venous Blood Potassium   


 


Vancomycin Trough   


 


Serum Ketones   


 


Hep Bs Antigen   


 


Hep Bs Antibody   


 


Hep B Core IgM Ab   


 


Hepatitis C Antibody   














  03/02/18 03/02/18 03/02/18





  07:47 08:34 11:15


 


WBC   


 


RBC   


 


Hgb   


 


Hct   


 


MCV   


 


MCH   


 


MCHC   


 


RDW   


 


Plt Count   


 


MPV   


 


Neut % (Auto)   


 


Lymph % (Auto)   


 


Mono % (Auto)   


 


Eos % (Auto)   


 


Baso % (Auto)   


 


Neut # (Auto)   


 


Lymph # (Auto)   


 


Mono # (Auto)   


 


Eos # (Auto)   


 


Baso # (Auto)   


 


Neutrophils % (Manual)   


 


Lymphocytes % (Manual)   


 


Monocytes % (Manual)   


 


Platelet Estimate   


 


Hypochromasia (manual)   


 


Poikilocytosis (manual   


 


Anisocytosis (manual)   


 


Ovalocytes   


 


PT   


 


INR   


 


APTT   


 


D-Dimer, Quantitative   


 


pO2   


 


VBG pH   


 


VBG pCO2   


 


VBG HCO3   


 


VBG Total CO2   


 


VBG O2 Sat (Calc)   


 


VBG Base Excess   


 


VBG Potassium   


 


Glucose   


 


Lactate   


 


Crit Value Called To   


 


Crit Value Called By   


 


Crit Value Read Back   


 


Blood Gas Notified Time   


 


Sodium   


 


Potassium   


 


Chloride   


 


Carbon Dioxide   


 


Anion Gap   


 


BUN   


 


Creatinine   


 


Est GFR ( Amer)   


 


Est GFR (Non-Af Amer)   


 


POC Glucose (mg/dL)  349 H   345 H


 


Random Glucose   


 


Lactic Acid   


 


Calcium   


 


Phosphorus   


 


Magnesium   


 


Iron   


 


TIBC   


 


% Saturation   


 


Ferritin   


 


Total Bilirubin   


 


AST   


 


ALT   


 


Alkaline Phosphatase   


 


Troponin I   


 


NT-Pro-B Natriuret Pep   


 


Total Protein   


 


Albumin   


 


Globulin   


 


Albumin/Globulin Ratio   


 


Triglycerides   


 


Cholesterol   


 


LDL Cholesterol Direct   


 


HDL Cholesterol   


 


25-OH Vitamin D Total   


 


Procalcitonin   54.73 H 


 


Venous Blood Potassium   


 


Vancomycin Trough   


 


Serum Ketones   


 


Hep Bs Antigen   


 


Hep Bs Antibody   


 


Hep B Core IgM Ab   


 


Hepatitis C Antibody   














  03/02/18 03/02/18 03/02/18





  12:19 12:19 12:19


 


WBC   


 


RBC   


 


Hgb   


 


Hct   


 


MCV   


 


MCH   


 


MCHC   


 


RDW   


 


Plt Count   


 


MPV   


 


Neut % (Auto)   


 


Lymph % (Auto)   


 


Mono % (Auto)   


 


Eos % (Auto)   


 


Baso % (Auto)   


 


Neut # (Auto)   


 


Lymph # (Auto)   


 


Mono # (Auto)   


 


Eos # (Auto)   


 


Baso # (Auto)   


 


Neutrophils % (Manual)   


 


Lymphocytes % (Manual)   


 


Monocytes % (Manual)   


 


Platelet Estimate   


 


Hypochromasia (manual)   


 


Poikilocytosis (manual   


 


Anisocytosis (manual)   


 


Ovalocytes   


 


PT   


 


INR   


 


APTT  31  


 


D-Dimer, Quantitative   


 


pO2   


 


VBG pH   


 


VBG pCO2   


 


VBG HCO3   


 


VBG Total CO2   


 


VBG O2 Sat (Calc)   


 


VBG Base Excess   


 


VBG Potassium   


 


Glucose   


 


Lactate   


 


Crit Value Called To   


 


Crit Value Called By   


 


Crit Value Read Back   


 


Blood Gas Notified Time   


 


Sodium   


 


Potassium   


 


Chloride   


 


Carbon Dioxide   


 


Anion Gap   


 


BUN   


 


Creatinine   


 


Est GFR ( Amer)   


 


Est GFR (Non-Af Amer)   


 


POC Glucose (mg/dL)   


 


Random Glucose   


 


Lactic Acid   


 


Calcium   


 


Phosphorus   


 


Magnesium   


 


Iron   18 L 


 


TIBC   196 L 


 


% Saturation   9 L 


 


Ferritin    195.0


 


Total Bilirubin   


 


AST   


 


ALT   


 


Alkaline Phosphatase   


 


Troponin I   


 


NT-Pro-B Natriuret Pep   


 


Total Protein   


 


Albumin   


 


Globulin   


 


Albumin/Globulin Ratio   


 


Triglycerides   


 


Cholesterol   


 


LDL Cholesterol Direct   


 


HDL Cholesterol   


 


25-OH Vitamin D Total   27.1 L 


 


Procalcitonin   


 


Venous Blood Potassium   


 


Vancomycin Trough   


 


Serum Ketones   


 


Hep Bs Antigen    Negative


 


Hep Bs Antibody   


 


Hep B Core IgM Ab    Negative


 


Hepatitis C Antibody    Negative














  03/02/18 03/02/18 03/02/18





  12:19 16:13 17:41


 


WBC   


 


RBC   


 


Hgb   


 


Hct   


 


MCV   


 


MCH   


 


MCHC   


 


RDW   


 


Plt Count   


 


MPV   


 


Neut % (Auto)   


 


Lymph % (Auto)   


 


Mono % (Auto)   


 


Eos % (Auto)   


 


Baso % (Auto)   


 


Neut # (Auto)   


 


Lymph # (Auto)   


 


Mono # (Auto)   


 


Eos # (Auto)   


 


Baso # (Auto)   


 


Neutrophils % (Manual)   


 


Lymphocytes % (Manual)   


 


Monocytes % (Manual)   


 


Platelet Estimate   


 


Hypochromasia (manual)   


 


Poikilocytosis (manual   


 


Anisocytosis (manual)   


 


Ovalocytes   


 


PT   


 


INR   


 


APTT   


 


D-Dimer, Quantitative   


 


pO2   


 


VBG pH   


 


VBG pCO2   


 


VBG HCO3   


 


VBG Total CO2   


 


VBG O2 Sat (Calc)   


 


VBG Base Excess   


 


VBG Potassium   


 


Glucose   


 


Lactate   


 


Crit Value Called To   


 


Crit Value Called By   


 


Crit Value Read Back   


 


Blood Gas Notified Time   


 


Sodium   


 


Potassium   


 


Chloride   


 


Carbon Dioxide   


 


Anion Gap   


 


BUN   


 


Creatinine   


 


Est GFR ( Amer)   


 


Est GFR (Non-Af Amer)   


 


POC Glucose (mg/dL)   384 H 


 


Random Glucose   


 


Lactic Acid   


 


Calcium   


 


Phosphorus   


 


Magnesium   


 


Iron   


 


TIBC   


 


% Saturation   


 


Ferritin   


 


Total Bilirubin   


 


AST   


 


ALT   


 


Alkaline Phosphatase   


 


Troponin I   


 


NT-Pro-B Natriuret Pep   


 


Total Protein   


 


Albumin   


 


Globulin   


 


Albumin/Globulin Ratio   


 


Triglycerides   


 


Cholesterol   


 


LDL Cholesterol Direct   


 


HDL Cholesterol   


 


25-OH Vitamin D Total   


 


Procalcitonin   


 


Venous Blood Potassium   


 


Vancomycin Trough    < 5.0 L


 


Serum Ketones   


 


Hep Bs Antigen   


 


Hep Bs Antibody  Negative  


 


Hep B Core IgM Ab   


 


Hepatitis C Antibody   














  03/02/18





  18:25


 


WBC 


 


RBC 


 


Hgb 


 


Hct 


 


MCV 


 


MCH 


 


MCHC 


 


RDW 


 


Plt Count 


 


MPV 


 


Neut % (Auto) 


 


Lymph % (Auto) 


 


Mono % (Auto) 


 


Eos % (Auto) 


 


Baso % (Auto) 


 


Neut # (Auto) 


 


Lymph # (Auto) 


 


Mono # (Auto) 


 


Eos # (Auto) 


 


Baso # (Auto) 


 


Neutrophils % (Manual) 


 


Lymphocytes % (Manual) 


 


Monocytes % (Manual) 


 


Platelet Estimate 


 


Hypochromasia (manual) 


 


Poikilocytosis (manual 


 


Anisocytosis (manual) 


 


Ovalocytes 


 


PT 


 


INR 


 


APTT  40 H D


 


D-Dimer, Quantitative 


 


pO2 


 


VBG pH 


 


VBG pCO2 


 


VBG HCO3 


 


VBG Total CO2 


 


VBG O2 Sat (Calc) 


 


VBG Base Excess 


 


VBG Potassium 


 


Glucose 


 


Lactate 


 


Crit Value Called To 


 


Crit Value Called By 


 


Crit Value Read Back 


 


Blood Gas Notified Time 


 


Sodium 


 


Potassium 


 


Chloride 


 


Carbon Dioxide 


 


Anion Gap 


 


BUN 


 


Creatinine 


 


Est GFR ( Amer) 


 


Est GFR (Non-Af Amer) 


 


POC Glucose (mg/dL) 


 


Random Glucose 


 


Lactic Acid 


 


Calcium 


 


Phosphorus 


 


Magnesium 


 


Iron 


 


TIBC 


 


% Saturation 


 


Ferritin 


 


Total Bilirubin 


 


AST 


 


ALT 


 


Alkaline Phosphatase 


 


Troponin I 


 


NT-Pro-B Natriuret Pep 


 


Total Protein 


 


Albumin 


 


Globulin 


 


Albumin/Globulin Ratio 


 


Triglycerides 


 


Cholesterol 


 


LDL Cholesterol Direct 


 


HDL Cholesterol 


 


25-OH Vitamin D Total 


 


Procalcitonin 


 


Venous Blood Potassium 


 


Vancomycin Trough 


 


Serum Ketones 


 


Hep Bs Antigen 


 


Hep Bs Antibody 


 


Hep B Core IgM Ab 


 


Hepatitis C Antibody 











EKG/Cardiology Studies: 


Cardiology / EKG Studies





03/01/18 20:10


ELECTROCARDIOGRAM Stat 


   Comment: 


   Mode Of Transportation: BED


   Reason For Exam: chest pain





03/01/18 20:22


ELECTROCARDIOGRAM Stat 


   Comment: 


   Mode Of Transportation: BED


   Reason For Exam: chest pain





03/02/18 10:36


ELECTROCARDIOGRAM Stat 


   Comment: 


   Mode Of Transportation: PORTABLE


   Reason For Exam: NSTMI


   Precautions: Standard











Fingerstick Blood Sugar Results: 384





Critical Care Progress Note





- Nutrition


Nutrition: 


 Nutrition











 Category Date Time Status


 


 NPO Diet [DIET] Diets  03/02/18 Breakfast Active














Assessment/Plan





- Assessment and Plan (Free Text)


Assessment: 





CPR x 2,V tach,MI


On Iv heparin,amiodarone





Renal failure


Sepsis/hypotension/metabolic acidosis


IV antibiotics,bicarbonate drip


for HD


antibiotics





Prognosis poor.spoke top daughter and grand-daughter.aware of condition.family 

at bedside

## 2018-03-02 NOTE — RAD
Chest x-ray single frontal view 



History: Triple-lumen catheter placement. 



Comparison: 03/01/2018 



Findings: 



Right central venous catheter with tip extending to the right atrium. 



Moderate to severe venous congestion. 



Moderate loculated left pleural effusion and small right pleural 

effusion. 



Patchy consolidative changes in the left mid to lower lung zone and 

right lung base. 



Bilateral hilar prominence. 



Calcification at the aortic knob. 



Cardiomegaly. 



Degenerative changes in the spine and shoulders. 



Small radiopaque density projects over the medial left zackery abdomen. 



Impression: 



Right central venous catheter with tip extending to the right atrium. 



Moderate to severe venous congestion. 



Moderate loculated left pleural effusion and small right pleural 

effusion. 



Patchy consolidative changes in the left mid to lower lung zone and 

right lung base. 



Bilateral hilar prominence. 



Calcification at the aortic knob. 



Cardiomegaly.

## 2018-03-02 NOTE — RAD
Chest x-ray single frontal view 



History: Chest pain. 



Comparison: 08/28/2017 



Findings: 



Moderate left and small right pleural effusion. 



Moderate venous congestion. 



Confluent airspace opacifications seen within the left mid to lower 

lung zone as well as at right lung base. 



Few scattered nodular densities in both lung fields. 



Cardiomegaly. 



Enlarged ectatic aorta. 



Degenerative changes in the spine and shoulders. 



Impression: 



Moderate left and small right pleural effusion. 



Moderate venous congestion. 



Confluent airspace opacifications seen within the left mid to lower 

lung zone as well as at right lung base. 



Few scattered nodular densities in both lung fields. 



Cardiomegaly. 



Enlarged ectatic aorta.

## 2018-03-02 NOTE — CP.CCUPN
<MerrittshahzadNico nuñez - Last Filed: 03/02/18 17:16>





CCU Subjective





- Physician Review


Subjective (Free Text): 





03/02/18 15:06


Patient seen and examined at bedside


AMS


spoke with nephro and want to wait for diaylsis until cardio decides to do cath 

or not





03/02/18 17:16


PMD talked with cardio and Nephro. Medical managment for cardiac issues at this 

time. OK to go ahead with dialysis. First session tonight





CCU Objective





- Vital Signs / Intake & Output


Vital Signs (Last 4 hours): 


Vital Signs











  Temp Pulse Resp BP Pulse Ox


 


 03/02/18 06:00   101 H  18   100


 


 03/02/18 05:50   100 H  19  133/76  100


 


 03/02/18 05:00   95 H  21  


 


 03/02/18 04:50   90  25 H  101/50 L 


 


 03/02/18 04:00  98.2 F  99 H  18  125/77  98











Intake and Output (Last 8hrs): 


 Intake & Output











 03/01/18 03/02/18 03/02/18





 22:59 06:59 14:59


 


Intake Total  207.1 


 


Output Total  0 


 


Balance  207.1 


 


Weight 130 lb 130 lb 4.8 oz 


 


Intake:   


 


  Intake, IV Amount  207.1 


 


    Right Forearm  7.1 


 


    Right Hand  100 


 


    Right Internal Jugular  100 


 


  Oral  0 


 


Output:   


 


  Urine  0 


 


    Urethral ([place])  0 


 


  Stool  0 


 


Other:   


 


  Voiding Method  Indwelling Catheter 














- Physical Exam


Head: Positive for: Atraumatic, Normocephalic.  Negative for: Tenderness, 

Contusion, Swelling


Pupils: Positive for: PERRL.  Negative for: Sluggish, Non-Reactive


Extroacular Muscles: Positive for: EOMI


Conjunctiva: Positive for: Normal


Mouth: Positive for: Dry


Pharnyx: Positive for: Normal


Nose (External): Positive for: Atraumatic


Neck: Positive for: Normal Range of Motion, Trachea Midline.  Negative for: 

Meningeal Signs, MIDLINE TENDERNESS, Paraspinal Tenderness, JVD, Lymphadenopathy


Respiratory/Chest: Positive for: Good Air Exchange, Decreased Breath Sounds (on 

bilateral bases).  Negative for: Accessory Muscle Use, Wheezes


Cardiovascular: Positive for: Regular Rate and Rhythm, Murmurs (Ejection 

systolic ), Normal S1, S2, Peripheal Pulses Present.  Negative for: Tachycardic

, Bradycardic


Abdomen: Positive for: Normal Bowel Sounds.  Negative for: Tenderness, 

Distention, Peritoneal Signs


Upper Extremity: Positive for: NORMAL PULSES, Capillary Refill < 2s, Other.  

Negative for: Cyanosis, Edema


Lower Extremity: Positive for: Normal Inspection, NORMAL PULSES.  Negative for: 

Edema, CALF TENDERNESS





- Medications


Active Medications: 


Active Medications











Generic Name Dose Route Start Last Admin





  Trade Name Freq  PRN Reason Stop Dose Admin


 


Aspirin  81 mg  03/02/18 10:00  





  Aspirin Chewable  PO   





  DAILY ABHILASH   


 


Heparin Sodium/Sodium Chloride  25,000 units in 250 mls @ 7.076 mls/hr  03/02/ 18 00:56  03/02/18 05:00





  Heparin 42030 Units/250ml 1/2 Normal Saline  IV   12 units/kg/hr





  .Q24H PRN   7.076 mls/hr





  PROTOCOL   Administration





  Protocol   





  12 UNITS/KG/HR   


 


Nitroglycerin  0.4 mg  03/02/18 00:49  





  Nitrostat Sl Tab  SL   





  Q5M PRN   





  Other   


 


Rosuvastatin Calcium  5 mg  03/02/18 01:15  03/02/18 01:29





  Crestor  PO   5 mg





  HS ABHILASH   Administration














- Patient Studies


Lab Studies: 


 Lab Studies











  03/02/18 03/02/18 03/02/18 Range/Units





  07:47 05:00 03:49 


 


WBC     (4.8-10.8)  K/uL


 


RBC     (3.80-5.20)  Mil/uL


 


Hgb     (11.0-16.0)  g/dL


 


Hct     (34.0-47.0)  %


 


MCV     (81.0-99.0)  fL


 


MCH     (27.0-31.0)  pg


 


MCHC     (33.0-37.0)  g/dL


 


RDW     (11.5-14.5)  %


 


Plt Count     (130-400)  K/uL


 


MPV     (7.2-11.7)  fL


 


Neut % (Auto)     (50.0-75.0)  %


 


Lymph % (Auto)     (20.0-40.0)  %


 


Mono % (Auto)     (0.0-10.0)  %


 


Eos % (Auto)     (0.0-4.0)  %


 


Baso % (Auto)     (0.0-2.0)  %


 


Neut # (Auto)     (1.8-7.0)  K/uL


 


Lymph # (Auto)     (1.0-4.3)  K/uL


 


Mono # (Auto)     (0.0-0.8)  K/uL


 


Eos # (Auto)     (0.0-0.7)  K/uL


 


Baso # (Auto)     (0.0-0.2)  K/uL


 


Neutrophils % (Manual)     (50-75)  %


 


Lymphocytes % (Manual)     (20-40)  %


 


Monocytes % (Manual)     (0-10)  %


 


Platelet Estimate     (NORMAL)  


 


Hypochromasia (manual)     


 


Poikilocytosis (manual     


 


Anisocytosis (manual)     


 


Ovalocytes     


 


PT     (9.7-12.2)  SECONDS


 


INR     


 


APTT     (21-34)  SECONDS


 


D-Dimer, Quantitative     (0-243)  ng/mlDDU


 


pO2     (30-55)  mm/Hg


 


VBG pH     (7.32-7.43)  


 


VBG pCO2     (40-60)  mmHg


 


VBG HCO3     mmol/L


 


VBG Total CO2     (22-28)  mmol/L


 


VBG O2 Sat (Calc)     (40-65)  %


 


VBG Base Excess     (0.0-2.0)  mmol/L


 


VBG Potassium     (3.6-5.2)  mmol/L


 


Glucose     ()  mg/dl


 


Lactate     (0.7-2.1)  mmol/L


 


Crit Value Called To     


 


Crit Value Called By     


 


Crit Value Read Back     


 


Blood Gas Notified Time     


 


Sodium     (132-148)  mmol/L


 


Potassium     (3.6-5.2)  mmol/L


 


Chloride     ()  mmol/L


 


Carbon Dioxide     (22-30)  mmol/L


 


Anion Gap     (10-20)  


 


BUN     (7-17)  mg/dL


 


Creatinine     (0.7-1.2)  mg/dL


 


Est GFR (African Amer)     


 


Est GFR (Non-Af Amer)     


 


POC Glucose (mg/dL)  349 H    ()  mg/dL


 


Random Glucose     ()  mg/dL


 


Lactic Acid    3.4 H  (0.7-2.1)  mmol/L


 


Calcium     (8.6-10.4)  mg/dl


 


Phosphorus     (2.5-4.5)  mg/dL


 


Magnesium     (1.6-2.3)  mg/dL


 


Total Bilirubin     (0.2-1.3)  mg/dL


 


AST     (14-36)  U/L


 


ALT     (9-52)  U/L


 


Alkaline Phosphatase     ()  U/L


 


Troponin I     (0.00-0.120)  ng/mL


 


NT-Pro-B Natriuret Pep     (0-900)  pg/mL


 


Total Protein     (6.3-8.3)  g/dL


 


Albumin     (3.5-5.0)  g/dL


 


Globulin     (2.2-3.9)  gm/dL


 


Albumin/Globulin Ratio     (1.0-2.1)  


 


Triglycerides   170 H D   (0-149)  mg/dL


 


Cholesterol   228 H   (0-199)  mg/dL


 


LDL Cholesterol Direct   68   (0-129)  mg/dL


 


HDL Cholesterol   61   (30-70)  mg/dL


 


Venous Blood Potassium     (3.6-5.2)  mmol/L


 


Serum Ketones     (NEGATIVE)  














  03/02/18 03/02/18 03/02/18 Range/Units





  03:49 03:49 02:17 


 


WBC   15.7 H   (4.8-10.8)  K/uL


 


RBC   3.48 L   (3.80-5.20)  Mil/uL


 


Hgb   9.6 L   (11.0-16.0)  g/dL


 


Hct   29.6 L   (34.0-47.0)  %


 


MCV   85.2  D   (81.0-99.0)  fL


 


MCH   27.7   (27.0-31.0)  pg


 


MCHC   32.5 L   (33.0-37.0)  g/dL


 


RDW   16.2 H   (11.5-14.5)  %


 


Plt Count   143   (130-400)  K/uL


 


MPV   8.9   (7.2-11.7)  fL


 


Neut % (Auto)   90.5 H   (50.0-75.0)  %


 


Lymph % (Auto)   3.4 L   (20.0-40.0)  %


 


Mono % (Auto)   5.6   (0.0-10.0)  %


 


Eos % (Auto)   0.0   (0.0-4.0)  %


 


Baso % (Auto)   0.5   (0.0-2.0)  %


 


Neut # (Auto)   14.2 H   (1.8-7.0)  K/uL


 


Lymph # (Auto)   0.5 L   (1.0-4.3)  K/uL


 


Mono # (Auto)   0.9 H   (0.0-0.8)  K/uL


 


Eos # (Auto)   0.0   (0.0-0.7)  K/uL


 


Baso # (Auto)   0.1   (0.0-0.2)  K/uL


 


Neutrophils % (Manual)   93 H   (50-75)  %


 


Lymphocytes % (Manual)   4 L   (20-40)  %


 


Monocytes % (Manual)   3   (0-10)  %


 


Platelet Estimate   Normal   (NORMAL)  


 


Hypochromasia (manual)   Slight   


 


Poikilocytosis (manual   Slight   


 


Anisocytosis (manual)   Slight   


 


Ovalocytes   Slight   


 


PT    14.6 H  (9.7-12.2)  SECONDS


 


INR    1.3  


 


APTT    29  (21-34)  SECONDS


 


D-Dimer, Quantitative     (0-243)  ng/mlDDU


 


pO2     (30-55)  mm/Hg


 


VBG pH     (7.32-7.43)  


 


VBG pCO2     (40-60)  mmHg


 


VBG HCO3     mmol/L


 


VBG Total CO2     (22-28)  mmol/L


 


VBG O2 Sat (Calc)     (40-65)  %


 


VBG Base Excess     (0.0-2.0)  mmol/L


 


VBG Potassium     (3.6-5.2)  mmol/L


 


Glucose     ()  mg/dl


 


Lactate     (0.7-2.1)  mmol/L


 


Crit Value Called To     


 


Crit Value Called By     


 


Crit Value Read Back     


 


Blood Gas Notified Time     


 


Sodium  139    (132-148)  mmol/L


 


Potassium  5.9 H    (3.6-5.2)  mmol/L


 


Chloride  106    ()  mmol/L


 


Carbon Dioxide  16 L    (22-30)  mmol/L


 


Anion Gap  24 H    (10-20)  


 


BUN  79 H    (7-17)  mg/dL


 


Creatinine  5.2 H    (0.7-1.2)  mg/dL


 


Est GFR ( Amer)  10    


 


Est GFR (Non-Af Amer)  8    


 


POC Glucose (mg/dL)     ()  mg/dL


 


Random Glucose  415 H* D    ()  mg/dL


 


Lactic Acid     (0.7-2.1)  mmol/L


 


Calcium  8.6    (8.6-10.4)  mg/dl


 


Phosphorus  5.8 H    (2.5-4.5)  mg/dL


 


Magnesium  2.1    (1.6-2.3)  mg/dL


 


Total Bilirubin     (0.2-1.3)  mg/dL


 


AST     (14-36)  U/L


 


ALT     (9-52)  U/L


 


Alkaline Phosphatase     ()  U/L


 


Troponin I  96.9000 H*    (0.00-0.120)  ng/mL


 


NT-Pro-B Natriuret Pep     (0-900)  pg/mL


 


Total Protein     (6.3-8.3)  g/dL


 


Albumin     (3.5-5.0)  g/dL


 


Globulin     (2.2-3.9)  gm/dL


 


Albumin/Globulin Ratio     (1.0-2.1)  


 


Triglycerides     (0-149)  mg/dL


 


Cholesterol     (0-199)  mg/dL


 


LDL Cholesterol Direct     (0-129)  mg/dL


 


HDL Cholesterol     (30-70)  mg/dL


 


Venous Blood Potassium     (3.6-5.2)  mmol/L


 


Serum Ketones     (NEGATIVE)  














  03/02/18 03/02/18 03/01/18 Range/Units





  02:01 01:18 23:25 


 


WBC     (4.8-10.8)  K/uL


 


RBC     (3.80-5.20)  Mil/uL


 


Hgb     (11.0-16.0)  g/dL


 


Hct     (34.0-47.0)  %


 


MCV     (81.0-99.0)  fL


 


MCH     (27.0-31.0)  pg


 


MCHC     (33.0-37.0)  g/dL


 


RDW     (11.5-14.5)  %


 


Plt Count     (130-400)  K/uL


 


MPV     (7.2-11.7)  fL


 


Neut % (Auto)     (50.0-75.0)  %


 


Lymph % (Auto)     (20.0-40.0)  %


 


Mono % (Auto)     (0.0-10.0)  %


 


Eos % (Auto)     (0.0-4.0)  %


 


Baso % (Auto)     (0.0-2.0)  %


 


Neut # (Auto)     (1.8-7.0)  K/uL


 


Lymph # (Auto)     (1.0-4.3)  K/uL


 


Mono # (Auto)     (0.0-0.8)  K/uL


 


Eos # (Auto)     (0.0-0.7)  K/uL


 


Baso # (Auto)     (0.0-0.2)  K/uL


 


Neutrophils % (Manual)     (50-75)  %


 


Lymphocytes % (Manual)     (20-40)  %


 


Monocytes % (Manual)     (0-10)  %


 


Platelet Estimate     (NORMAL)  


 


Hypochromasia (manual)     


 


Poikilocytosis (manual     


 


Anisocytosis (manual)     


 


Ovalocytes     


 


PT     (9.7-12.2)  SECONDS


 


INR     


 


APTT     (21-34)  SECONDS


 


D-Dimer, Quantitative     (0-243)  ng/mlDDU


 


pO2    16 L  (30-55)  mm/Hg


 


VBG pH    7.16 L*  (7.32-7.43)  


 


VBG pCO2    40  (40-60)  mmHg


 


VBG HCO3    11.9  mmol/L


 


VBG Total CO2    15.5 L  (22-28)  mmol/L


 


VBG O2 Sat (Calc)    35.2 L  (40-65)  %


 


VBG Base Excess    -13.8 L  (0.0-2.0)  mmol/L


 


VBG Potassium    5.8 H  (3.6-5.2)  mmol/L


 


Glucose    562 H* D  ()  mg/dl


 


Lactate    2.5 H  (0.7-2.1)  mmol/L


 


Crit Value Called To    Karmen moreno/rn  


 


Crit Value Called By    Olu gallegos/rt  


 


Crit Value Read Back    Y  


 


Blood Gas Notified Time    2335  


 


Sodium    139.0  (132-148)  mmol/L


 


Potassium     (3.6-5.2)  mmol/L


 


Chloride    103.0  ()  mmol/L


 


Carbon Dioxide     (22-30)  mmol/L


 


Anion Gap     (10-20)  


 


BUN     (7-17)  mg/dL


 


Creatinine     (0.7-1.2)  mg/dL


 


Est GFR (African Amer)     


 


Est GFR (Non-Af Amer)     


 


POC Glucose (mg/dL)  374 H  362 H   ()  mg/dL


 


Random Glucose     ()  mg/dL


 


Lactic Acid     (0.7-2.1)  mmol/L


 


Calcium     (8.6-10.4)  mg/dl


 


Phosphorus     (2.5-4.5)  mg/dL


 


Magnesium     (1.6-2.3)  mg/dL


 


Total Bilirubin     (0.2-1.3)  mg/dL


 


AST     (14-36)  U/L


 


ALT     (9-52)  U/L


 


Alkaline Phosphatase     ()  U/L


 


Troponin I     (0.00-0.120)  ng/mL


 


NT-Pro-B Natriuret Pep     (0-900)  pg/mL


 


Total Protein     (6.3-8.3)  g/dL


 


Albumin     (3.5-5.0)  g/dL


 


Globulin     (2.2-3.9)  gm/dL


 


Albumin/Globulin Ratio     (1.0-2.1)  


 


Triglycerides     (0-149)  mg/dL


 


Cholesterol     (0-199)  mg/dL


 


LDL Cholesterol Direct     (0-129)  mg/dL


 


HDL Cholesterol     (30-70)  mg/dL


 


Venous Blood Potassium    5.8 H  (3.6-5.2)  mmol/L


 


Serum Ketones     (NEGATIVE)  














  03/01/18 03/01/18 03/01/18 Range/Units





  22:51 22:51 22:51 


 


WBC    16.3 H D  (4.8-10.8)  K/uL


 


RBC    3.72 L  (3.80-5.20)  Mil/uL


 


Hgb    10.3 L  (11.0-16.0)  g/dL


 


Hct    32.5 L  (34.0-47.0)  %


 


MCV    87.4  (81.0-99.0)  fL


 


MCH    27.7  (27.0-31.0)  pg


 


MCHC    31.6 L  (33.0-37.0)  g/dL


 


RDW    16.7 H  (11.5-14.5)  %


 


Plt Count    156  (130-400)  K/uL


 


MPV    9.4  (7.2-11.7)  fL


 


Neut % (Auto)    90.5 H  (50.0-75.0)  %


 


Lymph % (Auto)    3.8 L  (20.0-40.0)  %


 


Mono % (Auto)    4.8  (0.0-10.0)  %


 


Eos % (Auto)    0.0  (0.0-4.0)  %


 


Baso % (Auto)    0.9  (0.0-2.0)  %


 


Neut # (Auto)    14.8 H  (1.8-7.0)  K/uL


 


Lymph # (Auto)    0.6 L  (1.0-4.3)  K/uL


 


Mono # (Auto)    0.8  (0.0-0.8)  K/uL


 


Eos # (Auto)    0.0  (0.0-0.7)  K/uL


 


Baso # (Auto)    0.1  (0.0-0.2)  K/uL


 


Neutrophils % (Manual)    92 H  (50-75)  %


 


Lymphocytes % (Manual)    5 L  (20-40)  %


 


Monocytes % (Manual)    3  (0-10)  %


 


Platelet Estimate    Normal  (NORMAL)  


 


Hypochromasia (manual)     


 


Poikilocytosis (manual     


 


Anisocytosis (manual)     


 


Ovalocytes     


 


PT     (9.7-12.2)  SECONDS


 


INR     


 


APTT     (21-34)  SECONDS


 


D-Dimer, Quantitative   4388 H   (0-243)  ng/mlDDU


 


pO2     (30-55)  mm/Hg


 


VBG pH     (7.32-7.43)  


 


VBG pCO2     (40-60)  mmHg


 


VBG HCO3     mmol/L


 


VBG Total CO2     (22-28)  mmol/L


 


VBG O2 Sat (Calc)     (40-65)  %


 


VBG Base Excess     (0.0-2.0)  mmol/L


 


VBG Potassium     (3.6-5.2)  mmol/L


 


Glucose     ()  mg/dl


 


Lactate     (0.7-2.1)  mmol/L


 


Crit Value Called To     


 


Crit Value Called By     


 


Crit Value Read Back     


 


Blood Gas Notified Time     


 


Sodium  137    (132-148)  mmol/L


 


Potassium  6.5 H* D    (3.6-5.2)  mmol/L


 


Chloride  105    ()  mmol/L


 


Carbon Dioxide  11 L* D    (22-30)  mmol/L


 


Anion Gap  28 H    (10-20)  


 


BUN  74 H    (7-17)  mg/dL


 


Creatinine  4.7 H    (0.7-1.2)  mg/dL


 


Est GFR ( Amer)  11    


 


Est GFR (Non-Af Amer)  9    


 


POC Glucose (mg/dL)     ()  mg/dL


 


Random Glucose  559 H* D    ()  mg/dL


 


Lactic Acid     (0.7-2.1)  mmol/L


 


Calcium  8.0 L    (8.6-10.4)  mg/dl


 


Phosphorus     (2.5-4.5)  mg/dL


 


Magnesium     (1.6-2.3)  mg/dL


 


Total Bilirubin  1.6 H    (0.2-1.3)  mg/dL


 


AST  193 H    (14-36)  U/L


 


ALT  54 H D    (9-52)  U/L


 


Alkaline Phosphatase  96    ()  U/L


 


Troponin I  103.0000 H*    (0.00-0.120)  ng/mL


 


NT-Pro-B Natriuret Pep  15268 H    (0-900)  pg/mL


 


Total Protein  7.3    (6.3-8.3)  g/dL


 


Albumin  3.6    (3.5-5.0)  g/dL


 


Globulin  3.7    (2.2-3.9)  gm/dL


 


Albumin/Globulin Ratio  1.0    (1.0-2.1)  


 


Triglycerides     (0-149)  mg/dL


 


Cholesterol     (0-199)  mg/dL


 


LDL Cholesterol Direct     (0-129)  mg/dL


 


HDL Cholesterol     (30-70)  mg/dL


 


Venous Blood Potassium     (3.6-5.2)  mmol/L


 


Serum Ketones  Negative    (NEGATIVE)  














  03/01/18 Range/Units





  22:18 


 


WBC   (4.8-10.8)  K/uL


 


RBC   (3.80-5.20)  Mil/uL


 


Hgb   (11.0-16.0)  g/dL


 


Hct   (34.0-47.0)  %


 


MCV   (81.0-99.0)  fL


 


MCH   (27.0-31.0)  pg


 


MCHC   (33.0-37.0)  g/dL


 


RDW   (11.5-14.5)  %


 


Plt Count   (130-400)  K/uL


 


MPV   (7.2-11.7)  fL


 


Neut % (Auto)   (50.0-75.0)  %


 


Lymph % (Auto)   (20.0-40.0)  %


 


Mono % (Auto)   (0.0-10.0)  %


 


Eos % (Auto)   (0.0-4.0)  %


 


Baso % (Auto)   (0.0-2.0)  %


 


Neut # (Auto)   (1.8-7.0)  K/uL


 


Lymph # (Auto)   (1.0-4.3)  K/uL


 


Mono # (Auto)   (0.0-0.8)  K/uL


 


Eos # (Auto)   (0.0-0.7)  K/uL


 


Baso # (Auto)   (0.0-0.2)  K/uL


 


Neutrophils % (Manual)   (50-75)  %


 


Lymphocytes % (Manual)   (20-40)  %


 


Monocytes % (Manual)   (0-10)  %


 


Platelet Estimate   (NORMAL)  


 


Hypochromasia (manual)   


 


Poikilocytosis (manual   


 


Anisocytosis (manual)   


 


Ovalocytes   


 


PT   (9.7-12.2)  SECONDS


 


INR   


 


APTT   (21-34)  SECONDS


 


D-Dimer, Quantitative   (0-243)  ng/mlDDU


 


pO2   (30-55)  mm/Hg


 


VBG pH   (7.32-7.43)  


 


VBG pCO2   (40-60)  mmHg


 


VBG HCO3   mmol/L


 


VBG Total CO2   (22-28)  mmol/L


 


VBG O2 Sat (Calc)   (40-65)  %


 


VBG Base Excess   (0.0-2.0)  mmol/L


 


VBG Potassium   (3.6-5.2)  mmol/L


 


Glucose   ()  mg/dl


 


Lactate   (0.7-2.1)  mmol/L


 


Crit Value Called To   


 


Crit Value Called By   


 


Crit Value Read Back   


 


Blood Gas Notified Time   


 


Sodium   (132-148)  mmol/L


 


Potassium   (3.6-5.2)  mmol/L


 


Chloride   ()  mmol/L


 


Carbon Dioxide   (22-30)  mmol/L


 


Anion Gap   (10-20)  


 


BUN   (7-17)  mg/dL


 


Creatinine   (0.7-1.2)  mg/dL


 


Est GFR (African Amer)   


 


Est GFR (Non-Af Amer)   


 


POC Glucose (mg/dL)  467 H*  ()  mg/dL


 


Random Glucose   ()  mg/dL


 


Lactic Acid   (0.7-2.1)  mmol/L


 


Calcium   (8.6-10.4)  mg/dl


 


Phosphorus   (2.5-4.5)  mg/dL


 


Magnesium   (1.6-2.3)  mg/dL


 


Total Bilirubin   (0.2-1.3)  mg/dL


 


AST   (14-36)  U/L


 


ALT   (9-52)  U/L


 


Alkaline Phosphatase   ()  U/L


 


Troponin I   (0.00-0.120)  ng/mL


 


NT-Pro-B Natriuret Pep   (0-900)  pg/mL


 


Total Protein   (6.3-8.3)  g/dL


 


Albumin   (3.5-5.0)  g/dL


 


Globulin   (2.2-3.9)  gm/dL


 


Albumin/Globulin Ratio   (1.0-2.1)  


 


Triglycerides   (0-149)  mg/dL


 


Cholesterol   (0-199)  mg/dL


 


LDL Cholesterol Direct   (0-129)  mg/dL


 


HDL Cholesterol   (30-70)  mg/dL


 


Venous Blood Potassium   (3.6-5.2)  mmol/L


 


Serum Ketones   (NEGATIVE)  








 Laboratory Results - last 24 hr











  03/01/18 03/01/18 03/01/18





  22:18 22:51 22:51


 


WBC   16.3 H D 


 


RBC   3.72 L 


 


Hgb   10.3 L 


 


Hct   32.5 L 


 


MCV   87.4 


 


MCH   27.7 


 


MCHC   31.6 L 


 


RDW   16.7 H 


 


Plt Count   156 


 


MPV   9.4 


 


Neut % (Auto)   90.5 H 


 


Lymph % (Auto)   3.8 L 


 


Mono % (Auto)   4.8 


 


Eos % (Auto)   0.0 


 


Baso % (Auto)   0.9 


 


Neut # (Auto)   14.8 H 


 


Lymph # (Auto)   0.6 L 


 


Mono # (Auto)   0.8 


 


Eos # (Auto)   0.0 


 


Baso # (Auto)   0.1 


 


Neutrophils % (Manual)   92 H 


 


Lymphocytes % (Manual)   5 L 


 


Monocytes % (Manual)   3 


 


Platelet Estimate   Normal 


 


Hypochromasia (manual)   


 


Poikilocytosis (manual   


 


Anisocytosis (manual)   


 


Ovalocytes   


 


PT   


 


INR   


 


APTT   


 


D-Dimer, Quantitative    4388 H


 


pO2   


 


VBG pH   


 


VBG pCO2   


 


VBG HCO3   


 


VBG Total CO2   


 


VBG O2 Sat (Calc)   


 


VBG Base Excess   


 


VBG Potassium   


 


Glucose   


 


Lactate   


 


Crit Value Called To   


 


Crit Value Called By   


 


Crit Value Read Back   


 


Blood Gas Notified Time   


 


Sodium   


 


Potassium   


 


Chloride   


 


Carbon Dioxide   


 


Anion Gap   


 


BUN   


 


Creatinine   


 


Est GFR ( Amer)   


 


Est GFR (Non-Af Amer)   


 


POC Glucose (mg/dL)  467 H*  


 


Random Glucose   


 


Lactic Acid   


 


Calcium   


 


Phosphorus   


 


Magnesium   


 


Total Bilirubin   


 


AST   


 


ALT   


 


Alkaline Phosphatase   


 


Troponin I   


 


NT-Pro-B Natriuret Pep   


 


Total Protein   


 


Albumin   


 


Globulin   


 


Albumin/Globulin Ratio   


 


Triglycerides   


 


Cholesterol   


 


LDL Cholesterol Direct   


 


HDL Cholesterol   


 


Venous Blood Potassium   


 


Serum Ketones   














  03/01/18 03/01/18 03/02/18





  22:51 23:25 01:18


 


WBC   


 


RBC   


 


Hgb   


 


Hct   


 


MCV   


 


MCH   


 


MCHC   


 


RDW   


 


Plt Count   


 


MPV   


 


Neut % (Auto)   


 


Lymph % (Auto)   


 


Mono % (Auto)   


 


Eos % (Auto)   


 


Baso % (Auto)   


 


Neut # (Auto)   


 


Lymph # (Auto)   


 


Mono # (Auto)   


 


Eos # (Auto)   


 


Baso # (Auto)   


 


Neutrophils % (Manual)   


 


Lymphocytes % (Manual)   


 


Monocytes % (Manual)   


 


Platelet Estimate   


 


Hypochromasia (manual)   


 


Poikilocytosis (manual   


 


Anisocytosis (manual)   


 


Ovalocytes   


 


PT   


 


INR   


 


APTT   


 


D-Dimer, Quantitative   


 


pO2   16 L 


 


VBG pH   7.16 L* 


 


VBG pCO2   40 


 


VBG HCO3   11.9 


 


VBG Total CO2   15.5 L 


 


VBG O2 Sat (Calc)   35.2 L 


 


VBG Base Excess   -13.8 L 


 


VBG Potassium   5.8 H 


 


Glucose   562 H* D 


 


Lactate   2.5 H 


 


Crit Value Called To   Karmen moreno/rn 


 


Crit Value Called By   Olu gallegos/rt 


 


Crit Value Read Back   Y 


 


Blood Gas Notified Time   2335 


 


Sodium  137  139.0 


 


Potassium  6.5 H* D  


 


Chloride  105  103.0 


 


Carbon Dioxide  11 L* D  


 


Anion Gap  28 H  


 


BUN  74 H  


 


Creatinine  4.7 H  


 


Est GFR ( Amer)  11  


 


Est GFR (Non-Af Amer)  9  


 


POC Glucose (mg/dL)    362 H


 


Random Glucose  559 H* D  


 


Lactic Acid   


 


Calcium  8.0 L  


 


Phosphorus   


 


Magnesium   


 


Total Bilirubin  1.6 H  


 


AST  193 H  


 


ALT  54 H D  


 


Alkaline Phosphatase  96  


 


Troponin I  103.0000 H*  


 


NT-Pro-B Natriuret Pep  85769 H  


 


Total Protein  7.3  


 


Albumin  3.6  


 


Globulin  3.7  


 


Albumin/Globulin Ratio  1.0  


 


Triglycerides   


 


Cholesterol   


 


LDL Cholesterol Direct   


 


HDL Cholesterol   


 


Venous Blood Potassium   5.8 H 


 


Serum Ketones  Negative  














  03/02/18 03/02/18 03/02/18





  02:01 02:17 03:49


 


WBC    15.7 H


 


RBC    3.48 L


 


Hgb    9.6 L


 


Hct    29.6 L


 


MCV    85.2  D


 


MCH    27.7


 


MCHC    32.5 L


 


RDW    16.2 H


 


Plt Count    143


 


MPV    8.9


 


Neut % (Auto)    90.5 H


 


Lymph % (Auto)    3.4 L


 


Mono % (Auto)    5.6


 


Eos % (Auto)    0.0


 


Baso % (Auto)    0.5


 


Neut # (Auto)    14.2 H


 


Lymph # (Auto)    0.5 L


 


Mono # (Auto)    0.9 H


 


Eos # (Auto)    0.0


 


Baso # (Auto)    0.1


 


Neutrophils % (Manual)    93 H


 


Lymphocytes % (Manual)    4 L


 


Monocytes % (Manual)    3


 


Platelet Estimate    Normal


 


Hypochromasia (manual)    Slight


 


Poikilocytosis (manual    Slight


 


Anisocytosis (manual)    Slight


 


Ovalocytes    Slight


 


PT   14.6 H 


 


INR   1.3 


 


APTT   29 


 


D-Dimer, Quantitative   


 


pO2   


 


VBG pH   


 


VBG pCO2   


 


VBG HCO3   


 


VBG Total CO2   


 


VBG O2 Sat (Calc)   


 


VBG Base Excess   


 


VBG Potassium   


 


Glucose   


 


Lactate   


 


Crit Value Called To   


 


Crit Value Called By   


 


Crit Value Read Back   


 


Blood Gas Notified Time   


 


Sodium   


 


Potassium   


 


Chloride   


 


Carbon Dioxide   


 


Anion Gap   


 


BUN   


 


Creatinine   


 


Est GFR ( Amer)   


 


Est GFR (Non-Af Amer)   


 


POC Glucose (mg/dL)  374 H  


 


Random Glucose   


 


Lactic Acid   


 


Calcium   


 


Phosphorus   


 


Magnesium   


 


Total Bilirubin   


 


AST   


 


ALT   


 


Alkaline Phosphatase   


 


Troponin I   


 


NT-Pro-B Natriuret Pep   


 


Total Protein   


 


Albumin   


 


Globulin   


 


Albumin/Globulin Ratio   


 


Triglycerides   


 


Cholesterol   


 


LDL Cholesterol Direct   


 


HDL Cholesterol   


 


Venous Blood Potassium   


 


Serum Ketones   














  03/02/18 03/02/18 03/02/18





  03:49 03:49 05:00


 


WBC   


 


RBC   


 


Hgb   


 


Hct   


 


MCV   


 


MCH   


 


MCHC   


 


RDW   


 


Plt Count   


 


MPV   


 


Neut % (Auto)   


 


Lymph % (Auto)   


 


Mono % (Auto)   


 


Eos % (Auto)   


 


Baso % (Auto)   


 


Neut # (Auto)   


 


Lymph # (Auto)   


 


Mono # (Auto)   


 


Eos # (Auto)   


 


Baso # (Auto)   


 


Neutrophils % (Manual)   


 


Lymphocytes % (Manual)   


 


Monocytes % (Manual)   


 


Platelet Estimate   


 


Hypochromasia (manual)   


 


Poikilocytosis (manual   


 


Anisocytosis (manual)   


 


Ovalocytes   


 


PT   


 


INR   


 


APTT   


 


D-Dimer, Quantitative   


 


pO2   


 


VBG pH   


 


VBG pCO2   


 


VBG HCO3   


 


VBG Total CO2   


 


VBG O2 Sat (Calc)   


 


VBG Base Excess   


 


VBG Potassium   


 


Glucose   


 


Lactate   


 


Crit Value Called To   


 


Crit Value Called By   


 


Crit Value Read Back   


 


Blood Gas Notified Time   


 


Sodium  139  


 


Potassium  5.9 H  


 


Chloride  106  


 


Carbon Dioxide  16 L  


 


Anion Gap  24 H  


 


BUN  79 H  


 


Creatinine  5.2 H  


 


Est GFR ( Amer)  10  


 


Est GFR (Non-Af Amer)  8  


 


POC Glucose (mg/dL)   


 


Random Glucose  415 H* D  


 


Lactic Acid   3.4 H 


 


Calcium  8.6  


 


Phosphorus  5.8 H  


 


Magnesium  2.1  


 


Total Bilirubin   


 


AST   


 


ALT   


 


Alkaline Phosphatase   


 


Troponin I  96.9000 H*  


 


NT-Pro-B Natriuret Pep   


 


Total Protein   


 


Albumin   


 


Globulin   


 


Albumin/Globulin Ratio   


 


Triglycerides    170 H D


 


Cholesterol    228 H


 


LDL Cholesterol Direct    68


 


HDL Cholesterol    61


 


Venous Blood Potassium   


 


Serum Ketones   














  03/02/18





  07:47


 


WBC 


 


RBC 


 


Hgb 


 


Hct 


 


MCV 


 


MCH 


 


MCHC 


 


RDW 


 


Plt Count 


 


MPV 


 


Neut % (Auto) 


 


Lymph % (Auto) 


 


Mono % (Auto) 


 


Eos % (Auto) 


 


Baso % (Auto) 


 


Neut # (Auto) 


 


Lymph # (Auto) 


 


Mono # (Auto) 


 


Eos # (Auto) 


 


Baso # (Auto) 


 


Neutrophils % (Manual) 


 


Lymphocytes % (Manual) 


 


Monocytes % (Manual) 


 


Platelet Estimate 


 


Hypochromasia (manual) 


 


Poikilocytosis (manual 


 


Anisocytosis (manual) 


 


Ovalocytes 


 


PT 


 


INR 


 


APTT 


 


D-Dimer, Quantitative 


 


pO2 


 


VBG pH 


 


VBG pCO2 


 


VBG HCO3 


 


VBG Total CO2 


 


VBG O2 Sat (Calc) 


 


VBG Base Excess 


 


VBG Potassium 


 


Glucose 


 


Lactate 


 


Crit Value Called To 


 


Crit Value Called By 


 


Crit Value Read Back 


 


Blood Gas Notified Time 


 


Sodium 


 


Potassium 


 


Chloride 


 


Carbon Dioxide 


 


Anion Gap 


 


BUN 


 


Creatinine 


 


Est GFR ( Amer) 


 


Est GFR (Non-Af Amer) 


 


POC Glucose (mg/dL)  349 H


 


Random Glucose 


 


Lactic Acid 


 


Calcium 


 


Phosphorus 


 


Magnesium 


 


Total Bilirubin 


 


AST 


 


ALT 


 


Alkaline Phosphatase 


 


Troponin I 


 


NT-Pro-B Natriuret Pep 


 


Total Protein 


 


Albumin 


 


Globulin 


 


Albumin/Globulin Ratio 


 


Triglycerides 


 


Cholesterol 


 


LDL Cholesterol Direct 


 


HDL Cholesterol 


 


Venous Blood Potassium 


 


Serum Ketones 











EKG/Cardiology Studies: 


Cardiology / EKG Studies





03/01/18 20:10


ELECTROCARDIOGRAM Stat 


   Comment: 


   Mode Of Transportation: BED


   Reason For Exam: chest pain





03/01/18 20:22


ELECTROCARDIOGRAM Stat 


   Comment: 


   Mode Of Transportation: BED


   Reason For Exam: chest pain











Fingerstick Blood Sugar Results: 467





Assessment/Plan





- Assessment and Plan (Free Text)


Assessment: 





77F w/ NSTEMI, CKD


Plan: 





Neuro: AMS, Likely secondary to severe sepsis, RF





Cardio: Nstemi VS Myocarditis. Waiting for reccs from cardio for wether 

treating as NSTEMI or myocarditis. Nephro wants to wait for cardiac managment (

cath or no cath) prior to doing dialysis


On heparin drip


crestor


lopressor





Pulm: No acute issues


Duonebs PRN


moxiflox for possible PNA





GI: NPO





Renal: AVF, needs dialysis per nephro but wants ok from cardio prior to dialysis





Endo: DM, insulin aspart Q6, accuchecks





PPX: heparin drip, GI PPX not indicated





<Dolores Au - Last Filed: 03/03/18 11:31>





CCU Subjective





- Physician Review


Subjective (Free Text): 


Patient admitted to ICU for MI/CKD/URIEL


-obtain cardiology input possible PCI


-obtain nephrology input regarding HD via left AV fistula














CCU Objective





- Vital Signs / Intake & Output


Intake and Output (Last 8hrs): 


 Intake & Output











 03/02/18 03/03/18 03/03/18





 22:59 06:59 14:59


 


Intake Total 504.3 1253.3 158


 


Output Total 0 0 


 


Balance 504.3 1253.3 158


 


Weight  148 lb 9 oz 


 


Intake:   


 


   338 158


 


  Intake, IV Amount 390.3 915.3 


 


    Right Forearm 39.8  


 


    Right Hand 33.9 76.5 


 


    distal 99.9 200.0 


 


    medial 96.7 318.8 


 


    proximal 120 320 


 


  Oral 0  


 


Output:   


 


  Urine 0 0 


 


    Urethral ([place]) 0 0 


 


  Stool 0  


 


Other:   


 


  # Bowel Movements  0 














- Medications


Active Medications: 


Active Medications











Generic Name Dose Route Start Last Admin





  Trade Name Freq  PRN Reason Stop Dose Admin


 


Aspirin  81 mg  03/02/18 10:00  03/03/18 09:40





  Aspirin Chewable  PO   81 mg





  [facility]   Administration


 


Calcium Acetate  667 mg  03/02/18 14:00  03/03/18 09:40





  Phoslo  PO   667 mg





  [facility]   Administration


 


Epoetin Vamsi  4,000 unit  03/02/18 15:00  03/02/18 23:34





  Procrit  IV   Not Given





  [facility]   


 


Ergocalciferol  1 cap  03/03/18 14:45  





  Drisdol 50,000 Intl Units Cap  PO   





  [facility]   


 


Ferrous Gluconate  324 mg  03/03/18 10:00  03/03/18 09:40





  Fergon  PO   324 mg





  [facility]   Administration


 


Heparin Sodium/Sodium Chloride  25,000 units in 250 mls @ 7.076 mls/hr  03/02/ 18 00:56  03/03/18 05:11





  Heparin 02971 Units/250ml 1/2 Normal Saline  IV   17 units/kg/hr





  .Q24H PRN   10.024 mls/hr





  PROTOCOL   Administration





  Protocol   





  12 UNITS/KG/HR   


 


Moxifloxacin HCl  400 mg in 250 mls @ 167 mls/hr  03/02/18 23:00  03/02/18 23:30





  Avelox Iv 400mg/250ml Ns  IVPB   167 mls/hr





  Q24H [facility]   Administration


 


Norepinephrine Bitartrate 8 mg  258 mls @ 7.74 mls/hr  03/02/18 19:56  03/03/18 

11:00





  / Sodium Chloride  IV   16 mcg/min





  .Q24H PRN   30.96 mls/hr





  TITRATE PER MD ORDER   Titration





  Protocol   





  4 MCG/MIN   


 


Amiodarone HCl 900 mg/  500 mls @ 16.66 mls/hr  03/03/18 03:00  03/03/18 03:00





  Dextrose  IV  03/03/18 21:00  16.66 mls/hr





  .Q24H ABHILASH   Administration





  Protocol   





  0.5 MG/MIN   


 


Insulin Aspart  0 unit  03/02/18 18:00  03/03/18 06:24





  Novolog  SC   Not Given





  [facility]   





  Protocol   


 


Insulin Glargine  15 unit  03/02/18 22:00  03/02/18 23:38





  Lantus  SC   15 u





  [facility]   Administration


 


Metoprolol Tartrate  25 mg  03/02/18 10:00  03/03/18 10:00





  Lopressor  PO   Not Given





  BID AB[facility]IL[facility]   


 


Nitroglycerin  0.4 mg  03/02/18 00:49  





  Nitrostat Sl Tab  SL   





  Q5M PRN   





  Other   


 


Pantoprazole Sodium  40 mg  03/03/18 10:00  





  Protonix Inj  IVP   





  [facility]   


 


Rosuvastatin Calcium  5 mg  03/02/18 01:15  03/02/18 01:29





  Crestor  PO   5 mg





  [facility]   Administration


 


Vitamin B Complex/Vit C/Folic Acid  1 tab  03/03/18 08:00  03/03/18 08:30





  Nephro-Nneka  PO   1 tab





  [facility]   Administration














- Patient Studies


Lab Studies: 


 Microbiology Studies











 03/01/18 06:30 Blood Culture - Preliminary





 Blood-Venous    NO GROWTH AFTER 24 HOURS


 


 03/01/18 06:00 Blood Culture - Preliminary





 Blood-Venous    NO GROWTH AFTER 24 HOURS








 Lab Studies











  03/03/18 03/03/18 03/03/18 Range/Units





  05:24 05:18 03:20 


 


WBC     (4.8-10.8)  K/uL


 


RBC     (3.80-5.20)  Mil/uL


 


Hgb     (11.0-16.0)  g/dL


 


Hct     (34.0-47.0)  %


 


MCV     (81.0-99.0)  fL


 


MCH     (27.0-31.0)  pg


 


MCHC     (33.0-37.0)  g/dL


 


RDW     (11.5-14.5)  %


 


Plt Count     (130-400)  K/uL


 


MPV     (7.2-11.7)  fL


 


Neut % (Auto)     (50.0-75.0)  %


 


Lymph % (Auto)     (20.0-40.0)  %


 


Mono % (Auto)     (0.0-10.0)  %


 


Eos % (Auto)     (0.0-4.0)  %


 


Baso % (Auto)     (0.0-2.0)  %


 


Neut # (Auto)     (1.8-7.0)  K/uL


 


Lymph # (Auto)     (1.0-4.3)  K/uL


 


Mono # (Auto)     (0.0-0.8)  K/uL


 


Eos # (Auto)     (0.0-0.7)  K/uL


 


Baso # (Auto)     (0.0-0.2)  K/uL


 


Neutrophils % (Manual)     (50-75)  %


 


Band Neutrophils %     (0-2)  %


 


Lymphocytes % (Manual)     (20-40)  %


 


Monocytes % (Manual)     (0-10)  %


 


Nucleated RBC %     (0-0)  %


 


Differential Comment     


 


Platelet Estimate     (NORMAL)  


 


Hypochromasia (manual)     


 


Ovalocytes     


 


APTT    126 H* D  (21-34)  SECONDS


 


Puncture Site   Rb   


 


pCO2   21 L   (35-45)  mm/Hg


 


pO2   360 H   ()  mm/Hg


 


HCO3   14.6 L   (21-28)  mmol/L


 


ABG pH   7.33 L   (7.35-7.45)  


 


ABG Total CO2   11.7 L   (22-28)  mmol/L


 


ABG O2 Saturation   99.7 H   (95-98)  %


 


ABG Base Excess   -13.2 L   (-2.0-3.0)  mmol/L


 


ABG Hemoglobin   9.2 L   (11.7-17.4)  g/dL


 


ABG Carboxyhemoglobin   1.4   (0.5-1.5)  %


 


POC ABG HHb (Measured)   0.3   (0.0-5.0)  %


 


ABG Methemoglobin   1.3   (0.0-3.0)  %


 


Jesus Test   Na   


 


VBG pH     (7.32-7.43)  


 


VBG pCO2     (40-60)  mmHg


 


VBG HCO3     mmol/L


 


VBG O2 Sat (Calc)     (40-65)  %


 


VBG Base Excess     (0.0-2.0)  mmol/L


 


A-a O2 Difference   327.0   mm/Hg


 


Respiratory Index   0.9   


 


Hgb O2 Saturation   96.9   (95.0-98.0)  %


 


Vent Mode   Prvc   


 


Mechanical Rate   24   


 


FiO2   100.0   %


 


Tidal Volume   550   


 


PEEP   5   


 


Crit Value Called To     


 


Crit Value Called By     


 


Crit Value Read Back     


 


Blood Gas Notified Time     


 


Sodium     (132-148)  mmol/L


 


Potassium     (3.6-5.2)  mmol/L


 


Chloride     ()  mmol/L


 


Carbon Dioxide     (22-30)  mmol/L


 


Anion Gap     (10-20)  


 


BUN     (7-17)  mg/dL


 


Creatinine     (0.7-1.2)  mg/dL


 


Est GFR (African Amer)     


 


Est GFR (Non-Af Amer)     


 


POC Glucose (mg/dL)  118 H    ()  mg/dL


 


Random Glucose     ()  mg/dL


 


Calcium     (8.6-10.4)  mg/dl


 


Magnesium     (1.6-2.3)  mg/dL


 


Iron     ()  ug/dL


 


TIBC     (250-450)  ug/dL


 


% Saturation     (20-55)  


 


Ferritin     ng/mL


 


Total Bilirubin     (0.2-1.3)  mg/dL


 


AST     (14-36)  U/L


 


ALT     (9-52)  U/L


 


Alkaline Phosphatase     ()  U/L


 


Troponin I     (0.00-0.120)  ng/mL


 


Total Protein     (6.3-8.3)  g/dL


 


Albumin     (3.5-5.0)  g/dL


 


Globulin     (2.2-3.9)  gm/dL


 


Albumin/Globulin Ratio     (1.0-2.1)  


 


Triglycerides     (0-149)  mg/dL


 


Cholesterol     (0-199)  mg/dL


 


LDL Cholesterol Direct     (0-129)  mg/dL


 


HDL Cholesterol     (30-70)  mg/dL


 


25-OH Vitamin D Total     (30.0-100.0)  NG/ML


 


Procalcitonin     (0.19-0.49)  NG/ML


 


Vancomycin Trough     (5.0-10.0)  ug/mL


 


Hep Bs Antigen     (NEGATIVE)  


 


Hep Bs Antibody     (NEGATIVE)  


 


Hep B Core IgM Ab     (NEGATIVE)  


 


Hepatitis C Antibody     (NEGATIVE)  














  03/03/18 03/03/18 03/03/18 Range/Units





  03:18 03:18 03:18 


 


WBC    14.6 H  (4.8-10.8)  K/uL


 


RBC    3.31 L  (3.80-5.20)  Mil/uL


 


Hgb    9.2 L  (11.0-16.0)  g/dL


 


Hct    28.4 L  (34.0-47.0)  %


 


MCV    85.7  D  (81.0-99.0)  fL


 


MCH    27.7  (27.0-31.0)  pg


 


MCHC    32.3 L  (33.0-37.0)  g/dL


 


RDW    16.4 H  (11.5-14.5)  %


 


Plt Count    132  (130-400)  K/uL


 


MPV    8.7  (7.2-11.7)  fL


 


Neut % (Auto)    89.2 H  (50.0-75.0)  %


 


Lymph % (Auto)    6.7 L  (20.0-40.0)  %


 


Mono % (Auto)    3.9  (0.0-10.0)  %


 


Eos % (Auto)    0.0  (0.0-4.0)  %


 


Baso % (Auto)    0.2  (0.0-2.0)  %


 


Neut # (Auto)    13.1 H  (1.8-7.0)  K/uL


 


Lymph # (Auto)    1.0  (1.0-4.3)  K/uL


 


Mono # (Auto)    0.6  (0.0-0.8)  K/uL


 


Eos # (Auto)    0.0  (0.0-0.7)  K/uL


 


Baso # (Auto)    0.0  (0.0-0.2)  K/uL


 


Neutrophils % (Manual)    89 H  (50-75)  %


 


Band Neutrophils %    6 H  (0-2)  %


 


Lymphocytes % (Manual)    3 L  (20-40)  %


 


Monocytes % (Manual)    2  (0-10)  %


 


Nucleated RBC %    6 H  (0-0)  %


 


Differential Comment     


 


Platelet Estimate    Normal  (NORMAL)  


 


Hypochromasia (manual)    Slight  


 


Ovalocytes    Slight  


 


APTT     (21-34)  SECONDS


 


Puncture Site     


 


pCO2     (35-45)  mm/Hg


 


pO2     ()  mm/Hg


 


HCO3     (21-28)  mmol/L


 


ABG pH     (7.35-7.45)  


 


ABG Total CO2     (22-28)  mmol/L


 


ABG O2 Saturation     (95-98)  %


 


ABG Base Excess     (-2.0-3.0)  mmol/L


 


ABG Hemoglobin     (11.7-17.4)  g/dL


 


ABG Carboxyhemoglobin     (0.5-1.5)  %


 


POC ABG HHb (Measured)     (0.0-5.0)  %


 


ABG Methemoglobin     (0.0-3.0)  %


 


Jesus Test     


 


VBG pH     (7.32-7.43)  


 


VBG pCO2     (40-60)  mmHg


 


VBG HCO3     mmol/L


 


VBG O2 Sat (Calc)     (40-65)  %


 


VBG Base Excess     (0.0-2.0)  mmol/L


 


A-a O2 Difference     mm/Hg


 


Respiratory Index     


 


Hgb O2 Saturation     (95.0-98.0)  %


 


Vent Mode     


 


Mechanical Rate     


 


FiO2     %


 


Tidal Volume     


 


PEEP     


 


Crit Value Called To     


 


Crit Value Called By     


 


Crit Value Read Back     


 


Blood Gas Notified Time     


 


Sodium   150 H   (132-148)  mmol/L


 


Potassium   4.7   (3.6-5.2)  mmol/L


 


Chloride   108 H   ()  mmol/L


 


Carbon Dioxide   16 L   (22-30)  mmol/L


 


Anion Gap   32 H   (10-20)  


 


BUN   93 H   (7-17)  mg/dL


 


Creatinine   6.2 H   (0.7-1.2)  mg/dL


 


Est GFR ( Amer)   8   


 


Est GFR (Non-Af Amer)   7   


 


POC Glucose (mg/dL)     ()  mg/dL


 


Random Glucose   125 H   ()  mg/dL


 


Calcium   8.1 L   (8.6-10.4)  mg/dl


 


Magnesium     (1.6-2.3)  mg/dL


 


Iron     ()  ug/dL


 


TIBC     (250-450)  ug/dL


 


% Saturation     (20-55)  


 


Ferritin     ng/mL


 


Total Bilirubin   2.0 H   (0.2-1.3)  mg/dL


 


AST   254 H D   (14-36)  U/L


 


ALT   108 H D   (9-52)  U/L


 


Alkaline Phosphatase   77   ()  U/L


 


Troponin I     (0.00-0.120)  ng/mL


 


Total Protein   5.8 L   (6.3-8.3)  g/dL


 


Albumin   2.9 L   (3.5-5.0)  g/dL


 


Globulin   2.9   (2.2-3.9)  gm/dL


 


Albumin/Globulin Ratio   1.0   (1.0-2.1)  


 


Triglycerides  82  D    (0-149)  mg/dL


 


Cholesterol  173    (0-199)  mg/dL


 


LDL Cholesterol Direct  42    (0-129)  mg/dL


 


HDL Cholesterol  56    (30-70)  mg/dL


 


25-OH Vitamin D Total     (30.0-100.0)  NG/ML


 


Procalcitonin     (0.19-0.49)  NG/ML


 


Vancomycin Trough     (5.0-10.0)  ug/mL


 


Hep Bs Antigen     (NEGATIVE)  


 


Hep Bs Antibody     (NEGATIVE)  


 


Hep B Core IgM Ab     (NEGATIVE)  


 


Hepatitis C Antibody     (NEGATIVE)  














  03/03/18 03/03/18 03/02/18 Range/Units





  00:29 00:04 20:41 


 


WBC     (4.8-10.8)  K/uL


 


RBC     (3.80-5.20)  Mil/uL


 


Hgb     (11.0-16.0)  g/dL


 


Hct     (34.0-47.0)  %


 


MCV     (81.0-99.0)  fL


 


MCH     (27.0-31.0)  pg


 


MCHC     (33.0-37.0)  g/dL


 


RDW     (11.5-14.5)  %


 


Plt Count     (130-400)  K/uL


 


MPV     (7.2-11.7)  fL


 


Neut % (Auto)     (50.0-75.0)  %


 


Lymph % (Auto)     (20.0-40.0)  %


 


Mono % (Auto)     (0.0-10.0)  %


 


Eos % (Auto)     (0.0-4.0)  %


 


Baso % (Auto)     (0.0-2.0)  %


 


Neut # (Auto)     (1.8-7.0)  K/uL


 


Lymph # (Auto)     (1.0-4.3)  K/uL


 


Mono # (Auto)     (0.0-0.8)  K/uL


 


Eos # (Auto)     (0.0-0.7)  K/uL


 


Baso # (Auto)     (0.0-0.2)  K/uL


 


Neutrophils % (Manual)     (50-75)  %


 


Band Neutrophils %     (0-2)  %


 


Lymphocytes % (Manual)     (20-40)  %


 


Monocytes % (Manual)     (0-10)  %


 


Nucleated RBC %     (0-0)  %


 


Differential Comment     


 


Platelet Estimate     (NORMAL)  


 


Hypochromasia (manual)     


 


Ovalocytes     


 


APTT     (21-34)  SECONDS


 


Puncture Site  Drawn by rn    


 


pCO2     (35-45)  mm/Hg


 


pO2  19 L    ()  mm/Hg


 


HCO3     (21-28)  mmol/L


 


ABG pH     (7.35-7.45)  


 


ABG Total CO2     (22-28)  mmol/L


 


ABG O2 Saturation     (95-98)  %


 


ABG Base Excess     (-2.0-3.0)  mmol/L


 


ABG Hemoglobin     (11.7-17.4)  g/dL


 


ABG Carboxyhemoglobin     (0.5-1.5)  %


 


POC ABG HHb (Measured)     (0.0-5.0)  %


 


ABG Methemoglobin     (0.0-3.0)  %


 


Jesus Test  Na    


 


VBG pH  7.20 L    (7.32-7.43)  


 


VBG pCO2  42    (40-60)  mmHg


 


VBG HCO3  14.0    mmol/L


 


VBG O2 Sat (Calc)  30.5 L    (40-65)  %


 


VBG Base Excess  -11.2 L    (0.0-2.0)  mmol/L


 


A-a O2 Difference     mm/Hg


 


Respiratory Index     


 


Hgb O2 Saturation     (95.0-98.0)  %


 


Vent Mode     


 


Mechanical Rate     


 


FiO2     %


 


Tidal Volume     


 


PEEP     


 


Crit Value Called To  Dr liu    


 


Crit Value Called By  Gillian vazquez rt    


 


Crit Value Read Back  Y    


 


Blood Gas Notified Time  30    


 


Sodium    146  (132-148)  mmol/L


 


Potassium    5.1  (3.6-5.2)  mmol/L


 


Chloride    108 H  ()  mmol/L


 


Carbon Dioxide    12 L  (22-30)  mmol/L


 


Anion Gap    31 H  (10-20)  


 


BUN    91 H  (7-17)  mg/dL


 


Creatinine    6.2 H  (0.7-1.2)  mg/dL


 


Est GFR ( Amer)    8  


 


Est GFR (Non-Af Amer)    7  


 


POC Glucose (mg/dL)   223 H   ()  mg/dL


 


Random Glucose    333 H  ()  mg/dL


 


Calcium    8.4 L  (8.6-10.4)  mg/dl


 


Magnesium    2.5 H  (1.6-2.3)  mg/dL


 


Iron     ()  ug/dL


 


TIBC     (250-450)  ug/dL


 


% Saturation     (20-55)  


 


Ferritin     ng/mL


 


Total Bilirubin     (0.2-1.3)  mg/dL


 


AST     (14-36)  U/L


 


ALT     (9-52)  U/L


 


Alkaline Phosphatase     ()  U/L


 


Troponin I    79.7000 H*  (0.00-0.120)  ng/mL


 


Total Protein     (6.3-8.3)  g/dL


 


Albumin     (3.5-5.0)  g/dL


 


Globulin     (2.2-3.9)  gm/dL


 


Albumin/Globulin Ratio     (1.0-2.1)  


 


Triglycerides     (0-149)  mg/dL


 


Cholesterol     (0-199)  mg/dL


 


LDL Cholesterol Direct     (0-129)  mg/dL


 


HDL Cholesterol     (30-70)  mg/dL


 


25-OH Vitamin D Total     (30.0-100.0)  NG/ML


 


Procalcitonin     (0.19-0.49)  NG/ML


 


Vancomycin Trough     (5.0-10.0)  ug/mL


 


Hep Bs Antigen     (NEGATIVE)  


 


Hep Bs Antibody     (NEGATIVE)  


 


Hep B Core IgM Ab     (NEGATIVE)  


 


Hepatitis C Antibody     (NEGATIVE)  














  03/02/18 03/02/18 03/02/18 Range/Units





  20:41 20:30 18:25 


 


WBC  16.1 H    (4.8-10.8)  K/uL


 


RBC  3.31 L    (3.80-5.20)  Mil/uL


 


Hgb  9.2 L    (11.0-16.0)  g/dL


 


Hct  29.2 L    (34.0-47.0)  %


 


MCV  88.2  D    (81.0-99.0)  fL


 


MCH  27.8    (27.0-31.0)  pg


 


MCHC  31.5 L    (33.0-37.0)  g/dL


 


RDW  16.6 H    (11.5-14.5)  %


 


Plt Count  127 L    (130-400)  K/uL


 


MPV  9.2    (7.2-11.7)  fL


 


Neut % (Auto)     (50.0-75.0)  %


 


Lymph % (Auto)     (20.0-40.0)  %


 


Mono % (Auto)     (0.0-10.0)  %


 


Eos % (Auto)     (0.0-4.0)  %


 


Baso % (Auto)     (0.0-2.0)  %


 


Neut # (Auto)     (1.8-7.0)  K/uL


 


Lymph # (Auto)     (1.0-4.3)  K/uL


 


Mono # (Auto)     (0.0-0.8)  K/uL


 


Eos # (Auto)     (0.0-0.7)  K/uL


 


Baso # (Auto)     (0.0-0.2)  K/uL


 


Neutrophils % (Manual)     (50-75)  %


 


Band Neutrophils %     (0-2)  %


 


Lymphocytes % (Manual)     (20-40)  %


 


Monocytes % (Manual)     (0-10)  %


 


Nucleated RBC %     (0-0)  %


 


Differential Comment      


 


Platelet Estimate     (NORMAL)  


 


Hypochromasia (manual)     


 


Ovalocytes     


 


APTT    40 H D  (21-34)  SECONDS


 


Puncture Site   Rba   


 


pCO2   26 L   (35-45)  mm/Hg


 


pO2   202 H   ()  mm/Hg


 


HCO3   10.7 L   (21-28)  mmol/L


 


ABG pH   7.15 L*   (7.35-7.45)  


 


ABG Total CO2   9.9 L   (22-28)  mmol/L


 


ABG O2 Saturation   99.7 H   (95-98)  %


 


ABG Base Excess   -18.2 L   (-2.0-3.0)  mmol/L


 


ABG Hemoglobin   11.4 L   (11.7-17.4)  g/dL


 


ABG Carboxyhemoglobin   1.7 H   (0.5-1.5)  %


 


POC ABG HHb (Measured)   0.3   (0.0-5.0)  %


 


ABG Methemoglobin   1.5   (0.0-3.0)  %


 


Jesus Test   Pos   


 


VBG pH     (7.32-7.43)  


 


VBG pCO2     (40-60)  mmHg


 


VBG HCO3     mmol/L


 


VBG O2 Sat (Calc)     (40-65)  %


 


VBG Base Excess     (0.0-2.0)  mmol/L


 


A-a O2 Difference   479.0   mm/Hg


 


Respiratory Index   2.4   


 


Hgb O2 Saturation   96.5   (95.0-98.0)  %


 


Vent Mode   Prvc   


 


Mechanical Rate   14   


 


FiO2   100.0   %


 


Tidal Volume   500   


 


PEEP   5   


 


Crit Value Called To   Dr liu   


 


Crit Value Called By   [facility]   


 


Crit Value Read Back   Y   


 


Blood Gas Notified Time   2036   


 


Sodium     (132-148)  mmol/L


 


Potassium     (3.6-5.2)  mmol/L


 


Chloride     ()  mmol/L


 


Carbon Dioxide     (22-30)  mmol/L


 


Anion Gap     (10-20)  


 


BUN     (7-17)  mg/dL


 


Creatinine     (0.7-1.2)  mg/dL


 


Est GFR (African Amer)     


 


Est GFR (Non-Af Amer)     


 


POC Glucose (mg/dL)     ()  mg/dL


 


Random Glucose     ()  mg/dL


 


Calcium     (8.6-10.4)  mg/dl


 


Magnesium     (1.6-2.3)  mg/dL


 


Iron     ()  ug/dL


 


TIBC     (250-450)  ug/dL


 


% Saturation     (20-55)  


 


Ferritin     ng/mL


 


Total Bilirubin     (0.2-1.3)  mg/dL


 


AST     (14-36)  U/L


 


ALT     (9-52)  U/L


 


Alkaline Phosphatase     ()  U/L


 


Troponin I     (0.00-0.120)  ng/mL


 


Total Protein     (6.3-8.3)  g/dL


 


Albumin     (3.5-5.0)  g/dL


 


Globulin     (2.2-3.9)  gm/dL


 


Albumin/Globulin Ratio     (1.0-2.1)  


 


Triglycerides     (0-149)  mg/dL


 


Cholesterol     (0-199)  mg/dL


 


LDL Cholesterol Direct     (0-129)  mg/dL


 


HDL Cholesterol     (30-70)  mg/dL


 


25-OH Vitamin D Total     (30.0-100.0)  NG/ML


 


Procalcitonin     (0.19-0.49)  NG/ML


 


Vancomycin Trough     (5.0-10.0)  ug/mL


 


Hep Bs Antigen     (NEGATIVE)  


 


Hep Bs Antibody     (NEGATIVE)  


 


Hep B Core IgM Ab     (NEGATIVE)  


 


Hepatitis C Antibody     (NEGATIVE)  














  03/02/18 03/02/18 03/02/18 Range/Units





  17:41 16:13 12:19 


 


WBC     (4.8-10.8)  K/uL


 


RBC     (3.80-5.20)  Mil/uL


 


Hgb     (11.0-16.0)  g/dL


 


Hct     (34.0-47.0)  %


 


MCV     (81.0-99.0)  fL


 


MCH     (27.0-31.0)  pg


 


MCHC     (33.0-37.0)  g/dL


 


RDW     (11.5-14.5)  %


 


Plt Count     (130-400)  K/uL


 


MPV     (7.2-11.7)  fL


 


Neut % (Auto)     (50.0-75.0)  %


 


Lymph % (Auto)     (20.0-40.0)  %


 


Mono % (Auto)     (0.0-10.0)  %


 


Eos % (Auto)     (0.0-4.0)  %


 


Baso % (Auto)     (0.0-2.0)  %


 


Neut # (Auto)     (1.8-7.0)  K/uL


 


Lymph # (Auto)     (1.0-4.3)  K/uL


 


Mono # (Auto)     (0.0-0.8)  K/uL


 


Eos # (Auto)     (0.0-0.7)  K/uL


 


Baso # (Auto)     (0.0-0.2)  K/uL


 


Neutrophils % (Manual)     (50-75)  %


 


Band Neutrophils %     (0-2)  %


 


Lymphocytes % (Manual)     (20-40)  %


 


Monocytes % (Manual)     (0-10)  %


 


Nucleated RBC %     (0-0)  %


 


Differential Comment     


 


Platelet Estimate     (NORMAL)  


 


Hypochromasia (manual)     


 


Ovalocytes     


 


APTT     (21-34)  SECONDS


 


Puncture Site     


 


pCO2     (35-45)  mm/Hg


 


pO2     ()  mm/Hg


 


HCO3     (21-28)  mmol/L


 


ABG pH     (7.35-7.45)  


 


ABG Total CO2     (22-28)  mmol/L


 


ABG O2 Saturation     (95-98)  %


 


ABG Base Excess     (-2.0-3.0)  mmol/L


 


ABG Hemoglobin     (11.7-17.4)  g/dL


 


ABG Carboxyhemoglobin     (0.5-1.5)  %


 


POC ABG HHb (Measured)     (0.0-5.0)  %


 


ABG Methemoglobin     (0.0-3.0)  %


 


Jesus Test     


 


VBG pH     (7.32-7.43)  


 


VBG pCO2     (40-60)  mmHg


 


VBG HCO3     mmol/L


 


VBG O2 Sat (Calc)     (40-65)  %


 


VBG Base Excess     (0.0-2.0)  mmol/L


 


A-a O2 Difference     mm/Hg


 


Respiratory Index     


 


Hgb O2 Saturation     (95.0-98.0)  %


 


Vent Mode     


 


Mechanical Rate     


 


FiO2     %


 


Tidal Volume     


 


PEEP     


 


Crit Value Called To     


 


Crit Value Called By     


 


Crit Value Read Back     


 


Blood Gas Notified Time     


 


Sodium     (132-148)  mmol/L


 


Potassium     (3.6-5.2)  mmol/L


 


Chloride     ()  mmol/L


 


Carbon Dioxide     (22-30)  mmol/L


 


Anion Gap     (10-20)  


 


BUN     (7-17)  mg/dL


 


Creatinine     (0.7-1.2)  mg/dL


 


Est GFR (African Amer)     


 


Est GFR (Non-Af Amer)     


 


POC Glucose (mg/dL)   384 H   ()  mg/dL


 


Random Glucose     ()  mg/dL


 


Calcium     (8.6-10.4)  mg/dl


 


Magnesium     (1.6-2.3)  mg/dL


 


Iron     ()  ug/dL


 


TIBC     (250-450)  ug/dL


 


% Saturation     (20-55)  


 


Ferritin     ng/mL


 


Total Bilirubin     (0.2-1.3)  mg/dL


 


AST     (14-36)  U/L


 


ALT     (9-52)  U/L


 


Alkaline Phosphatase     ()  U/L


 


Troponin I     (0.00-0.120)  ng/mL


 


Total Protein     (6.3-8.3)  g/dL


 


Albumin     (3.5-5.0)  g/dL


 


Globulin     (2.2-3.9)  gm/dL


 


Albumin/Globulin Ratio     (1.0-2.1)  


 


Triglycerides     (0-149)  mg/dL


 


Cholesterol     (0-199)  mg/dL


 


LDL Cholesterol Direct     (0-129)  mg/dL


 


HDL Cholesterol     (30-70)  mg/dL


 


25-OH Vitamin D Total     (30.0-100.0)  NG/ML


 


Procalcitonin     (0.19-0.49)  NG/ML


 


Vancomycin Trough  < 5.0 L    (5.0-10.0)  ug/mL


 


Hep Bs Antigen     (NEGATIVE)  


 


Hep Bs Antibody    Negative  (NEGATIVE)  


 


Hep B Core IgM Ab     (NEGATIVE)  


 


Hepatitis C Antibody     (NEGATIVE)  














  03/02/18 03/02/18 03/02/18 Range/Units





  12:19 12:19 12:19 


 


WBC     (4.8-10.8)  K/uL


 


RBC     (3.80-5.20)  Mil/uL


 


Hgb     (11.0-16.0)  g/dL


 


Hct     (34.0-47.0)  %


 


MCV     (81.0-99.0)  fL


 


MCH     (27.0-31.0)  pg


 


MCHC     (33.0-37.0)  g/dL


 


RDW     (11.5-14.5)  %


 


Plt Count     (130-400)  K/uL


 


MPV     (7.2-11.7)  fL


 


Neut % (Auto)     (50.0-75.0)  %


 


Lymph % (Auto)     (20.0-40.0)  %


 


Mono % (Auto)     (0.0-10.0)  %


 


Eos % (Auto)     (0.0-4.0)  %


 


Baso % (Auto)     (0.0-2.0)  %


 


Neut # (Auto)     (1.8-7.0)  K/uL


 


Lymph # (Auto)     (1.0-4.3)  K/uL


 


Mono # (Auto)     (0.0-0.8)  K/uL


 


Eos # (Auto)     (0.0-0.7)  K/uL


 


Baso # (Auto)     (0.0-0.2)  K/uL


 


Neutrophils % (Manual)     (50-75)  %


 


Band Neutrophils %     (0-2)  %


 


Lymphocytes % (Manual)     (20-40)  %


 


Monocytes % (Manual)     (0-10)  %


 


Nucleated RBC %     (0-0)  %


 


Differential Comment     


 


Platelet Estimate     (NORMAL)  


 


Hypochromasia (manual)     


 


Ovalocytes     


 


APTT    31  (21-34)  SECONDS


 


Puncture Site     


 


pCO2     (35-45)  mm/Hg


 


pO2     ()  mm/Hg


 


HCO3     (21-28)  mmol/L


 


ABG pH     (7.35-7.45)  


 


ABG Total CO2     (22-28)  mmol/L


 


ABG O2 Saturation     (95-98)  %


 


ABG Base Excess     (-2.0-3.0)  mmol/L


 


ABG Hemoglobin     (11.7-17.4)  g/dL


 


ABG Carboxyhemoglobin     (0.5-1.5)  %


 


POC ABG HHb (Measured)     (0.0-5.0)  %


 


ABG Methemoglobin     (0.0-3.0)  %


 


Jesus Test     


 


VBG pH     (7.32-7.43)  


 


VBG pCO2     (40-60)  mmHg


 


VBG HCO3     mmol/L


 


VBG O2 Sat (Calc)     (40-65)  %


 


VBG Base Excess     (0.0-2.0)  mmol/L


 


A-a O2 Difference     mm/Hg


 


Respiratory Index     


 


Hgb O2 Saturation     (95.0-98.0)  %


 


Vent Mode     


 


Mechanical Rate     


 


FiO2     %


 


Tidal Volume     


 


PEEP     


 


Crit Value Called To     


 


Crit Value Called By     


 


Crit Value Read Back     


 


Blood Gas Notified Time     


 


Sodium     (132-148)  mmol/L


 


Potassium     (3.6-5.2)  mmol/L


 


Chloride     ()  mmol/L


 


Carbon Dioxide     (22-30)  mmol/L


 


Anion Gap     (10-20)  


 


BUN     (7-17)  mg/dL


 


Creatinine     (0.7-1.2)  mg/dL


 


Est GFR (African Amer)     


 


Est GFR (Non-Af Amer)     


 


POC Glucose (mg/dL)     ()  mg/dL


 


Random Glucose     ()  mg/dL


 


Calcium     (8.6-10.4)  mg/dl


 


Magnesium     (1.6-2.3)  mg/dL


 


Iron   18 L   ()  ug/dL


 


TIBC   196 L   (250-450)  ug/dL


 


% Saturation   9 L   (20-55)  


 


Ferritin  195.0    ng/mL


 


Total Bilirubin     (0.2-1.3)  mg/dL


 


AST     (14-36)  U/L


 


ALT     (9-52)  U/L


 


Alkaline Phosphatase     ()  U/L


 


Troponin I     (0.00-0.120)  ng/mL


 


Total Protein     (6.3-8.3)  g/dL


 


Albumin     (3.5-5.0)  g/dL


 


Globulin     (2.2-3.9)  gm/dL


 


Albumin/Globulin Ratio     (1.0-2.1)  


 


Triglycerides     (0-149)  mg/dL


 


Cholesterol     (0-199)  mg/dL


 


LDL Cholesterol Direct     (0-129)  mg/dL


 


HDL Cholesterol     (30-70)  mg/dL


 


25-OH Vitamin D Total   27.1 L   (30.0-100.0)  NG/ML


 


Procalcitonin     (0.19-0.49)  NG/ML


 


Vancomycin Trough     (5.0-10.0)  ug/mL


 


Hep Bs Antigen  Negative    (NEGATIVE)  


 


Hep Bs Antibody     (NEGATIVE)  


 


Hep B Core IgM Ab  Negative    (NEGATIVE)  


 


Hepatitis C Antibody  Negative    (NEGATIVE)  














  03/02/18 Range/Units





  08:34 


 


WBC   (4.8-10.8)  K/uL


 


RBC   (3.80-5.20)  Mil/uL


 


Hgb   (11.0-16.0)  g/dL


 


Hct   (34.0-47.0)  %


 


MCV   (81.0-99.0)  fL


 


MCH   (27.0-31.0)  pg


 


MCHC   (33.0-37.0)  g/dL


 


RDW   (11.5-14.5)  %


 


Plt Count   (130-400)  K/uL


 


MPV   (7.2-11.7)  fL


 


Neut % (Auto)   (50.0-75.0)  %


 


Lymph % (Auto)   (20.0-40.0)  %


 


Mono % (Auto)   (0.0-10.0)  %


 


Eos % (Auto)   (0.0-4.0)  %


 


Baso % (Auto)   (0.0-2.0)  %


 


Neut # (Auto)   (1.8-7.0)  K/uL


 


Lymph # (Auto)   (1.0-4.3)  K/uL


 


Mono # (Auto)   (0.0-0.8)  K/uL


 


Eos # (Auto)   (0.0-0.7)  K/uL


 


Baso # (Auto)   (0.0-0.2)  K/uL


 


Neutrophils % (Manual)   (50-75)  %


 


Band Neutrophils %   (0-2)  %


 


Lymphocytes % (Manual)   (20-40)  %


 


Monocytes % (Manual)   (0-10)  %


 


Nucleated RBC %   (0-0)  %


 


Differential Comment   


 


Platelet Estimate   (NORMAL)  


 


Hypochromasia (manual)   


 


Ovalocytes   


 


APTT   (21-34)  SECONDS


 


Puncture Site   


 


pCO2   (35-45)  mm/Hg


 


pO2   ()  mm/Hg


 


HCO3   (21-28)  mmol/L


 


ABG pH   (7.35-7.45)  


 


ABG Total CO2   (22-28)  mmol/L


 


ABG O2 Saturation   (95-98)  %


 


ABG Base Excess   (-2.0-3.0)  mmol/L


 


ABG Hemoglobin   (11.7-17.4)  g/dL


 


ABG Carboxyhemoglobin   (0.5-1.5)  %


 


POC ABG HHb (Measured)   (0.0-5.0)  %


 


ABG Methemoglobin   (0.0-3.0)  %


 


Jesus Test   


 


VBG pH   (7.32-7.43)  


 


VBG pCO2   (40-60)  mmHg


 


VBG HCO3   mmol/L


 


VBG O2 Sat (Calc)   (40-65)  %


 


VBG Base Excess   (0.0-2.0)  mmol/L


 


A-a O2 Difference   mm/Hg


 


Respiratory Index   


 


Hgb O2 Saturation   (95.0-98.0)  %


 


Vent Mode   


 


Mechanical Rate   


 


FiO2   %


 


Tidal Volume   


 


PEEP   


 


Crit Value Called To   


 


Crit Value Called By   


 


Crit Value Read Back   


 


Blood Gas Notified Time   


 


Sodium   (132-148)  mmol/L


 


Potassium   (3.6-5.2)  mmol/L


 


Chloride   ()  mmol/L


 


Carbon Dioxide   (22-30)  mmol/L


 


Anion Gap   (10-20)  


 


BUN   (7-17)  mg/dL


 


Creatinine   (0.7-1.2)  mg/dL


 


Est GFR (African Amer)   


 


Est GFR (Non-Af Amer)   


 


POC Glucose (mg/dL)   ()  mg/dL


 


Random Glucose   ()  mg/dL


 


Calcium   (8.6-10.4)  mg/dl


 


Magnesium   (1.6-2.3)  mg/dL


 


Iron   ()  ug/dL


 


TIBC   (250-450)  ug/dL


 


% Saturation   (20-55)  


 


Ferritin   ng/mL


 


Total Bilirubin   (0.2-1.3)  mg/dL


 


AST   (14-36)  U/L


 


ALT   (9-52)  U/L


 


Alkaline Phosphatase   ()  U/L


 


Troponin I   (0.00-0.120)  ng/mL


 


Total Protein   (6.3-8.3)  g/dL


 


Albumin   (3.5-5.0)  g/dL


 


Globulin   (2.2-3.9)  gm/dL


 


Albumin/Globulin Ratio   (1.0-2.1)  


 


Triglycerides   (0-149)  mg/dL


 


Cholesterol   (0-199)  mg/dL


 


LDL Cholesterol Direct   (0-129)  mg/dL


 


HDL Cholesterol   (30-70)  mg/dL


 


25-OH Vitamin D Total   (30.0-100.0)  NG/ML


 


Procalcitonin  54.73 H  (0.19-0.49)  NG/ML


 


Vancomycin Trough   (5.0-10.0)  ug/mL


 


Hep Bs Antigen   (NEGATIVE)  


 


Hep Bs Antibody   (NEGATIVE)  


 


Hep B Core IgM Ab   (NEGATIVE)  


 


Hepatitis C Antibody   (NEGATIVE)  








 Laboratory Results - last 24 hr











  03/02/18 03/02/18 03/02/18





  08:34 12:19 12:19


 


WBC   


 


RBC   


 


Hgb   


 


Hct   


 


MCV   


 


MCH   


 


MCHC   


 


RDW   


 


Plt Count   


 


MPV   


 


Neut % (Auto)   


 


Lymph % (Auto)   


 


Mono % (Auto)   


 


Eos % (Auto)   


 


Baso % (Auto)   


 


Neut # (Auto)   


 


Lymph # (Auto)   


 


Mono # (Auto)   


 


Eos # (Auto)   


 


Baso # (Auto)   


 


Neutrophils % (Manual)   


 


Band Neutrophils %   


 


Lymphocytes % (Manual)   


 


Monocytes % (Manual)   


 


Nucleated RBC %   


 


Differential Comment   


 


Platelet Estimate   


 


Hypochromasia (manual)   


 


Ovalocytes   


 


APTT   31 


 


Puncture Site   


 


pCO2   


 


pO2   


 


HCO3   


 


ABG pH   


 


ABG Total CO2   


 


ABG O2 Saturation   


 


ABG Base Excess   


 


ABG Hemoglobin   


 


ABG Carboxyhemoglobin   


 


POC ABG HHb (Measured)   


 


ABG Methemoglobin   


 


Jesus Test   


 


VBG pH   


 


VBG pCO2   


 


VBG HCO3   


 


VBG O2 Sat (Calc)   


 


VBG Base Excess   


 


A-a O2 Difference   


 


Respiratory Index   


 


Hgb O2 Saturation   


 


Vent Mode   


 


Mechanical Rate   


 


FiO2   


 


Tidal Volume   


 


PEEP   


 


Crit Value Called To   


 


Crit Value Called By   


 


Crit Value Read Back   


 


Blood Gas Notified Time   


 


Sodium   


 


Potassium   


 


Chloride   


 


Carbon Dioxide   


 


Anion Gap   


 


BUN   


 


Creatinine   


 


Est GFR ( Amer)   


 


Est GFR (Non-Af Amer)   


 


POC Glucose (mg/dL)   


 


Random Glucose   


 


Calcium   


 


Magnesium   


 


Iron    18 L


 


TIBC    196 L


 


% Saturation    9 L


 


Ferritin   


 


Total Bilirubin   


 


AST   


 


ALT   


 


Alkaline Phosphatase   


 


Troponin I   


 


Total Protein   


 


Albumin   


 


Globulin   


 


Albumin/Globulin Ratio   


 


Triglycerides   


 


Cholesterol   


 


LDL Cholesterol Direct   


 


HDL Cholesterol   


 


25-OH Vitamin D Total    27.1 L


 


Procalcitonin  54.73 H  


 


Vancomycin Trough   


 


Hep Bs Antigen   


 


Hep Bs Antibody   


 


Hep B Core IgM Ab   


 


Hepatitis C Antibody   














  03/02/18 03/02/18 03/02/18





  12:19 12:19 16:13


 


WBC   


 


RBC   


 


Hgb   


 


Hct   


 


MCV   


 


MCH   


 


MCHC   


 


RDW   


 


Plt Count   


 


MPV   


 


Neut % (Auto)   


 


Lymph % (Auto)   


 


Mono % (Auto)   


 


Eos % (Auto)   


 


Baso % (Auto)   


 


Neut # (Auto)   


 


Lymph # (Auto)   


 


Mono # (Auto)   


 


Eos # (Auto)   


 


Baso # (Auto)   


 


Neutrophils % (Manual)   


 


Band Neutrophils %   


 


Lymphocytes % (Manual)   


 


Monocytes % (Manual)   


 


Nucleated RBC %   


 


Differential Comment   


 


Platelet Estimate   


 


Hypochromasia (manual)   


 


Ovalocytes   


 


APTT   


 


Puncture Site   


 


pCO2   


 


pO2   


 


HCO3   


 


ABG pH   


 


ABG Total CO2   


 


ABG O2 Saturation   


 


ABG Base Excess   


 


ABG Hemoglobin   


 


ABG Carboxyhemoglobin   


 


POC ABG HHb (Measured)   


 


ABG Methemoglobin   


 


Jesus Test   


 


VBG pH   


 


VBG pCO2   


 


VBG HCO3   


 


VBG O2 Sat (Calc)   


 


VBG Base Excess   


 


A-a O2 Difference   


 


Respiratory Index   


 


Hgb O2 Saturation   


 


Vent Mode   


 


Mechanical Rate   


 


FiO2   


 


Tidal Volume   


 


PEEP   


 


Crit Value Called To   


 


Crit Value Called By   


 


Crit Value Read Back   


 


Blood Gas Notified Time   


 


Sodium   


 


Potassium   


 


Chloride   


 


Carbon Dioxide   


 


Anion Gap   


 


BUN   


 


Creatinine   


 


Est GFR ( Amer)   


 


Est GFR (Non-Af Amer)   


 


POC Glucose (mg/dL)    384 H


 


Random Glucose   


 


Calcium   


 


Magnesium   


 


Iron   


 


TIBC   


 


% Saturation   


 


Ferritin  195.0  


 


Total Bilirubin   


 


AST   


 


ALT   


 


Alkaline Phosphatase   


 


Troponin I   


 


Total Protein   


 


Albumin   


 


Globulin   


 


Albumin/Globulin Ratio   


 


Triglycerides   


 


Cholesterol   


 


LDL Cholesterol Direct   


 


HDL Cholesterol   


 


25-OH Vitamin D Total   


 


Procalcitonin   


 


Vancomycin Trough   


 


Hep Bs Antigen  Negative  


 


Hep Bs Antibody   Negative 


 


Hep B Core IgM Ab  Negative  


 


Hepatitis C Antibody  Negative  














  03/02/18 03/02/18 03/02/18





  17:41 18:25 20:30


 


WBC   


 


RBC   


 


Hgb   


 


Hct   


 


MCV   


 


MCH   


 


MCHC   


 


RDW   


 


Plt Count   


 


MPV   


 


Neut % (Auto)   


 


Lymph % (Auto)   


 


Mono % (Auto)   


 


Eos % (Auto)   


 


Baso % (Auto)   


 


Neut # (Auto)   


 


Lymph # (Auto)   


 


Mono # (Auto)   


 


Eos # (Auto)   


 


Baso # (Auto)   


 


Neutrophils % (Manual)   


 


Band Neutrophils %   


 


Lymphocytes % (Manual)   


 


Monocytes % (Manual)   


 


Nucleated RBC %   


 


Differential Comment   


 


Platelet Estimate   


 


Hypochromasia (manual)   


 


Ovalocytes   


 


APTT   40 H D 


 


Puncture Site    Rba


 


pCO2    26 L


 


pO2    202 H


 


HCO3    10.7 L


 


ABG pH    7.15 L*


 


ABG Total CO2    9.9 L


 


ABG O2 Saturation    99.7 H


 


ABG Base Excess    -18.2 L


 


ABG Hemoglobin    11.4 L


 


ABG Carboxyhemoglobin    1.7 H


 


POC ABG HHb (Measured)    0.3


 


ABG Methemoglobin    1.5


 


Jesus Test    Pos


 


VBG pH   


 


VBG pCO2   


 


VBG HCO3   


 


VBG O2 Sat (Calc)   


 


VBG Base Excess   


 


A-a O2 Difference    479.0


 


Respiratory Index    2.4


 


Hgb O2 Saturation    96.5


 


Vent Mode    Prvc


 


Mechanical Rate    14


 


FiO2    100.0


 


Tidal Volume    500


 


PEEP    5


 


Crit Value Called To    Dr liu


 


Crit Value Called By    [facility]


 


Crit Value Read Back    Y


 


Blood Gas Notified Time    2036


 


Sodium   


 


Potassium   


 


Chloride   


 


Carbon Dioxide   


 


Anion Gap   


 


BUN   


 


Creatinine   


 


Est GFR ( Amer)   


 


Est GFR (Non-Af Amer)   


 


POC Glucose (mg/dL)   


 


Random Glucose   


 


Calcium   


 


Magnesium   


 


Iron   


 


TIBC   


 


% Saturation   


 


Ferritin   


 


Total Bilirubin   


 


AST   


 


ALT   


 


Alkaline Phosphatase   


 


Troponin I   


 


Total Protein   


 


Albumin   


 


Globulin   


 


Albumin/Globulin Ratio   


 


Triglycerides   


 


Cholesterol   


 


LDL Cholesterol Direct   


 


HDL Cholesterol   


 


25-OH Vitamin D Total   


 


Procalcitonin   


 


Vancomycin Trough  < 5.0 L  


 


Hep Bs Antigen   


 


Hep Bs Antibody   


 


Hep B Core IgM Ab   


 


Hepatitis C Antibody   














  03/02/18 03/02/18 03/03/18





  20:41 20:41 00:04


 


WBC  16.1 H  


 


RBC  3.31 L  


 


Hgb  9.2 L  


 


Hct  29.2 L  


 


MCV  88.2  D  


 


MCH  27.8  


 


MCHC  31.5 L  


 


RDW  16.6 H  


 


Plt Count  127 L  


 


MPV  9.2  


 


Neut % (Auto)   


 


Lymph % (Auto)   


 


Mono % (Auto)   


 


Eos % (Auto)   


 


Baso % (Auto)   


 


Neut # (Auto)   


 


Lymph # (Auto)   


 


Mono # (Auto)   


 


Eos # (Auto)   


 


Baso # (Auto)   


 


Neutrophils % (Manual)   


 


Band Neutrophils %   


 


Lymphocytes % (Manual)   


 


Monocytes % (Manual)   


 


Nucleated RBC %   


 


Differential Comment    


 


Platelet Estimate   


 


Hypochromasia (manual)   


 


Ovalocytes   


 


APTT   


 


Puncture Site   


 


pCO2   


 


pO2   


 


HCO3   


 


ABG pH   


 


ABG Total CO2   


 


ABG O2 Saturation   


 


ABG Base Excess   


 


ABG Hemoglobin   


 


ABG Carboxyhemoglobin   


 


POC ABG HHb (Measured)   


 


ABG Methemoglobin   


 


Jesus Test   


 


VBG pH   


 


VBG pCO2   


 


VBG HCO3   


 


VBG O2 Sat (Calc)   


 


VBG Base Excess   


 


A-a O2 Difference   


 


Respiratory Index   


 


Hgb O2 Saturation   


 


Vent Mode   


 


Mechanical Rate   


 


FiO2   


 


Tidal Volume   


 


PEEP   


 


Crit Value Called To   


 


Crit Value Called By   


 


Crit Value Read Back   


 


Blood Gas Notified Time   


 


Sodium   146 


 


Potassium   5.1 


 


Chloride   108 H 


 


Carbon Dioxide   12 L 


 


Anion Gap   31 H 


 


BUN   91 H 


 


Creatinine   6.2 H 


 


Est GFR ( Amer)   8 


 


Est GFR (Non-Af Amer)   7 


 


POC Glucose (mg/dL)    223 H


 


Random Glucose   333 H 


 


Calcium   8.4 L 


 


Magnesium   2.5 H 


 


Iron   


 


TIBC   


 


% Saturation   


 


Ferritin   


 


Total Bilirubin   


 


AST   


 


ALT   


 


Alkaline Phosphatase   


 


Troponin I   79.7000 H* 


 


Total Protein   


 


Albumin   


 


Globulin   


 


Albumin/Globulin Ratio   


 


Triglycerides   


 


Cholesterol   


 


LDL Cholesterol Direct   


 


HDL Cholesterol   


 


25-OH Vitamin D Total   


 


Procalcitonin   


 


Vancomycin Trough   


 


Hep Bs Antigen   


 


Hep Bs Antibody   


 


Hep B Core IgM Ab   


 


Hepatitis C Antibody   














  03/03/18 03/03/18 03/03/18





  00:29 03:18 03:18


 


WBC   14.6 H 


 


RBC   3.31 L 


 


Hgb   9.2 L 


 


Hct   28.4 L 


 


MCV   85.7  D 


 


MCH   27.7 


 


MCHC   32.3 L 


 


RDW   16.4 H 


 


Plt Count   132 


 


MPV   8.7 


 


Neut % (Auto)   89.2 H 


 


Lymph % (Auto)   6.7 L 


 


Mono % (Auto)   3.9 


 


Eos % (Auto)   0.0 


 


Baso % (Auto)   0.2 


 


Neut # (Auto)   13.1 H 


 


Lymph # (Auto)   1.0 


 


Mono # (Auto)   0.6 


 


Eos # (Auto)   0.0 


 


Baso # (Auto)   0.0 


 


Neutrophils % (Manual)   89 H 


 


Band Neutrophils %   6 H 


 


Lymphocytes % (Manual)   3 L 


 


Monocytes % (Manual)   2 


 


Nucleated RBC %   6 H 


 


Differential Comment   


 


Platelet Estimate   Normal 


 


Hypochromasia (manual)   Slight 


 


Ovalocytes   Slight 


 


APTT   


 


Puncture Site  Drawn by rn  


 


pCO2   


 


pO2  19 L  


 


HCO3   


 


ABG pH   


 


ABG Total CO2   


 


ABG O2 Saturation   


 


ABG Base Excess   


 


ABG Hemoglobin   


 


ABG Carboxyhemoglobin   


 


POC ABG HHb (Measured)   


 


ABG Methemoglobin   


 


Jesus Test  Na  


 


VBG pH  7.20 L  


 


VBG pCO2  42  


 


VBG HCO3  14.0  


 


VBG O2 Sat (Calc)  30.5 L  


 


VBG Base Excess  -11.2 L  


 


A-a O2 Difference   


 


Respiratory Index   


 


Hgb O2 Saturation   


 


Vent Mode   


 


Mechanical Rate   


 


FiO2   


 


Tidal Volume   


 


PEEP   


 


Crit Value Called To  Dr liu  


 


Crit Value Called By  Gillian vazquez rt  


 


Crit Value Read Back  Y  


 


Blood Gas Notified Time  30  


 


Sodium    150 H


 


Potassium    4.7


 


Chloride    108 H


 


Carbon Dioxide    16 L


 


Anion Gap    32 H


 


BUN    93 H


 


Creatinine    6.2 H


 


Est GFR ( Amer)    8


 


Est GFR (Non-Af Amer)    7


 


POC Glucose (mg/dL)   


 


Random Glucose    125 H


 


Calcium    8.1 L


 


Magnesium   


 


Iron   


 


TIBC   


 


% Saturation   


 


Ferritin   


 


Total Bilirubin    2.0 H


 


AST    254 H D


 


ALT    108 H D


 


Alkaline Phosphatase    77


 


Troponin I   


 


Total Protein    5.8 L


 


Albumin    2.9 L


 


Globulin    2.9


 


Albumin/Globulin Ratio    1.0


 


Triglycerides   


 


Cholesterol   


 


LDL Cholesterol Direct   


 


HDL Cholesterol   


 


25-OH Vitamin D Total   


 


Procalcitonin   


 


Vancomycin Trough   


 


Hep Bs Antigen   


 


Hep Bs Antibody   


 


Hep B Core IgM Ab   


 


Hepatitis C Antibody   














  03/03/18 03/03/18 03/03/18





  03:18 03:20 05:18


 


WBC   


 


RBC   


 


Hgb   


 


Hct   


 


MCV   


 


MCH   


 


MCHC   


 


RDW   


 


Plt Count   


 


MPV   


 


Neut % (Auto)   


 


Lymph % (Auto)   


 


Mono % (Auto)   


 


Eos % (Auto)   


 


Baso % (Auto)   


 


Neut # (Auto)   


 


Lymph # (Auto)   


 


Mono # (Auto)   


 


Eos # (Auto)   


 


Baso # (Auto)   


 


Neutrophils % (Manual)   


 


Band Neutrophils %   


 


Lymphocytes % (Manual)   


 


Monocytes % (Manual)   


 


Nucleated RBC %   


 


Differential Comment   


 


Platelet Estimate   


 


Hypochromasia (manual)   


 


Ovalocytes   


 


APTT   126 H* D 


 


Puncture Site    Rb


 


pCO2    21 L


 


pO2    360 H


 


HCO3    14.6 L


 


ABG pH    7.33 L


 


ABG Total CO2    11.7 L


 


ABG O2 Saturation    99.7 H


 


ABG Base Excess    -13.2 L


 


ABG Hemoglobin    9.2 L


 


ABG Carboxyhemoglobin    1.4


 


POC ABG HHb (Measured)    0.3


 


ABG Methemoglobin    1.3


 


Jesus Test    Na


 


VBG pH   


 


VBG pCO2   


 


VBG HCO3   


 


VBG O2 Sat (Calc)   


 


VBG Base Excess   


 


A-a O2 Difference    327.0


 


Respiratory Index    0.9


 


Hgb O2 Saturation    96.9


 


Vent Mode    Prvc


 


Mechanical Rate    24


 


FiO2    100.0


 


Tidal Volume    550


 


PEEP    5


 


Crit Value Called To   


 


Crit Value Called By   


 


Crit Value Read Back   


 


Blood Gas Notified Time   


 


Sodium   


 


Potassium   


 


Chloride   


 


Carbon Dioxide   


 


Anion Gap   


 


BUN   


 


Creatinine   


 


Est GFR ( Amer)   


 


Est GFR (Non-Af Amer)   


 


POC Glucose (mg/dL)   


 


Random Glucose   


 


Calcium   


 


Magnesium   


 


Iron   


 


TIBC   


 


% Saturation   


 


Ferritin   


 


Total Bilirubin   


 


AST   


 


ALT   


 


Alkaline Phosphatase   


 


Troponin I   


 


Total Protein   


 


Albumin   


 


Globulin   


 


Albumin/Globulin Ratio   


 


Triglycerides  82  D  


 


Cholesterol  173  


 


LDL Cholesterol Direct  42  


 


HDL Cholesterol  56  


 


25-OH Vitamin D Total   


 


Procalcitonin   


 


Vancomycin Trough   


 


Hep Bs Antigen   


 


Hep Bs Antibody   


 


Hep B Core IgM Ab   


 


Hepatitis C Antibody   














  03/03/18





  05:24


 


WBC 


 


RBC 


 


Hgb 


 


Hct 


 


MCV 


 


MCH 


 


MCHC 


 


RDW 


 


Plt Count 


 


MPV 


 


Neut % (Auto) 


 


Lymph % (Auto) 


 


Mono % (Auto) 


 


Eos % (Auto) 


 


Baso % (Auto) 


 


Neut # (Auto) 


 


Lymph # (Auto) 


 


Mono # (Auto) 


 


Eos # (Auto) 


 


Baso # (Auto) 


 


Neutrophils % (Manual) 


 


Band Neutrophils % 


 


Lymphocytes % (Manual) 


 


Monocytes % (Manual) 


 


Nucleated RBC % 


 


Differential Comment 


 


Platelet Estimate 


 


Hypochromasia (manual) 


 


Ovalocytes 


 


APTT 


 


Puncture Site 


 


pCO2 


 


pO2 


 


HCO3 


 


ABG pH 


 


ABG Total CO2 


 


ABG O2 Saturation 


 


ABG Base Excess 


 


ABG Hemoglobin 


 


ABG Carboxyhemoglobin 


 


POC ABG HHb (Measured) 


 


ABG Methemoglobin 


 


Jesus Test 


 


VBG pH 


 


VBG pCO2 


 


VBG HCO3 


 


VBG O2 Sat (Calc) 


 


VBG Base Excess 


 


A-a O2 Difference 


 


Respiratory Index 


 


Hgb O2 Saturation 


 


Vent Mode 


 


Mechanical Rate 


 


FiO2 


 


Tidal Volume 


 


PEEP 


 


Crit Value Called To 


 


Crit Value Called By 


 


Crit Value Read Back 


 


Blood Gas Notified Time 


 


Sodium 


 


Potassium 


 


Chloride 


 


Carbon Dioxide 


 


Anion Gap 


 


BUN 


 


Creatinine 


 


Est GFR ( Amer) 


 


Est GFR (Non-Af Amer) 


 


POC Glucose (mg/dL)  118 H


 


Random Glucose 


 


Calcium 


 


Magnesium 


 


Iron 


 


TIBC 


 


% Saturation 


 


Ferritin 


 


Total Bilirubin 


 


AST 


 


ALT 


 


Alkaline Phosphatase 


 


Troponin I 


 


Total Protein 


 


Albumin 


 


Globulin 


 


Albumin/Globulin Ratio 


 


Triglycerides 


 


Cholesterol 


 


LDL Cholesterol Direct 


 


HDL Cholesterol 


 


25-OH Vitamin D Total 


 


Procalcitonin 


 


Vancomycin Trough 


 


Hep Bs Antigen 


 


Hep Bs Antibody 


 


Hep B Core IgM Ab 


 


Hepatitis C Antibody 











EKG/Cardiology Studies: 


Cardiology / EKG Studies





03/02/18 10:36


ELECTROCARDIOGRAM Stat 


   Comment: 


   Mode Of Transportation: PORTABLE


   Reason For Exam: NSTMI


   Precautions: Standard














Critical Care Progress Note





- Nutrition


Nutrition: 


 Nutrition











 Category Date Time Status


 


 NPO Diet [DIET] Diets  03/02/18 Breakfast Active














Assessment/Plan





- Assessment and Plan (Free Text)


Plan: 


Patient admitted to ICU for MI/CKD/URIEL


-obtain cardiology input possible PCI


-obtain nephrology input regarding HD via left AV fistula








- Date & Time


Date: 03/02/18


Time: 19:00

## 2018-03-03 LAB
ALBUMIN SERPL-MCNC: 2.8 G/DL (ref 3.5–5)
ALBUMIN SERPL-MCNC: 2.9 G/DL (ref 3.5–5)
ALBUMIN/GLOB SERPL: 1 {RATIO} (ref 1–2.1)
ALBUMIN/GLOB SERPL: 1 {RATIO} (ref 1–2.1)
ALT SERPL-CCNC: 108 U/L (ref 9–52)
ARTERIAL BLOOD GAS HEMOGLOBIN: 9.2 G/DL (ref 11.7–17.4)
ARTERIAL BLOOD GAS O2 SAT: 100.1 % (ref 95–98)
ARTERIAL BLOOD GAS O2 SAT: 99.7 % (ref 95–98)
ARTERIAL BLOOD GAS PCO2: 21 MM/HG (ref 35–45)
ARTERIAL BLOOD GAS PCO2: 22 MM/HG (ref 35–45)
ARTERIAL BLOOD GAS TCO2: 11.7 MMOL/L (ref 22–28)
ARTERIAL BLOOD GAS TCO2: 23.3 MMOL/L (ref 22–28)
AST SERPL-CCNC: 254 U/L (ref 14–36)
AST SERPL-CCNC: 6513 U/L (ref 14–36)
BASE EXCESS BLDV CALC-SCNC: -11.2 MMOL/L (ref 0–2)
BASOPHILS # BLD AUTO: 0 K/UL (ref 0–0.2)
BASOPHILS # BLD AUTO: 0 K/UL (ref 0–0.2)
BASOPHILS NFR BLD: 0.2 % (ref 0–2)
BASOPHILS NFR BLD: 0.2 % (ref 0–2)
BUN SERPL-MCNC: 85 MG/DL (ref 7–17)
BUN SERPL-MCNC: 93 MG/DL (ref 7–17)
CALCIUM SERPL-MCNC: 7.5 MG/DL (ref 8.6–10.4)
CALCIUM SERPL-MCNC: 8.1 MG/DL (ref 8.6–10.4)
EOSINOPHIL # BLD AUTO: 0 K/UL (ref 0–0.7)
EOSINOPHIL # BLD AUTO: 0.1 K/UL (ref 0–0.7)
EOSINOPHIL NFR BLD: 0 % (ref 0–4)
EOSINOPHIL NFR BLD: 0.4 % (ref 0–4)
ERYTHROCYTE [DISTWIDTH] IN BLOOD BY AUTOMATED COUNT: 16.3 % (ref 11.5–14.5)
ERYTHROCYTE [DISTWIDTH] IN BLOOD BY AUTOMATED COUNT: 16.4 % (ref 11.5–14.5)
GFR NON-AFRICAN AMERICAN: 7
GFR NON-AFRICAN AMERICAN: 7
HCO3 BLDA-SCNC: 14.6 MMOL/L (ref 21–28)
HCO3 BLDA-SCNC: 27.4 MMOL/L (ref 21–28)
HDLC SERPL-MCNC: 56 MG/DL (ref 30–70)
HGB BLD-MCNC: 8.1 G/DL (ref 11–16)
HGB BLD-MCNC: 9.2 G/DL (ref 11–16)
HYPOCHROMIC: SLIGHT
INHALED O2 CONCENTRATION: 100 %
INHALED O2 CONCENTRATION: 40 %
LDLC SERPL-MCNC: 42 MG/DL (ref 0–129)
LYMPHOCYTE: 3 % (ref 20–40)
LYMPHOCYTES # BLD AUTO: 1 K/UL (ref 1–4.3)
LYMPHOCYTES # BLD AUTO: 1.4 K/UL (ref 1–4.3)
LYMPHOCYTES NFR BLD AUTO: 11.1 % (ref 20–40)
LYMPHOCYTES NFR BLD AUTO: 6.7 % (ref 20–40)
MCH RBC QN AUTO: 27.7 PG (ref 27–31)
MCH RBC QN AUTO: 27.7 PG (ref 27–31)
MCHC RBC AUTO-ENTMCNC: 32.3 G/DL (ref 33–37)
MCHC RBC AUTO-ENTMCNC: 33.3 G/DL (ref 33–37)
MCV RBC AUTO: 83.2 FL (ref 81–99)
MCV RBC AUTO: 85.7 FL (ref 81–99)
MONOCYTE: 2 % (ref 0–10)
MONOCYTES # BLD: 0.4 K/UL (ref 0–0.8)
MONOCYTES # BLD: 0.6 K/UL (ref 0–0.8)
MONOCYTES NFR BLD: 3.4 % (ref 0–10)
MONOCYTES NFR BLD: 3.9 % (ref 0–10)
NEUTROPHILS # BLD: 10.8 K/UL (ref 1.8–7)
NEUTROPHILS # BLD: 13.1 K/UL (ref 1.8–7)
NEUTROPHILS NFR BLD AUTO: 84.9 % (ref 50–75)
NEUTROPHILS NFR BLD AUTO: 89 % (ref 50–75)
NEUTROPHILS NFR BLD AUTO: 89.2 % (ref 50–75)
NEUTS BAND NFR BLD: 6 % (ref 0–2)
NRBC BLD AUTO-RTO: 2.3 % (ref 0–2)
NRBC BLD AUTO-RTO: 4.2 % (ref 0–2)
NRBC BLD AUTO-RTO: 6 % (ref 0–0)
OVALOCYTES BLD QL SMEAR: SLIGHT
PCO2 BLDV: 42 MMHG (ref 40–60)
PH BLDA: 7.33 [PH] (ref 7.35–7.45)
PH BLDA: 7.62 [PH] (ref 7.35–7.45)
PH BLDV: 7.2 [PH] (ref 7.32–7.43)
PLATELET # BLD EST: NORMAL 10*3/UL
PLATELET # BLD: 108 K/UL (ref 130–400)
PLATELET # BLD: 132 K/UL (ref 130–400)
PMV BLD AUTO: 8.7 FL (ref 7.2–11.7)
PMV BLD AUTO: 8.7 FL (ref 7.2–11.7)
PO2 BLDA: 162 MM/HG (ref 80–100)
PO2 BLDA: 360 MM/HG (ref 80–100)
RBC # BLD AUTO: 2.91 MIL/UL (ref 3.8–5.2)
RBC # BLD AUTO: 3.31 MIL/UL (ref 3.8–5.2)
TOTAL CELLS COUNTED BLD: 100
VENOUS BLOOD GAS PO2: 19 MM/HG (ref 30–55)
WBC # BLD AUTO: 12.7 K/UL (ref 4.8–10.8)
WBC # BLD AUTO: 14.6 K/UL (ref 4.8–10.8)

## 2018-03-03 PROCEDURE — B54DZZA ULTRASONOGRAPHY OF BILATERAL LOWER EXTREMITY VEINS, GUIDANCE: ICD-10-PCS

## 2018-03-03 RX ADMIN — HEPARIN SODIUM PRN MLS/HR: 10000 INJECTION, SOLUTION INTRAVENOUS at 05:11

## 2018-03-03 RX ADMIN — INSULIN ASPART SCH: 100 INJECTION, SOLUTION INTRAVENOUS; SUBCUTANEOUS at 00:07

## 2018-03-03 RX ADMIN — INSULIN ASPART SCH: 100 INJECTION, SOLUTION INTRAVENOUS; SUBCUTANEOUS at 06:24

## 2018-03-03 RX ADMIN — MOXIFLOXACIN HYDROCHLORIDE SCH MLS/HR: 400 INJECTION, SOLUTION INTRAVENOUS at 22:57

## 2018-03-03 RX ADMIN — INSULIN ASPART SCH: 100 INJECTION, SOLUTION INTRAVENOUS; SUBCUTANEOUS at 12:11

## 2018-03-03 RX ADMIN — INSULIN ASPART SCH: 100 INJECTION, SOLUTION INTRAVENOUS; SUBCUTANEOUS at 18:12

## 2018-03-03 RX ADMIN — WATER SCH MLS/HR: 1 INJECTION INTRAMUSCULAR; INTRAVENOUS; SUBCUTANEOUS at 13:46

## 2018-03-03 RX ADMIN — Medication SCH TAB: at 08:30

## 2018-03-03 RX ADMIN — INSULIN GLARGINE SCH U: 100 INJECTION, SOLUTION SUBCUTANEOUS at 22:46

## 2018-03-03 RX ADMIN — WATER SCH MLS/HR: 1 INJECTION INTRAMUSCULAR; INTRAVENOUS; SUBCUTANEOUS at 22:43

## 2018-03-03 NOTE — [id number]
Surgeon's Initial Post Op Note





- Surgeon's Notes


Surgeon: Dr Alvarado


Assistant: Dr Krause PGY3


Type of Anesthesia: Local


Pre-Operative Diagnosis: renal failure.  thrombosed left AV shunt


Operative Findings: patent LIJ


Post-Operative Diagnosis: as above


Operation Performed: attempted right femoral shiley - unable to pass guidewire.

  LIJ shiley insertion with US guidance.  placement confirmed with CXR


Specimen/Specimens Removed: none


Estimated Blood Loss: EBL {In ML}: 20


Blood Products Given: N/A


Drains Used: No Drains


Post-Op Condition: Fair


Date of Surgery/Procedure: 03/03/18


Time of Surgery/Procedure: 12:38

## 2018-03-03 NOTE — CARD
--------------- APPROVED REPORT --------------





EXAM: Two-dimensional and M-mode echocardiogram with Doppler and 

color Doppler.



Other Information 

Quality : GoodRhythm : 



INDICATION

CVA/TIA Pleural Effusion Congestive Heart Failure Non STEMI CKD, 

DIALYSIS, COLON CA



RISK FACTORS

Hypertension 

Hyperlipidemia

Diabetes



2D DIMENSIONS 

IVSd1.2   (0.7-1.1cm)LVDd4.5   (3.9-5.9cm)

LVOT Diameter2.0   (1.8-2.4cm)PWd1.3   (0.7-1.1cm)

LVDs4.0   (2.5-4.0cm)FS (%) 9.4   %

LVEF (%)20.7   (>50%)



M-Mode DIMENSIONS 

RVDd2.00   (2.1-3.2cm)Left Atrium (MM)3.70   (2.5-4.0cm)

IVSd1.32   (0.7-1.1cm)Aortic Root2.87   (2.2-3.7cm)

LVDd5.16   (4.0-5.6cm)Aortic Cusp Exc.1.36   (1.5-2.0cm)

PWd0.67   (0.7-1.1cm)FS (%) 10   %

LVDs4.62   (2.0-3.8cm)LVEF (%)23   (>50%)



Aortic Valve

AoV Peak Yxajnteh368.2cm/sAoV VTI26.8cmAO Peak GR.8mmHg

LVOT Peak Eshjkdaq45.2cm/sLVOT VTI10.89cmAO Mean GR.5mmHg

JAQUELIN (VMAX)1.48rl7MMO (VTI)1.27cm2



Mitral Valve

MV E Bemtperf075.8cm/sMV A Ioswjfvi46.1cm/sE/A ratio2.8



TDI

E/Lateral E'0.0E/Medial E'0.0



Tricuspid Valve

TR Peak Kjxsrhkc367ug/sTR Peak Gr.22eqUzUYCC28wlVn



 LEFT VENTRICLE 

There is mild concentric left ventricular hypertrophy.

Left ventricle systolic function is severely impaired. The Ejection 

Fraction is 20-25%.

Significant regional wall motion abnormalities noted consistent with 

CAD.

The left ventricular diastolic function is normal.

No left ventricle thrombus noted on this study.



 RIGHT VENTRICLE 

The right ventricle is normal size.

Systolic function is mildly to moderately reduced.



 ATRIA 

The left atrium is mildly dilated. 

The right atrium is mildly dilated. 



 AORTIC VALVE 

The aortic valve is severely calcified.

The aortic valve is trileaflet.

There is trace to mild aortic regurgitation. 

There is moderate valvular aortic stenosis. 

Cannot exclude aortic valvular vegetation.



 MITRAL VALVE 

Mitral annular calcification is severe.

There is no evidence of mitral valve prolapse.

There is no mitral valve stenosis.

Mitral regurgitation is moderate.



 TRICUSPID VALVE 

The tricuspid valve is normal in structure.

There is moderate tricuspid regurgitation

Right ventricular systolic pressure is estimated at greater than 60 

mmHg. 

There is moderate-severe pulmonary hypertension.

There is no tricuspid valve prolapse or vegetation.

There is no tricuspid valve stenosis. 



 PULMONIC VALVE 

The pulmonic valve is not well visualized.

There is no pulmonic valvular regurgitation.



 GREAT VESSELS 

The aortic root is normal in size.

Dilated IVC with poor inspiration collapse is consistent with 

elevated right atrial pressure.



 PERICARDIAL EFFUSION 

There is no pericardial effusion.

There is large pleural effusion.



<Conclusion>

There is mild concentric left ventricular hypertrophy.

Left ventricle systolic function is severely impaired. The Ejection 

Fraction is 20-25%.

Significant regional wall motion abnormalities noted consistent with 

CAD.

The left ventricular diastolic function is normal.

The left atrium is mildly dilated.

The right atrium is mildly dilated.

There is trace to mild aortic regurgitation.

There is moderate valvular aortic stenosis.

Mitral regurgitation is moderate.

There is moderate tricuspid regurgitation

There is moderate-severe pulmonary hypertension.

There is large R pleural effusion.

## 2018-03-03 NOTE — CP.PCM.PN
Subjective





- Date & Time of Evaluation


Date of Evaluation: 03/03/18


Time of Evaluation: 08:17





- Subjective


Subjective: 


seen and examined,She is awake and responsive on vent





s/p Hypotension,asystolic ,CPR,sustained VT,started on amiodarone. patient was 

intubated 


First she received 2 epi and 1 bicarb ,regained pulse and BP. Then shocked for 

VT became asystol,got pulse with 1 epi and 2 Bicarb








l





Objective





- Vital Signs/Intake and Output


Vital Signs (last 24 hours): 


 











Temp Pulse Resp BP Pulse Ox


 


 97.6 F   67   24   120/35 L  100 


 


 03/03/18 04:00  03/03/18 06:00  03/03/18 06:00  03/03/18 05:59  03/03/18 06:00








Intake and Output: 


 











 03/03/18 03/03/18





 06:59 18:59


 


Intake Total 1729.1 58


 


Output Total 0 


 


Balance 1729.1 58














- Medications


Medications: 


 Current Medications





Aspirin (Aspirin Chewable)  81 mg PO DAILY [facility]


   Last Admin: 03/02/18 09:49 Dose:  81 mg


Calcium Acetate (Phoslo)  667 mg PO TID [facility]


   Last Admin: 03/02/18 18:00 Dose:  Not Given


Epoetin Vamsi (Procrit)  4,000 unit IV MWF [facility]


   Last Admin: 03/02/18 23:34 Dose:  Not Given


Ergocalciferol (Drisdol 50,000 Intl Units Cap)  1 cap PO Q7D [facility]


Ferrous Gluconate (Fergon)  324 mg PO TID [facility]


Heparin Sodium/Sodium Chloride (Heparin 08360 Units/250ml 1/2 Normal Saline)  25

,000 units in 250 mls @ 7.076 mls/hr IV .Q24H PRN; Protocol; 12 UNITS/KG/HR


   PRN Reason: PROTOCOL


   Last Admin: 03/03/18 05:11 Dose:  17 units/kg/hr, 10.024 mls/hr


Moxifloxacin HCl (Avelox Iv 400mg/250ml Ns)  400 mg in 250 mls @ 167 mls/hr 

IVPB Q24H [facility]


   Last Admin: 03/02/18 23:30 Dose:  167 mls/hr


Norepinephrine Bitartrate 8 mg (/ Sodium Chloride)  258 mls @ 7.74 mls/hr IV 

.Q24H PRN; Protocol; 4 MCG/MIN


   PRN Reason: TITRATE PER MD ORDER


   Last Titration: 03/03/18 07:00 Dose:  20 mcg/min, 38.7 mls/hr


Sodium Bicarbonate 150 meq/ (Dextrose)  500 mls @ 40 mls/hr IV .[id number] [facility]


   Stop: 03/03/18 09:29


   Last Admin: 03/02/18 21:27 Dose:  40 mls/hr


Amiodarone HCl 900 mg/ (Dextrose)  500 mls @ 16.66 mls/hr IV .Q24H [facility]; 0.5 MG/

MIN


   PRN Reason: Protocol


   Stop: 03/03/18 21:00


   Last Admin: 03/03/18 03:00 Dose:  16.66 mls/hr


Insulin Aspart (Novolog)  0 unit SC Q6 [facility]


   PRN Reason: Protocol


   Last Admin: 03/03/18 06:24 Dose:  Not Given


Insulin Glargine (Lantus)  15 unit SC [facility]


   Last Admin: 03/02/18 23:38 Dose:  15 u


Metoprolol Tartrate (Lopressor)  25 mg PO BID [facility]


   Last Admin: 03/02/18 18:14 Dose:  25 mg


Nitroglycerin (Nitrostat Sl Tab)  0.4 mg SL Q5M PRN


   PRN Reason: Other


Pantoprazole Sodium (Protonix Inj)  40 mg IVP DAILY [facility]


Rosuvastatin Calcium (Crestor)  5 mg PO [facility]


   Last Admin: 03/02/18 01:29 Dose:  5 mg


Vitamin B Complex/Vit C/Folic Acid (Nephro-Nneka)  1 tab PO 0800 [facility]











- Labs


Labs: 


 





 03/03/18 03:18 





 03/03/18 03:18 





 











PT  14.6 SECONDS (9.7-12.2)  H  03/02/18  02:17    


 


INR  1.3   03/02/18  02:17    


 


APTT  126 SECONDS (21-34)  H* D 03/03/18  03:20    














- Constitutional


Appears: Chronically Ill





- Head Exam


Head Exam: NORMAL INSPECTION





- Eye Exam


Eye Exam: Normal appearance





- ENT Exam


ENT Exam: Mucous Membranes Moist





- Neck Exam


Neck Exam: Full ROM





- Respiratory Exam


Respiratory Exam: Decreased Breath Sounds (at bases), Rales.  absent: Wheezes





- Cardiovascular Exam


Cardiovascular Exam: REGULAR RHYTHM





- GI/Abdominal Exam


GI & Abdominal Exam: Soft, Normal Bowel Sounds





- Extremities Exam


Extremities Exam: Full ROM





- Neurological Exam


Neurological Exam: Awake.  absent: Oriented x3 (on Mechanical vent)





- Psychiatric Exam


Psychiatric exam: Normal Mood





- Skin


Skin Exam: Dry





Assessment and Plan





- Assessment and Plan (Free Text)


Assessment: 





Patient is a 77 year old  female with a PMH significant for CKD 

stage V (not on dialysis), Diastolic CHF, DM, HTN, hypercholesterolemia, MI (

many years ago, no known intervention), TIA x 3 (years ago), colon CA who 

presents for altered mental status. 


On admission patient was acidotic,NSTEMI ,acute on chronic renal failure,

altered mental status and leukocytosis/r/o infection.She was evaluated by 

cardiologist yesterday who recommended to continue heparin ,asprin, labetalol 

and treat her acute renal failure and possible sepsis.


 ECHO with evidence of severely decreased EF (30%). Patient has a matured AVG 

in L arm .patient was scheduled to have hemodialysis last night. Patient was 

hypotensive,then became asystol needed CPR,Epi and bicarb. She was started on 

amiodarone for sustained VT. Not able to dialysis last night. failed femoral 

dialysis access. 





Plan: 


]





1 S/P Hypotension/Asystol ,CPR/Sustained VT/Intubation


  Continue amiodarone and Levophed


  patient is awake and responsive


  follow with ICU physician


 





2.Metabolic acidosis and acute on chronic renal failure


   Likely due to renal failure


   Dialysis as per nephrology


   vascular consult requested for evaluation of dialysis access


   


3. Leukocytosis and r/o sepsis


   follow blood and urine cultures


   Possible Pneumonia on Avelox





4.Acute systolic heart failure


  Possible myocarditis,continue heparin and asprin


  ECHO shows EF 30%





5.Acute NSTEMI


Elevated troponin with normal EKG


Cardiology consult, Dr Nichole


Aspirin  and Heparin Drip - Cardiac Protocol


Nitorglycerin 0.4mg SL Q5M PRN


Crestor 5mg PO HS


cardiac intervention as needed after treating ARF and possible infection





6 .Pleural effusion,r/o pneumonia


    D/W intensivist Her effusion is likely cardiac. No plan for tap


    follow chest x ray and dialysis


    continue Avelox








7.Hyperglycemia and uncontrolled DM


   continue Lantus and monitor sugar





8.Anemia


   likely due to CRF


   anemia work up





9 Hypertension

## 2018-03-03 NOTE — PCM.PROC
Procedures


Attestation:: I certify that I have explained the specified Operation(s) or 

Procedure(s), risks, benefits and reasonable alternatives to the Patient and/or 

other person responsible. The opportunity was given to ask questions and all 

questions answered





- Central Line Placement


  ** Left Femoral


Aseptic technique was employed throughout the procedure: Hand Hygiene done 

prior to procedure, Full sterile barriers (mask, hair cover, sterile gown, 

sterile gloves), Full body sterile drape, Chloraprep Antiseptic: 2 minute prep 

for Femoral


CVP Time Out Performed: Yes


Pt. Placed on Pulse Ox Monitor: Yes


Central Line Prep: Chlorhexidine-Alcohol Combination


Local Anesthesia Used: Lidocaine 2%


Ultrasound Used for Placement: Yes


Additional Comments: 





HD catheter attempted both right and left femoral veins.Was able to access vein 

but unsuccessful at advancing guidewire and inserting HD catheter

## 2018-03-03 NOTE — CP.PCM.PN
Subjective





- Date & Time of Evaluation


Date of Evaluation: 03/03/18


Time of Evaluation: 15:33





- Subjective


Subjective: 





Nephrology Follow up





Assessment: critical


CKD stage 5 (N18.5) with hyperkalemia, acidosis ? uremia 


pulm edema, CHF, NSTEMI, pleural effusions, pneumonia


AMS, hyperglycemia


Cardiac arrest


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 


Hypertensive Chronic Kidney Disease (I12.9)


Anemia (D64.9), Hyperphosphatemia (E83.39), Secondary Hyperparathyroidism (E21.1

), HTN (I12.9), vit D insuff, hx of colon CA, TIAs





Plan


unable to dialyze yesterday due to clotted AVG p cardiac arrest. Temp line 

placed today


HD today


on iron supplements, MVI, epogen with HD, phos binders and Vit D


f/u cardiology plans


d/c hco3 gtt hd should correct








S: seen and examined, intubated





Physical Examination: 


General Appearance: intubated and sedated


Vitals reviewed and noted as below


Head; Atraumatic, normocephalic


ENT: unable


EYES: anicteric


Neck; supple no lymphadenopathy, no thyromegaly or bruit


Lungs: Normal respiratory rate/effort. Breath sounds bilateral decreased at 

bases with crackles


Heart: Normal rate. s1s2 normal. No rub or gallop. 


Extremities: no edema. No varicose veins. chronic hyperpigmented changes in legs


Neurological: Patient is  lethargic, mostly unresponsive, non communicative


Skin: Warm and dry. Normal turgor. No rash. Palpitation: Normal elasticity for 

age


Abdomen: Abdomen is soft. Bowel sounds +. There is no abdominal tenderness, no 

guarding/rigidity no organomegaly


Psych: unable


MSK: no joint tenderness or swelling. Digits and nails normal, no deformity


: kidney or bladder not palpable


Access: Left AVG with no bruit





Labs/imaging reviewed.


Past medical history, past surgical history, family history, social history, 

allergy reviewed and noted as below


Family hx: no hx of CKD. Rest non-contributory





Objective





- Vital Signs/Intake and Output


Vital Signs (last 24 hours): 


 











Temp Pulse Resp BP Pulse Ox


 


 97.6 F   72   24   137/53 L  100 


 


 03/03/18 04:00  03/03/18 13:58  03/03/18 13:58  03/03/18 13:58  03/03/18 12:50








Intake and Output: 


 











 03/03/18 03/03/18





 06:59 18:59


 


Intake Total 1729.1 1561.5


 


Output Total 0 


 


Balance 1729.1 1561.5














- Medications


Medications: 


 Current Medications





Aspirin (Aspirin Chewable)  81 mg PO DAILY [facility]


   Last Admin: 03/03/18 09:40 Dose:  81 mg


Calcium Acetate (Phoslo)  667 mg PO TID [facility]


   Last Admin: 03/03/18 13:51 Dose:  667 mg


Epoetin Vamsi (Procrit)  4,000 unit IV MWF [facility]


   Last Admin: 03/02/18 23:34 Dose:  Not Given


Ergocalciferol (Drisdol 50,000 Intl Units Cap)  1 cap PO Q7D [facility]


   Last Admin: 03/03/18 13:51 Dose:  1 cap


Ferrous Gluconate (Fergon)  324 mg PO TID [facility]


   Last Admin: 03/03/18 13:51 Dose:  324 mg


Heparin Sodium/Sodium Chloride (Heparin 60051 Units/250ml 1/2 Normal Saline)  25

,000 units in 250 mls @ 7.076 mls/hr IV .Q24H PRN; Protocol; 12 UNITS/KG/HR


   PRN Reason: PROTOCOL


   Last Admin: 03/03/18 05:11 Dose:  17 units/kg/hr, 10.024 mls/hr


Moxifloxacin HCl (Avelox Iv 400mg/250ml Ns)  400 mg in 250 mls @ 167 mls/hr 

IVPB Q24H [facility]


   Last Admin: 03/02/18 23:30 Dose:  167 mls/hr


Norepinephrine Bitartrate 8 mg (/ Sodium Chloride)  258 mls @ 7.74 mls/hr IV 

.Q24H PRN; Protocol; 4 MCG/MIN


   PRN Reason: TITRATE PER MD ORDER


   Last Admin: 03/03/18 12:50 Dose:  10 mcg/min, 19.35 mls/hr


Amiodarone HCl 900 mg/ (Dextrose)  500 mls @ 16.66 mls/hr IV .Q24H [facility]; 0.5 MG/

MIN


   PRN Reason: Protocol


   Stop: 03/03/18 21:00


   Last Admin: 03/03/18 03:00 Dose:  16.66 mls/hr


Sodium Bicarbonate 150 meq/ (Dextrose)  1,150 mls @ 75 mls/hr IV .[id number] [facility]


Aztreonam 1 gm/ Sodium (Chloride)  100 mls @ 100 mls/hr IVPB Q12H [facility]


   Last Admin: 03/03/18 12:30 Dose:  100 mls/hr


Metronidazole (Flagyl)  500 mg in 100 mls @ 100 mls/hr IVPB Q8H [facility]


   Last Admin: 03/03/18 13:46 Dose:  100 mls/hr


Insulin Aspart (Novolog)  0 unit SC Q6 [facility]


   PRN Reason: Protocol


   Last Admin: 03/03/18 12:11 Dose:  Not Given


Insulin Glargine (Lantus)  15 unit SC [facility]


   Last Admin: 03/02/18 23:38 Dose:  15 u


Nitroglycerin (Nitrostat Sl Tab)  0.4 mg SL Q5M PRN


   PRN Reason: Other


Pantoprazole Sodium (Protonix Ec Tab)  40 mg PO DAILY [facility]


Rosuvastatin Calcium (Crestor)  5 mg PO [facility]


   Last Admin: 03/02/18 01:29 Dose:  5 mg


Vitamin B Complex/Vit C/Folic Acid (Nephro-Nneka)  1 tab PO 0800 [facility]


   Last Admin: 03/03/18 08:30 Dose:  1 tab











- Labs


Labs: 


 





 03/03/18 03:18 





 03/03/18 03:18 





 











PT  14.6 SECONDS (9.7-12.2)  H  03/02/18  02:17    


 


INR  1.3   03/02/18  02:17    


 


APTT  88 SECONDS (21-34)  H D 03/03/18  11:44

## 2018-03-03 NOTE — CARD
--------------- APPROVED REPORT --------------





EKG Measurement

Heart [id number]

AK [id number]

[id number]

[id number]

[id number]



<Conclusion>

Normal sinus rhythm

Normal ECG

## 2018-03-03 NOTE — RAD
HISTORY:

pt. intubated  



COMPARISON:

Chest radiograph dated 03/02/2018 



FINDINGS:



LUNGS:

Pulmonary vascular congestion.  Left basilar atelectasis.



PLEURA:

Limited evaluation of the right costophrenic angle tip overlying 

beats.  Grossly stable small to moderate left pleural effusion. No 

pneumothorax apparent.



CARDIOVASCULAR:

Atherosclerotic aortic calcifications. Heavy mitral annular 

calcification.  Cardiomediastinal silhouette stably enlarged.



OSSEOUS STRUCTURES:

Unchanged.



VISUALIZED UPPER ABDOMEN:

Normal.



OTHER FINDINGS:

Endotracheal and enteric tubes, unchanged.  Right internal jugular 

access central venous catheter, unchanged.



IMPRESSION:

Stable lines and tubes.  Grossly stable pulmonary vascular congestion 

with small to moderate left pleural effusion. No significant interval 

change.

## 2018-03-03 NOTE — RAD
HISTORY:

intubated  



COMPARISON:

Chest radiograph performed approximately 17 hours prior. 



FINDINGS:



LUNGS:

Pulmonary vascular congestion.  Left basilar atelectasis.



PLEURA:

Small to moderate left pleural effusion.  Trace right pleural 

effusion. No appreciable pneumothorax.



CARDIOVASCULAR:

Atherosclerotic aortic calcifications. Heavy mitral annular 

calcification. Cardiomediastinal silhouette stably enlarged.



OSSEOUS STRUCTURES:

Unchanged.



VISUALIZED UPPER ABDOMEN:

Unremarkable.



OTHER FINDINGS:

New endotracheal tube with tip between the clavicles and the peter.  

New enteric tube with tip in the stomach.  Overlying pacer pads now 

present.  Right internal jugular access central venous catheter, 

unchanged.



IMPRESSION:

New endotracheal and enteric tubes in satisfactory position.



Grossly stable pulmonary vascular congestion with trace right and 

small to moderate left pleural effusions. 



No other significant interval change.

## 2018-03-03 NOTE — RAD
HISTORY:

Hemodialysis catheter placement  



COMPARISON:

Chest radiograph performed approximately 4 hours prior. 



FINDINGS:



LUNGS:

Pulmonary vascular congestion.  Left basilar atelectasis.



PLEURA:

Stable trace right and small to moderate left pleural effusions. No 

pneumothorax apparent.



CARDIOVASCULAR:

Atherosclerotic aortic calcifications. Heavy mitral annular 

calcification Cardiomediastinal silhouette stably enlarged.



OSSEOUS STRUCTURES:

Unchanged.



VISUALIZED UPPER ABDOMEN:

Normal.



OTHER FINDINGS:

New left internal jugular access non tunneled hemodialysis catheter 

with catheter tip in the upper right atrium. Endotracheal and enteric 

tubes, unchanged.  Right internal jugular access central venous 

catheter, unchanged.  Overlying pacer pads, unchanged.



IMPRESSION:

New left internal jugular access non tunneled hemodialysis catheter 

in satisfactory position. No appreciable pneumothorax. 



No other significant interval change.

## 2018-03-03 NOTE — PN
DATE:



SUBJECTIVE:  The patient arrested twice yesterday.  First cardiac arrest

was of bradycardia and second one reported to me as ventricular

tachycardia.  The patient received one defibrillation.  She is currently

intubated on a ventilator, about to start hemodialysis.  The left upper arm

AV fistula was not functioning and the patient had Perma-cath inserted

today.  The patient is currently on Levophed 10 mcg per minute.



PHYSICAL EXAMINATION

VITAL SIGNS:  Blood pressure 120/54, heart rate 115, temperature 97.6.  The

patient is in atrial fibrillation on the monitor with rapid ventricular

response.

HEENT:  Pale conjunctivae.

CHEST:  Absent breath sounds over the bases.

HEART:  Heart sounds were regular.

EXTREMITIES:  Trace leg edema.



LABORATORY DATA:  SMA-7:  Sodium 150, potassium 4.7, chloride 108, CO2 is

16, glucose 125, BUN 96, creatinine 6.2.  Today's hemoglobin and hematocrit

9.2 and 28.4, white count 14.6, platelet count 132,000.  Chest x-ray

revealed cardiomegaly with mild CHF.



ASSESSMENT:

1.  Status post cardiac arrest.

2.  Ischemic cardiomyopathy.

3.  Consider non-ST elevation myocardial infarction.

4.  End-stage renal disease, hemodialysis is about to be started.

5.  New-onset atrial fibrillation.

6.  Hypertension, septic versus cardiogenic shock.



RECOMMENDATIONS:  Continue current IV amiodarone, and IV heparin.  Continue

IV Avelox and IV Azactam.  Continue Levophed to maintain systolic blood

pressure above 110.  Obtain 12-lead EKG now.  Case was discussed at length

with family and with Dr. Nichole, the primary cardiologist.  According to

the family, the patient has refused dialysis in the past and according to

Dr. Nichole, she refused cardiac catheterization in the past.  The patient

is not a suitable candidate for cardiac catheterization at this time.  Once

reasonable recovery is achieved, then the history of cardiac

catheterization can be revisited.  In the meantime, I will sign off the

case.





__________________________________________

Dick Dominique MD



DD:  03/03/2018 15:50:15

DT:  03/03/2018 21:51:31

Job # [id number]

## 2018-03-03 NOTE — CON
DATE:



HISTORY OF PRESENT ILLNESS:  I was requested by Dr. Nichole to evaluate Mrs. Corral for possible cardiac catheterization.  The patient is a 77-year-old

 female who has renal insufficiency and AV fistula placed a

few years ago in the left upper arm.  The patient is hypertensive and

diabetic and has a history of colon cancer with resection in 2010.  The

patient also has a history of DVT in 2012, which was complicated with

pulmonary embolism and pulmonary hypertension.  The patient was admitted

because of altered mental status and uncontrolled diabetes mellitus and was

found to have pneumonia.  In the meantime, nonST elevation myocardial

infarction was ruled in.  Echocardiographic study revealed severely

depressed ejection fraction with segmental hypokinesis in inferior wall as

well as a large left pleural effusion.  At this time, the patient is

nonverbal, lethargic in ICU.  No reported ventricular tachycardia and not

requiring pressors.



MEDICATIONS:  Aspirin 81 mg once a day, Avelox 400 mg intravenous daily,

Crestor 5 mg once a day, intravenous heparin therapeutic regimen, Lopressor

25 mg twice a day, PhosLo 1 tablet t.i.d., Procrit 4000 units

subcutaneously Monday, Wednesday, and Friday, Protonix 40 mg intravenous

once a day.



PAST MEDICAL HISTORY:  Hypertension, diabetes mellitus, chronic renal

insufficiency.



PHYSICAL EXAMINATION:

GENERAL:  The patient is an elderly female, who is lethargic, nonverbally

communicating.

VITAL SIGNS:  Blood pressure 99/72, heart rate 88, temperature 98.1,

respirations 17.

HEENT:  Pale conjunctiva.

CHEST:  Absent breath sounds over the left base and there is rhonchi.

HEART:  S1 and S2, regular.

ABDOMEN:  Soft.

EXTREMITIES:  +2 pitting edema.



LABORATORY DATA:  Troponin is 103 and 96.9.  Most recent SMA-7:  Sodium

139, potassium 5.9, chloride 106, CO2 is 16, glucose 215, BUN 79,

creatinine 5.2.  Total cholesterol is 228, triglycerides 170, both are

elevated.  Lactic acid 3.4.  EKG revealed sinus rhythm with a ____ lateral

ST segment elevation, rate of 94.



ASSESSMENT:

1.  NonST elevation myocardial function.

2.  Advanced renal insufficiency.

3.  Hyperkalemia.

4.  Severe metabolic acidosis.

5.  Consider underlying sepsis.

6.  Large left pleural effusion and significant left lower lobe pneumonia.



RECOMMENDATION:  Continue current intravenous heparin for subtherapeutic

regimen of acute coronary syndrome, aspirin 81 mg once a day, Lopressor 25

mg twice a day.  Blood cultures and urine cultures will be obtained. 

Continue IV Avelox at 200 mg daily.  The plan is to initiate hemodialysis

for the first time today.  If the patient shows reasonable clinical

recovery, then cardiac catheterization will be discussed with the patient's

family if blood cultures are negative.  Also, prognosis is very poor.  The

case will be discussed with Dr. Nichole and with the patient's family.





__________________________________________

Dcik Dominique MD





DD:  03/02/2018 17:19:19

DT:  03/03/2018 2:06:45

Job # [id number]

## 2018-03-03 NOTE — CP.CCUPN
CCU Subjective





- Physician Review


Subjective (Free Text): 


Patient s/p cardiac arrest overnight. currently on norepi/amiodarone and 

continued ventilation








Critical Care Time Spent (in minutes): 35





CCU Objective





- Vital Signs / Intake & Output


Intake and Output (Last 8hrs): 


 Intake & Output











 03/02/18 03/03/18 03/03/18





 22:59 06:59 14:59


 


Intake Total 504.3 1253.3 158


 


Output Total 0 0 


 


Balance 504.3 1253.3 158


 


Weight  148 lb 9 oz 


 


Intake:   


 


   338 158


 


  Intake, IV Amount 390.3 915.3 


 


    Right Forearm 39.8  


 


    Right Hand 33.9 76.5 


 


    distal 99.9 200.0 


 


    medial 96.7 318.8 


 


    proximal 120 320 


 


  Oral 0  


 


Output:   


 


  Urine 0 0 


 


    Urethral ([place]) 0 0 


 


  Stool 0  


 


Other:   


 


  # Bowel Movements  0 














- Physical Exam


Head: Positive for: Atraumatic, Normocephalic.  Negative for: Tenderness, 

Contusion, Swelling


Pupils: Positive for: PERRL.  Negative for: Sluggish, Non-Reactive


Extroacular Muscles: Positive for: EOMI


Conjunctiva: Positive for: Normal


Mouth: Positive for: Dry


Pharnyx: Positive for: Normal


Nose (External): Positive for: Atraumatic


Neck: Positive for: Trachea Midline.  Negative for: JVD


Respiratory/Chest: Positive for: Good Air Exchange, Accessory Muscle Use, 

Decreased Breath Sounds (on bilateral bases).  Negative for: Wheezes


Cardiovascular: Positive for: Tachycardic


Abdomen: Positive for: Distention, Normal Bowel Sounds.  Negative for: 

Peritoneal Signs


Upper Extremity: Negative for: Cyanosis, Edema


Lower Extremity: Negative for: Edema





- Medications


Active Medications: 


Active Medications











Generic Name Dose Route Start Last Admin





  Trade Name Freq  PRN Reason Stop Dose Admin


 


Aspirin  81 mg  03/02/18 10:00  03/03/18 09:40





  Aspirin Chewable  PO   81 mg





  DAILY ABHILASH   Administration


 


Calcium Acetate  667 mg  03/02/18 14:00  03/03/18 09:40





  Phoslo  PO   667 mg





  TID ABHILASH   Administration


 


Epoetin Vamsi  4,000 unit  03/02/18 15:00  03/02/18 23:34





  Procrit  IV   Not Given





  [facility]   


 


Ergocalciferol  1 cap  03/03/18 14:45  





  Drisdol 50,000 Intl Units Cap  PO   





  [facility]   


 


Ferrous Gluconate  324 mg  03/03/18 10:00  03/03/18 09:40





  Fergon  PO   324 mg





  TID ABHILASH   Administration


 


Heparin Sodium/Sodium Chloride  25,000 units in 250 mls @ 7.076 mls/hr  03/02/ 18 00:56  03/03/18 05:11





  Heparin 21591 Units/250ml 1/2 Normal Saline  IV   17 units/kg/hr





  .Q24H PRN   10.024 mls/hr





  PROTOCOL   Administration





  Protocol   





  12 UNITS/KG/HR   


 


Moxifloxacin HCl  400 mg in 250 mls @ 167 mls/hr  03/02/18 23:00  03/02/18 23:30





  Avelox Iv 400mg/250ml Ns  IVPB   167 mls/hr





  Q24H ABHILASH   Administration


 


Norepinephrine Bitartrate 8 mg  258 mls @ 7.74 mls/hr  03/02/18 19:56  03/03/18 

11:00





  / Sodium Chloride  IV   16 mcg/min





  .Q24H PRN   30.96 mls/hr





  TITRATE PER MD ORDER   Titration





  Protocol   





  4 MCG/MIN   


 


Amiodarone HCl 900 mg/  500 mls @ 16.66 mls/hr  03/03/18 03:00  03/03/18 03:00





  Dextrose  IV  03/03/18 21:00  16.66 mls/hr





  .Q24H ABHILASH   Administration





  Protocol   





  0.5 MG/MIN   


 


Sodium Bicarbonate 150 meq/  1,150 mls @ 75 mls/hr  03/03/18 11:45  





  Dextrose  IV   





  [id number] [facility]   


 


Insulin Aspart  0 unit  03/02/18 18:00  03/03/18 06:24





  Novolog  SC   Not Given





  [facility]   





  Protocol   


 


Insulin Glargine  15 unit  03/02/18 22:00  03/02/18 23:38





  Lantus  SC   15 u





  HS ABHILASH   Administration


 


Metoprolol Tartrate  25 mg  03/02/18 10:00  03/03/18 10:00





  Lopressor  PO   Not Given





  [facility]   


 


Nitroglycerin  0.4 mg  03/02/18 00:49  





  Nitrostat Sl Tab  SL   





  Q5M PRN   





  Other   


 


Pantoprazole Sodium  40 mg  03/03/18 10:00  03/03/18 11:29





  Protonix Inj  IVP   40 mg





  DAILY ABHILASH   Administration


 


Rosuvastatin Calcium  5 mg  03/02/18 01:15  03/02/18 01:29





  Crestor  PO   5 mg





  HS ABHILASH   Administration


 


Vitamin B Complex/Vit C/Folic Acid  1 tab  03/03/18 08:00  03/03/18 08:30





  Nephro-Nneka  PO   1 tab





  0800 ABHILASH   Administration














- Patient Studies


Lab Studies: 


 Microbiology Studies











 03/01/18 06:30 Blood Culture - Preliminary





 Blood-Venous    NO GROWTH AFTER 24 HOURS


 


 03/01/18 06:00 Blood Culture - Preliminary





 Blood-Venous    NO GROWTH AFTER 24 HOURS








 Lab Studies











  03/03/18 03/03/18 03/03/18 Range/Units





  05:24 05:18 03:20 


 


WBC     (4.8-10.8)  K/uL


 


RBC     (3.80-5.20)  Mil/uL


 


Hgb     (11.0-16.0)  g/dL


 


Hct     (34.0-47.0)  %


 


MCV     (81.0-99.0)  fL


 


MCH     (27.0-31.0)  pg


 


MCHC     (33.0-37.0)  g/dL


 


RDW     (11.5-14.5)  %


 


Plt Count     (130-400)  K/uL


 


MPV     (7.2-11.7)  fL


 


Neut % (Auto)     (50.0-75.0)  %


 


Lymph % (Auto)     (20.0-40.0)  %


 


Mono % (Auto)     (0.0-10.0)  %


 


Eos % (Auto)     (0.0-4.0)  %


 


Baso % (Auto)     (0.0-2.0)  %


 


Neut # (Auto)     (1.8-7.0)  K/uL


 


Lymph # (Auto)     (1.0-4.3)  K/uL


 


Mono # (Auto)     (0.0-0.8)  K/uL


 


Eos # (Auto)     (0.0-0.7)  K/uL


 


Baso # (Auto)     (0.0-0.2)  K/uL


 


Neutrophils % (Manual)     (50-75)  %


 


Band Neutrophils %     (0-2)  %


 


Lymphocytes % (Manual)     (20-40)  %


 


Monocytes % (Manual)     (0-10)  %


 


Nucleated RBC %     (0-0)  %


 


Differential Comment     


 


Platelet Estimate     (NORMAL)  


 


Hypochromasia (manual)     


 


Ovalocytes     


 


APTT    126 H* D  (21-34)  SECONDS


 


Puncture Site   Rb   


 


pCO2   21 L   (35-45)  mm/Hg


 


pO2   360 H   ()  mm/Hg


 


HCO3   14.6 L   (21-28)  mmol/L


 


ABG pH   7.33 L   (7.35-7.45)  


 


ABG Total CO2   11.7 L   (22-28)  mmol/L


 


ABG O2 Saturation   99.7 H   (95-98)  %


 


ABG Base Excess   -13.2 L   (-2.0-3.0)  mmol/L


 


ABG Hemoglobin   9.2 L   (11.7-17.4)  g/dL


 


ABG Carboxyhemoglobin   1.4   (0.5-1.5)  %


 


POC ABG HHb (Measured)   0.3   (0.0-5.0)  %


 


ABG Methemoglobin   1.3   (0.0-3.0)  %


 


Jesus Test   Na   


 


VBG pH     (7.32-7.43)  


 


VBG pCO2     (40-60)  mmHg


 


VBG HCO3     mmol/L


 


VBG O2 Sat (Calc)     (40-65)  %


 


VBG Base Excess     (0.0-2.0)  mmol/L


 


A-a O2 Difference   327.0   mm/Hg


 


Respiratory Index   0.9   


 


Hgb O2 Saturation   96.9   (95.0-98.0)  %


 


Vent Mode   Prvc   


 


Mechanical Rate   24   


 


FiO2   100.0   %


 


Tidal Volume   550   


 


PEEP   5   


 


Crit Value Called To     


 


Crit Value Called By     


 


Crit Value Read Back     


 


Blood Gas Notified Time     


 


Sodium     (132-148)  mmol/L


 


Potassium     (3.6-5.2)  mmol/L


 


Chloride     ()  mmol/L


 


Carbon Dioxide     (22-30)  mmol/L


 


Anion Gap     (10-20)  


 


BUN     (7-17)  mg/dL


 


Creatinine     (0.7-1.2)  mg/dL


 


Est GFR (African Amer)     


 


Est GFR (Non-Af Amer)     


 


POC Glucose (mg/dL)  118 H    ()  mg/dL


 


Random Glucose     ()  mg/dL


 


Calcium     (8.6-10.4)  mg/dl


 


Magnesium     (1.6-2.3)  mg/dL


 


Iron     ()  ug/dL


 


TIBC     (250-450)  ug/dL


 


% Saturation     (20-55)  


 


Ferritin     ng/mL


 


Total Bilirubin     (0.2-1.3)  mg/dL


 


AST     (14-36)  U/L


 


ALT     (9-52)  U/L


 


Alkaline Phosphatase     ()  U/L


 


Troponin I     (0.00-0.120)  ng/mL


 


Total Protein     (6.3-8.3)  g/dL


 


Albumin     (3.5-5.0)  g/dL


 


Globulin     (2.2-3.9)  gm/dL


 


Albumin/Globulin Ratio     (1.0-2.1)  


 


Triglycerides     (0-149)  mg/dL


 


Cholesterol     (0-199)  mg/dL


 


LDL Cholesterol Direct     (0-129)  mg/dL


 


HDL Cholesterol     (30-70)  mg/dL


 


25-OH Vitamin D Total     (30.0-100.0)  NG/ML


 


Procalcitonin     (0.19-0.49)  NG/ML


 


Vancomycin Trough     (5.0-10.0)  ug/mL


 


Hep Bs Antigen     (NEGATIVE)  


 


Hep Bs Antibody     (NEGATIVE)  


 


Hep B Core IgM Ab     (NEGATIVE)  


 


Hepatitis C Antibody     (NEGATIVE)  














  03/03/18 03/03/18 03/03/18 Range/Units





  03:18 03:18 03:18 


 


WBC    14.6 H  (4.8-10.8)  K/uL


 


RBC    3.31 L  (3.80-5.20)  Mil/uL


 


Hgb    9.2 L  (11.0-16.0)  g/dL


 


Hct    28.4 L  (34.0-47.0)  %


 


MCV    85.7  D  (81.0-99.0)  fL


 


MCH    27.7  (27.0-31.0)  pg


 


MCHC    32.3 L  (33.0-37.0)  g/dL


 


RDW    16.4 H  (11.5-14.5)  %


 


Plt Count    132  (130-400)  K/uL


 


MPV    8.7  (7.2-11.7)  fL


 


Neut % (Auto)    89.2 H  (50.0-75.0)  %


 


Lymph % (Auto)    6.7 L  (20.0-40.0)  %


 


Mono % (Auto)    3.9  (0.0-10.0)  %


 


Eos % (Auto)    0.0  (0.0-4.0)  %


 


Baso % (Auto)    0.2  (0.0-2.0)  %


 


Neut # (Auto)    13.1 H  (1.8-7.0)  K/uL


 


Lymph # (Auto)    1.0  (1.0-4.3)  K/uL


 


Mono # (Auto)    0.6  (0.0-0.8)  K/uL


 


Eos # (Auto)    0.0  (0.0-0.7)  K/uL


 


Baso # (Auto)    0.0  (0.0-0.2)  K/uL


 


Neutrophils % (Manual)    89 H  (50-75)  %


 


Band Neutrophils %    6 H  (0-2)  %


 


Lymphocytes % (Manual)    3 L  (20-40)  %


 


Monocytes % (Manual)    2  (0-10)  %


 


Nucleated RBC %    6 H  (0-0)  %


 


Differential Comment     


 


Platelet Estimate    Normal  (NORMAL)  


 


Hypochromasia (manual)    Slight  


 


Ovalocytes    Slight  


 


APTT     (21-34)  SECONDS


 


Puncture Site     


 


pCO2     (35-45)  mm/Hg


 


pO2     ()  mm/Hg


 


HCO3     (21-28)  mmol/L


 


ABG pH     (7.35-7.45)  


 


ABG Total CO2     (22-28)  mmol/L


 


ABG O2 Saturation     (95-98)  %


 


ABG Base Excess     (-2.0-3.0)  mmol/L


 


ABG Hemoglobin     (11.7-17.4)  g/dL


 


ABG Carboxyhemoglobin     (0.5-1.5)  %


 


POC ABG HHb (Measured)     (0.0-5.0)  %


 


ABG Methemoglobin     (0.0-3.0)  %


 


Jesus Test     


 


VBG pH     (7.32-7.43)  


 


VBG pCO2     (40-60)  mmHg


 


VBG HCO3     mmol/L


 


VBG O2 Sat (Calc)     (40-65)  %


 


VBG Base Excess     (0.0-2.0)  mmol/L


 


A-a O2 Difference     mm/Hg


 


Respiratory Index     


 


Hgb O2 Saturation     (95.0-98.0)  %


 


Vent Mode     


 


Mechanical Rate     


 


FiO2     %


 


Tidal Volume     


 


PEEP     


 


Crit Value Called To     


 


Crit Value Called By     


 


Crit Value Read Back     


 


Blood Gas Notified Time     


 


Sodium   150 H   (132-148)  mmol/L


 


Potassium   4.7   (3.6-5.2)  mmol/L


 


Chloride   108 H   ()  mmol/L


 


Carbon Dioxide   16 L   (22-30)  mmol/L


 


Anion Gap   32 H   (10-20)  


 


BUN   93 H   (7-17)  mg/dL


 


Creatinine   6.2 H   (0.7-1.2)  mg/dL


 


Est GFR ( Amer)   8   


 


Est GFR (Non-Af Amer)   7   


 


POC Glucose (mg/dL)     ()  mg/dL


 


Random Glucose   125 H   ()  mg/dL


 


Calcium   8.1 L   (8.6-10.4)  mg/dl


 


Magnesium     (1.6-2.3)  mg/dL


 


Iron     ()  ug/dL


 


TIBC     (250-450)  ug/dL


 


% Saturation     (20-55)  


 


Ferritin     ng/mL


 


Total Bilirubin   2.0 H   (0.2-1.3)  mg/dL


 


AST   254 H D   (14-36)  U/L


 


ALT   108 H D   (9-52)  U/L


 


Alkaline Phosphatase   77   ()  U/L


 


Troponin I     (0.00-0.120)  ng/mL


 


Total Protein   5.8 L   (6.3-8.3)  g/dL


 


Albumin   2.9 L   (3.5-5.0)  g/dL


 


Globulin   2.9   (2.2-3.9)  gm/dL


 


Albumin/Globulin Ratio   1.0   (1.0-2.1)  


 


Triglycerides  82  D    (0-149)  mg/dL


 


Cholesterol  173    (0-199)  mg/dL


 


LDL Cholesterol Direct  42    (0-129)  mg/dL


 


HDL Cholesterol  56    (30-70)  mg/dL


 


25-OH Vitamin D Total     (30.0-100.0)  NG/ML


 


Procalcitonin     (0.19-0.49)  NG/ML


 


Vancomycin Trough     (5.0-10.0)  ug/mL


 


Hep Bs Antigen     (NEGATIVE)  


 


Hep Bs Antibody     (NEGATIVE)  


 


Hep B Core IgM Ab     (NEGATIVE)  


 


Hepatitis C Antibody     (NEGATIVE)  














  03/03/18 03/03/18 03/02/18 Range/Units





  00:29 00:04 20:41 


 


WBC     (4.8-10.8)  K/uL


 


RBC     (3.80-5.20)  Mil/uL


 


Hgb     (11.0-16.0)  g/dL


 


Hct     (34.0-47.0)  %


 


MCV     (81.0-99.0)  fL


 


MCH     (27.0-31.0)  pg


 


MCHC     (33.0-37.0)  g/dL


 


RDW     (11.5-14.5)  %


 


Plt Count     (130-400)  K/uL


 


MPV     (7.2-11.7)  fL


 


Neut % (Auto)     (50.0-75.0)  %


 


Lymph % (Auto)     (20.0-40.0)  %


 


Mono % (Auto)     (0.0-10.0)  %


 


Eos % (Auto)     (0.0-4.0)  %


 


Baso % (Auto)     (0.0-2.0)  %


 


Neut # (Auto)     (1.8-7.0)  K/uL


 


Lymph # (Auto)     (1.0-4.3)  K/uL


 


Mono # (Auto)     (0.0-0.8)  K/uL


 


Eos # (Auto)     (0.0-0.7)  K/uL


 


Baso # (Auto)     (0.0-0.2)  K/uL


 


Neutrophils % (Manual)     (50-75)  %


 


Band Neutrophils %     (0-2)  %


 


Lymphocytes % (Manual)     (20-40)  %


 


Monocytes % (Manual)     (0-10)  %


 


Nucleated RBC %     (0-0)  %


 


Differential Comment     


 


Platelet Estimate     (NORMAL)  


 


Hypochromasia (manual)     


 


Ovalocytes     


 


APTT     (21-34)  SECONDS


 


Puncture Site  Drawn by rn    


 


pCO2     (35-45)  mm/Hg


 


pO2  19 L    ()  mm/Hg


 


HCO3     (21-28)  mmol/L


 


ABG pH     (7.35-7.45)  


 


ABG Total CO2     (22-28)  mmol/L


 


ABG O2 Saturation     (95-98)  %


 


ABG Base Excess     (-2.0-3.0)  mmol/L


 


ABG Hemoglobin     (11.7-17.4)  g/dL


 


ABG Carboxyhemoglobin     (0.5-1.5)  %


 


POC ABG HHb (Measured)     (0.0-5.0)  %


 


ABG Methemoglobin     (0.0-3.0)  %


 


Jesus Test  Na    


 


VBG pH  7.20 L    (7.32-7.43)  


 


VBG pCO2  42    (40-60)  mmHg


 


VBG HCO3  14.0    mmol/L


 


VBG O2 Sat (Calc)  30.5 L    (40-65)  %


 


VBG Base Excess  -11.2 L    (0.0-2.0)  mmol/L


 


A-a O2 Difference     mm/Hg


 


Respiratory Index     


 


Hgb O2 Saturation     (95.0-98.0)  %


 


Vent Mode     


 


Mechanical Rate     


 


FiO2     %


 


Tidal Volume     


 


PEEP     


 


Crit Value Called To  Dr liu    


 


Crit Value Called By  Gillian vazquez rt    


 


Crit Value Read Back  Y    


 


Blood Gas Notified Time  30    


 


Sodium    146  (132-148)  mmol/L


 


Potassium    5.1  (3.6-5.2)  mmol/L


 


Chloride    108 H  ()  mmol/L


 


Carbon Dioxide    12 L  (22-30)  mmol/L


 


Anion Gap    31 H  (10-20)  


 


BUN    91 H  (7-17)  mg/dL


 


Creatinine    6.2 H  (0.7-1.2)  mg/dL


 


Est GFR ( Amer)    8  


 


Est GFR (Non-Af Amer)    7  


 


POC Glucose (mg/dL)   223 H   ()  mg/dL


 


Random Glucose    333 H  ()  mg/dL


 


Calcium    8.4 L  (8.6-10.4)  mg/dl


 


Magnesium    2.5 H  (1.6-2.3)  mg/dL


 


Iron     ()  ug/dL


 


TIBC     (250-450)  ug/dL


 


% Saturation     (20-55)  


 


Ferritin     ng/mL


 


Total Bilirubin     (0.2-1.3)  mg/dL


 


AST     (14-36)  U/L


 


ALT     (9-52)  U/L


 


Alkaline Phosphatase     ()  U/L


 


Troponin I    79.7000 H*  (0.00-0.120)  ng/mL


 


Total Protein     (6.3-8.3)  g/dL


 


Albumin     (3.5-5.0)  g/dL


 


Globulin     (2.2-3.9)  gm/dL


 


Albumin/Globulin Ratio     (1.0-2.1)  


 


Triglycerides     (0-149)  mg/dL


 


Cholesterol     (0-199)  mg/dL


 


LDL Cholesterol Direct     (0-129)  mg/dL


 


HDL Cholesterol     (30-70)  mg/dL


 


25-OH Vitamin D Total     (30.0-100.0)  NG/ML


 


Procalcitonin     (0.19-0.49)  NG/ML


 


Vancomycin Trough     (5.0-10.0)  ug/mL


 


Hep Bs Antigen     (NEGATIVE)  


 


Hep Bs Antibody     (NEGATIVE)  


 


Hep B Core IgM Ab     (NEGATIVE)  


 


Hepatitis C Antibody     (NEGATIVE)  














  03/02/18 03/02/18 03/02/18 Range/Units





  20:41 20:30 18:25 


 


WBC  16.1 H    (4.8-10.8)  K/uL


 


RBC  3.31 L    (3.80-5.20)  Mil/uL


 


Hgb  9.2 L    (11.0-16.0)  g/dL


 


Hct  29.2 L    (34.0-47.0)  %


 


MCV  88.2  D    (81.0-99.0)  fL


 


MCH  27.8    (27.0-31.0)  pg


 


MCHC  31.5 L    (33.0-37.0)  g/dL


 


RDW  16.6 H    (11.5-14.5)  %


 


Plt Count  127 L    (130-400)  K/uL


 


MPV  9.2    (7.2-11.7)  fL


 


Neut % (Auto)     (50.0-75.0)  %


 


Lymph % (Auto)     (20.0-40.0)  %


 


Mono % (Auto)     (0.0-10.0)  %


 


Eos % (Auto)     (0.0-4.0)  %


 


Baso % (Auto)     (0.0-2.0)  %


 


Neut # (Auto)     (1.8-7.0)  K/uL


 


Lymph # (Auto)     (1.0-4.3)  K/uL


 


Mono # (Auto)     (0.0-0.8)  K/uL


 


Eos # (Auto)     (0.0-0.7)  K/uL


 


Baso # (Auto)     (0.0-0.2)  K/uL


 


Neutrophils % (Manual)     (50-75)  %


 


Band Neutrophils %     (0-2)  %


 


Lymphocytes % (Manual)     (20-40)  %


 


Monocytes % (Manual)     (0-10)  %


 


Nucleated RBC %     (0-0)  %


 


Differential Comment      


 


Platelet Estimate     (NORMAL)  


 


Hypochromasia (manual)     


 


Ovalocytes     


 


APTT    40 H D  (21-34)  SECONDS


 


Puncture Site   Rba   


 


pCO2   26 L   (35-45)  mm/Hg


 


pO2   202 H   ()  mm/Hg


 


HCO3   10.7 L   (21-28)  mmol/L


 


ABG pH   7.15 L*   (7.35-7.45)  


 


ABG Total CO2   9.9 L   (22-28)  mmol/L


 


ABG O2 Saturation   99.7 H   (95-98)  %


 


ABG Base Excess   -18.2 L   (-2.0-3.0)  mmol/L


 


ABG Hemoglobin   11.4 L   (11.7-17.4)  g/dL


 


ABG Carboxyhemoglobin   1.7 H   (0.5-1.5)  %


 


POC ABG HHb (Measured)   0.3   (0.0-5.0)  %


 


ABG Methemoglobin   1.5   (0.0-3.0)  %


 


Jesus Test   Pos   


 


VBG pH     (7.32-7.43)  


 


VBG pCO2     (40-60)  mmHg


 


VBG HCO3     mmol/L


 


VBG O2 Sat (Calc)     (40-65)  %


 


VBG Base Excess     (0.0-2.0)  mmol/L


 


A-a O2 Difference   479.0   mm/Hg


 


Respiratory Index   2.4   


 


Hgb O2 Saturation   96.5   (95.0-98.0)  %


 


Vent Mode   Prvc   


 


Mechanical Rate   14   


 


FiO2   100.0   %


 


Tidal Volume   500   


 


PEEP   5   


 


Crit Value Called To   Dr liu   


 


Crit Value Called By   [facility]   


 


Crit Value Read Back   Y   


 


Blood Gas Notified Time   2036   


 


Sodium     (132-148)  mmol/L


 


Potassium     (3.6-5.2)  mmol/L


 


Chloride     ()  mmol/L


 


Carbon Dioxide     (22-30)  mmol/L


 


Anion Gap     (10-20)  


 


BUN     (7-17)  mg/dL


 


Creatinine     (0.7-1.2)  mg/dL


 


Est GFR (African Amer)     


 


Est GFR (Non-Af Amer)     


 


POC Glucose (mg/dL)     ()  mg/dL


 


Random Glucose     ()  mg/dL


 


Calcium     (8.6-10.4)  mg/dl


 


Magnesium     (1.6-2.3)  mg/dL


 


Iron     ()  ug/dL


 


TIBC     (250-450)  ug/dL


 


% Saturation     (20-55)  


 


Ferritin     ng/mL


 


Total Bilirubin     (0.2-1.3)  mg/dL


 


AST     (14-36)  U/L


 


ALT     (9-52)  U/L


 


Alkaline Phosphatase     ()  U/L


 


Troponin I     (0.00-0.120)  ng/mL


 


Total Protein     (6.3-8.3)  g/dL


 


Albumin     (3.5-5.0)  g/dL


 


Globulin     (2.2-3.9)  gm/dL


 


Albumin/Globulin Ratio     (1.0-2.1)  


 


Triglycerides     (0-149)  mg/dL


 


Cholesterol     (0-199)  mg/dL


 


LDL Cholesterol Direct     (0-129)  mg/dL


 


HDL Cholesterol     (30-70)  mg/dL


 


25-OH Vitamin D Total     (30.0-100.0)  NG/ML


 


Procalcitonin     (0.19-0.49)  NG/ML


 


Vancomycin Trough     (5.0-10.0)  ug/mL


 


Hep Bs Antigen     (NEGATIVE)  


 


Hep Bs Antibody     (NEGATIVE)  


 


Hep B Core IgM Ab     (NEGATIVE)  


 


Hepatitis C Antibody     (NEGATIVE)  














  03/02/18 03/02/18 03/02/18 Range/Units





  17:41 16:13 12:19 


 


WBC     (4.8-10.8)  K/uL


 


RBC     (3.80-5.20)  Mil/uL


 


Hgb     (11.0-16.0)  g/dL


 


Hct     (34.0-47.0)  %


 


MCV     (81.0-99.0)  fL


 


MCH     (27.0-31.0)  pg


 


MCHC     (33.0-37.0)  g/dL


 


RDW     (11.5-14.5)  %


 


Plt Count     (130-400)  K/uL


 


MPV     (7.2-11.7)  fL


 


Neut % (Auto)     (50.0-75.0)  %


 


Lymph % (Auto)     (20.0-40.0)  %


 


Mono % (Auto)     (0.0-10.0)  %


 


Eos % (Auto)     (0.0-4.0)  %


 


Baso % (Auto)     (0.0-2.0)  %


 


Neut # (Auto)     (1.8-7.0)  K/uL


 


Lymph # (Auto)     (1.0-4.3)  K/uL


 


Mono # (Auto)     (0.0-0.8)  K/uL


 


Eos # (Auto)     (0.0-0.7)  K/uL


 


Baso # (Auto)     (0.0-0.2)  K/uL


 


Neutrophils % (Manual)     (50-75)  %


 


Band Neutrophils %     (0-2)  %


 


Lymphocytes % (Manual)     (20-40)  %


 


Monocytes % (Manual)     (0-10)  %


 


Nucleated RBC %     (0-0)  %


 


Differential Comment     


 


Platelet Estimate     (NORMAL)  


 


Hypochromasia (manual)     


 


Ovalocytes     


 


APTT     (21-34)  SECONDS


 


Puncture Site     


 


pCO2     (35-45)  mm/Hg


 


pO2     ()  mm/Hg


 


HCO3     (21-28)  mmol/L


 


ABG pH     (7.35-7.45)  


 


ABG Total CO2     (22-28)  mmol/L


 


ABG O2 Saturation     (95-98)  %


 


ABG Base Excess     (-2.0-3.0)  mmol/L


 


ABG Hemoglobin     (11.7-17.4)  g/dL


 


ABG Carboxyhemoglobin     (0.5-1.5)  %


 


POC ABG HHb (Measured)     (0.0-5.0)  %


 


ABG Methemoglobin     (0.0-3.0)  %


 


Jesus Test     


 


VBG pH     (7.32-7.43)  


 


VBG pCO2     (40-60)  mmHg


 


VBG HCO3     mmol/L


 


VBG O2 Sat (Calc)     (40-65)  %


 


VBG Base Excess     (0.0-2.0)  mmol/L


 


A-a O2 Difference     mm/Hg


 


Respiratory Index     


 


Hgb O2 Saturation     (95.0-98.0)  %


 


Vent Mode     


 


Mechanical Rate     


 


FiO2     %


 


Tidal Volume     


 


PEEP     


 


Crit Value Called To     


 


Crit Value Called By     


 


Crit Value Read Back     


 


Blood Gas Notified Time     


 


Sodium     (132-148)  mmol/L


 


Potassium     (3.6-5.2)  mmol/L


 


Chloride     ()  mmol/L


 


Carbon Dioxide     (22-30)  mmol/L


 


Anion Gap     (10-20)  


 


BUN     (7-17)  mg/dL


 


Creatinine     (0.7-1.2)  mg/dL


 


Est GFR (African Amer)     


 


Est GFR (Non-Af Amer)     


 


POC Glucose (mg/dL)   384 H   ()  mg/dL


 


Random Glucose     ()  mg/dL


 


Calcium     (8.6-10.4)  mg/dl


 


Magnesium     (1.6-2.3)  mg/dL


 


Iron     ()  ug/dL


 


TIBC     (250-450)  ug/dL


 


% Saturation     (20-55)  


 


Ferritin     ng/mL


 


Total Bilirubin     (0.2-1.3)  mg/dL


 


AST     (14-36)  U/L


 


ALT     (9-52)  U/L


 


Alkaline Phosphatase     ()  U/L


 


Troponin I     (0.00-0.120)  ng/mL


 


Total Protein     (6.3-8.3)  g/dL


 


Albumin     (3.5-5.0)  g/dL


 


Globulin     (2.2-3.9)  gm/dL


 


Albumin/Globulin Ratio     (1.0-2.1)  


 


Triglycerides     (0-149)  mg/dL


 


Cholesterol     (0-199)  mg/dL


 


LDL Cholesterol Direct     (0-129)  mg/dL


 


HDL Cholesterol     (30-70)  mg/dL


 


25-OH Vitamin D Total     (30.0-100.0)  NG/ML


 


Procalcitonin     (0.19-0.49)  NG/ML


 


Vancomycin Trough  < 5.0 L    (5.0-10.0)  ug/mL


 


Hep Bs Antigen     (NEGATIVE)  


 


Hep Bs Antibody    Negative  (NEGATIVE)  


 


Hep B Core IgM Ab     (NEGATIVE)  


 


Hepatitis C Antibody     (NEGATIVE)  














  03/02/18 03/02/18 03/02/18 Range/Units





  12:19 12:19 12:19 


 


WBC     (4.8-10.8)  K/uL


 


RBC     (3.80-5.20)  Mil/uL


 


Hgb     (11.0-16.0)  g/dL


 


Hct     (34.0-47.0)  %


 


MCV     (81.0-99.0)  fL


 


MCH     (27.0-31.0)  pg


 


MCHC     (33.0-37.0)  g/dL


 


RDW     (11.5-14.5)  %


 


Plt Count     (130-400)  K/uL


 


MPV     (7.2-11.7)  fL


 


Neut % (Auto)     (50.0-75.0)  %


 


Lymph % (Auto)     (20.0-40.0)  %


 


Mono % (Auto)     (0.0-10.0)  %


 


Eos % (Auto)     (0.0-4.0)  %


 


Baso % (Auto)     (0.0-2.0)  %


 


Neut # (Auto)     (1.8-7.0)  K/uL


 


Lymph # (Auto)     (1.0-4.3)  K/uL


 


Mono # (Auto)     (0.0-0.8)  K/uL


 


Eos # (Auto)     (0.0-0.7)  K/uL


 


Baso # (Auto)     (0.0-0.2)  K/uL


 


Neutrophils % (Manual)     (50-75)  %


 


Band Neutrophils %     (0-2)  %


 


Lymphocytes % (Manual)     (20-40)  %


 


Monocytes % (Manual)     (0-10)  %


 


Nucleated RBC %     (0-0)  %


 


Differential Comment     


 


Platelet Estimate     (NORMAL)  


 


Hypochromasia (manual)     


 


Ovalocytes     


 


APTT    31  (21-34)  SECONDS


 


Puncture Site     


 


pCO2     (35-45)  mm/Hg


 


pO2     ()  mm/Hg


 


HCO3     (21-28)  mmol/L


 


ABG pH     (7.35-7.45)  


 


ABG Total CO2     (22-28)  mmol/L


 


ABG O2 Saturation     (95-98)  %


 


ABG Base Excess     (-2.0-3.0)  mmol/L


 


ABG Hemoglobin     (11.7-17.4)  g/dL


 


ABG Carboxyhemoglobin     (0.5-1.5)  %


 


POC ABG HHb (Measured)     (0.0-5.0)  %


 


ABG Methemoglobin     (0.0-3.0)  %


 


Jesus Test     


 


VBG pH     (7.32-7.43)  


 


VBG pCO2     (40-60)  mmHg


 


VBG HCO3     mmol/L


 


VBG O2 Sat (Calc)     (40-65)  %


 


VBG Base Excess     (0.0-2.0)  mmol/L


 


A-a O2 Difference     mm/Hg


 


Respiratory Index     


 


Hgb O2 Saturation     (95.0-98.0)  %


 


Vent Mode     


 


Mechanical Rate     


 


FiO2     %


 


Tidal Volume     


 


PEEP     


 


Crit Value Called To     


 


Crit Value Called By     


 


Crit Value Read Back     


 


Blood Gas Notified Time     


 


Sodium     (132-148)  mmol/L


 


Potassium     (3.6-5.2)  mmol/L


 


Chloride     ()  mmol/L


 


Carbon Dioxide     (22-30)  mmol/L


 


Anion Gap     (10-20)  


 


BUN     (7-17)  mg/dL


 


Creatinine     (0.7-1.2)  mg/dL


 


Est GFR (African Amer)     


 


Est GFR (Non-Af Amer)     


 


POC Glucose (mg/dL)     ()  mg/dL


 


Random Glucose     ()  mg/dL


 


Calcium     (8.6-10.4)  mg/dl


 


Magnesium     (1.6-2.3)  mg/dL


 


Iron   18 L   ()  ug/dL


 


TIBC   196 L   (250-450)  ug/dL


 


% Saturation   9 L   (20-55)  


 


Ferritin  195.0    ng/mL


 


Total Bilirubin     (0.2-1.3)  mg/dL


 


AST     (14-36)  U/L


 


ALT     (9-52)  U/L


 


Alkaline Phosphatase     ()  U/L


 


Troponin I     (0.00-0.120)  ng/mL


 


Total Protein     (6.3-8.3)  g/dL


 


Albumin     (3.5-5.0)  g/dL


 


Globulin     (2.2-3.9)  gm/dL


 


Albumin/Globulin Ratio     (1.0-2.1)  


 


Triglycerides     (0-149)  mg/dL


 


Cholesterol     (0-199)  mg/dL


 


LDL Cholesterol Direct     (0-129)  mg/dL


 


HDL Cholesterol     (30-70)  mg/dL


 


25-OH Vitamin D Total   27.1 L   (30.0-100.0)  NG/ML


 


Procalcitonin     (0.19-0.49)  NG/ML


 


Vancomycin Trough     (5.0-10.0)  ug/mL


 


Hep Bs Antigen  Negative    (NEGATIVE)  


 


Hep Bs Antibody     (NEGATIVE)  


 


Hep B Core IgM Ab  Negative    (NEGATIVE)  


 


Hepatitis C Antibody  Negative    (NEGATIVE)  














  03/02/18 Range/Units





  08:34 


 


WBC   (4.8-10.8)  K/uL


 


RBC   (3.80-5.20)  Mil/uL


 


Hgb   (11.0-16.0)  g/dL


 


Hct   (34.0-47.0)  %


 


MCV   (81.0-99.0)  fL


 


MCH   (27.0-31.0)  pg


 


MCHC   (33.0-37.0)  g/dL


 


RDW   (11.5-14.5)  %


 


Plt Count   (130-400)  K/uL


 


MPV   (7.2-11.7)  fL


 


Neut % (Auto)   (50.0-75.0)  %


 


Lymph % (Auto)   (20.0-40.0)  %


 


Mono % (Auto)   (0.0-10.0)  %


 


Eos % (Auto)   (0.0-4.0)  %


 


Baso % (Auto)   (0.0-2.0)  %


 


Neut # (Auto)   (1.8-7.0)  K/uL


 


Lymph # (Auto)   (1.0-4.3)  K/uL


 


Mono # (Auto)   (0.0-0.8)  K/uL


 


Eos # (Auto)   (0.0-0.7)  K/uL


 


Baso # (Auto)   (0.0-0.2)  K/uL


 


Neutrophils % (Manual)   (50-75)  %


 


Band Neutrophils %   (0-2)  %


 


Lymphocytes % (Manual)   (20-40)  %


 


Monocytes % (Manual)   (0-10)  %


 


Nucleated RBC %   (0-0)  %


 


Differential Comment   


 


Platelet Estimate   (NORMAL)  


 


Hypochromasia (manual)   


 


Ovalocytes   


 


APTT   (21-34)  SECONDS


 


Puncture Site   


 


pCO2   (35-45)  mm/Hg


 


pO2   ()  mm/Hg


 


HCO3   (21-28)  mmol/L


 


ABG pH   (7.35-7.45)  


 


ABG Total CO2   (22-28)  mmol/L


 


ABG O2 Saturation   (95-98)  %


 


ABG Base Excess   (-2.0-3.0)  mmol/L


 


ABG Hemoglobin   (11.7-17.4)  g/dL


 


ABG Carboxyhemoglobin   (0.5-1.5)  %


 


POC ABG HHb (Measured)   (0.0-5.0)  %


 


ABG Methemoglobin   (0.0-3.0)  %


 


Jesus Test   


 


VBG pH   (7.32-7.43)  


 


VBG pCO2   (40-60)  mmHg


 


VBG HCO3   mmol/L


 


VBG O2 Sat (Calc)   (40-65)  %


 


VBG Base Excess   (0.0-2.0)  mmol/L


 


A-a O2 Difference   mm/Hg


 


Respiratory Index   


 


Hgb O2 Saturation   (95.0-98.0)  %


 


Vent Mode   


 


Mechanical Rate   


 


FiO2   %


 


Tidal Volume   


 


PEEP   


 


Crit Value Called To   


 


Crit Value Called By   


 


Crit Value Read Back   


 


Blood Gas Notified Time   


 


Sodium   (132-148)  mmol/L


 


Potassium   (3.6-5.2)  mmol/L


 


Chloride   ()  mmol/L


 


Carbon Dioxide   (22-30)  mmol/L


 


Anion Gap   (10-20)  


 


BUN   (7-17)  mg/dL


 


Creatinine   (0.7-1.2)  mg/dL


 


Est GFR (African Amer)   


 


Est GFR (Non-Af Amer)   


 


POC Glucose (mg/dL)   ()  mg/dL


 


Random Glucose   ()  mg/dL


 


Calcium   (8.6-10.4)  mg/dl


 


Magnesium   (1.6-2.3)  mg/dL


 


Iron   ()  ug/dL


 


TIBC   (250-450)  ug/dL


 


% Saturation   (20-55)  


 


Ferritin   ng/mL


 


Total Bilirubin   (0.2-1.3)  mg/dL


 


AST   (14-36)  U/L


 


ALT   (9-52)  U/L


 


Alkaline Phosphatase   ()  U/L


 


Troponin I   (0.00-0.120)  ng/mL


 


Total Protein   (6.3-8.3)  g/dL


 


Albumin   (3.5-5.0)  g/dL


 


Globulin   (2.2-3.9)  gm/dL


 


Albumin/Globulin Ratio   (1.0-2.1)  


 


Triglycerides   (0-149)  mg/dL


 


Cholesterol   (0-199)  mg/dL


 


LDL Cholesterol Direct   (0-129)  mg/dL


 


HDL Cholesterol   (30-70)  mg/dL


 


25-OH Vitamin D Total   (30.0-100.0)  NG/ML


 


Procalcitonin  54.73 H  (0.19-0.49)  NG/ML


 


Vancomycin Trough   (5.0-10.0)  ug/mL


 


Hep Bs Antigen   (NEGATIVE)  


 


Hep Bs Antibody   (NEGATIVE)  


 


Hep B Core IgM Ab   (NEGATIVE)  


 


Hepatitis C Antibody   (NEGATIVE)  








 Laboratory Results - last 24 hr











  03/02/18 03/02/18 03/02/18





  08:34 12:19 12:19


 


WBC   


 


RBC   


 


Hgb   


 


Hct   


 


MCV   


 


MCH   


 


MCHC   


 


RDW   


 


Plt Count   


 


MPV   


 


Neut % (Auto)   


 


Lymph % (Auto)   


 


Mono % (Auto)   


 


Eos % (Auto)   


 


Baso % (Auto)   


 


Neut # (Auto)   


 


Lymph # (Auto)   


 


Mono # (Auto)   


 


Eos # (Auto)   


 


Baso # (Auto)   


 


Neutrophils % (Manual)   


 


Band Neutrophils %   


 


Lymphocytes % (Manual)   


 


Monocytes % (Manual)   


 


Nucleated RBC %   


 


Differential Comment   


 


Platelet Estimate   


 


Hypochromasia (manual)   


 


Ovalocytes   


 


APTT   31 


 


Puncture Site   


 


pCO2   


 


pO2   


 


HCO3   


 


ABG pH   


 


ABG Total CO2   


 


ABG O2 Saturation   


 


ABG Base Excess   


 


ABG Hemoglobin   


 


ABG Carboxyhemoglobin   


 


POC ABG HHb (Measured)   


 


ABG Methemoglobin   


 


Jesus Test   


 


VBG pH   


 


VBG pCO2   


 


VBG HCO3   


 


VBG O2 Sat (Calc)   


 


VBG Base Excess   


 


A-a O2 Difference   


 


Respiratory Index   


 


Hgb O2 Saturation   


 


Vent Mode   


 


Mechanical Rate   


 


FiO2   


 


Tidal Volume   


 


PEEP   


 


Crit Value Called To   


 


Crit Value Called By   


 


Crit Value Read Back   


 


Blood Gas Notified Time   


 


Sodium   


 


Potassium   


 


Chloride   


 


Carbon Dioxide   


 


Anion Gap   


 


BUN   


 


Creatinine   


 


Est GFR ( Amer)   


 


Est GFR (Non-Af Amer)   


 


POC Glucose (mg/dL)   


 


Random Glucose   


 


Calcium   


 


Magnesium   


 


Iron    18 L


 


TIBC    196 L


 


% Saturation    9 L


 


Ferritin   


 


Total Bilirubin   


 


AST   


 


ALT   


 


Alkaline Phosphatase   


 


Troponin I   


 


Total Protein   


 


Albumin   


 


Globulin   


 


Albumin/Globulin Ratio   


 


Triglycerides   


 


Cholesterol   


 


LDL Cholesterol Direct   


 


HDL Cholesterol   


 


25-OH Vitamin D Total    27.1 L


 


Procalcitonin  54.73 H  


 


Vancomycin Trough   


 


Hep Bs Antigen   


 


Hep Bs Antibody   


 


Hep B Core IgM Ab   


 


Hepatitis C Antibody   














  03/02/18 03/02/18 03/02/18





  12:19 12:19 16:13


 


WBC   


 


RBC   


 


Hgb   


 


Hct   


 


MCV   


 


MCH   


 


MCHC   


 


RDW   


 


Plt Count   


 


MPV   


 


Neut % (Auto)   


 


Lymph % (Auto)   


 


Mono % (Auto)   


 


Eos % (Auto)   


 


Baso % (Auto)   


 


Neut # (Auto)   


 


Lymph # (Auto)   


 


Mono # (Auto)   


 


Eos # (Auto)   


 


Baso # (Auto)   


 


Neutrophils % (Manual)   


 


Band Neutrophils %   


 


Lymphocytes % (Manual)   


 


Monocytes % (Manual)   


 


Nucleated RBC %   


 


Differential Comment   


 


Platelet Estimate   


 


Hypochromasia (manual)   


 


Ovalocytes   


 


APTT   


 


Puncture Site   


 


pCO2   


 


pO2   


 


HCO3   


 


ABG pH   


 


ABG Total CO2   


 


ABG O2 Saturation   


 


ABG Base Excess   


 


ABG Hemoglobin   


 


ABG Carboxyhemoglobin   


 


POC ABG HHb (Measured)   


 


ABG Methemoglobin   


 


Jesus Test   


 


VBG pH   


 


VBG pCO2   


 


VBG HCO3   


 


VBG O2 Sat (Calc)   


 


VBG Base Excess   


 


A-a O2 Difference   


 


Respiratory Index   


 


Hgb O2 Saturation   


 


Vent Mode   


 


Mechanical Rate   


 


FiO2   


 


Tidal Volume   


 


PEEP   


 


Crit Value Called To   


 


Crit Value Called By   


 


Crit Value Read Back   


 


Blood Gas Notified Time   


 


Sodium   


 


Potassium   


 


Chloride   


 


Carbon Dioxide   


 


Anion Gap   


 


BUN   


 


Creatinine   


 


Est GFR ( Amer)   


 


Est GFR (Non-Af Amer)   


 


POC Glucose (mg/dL)    384 H


 


Random Glucose   


 


Calcium   


 


Magnesium   


 


Iron   


 


TIBC   


 


% Saturation   


 


Ferritin  195.0  


 


Total Bilirubin   


 


AST   


 


ALT   


 


Alkaline Phosphatase   


 


Troponin I   


 


Total Protein   


 


Albumin   


 


Globulin   


 


Albumin/Globulin Ratio   


 


Triglycerides   


 


Cholesterol   


 


LDL Cholesterol Direct   


 


HDL Cholesterol   


 


25-OH Vitamin D Total   


 


Procalcitonin   


 


Vancomycin Trough   


 


Hep Bs Antigen  Negative  


 


Hep Bs Antibody   Negative 


 


Hep B Core IgM Ab  Negative  


 


Hepatitis C Antibody  Negative  














  03/02/18 03/02/18 03/02/18





  17:41 18:25 20:30


 


WBC   


 


RBC   


 


Hgb   


 


Hct   


 


MCV   


 


MCH   


 


MCHC   


 


RDW   


 


Plt Count   


 


MPV   


 


Neut % (Auto)   


 


Lymph % (Auto)   


 


Mono % (Auto)   


 


Eos % (Auto)   


 


Baso % (Auto)   


 


Neut # (Auto)   


 


Lymph # (Auto)   


 


Mono # (Auto)   


 


Eos # (Auto)   


 


Baso # (Auto)   


 


Neutrophils % (Manual)   


 


Band Neutrophils %   


 


Lymphocytes % (Manual)   


 


Monocytes % (Manual)   


 


Nucleated RBC %   


 


Differential Comment   


 


Platelet Estimate   


 


Hypochromasia (manual)   


 


Ovalocytes   


 


APTT   40 H D 


 


Puncture Site    Rba


 


pCO2    26 L


 


pO2    202 H


 


HCO3    10.7 L


 


ABG pH    7.15 L*


 


ABG Total CO2    9.9 L


 


ABG O2 Saturation    99.7 H


 


ABG Base Excess    -18.2 L


 


ABG Hemoglobin    11.4 L


 


ABG Carboxyhemoglobin    1.7 H


 


POC ABG HHb (Measured)    0.3


 


ABG Methemoglobin    1.5


 


Jesus Test    Pos


 


VBG pH   


 


VBG pCO2   


 


VBG HCO3   


 


VBG O2 Sat (Calc)   


 


VBG Base Excess   


 


A-a O2 Difference    479.0


 


Respiratory Index    2.4


 


Hgb O2 Saturation    96.5


 


Vent Mode    Prvc


 


Mechanical Rate    14


 


FiO2    100.0


 


Tidal Volume    500


 


PEEP    5


 


Crit Value Called To    Dr liu


 


Crit Value Called By    [facility]


 


Crit Value Read Back    Y


 


Blood Gas Notified Time    2036


 


Sodium   


 


Potassium   


 


Chloride   


 


Carbon Dioxide   


 


Anion Gap   


 


BUN   


 


Creatinine   


 


Est GFR ( Amer)   


 


Est GFR (Non-Af Amer)   


 


POC Glucose (mg/dL)   


 


Random Glucose   


 


Calcium   


 


Magnesium   


 


Iron   


 


TIBC   


 


% Saturation   


 


Ferritin   


 


Total Bilirubin   


 


AST   


 


ALT   


 


Alkaline Phosphatase   


 


Troponin I   


 


Total Protein   


 


Albumin   


 


Globulin   


 


Albumin/Globulin Ratio   


 


Triglycerides   


 


Cholesterol   


 


LDL Cholesterol Direct   


 


HDL Cholesterol   


 


25-OH Vitamin D Total   


 


Procalcitonin   


 


Vancomycin Trough  < 5.0 L  


 


Hep Bs Antigen   


 


Hep Bs Antibody   


 


Hep B Core IgM Ab   


 


Hepatitis C Antibody   














  03/02/18 03/02/18 03/03/18





  20:41 20:41 00:04


 


WBC  16.1 H  


 


RBC  3.31 L  


 


Hgb  9.2 L  


 


Hct  29.2 L  


 


MCV  88.2  D  


 


MCH  27.8  


 


MCHC  31.5 L  


 


RDW  16.6 H  


 


Plt Count  127 L  


 


MPV  9.2  


 


Neut % (Auto)   


 


Lymph % (Auto)   


 


Mono % (Auto)   


 


Eos % (Auto)   


 


Baso % (Auto)   


 


Neut # (Auto)   


 


Lymph # (Auto)   


 


Mono # (Auto)   


 


Eos # (Auto)   


 


Baso # (Auto)   


 


Neutrophils % (Manual)   


 


Band Neutrophils %   


 


Lymphocytes % (Manual)   


 


Monocytes % (Manual)   


 


Nucleated RBC %   


 


Differential Comment    


 


Platelet Estimate   


 


Hypochromasia (manual)   


 


Ovalocytes   


 


APTT   


 


Puncture Site   


 


pCO2   


 


pO2   


 


HCO3   


 


ABG pH   


 


ABG Total CO2   


 


ABG O2 Saturation   


 


ABG Base Excess   


 


ABG Hemoglobin   


 


ABG Carboxyhemoglobin   


 


POC ABG HHb (Measured)   


 


ABG Methemoglobin   


 


Jesus Test   


 


VBG pH   


 


VBG pCO2   


 


VBG HCO3   


 


VBG O2 Sat (Calc)   


 


VBG Base Excess   


 


A-a O2 Difference   


 


Respiratory Index   


 


Hgb O2 Saturation   


 


Vent Mode   


 


Mechanical Rate   


 


FiO2   


 


Tidal Volume   


 


PEEP   


 


Crit Value Called To   


 


Crit Value Called By   


 


Crit Value Read Back   


 


Blood Gas Notified Time   


 


Sodium   146 


 


Potassium   5.1 


 


Chloride   108 H 


 


Carbon Dioxide   12 L 


 


Anion Gap   31 H 


 


BUN   91 H 


 


Creatinine   6.2 H 


 


Est GFR ( Amer)   8 


 


Est GFR (Non-Af Amer)   7 


 


POC Glucose (mg/dL)    223 H


 


Random Glucose   333 H 


 


Calcium   8.4 L 


 


Magnesium   2.5 H 


 


Iron   


 


TIBC   


 


% Saturation   


 


Ferritin   


 


Total Bilirubin   


 


AST   


 


ALT   


 


Alkaline Phosphatase   


 


Troponin I   79.7000 H* 


 


Total Protein   


 


Albumin   


 


Globulin   


 


Albumin/Globulin Ratio   


 


Triglycerides   


 


Cholesterol   


 


LDL Cholesterol Direct   


 


HDL Cholesterol   


 


25-OH Vitamin D Total   


 


Procalcitonin   


 


Vancomycin Trough   


 


Hep Bs Antigen   


 


Hep Bs Antibody   


 


Hep B Core IgM Ab   


 


Hepatitis C Antibody   














  03/03/18 03/03/18 03/03/18





  00:29 03:18 03:18


 


WBC   14.6 H 


 


RBC   3.31 L 


 


Hgb   9.2 L 


 


Hct   28.4 L 


 


MCV   85.7  D 


 


MCH   27.7 


 


MCHC   32.3 L 


 


RDW   16.4 H 


 


Plt Count   132 


 


MPV   8.7 


 


Neut % (Auto)   89.2 H 


 


Lymph % (Auto)   6.7 L 


 


Mono % (Auto)   3.9 


 


Eos % (Auto)   0.0 


 


Baso % (Auto)   0.2 


 


Neut # (Auto)   13.1 H 


 


Lymph # (Auto)   1.0 


 


Mono # (Auto)   0.6 


 


Eos # (Auto)   0.0 


 


Baso # (Auto)   0.0 


 


Neutrophils % (Manual)   89 H 


 


Band Neutrophils %   6 H 


 


Lymphocytes % (Manual)   3 L 


 


Monocytes % (Manual)   2 


 


Nucleated RBC %   6 H 


 


Differential Comment   


 


Platelet Estimate   Normal 


 


Hypochromasia (manual)   Slight 


 


Ovalocytes   Slight 


 


APTT   


 


Puncture Site  Drawn by rn  


 


pCO2   


 


pO2  19 L  


 


HCO3   


 


ABG pH   


 


ABG Total CO2   


 


ABG O2 Saturation   


 


ABG Base Excess   


 


ABG Hemoglobin   


 


ABG Carboxyhemoglobin   


 


POC ABG HHb (Measured)   


 


ABG Methemoglobin   


 


Jesus Test  Na  


 


VBG pH  7.20 L  


 


VBG pCO2  42  


 


VBG HCO3  14.0  


 


VBG O2 Sat (Calc)  30.5 L  


 


VBG Base Excess  -11.2 L  


 


A-a O2 Difference   


 


Respiratory Index   


 


Hgb O2 Saturation   


 


Vent Mode   


 


Mechanical Rate   


 


FiO2   


 


Tidal Volume   


 


PEEP   


 


Crit Value Called To  Dr liu  


 


Crit Value Called By  Gillian vazquez rt  


 


Crit Value Read Back  Y  


 


Blood Gas Notified Time  30  


 


Sodium    150 H


 


Potassium    4.7


 


Chloride    108 H


 


Carbon Dioxide    16 L


 


Anion Gap    32 H


 


BUN    93 H


 


Creatinine    6.2 H


 


Est GFR ( Amer)    8


 


Est GFR (Non-Af Amer)    7


 


POC Glucose (mg/dL)   


 


Random Glucose    125 H


 


Calcium    8.1 L


 


Magnesium   


 


Iron   


 


TIBC   


 


% Saturation   


 


Ferritin   


 


Total Bilirubin    2.0 H


 


AST    254 H D


 


ALT    108 H D


 


Alkaline Phosphatase    77


 


Troponin I   


 


Total Protein    5.8 L


 


Albumin    2.9 L


 


Globulin    2.9


 


Albumin/Globulin Ratio    1.0


 


Triglycerides   


 


Cholesterol   


 


LDL Cholesterol Direct   


 


HDL Cholesterol   


 


25-OH Vitamin D Total   


 


Procalcitonin   


 


Vancomycin Trough   


 


Hep Bs Antigen   


 


Hep Bs Antibody   


 


Hep B Core IgM Ab   


 


Hepatitis C Antibody   














  03/03/18 03/03/18 03/03/18





  03:18 03:20 05:18


 


WBC   


 


RBC   


 


Hgb   


 


Hct   


 


MCV   


 


MCH   


 


MCHC   


 


RDW   


 


Plt Count   


 


MPV   


 


Neut % (Auto)   


 


Lymph % (Auto)   


 


Mono % (Auto)   


 


Eos % (Auto)   


 


Baso % (Auto)   


 


Neut # (Auto)   


 


Lymph # (Auto)   


 


Mono # (Auto)   


 


Eos # (Auto)   


 


Baso # (Auto)   


 


Neutrophils % (Manual)   


 


Band Neutrophils %   


 


Lymphocytes % (Manual)   


 


Monocytes % (Manual)   


 


Nucleated RBC %   


 


Differential Comment   


 


Platelet Estimate   


 


Hypochromasia (manual)   


 


Ovalocytes   


 


APTT   126 H* D 


 


Puncture Site    Rb


 


pCO2    21 L


 


pO2    360 H


 


HCO3    14.6 L


 


ABG pH    7.33 L


 


ABG Total CO2    11.7 L


 


ABG O2 Saturation    99.7 H


 


ABG Base Excess    -13.2 L


 


ABG Hemoglobin    9.2 L


 


ABG Carboxyhemoglobin    1.4


 


POC ABG HHb (Measured)    0.3


 


ABG Methemoglobin    1.3


 


Jesus Test    Na


 


VBG pH   


 


VBG pCO2   


 


VBG HCO3   


 


VBG O2 Sat (Calc)   


 


VBG Base Excess   


 


A-a O2 Difference    327.0


 


Respiratory Index    0.9


 


Hgb O2 Saturation    96.9


 


Vent Mode    Prvc


 


Mechanical Rate    24


 


FiO2    100.0


 


Tidal Volume    550


 


PEEP    5


 


Crit Value Called To   


 


Crit Value Called By   


 


Crit Value Read Back   


 


Blood Gas Notified Time   


 


Sodium   


 


Potassium   


 


Chloride   


 


Carbon Dioxide   


 


Anion Gap   


 


BUN   


 


Creatinine   


 


Est GFR ( Amer)   


 


Est GFR (Non-Af Amer)   


 


POC Glucose (mg/dL)   


 


Random Glucose   


 


Calcium   


 


Magnesium   


 


Iron   


 


TIBC   


 


% Saturation   


 


Ferritin   


 


Total Bilirubin   


 


AST   


 


ALT   


 


Alkaline Phosphatase   


 


Troponin I   


 


Total Protein   


 


Albumin   


 


Globulin   


 


Albumin/Globulin Ratio   


 


Triglycerides  82  D  


 


Cholesterol  173  


 


LDL Cholesterol Direct  42  


 


HDL Cholesterol  56  


 


25-OH Vitamin D Total   


 


Procalcitonin   


 


Vancomycin Trough   


 


Hep Bs Antigen   


 


Hep Bs Antibody   


 


Hep B Core IgM Ab   


 


Hepatitis C Antibody   














  03/03/18





  05:24


 


WBC 


 


RBC 


 


Hgb 


 


Hct 


 


MCV 


 


MCH 


 


MCHC 


 


RDW 


 


Plt Count 


 


MPV 


 


Neut % (Auto) 


 


Lymph % (Auto) 


 


Mono % (Auto) 


 


Eos % (Auto) 


 


Baso % (Auto) 


 


Neut # (Auto) 


 


Lymph # (Auto) 


 


Mono # (Auto) 


 


Eos # (Auto) 


 


Baso # (Auto) 


 


Neutrophils % (Manual) 


 


Band Neutrophils % 


 


Lymphocytes % (Manual) 


 


Monocytes % (Manual) 


 


Nucleated RBC % 


 


Differential Comment 


 


Platelet Estimate 


 


Hypochromasia (manual) 


 


Ovalocytes 


 


APTT 


 


Puncture Site 


 


pCO2 


 


pO2 


 


HCO3 


 


ABG pH 


 


ABG Total CO2 


 


ABG O2 Saturation 


 


ABG Base Excess 


 


ABG Hemoglobin 


 


ABG Carboxyhemoglobin 


 


POC ABG HHb (Measured) 


 


ABG Methemoglobin 


 


Jesus Test 


 


VBG pH 


 


VBG pCO2 


 


VBG HCO3 


 


VBG O2 Sat (Calc) 


 


VBG Base Excess 


 


A-a O2 Difference 


 


Respiratory Index 


 


Hgb O2 Saturation 


 


Vent Mode 


 


Mechanical Rate 


 


FiO2 


 


Tidal Volume 


 


PEEP 


 


Crit Value Called To 


 


Crit Value Called By 


 


Crit Value Read Back 


 


Blood Gas Notified Time 


 


Sodium 


 


Potassium 


 


Chloride 


 


Carbon Dioxide 


 


Anion Gap 


 


BUN 


 


Creatinine 


 


Est GFR ( Amer) 


 


Est GFR (Non-Af Amer) 


 


POC Glucose (mg/dL)  118 H


 


Random Glucose 


 


Calcium 


 


Magnesium 


 


Iron 


 


TIBC 


 


% Saturation 


 


Ferritin 


 


Total Bilirubin 


 


AST 


 


ALT 


 


Alkaline Phosphatase 


 


Troponin I 


 


Total Protein 


 


Albumin 


 


Globulin 


 


Albumin/Globulin Ratio 


 


Triglycerides 


 


Cholesterol 


 


LDL Cholesterol Direct 


 


HDL Cholesterol 


 


25-OH Vitamin D Total 


 


Procalcitonin 


 


Vancomycin Trough 


 


Hep Bs Antigen 


 


Hep Bs Antibody 


 


Hep B Core IgM Ab 


 


Hepatitis C Antibody 











Fingerstick Blood Sugar Results: 118





Critical Care Progress Note





- Ventilator Checklist


Daily Sedation Vacation: Yes


Daily Assessment of Readiness to Wean: Yes


Daily Spontaneous Breathing Trial: Yes


PUD Prophalyxis: Yes


DVT Prophylaxis: Yes


Oral Care with Chlorhexidine Gluconate {CHG}: Yes





- Nutrition


Nutrition: 


 Nutrition











 Category Date Time Status


 


 NPO Diet [DIET] Diets  03/02/18 Breakfast Active














Assessment/Plan





- Assessment and Plan (Free Text)


Assessment: 


76 y/o female with PMH CKD stage V with left arm AV fistula, Diastolic CHF/

NSTEMI, DM, HTN, hypercholesterolemia, h/o TIA, h/o colon CA who presents to 

[facility] with altered mental status. 





-NSTEMI: will benefit from DUal antiplatelet therapy and IV heprain, cardiology 

input





-Shock: suspect 2nd aspiration/cardiogenic +/- acidosis: infuse bicarb ggt to 

keep bicarb near 18, infuse norepi to keep MAP >65, echo pending


 


-sepsis: continue broad spectrum abx, serial lactic, r/o sepsis/aspiratoin: 

currently on moxi, check QTC, start aztreonam + vanco per level and flagyl, pan 

culture





-Acute on chronic systolic heart failure: ECHO shows EF 30%, will benfit from 

norepi to keep MAP >65 and monitor SCVO2 to keep >70 





-Acute NSTEMI: Cardiology consult, Dr Nichole, possible PCI





-Hyperglycemia and uncontrolled DM, lantus + ISS/aspart


   continue Lantus and monitor sugar





-Anemia of chronic disease, continue epogen, check fecal occult blood





-DVT ppx on IV heparin 





-PUD ppx pepcid





cc time 45 minutes








- Date & Time


Date: 03/03/18


Time: 11:47

## 2018-03-04 LAB
ALBUMIN SERPL-MCNC: 2.5 G/DL (ref 3.5–5)
ALBUMIN/GLOB SERPL: 0.9 {RATIO} (ref 1–2.1)
ALT SERPL-CCNC: 2845 U/L (ref 9–52)
ANISOCYTOSIS BLD QL SMEAR: SLIGHT
ARTERIAL BLOOD GAS O2 SAT: 99.9 % (ref 95–98)
ARTERIAL BLOOD GAS PCO2: 27 MM/HG (ref 35–45)
ARTERIAL BLOOD GAS TCO2: 23.4 MMOL/L (ref 22–28)
BASOPHILS # BLD AUTO: 0 K/UL (ref 0–0.2)
BASOPHILS NFR BLD: 0.2 % (ref 0–2)
BUN SERPL-MCNC: 98 MG/DL (ref 7–17)
CALCIUM SERPL-MCNC: 7.3 MG/DL (ref 8.6–10.4)
EOSINOPHIL # BLD AUTO: 0 K/UL (ref 0–0.7)
EOSINOPHIL NFR BLD: 0.2 % (ref 0–4)
ERYTHROCYTE [DISTWIDTH] IN BLOOD BY AUTOMATED COUNT: 16.5 % (ref 11.5–14.5)
GFR NON-AFRICAN AMERICAN: 7
GIANT PLATELETS BLD QL SMEAR: PRESENT
HCO3 BLDA-SCNC: 25.8 MMOL/L (ref 21–28)
HGB BLD-MCNC: 7.6 G/DL (ref 11–16)
HYPOCHROMIC: SLIGHT
INHALED O2 CONCENTRATION: 40 %
LG PLATELETS BLD QL SMEAR: PRESENT
LYMPHOCYTE: 6 % (ref 20–40)
LYMPHOCYTES # BLD AUTO: 0.8 K/UL (ref 1–4.3)
LYMPHOCYTES NFR BLD AUTO: 7.2 % (ref 20–40)
MACROCYTES BLD QL SMEAR: SLIGHT
MCH RBC QN AUTO: 27.8 PG (ref 27–31)
MCHC RBC AUTO-ENTMCNC: 33 G/DL (ref 33–37)
MCV RBC AUTO: 84.2 FL (ref 81–99)
METAMYELOCYTES NFR BLD: 1 % (ref 0–0)
MICROCYTES BLD QL SMEAR: SLIGHT
MONOCYTE: 1 % (ref 0–10)
MONOCYTES # BLD: 0.3 K/UL (ref 0–0.8)
MONOCYTES NFR BLD: 3.1 % (ref 0–10)
NEUTROPHILS # BLD: 9.8 K/UL (ref 1.8–7)
NEUTROPHILS NFR BLD AUTO: 85 % (ref 50–75)
NEUTROPHILS NFR BLD AUTO: 89.3 % (ref 50–75)
NEUTS BAND NFR BLD: 6 % (ref 0–2)
NRBC BLD AUTO-RTO: 2 % (ref 0–0)
NRBC BLD AUTO-RTO: 3.4 % (ref 0–2)
OVALOCYTES BLD QL SMEAR: SLIGHT
PH BLDA: 7.53 [PH] (ref 7.35–7.45)
PLATELET # BLD EST: (no result) 10*3/UL
PLATELET # BLD: 88 K/UL (ref 130–400)
PMV BLD AUTO: 9.5 FL (ref 7.2–11.7)
PO2 BLDA: 165 MM/HG (ref 80–100)
POIKILOCYTOSIS BLD QL SMEAR: SLIGHT
POLYCHROMIC: SLIGHT
RBC # BLD AUTO: 2.72 MIL/UL (ref 3.8–5.2)
TOTAL CELLS COUNTED BLD: 100
TOXIC GRANULES BLD QL SMEAR: PRESENT
VARIANT LYMPHS NFR BLD MANUAL: 1 % (ref 0–0)
WBC # BLD AUTO: 11 K/UL (ref 4.8–10.8)

## 2018-03-04 PROCEDURE — 5A1D70Z PERFORMANCE OF URINARY FILTRATION, INTERMITTENT, LESS THAN 6 HOURS PER DAY: ICD-10-PCS

## 2018-03-04 RX ADMIN — HEPARIN SODIUM PRN MLS/HR: 10000 INJECTION, SOLUTION INTRAVENOUS at 08:00

## 2018-03-04 RX ADMIN — WATER SCH MLS/HR: 1 INJECTION INTRAMUSCULAR; INTRAVENOUS; SUBCUTANEOUS at 05:58

## 2018-03-04 RX ADMIN — Medication SCH TAB: at 08:56

## 2018-03-04 RX ADMIN — INSULIN ASPART SCH UNIT: 100 INJECTION, SOLUTION INTRAVENOUS; SUBCUTANEOUS at 18:31

## 2018-03-04 RX ADMIN — WATER SCH MLS/HR: 1 INJECTION INTRAMUSCULAR; INTRAVENOUS; SUBCUTANEOUS at 21:36

## 2018-03-04 RX ADMIN — INSULIN GLARGINE SCH U: 100 INJECTION, SOLUTION SUBCUTANEOUS at 21:36

## 2018-03-04 RX ADMIN — WATER SCH MLS/HR: 1 INJECTION INTRAMUSCULAR; INTRAVENOUS; SUBCUTANEOUS at 15:00

## 2018-03-04 RX ADMIN — INSULIN ASPART SCH UNIT: 100 INJECTION, SOLUTION INTRAVENOUS; SUBCUTANEOUS at 12:12

## 2018-03-04 RX ADMIN — INSULIN ASPART SCH: 100 INJECTION, SOLUTION INTRAVENOUS; SUBCUTANEOUS at 00:26

## 2018-03-04 RX ADMIN — INSULIN ASPART SCH UNIT: 100 INJECTION, SOLUTION INTRAVENOUS; SUBCUTANEOUS at 05:56

## 2018-03-04 NOTE — CP.PCM.PN
Subjective





- Date & Time of Evaluation


Date of Evaluation: 03/04/18


Time of Evaluation: 09:00





- Subjective


Subjective: 


Patient was seen and examined,she is awake and open her eyes spontaneously.


Patient didn't tolerate dialysis yesterday.She became hypotensive and rapid afib

,started on amiodarone


She is anuric , She is off pressors














Objective





- Vital Signs/Intake and Output


Vital Signs (last 24 hours): 


 











Temp Pulse Resp BP Pulse Ox


 


 98.2 F   85   16   100/70   100 


 


 03/04/18 04:00  03/04/18 09:59  03/04/18 09:59  03/04/18 09:59  03/04/18 09:59








Intake and Output: 


 











 03/04/18 03/04/18





 06:59 18:59


 


Intake Total 2566.7 333.2


 


Output Total 0 0


 


Balance 2566.7 333.2














- Medications


Medications: 


 Current Medications





Aspirin (Aspirin Chewable)  81 mg PO DAILY [facility]


   Last Admin: 03/04/18 09:01 Dose:  81 mg


Calcium Acetate (Phoslo)  667 mg PO TID [facility]


   Last Admin: 03/04/18 09:01 Dose:  667 mg


Epoetin Vamsi (Procrit)  4,000 unit IV MWF [facility]


   Last Admin: 03/02/18 23:34 Dose:  Not Given


Ergocalciferol (Drisdol 50,000 Intl Units Cap)  1 cap PO Q7D [facility]


   Last Admin: 03/03/18 13:51 Dose:  1 cap


Ferrous Gluconate (Fergon)  324 mg PO TID [facility]


   Last Admin: 03/04/18 10:23 Dose:  324 mg


Hydrocortisone Sodium Succinate (Solu-Cortef)  100 mg IV Q8H [facility]


   Last Admin: 03/04/18 04:34 Dose:  100 mg


Heparin Sodium/Sodium Chloride (Heparin 26370 Units/250ml 1/2 Normal Saline)  25

,000 units in 250 mls @ 7.076 mls/hr IV .Q24H PRN; Protocol; 12 UNITS/KG/HR


   PRN Reason: PROTOCOL


   Last Admin: 03/04/18 08:00 Dose:  17 units/kg/hr, 10.024 mls/hr


Moxifloxacin HCl (Avelox Iv 400mg/250ml Ns)  400 mg in 250 mls @ 167 mls/hr 

IVPB Q24H [facility]


   Last Admin: 03/03/18 22:57 Dose:  167 mls/hr


Norepinephrine Bitartrate 8 mg (/ Sodium Chloride)  258 mls @ 7.74 mls/hr IV 

.Q24H PRN; Protocol; 4 MCG/MIN


   PRN Reason: TITRATE PER MD ORDER


   Last Titration: 03/04/18 10:55 Dose:  5 mcg/min, 9.67 mls/hr


Aztreonam 1 gm/ Sodium (Chloride)  100 mls @ 100 mls/hr IVPB Q12H [facility]


   Last Admin: 03/04/18 01:34 Dose:  100 mls/hr


Metronidazole (Flagyl)  500 mg in 100 mls @ 100 mls/hr IVPB Q8H [facility]


   Last Admin: 03/04/18 05:58 Dose:  100 mls/hr


Sodium Bicarbonate 75 meq/ (Sodium Chloride)  1,075 mls @ 75 mls/hr IV .[id number] 

[facility]


   Last Admin: 03/03/18 22:40 Dose:  75 mls/hr


Phenylephrine HCl 30 mg/ (Sodium Chloride)  253 mls @ 10.12 mls/hr IV .Q24H PRN

; Protocol; 20 MCG/MIN


   PRN Reason: TITRATE PER MD ORDER


Insulin Aspart (Novolog)  0 unit SC Q6 [facility]


   PRN Reason: Protocol


   Last Admin: 03/04/18 05:56 Dose:  3 unit


Insulin Glargine (Lantus)  15 unit SC [facility]


   Last Admin: 03/03/18 22:46 Dose:  15 u


Pantoprazole Sodium (Protonix Ec Tab)  40 mg PO DAILY [facility]


   Last Admin: 03/04/18 10:00 Dose:  Not Given


Pantoprazole Sodium (Protonix Inj)  40 mg IVP DAILY [facility]


Rosuvastatin Calcium (Crestor)  5 mg PO [facility]


   Last Admin: 03/03/18 22:14 Dose:  5 mg


Vitamin B Complex/Vit C/Folic Acid (Nephro-Nneka)  1 tab PO 0800 [facility]


   Last Admin: 03/04/18 08:56 Dose:  1 tab











- Labs


Labs: 


 





 03/04/18 06:11 





 03/04/18 06:08 





 











PT  14.6 SECONDS (9.7-12.2)  H  03/02/18  02:17    


 


INR  1.3   03/02/18  02:17    


 


APTT  53 SECONDS (21-34)  H D 03/04/18  06:08    














- Constitutional


Appears: No Acute Distress, Chronically Ill





- Head Exam


Head Exam: absent: NORMAL INSPECTION (intubated)





- Eye Exam


Eye Exam: Normal appearance





- ENT Exam


ENT Exam: Mucous Membranes Moist





- Neck Exam


Neck Exam: absent: Full ROM (Intubated)





- Respiratory Exam


Respiratory Exam: Decreased Breath Sounds, Rales, NORMAL BREATHING PATTERN





- Cardiovascular Exam


Cardiovascular Exam: REGULAR RHYTHM





- GI/Abdominal Exam


GI & Abdominal Exam: Soft, Normal Bowel Sounds





- Extremities Exam


Extremities Exam: absent: Full ROM (lethrgic this morning)





- Neurological Exam


Neurological Exam: Awake.  absent: Oriented x3 (intubated)





- Psychiatric Exam


Psychiatric exam: absent: Normal Mood (inutubated)





- Skin


Skin Exam: Dry





Assessment and Plan





- Assessment and Plan (Free Text)


Assessment: 





Patient is a 77 year old  female with a PMH significant for CKD 

stage V (not on dialysis), Diastolic CHF, DM, HTN, hypercholesterolemia, MI (

many years ago, no known intervention), TIA x 3 (years ago), colon CA who 

presents for altered mental status. 


On admission patient was acidotic,NSTEMI ,acute on chronic renal failure,

altered mental status and leukocytosis/r/o infection.She was evaluated by 

cardiologist yesterday who recommended to continue heparin ,asprin, labetalol 

and treat her acute renal failure and possible sepsis.


 ECHO with evidence of severely decreased EF (30%). Patient has a matured AVG 

in L arm .Not working. Has temporary cath for dialysis 


Plan: 





1 Acute NSTEMI ,  S/P Hypotension/Asystol ,CPR/Sustained VT/Intubation


  Continue amiodarone and Levophed


  patient is awake and responsive


  Echo EF 30%.Elevated 


 Elevated troponin with normal EKG


Cardiology consult, Dr Nichole


continue Aspirin  and Heparin Drip - Cardiac Protocol


Nitorglycerin 0.4mg SL Q5M PRN


Crestor 5mg PO HS


cardiac intervention after treating ARF and possible infection





2.Acute renal failure and Anuria


  Dialysis as per nephrology/Didn't tolerate yesterday


  trial today








3. acidosis/ Leukocytosis and r/o sepsis


  blood -no growth for 48hours. urine not collected/anuria


  Possible pneumonia


  had one dose vanco,On Aztreonam,Avelox,flagyl and s/p vanco





4.Acute systolic heart failure


  Possible myocarditis,continue heparin and asprin


  ECHO shows EF 30%


  cardio follow up





5 .Pleural effusion,r/o pneumonia


    D/W intensivist Her effusion is likely cardiac. No plan for tap


    follow chest x ray and dialysis


    





6.Hyperglycemia and uncontrolled DM


   continue Lantus and monitor sugar





7.Anemia


   likely due to CRF


   anemia work up


8.Code status -full code

## 2018-03-04 NOTE — RAD
HISTORY:

eval cxr post CPR  



COMPARISON:

Chest radiograph dated 03/03/2018. 



FINDINGS:



LUNGS:

Pulmonary vascular congestion.  Left basilar atelectasis.



PLEURA:

Stable trace right and small to moderate left pleural effusions.  No 

pneumothorax apparent.



CARDIOVASCULAR:

Atherosclerotic aortic calcifications. Heavy mitral annular 

calcification. Cardiomediastinal silhouette stably enlarged.



OSSEOUS STRUCTURES:

Unchanged.



VISUALIZED UPPER ABDOMEN:

Partially imaged inferior vena cava filter.



OTHER FINDINGS:

Endotracheal, enteric tubes, unchanged.  Right internal jugular 

access central venous catheter, unchanged.  Left internal jugular 

access non tunneled hemodialysis catheter unchanged.  Overlying pacer 

pads redemonstrated.



IMPRESSION:

Stable tubes and lines.  No significant interval change.

## 2018-03-04 NOTE — CP.PCM.PN
Subjective





- Date & Time of Evaluation


Date of Evaluation: 03/04/18


Time of Evaluation: 02:42





- Subjective


Subjective: 





Vasc Sx:  Dr Alvarado





Pt S&E in ICU.  Remains intubated.  Now on bicarb drip.   Prognosis guarded.  

Able to dialysis of temporary shiley today but pt did not tolerate dialysis for 

prolonged time.  





Will defer any intervention on thrombosed graft til pt is stabilized and 

recovered.





Objective





- Vital Signs/Intake and Output


Vital Signs (last 24 hours): 


 











Temp Pulse Resp BP Pulse Ox


 


 98.7 F   86   19   116/53 L  84 L


 


 03/04/18 00:00  03/04/18 02:00  03/04/18 02:00  03/04/18 01:59  03/04/18 02:00








Intake and Output: 


 











 03/03/18 03/04/18





 18:59 06:59


 


Intake Total 1824.8 1807.9


 


Output Total  0


 


Balance 1824.8 1807.9














- Medications


Medications: 


 Current Medications





Aspirin (Aspirin Chewable)  81 mg PO DAILY [facility]


   Last Admin: 03/03/18 09:40 Dose:  81 mg


Calcium Acetate (Phoslo)  667 mg PO TID [facility]


   Last Admin: 03/03/18 17:35 Dose:  667 mg


Epoetin Vamsi (Procrit)  4,000 unit IV MWF [facility]


   Last Admin: 03/02/18 23:34 Dose:  Not Given


Ergocalciferol (Drisdol 50,000 Intl Units Cap)  1 cap PO Q7D [facility]


   Last Admin: 03/03/18 13:51 Dose:  1 cap


Ferrous Gluconate (Fergon)  324 mg PO TID [facility]


   Last Admin: 03/03/18 17:35 Dose:  324 mg


Hydrocortisone Sodium Succinate (Solu-Cortef)  100 mg IV Q8H [facility]


   Last Admin: 03/03/18 21:17 Dose:  100 mg


Heparin Sodium/Sodium Chloride (Heparin 70476 Units/250ml 1/2 Normal Saline)  25

,000 units in 250 mls @ 7.076 mls/hr IV .Q24H PRN; Protocol; 12 UNITS/KG/HR


   PRN Reason: PROTOCOL


   Last Admin: 03/03/18 05:11 Dose:  17 units/kg/hr, 10.024 mls/hr


Moxifloxacin HCl (Avelox Iv 400mg/250ml Ns)  400 mg in 250 mls @ 167 mls/hr 

IVPB Q24H [facility]


   Last Admin: 03/03/18 22:57 Dose:  167 mls/hr


Norepinephrine Bitartrate 8 mg (/ Sodium Chloride)  258 mls @ 7.74 mls/hr IV 

.Q24H PRN; Protocol; 4 MCG/MIN


   PRN Reason: TITRATE PER MD ORDER


   Last Titration: 03/03/18 21:48 Dose:  0 mcg/min, 0 mls/hr


Aztreonam 1 gm/ Sodium (Chloride)  100 mls @ 100 mls/hr IVPB Q12H [facility]


   Last Admin: 03/04/18 01:34 Dose:  100 mls/hr


Metronidazole (Flagyl)  500 mg in 100 mls @ 100 mls/hr IVPB Q8H [facility]


   Last Admin: 03/03/18 22:43 Dose:  100 mls/hr


Vasopressin 40 units/ Dextrose  42 mls @ 2.52 mls/hr IV .[id number] [facility]; 0.04 UNITS

/MIN


   PRN Reason: Protocol


   Last Admin: 03/03/18 20:53 Dose:  0.04 units/min, 2.52 mls/hr


Sodium Bicarbonate 75 meq/ (Sodium Chloride)  1,075 mls @ 75 mls/hr IV .[id number] 

[facility]


   Last Admin: 03/03/18 22:40 Dose:  75 mls/hr


Insulin Aspart (Novolog)  0 unit SC Q6 [facility]


   PRN Reason: Protocol


   Last Admin: 03/04/18 00:26 Dose:  Not Given


Insulin Glargine (Lantus)  15 unit SC [facility]


   Last Admin: 03/03/18 22:46 Dose:  15 u


Pantoprazole Sodium (Protonix Ec Tab)  40 mg PO DAILY [facility]


Rosuvastatin Calcium (Crestor)  5 mg PO HS [facility]


   Last Admin: 03/03/18 22:14 Dose:  5 mg


Vitamin B Complex/Vit C/Folic Acid (Nephro-Nneka)  1 tab PO 0800 [facility]


   Last Admin: 03/03/18 08:30 Dose:  1 tab











- Labs


Labs: 


 





 03/03/18 21:16 





 03/03/18 21:16 





 











PT  14.6 SECONDS (9.7-12.2)  H  03/02/18  02:17    


 


INR  1.3   03/02/18  02:17    


 


APTT  58 SECONDS (21-34)  H D 03/03/18  17:10    














- Constitutional


Appears: Toxic





- ENT Exam


ENT Exam: Mucous Membranes Dry





- Respiratory Exam


Additional comments: 





intubated





- Cardiovascular Exam


Cardiovascular Exam: Tachycardia





- GI/Abdominal Exam


GI & Abdominal Exam: Distended, Soft





- Extremities Exam


Extremities Exam: Pedal Edema





- Neurological Exam


Neurological Exam: absent: Alert





Assessment and Plan





- Assessment and Plan (Free Text)


Assessment: 





cont to dialyze via shiley


no plans for intervention on thrombosed graft at this time


please reconsult vascular surgery as necessary








d/w Dr Jenny Krause, PGY3

## 2018-03-04 NOTE — CP.PCM.PN
Subjective





- Date & Time of Evaluation


Date of Evaluation: 03/04/18


Time of Evaluation: 08:57





- Subjective


Subjective: 





Nephrology Follow up





Assessment: critical


CKD stage 5 (N18.5) with hyperkalemia, acidosis ? uremia 


pulm edema, CHF, NSTEMI, pleural effusions, pneumonia


AMS, hyperglycemia


Cardiac arrest


Sepsis


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 


Hypertensive Chronic Kidney Disease (I12.9)


Anemia (D64.9), Hyperphosphatemia (E83.39), Secondary Hyperparathyroidism (E21.1

), HTN (I12.9), vit D insuff, hx of colon CA, TIAs





Plan


Pt underwent HD for about 45 min but had to be terminated due to hypotension 

and rapid afib. Was started on amio gtt.


Remains anuric


Will trial HD again today,  currently off pressors. If does not tolerate again 

may need to consider continuous modalities.


on iron supplements, MVI, epogen with HD


continue phos binders and Vit D


d/c hco3


monitor lactic acid


on broad spectrum abx dose for esrd 


discussed w/ intensivist





S: seen and examined, intubated





Physical Examination: 


General Appearance: intubated and sedated


Vitals reviewed and noted as below


Head; Atraumatic, normocephalic


ENT: unable


EYES: anicteric


op: et tube


Neck; supple no lymphadenopathy, no thyromegaly or bruit


Lungs: Normal respiratory rate/effort. Breath sounds bilateral decreased at 

bases with crackles


Heart: Normal rate. s1s2 normal. No rub or gallop. 


Extremities: no edema. No varicose veins. chronic hyperpigmented changes in legs


Neurological: Patient opens eyes to voice


Skin: Warm and dry. Normal turgor. No rash. Palpitation: Normal elasticity for 

age


Abdomen: Abdomen is soft. Bowel sounds +. There is no abdominal tenderness, no 

guarding/rigidity no organomegaly


Psych: unable


MSK: no joint tenderness or swelling. Digits and nails normal, no deformity


: kidney or bladder not palpable


Access: Left AVG with no bruit





Labs/imaging reviewed.


Past medical history, past surgical history, family history, social history, 

allergy reviewed and noted as below


Family hx: no hx of CKD. Rest non-contributory





Objective





- Vital Signs/Intake and Output


Vital Signs (last 24 hours): 


 











Temp Pulse Resp BP Pulse Ox


 


 98.2 F   91 H  16   126/62   100 


 


 03/04/18 04:00  03/04/18 07:00  03/04/18 07:00  03/04/18 06:59  03/04/18 07:00








Intake and Output: 


 











 03/04/18 03/04/18





 06:59 18:59


 


Intake Total 2316.7 186.6


 


Output Total 0 0


 


Balance 2316.7 186.6














- Medications


Medications: 


 Current Medications





Aspirin (Aspirin Chewable)  81 mg PO DAILY [facility]


   Last Admin: 03/03/18 09:40 Dose:  81 mg


Calcium Acetate (Phoslo)  667 mg PO TID [facility]


   Last Admin: 03/03/18 17:35 Dose:  667 mg


Epoetin Vamsi (Procrit)  4,000 unit IV MWF [facility]


   Last Admin: 03/02/18 23:34 Dose:  Not Given


Ergocalciferol (Drisdol 50,000 Intl Units Cap)  1 cap PO Q7D [facility]


   Last Admin: 03/03/18 13:51 Dose:  1 cap


Ferrous Gluconate (Fergon)  324 mg PO TID [facility]


   Last Admin: 03/03/18 17:35 Dose:  324 mg


Hydrocortisone Sodium Succinate (Solu-Cortef)  100 mg IV Q8H [facility]


   Last Admin: 03/04/18 04:34 Dose:  100 mg


Heparin Sodium/Sodium Chloride (Heparin 15141 Units/250ml 1/2 Normal Saline)  25

,000 units in 250 mls @ 7.076 mls/hr IV .Q24H PRN; Protocol; 12 UNITS/KG/HR


   PRN Reason: PROTOCOL


   Last Admin: 03/03/18 05:11 Dose:  17 units/kg/hr, 10.024 mls/hr


Moxifloxacin HCl (Avelox Iv 400mg/250ml Ns)  400 mg in 250 mls @ 167 mls/hr 

IVPB Q24H [facility]


   Last Admin: 03/03/18 22:57 Dose:  167 mls/hr


Norepinephrine Bitartrate 8 mg (/ Sodium Chloride)  258 mls @ 7.74 mls/hr IV 

.Q24H PRN; Protocol; 4 MCG/MIN


   PRN Reason: TITRATE PER MD ORDER


   Last Titration: 03/03/18 21:48 Dose:  0 mcg/min, 0 mls/hr


Aztreonam 1 gm/ Sodium (Chloride)  100 mls @ 100 mls/hr IVPB Q12H [facility]


   Last Admin: 03/04/18 01:34 Dose:  100 mls/hr


Metronidazole (Flagyl)  500 mg in 100 mls @ 100 mls/hr IVPB Q8H [facility]


   Last Admin: 03/04/18 05:58 Dose:  100 mls/hr


Vasopressin 40 units/ Dextrose  42 mls @ 2.52 mls/hr IV .[id number] [facility]; 0.04 UNITS

/MIN


   PRN Reason: Protocol


   Last Titration: 03/04/18 05:00 Dose:  0 units/min, 0 mls/hr


Sodium Bicarbonate 75 meq/ (Sodium Chloride)  1,075 mls @ 75 mls/hr IV .[id number] 

[facility]


   Last Admin: 03/03/18 22:40 Dose:  75 mls/hr


Insulin Aspart (Novolog)  0 unit SC Q6 [facility]


   PRN Reason: Protocol


   Last Admin: 03/04/18 05:56 Dose:  3 unit


Insulin Glargine (Lantus)  15 unit SC HS [facility]


   Last Admin: 03/03/18 22:46 Dose:  15 u


Pantoprazole Sodium (Protonix Ec Tab)  40 mg PO DAILY [facility]


Rosuvastatin Calcium (Crestor)  5 mg PO HS [facility]


   Last Admin: 03/03/18 22:14 Dose:  5 mg


Vitamin B Complex/Vit C/Folic Acid (Nephro-Nneka)  1 tab PO 0800 [facility]


   Last Admin: 03/04/18 08:56 Dose:  1 tab











- Labs


Labs: 


 





 03/04/18 06:11 





 03/04/18 06:08 





 











PT  14.6 SECONDS (9.7-12.2)  H  03/02/18  02:17    


 


INR  1.3   03/02/18  02:17    


 


APTT  53 SECONDS (21-34)  H D 03/04/18  06:08

## 2018-03-04 NOTE — CP.CCUPN
CCU Subjective





- Physician Review


Events Since Last Encounter (Free Text): 





03/04/18 16:55


moving around, not following commands.





CCU Objective





- Vital Signs / Intake & Output


Vital Signs (Last 4 hours): 


Vital Signs











  Temp Pulse Resp BP BP Pulse Ox


 


 03/04/18 16:51   98 H  17  139/80   100


 


 03/04/18 16:11   106 H  16  135/77   100


 


 03/04/18 16:00  97.3 F L      100


 


 03/04/18 15:25   102 H  16  119/80   100


 


 03/04/18 15:11   93 H  16  143/87   100


 


 03/04/18 14:26   103 H  16  112/70   100


 


 03/04/18 14:25      112/70 


 


 03/04/18 14:11   101 H  19  130/29 L   96


 


 03/04/18 13:55      144/68 


 


 03/04/18 13:25      90/69 L 


 


 03/04/18 13:11   102 H  19  102/68   87 L











Intake and Output (Last 8hrs): 


 Intake & Output











 03/04/18 03/04/18 03/04/18





 06:59 14:59 22:59


 


Intake Total 1591.6 616.0 323.4


 


Output Total 0 0 0


 


Balance 1591.6 616.0 323.4


 


Weight 160 lb 4 oz  


 


Intake:   


 


   20 


 


  Intake, IV Amount 1309.6 421.0 273.4


 


    Right Forearm 350  200


 


    Right Hand 80.0 90.0 20.0


 


    Right Internal Jugular 13.2 0 


 


    distal 266.4 216.6 33.4


 


    medial  39.4 20


 


    proximal 600 75 


 


  Tube Feeding  175 50


 


Output:   


 


  Urine 0 0 0


 


    Urethral ([place]) 0 0 0


 


Other:   


 


  # Bowel Movements 0 0 0














- Physical Exam


Physical Exam Limitations: Positive for: Altered Mental Status


Head: Positive for: Atraumatic, Normocephalic.  Negative for: Tenderness, 

Contusion, Swelling


Pupils: Positive for: PERRL.  Negative for: Sluggish, Non-Reactive


Extroacular Muscles: Positive for: EOMI


Conjunctiva: Positive for: Normal


Mouth: Positive for: Dry


Pharnyx: Positive for: Normal


Nose (External): Positive for: Atraumatic


Neck: Positive for: Trachea Midline.  Negative for: JVD


Respiratory/Chest: Positive for: Good Air Exchange, Accessory Muscle Use, 

Decreased Breath Sounds (on bilateral bases).  Negative for: Wheezes


Cardiovascular: Positive for: Tachycardic


Abdomen: Positive for: Distention, Normal Bowel Sounds.  Negative for: 

Peritoneal Signs


Upper Extremity: Negative for: Cyanosis, Edema


Lower Extremity: Negative for: Edema


Psychiatric: Positive for: Alert





- Medications


Active Medications: 


Active Medications











Generic Name Dose Route Start Last Admin





  Trade Name Freq  PRN Reason Stop Dose Admin


 


Aspirin  81 mg  03/02/18 10:00  03/04/18 09:01





  Aspirin Chewable  PO   81 mg





  DAILY ABHILASH   Administration


 


Calcium Acetate  667 mg  03/02/18 14:00  03/04/18 14:55





  Phoslo  PO   667 mg





  TID ABHILASH   Administration


 


Epoetin Vamsi  4,000 unit  03/02/18 15:00  03/02/18 23:34





  Procrit  IV   Not Given





  [facility] [place]   


 


Ergocalciferol  1 cap  03/03/18 14:45  03/03/18 13:51





  Drisdol 50,000 Intl Units Cap  PO   1 cap





  Q7D ABHILASH   Administration


 


Ferrous Gluconate  324 mg  03/03/18 10:00  03/04/18 14:54





  Fergon  PO   324 mg





  TID ABHILASH   Administration


 


Heparin Sodium/Sodium Chloride  25,000 units in 250 mls @ 7.076 mls/hr  03/02/ 18 00:56  03/04/18 08:00





  Heparin 97065 Units/250ml 1/2 Normal Saline  IV   17 units/kg/hr





  .Q24H PRN   10.024 mls/hr





  PROTOCOL   Administration





  Protocol   





  12 UNITS/KG/HR   


 


Moxifloxacin HCl  400 mg in 250 mls @ 167 mls/hr  03/02/18 23:00  03/03/18 22:57





  Avelox Iv 400mg/250ml Ns  IVPB   167 mls/hr





  Q24H ABHILASH   Administration


 


Norepinephrine Bitartrate 8 mg  258 mls @ 7.74 mls/hr  03/02/18 19:56  03/04/18 

12:12





  / Sodium Chloride  IV   0 mcg/min





  .Q24H PRN   0 mls/hr





  TITRATE PER MD ORDER   Titration





  Protocol   





  4 MCG/MIN   


 


Aztreonam 1 gm/ Sodium  100 mls @ 100 mls/hr  03/03/18 13:30  03/04/18 14:54





  Chloride  IVPB   100 mls/hr





  Q12H ABHILASH   Administration


 


Metronidazole  500 mg in 100 mls @ 100 mls/hr  03/03/18 14:30  03/04/18 15:00





  Flagyl  IVPB   100 mls/hr





  Q8H ABHILASH   Administration


 


Phenylephrine HCl 30 mg/  250 mls @ 10 mls/hr  03/04/18 11:36  03/04/18 12:12





  Sodium Chloride  IV   20 mcg/min





  .Q24H PRN   10 mls/hr





  TITRATE PER MD ORDER   Administration





  Protocol   





  20 MCG/MIN   


 


Amiodarone HCl 900 mg/  500 mls @ 16.66 mls/hr  03/04/18 16:17  03/04/18 16:51





  Dextrose  IV  03/05/18 16:16  16.66 mls/hr





  .Q24H ONE   Administration





  Protocol   





  0.5 MG/MIN   


 


Insulin Aspart  0 unit  03/04/18 11:49  03/04/18 12:12





  Novolog  SC   8 unit





  Q6 ABHILASH   Administration





  Protocol   


 


Insulin Glargine  15 unit  03/02/18 22:00  03/03/18 22:46





  Lantus  SC   15 u





  HS ABHILASH   Administration


 


Pantoprazole Sodium  40 mg  03/05/18 10:00  





  Protonix Inj  IVP   





  DAILY ABHILASH   


 


Rosuvastatin Calcium  5 mg  03/02/18 01:15  03/03/18 22:14





  Crestor  PO   5 mg





  HS ABHILASH   Administration


 


Vitamin B Complex/Vit C/Folic Acid  1 tab  03/03/18 08:00  03/04/18 08:56





  Nephro-Nneka  PO   1 tab





  0800 ABHILASH   Administration














- Patient Studies


Lab Studies: 


 Microbiology Studies











 03/01/18 06:30 Blood Culture - Preliminary





 Blood-Venous    NO GROWTH AFTER 48 HOURS


 


 03/01/18 06:00 Blood Culture - Preliminary





 Blood-Venous    NO GROWTH AFTER 48 HOURS


 


 03/02/18 06:00 MRSA Culture (Admit) - Final





 Nose    MRSA NOT DETECTED








 Lab Studies











  03/04/18 03/04/18 03/04/18 Range/Units





  11:21 06:11 06:08 


 


WBC   11.0 H   (4.8-10.8)  K/uL


 


RBC   2.72 L   (3.80-5.20)  Mil/uL


 


Hgb   7.6 L   (11.0-16.0)  g/dL


 


Hct   22.9 L   (34.0-47.0)  %


 


MCV   84.2   (81.0-99.0)  fL


 


MCH   27.8   (27.0-31.0)  pg


 


MCHC   33.0   (33.0-37.0)  g/dL


 


RDW   16.5 H   (11.5-14.5)  %


 


Plt Count   88 L D   (130-400)  K/uL


 


MPV   9.5   (7.2-11.7)  fL


 


Neut % (Auto)   89.3 H   (50.0-75.0)  %


 


Lymph % (Auto)   7.2 L   (20.0-40.0)  %


 


Mono % (Auto)   3.1   (0.0-10.0)  %


 


Eos % (Auto)   0.2   (0.0-4.0)  %


 


Baso % (Auto)   0.2   (0.0-2.0)  %


 


Neut # (Auto)   9.8 H   (1.8-7.0)  K/uL


 


Lymph # (Auto)   0.8 L   (1.0-4.3)  K/uL


 


Mono # (Auto)   0.3   (0.0-0.8)  K/uL


 


Eos # (Auto)   0.0   (0.0-0.7)  K/uL


 


Baso # (Auto)   0.0   (0.0-0.2)  K/uL


 


Neutrophils % (Manual)   85 H   (50-75)  %


 


Band Neutrophils %   6 H   (0-2)  %


 


Lymphocytes % (Manual)   6 L   (20-40)  %


 


Reactive Lymphs %   1 H   (0-0)  %


 


Monocytes % (Manual)   1   (0-10)  %


 


Metamyelocytes %   1 H   (0-0)  %


 


Nucleated RBC %   2 H   (0-0)  %


 


Toxic Granulation   Present   


 


Platelet Estimate   Decreased L   (NORMAL)  


 


Large Platelets   Present   


 


Giant Platelets   Present   


 


Polychromasia   Slight   


 


Hypochromasia (manual)   Slight   


 


Poikilocytosis (manual   Slight   


 


Anisocytosis (manual)   Slight   


 


Microcytosis (manual)   Slight   


 


Macrocytosis (manual)   Slight   


 


Ovalocytes   Slight   


 


APTT     (21-34)  SECONDS


 


Puncture Site     


 


pCO2     (35-45)  mm/Hg


 


pO2     ()  mm/Hg


 


HCO3     (21-28)  mmol/L


 


ABG pH     (7.35-7.45)  


 


ABG Total CO2     (22-28)  mmol/L


 


ABG O2 Saturation     (95-98)  %


 


ABG Base Excess     (-2.0-3.0)  mmol/L


 


Jesus Test     


 


ABG Potassium     (3.6-5.2)  mmol/L


 


A-a O2 Difference     mm/Hg


 


Respiratory Index     


 


Sodium     (132-148)  mmol/l


 


Chloride     ()  mmol/L


 


Glucose     ()  mg/dl


 


Lactate     (0.7-2.1)  mmol/L


 


Vent Mode     


 


Mechanical Rate     


 


FiO2     %


 


Tidal Volume     


 


PEEP     


 


Crit Value Called To     


 


Crit Value Called By     


 


Crit Value Read Back     


 


Blood Gas Notified Time     


 


Potassium     (3.6-5.2)  mmol/L


 


Carbon Dioxide     (22-30)  mmol/L


 


Anion Gap     (10-20)  


 


BUN     (7-17)  mg/dL


 


Creatinine     (0.7-1.2)  mg/dL


 


Est GFR (African Amer)     


 


Est GFR (Non-Af Amer)     


 


POC Glucose (mg/dL)  300 H    ()  mg/dL


 


Random Glucose     ()  mg/dL


 


Lactic Acid    4.8 H*  (0.7-2.1)  mmol/L


 


Calcium     (8.6-10.4)  mg/dl


 


Phosphorus     (2.5-4.5)  mg/dL


 


Magnesium     (1.6-2.3)  mg/dL


 


Total Bilirubin     (0.2-1.3)  mg/dL


 


AST     (14-36)  U/L


 


ALT     (9-52)  U/L


 


Alkaline Phosphatase     ()  U/L


 


Total Protein     (6.3-8.3)  g/dL


 


Albumin     (3.5-5.0)  g/dL


 


Globulin     (2.2-3.9)  gm/dL


 


Albumin/Globulin Ratio     (1.0-2.1)  


 


Arterial Blood Potassium     (3.6-5.2)  mmol/L














  03/04/18 03/04/18 03/04/18 Range/Units





  06:08 06:08 05:28 


 


WBC     (4.8-10.8)  K/uL


 


RBC     (3.80-5.20)  Mil/uL


 


Hgb     (11.0-16.0)  g/dL


 


Hct     (34.0-47.0)  %


 


MCV     (81.0-99.0)  fL


 


MCH     (27.0-31.0)  pg


 


MCHC     (33.0-37.0)  g/dL


 


RDW     (11.5-14.5)  %


 


Plt Count     (130-400)  K/uL


 


MPV     (7.2-11.7)  fL


 


Neut % (Auto)     (50.0-75.0)  %


 


Lymph % (Auto)     (20.0-40.0)  %


 


Mono % (Auto)     (0.0-10.0)  %


 


Eos % (Auto)     (0.0-4.0)  %


 


Baso % (Auto)     (0.0-2.0)  %


 


Neut # (Auto)     (1.8-7.0)  K/uL


 


Lymph # (Auto)     (1.0-4.3)  K/uL


 


Mono # (Auto)     (0.0-0.8)  K/uL


 


Eos # (Auto)     (0.0-0.7)  K/uL


 


Baso # (Auto)     (0.0-0.2)  K/uL


 


Neutrophils % (Manual)     (50-75)  %


 


Band Neutrophils %     (0-2)  %


 


Lymphocytes % (Manual)     (20-40)  %


 


Reactive Lymphs %     (0-0)  %


 


Monocytes % (Manual)     (0-10)  %


 


Metamyelocytes %     (0-0)  %


 


Nucleated RBC %     (0-0)  %


 


Toxic Granulation     


 


Platelet Estimate     (NORMAL)  


 


Large Platelets     


 


Giant Platelets     


 


Polychromasia     


 


Hypochromasia (manual)     


 


Poikilocytosis (manual     


 


Anisocytosis (manual)     


 


Microcytosis (manual)     


 


Macrocytosis (manual)     


 


Ovalocytes     


 


APTT  53 H D    (21-34)  SECONDS


 


Puncture Site     


 


pCO2     (35-45)  mm/Hg


 


pO2     ()  mm/Hg


 


HCO3     (21-28)  mmol/L


 


ABG pH     (7.35-7.45)  


 


ABG Total CO2     (22-28)  mmol/L


 


ABG O2 Saturation     (95-98)  %


 


ABG Base Excess     (-2.0-3.0)  mmol/L


 


Jesus Test     


 


ABG Potassium     (3.6-5.2)  mmol/L


 


A-a O2 Difference     mm/Hg


 


Respiratory Index     


 


Sodium   143   (132-148)  mmol/l


 


Chloride   101   ()  mmol/L


 


Glucose     ()  mg/dl


 


Lactate     (0.7-2.1)  mmol/L


 


Vent Mode     


 


Mechanical Rate     


 


FiO2     %


 


Tidal Volume     


 


PEEP     


 


Crit Value Called To     


 


Crit Value Called By     


 


Crit Value Read Back     


 


Blood Gas Notified Time     


 


Potassium   4.5   (3.6-5.2)  mmol/L


 


Carbon Dioxide   20 L   (22-30)  mmol/L


 


Anion Gap   26 H   (10-20)  


 


BUN   98 H   (7-17)  mg/dL


 


Creatinine   5.7 H   (0.7-1.2)  mg/dL


 


Est GFR ( Amer)   9   


 


Est GFR (Non-Af Amer)   7   


 


POC Glucose (mg/dL)    266 H  ()  mg/dL


 


Random Glucose   241 H   ()  mg/dL


 


Lactic Acid     (0.7-2.1)  mmol/L


 


Calcium   7.3 L   (8.6-10.4)  mg/dl


 


Phosphorus     (2.5-4.5)  mg/dL


 


Magnesium     (1.6-2.3)  mg/dL


 


Total Bilirubin   1.8 H   (0.2-1.3)  mg/dL


 


AST   6076 H   (14-36)  U/L


 


ALT   2845 H   (9-52)  U/L


 


Alkaline Phosphatase   80   ()  U/L


 


Total Protein   5.3 L   (6.3-8.3)  g/dL


 


Albumin   2.5 L   (3.5-5.0)  g/dL


 


Globulin   2.7   (2.2-3.9)  gm/dL


 


Albumin/Globulin Ratio   0.9 L   (1.0-2.1)  


 


Arterial Blood Potassium     (3.6-5.2)  mmol/L














  03/04/18 03/03/18 03/03/18 Range/Units





  05:02 23:30 21:16 


 


WBC    12.7 H  (4.8-10.8)  K/uL


 


RBC    2.91 L  (3.80-5.20)  Mil/uL


 


Hgb    8.1 L  (11.0-16.0)  g/dL


 


Hct    24.2 L  (34.0-47.0)  %


 


MCV    83.2  D  (81.0-99.0)  fL


 


MCH    27.7  (27.0-31.0)  pg


 


MCHC    33.3  (33.0-37.0)  g/dL


 


RDW    16.3 H  (11.5-14.5)  %


 


Plt Count    108 L D  (130-400)  K/uL


 


MPV    8.7  (7.2-11.7)  fL


 


Neut % (Auto)    84.9 H  (50.0-75.0)  %


 


Lymph % (Auto)    11.1 L  (20.0-40.0)  %


 


Mono % (Auto)    3.4  (0.0-10.0)  %


 


Eos % (Auto)    0.4  (0.0-4.0)  %


 


Baso % (Auto)    0.2  (0.0-2.0)  %


 


Neut # (Auto)    10.8 H  (1.8-7.0)  K/uL


 


Lymph # (Auto)    1.4  (1.0-4.3)  K/uL


 


Mono # (Auto)    0.4  (0.0-0.8)  K/uL


 


Eos # (Auto)    0.1  (0.0-0.7)  K/uL


 


Baso # (Auto)    0.0  (0.0-0.2)  K/uL


 


Neutrophils % (Manual)     (50-75)  %


 


Band Neutrophils %     (0-2)  %


 


Lymphocytes % (Manual)     (20-40)  %


 


Reactive Lymphs %     (0-0)  %


 


Monocytes % (Manual)     (0-10)  %


 


Metamyelocytes %     (0-0)  %


 


Nucleated RBC %     (0-0)  %


 


Toxic Granulation     


 


Platelet Estimate     (NORMAL)  


 


Large Platelets     


 


Giant Platelets     


 


Polychromasia     


 


Hypochromasia (manual)     


 


Poikilocytosis (manual     


 


Anisocytosis (manual)     


 


Microcytosis (manual)     


 


Macrocytosis (manual)     


 


Ovalocytes     


 


APTT     (21-34)  SECONDS


 


Puncture Site  R bra    


 


pCO2  27 L    (35-45)  mm/Hg


 


pO2  165 H    ()  mm/Hg


 


HCO3  25.8    (21-28)  mmol/L


 


ABG pH  7.53 H    (7.35-7.45)  


 


ABG Total CO2  23.4    (22-28)  mmol/L


 


ABG O2 Saturation  99.9 H    (95-98)  %


 


ABG Base Excess  1.1    (-2.0-3.0)  mmol/L


 


Jesus Test  Na    


 


ABG Potassium  4.6    (3.6-5.2)  mmol/L


 


A-a O2 Difference  86.0    mm/Hg


 


Respiratory Index  0.5    


 


Sodium  141.0    (132-148)  mmol/l


 


Chloride  105.0    ()  mmol/L


 


Glucose  272 H    ()  mg/dl


 


Lactate  5.3 H*    (0.7-2.1)  mmol/L


 


Vent Mode  Prvc    


 


Mechanical Rate  16    


 


FiO2  40.0    %


 


Tidal Volume  500    


 


PEEP  5    


 


Crit Value Called To  Eli jordan icu rn    


 


Crit Value Called By  Gillian vazquez rt    


 


Crit Value Read Back  Y    


 


Blood Gas Notified Time  542    


 


Potassium     (3.6-5.2)  mmol/L


 


Carbon Dioxide     (22-30)  mmol/L


 


Anion Gap     (10-20)  


 


BUN     (7-17)  mg/dL


 


Creatinine     (0.7-1.2)  mg/dL


 


Est GFR (African Amer)     


 


Est GFR (Non-Af Amer)     


 


POC Glucose (mg/dL)   202 H   ()  mg/dL


 


Random Glucose     ()  mg/dL


 


Lactic Acid     (0.7-2.1)  mmol/L


 


Calcium     (8.6-10.4)  mg/dl


 


Phosphorus     (2.5-4.5)  mg/dL


 


Magnesium     (1.6-2.3)  mg/dL


 


Total Bilirubin     (0.2-1.3)  mg/dL


 


AST     (14-36)  U/L


 


ALT     (9-52)  U/L


 


Alkaline Phosphatase     ()  U/L


 


Total Protein     (6.3-8.3)  g/dL


 


Albumin     (3.5-5.0)  g/dL


 


Globulin     (2.2-3.9)  gm/dL


 


Albumin/Globulin Ratio     (1.0-2.1)  


 


Arterial Blood Potassium  4.6    (3.6-5.2)  mmol/L














  03/03/18 03/03/18 03/03/18 Range/Units





  21:16 21:16 21:12 


 


WBC     (4.8-10.8)  K/uL


 


RBC     (3.80-5.20)  Mil/uL


 


Hgb     (11.0-16.0)  g/dL


 


Hct     (34.0-47.0)  %


 


MCV     (81.0-99.0)  fL


 


MCH     (27.0-31.0)  pg


 


MCHC     (33.0-37.0)  g/dL


 


RDW     (11.5-14.5)  %


 


Plt Count     (130-400)  K/uL


 


MPV     (7.2-11.7)  fL


 


Neut % (Auto)     (50.0-75.0)  %


 


Lymph % (Auto)     (20.0-40.0)  %


 


Mono % (Auto)     (0.0-10.0)  %


 


Eos % (Auto)     (0.0-4.0)  %


 


Baso % (Auto)     (0.0-2.0)  %


 


Neut # (Auto)     (1.8-7.0)  K/uL


 


Lymph # (Auto)     (1.0-4.3)  K/uL


 


Mono # (Auto)     (0.0-0.8)  K/uL


 


Eos # (Auto)     (0.0-0.7)  K/uL


 


Baso # (Auto)     (0.0-0.2)  K/uL


 


Neutrophils % (Manual)     (50-75)  %


 


Band Neutrophils %     (0-2)  %


 


Lymphocytes % (Manual)     (20-40)  %


 


Reactive Lymphs %     (0-0)  %


 


Monocytes % (Manual)     (0-10)  %


 


Metamyelocytes %     (0-0)  %


 


Nucleated RBC %     (0-0)  %


 


Toxic Granulation     


 


Platelet Estimate     (NORMAL)  


 


Large Platelets     


 


Giant Platelets     


 


Polychromasia     


 


Hypochromasia (manual)     


 


Poikilocytosis (manual     


 


Anisocytosis (manual)     


 


Microcytosis (manual)     


 


Macrocytosis (manual)     


 


Ovalocytes     


 


APTT     (21-34)  SECONDS


 


Puncture Site    Rba  


 


pCO2    22 L  (35-45)  mm/Hg


 


pO2    162 H  ()  mm/Hg


 


HCO3    27.4  (21-28)  mmol/L


 


ABG pH    7.62 H*  (7.35-7.45)  


 


ABG Total CO2    23.3  (22-28)  mmol/L


 


ABG O2 Saturation    100.1 H  (95-98)  %


 


ABG Base Excess    3.1 H  (-2.0-3.0)  mmol/L


 


Jesus Test    Na  


 


ABG Potassium    4.2  (3.6-5.2)  mmol/L


 


A-a O2 Difference    96.0  mm/Hg


 


Respiratory Index    0.6  


 


Sodium  142   144.0  (132-148)  mmol/l


 


Chloride  102   107.0  ()  mmol/L


 


Glucose    177 H  ()  mg/dl


 


Lactate    6.1 H*  (0.7-2.1)  mmol/L


 


Vent Mode    Prvc  


 


Mechanical Rate    22  


 


FiO2    40.0  %


 


Tidal Volume    500  


 


PEEP    5  


 


Crit Value Called To    Dr kalpana aguirre  


 


Crit Value Called By    Natalie chin  


 


Crit Value Read Back    Y  


 


Blood Gas Notified Time    2117  


 


Potassium  4.2    (3.6-5.2)  mmol/L


 


Carbon Dioxide  22    (22-30)  mmol/L


 


Anion Gap  23 H    (10-20)  


 


BUN  85 H    (7-17)  mg/dL


 


Creatinine  5.7 H    (0.7-1.2)  mg/dL


 


Est GFR ( Amer)  9    


 


Est GFR (Non-Af Amer)  7    


 


POC Glucose (mg/dL)     ()  mg/dL


 


Random Glucose  165 H    ()  mg/dL


 


Lactic Acid   5.6 H*   (0.7-2.1)  mmol/L


 


Calcium  7.5 L    (8.6-10.4)  mg/dl


 


Phosphorus  4.7 H    (2.5-4.5)  mg/dL


 


Magnesium  1.8    (1.6-2.3)  mg/dL


 


Total Bilirubin  2.0 H    (0.2-1.3)  mg/dL


 


AST  6513 H    (14-36)  U/L


 


ALT  2678 H    (9-52)  U/L


 


Alkaline Phosphatase  78    ()  U/L


 


Total Protein  5.5 L    (6.3-8.3)  g/dL


 


Albumin  2.8 L    (3.5-5.0)  g/dL


 


Globulin  2.7    (2.2-3.9)  gm/dL


 


Albumin/Globulin Ratio  1.0    (1.0-2.1)  


 


Arterial Blood Potassium    4.2  (3.6-5.2)  mmol/L














  03/03/18 03/03/18 Range/Units





  17:55 17:10 


 


WBC    (4.8-10.8)  K/uL


 


RBC    (3.80-5.20)  Mil/uL


 


Hgb    (11.0-16.0)  g/dL


 


Hct    (34.0-47.0)  %


 


MCV    (81.0-99.0)  fL


 


MCH    (27.0-31.0)  pg


 


MCHC    (33.0-37.0)  g/dL


 


RDW    (11.5-14.5)  %


 


Plt Count    (130-400)  K/uL


 


MPV    (7.2-11.7)  fL


 


Neut % (Auto)    (50.0-75.0)  %


 


Lymph % (Auto)    (20.0-40.0)  %


 


Mono % (Auto)    (0.0-10.0)  %


 


Eos % (Auto)    (0.0-4.0)  %


 


Baso % (Auto)    (0.0-2.0)  %


 


Neut # (Auto)    (1.8-7.0)  K/uL


 


Lymph # (Auto)    (1.0-4.3)  K/uL


 


Mono # (Auto)    (0.0-0.8)  K/uL


 


Eos # (Auto)    (0.0-0.7)  K/uL


 


Baso # (Auto)    (0.0-0.2)  K/uL


 


Neutrophils % (Manual)    (50-75)  %


 


Band Neutrophils %    (0-2)  %


 


Lymphocytes % (Manual)    (20-40)  %


 


Reactive Lymphs %    (0-0)  %


 


Monocytes % (Manual)    (0-10)  %


 


Metamyelocytes %    (0-0)  %


 


Nucleated RBC %    (0-0)  %


 


Toxic Granulation    


 


Platelet Estimate    (NORMAL)  


 


Large Platelets    


 


Giant Platelets    


 


Polychromasia    


 


Hypochromasia (manual)    


 


Poikilocytosis (manual    


 


Anisocytosis (manual)    


 


Microcytosis (manual)    


 


Macrocytosis (manual)    


 


Ovalocytes    


 


APTT   58 H D  (21-34)  SECONDS


 


Puncture Site    


 


pCO2    (35-45)  mm/Hg


 


pO2    ()  mm/Hg


 


HCO3    (21-28)  mmol/L


 


ABG pH    (7.35-7.45)  


 


ABG Total CO2    (22-28)  mmol/L


 


ABG O2 Saturation    (95-98)  %


 


ABG Base Excess    (-2.0-3.0)  mmol/L


 


Jesus Test    


 


ABG Potassium    (3.6-5.2)  mmol/L


 


A-a O2 Difference    mm/Hg


 


Respiratory Index    


 


Sodium    (132-148)  mmol/l


 


Chloride    ()  mmol/L


 


Glucose    ()  mg/dl


 


Lactate    (0.7-2.1)  mmol/L


 


Vent Mode    


 


Mechanical Rate    


 


FiO2    %


 


Tidal Volume    


 


PEEP    


 


Crit Value Called To    


 


Crit Value Called By    


 


Crit Value Read Back    


 


Blood Gas Notified Time    


 


Potassium    (3.6-5.2)  mmol/L


 


Carbon Dioxide    (22-30)  mmol/L


 


Anion Gap    (10-20)  


 


BUN    (7-17)  mg/dL


 


Creatinine    (0.7-1.2)  mg/dL


 


Est GFR (African Amer)    


 


Est GFR (Non-Af Amer)    


 


POC Glucose (mg/dL)  82   ()  mg/dL


 


Random Glucose    ()  mg/dL


 


Lactic Acid    (0.7-2.1)  mmol/L


 


Calcium    (8.6-10.4)  mg/dl


 


Phosphorus    (2.5-4.5)  mg/dL


 


Magnesium    (1.6-2.3)  mg/dL


 


Total Bilirubin    (0.2-1.3)  mg/dL


 


AST    (14-36)  U/L


 


ALT    (9-52)  U/L


 


Alkaline Phosphatase    ()  U/L


 


Total Protein    (6.3-8.3)  g/dL


 


Albumin    (3.5-5.0)  g/dL


 


Globulin    (2.2-3.9)  gm/dL


 


Albumin/Globulin Ratio    (1.0-2.1)  


 


Arterial Blood Potassium    (3.6-5.2)  mmol/L








 Laboratory Results - last 24 hr











  03/03/18 03/03/18 03/03/18





  17:10 17:55 21:12


 


WBC   


 


RBC   


 


Hgb   


 


Hct   


 


MCV   


 


MCH   


 


MCHC   


 


RDW   


 


Plt Count   


 


MPV   


 


Neut % (Auto)   


 


Lymph % (Auto)   


 


Mono % (Auto)   


 


Eos % (Auto)   


 


Baso % (Auto)   


 


Neut # (Auto)   


 


Lymph # (Auto)   


 


Mono # (Auto)   


 


Eos # (Auto)   


 


Baso # (Auto)   


 


Neutrophils % (Manual)   


 


Band Neutrophils %   


 


Lymphocytes % (Manual)   


 


Reactive Lymphs %   


 


Monocytes % (Manual)   


 


Metamyelocytes %   


 


Nucleated RBC %   


 


Toxic Granulation   


 


Platelet Estimate   


 


Large Platelets   


 


Giant Platelets   


 


Polychromasia   


 


Hypochromasia (manual)   


 


Poikilocytosis (manual   


 


Anisocytosis (manual)   


 


Microcytosis (manual)   


 


Macrocytosis (manual)   


 


Ovalocytes   


 


APTT  58 H D  


 


Puncture Site    Rba


 


pCO2    22 L


 


pO2    162 H


 


HCO3    27.4


 


ABG pH    7.62 H*


 


ABG Total CO2    23.3


 


ABG O2 Saturation    100.1 H


 


ABG Base Excess    3.1 H


 


Jesus Test    Na


 


ABG Potassium    4.2


 


A-a O2 Difference    96.0


 


Respiratory Index    0.6


 


Sodium    144.0


 


Chloride    107.0


 


Glucose    177 H


 


Lactate    6.1 H*


 


Vent Mode    Prvc


 


Mechanical Rate    22


 


FiO2    40.0


 


Tidal Volume    500


 


PEEP    5


 


Crit Value Called To    Dr kalpana aguirre


 


Crit Value Called By    Natalie chin


 


Crit Value Read Back    Y


 


Blood Gas Notified Time    2117


 


Potassium   


 


Carbon Dioxide   


 


Anion Gap   


 


BUN   


 


Creatinine   


 


Est GFR ( Amer)   


 


Est GFR (Non-Af Amer)   


 


POC Glucose (mg/dL)   82 


 


Random Glucose   


 


Lactic Acid   


 


Calcium   


 


Phosphorus   


 


Magnesium   


 


Total Bilirubin   


 


AST   


 


ALT   


 


Alkaline Phosphatase   


 


Total Protein   


 


Albumin   


 


Globulin   


 


Albumin/Globulin Ratio   


 


Arterial Blood Potassium    4.2














  03/03/18 03/03/18 03/03/18





  21:16 21:16 21:16


 


WBC    12.7 H


 


RBC    2.91 L


 


Hgb    8.1 L


 


Hct    24.2 L


 


MCV    83.2  D


 


MCH    27.7


 


MCHC    33.3


 


RDW    16.3 H


 


Plt Count    108 L D


 


MPV    8.7


 


Neut % (Auto)    84.9 H


 


Lymph % (Auto)    11.1 L


 


Mono % (Auto)    3.4


 


Eos % (Auto)    0.4


 


Baso % (Auto)    0.2


 


Neut # (Auto)    10.8 H


 


Lymph # (Auto)    1.4


 


Mono # (Auto)    0.4


 


Eos # (Auto)    0.1


 


Baso # (Auto)    0.0


 


Neutrophils % (Manual)   


 


Band Neutrophils %   


 


Lymphocytes % (Manual)   


 


Reactive Lymphs %   


 


Monocytes % (Manual)   


 


Metamyelocytes %   


 


Nucleated RBC %   


 


Toxic Granulation   


 


Platelet Estimate   


 


Large Platelets   


 


Giant Platelets   


 


Polychromasia   


 


Hypochromasia (manual)   


 


Poikilocytosis (manual   


 


Anisocytosis (manual)   


 


Microcytosis (manual)   


 


Macrocytosis (manual)   


 


Ovalocytes   


 


APTT   


 


Puncture Site   


 


pCO2   


 


pO2   


 


HCO3   


 


ABG pH   


 


ABG Total CO2   


 


ABG O2 Saturation   


 


ABG Base Excess   


 


Jesus Test   


 


ABG Potassium   


 


A-a O2 Difference   


 


Respiratory Index   


 


Sodium   142 


 


Chloride   102 


 


Glucose   


 


Lactate   


 


Vent Mode   


 


Mechanical Rate   


 


FiO2   


 


Tidal Volume   


 


PEEP   


 


Crit Value Called To   


 


Crit Value Called By   


 


Crit Value Read Back   


 


Blood Gas Notified Time   


 


Potassium   4.2 


 


Carbon Dioxide   22 


 


Anion Gap   23 H 


 


BUN   85 H 


 


Creatinine   5.7 H 


 


Est GFR ( Amer)   9 


 


Est GFR (Non-Af Amer)   7 


 


POC Glucose (mg/dL)   


 


Random Glucose   165 H 


 


Lactic Acid  5.6 H*  


 


Calcium   7.5 L 


 


Phosphorus   4.7 H 


 


Magnesium   1.8 


 


Total Bilirubin   2.0 H 


 


AST   6513 H 


 


ALT   2678 H 


 


Alkaline Phosphatase   78 


 


Total Protein   5.5 L 


 


Albumin   2.8 L 


 


Globulin   2.7 


 


Albumin/Globulin Ratio   1.0 


 


Arterial Blood Potassium   














  03/03/18 03/04/18 03/04/18





  23:30 05:02 05:28


 


WBC   


 


RBC   


 


Hgb   


 


Hct   


 


MCV   


 


MCH   


 


MCHC   


 


RDW   


 


Plt Count   


 


MPV   


 


Neut % (Auto)   


 


Lymph % (Auto)   


 


Mono % (Auto)   


 


Eos % (Auto)   


 


Baso % (Auto)   


 


Neut # (Auto)   


 


Lymph # (Auto)   


 


Mono # (Auto)   


 


Eos # (Auto)   


 


Baso # (Auto)   


 


Neutrophils % (Manual)   


 


Band Neutrophils %   


 


Lymphocytes % (Manual)   


 


Reactive Lymphs %   


 


Monocytes % (Manual)   


 


Metamyelocytes %   


 


Nucleated RBC %   


 


Toxic Granulation   


 


Platelet Estimate   


 


Large Platelets   


 


Giant Platelets   


 


Polychromasia   


 


Hypochromasia (manual)   


 


Poikilocytosis (manual   


 


Anisocytosis (manual)   


 


Microcytosis (manual)   


 


Macrocytosis (manual)   


 


Ovalocytes   


 


APTT   


 


Puncture Site   R bra 


 


pCO2   27 L 


 


pO2   165 H 


 


HCO3   25.8 


 


ABG pH   7.53 H 


 


ABG Total CO2   23.4 


 


ABG O2 Saturation   99.9 H 


 


ABG Base Excess   1.1 


 


Jesus Test   Na 


 


ABG Potassium   4.6 


 


A-a O2 Difference   86.0 


 


Respiratory Index   0.5 


 


Sodium   141.0 


 


Chloride   105.0 


 


Glucose   272 H 


 


Lactate   5.3 H* 


 


Vent Mode   Prvc 


 


Mechanical Rate   16 


 


FiO2   40.0 


 


Tidal Volume   500 


 


PEEP   5 


 


Crit Value Called To   Eli jordan icu rn 


 


Crit Value Called By   Gillian vazquez rt 


 


Crit Value Read Back   Y 


 


Blood Gas Notified Time   542 


 


Potassium   


 


Carbon Dioxide   


 


Anion Gap   


 


BUN   


 


Creatinine   


 


Est GFR ( Amer)   


 


Est GFR (Non-Af Amer)   


 


POC Glucose (mg/dL)  202 H   266 H


 


Random Glucose   


 


Lactic Acid   


 


Calcium   


 


Phosphorus   


 


Magnesium   


 


Total Bilirubin   


 


AST   


 


ALT   


 


Alkaline Phosphatase   


 


Total Protein   


 


Albumin   


 


Globulin   


 


Albumin/Globulin Ratio   


 


Arterial Blood Potassium   4.6 














  03/04/18 03/04/18 03/04/18





  06:08 06:08 06:08


 


WBC   


 


RBC   


 


Hgb   


 


Hct   


 


MCV   


 


MCH   


 


MCHC   


 


RDW   


 


Plt Count   


 


MPV   


 


Neut % (Auto)   


 


Lymph % (Auto)   


 


Mono % (Auto)   


 


Eos % (Auto)   


 


Baso % (Auto)   


 


Neut # (Auto)   


 


Lymph # (Auto)   


 


Mono # (Auto)   


 


Eos # (Auto)   


 


Baso # (Auto)   


 


Neutrophils % (Manual)   


 


Band Neutrophils %   


 


Lymphocytes % (Manual)   


 


Reactive Lymphs %   


 


Monocytes % (Manual)   


 


Metamyelocytes %   


 


Nucleated RBC %   


 


Toxic Granulation   


 


Platelet Estimate   


 


Large Platelets   


 


Giant Platelets   


 


Polychromasia   


 


Hypochromasia (manual)   


 


Poikilocytosis (manual   


 


Anisocytosis (manual)   


 


Microcytosis (manual)   


 


Macrocytosis (manual)   


 


Ovalocytes   


 


APTT   53 H D 


 


Puncture Site   


 


pCO2   


 


pO2   


 


HCO3   


 


ABG pH   


 


ABG Total CO2   


 


ABG O2 Saturation   


 


ABG Base Excess   


 


Jesus Test   


 


ABG Potassium   


 


A-a O2 Difference   


 


Respiratory Index   


 


Sodium  143  


 


Chloride  101  


 


Glucose   


 


Lactate   


 


Vent Mode   


 


Mechanical Rate   


 


FiO2   


 


Tidal Volume   


 


PEEP   


 


Crit Value Called To   


 


Crit Value Called By   


 


Crit Value Read Back   


 


Blood Gas Notified Time   


 


Potassium  4.5  


 


Carbon Dioxide  20 L  


 


Anion Gap  26 H  


 


BUN  98 H  


 


Creatinine  5.7 H  


 


Est GFR ( Amer)  9  


 


Est GFR (Non-Af Amer)  7  


 


POC Glucose (mg/dL)   


 


Random Glucose  241 H  


 


Lactic Acid    4.8 H*


 


Calcium  7.3 L  


 


Phosphorus   


 


Magnesium   


 


Total Bilirubin  1.8 H  


 


AST  6076 H  


 


ALT  2845 H  


 


Alkaline Phosphatase  80  


 


Total Protein  5.3 L  


 


Albumin  2.5 L  


 


Globulin  2.7  


 


Albumin/Globulin Ratio  0.9 L  


 


Arterial Blood Potassium   














  03/04/18 03/04/18





  06:11 11:21


 


WBC  11.0 H 


 


RBC  2.72 L 


 


Hgb  7.6 L 


 


Hct  22.9 L 


 


MCV  84.2 


 


MCH  27.8 


 


MCHC  33.0 


 


RDW  16.5 H 


 


Plt Count  88 L D 


 


MPV  9.5 


 


Neut % (Auto)  89.3 H 


 


Lymph % (Auto)  7.2 L 


 


Mono % (Auto)  3.1 


 


Eos % (Auto)  0.2 


 


Baso % (Auto)  0.2 


 


Neut # (Auto)  9.8 H 


 


Lymph # (Auto)  0.8 L 


 


Mono # (Auto)  0.3 


 


Eos # (Auto)  0.0 


 


Baso # (Auto)  0.0 


 


Neutrophils % (Manual)  85 H 


 


Band Neutrophils %  6 H 


 


Lymphocytes % (Manual)  6 L 


 


Reactive Lymphs %  1 H 


 


Monocytes % (Manual)  1 


 


Metamyelocytes %  1 H 


 


Nucleated RBC %  2 H 


 


Toxic Granulation  Present 


 


Platelet Estimate  Decreased L 


 


Large Platelets  Present 


 


Giant Platelets  Present 


 


Polychromasia  Slight 


 


Hypochromasia (manual)  Slight 


 


Poikilocytosis (manual  Slight 


 


Anisocytosis (manual)  Slight 


 


Microcytosis (manual)  Slight 


 


Macrocytosis (manual)  Slight 


 


Ovalocytes  Slight 


 


APTT  


 


Puncture Site  


 


pCO2  


 


pO2  


 


HCO3  


 


ABG pH  


 


ABG Total CO2  


 


ABG O2 Saturation  


 


ABG Base Excess  


 


Jesus Test  


 


ABG Potassium  


 


A-a O2 Difference  


 


Respiratory Index  


 


Sodium  


 


Chloride  


 


Glucose  


 


Lactate  


 


Vent Mode  


 


Mechanical Rate  


 


FiO2  


 


Tidal Volume  


 


PEEP  


 


Crit Value Called To  


 


Crit Value Called By  


 


Crit Value Read Back  


 


Blood Gas Notified Time  


 


Potassium  


 


Carbon Dioxide  


 


Anion Gap  


 


BUN  


 


Creatinine  


 


Est GFR ( Amer)  


 


Est GFR (Non-Af Amer)  


 


POC Glucose (mg/dL)   300 H


 


Random Glucose  


 


Lactic Acid  


 


Calcium  


 


Phosphorus  


 


Magnesium  


 


Total Bilirubin  


 


AST  


 


ALT  


 


Alkaline Phosphatase  


 


Total Protein  


 


Albumin  


 


Globulin  


 


Albumin/Globulin Ratio  


 


Arterial Blood Potassium  











Fingerstick Blood Sugar Results: 300





Review of Systems





- Review of Systems


Systems not reviewed;Unavailable: Intubated





Assessment/Plan


(1) Cardiac arrest


Assessment and plan: 


Neruo: no sedation, alert, not following commands, making puposeful movements.





Pulm: acute respiratory failure on vent.





CV: NSTEMI, continue heparin gtt.  EF - 30%.





Hem: ASA for CAD, Procrit for anemia, iron tid for anemia of chronic disease.





Renal: dialysis today and prn.





Endo: SISS for coverage





GI: Nepro@25





ID: empiric coverage for suspected Aztreonam, Moxifloxacin, Flagyl.





GI proph - protonix


DVT proph - heparin gtt


full code





Critical Care time 35 minutes


Current Visit: Yes   Status: Acute   





- Assessment and Plan (Free Text)


Assessment: 








Neuro: 





Pulm:





CV:





Hem:





Renal:





Endo:





GI:





ID:





DVT proph - 


GI proph - 


brewster for strict I/O's during acute illness


Code status - 





Critical Care Time spent


Multi-disciplinary rounds were performed with house staff, nursing, speech 

therapy, respiratory therapy, pharmacy and nutrition with integrated input from 

the primary team/attending and other consulting services.  The documented time 

is cumulative and includes review of patient data/exams/labs/chart review and 

examination of the patient on rounds and throughout the day; time is exclusive 

of any procedures or teaching time.

## 2018-03-05 LAB
ALBUMIN SERPL-MCNC: 2.5 G/DL (ref 3.5–5)
ALBUMIN/GLOB SERPL: 0.9 {RATIO} (ref 1–2.1)
ALT SERPL-CCNC: 2074 U/L (ref 9–52)
ANISOCYTOSIS BLD QL SMEAR: SLIGHT
ARTERIAL BLOOD GAS HEMOGLOBIN: 7.8 G/DL (ref 11.7–17.4)
ARTERIAL BLOOD GAS O2 SAT: 99.8 % (ref 95–98)
ARTERIAL BLOOD GAS PCO2: 30 MM/HG (ref 35–45)
ARTERIAL BLOOD GAS TCO2: 24.3 MMOL/L (ref 22–28)
ARTERIAL PATENCY WRIST A: (no result)
AST SERPL-CCNC: 1253 U/L (ref 14–36)
BASOPHILS # BLD AUTO: 0 K/UL (ref 0–0.2)
BASOPHILS NFR BLD: 0.2 % (ref 0–2)
BUN SERPL-MCNC: 67 MG/DL (ref 7–17)
BURR CELLS BLD QL SMEAR: SLIGHT
CALCIUM SERPL-MCNC: 7.6 MG/DL (ref 8.6–10.4)
EOSINOPHIL # BLD AUTO: 0 K/UL (ref 0–0.7)
EOSINOPHIL NFR BLD: 0.1 % (ref 0–4)
ERYTHROCYTE [DISTWIDTH] IN BLOOD BY AUTOMATED COUNT: 16.8 % (ref 11.5–14.5)
GFR NON-AFRICAN AMERICAN: 9
GIANT PLATELETS BLD QL SMEAR: PRESENT
HCO3 BLDA-SCNC: 25.3 MMOL/L (ref 21–28)
HGB BLD-MCNC: 7.8 G/DL (ref 11–16)
HYPOCHROMIC: SLIGHT
INHALED O2 CONCENTRATION: 40 %
LG PLATELETS BLD QL SMEAR: PRESENT
LYMPHOCYTE: 6 % (ref 20–40)
LYMPHOCYTES # BLD AUTO: 1.1 K/UL (ref 1–4.3)
LYMPHOCYTES NFR BLD AUTO: 8.2 % (ref 20–40)
MACROCYTES BLD QL SMEAR: SLIGHT
MCH RBC QN AUTO: 27.8 PG (ref 27–31)
MCHC RBC AUTO-ENTMCNC: 32.7 G/DL (ref 33–37)
MCV RBC AUTO: 84.9 FL (ref 81–99)
MONOCYTE: 8 % (ref 0–10)
MONOCYTES # BLD: 0.7 K/UL (ref 0–0.8)
MONOCYTES NFR BLD: 5 % (ref 0–10)
NEUTROPHILS # BLD: 11.6 K/UL (ref 1.8–7)
NEUTROPHILS NFR BLD AUTO: 86 % (ref 50–75)
NEUTROPHILS NFR BLD AUTO: 86.5 % (ref 50–75)
NRBC BLD AUTO-RTO: 3 % (ref 0–0)
NRBC BLD AUTO-RTO: 3.4 % (ref 0–2)
PH BLDA: 7.5 [PH] (ref 7.35–7.45)
PLATELET # BLD EST: (no result) 10*3/UL
PLATELET # BLD: 102 K/UL (ref 130–400)
PMV BLD AUTO: 9.6 FL (ref 7.2–11.7)
PO2 BLDA: 145 MM/HG (ref 80–100)
POIKILOCYTOSIS BLD QL SMEAR: SLIGHT
POLYCHROMIC: SLIGHT
RBC # BLD AUTO: 2.8 MIL/UL (ref 3.8–5.2)
TARGETS BLD QL SMEAR: SLIGHT
TOTAL CELLS COUNTED BLD: 100
WBC # BLD AUTO: 13.4 K/UL (ref 4.8–10.8)

## 2018-03-05 RX ADMIN — WATER SCH MLS/HR: 1 INJECTION INTRAMUSCULAR; INTRAVENOUS; SUBCUTANEOUS at 14:21

## 2018-03-05 RX ADMIN — WATER SCH MLS/HR: 1 INJECTION INTRAMUSCULAR; INTRAVENOUS; SUBCUTANEOUS at 05:35

## 2018-03-05 RX ADMIN — INSULIN ASPART SCH: 100 INJECTION, SOLUTION INTRAVENOUS; SUBCUTANEOUS at 18:00

## 2018-03-05 RX ADMIN — WATER SCH MLS/HR: 1 INJECTION INTRAMUSCULAR; INTRAVENOUS; SUBCUTANEOUS at 21:50

## 2018-03-05 RX ADMIN — INSULIN ASPART SCH UNIT: 100 INJECTION, SOLUTION INTRAVENOUS; SUBCUTANEOUS at 05:34

## 2018-03-05 RX ADMIN — HEPARIN SODIUM PRN MLS/HR: 10000 INJECTION, SOLUTION INTRAVENOUS at 11:11

## 2018-03-05 RX ADMIN — INSULIN ASPART SCH UNIT: 100 INJECTION, SOLUTION INTRAVENOUS; SUBCUTANEOUS at 00:27

## 2018-03-05 RX ADMIN — INSULIN ASPART SCH UNIT: 100 INJECTION, SOLUTION INTRAVENOUS; SUBCUTANEOUS at 12:43

## 2018-03-05 RX ADMIN — Medication SCH TAB: at 07:59

## 2018-03-05 RX ADMIN — INSULIN GLARGINE SCH U: 100 INJECTION, SOLUTION SUBCUTANEOUS at 21:57

## 2018-03-05 RX ADMIN — MOXIFLOXACIN HYDROCHLORIDE SCH MLS/HR: 400 INJECTION, SOLUTION INTRAVENOUS at 00:24

## 2018-03-05 NOTE — CP.PCM.PN
Subjective





- Date & Time of Evaluation


Date of Evaluation: 03/05/18


Time of Evaluation: 02:35





- Subjective


Subjective: 





Patient was seen and examined by me. 





She remains intubated on PRVC settings at this time. Not on pressor medications 

at this time. 


She was able to open her eyes as well as lift both her arms bilaterally for a 

very brief period of time





As mentioned previously the patient has a history of ESRD and was not on HD, Hx 

of DM, HTN, previous MI as well as TIA, and colon cancer. She was brought in by 

family due to alter mental status and reportedly had chest pain since the 

previous day before coming to hospital. 


She was found to be in severe metabolic acidosis and a severely elevated 

troponin of 103. 


























Objective





- Vital Signs/Intake and Output


Vital Signs (last 24 hours): 


 











Temp Pulse Resp BP Pulse Ox


 


 97.4 F L  105 H  16   118/65   95 


 


 03/05/18 12:00  03/05/18 12:00  03/05/18 12:00  03/05/18 11:48  03/05/18 12:00








Intake and Output: 


 











 03/05/18 03/05/18





 06:59 18:59


 


Intake Total 1056.8 360


 


Output Total 0 


 


Balance 1056.8 360














- Medications


Medications: 


 Current Medications





Aspirin (Aspirin Chewable)  81 mg PO DAILY [facility]


   Last Admin: 03/05/18 10:13 Dose:  81 mg


Calcium Acetate (Phoslo)  667 mg PO TID [facility]


   Last Admin: 03/05/18 14:21 Dose:  667 mg


Epoetin Vamsi (Procrit)  8,000 unit IV MWF [facility]


Ergocalciferol (Drisdol 50,000 Intl Units Cap)  1 cap PO Q7D [facility]


   Last Admin: 03/03/18 13:51 Dose:  1 cap


Ferrous Gluconate (Fergon)  324 mg PO TID [facility]


   Last Admin: 03/05/18 14:21 Dose:  324 mg


Aztreonam 1 gm/ Sodium (Chloride)  100 mls @ 100 mls/hr IVPB Q12H [facility]


   Last Admin: 03/05/18 12:43 Dose:  100 mls/hr


Metronidazole (Flagyl)  500 mg in 100 mls @ 100 mls/hr IVPB Q8H [facility]


   Last Admin: 03/05/18 14:21 Dose:  100 mls/hr


Heparin Sodium/Sodium Chloride (Heparin 73993 Units/250ml 1/2 Normal Saline)  25

,000 units in 250 mls @ 7.641 mls/hr IV .Q24H PRN; Protocol; 12 UNIT/KG/HR


   PRN Reason: PROTOCOL


   Last Titration: 03/05/18 11:12 Dose:  15.7 unit/kg/hr, 10 mls/hr


Insulin Aspart (Novolog)  0 unit SC Q6 [facility]


   PRN Reason: Protocol


   Last Admin: 03/05/18 12:43 Dose:  2 unit


Insulin Glargine (Lantus)  15 unit SC HS [facility]


   Last Admin: 03/04/18 21:36 Dose:  15 u


Metoprolol Tartrate (Lopressor)  12.5 mg PO BID [facility]


Pantoprazole Sodium (Protonix Inj)  40 mg IVP DAILY [facility]


   Last Admin: 03/05/18 10:13 Dose:  40 mg


Rosuvastatin Calcium (Crestor)  5 mg PO HS [facility]


   Last Admin: 03/04/18 21:35 Dose:  5 mg


Vitamin B Complex/Vit C/Folic Acid (Nephro-Nneka)  1 tab PO 0800 [facility]


   Last Admin: 03/05/18 07:59 Dose:  1 tab











- Labs


Labs: 


 





 03/05/18 06:54 





 03/05/18 06:54 





 











PT  14.6 SECONDS (9.7-12.2)  H  03/02/18  02:17    


 


INR  1.3   03/02/18  02:17    


 


APTT  73 SECONDS (21-34)  H D 03/05/18  05:14    














- Constitutional


Appears: Toxic, Confused, Chronically Ill





- ENT Exam


ENT Exam: Mucous Membranes Moist





- Respiratory Exam


Respiratory Exam: Decreased Breath Sounds, Rales, Rhonchi





- Cardiovascular Exam


Cardiovascular Exam: REGULAR RHYTHM





- GI/Abdominal Exam


GI & Abdominal Exam: Soft, Normal Bowel Sounds





- Neurological Exam


Neurological Exam: Alert, Altered, Awake.  absent: Abnormal Gait, CN II-XII 

Intact, Normal Gait, Oriented x3, Reflexes Normal


Neuro motor strength exam: Left Upper Extremity: 3, Right Upper Extremity: 3





- Psychiatric Exam


Psychiatric exam: Normal Affect, Normal Mood





- Skin


Skin Exam: Normal Color, Warm





Assessment and Plan





- Assessment and Plan (Free Text)


Assessment: 








As previously mentioned, this is a 77 year old female with a history of ESRD 

now getting HD, CHF, DM, HTN, hypercholesterolemia, and some sort of MI years 

before, repeated TIAs, and colon CA. She was brought in by family for altered 

mental status and ultimately found to have severe metabolic acidosis as well as 

a extremely elevated troponins. 





Plan: 





1 Acute NSTEMI ,  S/P Hypotension/Asystol ,CPR/Sustained VT/Intubation


3/5/2018: Now off pressor medications. She remains on amiodarone   


Echo EF 30%. Troponins were as high as 103 then decreased. 


Currently on a heparin ggt, ASA, Statin.  





2.Acute renal failure and Anuria


3/5/2018: She has had one session of HD so far. Likley another session today





3. Leukocytosis concerning for sepsis


3/5/2018: The elevated WBC maybe stress related.  


She remains on abx Aztreonam IV and Flagyl IV





4.Acute systolic heart failure


  Possible myocarditis,continue heparin and asprin


  ECHO shows EF 30%





5 .Pleural effusion,r/o pneumonia


3/5/2018: Pleural effusions minimal at this time. Continue to monitor


    D/W intensivist Her effusion is likely cardiac. No plan for tap


    follow chest x ray and dialysis


    





6.Hyperglycemia and uncontrolled DM


   continue Lantus and monitor sugar





7.Anemia


   likely due to CRF


   anemia work up





8.Code status -full code

## 2018-03-05 NOTE — RAD
HISTORY:

intubated  



COMPARISON:

3/4/2018 3/4/2018 



FINDINGS:



LUNGS:

No active pulmonary disease.



PLEURA:

Small left pleural effusion.  No pneumothorax. No right pleural 

effusion.



CARDIOVASCULAR:

Normal heart size.  ET tube, NG tube and right multi lumen internal 

jugular central venous catheter are unchanged.



OSSEOUS STRUCTURES:

No significant abnormalities.



VISUALIZED UPPER ABDOMEN:

Normal.



OTHER FINDINGS:

None.



IMPRESSION:

Small left pleural effusion.  Lines and tubes are unchanged.

## 2018-03-05 NOTE — CP.CCUPN
CCU Subjective





- Physician Review


Subjective (Free Text): 





03/02/18 15:06


Patient seen and examined at bedside


AMS


spoke with nephro and want to wait for diaylsis until cardio decides to do cath 

or not





03/02/18 17:16


PMD talked with cardio and Nephro. Medical managment for cardiac issues at this 

time. OK to go ahead with dialysis. First session tonight





03/05/18 10:35


Over weekend patient had Vfib arrest


tolerated dialysis for 45 min before becoming hypotensive


Patient will again have dialysis today


Giving blood today








CCU Objective





- Vital Signs / Intake & Output


Intake and Output (Last 8hrs): 


 Intake & Output











 03/04/18 03/05/18 03/05/18





 22:59 06:59 14:59


 


Intake Total 816.1 730.0 


 


Output Total 0 0 


 


Balance 816.1 730.0 


 


Weight  140 lb 6 oz 


 


Intake:   


 


  IV 68  


 


  Intake, IV Amount 548.1 530.0 


 


    Right Forearm 200  


 


    Right Hand 90.0 80.0 


 


    Right Internal Jugular 100 450 


 


    distal 133.6 0 


 


    medial 24.5  


 


  Tube Feeding 200 200 


 


Output:   


 


  Urine 0 0 


 


    Urethral ([place]) 0 0 


 


Other:   


 


  # Bowel Movements 0 1 














- Physical Exam


Head: Positive for: Atraumatic, Normocephalic.  Negative for: Tenderness, 

Contusion, Swelling


Pupils: Positive for: PERRL.  Negative for: Sluggish, Non-Reactive


Extroacular Muscles: Positive for: EOMI


Conjunctiva: Positive for: Normal


Mouth: Positive for: Dry


Pharnyx: Positive for: Normal


Nose (External): Positive for: Atraumatic


Neck: Positive for: Trachea Midline.  Negative for: JVD


Respiratory/Chest: Positive for: Good Air Exchange, Accessory Muscle Use, 

Decreased Breath Sounds (on bilateral bases).  Negative for: Wheezes


Cardiovascular: Positive for: Tachycardic


Abdomen: Positive for: Distention, Normal Bowel Sounds.  Negative for: 

Peritoneal Signs


Upper Extremity: Negative for: Cyanosis, Edema


Lower Extremity: Negative for: Edema


Psychiatric: Positive for: Alert





- Medications


Active Medications: 


Active Medications











Generic Name Dose Route Start Last Admin





  Trade Name Freq  PRN Reason Stop Dose Admin


 


Aspirin  81 mg  03/02/18 10:00  03/05/18 10:13





  Aspirin Chewable  PO   81 mg





  DAILY ABHILASH   Administration


 


Calcium Acetate  667 mg  03/02/18 14:00  03/05/18 10:13





  Phoslo  PO   667 mg





  TID [facility]   Administration


 


Epoetin Vamsi  8,000 unit  03/05/18 10:13  





  Procrit  IV   





  MWF [facility]   


 


Ergocalciferol  1 cap  03/03/18 14:45  03/03/18 13:51





  Drisdol 50,000 Intl Units Cap  PO   1 cap





  Q7D ABHILASH   Administration


 


Ferrous Gluconate  324 mg  03/03/18 10:00  03/05/18 10:13





  Fergon  PO   324 mg





  TID [facility]   Administration


 


Aztreonam 1 gm/ Sodium  100 mls @ 100 mls/hr  03/03/18 13:30  03/05/18 01:42





  Chloride  IVPB   100 mls/hr





  Q12H ABHILASH   Administration


 


Metronidazole  500 mg in 100 mls @ 100 mls/hr  03/03/18 14:30  03/05/18 05:35





  Flagyl  IVPB   100 mls/hr





  Q8H ABHILASH   Administration


 


Heparin Sodium/Sodium Chloride  25,000 units in 250 mls @ 7.641 mls/hr  03/05/ 18 08:30  





  Heparin 05814 Units/250ml 1/2 Normal Saline  IV   





  .Q24H PRN   





  PROTOCOL   





  Protocol   





  12 UNIT/KG/HR   


 


Insulin Aspart  0 unit  03/04/18 11:49  03/05/18 05:34





  Novolog  SC   4 unit





  Q6 ABHILASH   Administration





  Protocol   


 


Insulin Glargine  15 unit  03/02/18 22:00  03/04/18 21:36





  Lantus  SC   15 u





  HS [facility]   Administration


 


Metoprolol Tartrate  12.5 mg  03/05/18 22:00  





  Lopressor  PO   





  BID [facility]   


 


Pantoprazole Sodium  40 mg  03/05/18 10:00  03/05/18 10:13





  Protonix Inj  IVP   40 mg





  DAILY [facility]   Administration


 


Rosuvastatin Calcium  5 mg  03/02/18 01:15  03/04/18 21:35





  Crestor  PO   5 mg





  HS [facility]   Administration


 


Vitamin B Complex/Vit C/Folic Acid  1 tab  03/03/18 08:00  03/05/18 07:59





  Nephro-Nneka  PO   1 tab





  0800 ABHILASH   Administration














- Patient Studies


Lab Studies: 


 Microbiology Studies











 03/01/18 06:30 Blood Culture - Preliminary





 Blood-Venous    NO GROWTH AFTER 3 DAYS


 


 03/01/18 06:00 Blood Culture - Preliminary





 Blood-Venous    NO GROWTH AFTER 3 DAYS








 Lab Studies











  03/05/18 03/05/18 03/05/18 Range/Units





  06:54 06:54 05:30 


 


WBC   13.4 H   (4.8-10.8)  K/uL


 


RBC   2.80 L   (3.80-5.20)  Mil/uL


 


Hgb   7.8 L   (11.0-16.0)  g/dL


 


Hct   23.8 L   (34.0-47.0)  %


 


MCV   84.9   (81.0-99.0)  fL


 


MCH   27.8   (27.0-31.0)  pg


 


MCHC   32.7 L   (33.0-37.0)  g/dL


 


RDW   16.8 H   (11.5-14.5)  %


 


Plt Count   102 L   (130-400)  K/uL


 


MPV   9.6   (7.2-11.7)  fL


 


Neut % (Auto)   86.5 H   (50.0-75.0)  %


 


Lymph % (Auto)   8.2 L   (20.0-40.0)  %


 


Mono % (Auto)   5.0   (0.0-10.0)  %


 


Eos % (Auto)   0.1   (0.0-4.0)  %


 


Baso % (Auto)   0.2   (0.0-2.0)  %


 


Neut # (Auto)   11.6 H   (1.8-7.0)  K/uL


 


Lymph # (Auto)   1.1   (1.0-4.3)  K/uL


 


Mono # (Auto)   0.7   (0.0-0.8)  K/uL


 


Eos # (Auto)   0.0   (0.0-0.7)  K/uL


 


Baso # (Auto)   0.0   (0.0-0.2)  K/uL


 


Neutrophils % (Manual)   86 H   (50-75)  %


 


Lymphocytes % (Manual)   6 L   (20-40)  %


 


Monocytes % (Manual)   8   (0-10)  %


 


Nucleated RBC %   3 H   (0-0)  %


 


Platelet Estimate   Slightly decreased L   (NORMAL)  


 


Large Platelets   Present   


 


Giant Platelets   Present   


 


Polychromasia   Slight   


 


Hypochromasia (manual)   Slight   


 


Poikilocytosis (manual   Slight   


 


Anisocytosis (manual)   Slight   


 


Macrocytosis (manual)   Slight   


 


Target Cells   Slight   


 


[place] Cells   Slight   


 


APTT     (21-34)  SECONDS


 


Puncture Site     


 


pCO2     (35-45)  mm/Hg


 


pO2     ()  mm/Hg


 


HCO3     (21-28)  mmol/L


 


ABG pH     (7.35-7.45)  


 


ABG Total CO2     (22-28)  mmol/L


 


ABG O2 Saturation     (95-98)  %


 


ABG Base Excess     (-2.0-3.0)  mmol/L


 


ABG Hemoglobin     (11.7-17.4)  g/dL


 


ABG Carboxyhemoglobin     (0.5-1.5)  %


 


POC ABG HHb (Measured)     (0.0-5.0)  %


 


ABG Methemoglobin     (0.0-3.0)  %


 


Jesus Test     


 


A-a O2 Difference     mm/Hg


 


Respiratory Index     


 


Hgb O2 Saturation     (95.0-98.0)  %


 


Vent Mode     


 


Mechanical Rate     


 


FiO2     %


 


Tidal Volume     


 


PEEP     


 


Sodium  139    (132-148)  mmol/L


 


Potassium  3.5 L    (3.6-5.2)  mmol/L


 


Chloride  102    ()  mmol/L


 


Carbon Dioxide  25    (22-30)  mmol/L


 


Anion Gap  16    (10-20)  


 


BUN  67 H    (7-17)  mg/dL


 


Creatinine  4.6 H    (0.7-1.2)  mg/dL


 


Est GFR ( Amer)  11    


 


Est GFR (Non-Af Amer)  9    


 


POC Glucose (mg/dL)    231 H  ()  mg/dL


 


Random Glucose  227 H    ()  mg/dL


 


Calcium  7.6 L    (8.6-10.4)  mg/dl


 


Total Bilirubin  1.3    (0.2-1.3)  mg/dL


 


AST  1253 H    (14-36)  U/L


 


ALT  2074 H    (9-52)  U/L


 


Alkaline Phosphatase  123    ()  U/L


 


Total Protein  5.4 L    (6.3-8.3)  g/dL


 


Albumin  2.5 L    (3.5-5.0)  g/dL


 


Globulin  2.9    (2.2-3.9)  gm/dL


 


Albumin/Globulin Ratio  0.9 L    (1.0-2.1)  


 


PTH Intact Whole Molec     (14-64)  pg/mL














  03/05/18 03/05/18 03/04/18 Range/Units





  05:14 05:14 23:46 


 


WBC     (4.8-10.8)  K/uL


 


RBC     (3.80-5.20)  Mil/uL


 


Hgb     (11.0-16.0)  g/dL


 


Hct     (34.0-47.0)  %


 


MCV     (81.0-99.0)  fL


 


MCH     (27.0-31.0)  pg


 


MCHC     (33.0-37.0)  g/dL


 


RDW     (11.5-14.5)  %


 


Plt Count     (130-400)  K/uL


 


MPV     (7.2-11.7)  fL


 


Neut % (Auto)     (50.0-75.0)  %


 


Lymph % (Auto)     (20.0-40.0)  %


 


Mono % (Auto)     (0.0-10.0)  %


 


Eos % (Auto)     (0.0-4.0)  %


 


Baso % (Auto)     (0.0-2.0)  %


 


Neut # (Auto)     (1.8-7.0)  K/uL


 


Lymph # (Auto)     (1.0-4.3)  K/uL


 


Mono # (Auto)     (0.0-0.8)  K/uL


 


Eos # (Auto)     (0.0-0.7)  K/uL


 


Baso # (Auto)     (0.0-0.2)  K/uL


 


Neutrophils % (Manual)     (50-75)  %


 


Lymphocytes % (Manual)     (20-40)  %


 


Monocytes % (Manual)     (0-10)  %


 


Nucleated RBC %     (0-0)  %


 


Platelet Estimate     (NORMAL)  


 


Large Platelets     


 


Giant Platelets     


 


Polychromasia     


 


Hypochromasia (manual)     


 


Poikilocytosis (manual     


 


Anisocytosis (manual)     


 


Macrocytosis (manual)     


 


Target Cells     


 


[place] Cells     


 


APTT   73 H D   (21-34)  SECONDS


 


Puncture Site  Rr    


 


pCO2  30 L    (35-45)  mm/Hg


 


pO2  145 H    ()  mm/Hg


 


HCO3  25.3    (21-28)  mmol/L


 


ABG pH  7.50 H    (7.35-7.45)  


 


ABG Total CO2  24.3    (22-28)  mmol/L


 


ABG O2 Saturation  99.8 H    (95-98)  %


 


ABG Base Excess  0.4    (-2.0-3.0)  mmol/L


 


ABG Hemoglobin  7.8 L    (11.7-17.4)  g/dL


 


ABG Carboxyhemoglobin  1.8 H    (0.5-1.5)  %


 


POC ABG HHb (Measured)  0.2    (0.0-5.0)  %


 


ABG Methemoglobin  0.7    (0.0-3.0)  %


 


Jesus Test  Pos    


 


A-a O2 Difference  103.0    mm/Hg


 


Respiratory Index  0.7    


 


Hgb O2 Saturation  97.3    (95.0-98.0)  %


 


Vent Mode  Prvc    


 


Mechanical Rate  16    


 


FiO2  40.0    %


 


Tidal Volume  500    


 


PEEP  5    


 


Sodium     (132-148)  mmol/L


 


Potassium     (3.6-5.2)  mmol/L


 


Chloride     ()  mmol/L


 


Carbon Dioxide     (22-30)  mmol/L


 


Anion Gap     (10-20)  


 


BUN     (7-17)  mg/dL


 


Creatinine     (0.7-1.2)  mg/dL


 


Est GFR (African Amer)     


 


Est GFR (Non-Af Amer)     


 


POC Glucose (mg/dL)    234 H  ()  mg/dL


 


Random Glucose     ()  mg/dL


 


Calcium     (8.6-10.4)  mg/dl


 


Total Bilirubin     (0.2-1.3)  mg/dL


 


AST     (14-36)  U/L


 


ALT     (9-52)  U/L


 


Alkaline Phosphatase     ()  U/L


 


Total Protein     (6.3-8.3)  g/dL


 


Albumin     (3.5-5.0)  g/dL


 


Globulin     (2.2-3.9)  gm/dL


 


Albumin/Globulin Ratio     (1.0-2.1)  


 


PTH Intact Whole Molec     (14-64)  pg/mL














  03/04/18 03/04/18 03/02/18 Range/Units





  17:31 11:21 12:19 


 


WBC     (4.8-10.8)  K/uL


 


RBC     (3.80-5.20)  Mil/uL


 


Hgb     (11.0-16.0)  g/dL


 


Hct     (34.0-47.0)  %


 


MCV     (81.0-99.0)  fL


 


MCH     (27.0-31.0)  pg


 


MCHC     (33.0-37.0)  g/dL


 


RDW     (11.5-14.5)  %


 


Plt Count     (130-400)  K/uL


 


MPV     (7.2-11.7)  fL


 


Neut % (Auto)     (50.0-75.0)  %


 


Lymph % (Auto)     (20.0-40.0)  %


 


Mono % (Auto)     (0.0-10.0)  %


 


Eos % (Auto)     (0.0-4.0)  %


 


Baso % (Auto)     (0.0-2.0)  %


 


Neut # (Auto)     (1.8-7.0)  K/uL


 


Lymph # (Auto)     (1.0-4.3)  K/uL


 


Mono # (Auto)     (0.0-0.8)  K/uL


 


Eos # (Auto)     (0.0-0.7)  K/uL


 


Baso # (Auto)     (0.0-0.2)  K/uL


 


Neutrophils % (Manual)     (50-75)  %


 


Lymphocytes % (Manual)     (20-40)  %


 


Monocytes % (Manual)     (0-10)  %


 


Nucleated RBC %     (0-0)  %


 


Platelet Estimate     (NORMAL)  


 


Large Platelets     


 


Giant Platelets     


 


Polychromasia     


 


Hypochromasia (manual)     


 


Poikilocytosis (manual     


 


Anisocytosis (manual)     


 


Macrocytosis (manual)     


 


Target Cells     


 


[place] Cells     


 


APTT     (21-34)  SECONDS


 


Puncture Site     


 


pCO2     (35-45)  mm/Hg


 


pO2     ()  mm/Hg


 


HCO3     (21-28)  mmol/L


 


ABG pH     (7.35-7.45)  


 


ABG Total CO2     (22-28)  mmol/L


 


ABG O2 Saturation     (95-98)  %


 


ABG Base Excess     (-2.0-3.0)  mmol/L


 


ABG Hemoglobin     (11.7-17.4)  g/dL


 


ABG Carboxyhemoglobin     (0.5-1.5)  %


 


POC ABG HHb (Measured)     (0.0-5.0)  %


 


ABG Methemoglobin     (0.0-3.0)  %


 


Jesus Test     


 


A-a O2 Difference     mm/Hg


 


Respiratory Index     


 


Hgb O2 Saturation     (95.0-98.0)  %


 


Vent Mode     


 


Mechanical Rate     


 


FiO2     %


 


Tidal Volume     


 


PEEP     


 


Sodium     (132-148)  mmol/L


 


Potassium     (3.6-5.2)  mmol/L


 


Chloride     ()  mmol/L


 


Carbon Dioxide     (22-30)  mmol/L


 


Anion Gap     (10-20)  


 


BUN     (7-17)  mg/dL


 


Creatinine     (0.7-1.2)  mg/dL


 


Est GFR (African Amer)     


 


Est GFR (Non-Af Amer)     


 


POC Glucose (mg/dL)  214 H  300 H   ()  mg/dL


 


Random Glucose     ()  mg/dL


 


Calcium     (8.6-10.4)  mg/dl


 


Total Bilirubin     (0.2-1.3)  mg/dL


 


AST     (14-36)  U/L


 


ALT     (9-52)  U/L


 


Alkaline Phosphatase     ()  U/L


 


Total Protein     (6.3-8.3)  g/dL


 


Albumin     (3.5-5.0)  g/dL


 


Globulin     (2.2-3.9)  gm/dL


 


Albumin/Globulin Ratio     (1.0-2.1)  


 


PTH Intact Whole Molec    1119 H  (14-64)  pg/mL








 Laboratory Results - last 24 hr











  03/02/18 03/04/18 03/04/18





  12:19 11:21 17:31


 


WBC   


 


RBC   


 


Hgb   


 


Hct   


 


MCV   


 


MCH   


 


MCHC   


 


RDW   


 


Plt Count   


 


MPV   


 


Neut % (Auto)   


 


Lymph % (Auto)   


 


Mono % (Auto)   


 


Eos % (Auto)   


 


Baso % (Auto)   


 


Neut # (Auto)   


 


Lymph # (Auto)   


 


Mono # (Auto)   


 


Eos # (Auto)   


 


Baso # (Auto)   


 


Neutrophils % (Manual)   


 


Lymphocytes % (Manual)   


 


Monocytes % (Manual)   


 


Nucleated RBC %   


 


Platelet Estimate   


 


Large Platelets   


 


Giant Platelets   


 


Polychromasia   


 


Hypochromasia (manual)   


 


Poikilocytosis (manual   


 


Anisocytosis (manual)   


 


Macrocytosis (manual)   


 


Target Cells   


 


Clara Cells   


 


APTT   


 


Puncture Site   


 


pCO2   


 


pO2   


 


HCO3   


 


ABG pH   


 


ABG Total CO2   


 


ABG O2 Saturation   


 


ABG Base Excess   


 


ABG Hemoglobin   


 


ABG Carboxyhemoglobin   


 


POC ABG HHb (Measured)   


 


ABG Methemoglobin   


 


Jesus Test   


 


A-a O2 Difference   


 


Respiratory Index   


 


Hgb O2 Saturation   


 


Vent Mode   


 


Mechanical Rate   


 


FiO2   


 


Tidal Volume   


 


PEEP   


 


Sodium   


 


Potassium   


 


Chloride   


 


Carbon Dioxide   


 


Anion Gap   


 


BUN   


 


Creatinine   


 


Est GFR ( Amer)   


 


Est GFR (Non-Af Amer)   


 


POC Glucose (mg/dL)   300 H  214 H


 


Random Glucose   


 


Calcium   


 


Total Bilirubin   


 


AST   


 


ALT   


 


Alkaline Phosphatase   


 


Total Protein   


 


Albumin   


 


Globulin   


 


Albumin/Globulin Ratio   


 


PTH Intact Whole Molec  1119 H  














  03/04/18 03/05/18 03/05/18





  23:46 05:14 05:14


 


WBC   


 


RBC   


 


Hgb   


 


Hct   


 


MCV   


 


MCH   


 


MCHC   


 


RDW   


 


Plt Count   


 


MPV   


 


Neut % (Auto)   


 


Lymph % (Auto)   


 


Mono % (Auto)   


 


Eos % (Auto)   


 


Baso % (Auto)   


 


Neut # (Auto)   


 


Lymph # (Auto)   


 


Mono # (Auto)   


 


Eos # (Auto)   


 


Baso # (Auto)   


 


Neutrophils % (Manual)   


 


Lymphocytes % (Manual)   


 


Monocytes % (Manual)   


 


Nucleated RBC %   


 


Platelet Estimate   


 


Large Platelets   


 


Giant Platelets   


 


Polychromasia   


 


Hypochromasia (manual)   


 


Poikilocytosis (manual   


 


Anisocytosis (manual)   


 


Macrocytosis (manual)   


 


Target Cells   


 


Clara Cells   


 


APTT   73 H D 


 


Puncture Site    Rr


 


pCO2    30 L


 


pO2    145 H


 


HCO3    25.3


 


ABG pH    7.50 H


 


ABG Total CO2    24.3


 


ABG O2 Saturation    99.8 H


 


ABG Base Excess    0.4


 


ABG Hemoglobin    7.8 L


 


ABG Carboxyhemoglobin    1.8 H


 


POC ABG HHb (Measured)    0.2


 


ABG Methemoglobin    0.7


 


Jesus Test    Pos


 


A-a O2 Difference    103.0


 


Respiratory Index    0.7


 


Hgb O2 Saturation    97.3


 


Vent Mode    Prvc


 


Mechanical Rate    16


 


FiO2    40.0


 


Tidal Volume    500


 


PEEP    5


 


Sodium   


 


Potassium   


 


Chloride   


 


Carbon Dioxide   


 


Anion Gap   


 


BUN   


 


Creatinine   


 


Est GFR ( Amer)   


 


Est GFR (Non-Af Amer)   


 


POC Glucose (mg/dL)  234 H  


 


Random Glucose   


 


Calcium   


 


Total Bilirubin   


 


AST   


 


ALT   


 


Alkaline Phosphatase   


 


Total Protein   


 


Albumin   


 


Globulin   


 


Albumin/Globulin Ratio   


 


PTH Intact Whole Molec   














  03/05/18 03/05/18 03/05/18





  05:30 06:54 06:54


 


WBC   13.4 H 


 


RBC   2.80 L 


 


Hgb   7.8 L 


 


Hct   23.8 L 


 


MCV   84.9 


 


MCH   27.8 


 


MCHC   32.7 L 


 


RDW   16.8 H 


 


Plt Count   102 L 


 


MPV   9.6 


 


Neut % (Auto)   86.5 H 


 


Lymph % (Auto)   8.2 L 


 


Mono % (Auto)   5.0 


 


Eos % (Auto)   0.1 


 


Baso % (Auto)   0.2 


 


Neut # (Auto)   11.6 H 


 


Lymph # (Auto)   1.1 


 


Mono # (Auto)   0.7 


 


Eos # (Auto)   0.0 


 


Baso # (Auto)   0.0 


 


Neutrophils % (Manual)   86 H 


 


Lymphocytes % (Manual)   6 L 


 


Monocytes % (Manual)   8 


 


Nucleated RBC %   3 H 


 


Platelet Estimate   Slightly decreased L 


 


Large Platelets   Present 


 


Giant Platelets   Present 


 


Polychromasia   Slight 


 


Hypochromasia (manual)   Slight 


 


Poikilocytosis (manual   Slight 


 


Anisocytosis (manual)   Slight 


 


Macrocytosis (manual)   Slight 


 


Target Cells   Slight 


 


Clara Cells   Slight 


 


APTT   


 


Puncture Site   


 


pCO2   


 


pO2   


 


HCO3   


 


ABG pH   


 


ABG Total CO2   


 


ABG O2 Saturation   


 


ABG Base Excess   


 


ABG Hemoglobin   


 


ABG Carboxyhemoglobin   


 


POC ABG HHb (Measured)   


 


ABG Methemoglobin   


 


Jesus Test   


 


A-a O2 Difference   


 


Respiratory Index   


 


Hgb O2 Saturation   


 


Vent Mode   


 


Mechanical Rate   


 


FiO2   


 


Tidal Volume   


 


PEEP   


 


Sodium    139


 


Potassium    3.5 L


 


Chloride    102


 


Carbon Dioxide    25


 


Anion Gap    16


 


BUN    67 H


 


Creatinine    4.6 H


 


Est GFR ( Amer)    11


 


Est GFR (Non-Af Amer)    9


 


POC Glucose (mg/dL)  231 H  


 


Random Glucose    227 H


 


Calcium    7.6 L


 


Total Bilirubin    1.3


 


AST    1253 H


 


ALT    2074 H


 


Alkaline Phosphatase    123


 


Total Protein    5.4 L


 


Albumin    2.5 L


 


Globulin    2.9


 


Albumin/Globulin Ratio    0.9 L


 


PTH Intact Whole Molec   











Fingerstick Blood Sugar Results: 234





Assessment/Plan





- Assessment and Plan (Free Text)


Assessment: 





77F w/ NSTEMI, CKD, s/p vfib arrest





Plan: 





Neuro: No sedation, Lethargic 





Cardio: NSTEMI. S/p Vfib arrest, Repat ECHO today. Will give blood as pateitn 

will require higher hgb given recent events. Cardio has signed off the case. 

Patient will need cardiac cath when more stable. Will reach out to cardio after 

repeat ECHO. Continue heparin drip. D/c amiodarone after shock liver





Pulm: Intubated after code. Remained intubated. Too unstable to extubate today. 

Patient is also getting blood products today so will not attempt CPAP


Duonetuyet PRN





GI: Nephro. @ goal. 





Renal: AVF clotted. Dialysis cath place [place]. Will attempt dialysis today





Endo: DM, insulin aspart Q6, accuchecks





PPX: heparin drip, GI PPX not indicated

## 2018-03-05 NOTE — CP.PCM.PN
Subjective





- Date & Time of Evaluation


Date of Evaluation: 03/05/18


Time of Evaluation: 13:00





- Subjective


Subjective: 





acute events noted, cardiac arrest, shocked, intubated not on pressors now, 

more awake, mild communication, consulted interventional cardiology, agreed 

with general support prior to any invasive cardiac management





Objective





- Vital Signs/Intake and Output


Vital Signs (last 24 hours): 


 











Temp Pulse Resp BP Pulse Ox


 


 97.7 F   104 H  16   140/82   100 


 


 03/05/18 16:50  03/05/18 17:20  03/05/18 17:20  03/05/18 17:20  03/05/18 17:20








Intake and Output: 


 











 03/05/18 03/05/18





 06:59 18:59


 


Intake Total 1056.8 700


 


Output Total 0 


 


Balance 1056.8 700














- Medications


Medications: 


 Current Medications





Aspirin (Aspirin Chewable)  81 mg PO DAILY [facility]


   Last Admin: 03/05/18 10:13 Dose:  81 mg


Calcium Acetate (Phoslo)  667 mg PO TID [facility]


   Last Admin: 03/05/18 14:21 Dose:  667 mg


Epoetin Vamsi (Procrit)  8,000 unit IV F [facility]


Ergocalciferol (Drisdol 50,000 Intl Units Cap)  1 cap PO Q7D [facility]


   Last Admin: 03/03/18 13:51 Dose:  1 cap


Ferrous Gluconate (Fergon)  324 mg PO TID [facility]


   Last Admin: 03/05/18 14:21 Dose:  324 mg


Aztreonam 1 gm/ Sodium (Chloride)  100 mls @ 100 mls/hr IVPB Q12H [facility]


   Last Admin: 03/05/18 12:43 Dose:  100 mls/hr


Metronidazole (Flagyl)  500 mg in 100 mls @ 100 mls/hr IVPB Q8H [facility]


   Last Admin: 03/05/18 14:21 Dose:  100 mls/hr


Heparin Sodium/Sodium Chloride (Heparin 68463 Units/250ml 1/2 Normal Saline)  25

,000 units in 250 mls @ 7.641 mls/hr IV .Q24H PRN; Protocol; 12 UNIT/KG/HR


   PRN Reason: PROTOCOL


   Last Titration: 03/05/18 11:12 Dose:  15.7 unit/kg/hr, 10 mls/hr


Insulin Aspart (Novolog)  0 unit SC Q6 [facility]


   PRN Reason: Protocol


   Last Admin: 03/05/18 12:43 Dose:  2 unit


Insulin Glargine (Lantus)  15 unit SC HS [facility]


   Last Admin: 03/04/18 21:36 Dose:  15 u


Metoprolol Tartrate (Lopressor)  12.5 mg PO BID [facility]


Pantoprazole Sodium (Protonix Inj)  40 mg IVP DAILY [facility]


   Last Admin: 03/05/18 10:13 Dose:  40 mg


Rosuvastatin Calcium (Crestor)  5 mg PO HS [facility]


   Last Admin: 03/04/18 21:35 Dose:  5 mg


Vitamin B Complex/Vit C/Folic Acid (Nephro-Nneka)  1 tab PO 0800 [facility]


   Last Admin: 03/05/18 07:59 Dose:  1 tab











- Labs


Labs: 


 





 03/05/18 06:54 





 03/05/18 06:54 





 











PT  14.6 SECONDS (9.7-12.2)  H  03/02/18  02:17    


 


INR  1.3   03/02/18  02:17    


 


APTT  73 SECONDS (21-34)  H D 03/05/18  05:14    














- Constitutional


Appears: Toxic





- Head Exam


Head Exam: ATRAUMATIC





- ENT Exam


ENT Exam: Mucous Membranes Moist





- Neck Exam


Neck Exam: absent: Lymphadenopathy, Thyromegaly





- Respiratory Exam


Respiratory Exam: Clear to Ausculation Bilateral.  absent: Rales





- Cardiovascular Exam


Cardiovascular Exam: REGULAR RHYTHM, Murmur





- GI/Abdominal Exam


GI & Abdominal Exam: Normal Bowel Sounds.  absent: Organomegaly





- Rectal Exam


Rectal Exam: Deferred





- Extremities Exam


Extremities Exam: Normal Capillary Refill





- Neurological Exam


Neurological Exam: Alert





- Skin


Skin Exam: Dry





Assessment and Plan


(1) Severe sepsis


Status: Acute   





(2) Acute on chronic renal failure


Status: Acute   





(3) Congestive heart failure


Status: Acute   





(4) NSTEMI (non-ST elevated myocardial infarction)


Status: Acute   





(5) Pulmonary HTN


Status: Chronic   





(6) Malignant neoplasm of transverse colon


Status: Chronic

## 2018-03-05 NOTE — CP.PCM.PN
Subjective





- Date & Time of Evaluation


Date of Evaluation: 03/05/18


Time of Evaluation: 15:01





- Subjective


Subjective: 





Nephrology Consultation:





Assessment: critical


CKD stage 5 (N18.5) with hyperkalemia, acidosis ? uremia 


pulm edema, CHF, NSTEMI, pleural effusions, pneumonia


AMS, hyperglycemia


s/p cardiac arrest, shock liver, A fib





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 


Hypertensive Chronic Kidney Disease (I12.9)


Anemia (D64.9), Hyperphosphatemia (E83.39), Secondary Hyperparathyroidism (E21.1

), HTN (I12.9), vit D insuff, hx of colon CA, TIAs





Plan


HD today as ordered


maintain hemodynamic stable. Patient not on ACEI/ARB due to hyperkalemia 

initially nd low BP


started iron supplements, MVI, epogen with HD, phos binders and Vit D


vasc surgery following





Dose meds/antibiotics for ESRD/HD status. Avoid fleets enema/magnesium based 

laxatives. Avoid nephrotoxins/NSAIDs


Glycemic control


Further work up/management as per primary team





Thanks for allowing me to participate in care of your patient. Will follow 

patient with you. Please call if any Qs. d/w team 


Dr Shad Panchal


Office: [phone] Cell: [phone] Fax: [phone]





Chief Complaint; unable to obtain


reason for consult: CKD management


source of Info: EMR


HPI: Pt is a 77 F with hx of diabetes Mellitus ( years), hypertension (years), 

CHF, TIA, Colon CA, CKD stage 4/5, has matured AVG in left arm presented with 

complaints of AMS and hyperglycemia as brought by family. now has elevated trop 

100, hyperkalemia, AMS and elevated sugar, pulm edema. renal consult for CKD 

management. 


pt with AMS unable to provide any hx. she had received medical management for 

hyperkalemia. 





ROS: unable to obtain





Physical Examination: 


General Appearance: ill appearing,intubated


Vitals reviewed and noted as below


Head; Atraumatic, normocephalic


ENT: intubated


EYES: PERRLA


Neck; supple no lymphadenopathy, no thyromegaly or bruit


Lungs: Normal respiratory rate/effort. Breath sounds bilateral clear, she is 

mechanically ventilated


Heart: Normal rate. s1s2 normal. No rub or gallop. 


Extremities: no edema. No varicose veins. chronic hyperpigmented changes in legs


Neurological: Patient is  awake


Skin: Warm and dry. Normal turgor. No rash. Palpitation: Normal elasticity for 

age


Abdomen: Abdomen is soft. Bowel sounds +. There is no abdominal tenderness, no 

guarding/rigidity no organomegaly


Psych: unable


MSK: no joint tenderness or swelling. Digits and nails normal, no deformity


: kidney or bladder not palpable


Access: Left AVG without bruit. has left IJ shiley





Labs/imaging reviewed.


Past medical history, past surgical history, family history, social history, 

allergy reviewed and noted as below


Family hx: no hx of CKD. Rest non-contributory








Objective





- Vital Signs/Intake and Output


Vital Signs (last 24 hours): 


 











Temp Pulse Resp BP Pulse Ox


 


 97.4 F L  105 H  16   118/65   95 


 


 03/05/18 12:00  03/05/18 12:00  03/05/18 12:00  03/05/18 11:48  03/05/18 12:00








Intake and Output: 


 











 03/05/18 03/05/18





 06:59 18:59


 


Intake Total 1056.8 360


 


Output Total 0 


 


Balance 1056.8 360














- Medications


Medications: 


 Current Medications





Aspirin (Aspirin Chewable)  81 mg PO DAILY [facility]


   Last Admin: 03/05/18 10:13 Dose:  81 mg


Calcium Acetate (Phoslo)  667 mg PO TID [facility]


   Last Admin: 03/05/18 14:21 Dose:  667 mg


Epoetin Vamsi (Procrit)  8,000 unit IV MWF [facility]


Ergocalciferol (Drisdol 50,000 Intl Units Cap)  1 cap PO Q7D [facility]


   Last Admin: 03/03/18 13:51 Dose:  1 cap


Ferrous Gluconate (Fergon)  324 mg PO TID [facility]


   Last Admin: 03/05/18 14:21 Dose:  324 mg


Aztreonam 1 gm/ Sodium (Chloride)  100 mls @ 100 mls/hr IVPB Q12H [facility]


   Last Admin: 03/05/18 12:43 Dose:  100 mls/hr


Metronidazole (Flagyl)  500 mg in 100 mls @ 100 mls/hr IVPB Q8H [facility]


   Last Admin: 03/05/18 14:21 Dose:  100 mls/hr


Heparin Sodium/Sodium Chloride (Heparin 12875 Units/250ml 1/2 Normal Saline)  25

,000 units in 250 mls @ 7.641 mls/hr IV .Q24H PRN; Protocol; 12 UNIT/KG/HR


   PRN Reason: PROTOCOL


   Last Titration: 03/05/18 11:12 Dose:  15.7 unit/kg/hr, 10 mls/hr


Insulin Aspart (Novolog)  0 unit SC Q6 [facility]


   PRN Reason: Protocol


   Last Admin: 03/05/18 12:43 Dose:  2 unit


Insulin Glargine (Lantus)  15 unit SC [facility]


   Last Admin: 03/04/18 21:36 Dose:  15 u


Metoprolol Tartrate (Lopressor)  12.5 mg PO BID [facility]


Pantoprazole Sodium (Protonix Inj)  40 mg IVP DAILY [facility]


   Last Admin: 03/05/18 10:13 Dose:  40 mg


Rosuvastatin Calcium (Crestor)  5 mg PO [facility]


   Last Admin: 03/04/18 21:35 Dose:  5 mg


Vitamin B Complex/Vit C/Folic Acid (Nephro-Nneka)  1 tab PO 0800 [facility]


   Last Admin: 03/05/18 07:59 Dose:  1 tab











- Labs


Labs: 


 





 03/05/18 06:54 





 03/05/18 06:54 





 











PT  14.6 SECONDS (9.7-12.2)  H  03/02/18  02:17    


 


INR  1.3   03/02/18  02:17    


 


APTT  73 SECONDS (21-34)  H D 03/05/18  05:14

## 2018-03-05 NOTE — CARD
--------------- APPROVED REPORT --------------





EXAM: Two-dimensional and M-mode echocardiogram with Doppler and 

color Doppler.



Other Information 

Quality : GoodRhythm : 



INDICATION

VFIB ARREST



Mitral Valve

MV E Derbhqan843.6cm/sE/A ratio0.0



TDI

E/Lateral E'0.0E/Medial E'0.0



Tricuspid Valve

TR Peak [id number]/sTR Peak [id number]



 LEFT VENTRICLE 

The left ventricle is normal size.

There is normal left ventricular wall thickness.

Left ventricle systolic function is severely impaired.

The Ejection Fraction is 15-20%.

There is severe global hypokinesis of the left ventricle.

No left ventricle thrombus noted on this study.



 RIGHT VENTRICLE 

The right ventricle is normal size.

There is normal right ventricular wall thickness.

Systolic function is severely reduced.



 ATRIA 

The left atrium size is normal.

The right atrium size is normal.

The interatrial septum is intact with no evidence for an atrial 

septal defect.



 AORTIC VALVE 

The aortic valve is normal in structure.

No aortic regurgitation is present.

There is no aortic valvular stenosis. 



 MITRAL VALVE 

The mitral valve is normal in structure.

There is no evidence of mitral valve prolapse.

There is no mitral valve stenosis.

Mitral regurgitation is mild.



 TRICUSPID VALVE 

The tricuspid valve is normal in structure.

There is mild tricuspid regurgitation.

Right ventricular systolic pressure is estimated at 40-50 mmHg. 

There is mild-moderate pulmonary hypertension.



 PULMONIC VALVE 

The pulmonic valve is not well visualized.

There is mild pulmonic valvular regurgitation. 



 GREAT VESSELS 

The aortic root is normal in size.



 PERICARDIAL EFFUSION 

There is no significant  pericardial effusion. Echo space in the 

posterior area of the LV is most likely lung parenchyma- could be 

mistaken for large pericardial effusion. Please correlate clinically. 

Suggest CT scan of chest.



<Conclusion>

Left ventricle systolic function is severely impaired.

The Ejection Fraction is 15-20%.

No aortic regurgitation is present.

Mitral regurgitation is mild.

There is mild tricuspid regurgitation.

There is mild-moderate pulmonary hypertension.

There is mild pulmonic valvular regurgitation.

## 2018-03-05 NOTE — CP.PCM.CON
History of Present Illness





- History of Present Illness


History of Present Illness: 





dictated





Past Patient History





- Infectious Disease


Hx of Infectious Diseases: None





- Tetanus Immunizations


Tetanus Immunization: Unknown





- Past Medical History & Family History


Past Medical History?: Yes





- Past Social History


Smoking Status: Former Smoker





- CARDIAC


Hx Hypercholesterolemia: Yes


Hx Hypertension: Yes


Hx Peripheral Edema: Yes





- PULMONARY


Hx Asthma: Yes (Dx 15 yrs ago,does not use home o2 anymore)


Hx Pneumonia: Yes (x2)





- NEUROLOGICAL


Hx Transient Ischemic Attacks (TIA): Yes (x3 about 20 yrs ago, 10 yrs ago, 7 

yrs ago)





- HEENT


Hx HEENT Problems: Yes


Hx Cataracts: Yes





- RENAL


Hx Chronic Kidney Disease: Yes





- ENDOCRINE/METABOLIC


Hx Diabetes Mellitus Type 1: Yes





- HEMATOLOGICAL/ONCOLOGICAL


Hx Anemia: Yes





- INTEGUMENTARY


Hx Dermatological Problems: No





- MUSCULOSKELETAL/RHEUMATOLOGICAL


Hx Arthritis: Yes


Hx Fractures: Yes (Right ankle)





- GASTROINTESTINAL


Hx Gastrointestinal Disorders: Yes


Other/Comment: Hx colon cancer





- GENITOURINARY/GYNECOLOGICAL


Hx Genitourinary Disorders: Yes


Other/Comment: Colon CA





- PSYCHIATRIC


Hx Depression: No


Hx Substance Use: No





- SURGICAL HISTORY


Hx Cataract Extraction: Yes (LEFT)





- ANESTHESIA


Hx Anesthesia: Yes


Hx Anesthesia Reactions: No


Hx Malignant Hyperthermia: No





Meds


Allergies/Adverse Reactions: 


 Allergies











Allergy/AdvReac Type Severity Reaction Status Date / Time


 


Penicillins Allergy Intermediate RASH Verified 03/01/18 20:11














- Medications


Medications: 


 Current Medications





Aspirin (Aspirin Chewable)  81 mg PO DAILY [facility]


   Last Admin: 03/05/18 10:13 Dose:  81 mg


Calcium Acetate (Phoslo)  667 mg PO TID [facility]


   Last Admin: 03/05/18 14:21 Dose:  667 mg


Epoetin Vamsi (Procrit)  8,000 unit IV MWF [facility]


Ergocalciferol (Drisdol 50,000 Intl Units Cap)  1 cap PO Q7D [facility]


   Last Admin: 03/03/18 13:51 Dose:  1 cap


Ferrous Gluconate (Fergon)  324 mg PO TID [facility]


   Last Admin: 03/05/18 14:21 Dose:  324 mg


Aztreonam 1 gm/ Sodium (Chloride)  100 mls @ 100 mls/hr IVPB Q12H [facility]


   Last Admin: 03/05/18 12:43 Dose:  100 mls/hr


Metronidazole (Flagyl)  500 mg in 100 mls @ 100 mls/hr IVPB Q8H [facility]


   Last Admin: 03/05/18 14:21 Dose:  100 mls/hr


Heparin Sodium/Sodium Chloride (Heparin 13945 Units/250ml 1/2 Normal Saline)  25

,000 units in 250 mls @ 7.641 mls/hr IV .Q24H PRN; Protocol; 12 UNIT/KG/HR


   PRN Reason: PROTOCOL


   Last Titration: 03/05/18 11:12 Dose:  15.7 unit/kg/hr, 10 mls/hr


Insulin Aspart (Novolog)  0 unit SC Q6 [facility]


   PRN Reason: Protocol


   Last Admin: 03/05/18 12:43 Dose:  2 unit


Insulin Glargine (Lantus)  15 unit SC HS [facility]


   Last Admin: 03/04/18 21:36 Dose:  15 u


Metoprolol Tartrate (Lopressor)  12.5 mg PO BID [facility]


Pantoprazole Sodium (Protonix Inj)  40 mg IVP DAILY [facility]


   Last Admin: 03/05/18 10:13 Dose:  40 mg


Rosuvastatin Calcium (Crestor)  5 mg PO HS [facility]


   Last Admin: 03/04/18 21:35 Dose:  5 mg


Vitamin B Complex/Vit C/Folic Acid (Nephro-Nneka)  1 tab PO 0800 [facility]


   Last Admin: 03/05/18 07:59 Dose:  1 tab











Results





- Vital Signs


Recent Vital Signs: 


 Last Vital Signs











Temp  97.4 F L  03/05/18 12:00


 


Pulse  105 H  03/05/18 12:00


 


Resp  16   03/05/18 12:00


 


BP  118/65   03/05/18 11:48


 


Pulse Ox  95   03/05/18 12:00














- Labs


Result Diagrams: 


 03/05/18 06:54





 03/05/18 06:54


Labs: 


 Laboratory Results - last 24 hr











  03/02/18 03/04/18 03/04/18





  12:19 17:31 23:46


 


WBC   


 


RBC   


 


Hgb   


 


Hct   


 


MCV   


 


MCH   


 


MCHC   


 


RDW   


 


Plt Count   


 


MPV   


 


Neut % (Auto)   


 


Lymph % (Auto)   


 


Mono % (Auto)   


 


Eos % (Auto)   


 


Baso % (Auto)   


 


Neut # (Auto)   


 


Lymph # (Auto)   


 


Mono # (Auto)   


 


Eos # (Auto)   


 


Baso # (Auto)   


 


Neutrophils % (Manual)   


 


Lymphocytes % (Manual)   


 


Monocytes % (Manual)   


 


Nucleated RBC %   


 


Platelet Estimate   


 


Large Platelets   


 


Giant Platelets   


 


Polychromasia   


 


Hypochromasia (manual)   


 


Poikilocytosis (manual   


 


Anisocytosis (manual)   


 


Macrocytosis (manual)   


 


Target Cells   


 


Lewis Cells   


 


APTT   


 


Puncture Site   


 


pCO2   


 


pO2   


 


HCO3   


 


ABG pH   


 


ABG Total CO2   


 


ABG O2 Saturation   


 


ABG Base Excess   


 


ABG Hemoglobin   


 


ABG Carboxyhemoglobin   


 


POC ABG HHb (Measured)   


 


ABG Methemoglobin   


 


Jesus Test   


 


A-a O2 Difference   


 


Respiratory Index   


 


Hgb O2 Saturation   


 


Vent Mode   


 


Mechanical Rate   


 


FiO2   


 


Tidal Volume   


 


PEEP   


 


Sodium   


 


Potassium   


 


Chloride   


 


Carbon Dioxide   


 


Anion Gap   


 


BUN   


 


Creatinine   


 


Est GFR ( Amer)   


 


Est GFR (Non-Af Amer)   


 


POC Glucose (mg/dL)   214 H  234 H


 


Random Glucose   


 


Calcium   


 


Phosphorus   


 


Magnesium   


 


Total Bilirubin   


 


AST   


 


ALT   


 


Alkaline Phosphatase   


 


Total Protein   


 


Albumin   


 


Globulin   


 


Albumin/Globulin Ratio   


 


PTH Intact Whole Molec  1119 H  


 


Blood Type   


 


Antibody Screen   














  03/05/18 03/05/18 03/05/18





  05:14 05:14 05:30


 


WBC   


 


RBC   


 


Hgb   


 


Hct   


 


MCV   


 


MCH   


 


MCHC   


 


RDW   


 


Plt Count   


 


MPV   


 


Neut % (Auto)   


 


Lymph % (Auto)   


 


Mono % (Auto)   


 


Eos % (Auto)   


 


Baso % (Auto)   


 


Neut # (Auto)   


 


Lymph # (Auto)   


 


Mono # (Auto)   


 


Eos # (Auto)   


 


Baso # (Auto)   


 


Neutrophils % (Manual)   


 


Lymphocytes % (Manual)   


 


Monocytes % (Manual)   


 


Nucleated RBC %   


 


Platelet Estimate   


 


Large Platelets   


 


Giant Platelets   


 


Polychromasia   


 


Hypochromasia (manual)   


 


Poikilocytosis (manual   


 


Anisocytosis (manual)   


 


Macrocytosis (manual)   


 


Target Cells   


 


Lewis Cells   


 


APTT  73 H D  


 


Puncture Site   Rr 


 


pCO2   30 L 


 


pO2   145 H 


 


HCO3   25.3 


 


ABG pH   7.50 H 


 


ABG Total CO2   24.3 


 


ABG O2 Saturation   99.8 H 


 


ABG Base Excess   0.4 


 


ABG Hemoglobin   7.8 L 


 


ABG Carboxyhemoglobin   1.8 H 


 


POC ABG HHb (Measured)   0.2 


 


ABG Methemoglobin   0.7 


 


Jesus Test   Pos 


 


A-a O2 Difference   103.0 


 


Respiratory Index   0.7 


 


Hgb O2 Saturation   97.3 


 


Vent Mode   Prvc 


 


Mechanical Rate   16 


 


FiO2   40.0 


 


Tidal Volume   500 


 


PEEP   5 


 


Sodium   


 


Potassium   


 


Chloride   


 


Carbon Dioxide   


 


Anion Gap   


 


BUN   


 


Creatinine   


 


Est GFR ( Amer)   


 


Est GFR (Non-Af Amer)   


 


POC Glucose (mg/dL)    231 H


 


Random Glucose   


 


Calcium   


 


Phosphorus   


 


Magnesium   


 


Total Bilirubin   


 


AST   


 


ALT   


 


Alkaline Phosphatase   


 


Total Protein   


 


Albumin   


 


Globulin   


 


Albumin/Globulin Ratio   


 


PTH Intact Whole Molec   


 


Blood Type   


 


Antibody Screen   














  03/05/18 03/05/18 03/05/18





  06:54 06:54 11:43


 


WBC  13.4 H  


 


RBC  2.80 L  


 


Hgb  7.8 L  


 


Hct  23.8 L  


 


MCV  84.9  


 


MCH  27.8  


 


MCHC  32.7 L  


 


RDW  16.8 H  


 


Plt Count  102 L  


 


MPV  9.6  


 


Neut % (Auto)  86.5 H  


 


Lymph % (Auto)  8.2 L  


 


Mono % (Auto)  5.0  


 


Eos % (Auto)  0.1  


 


Baso % (Auto)  0.2  


 


Neut # (Auto)  11.6 H  


 


Lymph # (Auto)  1.1  


 


Mono # (Auto)  0.7  


 


Eos # (Auto)  0.0  


 


Baso # (Auto)  0.0  


 


Neutrophils % (Manual)  86 H  


 


Lymphocytes % (Manual)  6 L  


 


Monocytes % (Manual)  8  


 


Nucleated RBC %  3 H  


 


Platelet Estimate  Slightly decreased L  


 


Large Platelets  Present  


 


Giant Platelets  Present  


 


Polychromasia  Slight  


 


Hypochromasia (manual)  Slight  


 


Poikilocytosis (manual  Slight  


 


Anisocytosis (manual)  Slight  


 


Macrocytosis (manual)  Slight  


 


Target Cells  Slight  


 


Lewis Cells  Slight  


 


APTT   


 


Puncture Site   


 


pCO2   


 


pO2   


 


HCO3   


 


ABG pH   


 


ABG Total CO2   


 


ABG O2 Saturation   


 


ABG Base Excess   


 


ABG Hemoglobin   


 


ABG Carboxyhemoglobin   


 


POC ABG HHb (Measured)   


 


ABG Methemoglobin   


 


Jesus Test   


 


A-a O2 Difference   


 


Respiratory Index   


 


Hgb O2 Saturation   


 


Vent Mode   


 


Mechanical Rate   


 


FiO2   


 


Tidal Volume   


 


PEEP   


 


Sodium   139 


 


Potassium   3.5 L 


 


Chloride   102 


 


Carbon Dioxide   25 


 


Anion Gap   16 


 


BUN   67 H 


 


Creatinine   4.6 H 


 


Est GFR ( Amer)   11 


 


Est GFR (Non-Af Amer)   9 


 


POC Glucose (mg/dL)    195 H


 


Random Glucose   227 H 


 


Calcium   7.6 L 


 


Phosphorus   3.7 


 


Magnesium   1.9 


 


Total Bilirubin   1.3 


 


AST   1253 H 


 


ALT   2074 H 


 


Alkaline Phosphatase   123 


 


Total Protein   5.4 L 


 


Albumin   2.5 L 


 


Globulin   2.9 


 


Albumin/Globulin Ratio   0.9 L 


 


PTH Intact Whole Molec   


 


Blood Type   


 


Antibody Screen   














  03/05/18





  11:56


 


WBC 


 


RBC 


 


Hgb 


 


Hct 


 


MCV 


 


MCH 


 


MCHC 


 


RDW 


 


Plt Count 


 


MPV 


 


Neut % (Auto) 


 


Lymph % (Auto) 


 


Mono % (Auto) 


 


Eos % (Auto) 


 


Baso % (Auto) 


 


Neut # (Auto) 


 


Lymph # (Auto) 


 


Mono # (Auto) 


 


Eos # (Auto) 


 


Baso # (Auto) 


 


Neutrophils % (Manual) 


 


Lymphocytes % (Manual) 


 


Monocytes % (Manual) 


 


Nucleated RBC % 


 


Platelet Estimate 


 


Large Platelets 


 


Giant Platelets 


 


Polychromasia 


 


Hypochromasia (manual) 


 


Poikilocytosis (manual 


 


Anisocytosis (manual) 


 


Macrocytosis (manual) 


 


Target Cells 


 


Clara Cells 


 


APTT 


 


Puncture Site 


 


pCO2 


 


pO2 


 


HCO3 


 


ABG pH 


 


ABG Total CO2 


 


ABG O2 Saturation 


 


ABG Base Excess 


 


ABG Hemoglobin 


 


ABG Carboxyhemoglobin 


 


POC ABG HHb (Measured) 


 


ABG Methemoglobin 


 


Jesus Test 


 


A-a O2 Difference 


 


Respiratory Index 


 


Hgb O2 Saturation 


 


Vent Mode 


 


Mechanical Rate 


 


FiO2 


 


Tidal Volume 


 


PEEP 


 


Sodium 


 


Potassium 


 


Chloride 


 


Carbon Dioxide 


 


Anion Gap 


 


BUN 


 


Creatinine 


 


Est GFR ( Amer) 


 


Est GFR (Non-Af Amer) 


 


POC Glucose (mg/dL) 


 


Random Glucose 


 


Calcium 


 


Phosphorus 


 


Magnesium 


 


Total Bilirubin 


 


AST 


 


ALT 


 


Alkaline Phosphatase 


 


Total Protein 


 


Albumin 


 


Globulin 


 


Albumin/Globulin Ratio 


 


PTH Intact Whole Molec 


 


Blood Type  O POSITIVE


 


Antibody Screen  Negative

## 2018-03-06 LAB
ALBUMIN SERPL-MCNC: 2.4 G/DL (ref 3.5–5)
ALBUMIN/GLOB SERPL: 0.9 {RATIO} (ref 1–2.1)
ALT SERPL-CCNC: 1479 U/L (ref 9–52)
ARTERIAL BLOOD GAS HEMOGLOBIN: 7.5 G/DL (ref 11.7–17.4)
ARTERIAL BLOOD GAS O2 SAT: 99.6 % (ref 95–98)
ARTERIAL BLOOD GAS PCO2: 24 MM/HG (ref 35–45)
ARTERIAL BLOOD GAS TCO2: 20.8 MMOL/L (ref 22–28)
AST SERPL-CCNC: 868 U/L (ref 14–36)
BASOPHILS # BLD AUTO: 0.1 K/UL (ref 0–0.2)
BASOPHILS NFR BLD: 0.4 % (ref 0–2)
BUN SERPL-MCNC: 48 MG/DL (ref 7–17)
CALCIUM SERPL-MCNC: 7.6 MG/DL (ref 8.6–10.4)
EOSINOPHIL # BLD AUTO: 0 K/UL (ref 0–0.7)
EOSINOPHIL NFR BLD: 0.4 % (ref 0–4)
ERYTHROCYTE [DISTWIDTH] IN BLOOD BY AUTOMATED COUNT: 15.8 % (ref 11.5–14.5)
GFR NON-AFRICAN AMERICAN: 12
HCO3 BLDA-SCNC: 23.3 MMOL/L (ref 21–28)
HGB BLD-MCNC: 9.2 G/DL (ref 11–16)
INHALED O2 CONCENTRATION: 40 %
LYMPHOCYTES # BLD AUTO: 1.6 K/UL (ref 1–4.3)
LYMPHOCYTES NFR BLD AUTO: 12.3 % (ref 20–40)
MCH RBC QN AUTO: 28 PG (ref 27–31)
MCHC RBC AUTO-ENTMCNC: 32.8 G/DL (ref 33–37)
MCV RBC AUTO: 85.2 FL (ref 81–99)
MONOCYTES # BLD: 0.7 K/UL (ref 0–0.8)
MONOCYTES NFR BLD: 5.1 % (ref 0–10)
NEUTROPHILS # BLD: 10.4 K/UL (ref 1.8–7)
NEUTROPHILS NFR BLD AUTO: 81.8 % (ref 50–75)
NRBC BLD AUTO-RTO: 4.3 % (ref 0–2)
PH BLDA: 7.53 [PH] (ref 7.35–7.45)
PLATELET # BLD: 86 K/UL (ref 130–400)
PMV BLD AUTO: 9.2 FL (ref 7.2–11.7)
PO2 BLDA: 154 MM/HG (ref 80–100)
RBC # BLD AUTO: 3.29 MIL/UL (ref 3.8–5.2)
WBC # BLD AUTO: 12.7 K/UL (ref 4.8–10.8)

## 2018-03-06 RX ADMIN — INSULIN ASPART SCH: 100 INJECTION, SOLUTION INTRAVENOUS; SUBCUTANEOUS at 06:00

## 2018-03-06 RX ADMIN — INSULIN ASPART SCH: 100 INJECTION, SOLUTION INTRAVENOUS; SUBCUTANEOUS at 11:08

## 2018-03-06 RX ADMIN — INSULIN ASPART SCH: 100 INJECTION, SOLUTION INTRAVENOUS; SUBCUTANEOUS at 00:00

## 2018-03-06 RX ADMIN — WATER SCH MLS/HR: 1 INJECTION INTRAMUSCULAR; INTRAVENOUS; SUBCUTANEOUS at 22:00

## 2018-03-06 RX ADMIN — INSULIN ASPART SCH: 100 INJECTION, SOLUTION INTRAVENOUS; SUBCUTANEOUS at 17:29

## 2018-03-06 RX ADMIN — HEPARIN SODIUM PRN MLS/HR: 10000 INJECTION, SOLUTION INTRAVENOUS at 14:39

## 2018-03-06 RX ADMIN — WATER SCH MLS/HR: 1 INJECTION INTRAMUSCULAR; INTRAVENOUS; SUBCUTANEOUS at 06:00

## 2018-03-06 RX ADMIN — Medication SCH TAB: at 07:52

## 2018-03-06 RX ADMIN — INSULIN GLARGINE SCH: 100 INJECTION, SOLUTION SUBCUTANEOUS at 22:17

## 2018-03-06 RX ADMIN — WATER SCH MLS/HR: 1 INJECTION INTRAMUSCULAR; INTRAVENOUS; SUBCUTANEOUS at 15:22

## 2018-03-06 NOTE — CP.PCM.PN
Subjective





- Date & Time of Evaluation


Date of Evaluation: 03/06/18


Time of Evaluation: 09:15





- Subjective


Subjective: 





Patient was seen and examined by me





She remains intubated at this time - her settings are now changed over to CPAP/

PS settings. 


Yesterday she had another HD session and this time she tolerated it very well. 

The CXRAY this morning looked a lot less congestion. 


When I saw this morning she appeared a lot more alert. She was able to nodd her 

head no. She was also able to follow simple commands such as raise her arms 

bilaterally as well as try to move her legs on command. 





Blood pressure remains stable, she has not been on pressor medications for 2 

days now. 








I spoke with the patient's sister via phone - Kristel Corral and updated her











Objective





- Vital Signs/Intake and Output


Vital Signs (last 24 hours): 


 











Temp Pulse Resp BP Pulse Ox


 


 97.9 F   97 H  16   121/68   96 


 


 03/06/18 08:00  03/06/18 07:56  03/06/18 07:56  03/06/18 07:56  03/06/18 07:56








Intake and Output: 


 











 03/06/18 03/06/18





 06:59 18:59


 


Intake Total 520 120


 


Output Total 1100 


 


Balance -580 120














- Medications


Medications: 


 Current Medications





Aspirin (Aspirin Chewable)  81 mg PO DAILY [facility]


   Last Admin: 03/06/18 09:11 Dose:  81 mg


Epoetin Vamsi (Procrit)  8,000 unit IV MWF [facility]


Ergocalciferol (Drisdol 50,000 Intl Units Cap)  1 cap PO Q7D [facility]


   Last Admin: 03/03/18 13:51 Dose:  1 cap


Ferrous Gluconate (Fergon)  324 mg PO TID [facility]


   Last Admin: 03/06/18 09:11 Dose:  324 mg


Aztreonam 1 gm/ Sodium (Chloride)  100 mls @ 100 mls/hr IVPB Q12H [facility]


   Last Admin: 03/06/18 01:30 Dose:  100 mls/hr


Metronidazole (Flagyl)  500 mg in 100 mls @ 100 mls/hr IVPB Q8H [facility]


   Last Admin: 03/06/18 06:00 Dose:  100 mls/hr


Heparin Sodium/Sodium Chloride (Heparin 29358 Units/250ml 1/2 Normal Saline)  25

,000 units in 250 mls @ 7.641 mls/hr IV .Q24H PRN; Protocol; 12 UNIT/KG/HR


   PRN Reason: PROTOCOL


   Last Titration: 03/05/18 11:12 Dose:  15.7 unit/kg/hr, 10 mls/hr


Potassium Chloride 20 meq/ (Sodium Chloride)  110 mls @ 50 mls/hr IV Q2H [facility]


   Stop: 03/06/18 13:59


Magnesium Sulfate/Dextrose (Magnesium Sulfate 1 Gm/100 Ml D5w)  1 gm in 100 mls 

@ 100 mls/hr IVPB ONCE ONE


   Stop: 03/06/18 09:59


   Last Admin: 03/06/18 09:11 Dose:  100 mls/hr


Insulin Aspart (Novolog)  0 unit SC Q6 [facility]


   PRN Reason: Protocol


   Last Admin: 03/06/18 06:00 Dose:  Not Given


Insulin Glargine (Lantus)  10 unit SC HS [facility]


Metoprolol Tartrate (Lopressor)  12.5 mg PO BID [facility]


   Last Admin: 03/06/18 09:11 Dose:  12.5 mg


Midodrine (Proamatine)  10 mg NG Q8 [facility]


Pantoprazole Sodium (Protonix Inj)  40 mg IVP DAILY [facility]


   Last Admin: 03/06/18 09:11 Dose:  40 mg


Rosuvastatin Calcium (Crestor)  5 mg PO HS [facility]


   Last Admin: 03/05/18 21:56 Dose:  5 mg


Vitamin B Complex/Vit C/Folic Acid (Nephro-Nneka)  1 tab PO 0800 [facility]


   Last Admin: 03/06/18 07:52 Dose:  1 tab











- Labs


Labs: 


 





 03/06/18 06:18 





 03/06/18 06:17 





 











PT  14.6 SECONDS (9.7-12.2)  H  03/02/18  02:17    


 


INR  1.3   03/02/18  02:17    


 


APTT  66 SECONDS (21-34)  H D 03/06/18  07:44    














- Constitutional


Appears: Chronically Ill





- Head Exam


Head Exam: NORMAL INSPECTION, NORMOCEPHALIC





- ENT Exam


ENT Exam: Mucous Membranes Moist





- Respiratory Exam


Respiratory Exam: NORMAL BREATHING PATTERN


Additional comments: 





Intubated





- Cardiovascular Exam


Cardiovascular Exam: Irregular Rhythm


Additional comments: 





Today was irregular irregular





- GI/Abdominal Exam


GI & Abdominal Exam: Soft, Normal Bowel Sounds





- Neurological Exam


Neurological Exam: Alert, Awake


Neuro motor strength exam: Left Upper Extremity: 4, Right Upper Extremity: 4, 

Left Lower Extremity: 3, Right Lower Extremity: 3


Additional comments: 





She could not raise legs up on exam - she was wearing the prevalon boots. She 

was able to slide leg on bed





- Skin


Skin Exam: Dry, Intact





Assessment and Plan





- Assessment and Plan (Free Text)


Assessment: 











As previously mentioned, this is a 77 year old female with a history of ESRD 

now getting HD, CHF, DM, HTN, hypercholesterolemia, and some sort of MI years 

before, repeated TIAs, and colon CA. She was brought in by family for altered 

mental status and ultimately found to have severe metabolic acidosis as well as 

a extremely elevated troponins. 





Plan: 





1 Acute NSTEMI ,  S/P Hypotension /Asystol ,CPR / Sustained VT


3/6: Remains in heparin ggt, statin, ASA. Hopefully at some point can have 

cardiac catherization. 


3/5: Now off pressor medications. She remains on amiodarone   


Echo EF 30%. Troponins were as high as 103 then decreased. 


Currently on a heparin ggt, ASA, Statin.  





2 Respiratory failure, intubation


3/6: Now changed to CPAP/PS settings. The chest XRAYs look better this morning 

following HD last night.





3 Acute renal failure 


3/6: Underwent HD yesterday and tolerated it well. CXRAY looks better


3/5/2018: She has had one session of HD so far. Axel another session today





4 Leukocytosis concerning for sepsis


3/6: Today decreased WBC. Cultures negative so far


3/5/2018: The elevated WBC maybe stress related.  


She remains on abx Aztreonam IV and Flagyl IV





5 Acute systolic heart failure


3/6: Monitor chest XRAYs. 


  ECHO shows EF 30%





6 Pleural effusion


3/6: Improving CXRAYs


3/5/2018: Pleural effusions minimal at this time. Continue to monitor





7 Hyperglycemia and uncontrolled DM


3/6: Reccent accucheks 130s, 130, 160








8 Anemia of chronic disease, CKD


   3/6: Yesterday had 1 unit of PRBC given during HD, receiving EPO

## 2018-03-06 NOTE — CP.CCUPN
<Abadier,Michael - Last Filed: 03/06/18 13:42>





CCU Subjective





- Physician Review


Subjective (Free Text): 





03/02/18 15:06


Patient seen and examined at bedside


AMS


spoke with nephro and want to wait for diaylsis until cardio decides to do cath 

or not





03/02/18 17:16


PMD talked with cardio and Nephro. Medical managment for cardiac issues at this 

time. OK to go ahead with dialysis. First session tonight





03/05/18 10:35


Over weekend patient had Vfib arrest


tolerated dialysis for 45 min before becoming hypotensive


Patient will again have dialysis today


Giving blood today





03/06/18 13:42


patient seen and examined today


patient will need cath when more stable


tolerated CPAP for 2 hours today


Dialysis tomorrow





CCU Objective





- Vital Signs / Intake & Output


Vital Signs (Last 4 hours): 


Vital Signs











  Temp Pulse Resp BP Pulse Ox


 


 03/06/18 12:00  98.1 F    


 


 03/06/18 11:56   94 H  16  103/54 L  99


 


 03/06/18 11:33   99 H  17  99/60 L  100


 


 03/06/18 11:00   131 H  16   100


 


 03/06/18 09:57   109 H  22  124/44 L  99











Intake and Output (Last 8hrs): 


 Intake & Output











 03/05/18 03/06/18 03/06/18





 22:59 06:59 14:59


 


Intake Total 430 330 965


 


Output Total 1100  


 


Balance -670 330 965


 


Weight  158 lb 


 


Intake:   


 


  Intake, IV Amount 80 80 310


 


    Right Hand 80 80 60


 


    medial   250


 


  Tube Feeding 200 200 150


 


  Blood Product 0  325


 


    Red Blood Cells Cpd As1 0  325





    Lr  Unit [id number]   


 


  Other 150 50 180


 


    Red Blood Cells Cpd As1   30





    Lr  Unit [id number]   


 


Output:   


 


  Other 1100  














- Physical Exam


Head: Positive for: Atraumatic, Normocephalic.  Negative for: Tenderness, 

Contusion, Swelling


Pupils: Positive for: PERRL.  Negative for: Sluggish, Non-Reactive


Extroacular Muscles: Positive for: EOMI


Conjunctiva: Positive for: Normal


Mouth: Positive for: Dry


Pharnyx: Positive for: Normal


Nose (External): Positive for: Atraumatic


Neck: Positive for: Trachea Midline.  Negative for: JVD


Respiratory/Chest: Positive for: Good Air Exchange, Accessory Muscle Use, 

Decreased Breath Sounds (on bilateral bases).  Negative for: Wheezes


Cardiovascular: Positive for: Tachycardic


Abdomen: Positive for: Distention, Normal Bowel Sounds.  Negative for: 

Peritoneal Signs


Upper Extremity: Negative for: Cyanosis, Edema


Lower Extremity: Negative for: Edema


Psychiatric: Positive for: Alert





- Medications


Active Medications: 


Active Medications











Generic Name Dose Route Start Last Admin





  Trade Name Freq  PRN Reason Stop Dose Admin


 


Aspirin  81 mg  03/02/18 10:00  03/06/18 09:11





  Aspirin Chewable  PO   81 mg





  DAILY ABHILASH   Administration


 


Epoetin Vamsi  8,000 unit  03/07/18 09:00  





  Procrit  IV   





  MWF ABHILASH   


 


Ergocalciferol  1 cap  03/03/18 14:45  03/03/18 13:51





  Drisdol 50,000 Intl Units Cap  PO   1 cap





  Q7D ABHILASH   Administration


 


Ferrous Gluconate  324 mg  03/03/18 10:00  03/06/18 09:11





  Fergon  PO   324 mg





  TID ABHILASH   Administration


 


Aztreonam 1 gm/ Sodium  100 mls @ 100 mls/hr  03/03/18 13:30  03/06/18 01:30





  Chloride  IVPB   100 mls/hr





  Q12H ABHILASH   Administration


 


Metronidazole  500 mg in 100 mls @ 100 mls/hr  03/03/18 14:30  03/06/18 06:00





  Flagyl  IVPB   100 mls/hr





  Q8H ABHILASH   Administration


 


Heparin Sodium/Sodium Chloride  25,000 units in 250 mls @ 7.641 mls/hr  03/05/ 18 08:30  03/05/18 11:12





  Heparin 73465 Units/250ml 1/2 Normal Saline  IV   15.7 unit/kg/hr





  .Q24H PRN   10 mls/hr





  PROTOCOL   Titration





  Protocol   





  12 UNIT/KG/HR   


 


Potassium Chloride 20 meq/  110 mls @ 50 mls/hr  03/06/18 10:00  03/06/18 12:27





  Sodium Chloride  IV  03/06/18 13:59  50 mls/hr





  Q2H ABHILASH   Administration


 


Insulin Aspart  0 unit  03/04/18 11:49  03/06/18 11:08





  Novolog  SC   Not Given





  Q6 ABHILASH   





  Protocol   


 


Insulin Glargine  5 unit  03/06/18 22:00  





  Lantus  SC   





  HS ABHILASH   


 


Metoprolol Tartrate  12.5 mg  03/05/18 22:00  03/06/18 09:11





  Lopressor  PO   12.5 mg





  BID ABHILASH   Administration


 


Midodrine  10 mg  03/06/18 14:00  





  Proamatine  NG   





  Q8 ABHILASH   


 


Pantoprazole Sodium  40 mg  03/05/18 10:00  03/06/18 09:11





  Protonix Inj  IVP   40 mg





  DAILY ABHILASH   Administration


 


Rosuvastatin Calcium  5 mg  03/02/18 01:15  03/05/18 21:56





  Crestor  PO   5 mg





  HS ABHILASH   Administration


 


Vitamin B Complex/Vit C/Folic Acid  1 tab  03/03/18 08:00  03/06/18 07:52





  Nephro-Nneka  PO   1 tab





  0800 ABHILASH   Administration














- Patient Studies


Lab Studies: 


 Microbiology Studies











 03/01/18 06:30 Blood Culture - Preliminary





 Blood-Venous    NO GROWTH AFTER 4 DAYS


 


 03/01/18 06:00 Blood Culture - Preliminary





 Blood-Venous    NO GROWTH AFTER 4 DAYS








 Lab Studies











  03/06/18 03/06/18 03/06/18 Range/Units





  11:55 11:09 11:08 


 


WBC     (4.8-10.8)  K/uL


 


RBC     (3.80-5.20)  Mil/uL


 


Hgb     (11.0-16.0)  g/dL


 


Hct     (34.0-47.0)  %


 


MCV     (81.0-99.0)  fL


 


MCH     (27.0-31.0)  pg


 


MCHC     (33.0-37.0)  g/dL


 


RDW     (11.5-14.5)  %


 


Plt Count     (130-400)  K/uL


 


MPV     (7.2-11.7)  fL


 


Neut % (Auto)     (50.0-75.0)  %


 


Lymph % (Auto)     (20.0-40.0)  %


 


Mono % (Auto)     (0.0-10.0)  %


 


Eos % (Auto)     (0.0-4.0)  %


 


Baso % (Auto)     (0.0-2.0)  %


 


Neut # (Auto)     (1.8-7.0)  K/uL


 


Lymph # (Auto)     (1.0-4.3)  K/uL


 


Mono # (Auto)     (0.0-0.8)  K/uL


 


Eos # (Auto)     (0.0-0.7)  K/uL


 


Baso # (Auto)     (0.0-0.2)  K/uL


 


Differential Comment     


 


APTT     (21-34)  SECONDS


 


Puncture Site     


 


pCO2     (35-45)  mm/Hg


 


pO2     ()  mm/Hg


 


HCO3     (21-28)  mmol/L


 


ABG pH     (7.35-7.45)  


 


ABG Total CO2     (22-28)  mmol/L


 


ABG O2 Saturation     (95-98)  %


 


ABG Base Excess     (-2.0-3.0)  mmol/L


 


ABG Hemoglobin     (11.7-17.4)  g/dL


 


ABG Carboxyhemoglobin     (0.5-1.5)  %


 


POC ABG HHb (Measured)     (0.0-5.0)  %


 


ABG Methemoglobin     (0.0-3.0)  %


 


Jesus Test     


 


A-a O2 Difference     mm/Hg


 


Respiratory Index     


 


Hgb O2 Saturation     (95.0-98.0)  %


 


Mechanical Rate     


 


FiO2     %


 


Tidal Volume     


 


PEEP     


 


Sodium     (132-148)  mmol/L


 


Potassium     (3.6-5.2)  mmol/L


 


Chloride     ()  mmol/L


 


Carbon Dioxide     (22-30)  mmol/L


 


Anion Gap     (10-20)  


 


BUN     (7-17)  mg/dL


 


Creatinine     (0.7-1.2)  mg/dL


 


Est GFR (African Amer)     


 


Est GFR (Non-Af Amer)     


 


POC Glucose (mg/dL)  152 H  58 L  54 L  ()  mg/dL


 


Random Glucose     ()  mg/dL


 


Calcium     (8.6-10.4)  mg/dl


 


Phosphorus     (2.5-4.5)  mg/dL


 


Magnesium     (1.6-2.3)  mg/dL


 


Total Bilirubin     (0.2-1.3)  mg/dL


 


AST     (14-36)  U/L


 


ALT     (9-52)  U/L


 


Alkaline Phosphatase     ()  U/L


 


Total Protein     (6.3-8.3)  g/dL


 


Albumin     (3.5-5.0)  g/dL


 


Globulin     (2.2-3.9)  gm/dL


 


Albumin/Globulin Ratio     (1.0-2.1)  


 


Blood Type     


 


Antibody Screen     














  03/06/18 03/06/18 03/06/18 Range/Units





  08:35 07:44 06:18 


 


WBC    12.7 H  (4.8-10.8)  K/uL


 


RBC    3.29 L  (3.80-5.20)  Mil/uL


 


Hgb    9.2 L  (11.0-16.0)  g/dL


 


Hct    28.0 L  (34.0-47.0)  %


 


MCV    85.2  (81.0-99.0)  fL


 


MCH    28.0  (27.0-31.0)  pg


 


MCHC    32.8 L  (33.0-37.0)  g/dL


 


RDW    15.8 H  (11.5-14.5)  %


 


Plt Count    86 L  (130-400)  K/uL


 


MPV    9.2  (7.2-11.7)  fL


 


Neut % (Auto)    81.8 H  (50.0-75.0)  %


 


Lymph % (Auto)    12.3 L  (20.0-40.0)  %


 


Mono % (Auto)    5.1  (0.0-10.0)  %


 


Eos % (Auto)    0.4  (0.0-4.0)  %


 


Baso % (Auto)    0.4  (0.0-2.0)  %


 


Neut # (Auto)    10.4 H  (1.8-7.0)  K/uL


 


Lymph # (Auto)    1.6  (1.0-4.3)  K/uL


 


Mono # (Auto)    0.7  (0.0-0.8)  K/uL


 


Eos # (Auto)    0.0  (0.0-0.7)  K/uL


 


Baso # (Auto)    0.1  (0.0-0.2)  K/uL


 


Differential Comment      


 


APTT   66 H D   (21-34)  SECONDS


 


Puncture Site  R/b    


 


pCO2  24 L    (35-45)  mm/Hg


 


pO2  154 H    ()  mm/Hg


 


HCO3  23.3    (21-28)  mmol/L


 


ABG pH  7.53 H    (7.35-7.45)  


 


ABG Total CO2  20.8 L    (22-28)  mmol/L


 


ABG O2 Saturation  99.6 H    (95-98)  %


 


ABG Base Excess  -2.1 L    (-2.0-3.0)  mmol/L


 


ABG Hemoglobin  7.5 L    (11.7-17.4)  g/dL


 


ABG Carboxyhemoglobin  1.6 H    (0.5-1.5)  %


 


POC ABG HHb (Measured)  0.4    (0.0-5.0)  %


 


ABG Methemoglobin  1.1    (0.0-3.0)  %


 


Jesus Test  Na    


 


A-a O2 Difference  101.0    mm/Hg


 


Respiratory Index  0.7    


 


Hgb O2 Saturation  96.8    (95.0-98.0)  %


 


Mechanical Rate  16    


 


FiO2  40.0    %


 


Tidal Volume  500    


 


PEEP  5    


 


Sodium     (132-148)  mmol/L


 


Potassium     (3.6-5.2)  mmol/L


 


Chloride     ()  mmol/L


 


Carbon Dioxide     (22-30)  mmol/L


 


Anion Gap     (10-20)  


 


BUN     (7-17)  mg/dL


 


Creatinine     (0.7-1.2)  mg/dL


 


Est GFR (African Amer)     


 


Est GFR (Non-Af Amer)     


 


POC Glucose (mg/dL)     ()  mg/dL


 


Random Glucose     ()  mg/dL


 


Calcium     (8.6-10.4)  mg/dl


 


Phosphorus     (2.5-4.5)  mg/dL


 


Magnesium     (1.6-2.3)  mg/dL


 


Total Bilirubin     (0.2-1.3)  mg/dL


 


AST     (14-36)  U/L


 


ALT     (9-52)  U/L


 


Alkaline Phosphatase     ()  U/L


 


Total Protein     (6.3-8.3)  g/dL


 


Albumin     (3.5-5.0)  g/dL


 


Globulin     (2.2-3.9)  gm/dL


 


Albumin/Globulin Ratio     (1.0-2.1)  


 


Blood Type     


 


Antibody Screen     














  03/06/18 03/06/18 03/06/18 Range/Units





  06:17 06:08 05:49 


 


WBC     (4.8-10.8)  K/uL


 


RBC     (3.80-5.20)  Mil/uL


 


Hgb     (11.0-16.0)  g/dL


 


Hct     (34.0-47.0)  %


 


MCV     (81.0-99.0)  fL


 


MCH     (27.0-31.0)  pg


 


MCHC     (33.0-37.0)  g/dL


 


RDW     (11.5-14.5)  %


 


Plt Count     (130-400)  K/uL


 


MPV     (7.2-11.7)  fL


 


Neut % (Auto)     (50.0-75.0)  %


 


Lymph % (Auto)     (20.0-40.0)  %


 


Mono % (Auto)     (0.0-10.0)  %


 


Eos % (Auto)     (0.0-4.0)  %


 


Baso % (Auto)     (0.0-2.0)  %


 


Neut # (Auto)     (1.8-7.0)  K/uL


 


Lymph # (Auto)     (1.0-4.3)  K/uL


 


Mono # (Auto)     (0.0-0.8)  K/uL


 


Eos # (Auto)     (0.0-0.7)  K/uL


 


Baso # (Auto)     (0.0-0.2)  K/uL


 


Differential Comment     


 


APTT     (21-34)  SECONDS


 


Puncture Site     


 


pCO2     (35-45)  mm/Hg


 


pO2     ()  mm/Hg


 


HCO3     (21-28)  mmol/L


 


ABG pH     (7.35-7.45)  


 


ABG Total CO2     (22-28)  mmol/L


 


ABG O2 Saturation     (95-98)  %


 


ABG Base Excess     (-2.0-3.0)  mmol/L


 


ABG Hemoglobin     (11.7-17.4)  g/dL


 


ABG Carboxyhemoglobin     (0.5-1.5)  %


 


POC ABG HHb (Measured)     (0.0-5.0)  %


 


ABG Methemoglobin     (0.0-3.0)  %


 


Jesus Test     


 


A-a O2 Difference     mm/Hg


 


Respiratory Index     


 


Hgb O2 Saturation     (95.0-98.0)  %


 


Mechanical Rate     


 


FiO2     %


 


Tidal Volume     


 


PEEP     


 


Sodium  138    (132-148)  mmol/L


 


Potassium  3.1 L    (3.6-5.2)  mmol/L


 


Chloride  100    ()  mmol/L


 


Carbon Dioxide  29    (22-30)  mmol/L


 


Anion Gap  12    (10-20)  


 


BUN  48 H    (7-17)  mg/dL


 


Creatinine  3.6 H    (0.7-1.2)  mg/dL


 


Est GFR (African Amer)  15    


 


Est GFR (Non-Af Amer)  12    


 


POC Glucose (mg/dL)   168 H  47 L  ()  mg/dL


 


Random Glucose  62 L    ()  mg/dL


 


Calcium  7.6 L    (8.6-10.4)  mg/dl


 


Phosphorus  1.7 L    (2.5-4.5)  mg/dL


 


Magnesium  1.8    (1.6-2.3)  mg/dL


 


Total Bilirubin  1.7 H    (0.2-1.3)  mg/dL


 


AST  868 H D    (14-36)  U/L


 


ALT  1479 H    (9-52)  U/L


 


Alkaline Phosphatase  182 H D    ()  U/L


 


Total Protein  5.2 L    (6.3-8.3)  g/dL


 


Albumin  2.4 L    (3.5-5.0)  g/dL


 


Globulin  2.8    (2.2-3.9)  gm/dL


 


Albumin/Globulin Ratio  0.9 L    (1.0-2.1)  


 


Blood Type     


 


Antibody Screen     














  03/06/18 03/06/18 03/05/18 Range/Units





  05:44 00:05 18:18 


 


WBC     (4.8-10.8)  K/uL


 


RBC     (3.80-5.20)  Mil/uL


 


Hgb     (11.0-16.0)  g/dL


 


Hct     (34.0-47.0)  %


 


MCV     (81.0-99.0)  fL


 


MCH     (27.0-31.0)  pg


 


MCHC     (33.0-37.0)  g/dL


 


RDW     (11.5-14.5)  %


 


Plt Count     (130-400)  K/uL


 


MPV     (7.2-11.7)  fL


 


Neut % (Auto)     (50.0-75.0)  %


 


Lymph % (Auto)     (20.0-40.0)  %


 


Mono % (Auto)     (0.0-10.0)  %


 


Eos % (Auto)     (0.0-4.0)  %


 


Baso % (Auto)     (0.0-2.0)  %


 


Neut # (Auto)     (1.8-7.0)  K/uL


 


Lymph # (Auto)     (1.0-4.3)  K/uL


 


Mono # (Auto)     (0.0-0.8)  K/uL


 


Eos # (Auto)     (0.0-0.7)  K/uL


 


Baso # (Auto)     (0.0-0.2)  K/uL


 


Differential Comment     


 


APTT     (21-34)  SECONDS


 


Puncture Site     


 


pCO2     (35-45)  mm/Hg


 


pO2     ()  mm/Hg


 


HCO3     (21-28)  mmol/L


 


ABG pH     (7.35-7.45)  


 


ABG Total CO2     (22-28)  mmol/L


 


ABG O2 Saturation     (95-98)  %


 


ABG Base Excess     (-2.0-3.0)  mmol/L


 


ABG Hemoglobin     (11.7-17.4)  g/dL


 


ABG Carboxyhemoglobin     (0.5-1.5)  %


 


POC ABG HHb (Measured)     (0.0-5.0)  %


 


ABG Methemoglobin     (0.0-3.0)  %


 


Jesus Test     


 


A-a O2 Difference     mm/Hg


 


Respiratory Index     


 


Hgb O2 Saturation     (95.0-98.0)  %


 


Mechanical Rate     


 


FiO2     %


 


Tidal Volume     


 


PEEP     


 


Sodium     (132-148)  mmol/L


 


Potassium     (3.6-5.2)  mmol/L


 


Chloride     ()  mmol/L


 


Carbon Dioxide     (22-30)  mmol/L


 


Anion Gap     (10-20)  


 


BUN     (7-17)  mg/dL


 


Creatinine     (0.7-1.2)  mg/dL


 


Est GFR (African Amer)     


 


Est GFR (Non-Af Amer)     


 


POC Glucose (mg/dL)  57 L  130 H  130 H  ()  mg/dL


 


Random Glucose     ()  mg/dL


 


Calcium     (8.6-10.4)  mg/dl


 


Phosphorus     (2.5-4.5)  mg/dL


 


Magnesium     (1.6-2.3)  mg/dL


 


Total Bilirubin     (0.2-1.3)  mg/dL


 


AST     (14-36)  U/L


 


ALT     (9-52)  U/L


 


Alkaline Phosphatase     ()  U/L


 


Total Protein     (6.3-8.3)  g/dL


 


Albumin     (3.5-5.0)  g/dL


 


Globulin     (2.2-3.9)  gm/dL


 


Albumin/Globulin Ratio     (1.0-2.1)  


 


Blood Type     


 


Antibody Screen     














  03/05/18 Range/Units





  11:56 


 


WBC   (4.8-10.8)  K/uL


 


RBC   (3.80-5.20)  Mil/uL


 


Hgb   (11.0-16.0)  g/dL


 


Hct   (34.0-47.0)  %


 


MCV   (81.0-99.0)  fL


 


MCH   (27.0-31.0)  pg


 


MCHC   (33.0-37.0)  g/dL


 


RDW   (11.5-14.5)  %


 


Plt Count   (130-400)  K/uL


 


MPV   (7.2-11.7)  fL


 


Neut % (Auto)   (50.0-75.0)  %


 


Lymph % (Auto)   (20.0-40.0)  %


 


Mono % (Auto)   (0.0-10.0)  %


 


Eos % (Auto)   (0.0-4.0)  %


 


Baso % (Auto)   (0.0-2.0)  %


 


Neut # (Auto)   (1.8-7.0)  K/uL


 


Lymph # (Auto)   (1.0-4.3)  K/uL


 


Mono # (Auto)   (0.0-0.8)  K/uL


 


Eos # (Auto)   (0.0-0.7)  K/uL


 


Baso # (Auto)   (0.0-0.2)  K/uL


 


Differential Comment   


 


APTT   (21-34)  SECONDS


 


Puncture Site   


 


pCO2   (35-45)  mm/Hg


 


pO2   ()  mm/Hg


 


HCO3   (21-28)  mmol/L


 


ABG pH   (7.35-7.45)  


 


ABG Total CO2   (22-28)  mmol/L


 


ABG O2 Saturation   (95-98)  %


 


ABG Base Excess   (-2.0-3.0)  mmol/L


 


ABG Hemoglobin   (11.7-17.4)  g/dL


 


ABG Carboxyhemoglobin   (0.5-1.5)  %


 


POC ABG HHb (Measured)   (0.0-5.0)  %


 


ABG Methemoglobin   (0.0-3.0)  %


 


Jesus Test   


 


A-a O2 Difference   mm/Hg


 


Respiratory Index   


 


Hgb O2 Saturation   (95.0-98.0)  %


 


Mechanical Rate   


 


FiO2   %


 


Tidal Volume   


 


PEEP   


 


Sodium   (132-148)  mmol/L


 


Potassium   (3.6-5.2)  mmol/L


 


Chloride   ()  mmol/L


 


Carbon Dioxide   (22-30)  mmol/L


 


Anion Gap   (10-20)  


 


BUN   (7-17)  mg/dL


 


Creatinine   (0.7-1.2)  mg/dL


 


Est GFR (African Amer)   


 


Est GFR (Non-Af Amer)   


 


POC Glucose (mg/dL)   ()  mg/dL


 


Random Glucose   ()  mg/dL


 


Calcium   (8.6-10.4)  mg/dl


 


Phosphorus   (2.5-4.5)  mg/dL


 


Magnesium   (1.6-2.3)  mg/dL


 


Total Bilirubin   (0.2-1.3)  mg/dL


 


AST   (14-36)  U/L


 


ALT   (9-52)  U/L


 


Alkaline Phosphatase   ()  U/L


 


Total Protein   (6.3-8.3)  g/dL


 


Albumin   (3.5-5.0)  g/dL


 


Globulin   (2.2-3.9)  gm/dL


 


Albumin/Globulin Ratio   (1.0-2.1)  


 


Blood Type  O POSITIVE  


 


Antibody Screen  Negative  








 Laboratory Results - last 24 hr











  03/05/18 03/05/18 03/06/18





  11:56 18:18 00:05


 


WBC   


 


RBC   


 


Hgb   


 


Hct   


 


MCV   


 


MCH   


 


MCHC   


 


RDW   


 


Plt Count   


 


MPV   


 


Neut % (Auto)   


 


Lymph % (Auto)   


 


Mono % (Auto)   


 


Eos % (Auto)   


 


Baso % (Auto)   


 


Neut # (Auto)   


 


Lymph # (Auto)   


 


Mono # (Auto)   


 


Eos # (Auto)   


 


Baso # (Auto)   


 


Differential Comment   


 


APTT   


 


Puncture Site   


 


pCO2   


 


pO2   


 


HCO3   


 


ABG pH   


 


ABG Total CO2   


 


ABG O2 Saturation   


 


ABG Base Excess   


 


ABG Hemoglobin   


 


ABG Carboxyhemoglobin   


 


POC ABG HHb (Measured)   


 


ABG Methemoglobin   


 


Jesus Test   


 


A-a O2 Difference   


 


Respiratory Index   


 


Hgb O2 Saturation   


 


Mechanical Rate   


 


FiO2   


 


Tidal Volume   


 


PEEP   


 


Sodium   


 


Potassium   


 


Chloride   


 


Carbon Dioxide   


 


Anion Gap   


 


BUN   


 


Creatinine   


 


Est GFR ( Amer)   


 


Est GFR (Non-Af Amer)   


 


POC Glucose (mg/dL)   130 H  130 H


 


Random Glucose   


 


Calcium   


 


Phosphorus   


 


Magnesium   


 


Total Bilirubin   


 


AST   


 


ALT   


 


Alkaline Phosphatase   


 


Total Protein   


 


Albumin   


 


Globulin   


 


Albumin/Globulin Ratio   


 


Blood Type  O POSITIVE  


 


Antibody Screen  Negative  














  03/06/18 03/06/18 03/06/18





  05:44 05:49 06:08


 


WBC   


 


RBC   


 


Hgb   


 


Hct   


 


MCV   


 


MCH   


 


MCHC   


 


RDW   


 


Plt Count   


 


MPV   


 


Neut % (Auto)   


 


Lymph % (Auto)   


 


Mono % (Auto)   


 


Eos % (Auto)   


 


Baso % (Auto)   


 


Neut # (Auto)   


 


Lymph # (Auto)   


 


Mono # (Auto)   


 


Eos # (Auto)   


 


Baso # (Auto)   


 


Differential Comment   


 


APTT   


 


Puncture Site   


 


pCO2   


 


pO2   


 


HCO3   


 


ABG pH   


 


ABG Total CO2   


 


ABG O2 Saturation   


 


ABG Base Excess   


 


ABG Hemoglobin   


 


ABG Carboxyhemoglobin   


 


POC ABG HHb (Measured)   


 


ABG Methemoglobin   


 


Ejsus Test   


 


A-a O2 Difference   


 


Respiratory Index   


 


Hgb O2 Saturation   


 


Mechanical Rate   


 


FiO2   


 


Tidal Volume   


 


PEEP   


 


Sodium   


 


Potassium   


 


Chloride   


 


Carbon Dioxide   


 


Anion Gap   


 


BUN   


 


Creatinine   


 


Est GFR ( Amer)   


 


Est GFR (Non-Af Amer)   


 


POC Glucose (mg/dL)  57 L  47 L  168 H


 


Random Glucose   


 


Calcium   


 


Phosphorus   


 


Magnesium   


 


Total Bilirubin   


 


AST   


 


ALT   


 


Alkaline Phosphatase   


 


Total Protein   


 


Albumin   


 


Globulin   


 


Albumin/Globulin Ratio   


 


Blood Type   


 


Antibody Screen   














  03/06/18 03/06/18 03/06/18





  06:17 06:18 07:44


 


WBC   12.7 H 


 


RBC   3.29 L 


 


Hgb   9.2 L 


 


Hct   28.0 L 


 


MCV   85.2 


 


MCH   28.0 


 


MCHC   32.8 L 


 


RDW   15.8 H 


 


Plt Count   86 L 


 


MPV   9.2 


 


Neut % (Auto)   81.8 H 


 


Lymph % (Auto)   12.3 L 


 


Mono % (Auto)   5.1 


 


Eos % (Auto)   0.4 


 


Baso % (Auto)   0.4 


 


Neut # (Auto)   10.4 H 


 


Lymph # (Auto)   1.6 


 


Mono # (Auto)   0.7 


 


Eos # (Auto)   0.0 


 


Baso # (Auto)   0.1 


 


Differential Comment    


 


APTT    66 H D


 


Puncture Site   


 


pCO2   


 


pO2   


 


HCO3   


 


ABG pH   


 


ABG Total CO2   


 


ABG O2 Saturation   


 


ABG Base Excess   


 


ABG Hemoglobin   


 


ABG Carboxyhemoglobin   


 


POC ABG HHb (Measured)   


 


ABG Methemoglobin   


 


Jesus Test   


 


A-a O2 Difference   


 


Respiratory Index   


 


Hgb O2 Saturation   


 


Mechanical Rate   


 


FiO2   


 


Tidal Volume   


 


PEEP   


 


Sodium  138  


 


Potassium  3.1 L  


 


Chloride  100  


 


Carbon Dioxide  29  


 


Anion Gap  12  


 


BUN  48 H  


 


Creatinine  3.6 H  


 


Est GFR ( Amer)  15  


 


Est GFR (Non-Af Amer)  12  


 


POC Glucose (mg/dL)   


 


Random Glucose  62 L  


 


Calcium  7.6 L  


 


Phosphorus  1.7 L  


 


Magnesium  1.8  


 


Total Bilirubin  1.7 H  


 


AST  868 H D  


 


ALT  1479 H  


 


Alkaline Phosphatase  182 H D  


 


Total Protein  5.2 L  


 


Albumin  2.4 L  


 


Globulin  2.8  


 


Albumin/Globulin Ratio  0.9 L  


 


Blood Type   


 


Antibody Screen   














  03/06/18 03/06/18 03/06/18





  08:35 11:08 11:09


 


WBC   


 


RBC   


 


Hgb   


 


Hct   


 


MCV   


 


MCH   


 


MCHC   


 


RDW   


 


Plt Count   


 


MPV   


 


Neut % (Auto)   


 


Lymph % (Auto)   


 


Mono % (Auto)   


 


Eos % (Auto)   


 


Baso % (Auto)   


 


Neut # (Auto)   


 


Lymph # (Auto)   


 


Mono # (Auto)   


 


Eos # (Auto)   


 


Baso # (Auto)   


 


Differential Comment   


 


APTT   


 


Puncture Site  R/b  


 


pCO2  24 L  


 


pO2  154 H  


 


HCO3  23.3  


 


ABG pH  7.53 H  


 


ABG Total CO2  20.8 L  


 


ABG O2 Saturation  99.6 H  


 


ABG Base Excess  -2.1 L  


 


ABG Hemoglobin  7.5 L  


 


ABG Carboxyhemoglobin  1.6 H  


 


POC ABG HHb (Measured)  0.4  


 


ABG Methemoglobin  1.1  


 


Jesus Test  Na  


 


A-a O2 Difference  101.0  


 


Respiratory Index  0.7  


 


Hgb O2 Saturation  96.8  


 


Mechanical Rate  16  


 


FiO2  40.0  


 


Tidal Volume  500  


 


PEEP  5  


 


Sodium   


 


Potassium   


 


Chloride   


 


Carbon Dioxide   


 


Anion Gap   


 


BUN   


 


Creatinine   


 


Est GFR ( Amer)   


 


Est GFR (Non-Af Amer)   


 


POC Glucose (mg/dL)   54 L  58 L


 


Random Glucose   


 


Calcium   


 


Phosphorus   


 


Magnesium   


 


Total Bilirubin   


 


AST   


 


ALT   


 


Alkaline Phosphatase   


 


Total Protein   


 


Albumin   


 


Globulin   


 


Albumin/Globulin Ratio   


 


Blood Type   


 


Antibody Screen   














  03/06/18





  11:55


 


WBC 


 


RBC 


 


Hgb 


 


Hct 


 


MCV 


 


MCH 


 


MCHC 


 


RDW 


 


Plt Count 


 


MPV 


 


Neut % (Auto) 


 


Lymph % (Auto) 


 


Mono % (Auto) 


 


Eos % (Auto) 


 


Baso % (Auto) 


 


Neut # (Auto) 


 


Lymph # (Auto) 


 


Mono # (Auto) 


 


Eos # (Auto) 


 


Baso # (Auto) 


 


Differential Comment 


 


APTT 


 


Puncture Site 


 


pCO2 


 


pO2 


 


HCO3 


 


ABG pH 


 


ABG Total CO2 


 


ABG O2 Saturation 


 


ABG Base Excess 


 


ABG Hemoglobin 


 


ABG Carboxyhemoglobin 


 


POC ABG HHb (Measured) 


 


ABG Methemoglobin 


 


Jesus Test 


 


A-a O2 Difference 


 


Respiratory Index 


 


Hgb O2 Saturation 


 


Mechanical Rate 


 


FiO2 


 


Tidal Volume 


 


PEEP 


 


Sodium 


 


Potassium 


 


Chloride 


 


Carbon Dioxide 


 


Anion Gap 


 


BUN 


 


Creatinine 


 


Est GFR ( Amer) 


 


Est GFR (Non-Af Amer) 


 


POC Glucose (mg/dL)  152 H


 


Random Glucose 


 


Calcium 


 


Phosphorus 


 


Magnesium 


 


Total Bilirubin 


 


AST 


 


ALT 


 


Alkaline Phosphatase 


 


Total Protein 


 


Albumin 


 


Globulin 


 


Albumin/Globulin Ratio 


 


Blood Type 


 


Antibody Screen 











Fingerstick Blood Sugar Results: 58





Assessment/Plan





- Assessment and Plan (Free Text)


Assessment: 








77F w/ NSTEMI, CKD, s/p vfib arrest





Plan: 





Neuro: No sedation, Lethargic 





Cardio: NSTEMI. S/p Vfib arrest. Cardiac cath when patient more stable





Pulm: Intubated after code. Remained intubated. tolerated CPAP for 2 hours today


José Miguel PRN





GI: Nephro. @ goal. 





Renal: AVF clotted. Dialysis cath place in [place] Dialysis MWF





Endo: DM, insulin aspart Q6, accuchecks





PPX: heparin drip, GI PPX not indicated








<Dolores Au - Last Filed: 03/06/18 17:45>





CCU Subjective





- Physician Review


Critical Care Time Spent (in minutes): 35





CCU Objective





- Vital Signs / Intake & Output


Vital Signs (Last 4 hours): 


Vital Signs











  Temp Pulse Resp BP Pulse Ox


 


 03/06/18 16:56   103 H  15  135/72  75 L


 


 03/06/18 16:09   114 H  18  142/81  100


 


 03/06/18 16:00  97.7 F  120 H  23   99


 


 03/06/18 14:56   103 H  14  118/73  100


 


 03/06/18 14:33   91 H  12  113/66  100


 


 03/06/18 13:56   102 H  16  89/56 L  100











Intake and Output (Last 8hrs): 


 Intake & Output











 03/06/18 03/06/18 03/06/18





 06:59 14:59 22:59


 


Intake Total 330 1485 205


 


Balance 330 1485 205


 


Weight 158 lb  


 


Intake:   


 


  IV  250 


 


  Intake, IV Amount 80 480 130


 


    Right Hand 80 80 30


 


    medial  400 100


 


  Tube Feeding 200 200 75


 


  Blood Product  325 


 


    Red Blood Cells Cpd As1  325 





    Lr  Unit [id number]   


 


  Other 50 230 


 


    Red Blood Cells Cpd As1  30 





    Lr  Unit [id number]   














- Medications


Active Medications: 


Active Medications











Generic Name Dose Route Start Last Admin





  Trade Name Freq  PRN Reason Stop Dose Admin


 


Aspirin  81 mg  03/02/18 10:00  03/06/18 09:11





  Aspirin Chewable  PO   81 mg





  DAILY ABHILASH   Administration


 


Epoetin Vamsi  8,000 unit  03/07/18 09:00  





  Procrit  IV   





  MWF ABHILASH   


 


Ergocalciferol  1 cap  03/03/18 14:45  03/03/18 13:51





  Drisdol 50,000 Intl Units Cap  PO   1 cap





  Q7D ABHILASH   Administration


 


Ferrous Gluconate  324 mg  03/03/18 10:00  03/06/18 17:27





  Fergon  PO   324 mg





  TID ABHILASH   Administration


 


Aztreonam 1 gm/ Sodium  100 mls @ 100 mls/hr  03/03/18 13:30  03/06/18 14:19





  Chloride  IVPB   100 mls/hr





  Q12H ABHILASH   Administration


 


Metronidazole  500 mg in 100 mls @ 100 mls/hr  03/03/18 14:30  03/06/18 15:22





  Flagyl  IVPB   100 mls/hr





  Q8H ABHILASH   Administration


 


Heparin Sodium/Sodium Chloride  25,000 units in 250 mls @ 7.641 mls/hr  03/05/ 18 08:30  03/06/18 14:39





  Heparin 50545 Units/250ml 1/2 Normal Saline  IV   15.7 unit/kg/hr





  .Q24H PRN   10 mls/hr





  PROTOCOL   Administration





  Protocol   





  12 UNIT/KG/HR   


 


Insulin Aspart  0 unit  03/04/18 11:49  03/06/18 17:29





  Novolog  SC   Not Given





  Q6 [facility]   





  Protocol   


 


Insulin Glargine  5 unit  03/06/18 22:00  





  Lantus  SC   





  HS [facility]   


 


Metoprolol Tartrate  12.5 mg  03/05/18 22:00  03/06/18 17:27





  Lopressor  PO   12.5 mg





  BID ABHILASH   Administration


 


Midodrine  10 mg  03/06/18 14:00  03/06/18 14:19





  Proamatine  NG   10 mg





  Q8 ABHILASH   Administration


 


Pantoprazole Sodium  40 mg  03/05/18 10:00  03/06/18 09:11





  Protonix Inj  IVP   40 mg





  DAILY ABHILASH   Administration


 


Rosuvastatin Calcium  5 mg  03/02/18 01:15  03/05/18 21:56





  Crestor  PO   5 mg





  HS ABHILASH   Administration


 


Vitamin B Complex/Vit C/Folic Acid  1 tab  03/03/18 08:00  03/06/18 07:52





  Nephro-Nneka  PO   1 tab





  0800 ABHILASH   Administration














- Patient Studies


Lab Studies: 


 Microbiology Studies











 03/06/18 Unknown Gram Stain - Final





 Trachasp 


 


 03/01/18 06:30 Blood Culture - Preliminary





 Blood-Venous    NO GROWTH AFTER 4 DAYS


 


 03/01/18 06:00 Blood Culture - Preliminary





 Blood-Venous    NO GROWTH AFTER 4 DAYS








 Lab Studies











  03/06/18 03/06/18 03/06/18 Range/Units





  17:28 11:55 11:09 


 


WBC     (4.8-10.8)  K/uL


 


RBC     (3.80-5.20)  Mil/uL


 


Hgb     (11.0-16.0)  g/dL


 


Hct     (34.0-47.0)  %


 


MCV     (81.0-99.0)  fL


 


MCH     (27.0-31.0)  pg


 


MCHC     (33.0-37.0)  g/dL


 


RDW     (11.5-14.5)  %


 


Plt Count     (130-400)  K/uL


 


MPV     (7.2-11.7)  fL


 


Neut % (Auto)     (50.0-75.0)  %


 


Lymph % (Auto)     (20.0-40.0)  %


 


Mono % (Auto)     (0.0-10.0)  %


 


Eos % (Auto)     (0.0-4.0)  %


 


Baso % (Auto)     (0.0-2.0)  %


 


Neut # (Auto)     (1.8-7.0)  K/uL


 


Lymph # (Auto)     (1.0-4.3)  K/uL


 


Mono # (Auto)     (0.0-0.8)  K/uL


 


Eos # (Auto)     (0.0-0.7)  K/uL


 


Baso # (Auto)     (0.0-0.2)  K/uL


 


Differential Comment     


 


APTT     (21-34)  SECONDS


 


Puncture Site     


 


pCO2     (35-45)  mm/Hg


 


pO2     ()  mm/Hg


 


HCO3     (21-28)  mmol/L


 


ABG pH     (7.35-7.45)  


 


ABG Total CO2     (22-28)  mmol/L


 


ABG O2 Saturation     (95-98)  %


 


ABG Base Excess     (-2.0-3.0)  mmol/L


 


ABG Hemoglobin     (11.7-17.4)  g/dL


 


ABG Carboxyhemoglobin     (0.5-1.5)  %


 


POC ABG HHb (Measured)     (0.0-5.0)  %


 


ABG Methemoglobin     (0.0-3.0)  %


 


Jesus Test     


 


A-a O2 Difference     mm/Hg


 


Respiratory Index     


 


Hgb O2 Saturation     (95.0-98.0)  %


 


Mechanical Rate     


 


FiO2     %


 


Tidal Volume     


 


PEEP     


 


Sodium     (132-148)  mmol/L


 


Potassium     (3.6-5.2)  mmol/L


 


Chloride     ()  mmol/L


 


Carbon Dioxide     (22-30)  mmol/L


 


Anion Gap     (10-20)  


 


BUN     (7-17)  mg/dL


 


Creatinine     (0.7-1.2)  mg/dL


 


Est GFR (African Amer)     


 


Est GFR (Non-Af Amer)     


 


POC Glucose (mg/dL)  71  152 H  58 L  ()  mg/dL


 


Random Glucose     ()  mg/dL


 


Calcium     (8.6-10.4)  mg/dl


 


Phosphorus     (2.5-4.5)  mg/dL


 


Magnesium     (1.6-2.3)  mg/dL


 


Total Bilirubin     (0.2-1.3)  mg/dL


 


AST     (14-36)  U/L


 


ALT     (9-52)  U/L


 


Alkaline Phosphatase     ()  U/L


 


Total Protein     (6.3-8.3)  g/dL


 


Albumin     (3.5-5.0)  g/dL


 


Globulin     (2.2-3.9)  gm/dL


 


Albumin/Globulin Ratio     (1.0-2.1)  














  03/06/18 03/06/18 03/06/18 Range/Units





  11:08 08:35 07:44 


 


WBC     (4.8-10.8)  K/uL


 


RBC     (3.80-5.20)  Mil/uL


 


Hgb     (11.0-16.0)  g/dL


 


Hct     (34.0-47.0)  %


 


MCV     (81.0-99.0)  fL


 


MCH     (27.0-31.0)  pg


 


MCHC     (33.0-37.0)  g/dL


 


RDW     (11.5-14.5)  %


 


Plt Count     (130-400)  K/uL


 


MPV     (7.2-11.7)  fL


 


Neut % (Auto)     (50.0-75.0)  %


 


Lymph % (Auto)     (20.0-40.0)  %


 


Mono % (Auto)     (0.0-10.0)  %


 


Eos % (Auto)     (0.0-4.0)  %


 


Baso % (Auto)     (0.0-2.0)  %


 


Neut # (Auto)     (1.8-7.0)  K/uL


 


Lymph # (Auto)     (1.0-4.3)  K/uL


 


Mono # (Auto)     (0.0-0.8)  K/uL


 


Eos # (Auto)     (0.0-0.7)  K/uL


 


Baso # (Auto)     (0.0-0.2)  K/uL


 


Differential Comment     


 


APTT    66 H D  (21-34)  SECONDS


 


Puncture Site   R/b   


 


pCO2   24 L   (35-45)  mm/Hg


 


pO2   154 H   ()  mm/Hg


 


HCO3   23.3   (21-28)  mmol/L


 


ABG pH   7.53 H   (7.35-7.45)  


 


ABG Total CO2   20.8 L   (22-28)  mmol/L


 


ABG O2 Saturation   99.6 H   (95-98)  %


 


ABG Base Excess   -2.1 L   (-2.0-3.0)  mmol/L


 


ABG Hemoglobin   7.5 L   (11.7-17.4)  g/dL


 


ABG Carboxyhemoglobin   1.6 H   (0.5-1.5)  %


 


POC ABG HHb (Measured)   0.4   (0.0-5.0)  %


 


ABG Methemoglobin   1.1   (0.0-3.0)  %


 


Jesus Test   Na   


 


A-a O2 Difference   101.0   mm/Hg


 


Respiratory Index   0.7   


 


Hgb O2 Saturation   96.8   (95.0-98.0)  %


 


Mechanical Rate   16   


 


FiO2   40.0   %


 


Tidal Volume   500   


 


PEEP   5   


 


Sodium     (132-148)  mmol/L


 


Potassium     (3.6-5.2)  mmol/L


 


Chloride     ()  mmol/L


 


Carbon Dioxide     (22-30)  mmol/L


 


Anion Gap     (10-20)  


 


BUN     (7-17)  mg/dL


 


Creatinine     (0.7-1.2)  mg/dL


 


Est GFR (African Amer)     


 


Est GFR (Non-Af Amer)     


 


POC Glucose (mg/dL)  54 L    ()  mg/dL


 


Random Glucose     ()  mg/dL


 


Calcium     (8.6-10.4)  mg/dl


 


Phosphorus     (2.5-4.5)  mg/dL


 


Magnesium     (1.6-2.3)  mg/dL


 


Total Bilirubin     (0.2-1.3)  mg/dL


 


AST     (14-36)  U/L


 


ALT     (9-52)  U/L


 


Alkaline Phosphatase     ()  U/L


 


Total Protein     (6.3-8.3)  g/dL


 


Albumin     (3.5-5.0)  g/dL


 


Globulin     (2.2-3.9)  gm/dL


 


Albumin/Globulin Ratio     (1.0-2.1)  














  03/06/18 03/06/18 03/06/18 Range/Units





  06:18 06:17 06:08 


 


WBC  12.7 H    (4.8-10.8)  K/uL


 


RBC  3.29 L    (3.80-5.20)  Mil/uL


 


Hgb  9.2 L    (11.0-16.0)  g/dL


 


Hct  28.0 L    (34.0-47.0)  %


 


MCV  85.2    (81.0-99.0)  fL


 


MCH  28.0    (27.0-31.0)  pg


 


MCHC  32.8 L    (33.0-37.0)  g/dL


 


RDW  15.8 H    (11.5-14.5)  %


 


Plt Count  86 L    (130-400)  K/uL


 


MPV  9.2    (7.2-11.7)  fL


 


Neut % (Auto)  81.8 H    (50.0-75.0)  %


 


Lymph % (Auto)  12.3 L    (20.0-40.0)  %


 


Mono % (Auto)  5.1    (0.0-10.0)  %


 


Eos % (Auto)  0.4    (0.0-4.0)  %


 


Baso % (Auto)  0.4    (0.0-2.0)  %


 


Neut # (Auto)  10.4 H    (1.8-7.0)  K/uL


 


Lymph # (Auto)  1.6    (1.0-4.3)  K/uL


 


Mono # (Auto)  0.7    (0.0-0.8)  K/uL


 


Eos # (Auto)  0.0    (0.0-0.7)  K/uL


 


Baso # (Auto)  0.1    (0.0-0.2)  K/uL


 


Differential Comment      


 


APTT     (21-34)  SECONDS


 


Puncture Site     


 


pCO2     (35-45)  mm/Hg


 


pO2     ()  mm/Hg


 


HCO3     (21-28)  mmol/L


 


ABG pH     (7.35-7.45)  


 


ABG Total CO2     (22-28)  mmol/L


 


ABG O2 Saturation     (95-98)  %


 


ABG Base Excess     (-2.0-3.0)  mmol/L


 


ABG Hemoglobin     (11.7-17.4)  g/dL


 


ABG Carboxyhemoglobin     (0.5-1.5)  %


 


POC ABG HHb (Measured)     (0.0-5.0)  %


 


ABG Methemoglobin     (0.0-3.0)  %


 


Jesus Test     


 


A-a O2 Difference     mm/Hg


 


Respiratory Index     


 


Hgb O2 Saturation     (95.0-98.0)  %


 


Mechanical Rate     


 


FiO2     %


 


Tidal Volume     


 


PEEP     


 


Sodium   138   (132-148)  mmol/L


 


Potassium   3.1 L   (3.6-5.2)  mmol/L


 


Chloride   100   ()  mmol/L


 


Carbon Dioxide   29   (22-30)  mmol/L


 


Anion Gap   12   (10-20)  


 


BUN   48 H   (7-17)  mg/dL


 


Creatinine   3.6 H   (0.7-1.2)  mg/dL


 


Est GFR (African Amer)   15   


 


Est GFR (Non-Af Amer)   12   


 


POC Glucose (mg/dL)    168 H  ()  mg/dL


 


Random Glucose   62 L   ()  mg/dL


 


Calcium   7.6 L   (8.6-10.4)  mg/dl


 


Phosphorus   1.7 L   (2.5-4.5)  mg/dL


 


Magnesium   1.8   (1.6-2.3)  mg/dL


 


Total Bilirubin   1.7 H   (0.2-1.3)  mg/dL


 


AST   868 H D   (14-36)  U/L


 


ALT   1479 H   (9-52)  U/L


 


Alkaline Phosphatase   182 H D   ()  U/L


 


Total Protein   5.2 L   (6.3-8.3)  g/dL


 


Albumin   2.4 L   (3.5-5.0)  g/dL


 


Globulin   2.8   (2.2-3.9)  gm/dL


 


Albumin/Globulin Ratio   0.9 L   (1.0-2.1)  














  03/06/18 03/06/18 03/06/18 Range/Units





  05:49 05:44 00:05 


 


WBC     (4.8-10.8)  K/uL


 


RBC     (3.80-5.20)  Mil/uL


 


Hgb     (11.0-16.0)  g/dL


 


Hct     (34.0-47.0)  %


 


MCV     (81.0-99.0)  fL


 


MCH     (27.0-31.0)  pg


 


MCHC     (33.0-37.0)  g/dL


 


RDW     (11.5-14.5)  %


 


Plt Count     (130-400)  K/uL


 


MPV     (7.2-11.7)  fL


 


Neut % (Auto)     (50.0-75.0)  %


 


Lymph % (Auto)     (20.0-40.0)  %


 


Mono % (Auto)     (0.0-10.0)  %


 


Eos % (Auto)     (0.0-4.0)  %


 


Baso % (Auto)     (0.0-2.0)  %


 


Neut # (Auto)     (1.8-7.0)  K/uL


 


Lymph # (Auto)     (1.0-4.3)  K/uL


 


Mono # (Auto)     (0.0-0.8)  K/uL


 


Eos # (Auto)     (0.0-0.7)  K/uL


 


Baso # (Auto)     (0.0-0.2)  K/uL


 


Differential Comment     


 


APTT     (21-34)  SECONDS


 


Puncture Site     


 


pCO2     (35-45)  mm/Hg


 


pO2     ()  mm/Hg


 


HCO3     (21-28)  mmol/L


 


ABG pH     (7.35-7.45)  


 


ABG Total CO2     (22-28)  mmol/L


 


ABG O2 Saturation     (95-98)  %


 


ABG Base Excess     (-2.0-3.0)  mmol/L


 


ABG Hemoglobin     (11.7-17.4)  g/dL


 


ABG Carboxyhemoglobin     (0.5-1.5)  %


 


POC ABG HHb (Measured)     (0.0-5.0)  %


 


ABG Methemoglobin     (0.0-3.0)  %


 


Jesus Test     


 


A-a O2 Difference     mm/Hg


 


Respiratory Index     


 


Hgb O2 Saturation     (95.0-98.0)  %


 


Mechanical Rate     


 


FiO2     %


 


Tidal Volume     


 


PEEP     


 


Sodium     (132-148)  mmol/L


 


Potassium     (3.6-5.2)  mmol/L


 


Chloride     ()  mmol/L


 


Carbon Dioxide     (22-30)  mmol/L


 


Anion Gap     (10-20)  


 


BUN     (7-17)  mg/dL


 


Creatinine     (0.7-1.2)  mg/dL


 


Est GFR (African Amer)     


 


Est GFR (Non-Af Amer)     


 


POC Glucose (mg/dL)  47 L  57 L  130 H  ()  mg/dL


 


Random Glucose     ()  mg/dL


 


Calcium     (8.6-10.4)  mg/dl


 


Phosphorus     (2.5-4.5)  mg/dL


 


Magnesium     (1.6-2.3)  mg/dL


 


Total Bilirubin     (0.2-1.3)  mg/dL


 


AST     (14-36)  U/L


 


ALT     (9-52)  U/L


 


Alkaline Phosphatase     ()  U/L


 


Total Protein     (6.3-8.3)  g/dL


 


Albumin     (3.5-5.0)  g/dL


 


Globulin     (2.2-3.9)  gm/dL


 


Albumin/Globulin Ratio     (1.0-2.1)  














  03/05/18 Range/Units





  18:18 


 


WBC   (4.8-10.8)  K/uL


 


RBC   (3.80-5.20)  Mil/uL


 


Hgb   (11.0-16.0)  g/dL


 


Hct   (34.0-47.0)  %


 


MCV   (81.0-99.0)  fL


 


MCH   (27.0-31.0)  pg


 


MCHC   (33.0-37.0)  g/dL


 


RDW   (11.5-14.5)  %


 


Plt Count   (130-400)  K/uL


 


MPV   (7.2-11.7)  fL


 


Neut % (Auto)   (50.0-75.0)  %


 


Lymph % (Auto)   (20.0-40.0)  %


 


Mono % (Auto)   (0.0-10.0)  %


 


Eos % (Auto)   (0.0-4.0)  %


 


Baso % (Auto)   (0.0-2.0)  %


 


Neut # (Auto)   (1.8-7.0)  K/uL


 


Lymph # (Auto)   (1.0-4.3)  K/uL


 


Mono # (Auto)   (0.0-0.8)  K/uL


 


Eos # (Auto)   (0.0-0.7)  K/uL


 


Baso # (Auto)   (0.0-0.2)  K/uL


 


Differential Comment   


 


APTT   (21-34)  SECONDS


 


Puncture Site   


 


pCO2   (35-45)  mm/Hg


 


pO2   ()  mm/Hg


 


HCO3   (21-28)  mmol/L


 


ABG pH   (7.35-7.45)  


 


ABG Total CO2   (22-28)  mmol/L


 


ABG O2 Saturation   (95-98)  %


 


ABG Base Excess   (-2.0-3.0)  mmol/L


 


ABG Hemoglobin   (11.7-17.4)  g/dL


 


ABG Carboxyhemoglobin   (0.5-1.5)  %


 


POC ABG HHb (Measured)   (0.0-5.0)  %


 


ABG Methemoglobin   (0.0-3.0)  %


 


Jesus Test   


 


A-a O2 Difference   mm/Hg


 


Respiratory Index   


 


Hgb O2 Saturation   (95.0-98.0)  %


 


Mechanical Rate   


 


FiO2   %


 


Tidal Volume   


 


PEEP   


 


Sodium   (132-148)  mmol/L


 


Potassium   (3.6-5.2)  mmol/L


 


Chloride   ()  mmol/L


 


Carbon Dioxide   (22-30)  mmol/L


 


Anion Gap   (10-20)  


 


BUN   (7-17)  mg/dL


 


Creatinine   (0.7-1.2)  mg/dL


 


Est GFR (African Amer)   


 


Est GFR (Non-Af Amer)   


 


POC Glucose (mg/dL)  130 H  ()  mg/dL


 


Random Glucose   ()  mg/dL


 


Calcium   (8.6-10.4)  mg/dl


 


Phosphorus   (2.5-4.5)  mg/dL


 


Magnesium   (1.6-2.3)  mg/dL


 


Total Bilirubin   (0.2-1.3)  mg/dL


 


AST   (14-36)  U/L


 


ALT   (9-52)  U/L


 


Alkaline Phosphatase   ()  U/L


 


Total Protein   (6.3-8.3)  g/dL


 


Albumin   (3.5-5.0)  g/dL


 


Globulin   (2.2-3.9)  gm/dL


 


Albumin/Globulin Ratio   (1.0-2.1)  








 Laboratory Results - last 24 hr











  03/05/18 03/06/18 03/06/18





  18:18 00:05 05:44


 


WBC   


 


RBC   


 


Hgb   


 


Hct   


 


MCV   


 


MCH   


 


MCHC   


 


RDW   


 


Plt Count   


 


MPV   


 


Neut % (Auto)   


 


Lymph % (Auto)   


 


Mono % (Auto)   


 


Eos % (Auto)   


 


Baso % (Auto)   


 


Neut # (Auto)   


 


Lymph # (Auto)   


 


Mono # (Auto)   


 


Eos # (Auto)   


 


Baso # (Auto)   


 


Differential Comment   


 


APTT   


 


Puncture Site   


 


pCO2   


 


pO2   


 


HCO3   


 


ABG pH   


 


ABG Total CO2   


 


ABG O2 Saturation   


 


ABG Base Excess   


 


ABG Hemoglobin   


 


ABG Carboxyhemoglobin   


 


POC ABG HHb (Measured)   


 


ABG Methemoglobin   


 


Jesus Test   


 


A-a O2 Difference   


 


Respiratory Index   


 


Hgb O2 Saturation   


 


Mechanical Rate   


 


FiO2   


 


Tidal Volume   


 


PEEP   


 


Sodium   


 


Potassium   


 


Chloride   


 


Carbon Dioxide   


 


Anion Gap   


 


BUN   


 


Creatinine   


 


Est GFR ( Amer)   


 


Est GFR (Non-Af Amer)   


 


POC Glucose (mg/dL)  130 H  130 H  57 L


 


Random Glucose   


 


Calcium   


 


Phosphorus   


 


Magnesium   


 


Total Bilirubin   


 


AST   


 


ALT   


 


Alkaline Phosphatase   


 


Total Protein   


 


Albumin   


 


Globulin   


 


Albumin/Globulin Ratio   














  03/06/18 03/06/18 03/06/18





  05:49 06:08 06:17


 


WBC   


 


RBC   


 


Hgb   


 


Hct   


 


MCV   


 


MCH   


 


MCHC   


 


RDW   


 


Plt Count   


 


MPV   


 


Neut % (Auto)   


 


Lymph % (Auto)   


 


Mono % (Auto)   


 


Eos % (Auto)   


 


Baso % (Auto)   


 


Neut # (Auto)   


 


Lymph # (Auto)   


 


Mono # (Auto)   


 


Eos # (Auto)   


 


Baso # (Auto)   


 


Differential Comment   


 


APTT   


 


Puncture Site   


 


pCO2   


 


pO2   


 


HCO3   


 


ABG pH   


 


ABG Total CO2   


 


ABG O2 Saturation   


 


ABG Base Excess   


 


ABG Hemoglobin   


 


ABG Carboxyhemoglobin   


 


POC ABG HHb (Measured)   


 


ABG Methemoglobin   


 


Jesus Test   


 


A-a O2 Difference   


 


Respiratory Index   


 


Hgb O2 Saturation   


 


Mechanical Rate   


 


FiO2   


 


Tidal Volume   


 


PEEP   


 


Sodium    138


 


Potassium    3.1 L


 


Chloride    100


 


Carbon Dioxide    29


 


Anion Gap    12


 


BUN    48 H


 


Creatinine    3.6 H


 


Est GFR ( Amer)    15


 


Est GFR (Non-Af Amer)    12


 


POC Glucose (mg/dL)  47 L  168 H 


 


Random Glucose    62 L


 


Calcium    7.6 L


 


Phosphorus    1.7 L


 


Magnesium    1.8


 


Total Bilirubin    1.7 H


 


AST    868 H D


 


ALT    1479 H


 


Alkaline Phosphatase    182 H D


 


Total Protein    5.2 L


 


Albumin    2.4 L


 


Globulin    2.8


 


Albumin/Globulin Ratio    0.9 L














  03/06/18 03/06/18 03/06/18





  06:18 07:44 08:35


 


WBC  12.7 H  


 


RBC  3.29 L  


 


Hgb  9.2 L  


 


Hct  28.0 L  


 


MCV  85.2  


 


MCH  28.0  


 


MCHC  32.8 L  


 


RDW  15.8 H  


 


Plt Count  86 L  


 


MPV  9.2  


 


Neut % (Auto)  81.8 H  


 


Lymph % (Auto)  12.3 L  


 


Mono % (Auto)  5.1  


 


Eos % (Auto)  0.4  


 


Baso % (Auto)  0.4  


 


Neut # (Auto)  10.4 H  


 


Lymph # (Auto)  1.6  


 


Mono # (Auto)  0.7  


 


Eos # (Auto)  0.0  


 


Baso # (Auto)  0.1  


 


Differential Comment    


 


APTT   66 H D 


 


Puncture Site    R/b


 


pCO2    24 L


 


pO2    154 H


 


HCO3    23.3


 


ABG pH    7.53 H


 


ABG Total CO2    20.8 L


 


ABG O2 Saturation    99.6 H


 


ABG Base Excess    -2.1 L


 


ABG Hemoglobin    7.5 L


 


ABG Carboxyhemoglobin    1.6 H


 


POC ABG HHb (Measured)    0.4


 


ABG Methemoglobin    1.1


 


Jesus Test    Na


 


A-a O2 Difference    101.0


 


Respiratory Index    0.7


 


Hgb O2 Saturation    96.8


 


Mechanical Rate    16


 


FiO2    40.0


 


Tidal Volume    500


 


PEEP    5


 


Sodium   


 


Potassium   


 


Chloride   


 


Carbon Dioxide   


 


Anion Gap   


 


BUN   


 


Creatinine   


 


Est GFR ( Amer)   


 


Est GFR (Non-Af Amer)   


 


POC Glucose (mg/dL)   


 


Random Glucose   


 


Calcium   


 


Phosphorus   


 


Magnesium   


 


Total Bilirubin   


 


AST   


 


ALT   


 


Alkaline Phosphatase   


 


Total Protein   


 


Albumin   


 


Globulin   


 


Albumin/Globulin Ratio   














  03/06/18 03/06/18 03/06/18





  11:08 11:09 11:55


 


WBC   


 


RBC   


 


Hgb   


 


Hct   


 


MCV   


 


MCH   


 


MCHC   


 


RDW   


 


Plt Count   


 


MPV   


 


Neut % (Auto)   


 


Lymph % (Auto)   


 


Mono % (Auto)   


 


Eos % (Auto)   


 


Baso % (Auto)   


 


Neut # (Auto)   


 


Lymph # (Auto)   


 


Mono # (Auto)   


 


Eos # (Auto)   


 


Baso # (Auto)   


 


Differential Comment   


 


APTT   


 


Puncture Site   


 


pCO2   


 


pO2   


 


HCO3   


 


ABG pH   


 


ABG Total CO2   


 


ABG O2 Saturation   


 


ABG Base Excess   


 


ABG Hemoglobin   


 


ABG Carboxyhemoglobin   


 


POC ABG HHb (Measured)   


 


ABG Methemoglobin   


 


Jesus Test   


 


A-a O2 Difference   


 


Respiratory Index   


 


Hgb O2 Saturation   


 


Mechanical Rate   


 


FiO2   


 


Tidal Volume   


 


PEEP   


 


Sodium   


 


Potassium   


 


Chloride   


 


Carbon Dioxide   


 


Anion Gap   


 


BUN   


 


Creatinine   


 


Est GFR ( Amer)   


 


Est GFR (Non-Af Amer)   


 


POC Glucose (mg/dL)  54 L  58 L  152 H


 


Random Glucose   


 


Calcium   


 


Phosphorus   


 


Magnesium   


 


Total Bilirubin   


 


AST   


 


ALT   


 


Alkaline Phosphatase   


 


Total Protein   


 


Albumin   


 


Globulin   


 


Albumin/Globulin Ratio   














  03/06/18





  17:28


 


WBC 


 


RBC 


 


Hgb 


 


Hct 


 


MCV 


 


MCH 


 


MCHC 


 


RDW 


 


Plt Count 


 


MPV 


 


Neut % (Auto) 


 


Lymph % (Auto) 


 


Mono % (Auto) 


 


Eos % (Auto) 


 


Baso % (Auto) 


 


Neut # (Auto) 


 


Lymph # (Auto) 


 


Mono # (Auto) 


 


Eos # (Auto) 


 


Baso # (Auto) 


 


Differential Comment 


 


APTT 


 


Puncture Site 


 


pCO2 


 


pO2 


 


HCO3 


 


ABG pH 


 


ABG Total CO2 


 


ABG O2 Saturation 


 


ABG Base Excess 


 


ABG Hemoglobin 


 


ABG Carboxyhemoglobin 


 


POC ABG HHb (Measured) 


 


ABG Methemoglobin 


 


Jesus Test 


 


A-a O2 Difference 


 


Respiratory Index 


 


Hgb O2 Saturation 


 


Mechanical Rate 


 


FiO2 


 


Tidal Volume 


 


PEEP 


 


Sodium 


 


Potassium 


 


Chloride 


 


Carbon Dioxide 


 


Anion Gap 


 


BUN 


 


Creatinine 


 


Est GFR ( Amer) 


 


Est GFR (Non-Af Amer) 


 


POC Glucose (mg/dL)  71


 


Random Glucose 


 


Calcium 


 


Phosphorus 


 


Magnesium 


 


Total Bilirubin 


 


AST 


 


ALT 


 


Alkaline Phosphatase 


 


Total Protein 


 


Albumin 


 


Globulin 


 


Albumin/Globulin Ratio 














Assessment/Plan





- Assessment and Plan (Free Text)


Plan: 





Above resident note reviewed and verified. Patient with h/o NSTEMI, s/p cardiac 

arrest not intubated for airway proteciton and dx with severe ssytolic herat 

failure





-add midodrine and tirate off pressors


-continue HD as per renal


-CPAP trials daily


-continue empirical abx





cc time 35 minutes

## 2018-03-06 NOTE — CP.PCM.PN
Subjective





- Date & Time of Evaluation


Date of Evaluation: 03/06/18


Time of Evaluation: 12:00





- Subjective


Subjective: 





Improved clinically, after hemodialysis,, more alert, bicarbonate is up to 29 

and pH 7.5. Potassium 3.1 post hemodialysis less congestion currently on CPAP 

and no pressors. Continue observation for further cardiac invasive management 

when more stable.





Objective





- Vital Signs/Intake and Output


Vital Signs (last 24 hours): 


 











Temp Pulse Resp BP Pulse Ox


 


 97.9 F   109 H  22   124/44 L  99 


 


 03/06/18 08:00  03/06/18 09:57  03/06/18 09:57  03/06/18 09:57  03/06/18 09:57








Intake and Output: 


 











 03/06/18 03/06/18





 06:59 18:59


 


Intake Total 520 440


 


Output Total 1100 


 


Balance -580 440














- Medications


Medications: 


 Current Medications





Aspirin (Aspirin Chewable)  81 mg PO DAILY [facility]


   Last Admin: 03/06/18 09:11 Dose:  81 mg


Epoetin Vamsi (Procrit)  8,000 unit IV MWF [facility]


Ergocalciferol (Drisdol 50,000 Intl Units Cap)  1 cap PO Q7D [facility]


   Last Admin: 03/03/18 13:51 Dose:  1 cap


Ferrous Gluconate (Fergon)  324 mg PO TID [facility]


   Last Admin: 03/06/18 09:11 Dose:  324 mg


Aztreonam 1 gm/ Sodium (Chloride)  100 mls @ 100 mls/hr IVPB Q12H [facility]


   Last Admin: 03/06/18 01:30 Dose:  100 mls/hr


Metronidazole (Flagyl)  500 mg in 100 mls @ 100 mls/hr IVPB Q8H [facility]


   Last Admin: 03/06/18 06:00 Dose:  100 mls/hr


Heparin Sodium/Sodium Chloride (Heparin 81084 Units/250ml 1/2 Normal Saline)  25

,000 units in 250 mls @ 7.641 mls/hr IV .Q24H PRN; Protocol; 12 UNIT/KG/HR


   PRN Reason: PROTOCOL


   Last Titration: 03/05/18 11:12 Dose:  15.7 unit/kg/hr, 10 mls/hr


Potassium Chloride 20 meq/ (Sodium Chloride)  110 mls @ 50 mls/hr IV Q2H [facility]


   Stop: 03/06/18 13:59


   Last Admin: 03/06/18 10:22 Dose:  50 mls/hr


Insulin Aspart (Novolog)  0 unit SC Q6 [facility]


   PRN Reason: Protocol


   Last Admin: 03/06/18 06:00 Dose:  Not Given


Insulin Glargine (Lantus)  5 unit SC HS [facility]


Metoprolol Tartrate (Lopressor)  12.5 mg PO BID [facility]


   Last Admin: 03/06/18 09:11 Dose:  12.5 mg


Midodrine (Proamatine)  10 mg NG Q8 [facility]


Pantoprazole Sodium (Protonix Inj)  40 mg IVP DAILY [facility]


   Last Admin: 03/06/18 09:11 Dose:  40 mg


Rosuvastatin Calcium (Crestor)  5 mg PO HS [facility]


   Last Admin: 03/05/18 21:56 Dose:  5 mg


Vitamin B Complex/Vit C/Folic Acid (Nephro-Nneka)  1 tab PO 0800 [facility]


   Last Admin: 03/06/18 07:52 Dose:  1 tab











- Labs


Labs: 


 





 03/06/18 06:18 





 03/06/18 06:17 





 











PT  14.6 SECONDS (9.7-12.2)  H  03/02/18  02:17    


 


INR  1.3   03/02/18  02:17    


 


APTT  66 SECONDS (21-34)  H D 03/06/18  07:44    














- Constitutional


Appears: Non-toxic





- Head Exam


Head Exam: ATRAUMATIC





- Eye Exam


Eye Exam: EOMI





- ENT Exam


ENT Exam: Mucous Membranes Moist





- Neck Exam


Neck Exam: absent: Lymphadenopathy, Thyromegaly





- Respiratory Exam


Respiratory Exam: Clear to Ausculation Bilateral.  absent: Rales





- Cardiovascular Exam


Cardiovascular Exam: REGULAR RHYTHM, Murmur





- GI/Abdominal Exam


GI & Abdominal Exam: Normal Bowel Sounds





- Rectal Exam


Rectal Exam: Deferred





- Extremities Exam


Extremities Exam: Normal Capillary Refill





- Neurological Exam


Neurological Exam: Alert





- Skin


Skin Exam: Dry





Assessment and Plan


(1) Severe sepsis


Status: Acute   





(2) Acute on chronic renal failure


Status: Acute   





(3) Congestive heart failure


Status: Acute   





(4) NSTEMI (non-ST elevated myocardial infarction)


Status: Acute   





(5) Pulmonary HTN


Status: Chronic   





(6) Malignant neoplasm of transverse colon


Status: Chronic

## 2018-03-06 NOTE — CON
DATE:



HISTORY OF PRESENT ILLNESS:  The patient is a 77-year-old Afro-American

female.  She is intubated at this time.  I am asked to evaluate for IV

antibiotics.  She has history of CKD and she has stage 4 chronic kidney

disease, but does not rely on dialysis.  She has diastolic congestive heart

failure, diabetes mellitus, hypertension and high cholesterol for many

years, history of MI and TIA 3 years ago, and history of colon cancer.  She

presented with altered mental status and according to the H and P, which

was done by the Resident, her sugars were 500 at home and they gave insulin

and then repeated the sugar at 4 p.m. when she was acting strange and the

patient was not behaving right and not looking right, so the family decided

to bring her.  She also was having chest pain and she was having some

chills, abdominal pain, and dry cough.  No fever at home.  She has had no

headaches, no weakness, no dizziness, no poor appetite.  She does have an

AV graft, which was placed in 2016, but was never used for dialysis as

probably she got over it I think, but now she is here with renal failure,

intubated.



PAST SURGICAL HISTORY:  She has surgical history of colon surgery in ,

had endoscopy.



PAST MEDICAL HISTORY:  She has past medical history of end-stage renal

disease, diabetes, hypertension, hypercholesterolemia, MI, TIA, peripheral

artery disease, CHF, asthma, colon cancer, and arthritis.



MEDICATIONS:  She was on aspirin, amlodipine, lisinopril, labetalol,

Lantus, furosemide, ferrous sulphate, and calcitriol.



ALLERGIES:  SHE IS ALLERGIC TO PENICILLIN AND GETS RASH.



FAMILY HISTORY:  Mother  of cancer, father  of unknown reason.



SOCIAL HISTORY:  She lives with the son and daughter, she lives alone, but

they visit her daily and she ambulates without assistance for this reason.



At this time, I found that critical care note shows that she has a

non-STEMI and she had asystole.  They tried to put a groin catheter, which

they could not and Dr. Alvarado on 2018 checked the renal.  They

found the thrombosed left AV fistula and he had to put right femoral

Shiley, but unable to pass guidewire.  So at this time, the patient is

intubated, unable give any history, but she is opening her eyes and she was

also seen by cardiologist and nephrologist.  She had a non-STEMI and she is

status post VFib and arrest.  She is on amiodarone and pulmonary-wise she

remains intubated and her kidney functions are bad.



ALLERGIES:  SHE IS ALLERGIC TO PENICILLIN.



She does have diabetes.



MEDICATIONS:  At this time, she is on aspirin, she is getting Azactam 1 gm

q.12, PhosLo, Procrit, Bestron, Fergon, Heparin 25,000, and she is on

NovoLog, and she is also on IV heparin, she is _____, Fergon, she was on

Flagyl also and she is getting that, so she is on Azactam and Flagyl at

this time, Crestor, and Protonix.



PHYSICAL EXAMINATION

VITAL SIGNS:  On examination I find her temperature is 97.7, pulse is 123,

blood pressure 148/92, and respirations on the vent.

HEENT:  She was opening her eyes, head is atraumatic, normocephalic, and

pupils are reacting to light, remains intubated.

NECK:  Supple, JVP flat.

CARDIOPULMONARY:  S1 and S2.  She did have atrial fibrillation right

before, but in sinus.

LUNGS:  Have bilateral decreased breath sounds.

ABDOMEN:  Soft, nontender, no guarding, no rigidity present.

EXTREMITIES:  She has Venodyne boots on, on both lower extremities.



LABORATORY DATA:  Labs are noted, lab show she did have hepatitis profile,

which is negative.  Her white count is 13.4 today, hemoglobin is 7.8,

hematocrit 23.8, platelet count is 102, which shows neutrophils 86.5. 

Sodium is 139, potassium 3.5, chloride is 102, CO2 is 25, BUN is 67,

creatinine is 4.6.  Her LFTs are elevated as she did have asystole and

cardiac arrest and probably had ischemic hepatitis.  Her blood culture and

those are negative.



I will order a sputum culture, so that will  help us to decide about

antibiotics and about her x-ray findings.



Her x-ray reports show the last chest x-ray was done today shows a small

left pleural effusion, lines and tubes are unchanged, and no active disease

in the lungs.



ASSESSMENT:   At this time, I think she probably came in with high sugars

and has renal failure and probably chronic diastolic congestive heart

failure, and we will continue present antibiotics; otherwise, Tygacil is

another option to use as she does have renal failure and she is allergic to

penicillin.  We will hold off on changing antibiotics at this time and

check the sputum, CNS and she remains anemic with renal failure, acute

respiratory failure.  We will follow and status post non-STEMI.





__________________________________________

Mahnaz Tate MD





DD:  2018 20:19:03

DT:  2018 23:08:57

Job # [id number]

## 2018-03-06 NOTE — RAD
Chest x-ray single frontal view 



History: Follow-up.  Vented. 



Comparison: 03/05/2018 



Findings: 



Lines and tubes in stable position. 



Mild venous congestion. 



Bibasilar airspace opacities with small left pleural effusion. 



Cardiomegaly. 



Calcification at aortic knob. 



Tortuous ectatic aorta. 



Degenerative changes in the spine and shoulders. 



Impression:



Mild venous congestion. 



Bibasilar airspace opacities with small left pleural effusion. 



Cardiomegaly.

## 2018-03-06 NOTE — CP.PCM.PN
Subjective





- Date & Time of Evaluation


Date of Evaluation: 03/06/18


Time of Evaluation: 02:25





- Subjective


Subjective: 





dictated





Objective





- Vital Signs/Intake and Output


Vital Signs (last 24 hours): 


 











Temp Pulse Resp BP Pulse Ox


 


 97.7 F   95 H  16   103/57 L  100 


 


 03/06/18 16:00  03/06/18 18:56  03/06/18 18:56  03/06/18 18:56  03/06/18 18:56








Intake and Output: 


 











 03/06/18 03/07/18





 18:59 06:59


 


Intake Total 1825 35


 


Balance 1825 35














- Medications


Medications: 


 Current Medications





Aspirin (Aspirin Chewable)  81 mg PO DAILY [facility]


   Last Admin: 03/06/18 09:11 Dose:  81 mg


Epoetin Vamsi (Procrit)  8,000 unit IV MWF [facility]


Ergocalciferol (Drisdol 50,000 Intl Units Cap)  1 cap PO Q7D [facility]


   Last Admin: 03/03/18 13:51 Dose:  1 cap


Ferrous Gluconate (Fergon)  324 mg PO TID [facility]


   Last Admin: 03/06/18 17:27 Dose:  324 mg


Aztreonam 1 gm/ Sodium (Chloride)  100 mls @ 100 mls/hr IVPB Q12H [facility]


   Last Admin: 03/06/18 14:19 Dose:  100 mls/hr


Metronidazole (Flagyl)  500 mg in 100 mls @ 100 mls/hr IVPB Q8H [facility]


   Last Admin: 03/06/18 15:22 Dose:  100 mls/hr


Heparin Sodium/Sodium Chloride (Heparin 65062 Units/250ml 1/2 Normal Saline)  25

,000 units in 250 mls @ 7.641 mls/hr IV .Q24H PRN; Protocol; 12 UNIT/KG/HR


   PRN Reason: PROTOCOL


   Last Admin: 03/06/18 14:39 Dose:  15.7 unit/kg/hr, 10 mls/hr


Insulin Aspart (Novolog)  0 unit SC Q6 [facility]


   PRN Reason: Protocol


   Last Admin: 03/06/18 17:29 Dose:  Not Given


Insulin Glargine (Lantus)  5 unit SC HS [facility]


Metoprolol Tartrate (Lopressor)  12.5 mg PO BID [facility]


   Last Admin: 03/06/18 17:27 Dose:  12.5 mg


Midodrine (Proamatine)  10 mg NG Q8 [facility]


   Last Admin: 03/06/18 14:19 Dose:  10 mg


Pantoprazole Sodium (Protonix Inj)  40 mg IVP DAILY [facility]


   Last Admin: 03/06/18 09:11 Dose:  40 mg


Rosuvastatin Calcium (Crestor)  5 mg PO HS [facility]


   Last Admin: 03/05/18 21:56 Dose:  5 mg


Vitamin B Complex/Vit C/Folic Acid (Nephro-Nneka)  1 tab PO 0800 [facility]


   Last Admin: 03/06/18 07:52 Dose:  1 tab











- Labs


Labs: 


 





 03/06/18 06:18 





 03/06/18 06:17 





 











PT  14.6 SECONDS (9.7-12.2)  H  03/02/18  02:17    


 


INR  1.3   03/02/18  02:17    


 


APTT  66 SECONDS (21-34)  H D 03/06/18  07:44

## 2018-03-07 LAB
ALBUMIN SERPL-MCNC: 2.3 G/DL (ref 3.5–5)
ALBUMIN/GLOB SERPL: 0.8 {RATIO} (ref 1–2.1)
ALT SERPL-CCNC: 1201 U/L (ref 9–52)
ANISOCYTOSIS BLD QL SMEAR: SLIGHT
ARTERIAL BLOOD GAS HEMOGLOBIN: 11.6 G/DL (ref 11.7–17.4)
ARTERIAL BLOOD GAS O2 SAT: 99.5 % (ref 95–98)
ARTERIAL BLOOD GAS PCO2: 30 MM/HG (ref 35–45)
ARTERIAL BLOOD GAS TCO2: 26 MMOL/L (ref 22–28)
AST SERPL-CCNC: 1487 U/L (ref 14–36)
BASOPHILS # BLD AUTO: 0 K/UL (ref 0–0.2)
BASOPHILS NFR BLD: 0.1 % (ref 0–2)
BUN SERPL-MCNC: 59 MG/DL (ref 7–17)
BURR CELLS BLD QL SMEAR: SLIGHT
CALCIUM SERPL-MCNC: 7.9 MG/DL (ref 8.6–10.4)
EOSINOPHIL # BLD AUTO: 0 K/UL (ref 0–0.7)
EOSINOPHIL NFR BLD: 0.4 % (ref 0–4)
EOSINOPHIL NFR BLD: 1 % (ref 0–4)
ERYTHROCYTE [DISTWIDTH] IN BLOOD BY AUTOMATED COUNT: 15.9 % (ref 11.5–14.5)
GFR NON-AFRICAN AMERICAN: 10
GIANT PLATELETS BLD QL SMEAR: PRESENT
HCO3 BLDA-SCNC: 27.2 MMOL/L (ref 21–28)
HGB BLD-MCNC: 8.5 G/DL (ref 11–16)
HYPOCHROMIC: SLIGHT
INHALED O2 CONCENTRATION: 40 %
LG PLATELETS BLD QL SMEAR: PRESENT
LYMPHOCYTE: 9 % (ref 20–40)
LYMPHOCYTES # BLD AUTO: 1.1 K/UL (ref 1–4.3)
LYMPHOCYTES NFR BLD AUTO: 8.8 % (ref 20–40)
MACROCYTES BLD QL SMEAR: SLIGHT
MCH RBC QN AUTO: 28.1 PG (ref 27–31)
MCHC RBC AUTO-ENTMCNC: 32.9 G/DL (ref 33–37)
MCV RBC AUTO: 85.5 FL (ref 81–99)
MONOCYTE: 8 % (ref 0–10)
MONOCYTES # BLD: 1 K/UL (ref 0–0.8)
MONOCYTES NFR BLD: 8.3 % (ref 0–10)
MYELOCYTES NFR BLD: 1 % (ref 0–0)
NEUTROPHILS # BLD: 10.1 K/UL (ref 1.8–7)
NEUTROPHILS NFR BLD AUTO: 81 % (ref 50–75)
NEUTROPHILS NFR BLD AUTO: 82.4 % (ref 50–75)
NEUTS BAND NFR BLD: 1 % (ref 0–2)
NRBC BLD AUTO-RTO: 2.4 % (ref 0–2)
OVALOCYTES BLD QL SMEAR: SLIGHT
PH BLDA: 7.53 [PH] (ref 7.35–7.45)
PLATELET # BLD EST: (no result) 10*3/UL
PLATELET # BLD: 84 K/UL (ref 130–400)
PMV BLD AUTO: 10.5 FL (ref 7.2–11.7)
PO2 BLDA: 147 MM/HG (ref 80–100)
POIKILOCYTOSIS BLD QL SMEAR: SLIGHT
RBC # BLD AUTO: 3.02 MIL/UL (ref 3.8–5.2)
TOTAL CELLS COUNTED BLD: 100
TOXIC GRANULES BLD QL SMEAR: PRESENT
WBC # BLD AUTO: 12.3 K/UL (ref 4.8–10.8)

## 2018-03-07 RX ADMIN — WATER SCH MLS/HR: 1 INJECTION INTRAMUSCULAR; INTRAVENOUS; SUBCUTANEOUS at 22:00

## 2018-03-07 RX ADMIN — HEPARIN SODIUM PRN MLS/HR: 10000 INJECTION, SOLUTION INTRAVENOUS at 09:00

## 2018-03-07 RX ADMIN — IPRATROPIUM BROMIDE AND ALBUTEROL SULFATE SCH ML: .5; 3 SOLUTION RESPIRATORY (INHALATION) at 15:45

## 2018-03-07 RX ADMIN — INSULIN GLARGINE SCH U: 100 INJECTION, SOLUTION SUBCUTANEOUS at 22:00

## 2018-03-07 RX ADMIN — WATER SCH MLS/HR: 1 INJECTION INTRAMUSCULAR; INTRAVENOUS; SUBCUTANEOUS at 06:00

## 2018-03-07 RX ADMIN — INSULIN ASPART SCH: 100 INJECTION, SOLUTION INTRAVENOUS; SUBCUTANEOUS at 19:15

## 2018-03-07 RX ADMIN — IPRATROPIUM BROMIDE AND ALBUTEROL SULFATE SCH ML: .5; 3 SOLUTION RESPIRATORY (INHALATION) at 19:30

## 2018-03-07 RX ADMIN — Medication SCH TAB: at 07:39

## 2018-03-07 RX ADMIN — INSULIN ASPART SCH: 100 INJECTION, SOLUTION INTRAVENOUS; SUBCUTANEOUS at 00:00

## 2018-03-07 RX ADMIN — INSULIN ASPART SCH: 100 INJECTION, SOLUTION INTRAVENOUS; SUBCUTANEOUS at 12:27

## 2018-03-07 RX ADMIN — ERYTHROPOIETIN SCH UNIT: 4000 INJECTION, SOLUTION INTRAVENOUS; SUBCUTANEOUS at 17:35

## 2018-03-07 RX ADMIN — WATER SCH MLS/HR: 1 INJECTION INTRAMUSCULAR; INTRAVENOUS; SUBCUTANEOUS at 13:35

## 2018-03-07 RX ADMIN — INSULIN ASPART SCH: 100 INJECTION, SOLUTION INTRAVENOUS; SUBCUTANEOUS at 06:00

## 2018-03-07 NOTE — PN
SUBJECTIVE:  The patient was seen today and she remains intubated, on the

vent in ICU, and her blood pressure was on the low side and since she is

intubated, I am not able to ask about any complaints.



PHYSICAL EXAMINATION:

VITAL SIGNS:  The patient is afebrile at 97.7, heart rate of 95, blood

pressure 103/57, and respirations are on the vent, 16.

HEENT:  Head is atraumatic.  She was opening her eyes.

NECK:  Supple.

LUNGS:  Clear.  No crackles or rales present.

HEART:  S1 and S2, regular.

ABDOMEN:  Soft, nontender, no guarding, no rigidity present.  She has no

Wynne catheter, no urine output.

EXTREMITIES:  No edema.



LABORATORY DATA:  Noted.  Labs show white count is 12.7, hemoglobin 9.2,

hematocrit 28, and platelet count is 86; her last platelet was 102, so

platelets are a little bit less.  Chemistry shows sodium is 138, potassium

is 3.1, chloride is 100, CO2 is 29, anion gap is 12, BUN is 48, creatinine

is 3.6, remains elevated.  Her AST on liver enzymes are slightly better and

her culture reports show there is no MRSA, blood cultures x2 are negative,

sputum culture is pending.  Chest x-ray today shows mild venous congestion,

bibasilar airspace opacities, there is a small left pleural effusion.



At this time, the white is count holding with the treatment, there is no

MRSA reported and she has renal insufficiency.  We will continue present

antibiotics, I am waiting for the sputum C and S.  She has mild failure of

kidney and chronic renal failure, had Vfib, cardiac arrest and is on

respiratory failure at this time and had a non-STEMI.  We will follow the

sputum culture, and we will continue Azactam and Flagyl as ordered.





__________________________________________

Mahnaz Tate MD

 



DD:  03/06/2018 22:05:00

DT:  03/07/2018 3:16:34

Job # [id number]

## 2018-03-07 NOTE — CP.PCM.PN
Subjective





- Date & Time of Evaluation


Date of Evaluation: 03/07/18


Time of Evaluation: 08:20





- Subjective


Subjective: 





Patient remains intubated. 


They went back to [facility] settings yesterday afternoon.


She was able to open her eyes when name is called. Also able to raise arms when 

asked. Also move legs when asked.





On telemetry she was HR of 90s - 110s and atrial fibrillation. Systolic BP was 

in the 120s





Yesterday I had conversation with daughter Kristel Corral over the phone as well 

as two other family members at bedside. 


This morning no one at bedside, but I will try to update them later today.











Objective





- Vital Signs/Intake and Output


Vital Signs (last 24 hours): 


 











Temp Pulse Resp BP Pulse Ox


 


 98.5 F   102 H  16   123/79   100 


 


 03/07/18 04:00  03/07/18 06:56  03/07/18 06:56  03/07/18 06:56  03/07/18 06:56








Intake and Output: 


 











 03/07/18 03/07/18





 06:59 18:59


 


Intake Total 820 35


 


Balance 820 35














- Medications


Medications: 


 Current Medications





Aspirin (Aspirin Chewable)  81 mg PO DAILY [facility]


   Last Admin: 03/06/18 09:11 Dose:  81 mg


Epoetin Vamsi (Procrit)  8,000 unit IV MWF [facility]


Ergocalciferol (Drisdol 50,000 Intl Units Cap)  1 cap PO Q7D [facility]


   Last Admin: 03/03/18 13:51 Dose:  1 cap


Ferrous Gluconate (Fergon)  324 mg PO TID [facility]


   Last Admin: 03/06/18 17:27 Dose:  324 mg


Aztreonam 1 gm/ Sodium (Chloride)  100 mls @ 100 mls/hr IVPB Q12H [facility]


   Last Admin: 03/07/18 01:30 Dose:  100 mls/hr


Metronidazole (Flagyl)  500 mg in 100 mls @ 100 mls/hr IVPB Q8H [facility]


   Last Admin: 03/07/18 06:00 Dose:  100 mls/hr


Heparin Sodium/Sodium Chloride (Heparin 97861 Units/250ml 1/2 Normal Saline)  25

,000 units in 250 mls @ 0 mls/hr IV .Q0M PRN; Protocol; Per Protocol


   PRN Reason: PROTOCOL


Insulin Aspart (Novolog)  0 unit SC Q6 [facility]


   PRN Reason: Protocol


   Last Admin: 03/07/18 06:00 Dose:  Not Given


Insulin Glargine (Lantus)  5 unit SC [facility]


   Last Admin: 03/06/18 22:17 Dose:  Not Given


Metoprolol Tartrate (Lopressor)  12.5 mg PO BID [facility]


   Last Admin: 03/06/18 22:14 Dose:  12.5 mg


Midodrine (Proamatine)  10 mg NG Q8 [facility]


   Last Admin: 03/07/18 06:30 Dose:  10 mg


Pantoprazole Sodium (Protonix Inj)  40 mg IVP DAILY [facility]


   Last Admin: 03/06/18 09:11 Dose:  40 mg


Rosuvastatin Calcium (Crestor)  5 mg PO HS [facility]


   Last Admin: 03/06/18 22:14 Dose:  5 mg


Vitamin B Complex/Vit C/Folic Acid (Nephro-Nneka)  1 tab PO 0800 [facility]


   Last Admin: 03/07/18 07:39 Dose:  1 tab











- Labs


Labs: 


 





 03/07/18 06:19 





 03/07/18 06:17 





 











PT  14.6 SECONDS (9.7-12.2)  H  03/02/18  02:17    


 


INR  1.3   03/02/18  02:17    


 


APTT  65 SECONDS (21-34)  H  03/07/18  06:19    














- Constitutional


Appears: Unkempt, Confused, Chronically Ill





- Head Exam


Head Exam: NORMAL INSPECTION





- Eye Exam


Eye Exam: Normal appearance





- ENT Exam


ENT Exam: Mucous Membranes Moist





- Respiratory Exam


Respiratory Exam: Decreased Breath Sounds


Additional comments: 





Mechanical intubation 





- Cardiovascular Exam


Cardiovascular Exam: Irregular Rhythm





- GI/Abdominal Exam


GI & Abdominal Exam: Soft





- Neurological Exam


Neurological Exam: Alert, Altered


Neuro motor strength exam: Left Upper Extremity: 4, Right Upper Extremity: 4, 

Left Lower Extremity: 3, Right Lower Extremity: 3





- Psychiatric Exam


Psychiatric exam: Depressed, Flat Affect





- Skin


Skin Exam: Normal Color, Warm





Assessment and Plan





- Assessment and Plan (Free Text)


Assessment: 











As previously mentioned, this is a 77 year old female with a history of ESRD 

now getting HD, CHF, DM, HTN, hypercholesterolemia, and some sort of MI years 

before, repeated TIAs, and colon CA. She was brought in by family for altered 

mental status and ultimately found to have severe metabolic acidosis as well as 

a extremely elevated troponins. 





Plan: 





1 Acute NSTEMI ,  S/P Hypotension /Asystol ,CPR / Sustained VT


3/7: atrial fibrillation appearance on telemetry, check a new 12 lead EKG. 

Remains on heparin ggt.


3/6: Remains in heparin ggt, statin, ASA. Hopefully at some point can have 

cardiac catherization. 


3/5: Now off pressor medications. She remains on amiodarone   


Echo EF 30%. Troponins were as high as 103 then decreased. 


Currently on a heparin ggt, ASA, Statin.  





2 Respiratory failure, intubation


3/7: Yesterday afternoon she was moved back to [facility]


3/6: Now changed to CPAP/PS settings. The chest XRAYs look better this morning 

following HD last night.





3 Acute renal failure 


3/7: Currently MWF HD


3/6: Underwent HD yesterday and tolerated it well. CXRAY looks better


3/5/2018: She has had one session of HD so far. Axel another session today





4 Leukocytosis concerning for sepsis


3/6: Today decreased WBC. Cultures negative so far


3/5/2018: The elevated WBC maybe stress related.  


She remains on abx Aztreonam IV and Flagyl IV





5 Acute systolic heart failure


3/6: Monitor chest XRAYs. 


  ECHO shows EF 30%





6 Pleural effusion


3/6: Improving CXRAYs


3/5/2018: Pleural effusions minimal at this time. Continue to monitor





7 Hyperglycemia and uncontrolled DM


3/6: Reccent accucheks 130s, 130, 160








8 Anemia of chronic disease, CKD


   3/6: Yesterday had 1 unit of PRBC given during HD, receiving EPO

## 2018-03-07 NOTE — RAD
HISTORY:

intubated  



COMPARISON:

Chest x-ray performed 3/6/18 



TECHNIQUE:

Chest, one view.



FINDINGS:

Endotracheal tube terminates approximately 2.1 cm above the peter.  

Nasogastric tube extends expected location of the stomach. Right IJ 

approach catheters extends the cavoatrial junction. 



LUNGS:

Mild pulmonary venous congestion. Small left pleural effusion and 

associated consolidation. No definite pneumothorax. 



CARDIOVASCULAR:

Cardiomegaly. Atherosclerotic calcifications of the aorta. Probable 

dense mitral annulus calcifications. 



OSSEOUS STRUCTURES:

Degenerative changes. 



VISUALIZED UPPER ABDOMEN:

Unremarkable.



OTHER FINDINGS:

None.



IMPRESSION:

Support lines and tubes as above. 



Mild pulmonary venous congestion. Small left pleural effusion and 

associated consolidation. 



Cardiomegaly.

## 2018-03-07 NOTE — CP.CCUPN
<Abadier,Michael - Last Filed: 03/07/18 14:29>





CCU Subjective





- Physician Review


Subjective (Free Text): 





03/02/18 15:06


Patient seen and examined at bedside


AMS


spoke with nephro and want to wait for diaylsis until cardio decides to do cath 

or not





03/02/18 17:16


PMD talked with cardio and Nephro. Medical managment for cardiac issues at this 

time. OK to go ahead with dialysis. First session tonight





03/05/18 10:35


Over weekend patient had Vfib arrest


tolerated dialysis for 45 min before becoming hypotensive


Patient will again have dialysis today


Giving blood today





03/06/18 13:42


patient seen and examined today


patient will need cath when more stable


tolerated CPAP for 2 hours today


Dialysis tomorrow





03/07/18 14:07


Patient seen and examined today


Tolerating CPAP trial for majority of day


Dialysis today








CCU Objective





- Vital Signs / Intake & Output


Vital Signs (Last 4 hours): 


Vital Signs











  Pulse Resp BP Pulse Ox


 


 03/07/18 13:00  97 H  19   99


 


 03/07/18 12:56  86  18  121/71  100


 


 03/07/18 12:00  81  19   96


 


 03/07/18 11:56  79  20  107/62  100


 


 03/07/18 11:00  82  19   100


 


 03/07/18 10:56  101 H  20  106/62  100











Intake and Output (Last 8hrs): 


 Intake & Output











 03/06/18 03/07/18 03/07/18





 22:59 06:59 14:59


 


Intake Total 630 530 35


 


Balance 630 530 35


 


Weight  160 lb 


 


Intake:   


 


  Intake, IV Amount 280 280 10


 


    Right Hand 80 80 10


 


    Right Internal Jugular  100 


 


    medial 200 100 


 


  Tube Feeding 200 200 25


 


  Other 150 50 


 


Other:   


 


  # Voids   


 


    Urine, Voided  1 














- Physical Exam


Head: Positive for: Atraumatic, Normocephalic.  Negative for: Tenderness, 

Contusion, Swelling


Pupils: Positive for: PERRL.  Negative for: Sluggish, Non-Reactive


Extroacular Muscles: Positive for: EOMI


Conjunctiva: Positive for: Normal


Mouth: Positive for: Dry


Pharnyx: Positive for: Normal


Nose (External): Positive for: Atraumatic


Neck: Positive for: Trachea Midline.  Negative for: JVD


Respiratory/Chest: Positive for: Good Air Exchange, Accessory Muscle Use, 

Decreased Breath Sounds (on bilateral bases).  Negative for: Wheezes


Cardiovascular: Positive for: Tachycardic


Abdomen: Positive for: Distention, Normal Bowel Sounds.  Negative for: 

Peritoneal Signs


Upper Extremity: Negative for: Cyanosis, Edema


Lower Extremity: Negative for: Edema


Psychiatric: Positive for: Alert





- Medications


Active Medications: 


Active Medications











Generic Name Dose Route Start Last Admin





  Trade Name Freq  PRN Reason Stop Dose Admin


 


Aspirin  81 mg  03/02/18 10:00  03/07/18 09:23





  Aspirin Chewable  PO   81 mg





  DAILY ABHILASH   Administration


 


Epoetin Vamsi  8,000 unit  03/07/18 09:00  





  Procrit  IV   





  MWF ABHILASH   


 


Ergocalciferol  1 cap  03/03/18 14:45  03/03/18 13:51





  Drisdol 50,000 Intl Units Cap  PO   1 cap





  Q7D ABHILASH   Administration


 


Ferrous Gluconate  324 mg  03/03/18 10:00  03/07/18 13:36





  Fergon  PO   324 mg





  TID ABHILASH   Administration


 


Aztreonam 1 gm/ Sodium  100 mls @ 100 mls/hr  03/03/18 13:30  03/07/18 13:35





  Chloride  IVPB   100 mls/hr





  Q12H ABHILASH   Administration


 


Metronidazole  500 mg in 100 mls @ 100 mls/hr  03/03/18 14:30  03/07/18 13:35





  Flagyl  IVPB   100 mls/hr





  Q8H ABHILASH   Administration


 


Heparin Sodium/Sodium Chloride  25,000 units in 250 mls @ 10.161 mls/hr  03/07/ 18 08:20  





  Heparin 75636 Units/250ml 1/2 Normal Saline  IV   





  .Q24H PRN   





  PROTOCOL   





  Protocol   





  14 UNITS/KG/HR   


 


Insulin Aspart  0 unit  03/04/18 11:49  03/07/18 12:27





  Novolog  SC   Not Given





  Q6 ABHILASH   





  Protocol   


 


Insulin Glargine  5 unit  03/06/18 22:00  03/06/18 22:17





  Lantus  SC   Not Given





  [facility]   


 


Metoprolol Tartrate  12.5 mg  03/05/18 22:00  03/07/18 09:23





  Lopressor  PO   12.5 mg





  BID ABHILASH   Administration


 


Midodrine  10 mg  03/06/18 14:00  03/07/18 13:36





  Proamatine  NG   10 mg





  Q8 ABHILASH   Administration


 


Pantoprazole Sodium  40 mg  03/05/18 10:00  03/07/18 09:24





  Protonix Inj  IVP   40 mg





  DAILY ABHILASH   Administration


 


Rosuvastatin Calcium  5 mg  03/02/18 01:15  03/06/18 22:14





  Crestor  PO   5 mg





  HS ABHILASH   Administration


 


Vitamin B Complex/Vit C/Folic Acid  1 tab  03/03/18 08:00  03/07/18 07:39





  Nephro-Nneka  PO   1 tab





  0800 ABHILASH   Administration














- Patient Studies


Lab Studies: 


 Microbiology Studies











 03/01/18 06:30 Blood Culture - Final





 Blood-Venous    NO GROWTH AFTER 5 DAYS





 Gram Stain - Final





    TEST NOT PERFORMED


 


 03/01/18 06:00 Blood Culture - Final





 Blood-Venous    NO GROWTH AFTER 5 DAYS





 Gram Stain - Final





    TEST NOT PERFORMED


 


 03/06/18 Unknown Gram Stain - Final





 Trachasp 








 Lab Studies











  03/07/18 03/07/18 03/07/18 Range/Units





  11:30 06:19 06:19 


 


WBC    12.3 H  (4.8-10.8)  K/uL


 


RBC    3.02 L  (3.80-5.20)  Mil/uL


 


Hgb    8.5 L  (11.0-16.0)  g/dL


 


Hct    25.8 L  (34.0-47.0)  %


 


MCV    85.5  (81.0-99.0)  fL


 


MCH    28.1  (27.0-31.0)  pg


 


MCHC    32.9 L  (33.0-37.0)  g/dL


 


RDW    15.9 H  (11.5-14.5)  %


 


Plt Count    84 L  (130-400)  K/uL


 


MPV    10.5  (7.2-11.7)  fL


 


Neut % (Auto)    82.4 H  (50.0-75.0)  %


 


Lymph % (Auto)    8.8 L  (20.0-40.0)  %


 


Mono % (Auto)    8.3  (0.0-10.0)  %


 


Eos % (Auto)    0.4  (0.0-4.0)  %


 


Baso % (Auto)    0.1  (0.0-2.0)  %


 


Neut # (Auto)    10.1 H  (1.8-7.0)  K/uL


 


Lymph # (Auto)    1.1  (1.0-4.3)  K/uL


 


Mono # (Auto)    1.0 H  (0.0-0.8)  K/uL


 


Eos # (Auto)    0.0  (0.0-0.7)  K/uL


 


Baso # (Auto)    0.0  (0.0-0.2)  K/uL


 


Neutrophils % (Manual)    81 H  (50-75)  %


 


Band Neutrophils %    1  (0-2)  %


 


Lymphocytes % (Manual)    9 L  (20-40)  %


 


Monocytes % (Manual)    8  (0-10)  %


 


Eosinophils % (Manual)    1  (0-4)  %


 


Myelocytes %    1 H  (0-0)  %


 


Toxic Granulation    Present  


 


Platelet Estimate    Decreased L  (NORMAL)  


 


Large Platelets    Present  


 


Giant Platelets    Present  


 


Hypochromasia (manual)    Slight  


 


Poikilocytosis (manual    Slight  


 


Anisocytosis (manual)    Slight  


 


Macrocytosis (manual)    Slight  


 


Ovalocytes    Slight  


 


Clara Cells    Slight  


 


APTT   65 H   (21-34)  SECONDS


 


Puncture Site     


 


pCO2     (35-45)  mm/Hg


 


pO2     ()  mm/Hg


 


HCO3     (21-28)  mmol/L


 


ABG pH     (7.35-7.45)  


 


ABG Total CO2     (22-28)  mmol/L


 


ABG O2 Saturation     (95-98)  %


 


ABG Base Excess     (-2.0-3.0)  mmol/L


 


ABG Hemoglobin     (11.7-17.4)  g/dL


 


ABG Carboxyhemoglobin     (0.5-1.5)  %


 


POC ABG HHb (Measured)     (0.0-5.0)  %


 


ABG Methemoglobin     (0.0-3.0)  %


 


Jesus Test     


 


A-a O2 Difference     mm/Hg


 


Respiratory Index     


 


Hgb O2 Saturation     (95.0-98.0)  %


 


Vent Mode     


 


Mechanical Rate     


 


FiO2     %


 


Tidal Volume     


 


PEEP     


 


Sodium     (132-148)  mmol/L


 


Potassium     (3.6-5.2)  mmol/L


 


Chloride     ()  mmol/L


 


Carbon Dioxide     (22-30)  mmol/L


 


Anion Gap     (10-20)  


 


BUN     (7-17)  mg/dL


 


Creatinine     (0.7-1.2)  mg/dL


 


Est GFR (African Amer)     


 


Est GFR (Non-Af Amer)     


 


POC Glucose (mg/dL)  90    ()  mg/dL


 


Random Glucose     ()  mg/dL


 


Calcium     (8.6-10.4)  mg/dl


 


Phosphorus     (2.5-4.5)  mg/dL


 


Magnesium     (1.6-2.3)  mg/dL


 


Total Bilirubin     (0.2-1.3)  mg/dL


 


AST     (14-36)  U/L


 


ALT     (9-52)  U/L


 


Alkaline Phosphatase     ()  U/L


 


Total Protein     (6.3-8.3)  g/dL


 


Albumin     (3.5-5.0)  g/dL


 


Globulin     (2.2-3.9)  gm/dL


 


Albumin/Globulin Ratio     (1.0-2.1)  














  03/07/18 03/07/18 03/07/18 Range/Units





  06:17 05:19 04:30 


 


WBC     (4.8-10.8)  K/uL


 


RBC     (3.80-5.20)  Mil/uL


 


Hgb     (11.0-16.0)  g/dL


 


Hct     (34.0-47.0)  %


 


MCV     (81.0-99.0)  fL


 


MCH     (27.0-31.0)  pg


 


MCHC     (33.0-37.0)  g/dL


 


RDW     (11.5-14.5)  %


 


Plt Count     (130-400)  K/uL


 


MPV     (7.2-11.7)  fL


 


Neut % (Auto)     (50.0-75.0)  %


 


Lymph % (Auto)     (20.0-40.0)  %


 


Mono % (Auto)     (0.0-10.0)  %


 


Eos % (Auto)     (0.0-4.0)  %


 


Baso % (Auto)     (0.0-2.0)  %


 


Neut # (Auto)     (1.8-7.0)  K/uL


 


Lymph # (Auto)     (1.0-4.3)  K/uL


 


Mono # (Auto)     (0.0-0.8)  K/uL


 


Eos # (Auto)     (0.0-0.7)  K/uL


 


Baso # (Auto)     (0.0-0.2)  K/uL


 


Neutrophils % (Manual)     (50-75)  %


 


Band Neutrophils %     (0-2)  %


 


Lymphocytes % (Manual)     (20-40)  %


 


Monocytes % (Manual)     (0-10)  %


 


Eosinophils % (Manual)     (0-4)  %


 


Myelocytes %     (0-0)  %


 


Toxic Granulation     


 


Platelet Estimate     (NORMAL)  


 


Large Platelets     


 


Giant Platelets     


 


Hypochromasia (manual)     


 


Poikilocytosis (manual     


 


Anisocytosis (manual)     


 


Macrocytosis (manual)     


 


Ovalocytes     


 


[place] Cells     


 


APTT     (21-34)  SECONDS


 


Puncture Site    Rb  


 


pCO2    30 L  (35-45)  mm/Hg


 


pO2    147 H  ()  mm/Hg


 


HCO3    27.2  (21-28)  mmol/L


 


ABG pH    7.53 H  (7.35-7.45)  


 


ABG Total CO2    26.0  (22-28)  mmol/L


 


ABG O2 Saturation    99.5 H  (95-98)  %


 


ABG Base Excess    2.9  (-2.0-3.0)  mmol/L


 


ABG Hemoglobin    11.6 L  (11.7-17.4)  g/dL


 


ABG Carboxyhemoglobin    1.8 H  (0.5-1.5)  %


 


POC ABG HHb (Measured)    0.5  (0.0-5.0)  %


 


ABG Methemoglobin    0.9  (0.0-3.0)  %


 


Jesus Test    Na  


 


A-a O2 Difference    101.0  mm/Hg


 


Respiratory Index    0.7  


 


Hgb O2 Saturation    96.8  (95.0-98.0)  %


 


Vent Mode    Prvc  


 


Mechanical Rate    16  


 


FiO2    40.0  %


 


Tidal Volume    500  


 


PEEP    5  


 


Sodium  139    (132-148)  mmol/L


 


Potassium  3.6    (3.6-5.2)  mmol/L


 


Chloride  101    ()  mmol/L


 


Carbon Dioxide  28    (22-30)  mmol/L


 


Anion Gap  14    (10-20)  


 


BUN  59 H    (7-17)  mg/dL


 


Creatinine  4.5 H    (0.7-1.2)  mg/dL


 


Est GFR ( Amer)  12    


 


Est GFR (Non-Af Amer)  10    


 


POC Glucose (mg/dL)   118 H   ()  mg/dL


 


Random Glucose  100    ()  mg/dL


 


Calcium  7.9 L    (8.6-10.4)  mg/dl


 


Phosphorus  2.3 L    (2.5-4.5)  mg/dL


 


Magnesium  2.1    (1.6-2.3)  mg/dL


 


Total Bilirubin  0.9    (0.2-1.3)  mg/dL


 


AST  1487 H    (14-36)  U/L


 


ALT  1201 H    (9-52)  U/L


 


Alkaline Phosphatase  196 H    ()  U/L


 


Total Protein  5.1 L    (6.3-8.3)  g/dL


 


Albumin  2.3 L    (3.5-5.0)  g/dL


 


Globulin  2.8    (2.2-3.9)  gm/dL


 


Albumin/Globulin Ratio  0.8 L    (1.0-2.1)  














  03/07/18 03/06/18 Range/Units





  00:11 17:28 


 


WBC    (4.8-10.8)  K/uL


 


RBC    (3.80-5.20)  Mil/uL


 


Hgb    (11.0-16.0)  g/dL


 


Hct    (34.0-47.0)  %


 


MCV    (81.0-99.0)  fL


 


MCH    (27.0-31.0)  pg


 


MCHC    (33.0-37.0)  g/dL


 


RDW    (11.5-14.5)  %


 


Plt Count    (130-400)  K/uL


 


MPV    (7.2-11.7)  fL


 


Neut % (Auto)    (50.0-75.0)  %


 


Lymph % (Auto)    (20.0-40.0)  %


 


Mono % (Auto)    (0.0-10.0)  %


 


Eos % (Auto)    (0.0-4.0)  %


 


Baso % (Auto)    (0.0-2.0)  %


 


Neut # (Auto)    (1.8-7.0)  K/uL


 


Lymph # (Auto)    (1.0-4.3)  K/uL


 


Mono # (Auto)    (0.0-0.8)  K/uL


 


Eos # (Auto)    (0.0-0.7)  K/uL


 


Baso # (Auto)    (0.0-0.2)  K/uL


 


Neutrophils % (Manual)    (50-75)  %


 


Band Neutrophils %    (0-2)  %


 


Lymphocytes % (Manual)    (20-40)  %


 


Monocytes % (Manual)    (0-10)  %


 


Eosinophils % (Manual)    (0-4)  %


 


Myelocytes %    (0-0)  %


 


Toxic Granulation    


 


Platelet Estimate    (NORMAL)  


 


Large Platelets    


 


Giant Platelets    


 


Hypochromasia (manual)    


 


Poikilocytosis (manual    


 


Anisocytosis (manual)    


 


Macrocytosis (manual)    


 


Ovalocytes    


 


[place] Cells    


 


APTT    (21-34)  SECONDS


 


Puncture Site    


 


pCO2    (35-45)  mm/Hg


 


pO2    ()  mm/Hg


 


HCO3    (21-28)  mmol/L


 


ABG pH    (7.35-7.45)  


 


ABG Total CO2    (22-28)  mmol/L


 


ABG O2 Saturation    (95-98)  %


 


ABG Base Excess    (-2.0-3.0)  mmol/L


 


ABG Hemoglobin    (11.7-17.4)  g/dL


 


ABG Carboxyhemoglobin    (0.5-1.5)  %


 


POC ABG HHb (Measured)    (0.0-5.0)  %


 


ABG Methemoglobin    (0.0-3.0)  %


 


Jesus Test    


 


A-a O2 Difference    mm/Hg


 


Respiratory Index    


 


Hgb O2 Saturation    (95.0-98.0)  %


 


Vent Mode    


 


Mechanical Rate    


 


FiO2    %


 


Tidal Volume    


 


PEEP    


 


Sodium    (132-148)  mmol/L


 


Potassium    (3.6-5.2)  mmol/L


 


Chloride    ()  mmol/L


 


Carbon Dioxide    (22-30)  mmol/L


 


Anion Gap    (10-20)  


 


BUN    (7-17)  mg/dL


 


Creatinine    (0.7-1.2)  mg/dL


 


Est GFR (African Amer)    


 


Est GFR (Non-Af Amer)    


 


POC Glucose (mg/dL)  81  71  ()  mg/dL


 


Random Glucose    ()  mg/dL


 


Calcium    (8.6-10.4)  mg/dl


 


Phosphorus    (2.5-4.5)  mg/dL


 


Magnesium    (1.6-2.3)  mg/dL


 


Total Bilirubin    (0.2-1.3)  mg/dL


 


AST    (14-36)  U/L


 


ALT    (9-52)  U/L


 


Alkaline Phosphatase    ()  U/L


 


Total Protein    (6.3-8.3)  g/dL


 


Albumin    (3.5-5.0)  g/dL


 


Globulin    (2.2-3.9)  gm/dL


 


Albumin/Globulin Ratio    (1.0-2.1)  








 Laboratory Results - last 24 hr











  03/06/18 03/07/18 03/07/18





  17:28 00:11 04:30


 


WBC   


 


RBC   


 


Hgb   


 


Hct   


 


MCV   


 


MCH   


 


MCHC   


 


RDW   


 


Plt Count   


 


MPV   


 


Neut % (Auto)   


 


Lymph % (Auto)   


 


Mono % (Auto)   


 


Eos % (Auto)   


 


Baso % (Auto)   


 


Neut # (Auto)   


 


Lymph # (Auto)   


 


Mono # (Auto)   


 


Eos # (Auto)   


 


Baso # (Auto)   


 


Neutrophils % (Manual)   


 


Band Neutrophils %   


 


Lymphocytes % (Manual)   


 


Monocytes % (Manual)   


 


Eosinophils % (Manual)   


 


Myelocytes %   


 


Toxic Granulation   


 


Platelet Estimate   


 


Large Platelets   


 


Giant Platelets   


 


Hypochromasia (manual)   


 


Poikilocytosis (manual   


 


Anisocytosis (manual)   


 


Macrocytosis (manual)   


 


Ovalocytes   


 


Clara Cells   


 


APTT   


 


Puncture Site    Rb


 


pCO2    30 L


 


pO2    147 H


 


HCO3    27.2


 


ABG pH    7.53 H


 


ABG Total CO2    26.0


 


ABG O2 Saturation    99.5 H


 


ABG Base Excess    2.9


 


ABG Hemoglobin    11.6 L


 


ABG Carboxyhemoglobin    1.8 H


 


POC ABG HHb (Measured)    0.5


 


ABG Methemoglobin    0.9


 


Jesus Test    Na


 


A-a O2 Difference    101.0


 


Respiratory Index    0.7


 


Hgb O2 Saturation    96.8


 


Vent Mode    Prvc


 


Mechanical Rate    16


 


FiO2    40.0


 


Tidal Volume    500


 


PEEP    5


 


Sodium   


 


Potassium   


 


Chloride   


 


Carbon Dioxide   


 


Anion Gap   


 


BUN   


 


Creatinine   


 


Est GFR ( Amer)   


 


Est GFR (Non-Af Amer)   


 


POC Glucose (mg/dL)  71  81 


 


Random Glucose   


 


Calcium   


 


Phosphorus   


 


Magnesium   


 


Total Bilirubin   


 


AST   


 


ALT   


 


Alkaline Phosphatase   


 


Total Protein   


 


Albumin   


 


Globulin   


 


Albumin/Globulin Ratio   














  03/07/18 03/07/18 03/07/18





  05:19 06:17 06:19


 


WBC    12.3 H


 


RBC    3.02 L


 


Hgb    8.5 L


 


Hct    25.8 L


 


MCV    85.5


 


MCH    28.1


 


MCHC    32.9 L


 


RDW    15.9 H


 


Plt Count    84 L


 


MPV    10.5


 


Neut % (Auto)    82.4 H


 


Lymph % (Auto)    8.8 L


 


Mono % (Auto)    8.3


 


Eos % (Auto)    0.4


 


Baso % (Auto)    0.1


 


Neut # (Auto)    10.1 H


 


Lymph # (Auto)    1.1


 


Mono # (Auto)    1.0 H


 


Eos # (Auto)    0.0


 


Baso # (Auto)    0.0


 


Neutrophils % (Manual)    81 H


 


Band Neutrophils %    1


 


Lymphocytes % (Manual)    9 L


 


Monocytes % (Manual)    8


 


Eosinophils % (Manual)    1


 


Myelocytes %    1 H


 


Toxic Granulation    Present


 


Platelet Estimate    Decreased L


 


Large Platelets    Present


 


Giant Platelets    Present


 


Hypochromasia (manual)    Slight


 


Poikilocytosis (manual    Slight


 


Anisocytosis (manual)    Slight


 


Macrocytosis (manual)    Slight


 


Ovalocytes    Slight


 


Clara Cells    Slight


 


APTT   


 


Puncture Site   


 


pCO2   


 


pO2   


 


HCO3   


 


ABG pH   


 


ABG Total CO2   


 


ABG O2 Saturation   


 


ABG Base Excess   


 


ABG Hemoglobin   


 


ABG Carboxyhemoglobin   


 


POC ABG HHb (Measured)   


 


ABG Methemoglobin   


 


Jesus Test   


 


A-a O2 Difference   


 


Respiratory Index   


 


Hgb O2 Saturation   


 


Vent Mode   


 


Mechanical Rate   


 


FiO2   


 


Tidal Volume   


 


PEEP   


 


Sodium   139 


 


Potassium   3.6 


 


Chloride   101 


 


Carbon Dioxide   28 


 


Anion Gap   14 


 


BUN   59 H 


 


Creatinine   4.5 H 


 


Est GFR ( Amer)   12 


 


Est GFR (Non-Af Amer)   10 


 


POC Glucose (mg/dL)  118 H  


 


Random Glucose   100 


 


Calcium   7.9 L 


 


Phosphorus   2.3 L 


 


Magnesium   2.1 


 


Total Bilirubin   0.9 


 


AST   1487 H 


 


ALT   1201 H 


 


Alkaline Phosphatase   196 H 


 


Total Protein   5.1 L 


 


Albumin   2.3 L 


 


Globulin   2.8 


 


Albumin/Globulin Ratio   0.8 L 














  03/07/18 03/07/18





  06:19 11:30


 


WBC  


 


RBC  


 


Hgb  


 


Hct  


 


MCV  


 


MCH  


 


MCHC  


 


RDW  


 


Plt Count  


 


MPV  


 


Neut % (Auto)  


 


Lymph % (Auto)  


 


Mono % (Auto)  


 


Eos % (Auto)  


 


Baso % (Auto)  


 


Neut # (Auto)  


 


Lymph # (Auto)  


 


Mono # (Auto)  


 


Eos # (Auto)  


 


Baso # (Auto)  


 


Neutrophils % (Manual)  


 


Band Neutrophils %  


 


Lymphocytes % (Manual)  


 


Monocytes % (Manual)  


 


Eosinophils % (Manual)  


 


Myelocytes %  


 


Toxic Granulation  


 


Platelet Estimate  


 


Large Platelets  


 


Giant Platelets  


 


Hypochromasia (manual)  


 


Poikilocytosis (manual  


 


Anisocytosis (manual)  


 


Macrocytosis (manual)  


 


Ovalocytes  


 


Glen Arm Cells  


 


APTT  65 H 


 


Puncture Site  


 


pCO2  


 


pO2  


 


HCO3  


 


ABG pH  


 


ABG Total CO2  


 


ABG O2 Saturation  


 


ABG Base Excess  


 


ABG Hemoglobin  


 


ABG Carboxyhemoglobin  


 


POC ABG HHb (Measured)  


 


ABG Methemoglobin  


 


Jesus Test  


 


A-a O2 Difference  


 


Respiratory Index  


 


Hgb O2 Saturation  


 


Vent Mode  


 


Mechanical Rate  


 


FiO2  


 


Tidal Volume  


 


PEEP  


 


Sodium  


 


Potassium  


 


Chloride  


 


Carbon Dioxide  


 


Anion Gap  


 


BUN  


 


Creatinine  


 


Est GFR ( Amer)  


 


Est GFR (Non-Af Amer)  


 


POC Glucose (mg/dL)   90


 


Random Glucose  


 


Calcium  


 


Phosphorus  


 


Magnesium  


 


Total Bilirubin  


 


AST  


 


ALT  


 


Alkaline Phosphatase  


 


Total Protein  


 


Albumin  


 


Globulin  


 


Albumin/Globulin Ratio  











EKG/Cardiology Studies: 


Cardiology / EKG Studies





03/07/18 08:48


EKG [ELECTROCARDIOGRAM] Routine 


   Comment: 


   Mode Of Transportation: 


   Reason For Exam: atrial fibrillation


   Precautions: Standard











Fingerstick Blood Sugar Results: 90





Assessment/Plan





- Assessment and Plan (Free Text)


Assessment: 





77F NSTEMI, ESRD on HD


Plan: 





Neuro: Intubated, sedated





Cardio: NSTEMI


* Continue heparin for now


* ASA 81 QD


* lopressor 12.5 BID


* midodrine 10 Q8


* crestor 5 PO HS





Pulm: Inutbated, Tolerating CPAP for majority of day today


* Duoneb Q4





GI: No acute issues


* Nepro tube feed at goal





Renal: ESRD on HD MWF. Nephro ([place])


* Procrit


* Vit D


* Fergon


* Vit B





Endo: IDDM


* aspart sc Q6 


* glargine 5u SC HS





ID: Sputum culture pending. ID ([id number])


* aztreonam 1g q12


* Flagyl 500 Q8





PPX: Protonix, Heparin drip, CI to SCD (swelling)














<Rancho Rao S - Last Filed: 03/07/18 16:08>





CCU Objective





- Vital Signs / Intake & Output


Vital Signs (Last 4 hours): 


Vital Signs











  Temp Pulse Pulse Resp BP BP Pulse Ox


 


 03/07/18 15:11   84   19  130/69   100


 


 03/07/18 15:00   89   20    100


 


 03/07/18 14:56   79   17  124/72   93 L


 


 03/07/18 14:50       131/78 


 


 03/07/18 14:41   86   17  131/78   100


 


 03/07/18 14:35       122/83 


 


 03/07/18 14:26   94 H   20  122/83   100


 


 03/07/18 14:20       123/74 


 


 03/07/18 14:10   90   19  123/74   100


 


 03/07/18 14:05  97.8 F  87  87  19  130/72  130/72  99


 


 03/07/18 14:00   86   19    100


 


 03/07/18 13:55  97.8 F  100 H   18  123/79   96


 


 03/07/18 13:00   97 H   19    99


 


 03/07/18 12:56   86   18  121/71   100











Intake and Output (Last 8hrs): 


 Intake & Output











 03/07/18 03/07/18 03/07/18





 06:59 14:59 22:59


 


Intake Total 530 340 35


 


Balance 530 340 35


 


Weight 160 lb  


 


Intake:   


 


  Intake, IV Amount 280 80 10


 


    Right Hand 80 80 10


 


    Right Internal Jugular 100  


 


    medial 100  


 


  Tube Feeding 200 200 25


 


  Other 50 60 


 


Other:   


 


  # Voids   


 


    Urine, Voided 1  














- Medications


Active Medications: 


Active Medications











Generic Name Dose Route Start Last Admin





  Trade Name Freq  PRN Reason Stop Dose Admin


 


Albuterol/Ipratropium  3 ml  03/07/18 16:00  





  Duoneb 3 Mg/0.5 Mg (3 Ml) Ud  INH   





  RQ4 ABHILASH   


 


Aspirin  81 mg  03/02/18 10:00  03/07/18 09:23





  Aspirin Chewable  PO   81 mg





  DAILY ABHILASH   Administration


 


Epoetin Vamsi  8,000 unit  03/07/18 09:00  





  Procrit  IV   





  MWF [facility]   


 


Ergocalciferol  1 cap  03/03/18 14:45  03/03/18 13:51





  Drisdol 50,000 Intl Units Cap  PO   1 cap





  Q7D ABHILASH   Administration


 


Ferrous Gluconate  324 mg  03/03/18 10:00  03/07/18 13:36





  Fergon  PO   324 mg





  TID ABHILASH   Administration


 


Aztreonam 1 gm/ Sodium  100 mls @ 100 mls/hr  03/03/18 13:30  03/07/18 13:35





  Chloride  IVPB   100 mls/hr





  Q12H ABHILASH   Administration


 


Metronidazole  500 mg in 100 mls @ 100 mls/hr  03/03/18 14:30  03/07/18 13:35





  Flagyl  IVPB   100 mls/hr





  Q8H ABHILASH   Administration


 


Heparin Sodium/Sodium Chloride  25,000 units in 250 mls @ 10.161 mls/hr  03/07/ 18 08:20  





  Heparin 24171 Units/250ml 1/2 Normal Saline  IV   





  .Q24H PRN   





  PROTOCOL   





  Protocol   





  14 UNITS/KG/HR   


 


Insulin Aspart  0 unit  03/04/18 11:49  03/07/18 12:27





  Novolog  SC   Not Given





  Q6 [facility]   





  Protocol   


 


Insulin Glargine  5 unit  03/06/18 22:00  03/06/18 22:17





  Lantus  SC   Not Given





  HS [facility]   


 


Metoprolol Tartrate  12.5 mg  03/05/18 22:00  03/07/18 09:23





  Lopressor  PO   12.5 mg





  BID [facility]   Administration


 


Midodrine  10 mg  03/06/18 14:00  03/07/18 13:36





  Proamatine  NG   10 mg





  Q8 ABHILASH   Administration


 


Pantoprazole Sodium  40 mg  03/05/18 10:00  03/07/18 09:24





  Protonix Inj  IVP   40 mg





  DAILY [facility]   Administration


 


Rosuvastatin Calcium  5 mg  03/02/18 01:15  03/06/18 22:14





  Crestor  PO   5 mg





  HS ABHILASH   Administration


 


Vitamin B Complex/Vit C/Folic Acid  1 tab  03/03/18 08:00  03/07/18 07:39





  Nephro-Nneka  PO   1 tab





  0800 [facility]   Administration














- Patient Studies


Lab Studies: 


 Microbiology Studies











 03/01/18 06:30 Blood Culture - Final





 Blood-Venous    NO GROWTH AFTER 5 DAYS





 Gram Stain - Final





    TEST NOT PERFORMED


 


 03/01/18 06:00 Blood Culture - Final





 Blood-Venous    NO GROWTH AFTER 5 DAYS





 Gram Stain - Final





    TEST NOT PERFORMED


 


 03/06/18 Unknown Gram Stain - Final





 Trachasp 








 Lab Studies











  03/07/18 03/07/18 03/07/18 Range/Units





  11:30 06:19 06:19 


 


WBC    12.3 H  (4.8-10.8)  K/uL


 


RBC    3.02 L  (3.80-5.20)  Mil/uL


 


Hgb    8.5 L  (11.0-16.0)  g/dL


 


Hct    25.8 L  (34.0-47.0)  %


 


MCV    85.5  (81.0-99.0)  fL


 


MCH    28.1  (27.0-31.0)  pg


 


MCHC    32.9 L  (33.0-37.0)  g/dL


 


RDW    15.9 H  (11.5-14.5)  %


 


Plt Count    84 L  (130-400)  K/uL


 


MPV    10.5  (7.2-11.7)  fL


 


Neut % (Auto)    82.4 H  (50.0-75.0)  %


 


Lymph % (Auto)    8.8 L  (20.0-40.0)  %


 


Mono % (Auto)    8.3  (0.0-10.0)  %


 


Eos % (Auto)    0.4  (0.0-4.0)  %


 


Baso % (Auto)    0.1  (0.0-2.0)  %


 


Neut # (Auto)    10.1 H  (1.8-7.0)  K/uL


 


Lymph # (Auto)    1.1  (1.0-4.3)  K/uL


 


Mono # (Auto)    1.0 H  (0.0-0.8)  K/uL


 


Eos # (Auto)    0.0  (0.0-0.7)  K/uL


 


Baso # (Auto)    0.0  (0.0-0.2)  K/uL


 


Neutrophils % (Manual)    81 H  (50-75)  %


 


Band Neutrophils %    1  (0-2)  %


 


Lymphocytes % (Manual)    9 L  (20-40)  %


 


Monocytes % (Manual)    8  (0-10)  %


 


Eosinophils % (Manual)    1  (0-4)  %


 


Myelocytes %    1 H  (0-0)  %


 


Toxic Granulation    Present  


 


Platelet Estimate    Decreased L  (NORMAL)  


 


Large Platelets    Present  


 


Giant Platelets    Present  


 


Hypochromasia (manual)    Slight  


 


Poikilocytosis (manual    Slight  


 


Anisocytosis (manual)    Slight  


 


Macrocytosis (manual)    Slight  


 


Ovalocytes    Slight  


 


Glen Arm Cells    Slight  


 


APTT   65 H   (21-34)  SECONDS


 


Puncture Site     


 


pCO2     (35-45)  mm/Hg


 


pO2     ()  mm/Hg


 


HCO3     (21-28)  mmol/L


 


ABG pH     (7.35-7.45)  


 


ABG Total CO2     (22-28)  mmol/L


 


ABG O2 Saturation     (95-98)  %


 


ABG Base Excess     (-2.0-3.0)  mmol/L


 


ABG Hemoglobin     (11.7-17.4)  g/dL


 


ABG Carboxyhemoglobin     (0.5-1.5)  %


 


POC ABG HHb (Measured)     (0.0-5.0)  %


 


ABG Methemoglobin     (0.0-3.0)  %


 


Jesus Test     


 


A-a O2 Difference     mm/Hg


 


Respiratory Index     


 


Hgb O2 Saturation     (95.0-98.0)  %


 


Vent Mode     


 


Mechanical Rate     


 


FiO2     %


 


Tidal Volume     


 


PEEP     


 


Sodium     (132-148)  mmol/L


 


Potassium     (3.6-5.2)  mmol/L


 


Chloride     ()  mmol/L


 


Carbon Dioxide     (22-30)  mmol/L


 


Anion Gap     (10-20)  


 


BUN     (7-17)  mg/dL


 


Creatinine     (0.7-1.2)  mg/dL


 


Est GFR (African Amer)     


 


Est GFR (Non-Af Amer)     


 


POC Glucose (mg/dL)  90    ()  mg/dL


 


Random Glucose     ()  mg/dL


 


Calcium     (8.6-10.4)  mg/dl


 


Phosphorus     (2.5-4.5)  mg/dL


 


Magnesium     (1.6-2.3)  mg/dL


 


Total Bilirubin     (0.2-1.3)  mg/dL


 


AST     (14-36)  U/L


 


ALT     (9-52)  U/L


 


Alkaline Phosphatase     ()  U/L


 


Total Protein     (6.3-8.3)  g/dL


 


Albumin     (3.5-5.0)  g/dL


 


Globulin     (2.2-3.9)  gm/dL


 


Albumin/Globulin Ratio     (1.0-2.1)  














  03/07/18 03/07/18 03/07/18 Range/Units





  06:17 05:19 04:30 


 


WBC     (4.8-10.8)  K/uL


 


RBC     (3.80-5.20)  Mil/uL


 


Hgb     (11.0-16.0)  g/dL


 


Hct     (34.0-47.0)  %


 


MCV     (81.0-99.0)  fL


 


MCH     (27.0-31.0)  pg


 


MCHC     (33.0-37.0)  g/dL


 


RDW     (11.5-14.5)  %


 


Plt Count     (130-400)  K/uL


 


MPV     (7.2-11.7)  fL


 


Neut % (Auto)     (50.0-75.0)  %


 


Lymph % (Auto)     (20.0-40.0)  %


 


Mono % (Auto)     (0.0-10.0)  %


 


Eos % (Auto)     (0.0-4.0)  %


 


Baso % (Auto)     (0.0-2.0)  %


 


Neut # (Auto)     (1.8-7.0)  K/uL


 


Lymph # (Auto)     (1.0-4.3)  K/uL


 


Mono # (Auto)     (0.0-0.8)  K/uL


 


Eos # (Auto)     (0.0-0.7)  K/uL


 


Baso # (Auto)     (0.0-0.2)  K/uL


 


Neutrophils % (Manual)     (50-75)  %


 


Band Neutrophils %     (0-2)  %


 


Lymphocytes % (Manual)     (20-40)  %


 


Monocytes % (Manual)     (0-10)  %


 


Eosinophils % (Manual)     (0-4)  %


 


Myelocytes %     (0-0)  %


 


Toxic Granulation     


 


Platelet Estimate     (NORMAL)  


 


Large Platelets     


 


Giant Platelets     


 


Hypochromasia (manual)     


 


Poikilocytosis (manual     


 


Anisocytosis (manual)     


 


Macrocytosis (manual)     


 


Ovalocytes     


 


Glen Arm Cells     


 


APTT     (21-34)  SECONDS


 


Puncture Site    Rb  


 


pCO2    30 L  (35-45)  mm/Hg


 


pO2    147 H  ()  mm/Hg


 


HCO3    27.2  (21-28)  mmol/L


 


ABG pH    7.53 H  (7.35-7.45)  


 


ABG Total CO2    26.0  (22-28)  mmol/L


 


ABG O2 Saturation    99.5 H  (95-98)  %


 


ABG Base Excess    2.9  (-2.0-3.0)  mmol/L


 


ABG Hemoglobin    11.6 L  (11.7-17.4)  g/dL


 


ABG Carboxyhemoglobin    1.8 H  (0.5-1.5)  %


 


POC ABG HHb (Measured)    0.5  (0.0-5.0)  %


 


ABG Methemoglobin    0.9  (0.0-3.0)  %


 


Jesus Test    Na  


 


A-a O2 Difference    101.0  mm/Hg


 


Respiratory Index    0.7  


 


Hgb O2 Saturation    96.8  (95.0-98.0)  %


 


Vent Mode    Prvc  


 


Mechanical Rate    16  


 


FiO2    40.0  %


 


Tidal Volume    500  


 


PEEP    5  


 


Sodium  139    (132-148)  mmol/L


 


Potassium  3.6    (3.6-5.2)  mmol/L


 


Chloride  101    ()  mmol/L


 


Carbon Dioxide  28    (22-30)  mmol/L


 


Anion Gap  14    (10-20)  


 


BUN  59 H    (7-17)  mg/dL


 


Creatinine  4.5 H    (0.7-1.2)  mg/dL


 


Est GFR ( Amer)  12    


 


Est GFR (Non-Af Amer)  10    


 


POC Glucose (mg/dL)   118 H   ()  mg/dL


 


Random Glucose  100    ()  mg/dL


 


Calcium  7.9 L    (8.6-10.4)  mg/dl


 


Phosphorus  2.3 L    (2.5-4.5)  mg/dL


 


Magnesium  2.1    (1.6-2.3)  mg/dL


 


Total Bilirubin  0.9    (0.2-1.3)  mg/dL


 


AST  1487 H    (14-36)  U/L


 


ALT  1201 H    (9-52)  U/L


 


Alkaline Phosphatase  196 H    ()  U/L


 


Total Protein  5.1 L    (6.3-8.3)  g/dL


 


Albumin  2.3 L    (3.5-5.0)  g/dL


 


Globulin  2.8    (2.2-3.9)  gm/dL


 


Albumin/Globulin Ratio  0.8 L    (1.0-2.1)  














  03/07/18 03/06/18 Range/Units





  00:11 17:28 


 


WBC    (4.8-10.8)  K/uL


 


RBC    (3.80-5.20)  Mil/uL


 


Hgb    (11.0-16.0)  g/dL


 


Hct    (34.0-47.0)  %


 


MCV    (81.0-99.0)  fL


 


MCH    (27.0-31.0)  pg


 


MCHC    (33.0-37.0)  g/dL


 


RDW    (11.5-14.5)  %


 


Plt Count    (130-400)  K/uL


 


MPV    (7.2-11.7)  fL


 


Neut % (Auto)    (50.0-75.0)  %


 


Lymph % (Auto)    (20.0-40.0)  %


 


Mono % (Auto)    (0.0-10.0)  %


 


Eos % (Auto)    (0.0-4.0)  %


 


Baso % (Auto)    (0.0-2.0)  %


 


Neut # (Auto)    (1.8-7.0)  K/uL


 


Lymph # (Auto)    (1.0-4.3)  K/uL


 


Mono # (Auto)    (0.0-0.8)  K/uL


 


Eos # (Auto)    (0.0-0.7)  K/uL


 


Baso # (Auto)    (0.0-0.2)  K/uL


 


Neutrophils % (Manual)    (50-75)  %


 


Band Neutrophils %    (0-2)  %


 


Lymphocytes % (Manual)    (20-40)  %


 


Monocytes % (Manual)    (0-10)  %


 


Eosinophils % (Manual)    (0-4)  %


 


Myelocytes %    (0-0)  %


 


Toxic Granulation    


 


Platelet Estimate    (NORMAL)  


 


Large Platelets    


 


Giant Platelets    


 


Hypochromasia (manual)    


 


Poikilocytosis (manual    


 


Anisocytosis (manual)    


 


Macrocytosis (manual)    


 


Ovalocytes    


 


Clara Cells    


 


APTT    (21-34)  SECONDS


 


Puncture Site    


 


pCO2    (35-45)  mm/Hg


 


pO2    ()  mm/Hg


 


HCO3    (21-28)  mmol/L


 


ABG pH    (7.35-7.45)  


 


ABG Total CO2    (22-28)  mmol/L


 


ABG O2 Saturation    (95-98)  %


 


ABG Base Excess    (-2.0-3.0)  mmol/L


 


ABG Hemoglobin    (11.7-17.4)  g/dL


 


ABG Carboxyhemoglobin    (0.5-1.5)  %


 


POC ABG HHb (Measured)    (0.0-5.0)  %


 


ABG Methemoglobin    (0.0-3.0)  %


 


Jesus Test    


 


A-a O2 Difference    mm/Hg


 


Respiratory Index    


 


Hgb O2 Saturation    (95.0-98.0)  %


 


Vent Mode    


 


Mechanical Rate    


 


FiO2    %


 


Tidal Volume    


 


PEEP    


 


Sodium    (132-148)  mmol/L


 


Potassium    (3.6-5.2)  mmol/L


 


Chloride    ()  mmol/L


 


Carbon Dioxide    (22-30)  mmol/L


 


Anion Gap    (10-20)  


 


BUN    (7-17)  mg/dL


 


Creatinine    (0.7-1.2)  mg/dL


 


Est GFR (African Amer)    


 


Est GFR (Non-Af Amer)    


 


POC Glucose (mg/dL)  81  71  ()  mg/dL


 


Random Glucose    ()  mg/dL


 


Calcium    (8.6-10.4)  mg/dl


 


Phosphorus    (2.5-4.5)  mg/dL


 


Magnesium    (1.6-2.3)  mg/dL


 


Total Bilirubin    (0.2-1.3)  mg/dL


 


AST    (14-36)  U/L


 


ALT    (9-52)  U/L


 


Alkaline Phosphatase    ()  U/L


 


Total Protein    (6.3-8.3)  g/dL


 


Albumin    (3.5-5.0)  g/dL


 


Globulin    (2.2-3.9)  gm/dL


 


Albumin/Globulin Ratio    (1.0-2.1)  








 Laboratory Results - last 24 hr











  03/06/18 03/07/18 03/07/18





  17:28 00:11 04:30


 


WBC   


 


RBC   


 


Hgb   


 


Hct   


 


MCV   


 


MCH   


 


MCHC   


 


RDW   


 


Plt Count   


 


MPV   


 


Neut % (Auto)   


 


Lymph % (Auto)   


 


Mono % (Auto)   


 


Eos % (Auto)   


 


Baso % (Auto)   


 


Neut # (Auto)   


 


Lymph # (Auto)   


 


Mono # (Auto)   


 


Eos # (Auto)   


 


Baso # (Auto)   


 


Neutrophils % (Manual)   


 


Band Neutrophils %   


 


Lymphocytes % (Manual)   


 


Monocytes % (Manual)   


 


Eosinophils % (Manual)   


 


Myelocytes %   


 


Toxic Granulation   


 


Platelet Estimate   


 


Large Platelets   


 


Giant Platelets   


 


Hypochromasia (manual)   


 


Poikilocytosis (manual   


 


Anisocytosis (manual)   


 


Macrocytosis (manual)   


 


Ovalocytes   


 


Clara Cells   


 


APTT   


 


Puncture Site    Rb


 


pCO2    30 L


 


pO2    147 H


 


HCO3    27.2


 


ABG pH    7.53 H


 


ABG Total CO2    26.0


 


ABG O2 Saturation    99.5 H


 


ABG Base Excess    2.9


 


ABG Hemoglobin    11.6 L


 


ABG Carboxyhemoglobin    1.8 H


 


POC ABG HHb (Measured)    0.5


 


ABG Methemoglobin    0.9


 


Jesus Test    Na


 


A-a O2 Difference    101.0


 


Respiratory Index    0.7


 


Hgb O2 Saturation    96.8


 


Vent Mode    Prvc


 


Mechanical Rate    16


 


FiO2    40.0


 


Tidal Volume    500


 


PEEP    5


 


Sodium   


 


Potassium   


 


Chloride   


 


Carbon Dioxide   


 


Anion Gap   


 


BUN   


 


Creatinine   


 


Est GFR ( Amer)   


 


Est GFR (Non-Af Amer)   


 


POC Glucose (mg/dL)  71  81 


 


Random Glucose   


 


Calcium   


 


Phosphorus   


 


Magnesium   


 


Total Bilirubin   


 


AST   


 


ALT   


 


Alkaline Phosphatase   


 


Total Protein   


 


Albumin   


 


Globulin   


 


Albumin/Globulin Ratio   














  03/07/18 03/07/18 03/07/18





  05:19 06:17 06:19


 


WBC    12.3 H


 


RBC    3.02 L


 


Hgb    8.5 L


 


Hct    25.8 L


 


MCV    85.5


 


MCH    28.1


 


MCHC    32.9 L


 


RDW    15.9 H


 


Plt Count    84 L


 


MPV    10.5


 


Neut % (Auto)    82.4 H


 


Lymph % (Auto)    8.8 L


 


Mono % (Auto)    8.3


 


Eos % (Auto)    0.4


 


Baso % (Auto)    0.1


 


Neut # (Auto)    10.1 H


 


Lymph # (Auto)    1.1


 


Mono # (Auto)    1.0 H


 


Eos # (Auto)    0.0


 


Baso # (Auto)    0.0


 


Neutrophils % (Manual)    81 H


 


Band Neutrophils %    1


 


Lymphocytes % (Manual)    9 L


 


Monocytes % (Manual)    8


 


Eosinophils % (Manual)    1


 


Myelocytes %    1 H


 


Toxic Granulation    Present


 


Platelet Estimate    Decreased L


 


Large Platelets    Present


 


Giant Platelets    Present


 


Hypochromasia (manual)    Slight


 


Poikilocytosis (manual    Slight


 


Anisocytosis (manual)    Slight


 


Macrocytosis (manual)    Slight


 


Ovalocytes    Slight


 


Glen Arm Cells    Slight


 


APTT   


 


Puncture Site   


 


pCO2   


 


pO2   


 


HCO3   


 


ABG pH   


 


ABG Total CO2   


 


ABG O2 Saturation   


 


ABG Base Excess   


 


ABG Hemoglobin   


 


ABG Carboxyhemoglobin   


 


POC ABG HHb (Measured)   


 


ABG Methemoglobin   


 


Jesus Test   


 


A-a O2 Difference   


 


Respiratory Index   


 


Hgb O2 Saturation   


 


Vent Mode   


 


Mechanical Rate   


 


FiO2   


 


Tidal Volume   


 


PEEP   


 


Sodium   139 


 


Potassium   3.6 


 


Chloride   101 


 


Carbon Dioxide   28 


 


Anion Gap   14 


 


BUN   59 H 


 


Creatinine   4.5 H 


 


Est GFR ( Amer)   12 


 


Est GFR (Non-Af Amer)   10 


 


POC Glucose (mg/dL)  118 H  


 


Random Glucose   100 


 


Calcium   7.9 L 


 


Phosphorus   2.3 L 


 


Magnesium   2.1 


 


Total Bilirubin   0.9 


 


AST   1487 H 


 


ALT   1201 H 


 


Alkaline Phosphatase   196 H 


 


Total Protein   5.1 L 


 


Albumin   2.3 L 


 


Globulin   2.8 


 


Albumin/Globulin Ratio   0.8 L 














  03/07/18 03/07/18





  06:19 11:30


 


WBC  


 


RBC  


 


Hgb  


 


Hct  


 


MCV  


 


MCH  


 


MCHC  


 


RDW  


 


Plt Count  


 


MPV  


 


Neut % (Auto)  


 


Lymph % (Auto)  


 


Mono % (Auto)  


 


Eos % (Auto)  


 


Baso % (Auto)  


 


Neut # (Auto)  


 


Lymph # (Auto)  


 


Mono # (Auto)  


 


Eos # (Auto)  


 


Baso # (Auto)  


 


Neutrophils % (Manual)  


 


Band Neutrophils %  


 


Lymphocytes % (Manual)  


 


Monocytes % (Manual)  


 


Eosinophils % (Manual)  


 


Myelocytes %  


 


Toxic Granulation  


 


Platelet Estimate  


 


Large Platelets  


 


Giant Platelets  


 


Hypochromasia (manual)  


 


Poikilocytosis (manual  


 


Anisocytosis (manual)  


 


Macrocytosis (manual)  


 


Ovalocytes  


 


Glen Arm Cells  


 


APTT  65 H 


 


Puncture Site  


 


pCO2  


 


pO2  


 


HCO3  


 


ABG pH  


 


ABG Total CO2  


 


ABG O2 Saturation  


 


ABG Base Excess  


 


ABG Hemoglobin  


 


ABG Carboxyhemoglobin  


 


POC ABG HHb (Measured)  


 


ABG Methemoglobin  


 


Jesus Test  


 


A-a O2 Difference  


 


Respiratory Index  


 


Hgb O2 Saturation  


 


Vent Mode  


 


Mechanical Rate  


 


FiO2  


 


Tidal Volume  


 


PEEP  


 


Sodium  


 


Potassium  


 


Chloride  


 


Carbon Dioxide  


 


Anion Gap  


 


BUN  


 


Creatinine  


 


Est GFR ( Amer)  


 


Est GFR (Non-Af Amer)  


 


POC Glucose (mg/dL)   90


 


Random Glucose  


 


Calcium  


 


Phosphorus  


 


Magnesium  


 


Total Bilirubin  


 


AST  


 


ALT  


 


Alkaline Phosphatase  


 


Total Protein  


 


Albumin  


 


Globulin  


 


Albumin/Globulin Ratio  











EKG/Cardiology Studies: 


Cardiology / EKG Studies





03/07/18 08:48


EKG [ELECTROCARDIOGRAM] Routine 


   Comment: 


   Mode Of Transportation: 


   Reason For Exam: atrial fibrillation


   Precautions: Standard














Attending/Attestation





- Attestation


I have personally seen and examined this patient.: Yes


I have fully participated in the care of the patient.: Yes


I have reviewed all pertinent clinical information: Yes


Notes (Text): 





03/07/18 16:07


PATIENT SEEN AND EXAMINED IN THE INTENSIVE CARE UNIT.


Response to vocal command by opening eyes


Tolerating CPAP


Possible cardiac cath tomorrow


Continue heparin drip


Follow-up chest x-ray and ABG

## 2018-03-07 NOTE — CP.PCM.PN
Subjective





- Date & Time of Evaluation


Date of Evaluation: 03/07/18


Time of Evaluation: 14:13





- Subjective


Subjective: 





Nephrology Consultation:





Assessment: critical


CKD stage 5 (N18.5) with hyperkalemia, acidosis now has ESRD on HD via [facility] 


pulm edema, CHF, NSTEMI, pleural effusions, pneumonia


AMS, hyperglycemia


s/p cardiac arrest, shock liver, A fib





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 


Hypertensive Chronic Kidney Disease (I12.9)


Anemia (D64.9), Hyperphosphatemia (E83.39), Secondary Hyperparathyroidism (E21.1

), HTN (I12.9), vit D insuff, hx of colon CA, TIAs





Plan


HD today as ordered.


maintain hemodynamic stable. Patient not on ACEI/ARB due to low BP


started iron supplements, MVI, epogen with HD, and Vit D


vasc surgery has evaluated. ID and cardiology following


KDUR supplementation as needed





Dose meds/antibiotics for ESRD/HD status. Avoid fleets enema/magnesium based 

laxatives. Avoid nephrotoxins/NSAIDs


Glycemic control


Further work up/management as per primary team





Thanks for allowing me to participate in care of your patient. Will follow 

patient with you. Please call if any Qs. d/w team 


Dr Shad Panchal


Office: [phone] Cell: [phone] Fax: [phone]





Chief Complaint; unable to obtain


reason for consult: CKD management


source of Info: EMR


HPI: Pt is a 77 F with hx of diabetes Mellitus ( years), hypertension (years), 

CHF, TIA, Colon CA, CKD stage 4/5, has matured AVG in left arm presented with 

complaints of AMS and hyperglycemia as brought by family. now has elevated trop 

100, hyperkalemia, AMS and elevated sugar, pulm edema. renal consult for CKD 

management. 


pt with AMS unable to provide any hx. she had received medical management for 

hyperkalemia. 





ROS: unable to obtain





Physical Examination: 


General Appearance: ill appearing,intubated


Vitals reviewed and noted as below


Head; Atraumatic, normocephalic


ENT: intubated


EYES: PERRLA


Neck; supple no lymphadenopathy, no thyromegaly or bruit


Lungs: Normal respiratory rate/effort. Breath sounds bilateral clear, she is 

mechanically ventilated


Heart: Normal rate. s1s2 normal. No rub or gallop. 


Extremities: no edema. No varicose veins. chronic hyperpigmented changes in legs


Neurological: Patient is  not communicative


Skin: Warm and dry. Normal turgor. No rash. Palpitation: Normal elasticity for 

age


Abdomen: Abdomen is soft. Bowel sounds +. There is no abdominal tenderness, no 

guarding/rigidity no organomegaly


Psych: unable


MSK: no joint tenderness or swelling. Digits and nails normal, no deformity


: kidney or bladder not palpable


Access: Left AVG without bruit. has left IJ shiley





Labs/imaging reviewed.


Past medical history, past surgical history, family history, social history, 

allergy reviewed and noted as below


Family hx: no hx of CKD. Rest non-contributory





Objective





- Vital Signs/Intake and Output


Vital Signs (last 24 hours): 


 











Temp Pulse Resp BP Pulse Ox


 


 98.5 F   97 H  19   121/71   99 


 


 03/07/18 04:00  03/07/18 13:00  03/07/18 13:00  03/07/18 12:56  03/07/18 13:00








Intake and Output: 


 











 03/07/18 03/07/18





 06:59 18:59


 


Intake Total 820 35


 


Balance 820 35














- Medications


Medications: 


 Current Medications





Aspirin (Aspirin Chewable)  81 mg PO DAILY [facility]


   Last Admin: 03/07/18 09:23 Dose:  81 mg


Epoetin Vamsi (Procrit)  8,000 unit IV MWF [facility]


Ergocalciferol (Drisdol 50,000 Intl Units Cap)  1 cap PO Q7D [facility]


   Last Admin: 03/03/18 13:51 Dose:  1 cap


Ferrous Gluconate (Fergon)  324 mg PO TID [facility]


   Last Admin: 03/07/18 13:36 Dose:  324 mg


Aztreonam 1 gm/ Sodium (Chloride)  100 mls @ 100 mls/hr IVPB Q12H [facility]


   Last Admin: 03/07/18 13:35 Dose:  100 mls/hr


Metronidazole (Flagyl)  500 mg in 100 mls @ 100 mls/hr IVPB Q8H [facility]


   Last Admin: 03/07/18 13:35 Dose:  100 mls/hr


Heparin Sodium/Sodium Chloride (Heparin 47517 Units/250ml 1/2 Normal Saline)  25

,000 units in 250 mls @ 10.161 mls/hr IV .Q24H PRN; Protocol; 14 UNITS/KG/HR


   PRN Reason: PROTOCOL


Insulin Aspart (Novolog)  0 unit SC Q6 [facility]


   PRN Reason: Protocol


   Last Admin: 03/07/18 12:27 Dose:  Not Given


Insulin Glargine (Lantus)  5 unit SC HS [facility]


   Last Admin: 03/06/18 22:17 Dose:  Not Given


Metoprolol Tartrate (Lopressor)  12.5 mg PO BID [facility]


   Last Admin: 03/07/18 09:23 Dose:  12.5 mg


Midodrine (Proamatine)  10 mg NG Q8 [facility]


   Last Admin: 03/07/18 13:36 Dose:  10 mg


Pantoprazole Sodium (Protonix Inj)  40 mg IVP DAILY [facility]


   Last Admin: 03/07/18 09:24 Dose:  40 mg


Rosuvastatin Calcium (Crestor)  5 mg PO HS [facility]


   Last Admin: 03/06/18 22:14 Dose:  5 mg


Vitamin B Complex/Vit C/Folic Acid (Nephro-Nneka)  1 tab PO 0800 [facility]


   Last Admin: 03/07/18 07:39 Dose:  1 tab











- Labs


Labs: 


 





 03/07/18 06:19 





 03/07/18 06:17 





 











PT  14.6 SECONDS (9.7-12.2)  H  03/02/18  02:17    


 


INR  1.3   03/02/18  02:17    


 


APTT  65 SECONDS (21-34)  H  03/07/18  06:19

## 2018-03-07 NOTE — RAD
HISTORY:

intubated, CPAP trial  



COMPARISON:

03/01/2018 



FINDINGS:

The endotracheal tube terminates 1.7 cm proximal to the peter. The 

right IJV line terminates at the cavoatrial junction. The left 

subclavian line terminates at the cavoatrial junction The nasogastric 

tube terminates in the stomach. 



LUNGS:

The right lung is well inflated and clear.  No significant interval 

change in left lower lobe consolidation and moderate left pleural 

effusion.  There is linear atelectasis in the left lower lobe.



PLEURA:

No significant pleural effusion identified, no pneumothorax apparent.



CARDIOVASCULAR:

Stable.



OSSEOUS STRUCTURES:

No significant abnormalities.



VISUALIZED UPPER ABDOMEN:

Normal.



OTHER FINDINGS:

None.



IMPRESSION:

No change in left lower lobe consolidation and moderate left pleural 

effusion.

## 2018-03-08 LAB
ALBUMIN SERPL-MCNC: 2.7 G/DL (ref 3.5–5)
ALBUMIN/GLOB SERPL: 0.8 {RATIO} (ref 1–2.1)
ALT SERPL-CCNC: 1238 U/L (ref 9–52)
ANISOCYTOSIS BLD QL SMEAR: SLIGHT
APTT BLD: 64 SECONDS (ref 21–34)
ARTERIAL BLOOD GAS O2 SAT: 99.9 % (ref 95–98)
ARTERIAL BLOOD GAS PCO2: 31 MM/HG (ref 35–45)
ARTERIAL BLOOD GAS TCO2: 24.1 MMOL/L (ref 22–28)
AST SERPL-CCNC: 648 U/L (ref 14–36)
BASOPHILS # BLD AUTO: 0 K/UL (ref 0–0.2)
BASOPHILS NFR BLD: 0.3 % (ref 0–2)
BUN SERPL-MCNC: 47 MG/DL (ref 7–17)
BURR CELLS BLD QL SMEAR: SLIGHT
CALCIUM SERPL-MCNC: 8.1 MG/DL (ref 8.6–10.4)
EOSINOPHIL # BLD AUTO: 0 K/UL (ref 0–0.7)
EOSINOPHIL NFR BLD: 0.3 % (ref 0–4)
EOSINOPHIL NFR BLD: 1 % (ref 0–4)
ERYTHROCYTE [DISTWIDTH] IN BLOOD BY AUTOMATED COUNT: 16.3 % (ref 11.5–14.5)
GFR NON-AFRICAN AMERICAN: 12
HCO3 BLDA-SCNC: 25.3 MMOL/L (ref 21–28)
HGB BLD-MCNC: 9 G/DL (ref 11–16)
HYPOCHROMIC: SLIGHT
INHALED O2 CONCENTRATION: 40 %
INR PPP: 1.6
LG PLATELETS BLD QL SMEAR: PRESENT
LYMPHOCYTE: 10 % (ref 20–40)
LYMPHOCYTES # BLD AUTO: 1 K/UL (ref 1–4.3)
LYMPHOCYTES NFR BLD AUTO: 8.4 % (ref 20–40)
MACROCYTES BLD QL SMEAR: SLIGHT
MCH RBC QN AUTO: 28.1 PG (ref 27–31)
MCHC RBC AUTO-ENTMCNC: 32.7 G/DL (ref 33–37)
MCV RBC AUTO: 85.9 FL (ref 81–99)
MONOCYTE: 9 % (ref 0–10)
MONOCYTES # BLD: 1 K/UL (ref 0–0.8)
MONOCYTES NFR BLD: 8.4 % (ref 0–10)
NEUTROPHILS # BLD: 10.1 K/UL (ref 1.8–7)
NEUTROPHILS NFR BLD AUTO: 80 % (ref 50–75)
NEUTROPHILS NFR BLD AUTO: 82.6 % (ref 50–75)
NRBC BLD AUTO-RTO: 1.3 % (ref 0–2)
OVALOCYTES BLD QL SMEAR: SLIGHT
PH BLDA: 7.48 [PH] (ref 7.35–7.45)
PLATELET # BLD EST: (no result) 10*3/UL
PLATELET # BLD: 108 K/UL (ref 130–400)
PMV BLD AUTO: 10.3 FL (ref 7.2–11.7)
PO2 BLDA: 183 MM/HG (ref 80–100)
POIKILOCYTOSIS BLD QL SMEAR: SLIGHT
POLYCHROMIC: SLIGHT
PROTHROMBIN TIME: 18 SECONDS (ref 9.7–12.2)
RBC # BLD AUTO: 3.19 MIL/UL (ref 3.8–5.2)
TARGETS BLD QL SMEAR: SLIGHT
TEARDROP CELLS: SLIGHT
TOTAL CELLS COUNTED BLD: 100
WBC # BLD AUTO: 12.2 K/UL (ref 4.8–10.8)

## 2018-03-08 RX ADMIN — INSULIN ASPART SCH: 100 INJECTION, SOLUTION INTRAVENOUS; SUBCUTANEOUS at 11:43

## 2018-03-08 RX ADMIN — IPRATROPIUM BROMIDE AND ALBUTEROL SULFATE SCH ML: .5; 3 SOLUTION RESPIRATORY (INHALATION) at 11:39

## 2018-03-08 RX ADMIN — WATER SCH MLS/HR: 1 INJECTION INTRAMUSCULAR; INTRAVENOUS; SUBCUTANEOUS at 21:35

## 2018-03-08 RX ADMIN — INSULIN ASPART SCH UNIT: 100 INJECTION, SOLUTION INTRAVENOUS; SUBCUTANEOUS at 05:35

## 2018-03-08 RX ADMIN — METHYLPREDNISOLONE SODIUM SUCCINATE SCH MG: 40 INJECTION, POWDER, FOR SOLUTION INTRAMUSCULAR; INTRAVENOUS at 21:35

## 2018-03-08 RX ADMIN — IPRATROPIUM BROMIDE AND ALBUTEROL SULFATE SCH ML: .5; 3 SOLUTION RESPIRATORY (INHALATION) at 20:11

## 2018-03-08 RX ADMIN — WATER SCH MLS/HR: 1 INJECTION INTRAMUSCULAR; INTRAVENOUS; SUBCUTANEOUS at 13:46

## 2018-03-08 RX ADMIN — INSULIN GLARGINE SCH U: 100 INJECTION, SOLUTION SUBCUTANEOUS at 21:35

## 2018-03-08 RX ADMIN — HEPARIN SODIUM PRN MLS/HR: 10000 INJECTION, SOLUTION INTRAVENOUS at 18:00

## 2018-03-08 RX ADMIN — IPRATROPIUM BROMIDE AND ALBUTEROL SULFATE SCH ML: .5; 3 SOLUTION RESPIRATORY (INHALATION) at 15:32

## 2018-03-08 RX ADMIN — INSULIN ASPART SCH: 100 INJECTION, SOLUTION INTRAVENOUS; SUBCUTANEOUS at 00:00

## 2018-03-08 RX ADMIN — METHYLPREDNISOLONE SODIUM SUCCINATE SCH MG: 40 INJECTION, POWDER, FOR SOLUTION INTRAMUSCULAR; INTRAVENOUS at 12:18

## 2018-03-08 RX ADMIN — Medication SCH TAB: at 08:55

## 2018-03-08 RX ADMIN — IPRATROPIUM BROMIDE AND ALBUTEROL SULFATE SCH ML: .5; 3 SOLUTION RESPIRATORY (INHALATION) at 03:06

## 2018-03-08 RX ADMIN — WATER SCH MLS/HR: 1 INJECTION INTRAMUSCULAR; INTRAVENOUS; SUBCUTANEOUS at 05:30

## 2018-03-08 RX ADMIN — INSULIN ASPART SCH UNIT: 100 INJECTION, SOLUTION INTRAVENOUS; SUBCUTANEOUS at 18:58

## 2018-03-08 RX ADMIN — IPRATROPIUM BROMIDE AND ALBUTEROL SULFATE SCH ML: .5; 3 SOLUTION RESPIRATORY (INHALATION) at 07:10

## 2018-03-08 RX ADMIN — IPRATROPIUM BROMIDE AND ALBUTEROL SULFATE SCH ML: .5; 3 SOLUTION RESPIRATORY (INHALATION) at 00:21

## 2018-03-08 NOTE — CP.PCM.PN
Subjective





- Date & Time of Evaluation


Date of Evaluation: 03/08/18


Time of Evaluation: 19:00





- Subjective


Subjective: 





D/W daughter and Grand daughter


Declined cardiac cath


cath cancelled for tomorrow


will re visit the issue once patient is extubated





Physical Exam





- Additional Findings


Additional findings: 


Physical Exam: 





Gen: drowsy, arousable to voice and touch but quickly falls asleep





HEENT: NCAT, mucosal membranes dry





Skin: (+) swelling of eyelids; (+) multiple hypopigmented areas to abdomen; (+) 

leathery skin to bilateral legs and feet. 





Cardio: tachycardic; +S1, S2; 2/6 systolic ejection murmur to right sternal 

border





Resp: diffuse wheezing and rales, most prominent in bases bilaterally 





Abd: Soft, nondistended. Bowel sounds present. Midline vertical healed surgical 

scar. 





Extremities: Radial pulses present bilaterally. DP/PT pulses intact but weak. 

Lower extremities cool to touch. 





Neuro: awake but not alert; not able to follow commands








Objective





- Vital Signs/Intake and Output


Vital Signs (last 24 hours): 


 











Temp Pulse Resp BP Pulse Ox


 


 97.8 F   110 H  17   122/86   100 


 


 03/08/18 20:00  03/08/18 22:01  03/08/18 22:01  03/08/18 22:01  03/08/18 22:01








Intake and Output: 


 











 03/08/18 03/09/18





 18:59 06:59


 


Intake Total 720 400


 


Balance 720 400














- Medications


Medications: 


 Current Medications





Albuterol/Ipratropium (Duoneb 3 Mg/0.5 Mg (3 Ml) Ud)  3 ml INH RQ4 [facility]


   Last Admin: 03/08/18 20:11 Dose:  3 ml


Aspirin (Aspirin Chewable)  81 mg PO DAILY [facility]


   Last Admin: 03/08/18 09:12 Dose:  81 mg


Epoetin Vamsi (Procrit)  8,000 unit IV MWF [facility]


   Last Admin: 03/07/18 17:35 Dose:  8,000 unit


Ergocalciferol (Drisdol 50,000 Intl Units Cap)  1 cap PO Q7D [facility]


   Last Admin: 03/03/18 13:51 Dose:  1 cap


Ferrous Gluconate (Fergon)  324 mg PO TID [facility]


   Last Admin: 03/08/18 18:31 Dose:  324 mg


Heparin Sodium/Sodium Chloride (Heparin 05879 Units/250ml 1/2 Normal Saline)  25

,000 units in 250 mls @ 10.161 mls/hr IV .Q24H PRN; Protocol; 14 UNITS/KG/HR


   PRN Reason: PROTOCOL


   Last Admin: 03/07/18 09:00 Dose:  14 units/kg/hr, 10.161 mls/hr


Aztreonam 1 gm/ Sodium (Chloride)  100 mls @ 100 mls/hr IVPB Q12H [facility]


Metronidazole (Flagyl)  500 mg in 100 mls @ 100 mls/hr IVPB Q8H [facility]


   Last Admin: 03/08/18 21:35 Dose:  100 mls/hr


Insulin Aspart (Novolog)  0 unit SC Q6 [facility]


   PRN Reason: Protocol


   Last Admin: 03/08/18 18:58 Dose:  4 unit


Insulin Glargine (Lantus)  5 unit SC HS [facility]


   Last Admin: 03/08/18 21:35 Dose:  5 u


Methylprednisolone (Solu-Medrol)  40 mg IVP Q8H [facility]


   Last Admin: 03/08/18 21:35 Dose:  40 mg


Metoprolol Tartrate (Lopressor)  12.5 mg PO BID [facility]


   Last Admin: 03/08/18 18:31 Dose:  12.5 mg


Midodrine (Proamatine)  10 mg NG Q8 [facility]


   Last Admin: 03/08/18 21:35 Dose:  10 mg


Pantoprazole Sodium (Protonix Inj)  40 mg IVP DAILY [facility]


   Last Admin: 03/08/18 09:12 Dose:  40 mg


Rosuvastatin Calcium (Crestor)  5 mg PO HS [facility]


   Last Admin: 03/08/18 21:35 Dose:  5 mg


Vitamin B Complex/Vit C/Folic Acid (Nephro-Nneka)  1 tab PO 0800 [facility]


   Last Admin: 03/08/18 08:55 Dose:  1 tab











- Labs


Labs: 


 





 03/08/18 06:28 





 03/08/18 06:26 





 











PT  18.0 SECONDS (9.7-12.2)  H  03/08/18  06:32    


 


INR  1.6   03/08/18  06:32    


 


APTT  64 SECONDS (21-34)  H  03/08/18  06:32    














Assessment and Plan





- Assessment and Plan (Free Text)


Assessment: 





D/W daughter and Grand daughter


Declined cardiac cath


cath cancelled for tomorrow


will re visit the issue once patient is extubated

## 2018-03-08 NOTE — CP.PCM.CON
History of Present Illness





- History of Present Illness


History of Present Illness: 





Reason For Consultation: For cardiac cath





HPI: 77 F with hx of HTN, CRF on HD admitted for Acute systolic CHF, Non ST 

elevation MI Cardiac cath was originally deferred as patient was unstable


Now patient is slowly recovering


Will consider cath tomorrow





HPI: Patient is a 77 year old  female with a PMH significant 

for CKD stage V (not on dialysis), Diastolic CHF, DM, HTN, hypercholesterolemia

, MI (many years ago, no known intervention), TIA x 3 (years ago), colon CA who 

presents for altered mental status. 





PMD: Yuli 


PMH: ESRD (not yet on dialysis), DM, HTN, hypercholesterolemia, MI (many years 

ago, no known intervention), TIA x 3 (years ago), PAD, CHF, asthma, colon CA in 

 (treated with chemotherapy and surgery), arthritis


PSH: unknown surgery for colon CA (); endoscopy 


Medications: Aspirin 81mg PO daily, Amlodipine (Norvasc) 10mg PO daily, 

Lisinopril (Zestril) 10mg PO daily, Labetalol Hydrochloride (Normodyne) 300mg 

PO BID, Lantus 15 unit SC HS, Novolog 3 unit SC TIDAC, Furosemide (Lasix) 40mg 

PO daily, Ferrous Sulfate (Feosol) 325mg PO BID, Calcitriol (Rocaltrol) 0.25 

mcg PO MWF


Allergies: Penicillin (rash)


Family History: Limited; Mother- of cancer; Father- for unknown reason. 


Social History: Lives alone in [place] (son and daughter visit daily to 

assist); ambulates without assistance; at baseline is able to walk around and 

cook without difficulty; retired; former smoker (25+ pack years); no current 

ETOH use; no current recreational drug use 


Code status: Full Code


No advance directive


In the event of emergency, daughter Huber Corral () will make 

healthcare decisions. Patient's son is also involved in her care: Bryle Burke (

[phone]) 














Physical Exam





- Additional Findings


Additional findings: 


Physical Exam: 





Gen: drowsy, arousable to voice and touch but quickly falls asleep





HEENT: NCAT, mucosal membranes dry





Skin: (+) swelling of eyelids; (+) multiple hypopigmented areas to abdomen; (+) 

leathery skin to bilateral legs and feet. 





Cardio: tachycardic; +S1, S2; 2/6 systolic ejection murmur to right sternal 

border





Resp: diffuse wheezing and rales, most prominent in bases bilaterally 





Abd: Soft, nondistended. Bowel sounds present. Midline vertical healed surgical 

scar. 





Extremities: Radial pulses present bilaterally. DP/PT pulses intact but weak. 

Lower extremities cool to touch. 





Neuro: awake but not alert; not able to follow commands














Past Patient History





- Infectious Disease


Hx of Infectious Diseases: None





- Tetanus Immunizations


Tetanus Immunization: Unknown





- Past Medical History & Family History


Past Medical History?: Yes





- Past Social History


Smoking Status: Former Smoker





- CARDIAC


Hx Hypercholesterolemia: Yes


Hx Hypertension: Yes


Hx Peripheral Edema: Yes





- PULMONARY


Hx Asthma: Yes (Dx 15 yrs ago,does not use home o2 anymore)


Hx Pneumonia: Yes (x2)





- NEUROLOGICAL


Hx Transient Ischemic Attacks (TIA): Yes (x3 about 20 yrs ago, 10 yrs ago, 7 

yrs ago)





- HEENT


Hx HEENT Problems: Yes


Hx Cataracts: Yes





- RENAL


Hx Chronic Kidney Disease: Yes





- ENDOCRINE/METABOLIC


Hx Diabetes Mellitus Type 1: Yes





- HEMATOLOGICAL/ONCOLOGICAL


Hx Anemia: Yes





- INTEGUMENTARY


Hx Dermatological Problems: No





- MUSCULOSKELETAL/RHEUMATOLOGICAL


Hx Arthritis: Yes


Hx Fractures: Yes (Right ankle)





- GASTROINTESTINAL


Hx Gastrointestinal Disorders: Yes


Other/Comment: Hx colon cancer





- GENITOURINARY/GYNECOLOGICAL


Hx Genitourinary Disorders: Yes


Other/Comment: Colon CA





- PSYCHIATRIC


Hx Depression: No


Hx Substance Use: No





- SURGICAL HISTORY


Hx Cataract Extraction: Yes (LEFT)





- ANESTHESIA


Hx Anesthesia: Yes


Hx Anesthesia Reactions: No


Hx Malignant Hyperthermia: No





Meds


Allergies/Adverse Reactions: 


 Allergies











Allergy/AdvReac Type Severity Reaction Status Date / Time


 


Penicillins Allergy Intermediate RASH Verified 18 20:11














- Medications


Medications: 


 Current Medications





Albuterol/Ipratropium (Duoneb 3 Mg/0.5 Mg (3 Ml) Ud)  3 ml INH RQ4 [facility]


   Last Admin: 18 00:21 Dose:  3 ml


Aspirin (Aspirin Chewable)  81 mg PO DAILY [facility]


   Last Admin: 18 09:23 Dose:  81 mg


Epoetin Vamsi (Procrit)  8,000 unit IV MWF [facility]


   Last Admin: 18 17:35 Dose:  8,000 unit


Ergocalciferol (Drisdol 50,000 Intl Units Cap)  1 cap PO Q7D [facility]


   Last Admin: 18 13:51 Dose:  1 cap


Ferrous Gluconate (Fergon)  324 mg PO TID [facility]


   Last Admin: 18 17:37 Dose:  324 mg


Aztreonam 1 gm/ Sodium (Chloride)  100 mls @ 100 mls/hr IVPB Q12H [facility]


   Last Admin: 18 13:35 Dose:  100 mls/hr


Metronidazole (Flagyl)  500 mg in 100 mls @ 100 mls/hr IVPB Q8H [facility]


   Last Admin: 18 22:00 Dose:  100 mls/hr


Heparin Sodium/Sodium Chloride (Heparin 43574 Units/250ml 1/2 Normal Saline)  25

,000 units in 250 mls @ 10.161 mls/hr IV .Q24H PRN; Protocol; 14 UNITS/KG/HR


   PRN Reason: PROTOCOL


   Last Admin: 18 09:00 Dose:  14 units/kg/hr, 10.161 mls/hr


Insulin Aspart (Novolog)  0 unit SC Q6 [facility]


   PRN Reason: Protocol


   Last Admin: 18 00:00 Dose:  Not Given


Insulin Glargine (Lantus)  5 unit SC [facility]


   Last Admin: 18 22:00 Dose:  5 u


Metoprolol Tartrate (Lopressor)  12.5 mg PO BID [facility]


   Last Admin: 18 17:37 Dose:  12.5 mg


Midodrine (Proamatine)  10 mg NG Q8 [facility]


   Last Admin: 18 22:00 Dose:  10 mg


Pantoprazole Sodium (Protonix Inj)  40 mg IVP DAILY [facility]


   Last Admin: 18 09:24 Dose:  40 mg


Rosuvastatin Calcium (Crestor)  5 mg PO HS [facility]


   Last Admin: 18 22:00 Dose:  5 mg


Vitamin B Complex/Vit C/Folic Acid (Nephro-Nneka)  1 tab PO 0800 [facility]


   Last Admin: 18 07:39 Dose:  1 tab











Results





- Vital Signs


Recent Vital Signs: 


 Last Vital Signs











Temp  98.5 F   18 00:00


 


Pulse  96 H  18 00:01


 


Resp  18   18 00:01


 


BP  126/72   18 00:01


 


Pulse Ox  100   18 00:01














- Labs


Result Diagrams: 


 18 06:23





 18 06:21


Labs: 


 Laboratory Results - last 24 hr











  18





  11:44 04:30 05:19


 


WBC   


 


RBC   


 


Hgb   


 


Hct   


 


MCV   


 


MCH   


 


MCHC   


 


RDW   


 


Plt Count   


 


MPV   


 


Neut % (Auto)   


 


Lymph % (Auto)   


 


Mono % (Auto)   


 


Eos % (Auto)   


 


Baso % (Auto)   


 


Neut # (Auto)   


 


Lymph # (Auto)   


 


Mono # (Auto)   


 


Eos # (Auto)   


 


Baso # (Auto)   


 


Neutrophils % (Manual)   


 


Band Neutrophils %   


 


Lymphocytes % (Manual)   


 


Monocytes % (Manual)   


 


Eosinophils % (Manual)   


 


Myelocytes %   


 


Toxic Granulation   


 


Platelet Estimate   


 


Large Platelets   


 


Giant Platelets   


 


Hypochromasia (manual)   


 


Poikilocytosis (manual   


 


Anisocytosis (manual)   


 


Macrocytosis (manual)   


 


Ovalocytes   


 


Clara Cells   


 


APTT   


 


Puncture Site   Rb 


 


pCO2   30 L 


 


pO2   147 H 


 


HCO3   27.2 


 


ABG pH   7.53 H 


 


ABG Total CO2   26.0 


 


ABG O2 Saturation   99.5 H 


 


ABG Base Excess   2.9 


 


ABG Hemoglobin   11.6 L 


 


ABG Carboxyhemoglobin   1.8 H 


 


POC ABG HHb (Measured)   0.5 


 


ABG Methemoglobin   0.9 


 


Jesus Test   Na 


 


A-a O2 Difference   101.0 


 


Respiratory Index   0.7 


 


Hgb O2 Saturation   96.8 


 


Vent Mode   Prvc 


 


Mechanical Rate   16 


 


FiO2   40.0 


 


Tidal Volume   500 


 


PEEP   5 


 


Sodium   


 


Potassium   


 


Chloride   


 


Carbon Dioxide   


 


Anion Gap   


 


BUN   


 


Creatinine   


 


Est GFR ( Amer)   


 


Est GFR (Non-Af Amer)   


 


POC Glucose (mg/dL)    118 H


 


Random Glucose   


 


Calcium   


 


Phosphorus   


 


Magnesium   


 


Total Bilirubin   


 


AST   


 


ALT   


 


Alkaline Phosphatase   


 


Total Protein   


 


Albumin   


 


Globulin   


 


Albumin/Globulin Ratio   


 


Influenza Type A Ab  <1:8  


 


Influenza Type B Ab  <1:8  














  18





  06:17 06:19 06:19


 


WBC   12.3 H 


 


RBC   3.02 L 


 


Hgb   8.5 L 


 


Hct   25.8 L 


 


MCV   85.5 


 


MCH   28.1 


 


MCHC   32.9 L 


 


RDW   15.9 H 


 


Plt Count   84 L 


 


MPV   10.5 


 


Neut % (Auto)   82.4 H 


 


Lymph % (Auto)   8.8 L 


 


Mono % (Auto)   8.3 


 


Eos % (Auto)   0.4 


 


Baso % (Auto)   0.1 


 


Neut # (Auto)   10.1 H 


 


Lymph # (Auto)   1.1 


 


Mono # (Auto)   1.0 H 


 


Eos # (Auto)   0.0 


 


Baso # (Auto)   0.0 


 


Neutrophils % (Manual)   81 H 


 


Band Neutrophils %   1 


 


Lymphocytes % (Manual)   9 L 


 


Monocytes % (Manual)   8 


 


Eosinophils % (Manual)   1 


 


Myelocytes %   1 H 


 


Toxic Granulation   Present 


 


Platelet Estimate   Decreased L 


 


Large Platelets   Present 


 


Giant Platelets   Present 


 


Hypochromasia (manual)   Slight 


 


Poikilocytosis (manual   Slight 


 


Anisocytosis (manual)   Slight 


 


Macrocytosis (manual)   Slight 


 


Ovalocytes   Slight 


 


Forsan Cells   Slight 


 


APTT    65 H


 


Puncture Site   


 


pCO2   


 


pO2   


 


HCO3   


 


ABG pH   


 


ABG Total CO2   


 


ABG O2 Saturation   


 


ABG Base Excess   


 


ABG Hemoglobin   


 


ABG Carboxyhemoglobin   


 


POC ABG HHb (Measured)   


 


ABG Methemoglobin   


 


Jesus Test   


 


A-a O2 Difference   


 


Respiratory Index   


 


Hgb O2 Saturation   


 


Vent Mode   


 


Mechanical Rate   


 


FiO2   


 


Tidal Volume   


 


PEEP   


 


Sodium  139  


 


Potassium  3.6  


 


Chloride  101  


 


Carbon Dioxide  28  


 


Anion Gap  14  


 


BUN  59 H  


 


Creatinine  4.5 H  


 


Est GFR ( Amer)  12  


 


Est GFR (Non-Af Amer)  10  


 


POC Glucose (mg/dL)   


 


Random Glucose  100  


 


Calcium  7.9 L  


 


Phosphorus  2.3 L  


 


Magnesium  2.1  


 


Total Bilirubin  0.9  


 


AST  1487 H  


 


ALT  1201 H  


 


Alkaline Phosphatase  196 H  


 


Total Protein  5.1 L  


 


Albumin  2.3 L  


 


Globulin  2.8  


 


Albumin/Globulin Ratio  0.8 L  


 


Influenza Type A Ab   


 


Influenza Type B Ab   














  18





  11:30 17:46 23:28


 


WBC   


 


RBC   


 


Hgb   


 


Hct   


 


MCV   


 


MCH   


 


MCHC   


 


RDW   


 


Plt Count   


 


MPV   


 


Neut % (Auto)   


 


Lymph % (Auto)   


 


Mono % (Auto)   


 


Eos % (Auto)   


 


Baso % (Auto)   


 


Neut # (Auto)   


 


Lymph # (Auto)   


 


Mono # (Auto)   


 


Eos # (Auto)   


 


Baso # (Auto)   


 


Neutrophils % (Manual)   


 


Band Neutrophils %   


 


Lymphocytes % (Manual)   


 


Monocytes % (Manual)   


 


Eosinophils % (Manual)   


 


Myelocytes %   


 


Toxic Granulation   


 


Platelet Estimate   


 


Large Platelets   


 


Giant Platelets   


 


Hypochromasia (manual)   


 


Poikilocytosis (manual   


 


Anisocytosis (manual)   


 


Macrocytosis (manual)   


 


Ovalocytes   


 


Forsan Cells   


 


APTT   


 


Puncture Site   


 


pCO2   


 


pO2   


 


HCO3   


 


ABG pH   


 


ABG Total CO2   


 


ABG O2 Saturation   


 


ABG Base Excess   


 


ABG Hemoglobin   


 


ABG Carboxyhemoglobin   


 


POC ABG HHb (Measured)   


 


ABG Methemoglobin   


 


Jesus Test   


 


A-a O2 Difference   


 


Respiratory Index   


 


Hgb O2 Saturation   


 


Vent Mode   


 


Mechanical Rate   


 


FiO2   


 


Tidal Volume   


 


PEEP   


 


Sodium   


 


Potassium   


 


Chloride   


 


Carbon Dioxide   


 


Anion Gap   


 


BUN   


 


Creatinine   


 


Est GFR ( Amer)   


 


Est GFR (Non-Af Amer)   


 


POC Glucose (mg/dL)  90  111 H  164 H


 


Random Glucose   


 


Calcium   


 


Phosphorus   


 


Magnesium   


 


Total Bilirubin   


 


AST   


 


ALT   


 


Alkaline Phosphatase   


 


Total Protein   


 


Albumin   


 


Globulin   


 


Albumin/Globulin Ratio   


 


Influenza Type A Ab   


 


Influenza Type B Ab   














Assessment & Plan





- Assessment and Plan (Free Text)


Assessment: 








HPI: 77 F with hx of HTN, CRF on HD admitted for Acute systolic CHF, Non ST 

elevation MI Cardiac cath was originally deferred as patient was unstable


Now patient is slowly recovering


Will consider cath tomorrow

## 2018-03-08 NOTE — CARD
--------------- APPROVED REPORT --------------





EKG Measurement

Heart [id number]

AZ [id number]

[id number]

[id number]

[id number]



<Conclusion>

Normal sinus rhythm

Nonspecific ST and T wave abnormality

Abnormal ECG

## 2018-03-08 NOTE — RAD
Chest x-ray single frontal view 



History: Ventilator. 



Comparison: 03/07/2018 



Findings: 



Lines and tubes in stable position. 



Moderate loculated left pleural effusion. 



Moderate venous congestion with patchy bibasilar airspace opacities. 



Upper lobe granulomatous changes. 



Calcification at the aortic knob. 



Cardiomegaly. 



Degenerative changes in the spine. 



Impression: 



Lines and tubes in stable position. 



Moderate loculated left pleural effusion. 



Moderate venous congestion with patchy bibasilar airspace opacities. 



Upper lobe granulomatous changes. 



Calcification at the aortic knob. 



Cardiomegaly.

## 2018-03-08 NOTE — CP.PCM.PN
Subjective





- Date & Time of Evaluation


Date of Evaluation: 03/08/18


Time of Evaluation: 04:00





- Subjective


Subjective: 





dictated





Objective





- Vital Signs/Intake and Output


Vital Signs (last 24 hours): 


 











Temp Pulse Resp BP Pulse Ox


 


 97.5 F L  112 H  19   130/74   100 


 


 03/08/18 16:00  03/08/18 17:00  03/08/18 17:00  03/08/18 17:00  03/08/18 17:00








Intake and Output: 


 











 03/08/18 03/08/18





 06:59 18:59


 


Intake Total 680 685


 


Balance 680 685














- Medications


Medications: 


 Current Medications





Albuterol/Ipratropium (Duoneb 3 Mg/0.5 Mg (3 Ml) Ud)  3 ml INH RQ4 [facility]


   Last Admin: 03/08/18 15:32 Dose:  3 ml


Aspirin (Aspirin Chewable)  81 mg PO DAILY [facility]


   Last Admin: 03/08/18 09:12 Dose:  81 mg


Epoetin Vamsi (Procrit)  8,000 unit IV MWF [facility]


   Last Admin: 03/07/18 17:35 Dose:  8,000 unit


Ergocalciferol (Drisdol 50,000 Intl Units Cap)  1 cap PO Q7D [facility]


   Last Admin: 03/03/18 13:51 Dose:  1 cap


Ferrous Gluconate (Fergon)  324 mg PO TID [facility]


   Last Admin: 03/08/18 14:53 Dose:  324 mg


Heparin Sodium/Sodium Chloride (Heparin 41121 Units/250ml 1/2 Normal Saline)  25

,000 units in 250 mls @ 10.161 mls/hr IV .Q24H PRN; Protocol; 14 UNITS/KG/HR


   PRN Reason: PROTOCOL


   Last Admin: 03/07/18 09:00 Dose:  14 units/kg/hr, 10.161 mls/hr


Aztreonam 1 gm/ Sodium (Chloride)  100 mls @ 100 mls/hr IVPB Q12H [facility]


Metronidazole (Flagyl)  500 mg in 100 mls @ 100 mls/hr IVPB Q8H [facility]


Insulin Aspart (Novolog)  0 unit SC Q6 [facility]


   PRN Reason: Protocol


   Last Admin: 03/08/18 11:43 Dose:  Not Given


Insulin Glargine (Lantus)  5 unit SC HS [facility]


   Last Admin: 03/07/18 22:00 Dose:  5 u


Methylprednisolone (Solu-Medrol)  40 mg IVP Q8H [facility]


   Last Admin: 03/08/18 12:18 Dose:  40 mg


Metoprolol Tartrate (Lopressor)  12.5 mg PO BID [facility]


   Last Admin: 03/08/18 09:12 Dose:  12.5 mg


Midodrine (Proamatine)  10 mg NG Q8 [facility]


   Last Admin: 03/08/18 14:53 Dose:  10 mg


Pantoprazole Sodium (Protonix Inj)  40 mg IVP DAILY [facility]


   Last Admin: 03/08/18 09:12 Dose:  40 mg


Rosuvastatin Calcium (Crestor)  5 mg PO HS [facility]


   Last Admin: 03/07/18 22:00 Dose:  5 mg


Vitamin B Complex/Vit C/Folic Acid (Nephro-Nneka)  1 tab PO 0800 [facility]


   Last Admin: 03/08/18 08:55 Dose:  1 tab











- Labs


Labs: 


 





 03/08/18 06:28 





 03/08/18 06:26 





 











PT  18.0 SECONDS (9.7-12.2)  H  03/08/18  06:32    


 


INR  1.6   03/08/18  06:32    


 


APTT  64 SECONDS (21-34)  H  03/08/18  06:32

## 2018-03-08 NOTE — CP.PCM.PN
Subjective





- Date & Time of Evaluation


Date of Evaluation: 03/08/18


Time of Evaluation: 14:00





- Subjective


Subjective: 





planned for cath observe!  sedated





Objective





- Vital Signs/Intake and Output


Vital Signs (last 24 hours): 


 











Temp Pulse Resp BP Pulse Ox


 


 98.6 F   108 H  17   132/84   100 


 


 03/08/18 12:00  03/08/18 14:00  03/08/18 14:00  03/08/18 14:00  03/08/18 14:00








Intake and Output: 


 











 03/08/18 03/08/18





 06:59 18:59


 


Intake Total 680 580


 


Balance 680 580














- Medications


Medications: 


 Current Medications





Albuterol/Ipratropium (Duoneb 3 Mg/0.5 Mg (3 Ml) Ud)  3 ml INH RQ4 [facility]


   Last Admin: 03/08/18 11:39 Dose:  3 ml


Aspirin (Aspirin Chewable)  81 mg PO DAILY [facility]


   Last Admin: 03/08/18 09:12 Dose:  81 mg


Epoetin Vamsi (Procrit)  8,000 unit IV MWF [facility]


   Last Admin: 03/07/18 17:35 Dose:  8,000 unit


Ergocalciferol (Drisdol 50,000 Intl Units Cap)  1 cap PO Q7D [facility]


   Last Admin: 03/03/18 13:51 Dose:  1 cap


Ferrous Gluconate (Fergon)  324 mg PO TID [facility]


   Last Admin: 03/08/18 14:53 Dose:  324 mg


Heparin Sodium/Sodium Chloride (Heparin 71087 Units/250ml 1/2 Normal Saline)  25

,000 units in 250 mls @ 10.161 mls/hr IV .Q24H PRN; Protocol; 14 UNITS/KG/HR


   PRN Reason: PROTOCOL


   Last Admin: 03/07/18 09:00 Dose:  14 units/kg/hr, 10.161 mls/hr


Aztreonam 1 gm/ Sodium (Chloride)  100 mls @ 100 mls/hr IVPB Q12H [facility]


Metronidazole (Flagyl)  500 mg in 100 mls @ 100 mls/hr IVPB Q8H [facility]


Insulin Aspart (Novolog)  0 unit SC Q6 [facility]


   PRN Reason: Protocol


   Last Admin: 03/08/18 11:43 Dose:  Not Given


Insulin Glargine (Lantus)  5 unit SC HS [facility]


   Last Admin: 03/07/18 22:00 Dose:  5 u


Methylprednisolone (Solu-Medrol)  40 mg IVP Q8H [facility]


   Last Admin: 03/08/18 12:18 Dose:  40 mg


Metoprolol Tartrate (Lopressor)  12.5 mg PO BID [facility]


   Last Admin: 03/08/18 09:12 Dose:  12.5 mg


Midodrine (Proamatine)  10 mg NG Q8 [facility]


   Last Admin: 03/08/18 14:53 Dose:  10 mg


Pantoprazole Sodium (Protonix Inj)  40 mg IVP DAILY [facility]


   Last Admin: 03/08/18 09:12 Dose:  40 mg


Rosuvastatin Calcium (Crestor)  5 mg PO HS [facility]


   Last Admin: 03/07/18 22:00 Dose:  5 mg


Vitamin B Complex/Vit C/Folic Acid (Nephro-Nneka)  1 tab PO 0800 [facility]


   Last Admin: 03/08/18 08:55 Dose:  1 tab











- Labs


Labs: 


 





 03/08/18 06:28 





 03/08/18 06:26 





 











PT  18.0 SECONDS (9.7-12.2)  H  03/08/18  06:32    


 


INR  1.6   03/08/18  06:32    


 


APTT  64 SECONDS (21-34)  H  03/08/18  06:32    














- Constitutional


Appears: Non-toxic





- Head Exam


Head Exam: ATRAUMATIC





- ENT Exam


ENT Exam: Mucous Membranes Moist





- Neck Exam


Neck Exam: absent: Lymphadenopathy, Thyromegaly





- Respiratory Exam


Respiratory Exam: Clear to Ausculation Bilateral.  absent: Rales





- Cardiovascular Exam


Cardiovascular Exam: REGULAR RHYTHM, Murmur





- GI/Abdominal Exam


GI & Abdominal Exam: Normal Bowel Sounds.  absent: Organomegaly





- Rectal Exam


Rectal Exam: Deferred





- Extremities Exam


Extremities Exam: Normal Capillary Refill





- Neurological Exam


Additional comments: 





sedated





- Skin


Skin Exam: Dry





Assessment and Plan


(1) Severe sepsis


Status: Acute   





(2) Acute on chronic renal failure


Status: Acute   





(3) Congestive heart failure


Status: Acute   





(4) NSTEMI (non-ST elevated myocardial infarction)


Status: Acute   





(5) Pulmonary HTN


Status: Chronic   





(6) Malignant neoplasm of transverse colon


Status: Chronic

## 2018-03-08 NOTE — CP.PCM.PN
Subjective





- Date & Time of Evaluation


Date of Evaluation: 03/08/18


Time of Evaluation: 09:30





- Subjective


Subjective: 


 


Patient remains intubated





She was able top open her eyes and look at me with name call. 


She has been on PS/CPAP ventilator settings since yesterday. Possible 

extubation today


On the telemonitor her HR is irregular irregular and remains in the 90s - 100 

range





Also tolerated HD very well yesterday. 











Objective





- Vital Signs/Intake and Output


Vital Signs (last 24 hours): 


 











Temp Pulse Resp BP Pulse Ox


 


 98.2 F   104 H  19   127/60   100 


 


 03/08/18 08:00  03/08/18 08:01  03/08/18 08:01  03/08/18 08:01  03/08/18 07:00








Intake and Output: 


 











 03/08/18 03/08/18





 06:59 18:59


 


Intake Total 680 70


 


Balance 680 70














- Medications


Medications: 


 Current Medications





Albuterol/Ipratropium (Duoneb 3 Mg/0.5 Mg (3 Ml) Ud)  3 ml INH RQ4 [facility]


   Last Admin: 03/08/18 07:10 Dose:  3 ml


Aspirin (Aspirin Chewable)  81 mg PO DAILY [facility]


   Last Admin: 03/08/18 09:12 Dose:  81 mg


Epoetin Vamsi (Procrit)  8,000 unit IV MWF [facility]


   Last Admin: 03/07/18 17:35 Dose:  8,000 unit


Ergocalciferol (Drisdol 50,000 Intl Units Cap)  1 cap PO Q7D [facility]


   Last Admin: 03/03/18 13:51 Dose:  1 cap


Ferrous Gluconate (Fergon)  324 mg PO TID [facility]


   Last Admin: 03/08/18 09:12 Dose:  324 mg


Aztreonam 1 gm/ Sodium (Chloride)  100 mls @ 100 mls/hr IVPB Q12H [facility]


   Last Admin: 03/08/18 02:15 Dose:  100 mls/hr


Metronidazole (Flagyl)  500 mg in 100 mls @ 100 mls/hr IVPB Q8H [facility]


   Last Admin: 03/08/18 05:30 Dose:  100 mls/hr


Heparin Sodium/Sodium Chloride (Heparin 14827 Units/250ml 1/2 Normal Saline)  25

,000 units in 250 mls @ 10.161 mls/hr IV .Q24H PRN; Protocol; 14 UNITS/KG/HR


   PRN Reason: PROTOCOL


   Last Admin: 03/07/18 09:00 Dose:  14 units/kg/hr, 10.161 mls/hr


Insulin Aspart (Novolog)  0 unit SC Q6 [facility]


   PRN Reason: Protocol


   Last Admin: 03/08/18 05:35 Dose:  4 unit


Insulin Glargine (Lantus)  5 unit SC HS [facility]


   Last Admin: 03/07/18 22:00 Dose:  5 u


Methylprednisolone (Solu-Medrol)  40 mg IVP Q8H [facility]


Metoprolol Tartrate (Lopressor)  12.5 mg PO BID [facility]


   Last Admin: 03/08/18 09:12 Dose:  12.5 mg


Midodrine (Proamatine)  10 mg NG Q8 [facility]


   Last Admin: 03/08/18 05:20 Dose:  10 mg


Pantoprazole Sodium (Protonix Inj)  40 mg IVP DAILY [facility]


   Last Admin: 03/08/18 09:12 Dose:  40 mg


Rosuvastatin Calcium (Crestor)  5 mg PO HS [facility]


   Last Admin: 03/07/18 22:00 Dose:  5 mg


Vitamin B Complex/Vit C/Folic Acid (Nephro-Nneka)  1 tab PO 0800 [facility]


   Last Admin: 03/08/18 08:55 Dose:  1 tab











- Labs


Labs: 


 





 03/08/18 06:28 





 03/08/18 06:26 





 











PT  18.0 SECONDS (9.7-12.2)  H  03/08/18  06:32    


 


INR  1.6   03/08/18  06:32    


 


APTT  64 SECONDS (21-34)  H  03/08/18  06:32    














- Constitutional


Appears: Unkempt, Chronically Ill





- ENT Exam


ENT Exam: Mucous Membranes Moist





- Respiratory Exam


Respiratory Exam: Decreased Breath Sounds, Rales





- Cardiovascular Exam


Cardiovascular Exam: Irregular Rhythm





- GI/Abdominal Exam


GI & Abdominal Exam: Soft.  absent: Guarding, Rigid, Tenderness





- Neurological Exam


Neurological Exam: Alert, Altered, Awake.  absent: Oriented x3


Neuro motor strength exam: Left Upper Extremity: 4, Right Upper Extremity: 4, 

Left Lower Extremity: 3, Right Lower Extremity: 3





- Psychiatric Exam


Psychiatric exam: Depressed, Flat Affect





- Skin


Skin Exam: Normal Color, Warm





Assessment and Plan





- Assessment and Plan (Free Text)


Assessment: 











As previously mentioned, this is a 77 year old female with a history of ESRD 

now getting HD, CHF, DM, HTN, hypercholesterolemia, and some sort of MI years 

before, repeated TIAs, and colon CA. She was brought in by family for altered 

mental status and ultimately found to have severe metabolic acidosis as well as 

a extremely elevated troponins. 





Plan: 





1 Acute NSTEMI ,  S/P Hypotension /Asystol ,CPR / Sustained VT


3/8: Potentially cardiac catherization shortly from now. Remains on heparin ggt


3/7: atrial fibrillation appearance on telemetry, check a new 12 lead EKG. 

Remains on heparin ggt.


3/6: Remains in heparin ggt, statin, ASA. Hopefully at some point can have 

cardiac catherization. 


3/5: Now off pressor medications. She remains on amiodarone   


Echo EF 30%. Troponins were as high as 103 then decreased. 


Currently on a heparin ggt, ASA, Statin.  





2 Respiratory failure, intubation


3/8: Has tolerated CPAP settings throughout the night


3/7: [facility]


3/6: Now changed to CPAP/PS settings. The chest XRAYs look better this morning 

following HD last night.





3 Acute renal failure 


3/8: Toleratated HD well yesterday


3/7: Currently MWF HD


3/6: Underwent HD yesterday and tolerated it well. CXRAY looks better


3/5/2018: She has had one session of HD so far. Axel another session today





4 Leukocytosis concerning for sepsis


3/6: Today decreased WBC. Cultures negative so far


3/5/2018: The elevated WBC maybe stress related.  


She remains on abx Aztreonam IV and Flagyl IV





5 Acute systolic heart failure


3/6: Monitor chest XRAYs. 


  ECHO shows EF 30%





6 Pleural effusion


3/8: Still present on CXRAYs. Continue to moniotp


3/6: Improving CXRAYs


3/5/2018: Pleural effusions minimal at this time. Continue to monitor





7 Hyperglycemia and uncontrolled DM


3/6: Reccent accucheks 130s, 130, 160








8 Anemia of chronic disease, CKD


   3/6: Yesterday had 1 unit of PRBC given during HD, receiving EPO

## 2018-03-08 NOTE — PN
DATE:  03/08/2018.



SUBJECTIVE:  The patient was seen today and the nurse called that her

antibiotics need renewals, so I renewed them and I went to see her today

and she remains on the vent and still in atrial fib.



PHYSICAL EXAMINATION:

VITAL SIGNS:  She is afebrile, T-max of 97.5, heart rate of 112, blood

pressure 130/74, respirations are 19.

HEENT:  Head is atraumatic, normocephalic.

NECK:  Supple.  She is intubated.

LUNGS:  Clear.

HEART:  S1 and S2, is tachycardiac.

ABDOMEN:  Soft, floppy, nontender.  No guarding, no rigidity present.

EXTREMITIES:  Have foot protectors cannot assess them.



LABORATORY DATA:  White count is 12.2, hemoglobin 9, hematocrit 27.4, and

platelet count is 108 and neutrophils are 80, lymphs are 10.  Her ABG

looked better.  Her cultures have been all negative.



ASSESSMENT AND PLAN:  She is going to get cardiac cath done tomorrow, so I

will leave the antibiotics on and would discontinue 24 hours after the cath

as it may have just been failure but since she is still intubated, I would

wait for her to be extubated in next 24-48 hours, once she is extubated, we

can discontinue the antibiotics.  We will follow.







__________________________________________

Mahnaz Tate MD





DD:  03/08/2018 18:11:10

DT:  03/08/2018 19:17:24

Job # [id number]

## 2018-03-08 NOTE — CP.PCM.PN
Subjective





- Date & Time of Evaluation


Date of Evaluation: 03/08/18


Time of Evaluation: 14:11





- Subjective


Subjective: 





Nephrology Consultation:





Assessment: critical


CKD stage 5 (N18.5) with hyperkalemia, acidosis now has ESRD on HD via [facility] 


pulm edema, CHF, NSTEMI, pleural effusions, pneumonia


AMS, hyperglycemia


s/p cardiac arrest, shock liver, A fib





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 


Hypertensive Chronic Kidney Disease (I12.9)


Anemia (D64.9), Hyperphosphatemia (E83.39), Secondary Hyperparathyroidism (E21.1

), HTN (I12.9), vit D insuff, hx of colon CA, TIAs





Plan


HD tomorrow as ordered. no acute need today


maintain hemodynamic stable. Patient not on ACEI/ARB due to low BP


started iron supplements, MVI, epogen with HD, and Vit D


vasc surgery has evaluated. ID and cardiology following


KDUR supplementation as needed





Dose meds/antibiotics for ESRD/HD status. Avoid fleets enema/magnesium based 

laxatives. Avoid nephrotoxins/NSAIDs


Glycemic control


Further work up/management as per primary team





Thanks for allowing me to participate in care of your patient. Will follow 

patient with you. Please call if any Qs. d/w team 


Dr Shad Panchal


Office: [phone] Cell: [phone] Fax: [phone]





Chief Complaint; unable to obtain


reason for consult: CKD management


source of Info: EMR


HPI: Pt is a 77 F with hx of diabetes Mellitus ( years), hypertension (years), 

CHF, TIA, Colon CA, CKD stage 4/5, has matured AVG in left arm presented with 

complaints of AMS and hyperglycemia as brought by family. now has elevated trop 

100, hyperkalemia, AMS and elevated sugar, pulm edema. renal consult for CKD 

management. 


pt with AMS unable to provide any hx. she had received medical management for 

hyperkalemia. 





ROS: unable to obtain





Physical Examination: 


General Appearance: ill appearing,intubated


Vitals reviewed and noted as below


Head; Atraumatic, normocephalic


ENT: intubated


EYES: PERRLA


Neck; supple no lymphadenopathy, no thyromegaly or bruit


Lungs: Normal respiratory rate/effort. Breath sounds bilateral clear, she is 

mechanically ventilated


Heart: Normal rate. s1s2 normal. No rub or gallop. 


Extremities: no edema. No varicose veins. chronic hyperpigmented changes in legs


Neurological: Patient is  awake not communicative


Skin: Warm and dry. Normal turgor. No rash. Palpitation: Normal elasticity for 

age


Abdomen: Abdomen is soft. Bowel sounds +. There is no abdominal tenderness, no 

guarding/rigidity no organomegaly


Psych: unable


MSK: no joint tenderness or swelling. Digits and nails normal, no deformity


: kidney or bladder not palpable


Access: Left AVG without bruit. has left IJ shiley





Labs/imaging reviewed.


Past medical history, past surgical history, family history, social history, 

allergy reviewed and noted as below


Family hx: no hx of CKD. Rest non-contributory





Objective





- Vital Signs/Intake and Output


Vital Signs (last 24 hours): 


 











Temp Pulse Resp BP Pulse Ox


 


 98.2 F   82   18   138/63   100 


 


 03/08/18 08:00  03/08/18 11:01  03/08/18 11:01  03/08/18 11:01  03/08/18 11:01








Intake and Output: 


 











 03/08/18 03/08/18





 06:59 18:59


 


Intake Total 680 345


 


Balance 680 345














- Medications


Medications: 


 Current Medications





Albuterol/Ipratropium (Duoneb 3 Mg/0.5 Mg (3 Ml) Ud)  3 ml INH RQ4 [facility]


   Last Admin: 03/08/18 11:39 Dose:  3 ml


Aspirin (Aspirin Chewable)  81 mg PO DAILY [facility]


   Last Admin: 03/08/18 09:12 Dose:  81 mg


Epoetin Vamsi (Procrit)  8,000 unit IV MWF [facility]


   Last Admin: 03/07/18 17:35 Dose:  8,000 unit


Ergocalciferol (Drisdol 50,000 Intl Units Cap)  1 cap PO Q7D [facility]


   Last Admin: 03/03/18 13:51 Dose:  1 cap


Ferrous Gluconate (Fergon)  324 mg PO TID [facility]


   Last Admin: 03/08/18 09:12 Dose:  324 mg


Aztreonam 1 gm/ Sodium (Chloride)  100 mls @ 100 mls/hr IVPB Q12H [facility]


   Last Admin: 03/08/18 13:46 Dose:  100 mls/hr


Metronidazole (Flagyl)  500 mg in 100 mls @ 100 mls/hr IVPB Q8H [facility]


   Last Admin: 03/08/18 13:46 Dose:  100 mls/hr


Heparin Sodium/Sodium Chloride (Heparin 43038 Units/250ml 1/2 Normal Saline)  25

,000 units in 250 mls @ 10.161 mls/hr IV .Q24H PRN; Protocol; 14 UNITS/KG/HR


   PRN Reason: PROTOCOL


   Last Admin: 03/07/18 09:00 Dose:  14 units/kg/hr, 10.161 mls/hr


Insulin Aspart (Novolog)  0 unit SC Q6 [facility]


   PRN Reason: Protocol


   Last Admin: 03/08/18 11:43 Dose:  Not Given


Insulin Glargine (Lantus)  5 unit SC HS [facility]


   Last Admin: 03/07/18 22:00 Dose:  5 u


Methylprednisolone (Solu-Medrol)  40 mg IVP Q8H [facility]


   Last Admin: 03/08/18 12:18 Dose:  40 mg


Metoprolol Tartrate (Lopressor)  12.5 mg PO BID [facility]


   Last Admin: 03/08/18 09:12 Dose:  12.5 mg


Midodrine (Proamatine)  10 mg NG Q8 [facility]


   Last Admin: 03/08/18 05:20 Dose:  10 mg


Pantoprazole Sodium (Protonix Inj)  40 mg IVP DAILY [facility]


   Last Admin: 03/08/18 09:12 Dose:  40 mg


Rosuvastatin Calcium (Crestor)  5 mg PO HS [facility]


   Last Admin: 03/07/18 22:00 Dose:  5 mg


Vitamin B Complex/Vit C/Folic Acid (Nephro-Nneka)  1 tab PO 0800 [facility]


   Last Admin: 03/08/18 08:55 Dose:  1 tab











- Labs


Labs: 


 





 03/08/18 06:28 





 03/08/18 06:26 





 











PT  18.0 SECONDS (9.7-12.2)  H  03/08/18  06:32    


 


INR  1.6   03/08/18  06:32    


 


APTT  64 SECONDS (21-34)  H  03/08/18  06:32

## 2018-03-09 LAB
ALBUMIN SERPL-MCNC: 2.9 G/DL (ref 3.5–5)
ALBUMIN/GLOB SERPL: 1 {RATIO} (ref 1–2.1)
ALT SERPL-CCNC: 870 U/L (ref 9–52)
ANISOCYTOSIS BLD QL SMEAR: SLIGHT
APTT BLD: 60 SECONDS (ref 21–34)
ARTERIAL BLOOD GAS O2 SAT: 98.7 % (ref 95–98)
ARTERIAL BLOOD GAS PCO2: 32 MM/HG (ref 35–45)
ARTERIAL BLOOD GAS TCO2: 24.3 MMOL/L (ref 22–28)
AST SERPL-CCNC: 191 U/L (ref 14–36)
BASOPHILS # BLD AUTO: 0 K/UL (ref 0–0.2)
BASOPHILS NFR BLD: 0.1 % (ref 0–2)
BUN SERPL-MCNC: 67 MG/DL (ref 7–17)
CALCIUM SERPL-MCNC: 8.1 MG/DL (ref 8.6–10.4)
EOSINOPHIL # BLD AUTO: 0 K/UL (ref 0–0.7)
EOSINOPHIL NFR BLD: 0 % (ref 0–4)
ERYTHROCYTE [DISTWIDTH] IN BLOOD BY AUTOMATED COUNT: 16.6 % (ref 11.5–14.5)
GFR NON-AFRICAN AMERICAN: 9
HCO3 BLDA-SCNC: 25.1 MMOL/L (ref 21–28)
HGB BLD-MCNC: 9.3 G/DL (ref 11–16)
HYPOCHROMIC: SLIGHT
INHALED O2 CONCENTRATION: 40 %
INR PPP: 1.5
LG PLATELETS BLD QL SMEAR: PRESENT
LYMPHOCYTE: 3 % (ref 20–40)
LYMPHOCYTES # BLD AUTO: 0.5 K/UL (ref 1–4.3)
LYMPHOCYTES NFR BLD AUTO: 3.7 % (ref 20–40)
MACROCYTES BLD QL SMEAR: SLIGHT
MCH RBC QN AUTO: 27.9 PG (ref 27–31)
MCHC RBC AUTO-ENTMCNC: 32.5 G/DL (ref 33–37)
MCV RBC AUTO: 85.9 FL (ref 81–99)
MONOCYTE: 4 % (ref 0–10)
MONOCYTES # BLD: 0.6 K/UL (ref 0–0.8)
MONOCYTES NFR BLD: 4.6 % (ref 0–10)
NEUTROPHILS # BLD: 11.3 K/UL (ref 1.8–7)
NEUTROPHILS NFR BLD AUTO: 91.6 % (ref 50–75)
NEUTROPHILS NFR BLD AUTO: 92 % (ref 50–75)
NEUTS BAND NFR BLD: 1 % (ref 0–2)
NRBC BLD AUTO-RTO: 0.9 % (ref 0–2)
PH BLDA: 7.47 [PH] (ref 7.35–7.45)
PLATELET # BLD EST: NORMAL 10*3/UL
PLATELET # BLD: 144 K/UL (ref 130–400)
PMV BLD AUTO: 10.2 FL (ref 7.2–11.7)
PO2 BLDA: 84 MM/HG (ref 80–100)
POLYCHROMIC: SLIGHT
PROTHROMBIN TIME: 17.3 SECONDS (ref 9.7–12.2)
RBC # BLD AUTO: 3.35 MIL/UL (ref 3.8–5.2)
TARGETS BLD QL SMEAR: SLIGHT
TOTAL CELLS COUNTED BLD: 100
TOXIC GRANULES BLD QL SMEAR: PRESENT
WBC # BLD AUTO: 12.3 K/UL (ref 4.8–10.8)

## 2018-03-09 RX ADMIN — IPRATROPIUM BROMIDE AND ALBUTEROL SULFATE SCH ML: .5; 3 SOLUTION RESPIRATORY (INHALATION) at 19:11

## 2018-03-09 RX ADMIN — INSULIN ASPART SCH UNIT: 100 INJECTION, SOLUTION INTRAVENOUS; SUBCUTANEOUS at 12:30

## 2018-03-09 RX ADMIN — IPRATROPIUM BROMIDE AND ALBUTEROL SULFATE SCH ML: .5; 3 SOLUTION RESPIRATORY (INHALATION) at 03:08

## 2018-03-09 RX ADMIN — INSULIN ASPART SCH UNIT: 100 INJECTION, SOLUTION INTRAVENOUS; SUBCUTANEOUS at 17:40

## 2018-03-09 RX ADMIN — IPRATROPIUM BROMIDE AND ALBUTEROL SULFATE SCH ML: .5; 3 SOLUTION RESPIRATORY (INHALATION) at 15:50

## 2018-03-09 RX ADMIN — METHYLPREDNISOLONE SODIUM SUCCINATE SCH MG: 40 INJECTION, POWDER, FOR SOLUTION INTRAMUSCULAR; INTRAVENOUS at 05:00

## 2018-03-09 RX ADMIN — IPRATROPIUM BROMIDE AND ALBUTEROL SULFATE SCH ML: .5; 3 SOLUTION RESPIRATORY (INHALATION) at 11:07

## 2018-03-09 RX ADMIN — WATER SCH MLS/HR: 1 INJECTION INTRAMUSCULAR; INTRAVENOUS; SUBCUTANEOUS at 14:37

## 2018-03-09 RX ADMIN — WATER SCH MLS/HR: 1 INJECTION INTRAMUSCULAR; INTRAVENOUS; SUBCUTANEOUS at 21:40

## 2018-03-09 RX ADMIN — METHYLPREDNISOLONE SODIUM SUCCINATE SCH MG: 40 INJECTION, POWDER, FOR SOLUTION INTRAMUSCULAR; INTRAVENOUS at 20:00

## 2018-03-09 RX ADMIN — Medication SCH TAB: at 07:58

## 2018-03-09 RX ADMIN — INSULIN ASPART SCH UNIT: 100 INJECTION, SOLUTION INTRAVENOUS; SUBCUTANEOUS at 05:35

## 2018-03-09 RX ADMIN — IPRATROPIUM BROMIDE AND ALBUTEROL SULFATE SCH ML: .5; 3 SOLUTION RESPIRATORY (INHALATION) at 00:19

## 2018-03-09 RX ADMIN — ERYTHROPOIETIN SCH UNIT: 4000 INJECTION, SOLUTION INTRAVENOUS; SUBCUTANEOUS at 11:19

## 2018-03-09 RX ADMIN — IPRATROPIUM BROMIDE AND ALBUTEROL SULFATE SCH ML: .5; 3 SOLUTION RESPIRATORY (INHALATION) at 07:23

## 2018-03-09 RX ADMIN — INSULIN GLARGINE SCH U: 100 INJECTION, SOLUTION SUBCUTANEOUS at 21:41

## 2018-03-09 RX ADMIN — INSULIN ASPART SCH UNIT: 100 INJECTION, SOLUTION INTRAVENOUS; SUBCUTANEOUS at 01:00

## 2018-03-09 RX ADMIN — WATER SCH MLS/HR: 1 INJECTION INTRAMUSCULAR; INTRAVENOUS; SUBCUTANEOUS at 05:30

## 2018-03-09 RX ADMIN — IPRATROPIUM BROMIDE AND ALBUTEROL SULFATE SCH: .5; 3 SOLUTION RESPIRATORY (INHALATION) at 23:51

## 2018-03-09 RX ADMIN — METHYLPREDNISOLONE SODIUM SUCCINATE SCH MG: 40 INJECTION, POWDER, FOR SOLUTION INTRAMUSCULAR; INTRAVENOUS at 12:30

## 2018-03-09 NOTE — CP.PCM.PN
Subjective





- Date & Time of Evaluation


Date of Evaluation: 03/09/18


Time of Evaluation: 07:20





- Subjective


Subjective: 








Cardiac cath placed on hold. From what I understand, the family explain that in 

the past she was told she needed a cardiac cath however she did not want one. 





Overnight she was tachycardia and they changed the ventilator settings back to 

PRVC.


Currently on telemetry atrial fibrillation in the 90s to 110s range








Objective





- Vital Signs/Intake and Output


Vital Signs (last 24 hours): 


 











Temp Pulse Resp BP Pulse Ox


 


 98.6 F   98 H  16   152/81 H  100 


 


 03/09/18 04:00  03/09/18 07:01  03/09/18 07:01  03/09/18 07:01  03/09/18 07:01








Intake and Output: 


 











 03/09/18 03/09/18





 06:59 18:59


 


Intake Total 980 35


 


Balance 980 35














- Medications


Medications: 


 Current Medications





Albuterol/Ipratropium (Duoneb 3 Mg/0.5 Mg (3 Ml) Ud)  3 ml INH RQ4 [facility]


   Last Admin: 03/09/18 07:23 Dose:  3 ml


Aspirin (Aspirin Chewable)  81 mg PO DAILY [facility]


   Last Admin: 03/08/18 09:12 Dose:  81 mg


Epoetin Vamsi (Procrit)  8,000 unit IV MWF [facility]


   Last Admin: 03/07/18 17:35 Dose:  8,000 unit


Ergocalciferol (Drisdol 50,000 Intl Units Cap)  1 cap PO Q7D [facility]


   Last Admin: 03/03/18 13:51 Dose:  1 cap


Ferrous Gluconate (Fergon)  324 mg PO TID [facility]


   Last Admin: 03/08/18 18:31 Dose:  324 mg


Heparin Sodium/Sodium Chloride (Heparin 39366 Units/250ml 1/2 Normal Saline)  25

,000 units in 250 mls @ 10.161 mls/hr IV .Q24H PRN; Protocol; 14 UNITS/KG/HR


   PRN Reason: PROTOCOL


   Last Admin: 03/07/18 09:00 Dose:  14 units/kg/hr, 10.161 mls/hr


Aztreonam 1 gm/ Sodium (Chloride)  100 mls @ 100 mls/hr IVPB Q12H [facility]


   Last Admin: 03/09/18 01:00 Dose:  100 mls/hr


Metronidazole (Flagyl)  500 mg in 100 mls @ 100 mls/hr IVPB Q8H [facility]


   Last Admin: 03/09/18 05:30 Dose:  100 mls/hr


Insulin Aspart (Novolog)  0 unit SC Q6 [facility]


   PRN Reason: Protocol


   Last Admin: 03/09/18 05:35 Dose:  6 unit


Insulin Glargine (Lantus)  5 unit SC HS [facility]


   Last Admin: 03/08/18 21:35 Dose:  5 u


Methylprednisolone (Solu-Medrol)  40 mg IVP Q8H [facility]


   Last Admin: 03/09/18 05:00 Dose:  40 mg


Metoprolol Tartrate (Lopressor)  25 mg PO BID [facility]


Midodrine (Proamatine)  10 mg NG Q8 [facility]


   Last Admin: 03/09/18 05:25 Dose:  10 mg


Pantoprazole Sodium (Protonix Inj)  40 mg IVP DAILY [facility]


   Last Admin: 03/08/18 09:12 Dose:  40 mg


Rosuvastatin Calcium (Crestor)  5 mg PO [facility]


   Last Admin: 03/08/18 21:35 Dose:  5 mg


Vitamin B Complex/Vit C/Folic Acid (Nephro-Nneka)  1 tab PO 0800 [facility]


   Last Admin: 03/08/18 08:55 Dose:  1 tab











- Labs


Labs: 


 





 03/09/18 06:25 





 03/09/18 06:26 





 











PT  17.3 SECONDS (9.7-12.2)  H  03/09/18  06:00    


 


INR  1.5   03/09/18  06:00    


 


APTT  60 SECONDS (21-34)  H  03/09/18  06:00    














- Constitutional


Appears: Unkempt, Older Than Stated Age, Chronically Ill





- ENT Exam


ENT Exam: Mucous Membranes Moist


Additional comments: 





ET tube


OGT





- Respiratory Exam


Additional comments: 





Mechanical ventilator





- Cardiovascular Exam


Cardiovascular Exam: Irregular Rhythm





- GI/Abdominal Exam


GI & Abdominal Exam: Soft





- Neurological Exam


Neurological Exam: Altered, Awake


Neuro motor strength exam: Left Upper Extremity: 4, Right Upper Extremity: 4





- Psychiatric Exam


Psychiatric exam: Depressed, Flat Affect





- Skin


Skin Exam: Normal Color, Warm





Assessment and Plan





- Assessment and Plan (Free Text)


Assessment: 











As previously mentioned, this is a 77 year old female with a history of ESRD 

now getting HD, CHF, DM, HTN, hypercholesterolemia, and some sort of MI years 

before, repeated TIAs, and colon CA. She was brought in by family for altered 

mental status and ultimately found to have severe metabolic acidosis as well as 

a extremely elevated troponins. 





Plan: 





1 Acute NSTEMI ,  S/P Hypotension /Asystol ,CPR / Sustained VT


3/9: Cardiac catherization was canceled


3/8: Potentially cardiac catherization shortly from now. Remains on heparin ggt


3/7: atrial fibrillation appearance on telemetry, check a new 12 lead EKG. 

Remains on heparin ggt.


3/6: Remains in heparin ggt, statin, ASA. Hopefully at some point can have 

cardiac catherization. 


3/5: Now off pressor medications. She remains on amiodarone   


Echo EF 30%. Troponins were as high as 103 then decreased. 


Currently on a heparin ggt, ASA, Statin.  





2 Respiratory failure, intubation


3/9: Back on PRVC after having tachy cardia


3/8: Has tolerated CPAP settings throughout the night


3/7: Yesterday afternoon she was moved back to PRVC


3/6: Now changed to CPAP/PS settings. The chest XRAYs look better this morning 

following HD last night.





3 Atrial Fibrillation 


3/9: Continue on heparin ggt, Lopressor increased to 25 BID





4 Acute renal failure 


3/8: Toleratated HD well yesterday


3/7: Currently MWF HD


3/6: Underwent HD yesterday and tolerated it well. CXRAY looks better


3/5/2018: She has had one session of HD so far. Axel another session today





5 Leukocytosis concerning for sepsis


3/6: Today decreased WBC. Cultures negative so far


3/5/2018: The elevated WBC maybe stress related.  


She remains on abx Aztreonam IV and Flagyl IV





6 Acute systolic heart failure


3/6: Monitor chest XRAYs. 


  ECHO shows EF 30%





7 Pleural effusion


3/8: Still present on CXRAYs. Continue to moniotp


3/6: Improving CXRAYs


3/5/2018: Pleural effusions minimal at this time. Continue to monitor





8 Hyperglycemia and uncontrolled DM


3/6: Reccent accucheks 130s, 130, 160








9 Anemia of chronic disease, CKD


   3/6: Yesterday had 1 unit of PRBC given during HD, receiving EPO

## 2018-03-09 NOTE — CP.PCM.PN
Subjective





- Date & Time of Evaluation


Date of Evaluation: 03/09/18


Time of Evaluation: 12:00





- Subjective


Subjective: 





Still intubated with attempts for extubation, discussed with Dr. Correia 

interventional cardiologist, the family were hesitant about the invasive 

management. We'll continue with medical management in this time





Objective





- Vital Signs/Intake and Output


Vital Signs (last 24 hours): 


 











Temp Pulse Resp BP Pulse Ox


 


 97.8 F   86   16   150/78   100 


 


 03/09/18 10:00  03/09/18 11:00  03/09/18 11:00  03/09/18 11:00  03/09/18 11:00








Intake and Output: 


 











 03/09/18 03/09/18





 06:59 18:59


 


Intake Total 980 290


 


Balance 980 290














- Medications


Medications: 


 Current Medications





Albuterol/Ipratropium (Duoneb 3 Mg/0.5 Mg (3 Ml) Ud)  3 ml INH RQ4 [facility]


   Last Admin: 03/09/18 11:07 Dose:  3 ml


Aspirin (Aspirin Chewable)  81 mg PO DAILY [facility]


   Last Admin: 03/08/18 09:12 Dose:  81 mg


Epoetin Vamsi (Procrit)  8,000 unit IV MWF [facility]


   Last Admin: 03/09/18 11:19 Dose:  8,000 unit


Ergocalciferol (Drisdol 50,000 Intl Units Cap)  1 cap PO Q7D [facility]


   Last Admin: 03/03/18 13:51 Dose:  1 cap


Ferrous Gluconate (Fergon)  324 mg PO TID [facility]


   Last Admin: 03/09/18 10:37 Dose:  Not Given


Heparin Sodium/Sodium Chloride (Heparin 03061 Units/250ml 1/2 Normal Saline)  25

,000 units in 250 mls @ 10.161 mls/hr IV .Q24H PRN; Protocol; 14 UNITS/KG/HR


   PRN Reason: PROTOCOL


   Last Admin: 03/08/18 18:00 Dose:  14 units/kg/hr, 10.161 mls/hr


Aztreonam 1 gm/ Sodium (Chloride)  100 mls @ 100 mls/hr IVPB Q12H [facility]


   Last Admin: 03/09/18 01:00 Dose:  100 mls/hr


Metronidazole (Flagyl)  500 mg in 100 mls @ 100 mls/hr IVPB Q8H [facility]


   Last Admin: 03/09/18 05:30 Dose:  100 mls/hr


Insulin Aspart (Novolog)  0 unit SC Q6 [facility]


   PRN Reason: Protocol


   Last Admin: 03/09/18 05:35 Dose:  6 unit


Insulin Glargine (Lantus)  5 unit SC HS [facility]


   Last Admin: 03/08/18 21:35 Dose:  5 u


Methylprednisolone (Solu-Medrol)  40 mg IVP Q8H [facility]


   Last Admin: 03/09/18 05:00 Dose:  40 mg


Metoprolol Tartrate (Lopressor)  25 mg PO BID [facility]


   Last Admin: 03/09/18 07:59 Dose:  25 mg


Midodrine (Proamatine)  10 mg NG Q8 [facility]


   Last Admin: 03/09/18 05:25 Dose:  10 mg


Pantoprazole Sodium (Protonix Inj)  40 mg IVP DAILY [facility]


   Last Admin: 03/08/18 09:12 Dose:  40 mg


Rosuvastatin Calcium (Crestor)  5 mg PO HS [facility]


   Last Admin: 03/08/18 21:35 Dose:  5 mg


Vitamin B Complex/Vit C/Folic Acid (Nephro-Nneka)  1 tab PO 0800 [facility]


   Last Admin: 03/09/18 07:58 Dose:  1 tab











- Labs


Labs: 


 





 03/09/18 06:25 





 03/09/18 06:26 





 











PT  17.3 SECONDS (9.7-12.2)  H  03/09/18  06:00    


 


INR  1.5   03/09/18  06:00    


 


APTT  60 SECONDS (21-34)  H  03/09/18  06:00    














- Constitutional


Appears: Non-toxic





- Head Exam


Head Exam: ATRAUMATIC





- ENT Exam


ENT Exam: Mucous Membranes Moist





- Neck Exam


Neck Exam: absent: Lymphadenopathy, Thyromegaly





- Respiratory Exam


Respiratory Exam: Clear to Ausculation Bilateral.  absent: Rales





- Cardiovascular Exam


Cardiovascular Exam: REGULAR RHYTHM, Murmur





- GI/Abdominal Exam


GI & Abdominal Exam: Normal Bowel Sounds.  absent: Organomegaly





- Rectal Exam


Rectal Exam: Deferred





- Extremities Exam


Extremities Exam: Normal Capillary Refill





- Neurological Exam


Additional comments: 





Sedated





- Skin


Skin Exam: Dry





Assessment and Plan


(1) Severe sepsis


Status: Acute   





(2) Acute on chronic renal failure


Status: Acute   





(3) Congestive heart failure


Status: Acute   





(4) NSTEMI (non-ST elevated myocardial infarction)


Status: Acute   





(5) Pulmonary HTN


Status: Chronic   





(6) Malignant neoplasm of transverse colon


Status: Chronic

## 2018-03-09 NOTE — CP.CCUPN
<Abadier,Michael - Last Filed: 03/09/18 12:51>





CCU Subjective





- Physician Review


Subjective (Free Text): 





03/02/18 15:06


Patient seen and examined at bedside


AMS


spoke with nephro and want to wait for dialysis until cardio decides to do cath 

or not





03/02/18 17:16


PMD talked with cardio and Nephro. Medical management for cardiac issues at 

this time. OK to go ahead with dialysis. First session tonight





03/05/18 10:35


Over weekend patient had Vfib arrest


tolerated dialysis for 45 min before becoming hypotensive


Patient will again have dialysis today


Giving blood today





03/06/18 13:42


patient seen and examined today


patient will need cath when more stable


tolerated CPAP for 2 hours today


Dialysis tomorrow





03/07/18 14:07


Patient seen and examined today


Tolerating CPAP trial for majority of day


Dialysis today





03/08/18 13:04


patient seen and examined today


Continues to tolerate CPAP


Continue heparin drip and do not intubate per Cardio for cardiac cath possible 

tomorrow





03/08/18 15:45


Spoke with family and patient, right now they are leaning away from pursuing 

cardiac cath. Palliative care consult was called. I discussed with the family 

and patient the options including cath, DNR/DNI. No decision is made at this 

time. Patient is still full code.





03/09/18 12:52


Currently patient is refusing cath. Patient plan to be extubated after dialysis 

today


family meeting later today to discuss goals or care





CCU Objective





- Vital Signs / Intake & Output


Vital Signs (Last 4 hours): 


Vital Signs











  Temp Pulse Pulse Resp BP BP Pulse Ox


 


 03/09/18 11:26    95 H  16    100


 


 03/09/18 11:22   97 H   16  145/83   100


 


 03/09/18 11:08   82   16  136/75   100


 


 03/09/18 11:00   106 H  86  16   150/78  99


 


 03/09/18 10:52   94 H   16  150/78  


 


 03/09/18 10:37   94 H   16  135/85   93 L


 


 03/09/18 10:30    98 H  16   145/77  100


 


 03/09/18 10:22   88   16  143/77   100


 


 03/09/18 10:15    76  16   137/87  100


 


 03/09/18 10:08   91 H   16  137/82   100


 


 03/09/18 10:00  97.8 F  106 H  106 H  16  151/82 H  151/82 H  100


 


 03/09/18 09:52   98 H   16  151/82 H   100


 


 03/09/18 09:37   96 H   16  140/94 H   96


 


 03/09/18 09:00   106 H   16    100











Intake and Output (Last 8hrs): 


 Intake & Output











 03/08/18 03/09/18 03/09/18





 22:59 06:59 14:59


 


Intake Total 540 580 325


 


Balance 540 580 325


 


Weight  160 lb 14.999 oz 


 


Intake:   


 


  Intake, IV Amount 180 280 50


 


    Left Internal Jugular  50 50





    Side-Port   


 


    Right Hand 80 30 


 


    Right Proximal Port 100 200 





    Internal Jugular   


 


  Oral 160 100 150


 


  Tube Feeding 200 200 125














- Physical Exam


Head: Positive for: Atraumatic, Normocephalic.  Negative for: Tenderness, 

Contusion, Swelling


Pupils: Positive for: PERRL.  Negative for: Sluggish, Non-Reactive


Extroacular Muscles: Positive for: EOMI


Conjunctiva: Positive for: Normal


Mouth: Positive for: Dry


Pharnyx: Positive for: Normal


Nose (External): Positive for: Atraumatic


Neck: Positive for: Trachea Midline.  Negative for: JVD


Respiratory/Chest: Positive for: Good Air Exchange, Accessory Muscle Use, 

Decreased Breath Sounds (on bilateral bases).  Negative for: Wheezes


Cardiovascular: Positive for: Tachycardic


Abdomen: Positive for: Distention, Normal Bowel Sounds.  Negative for: 

Peritoneal Signs


Upper Extremity: Negative for: Cyanosis, Edema


Lower Extremity: Negative for: Edema


Psychiatric: Positive for: Alert





- Medications


Active Medications: 


Active Medications











Generic Name Dose Route Start Last Admin





  Trade Name Freq  PRN Reason Stop Dose Admin


 


Albuterol/Ipratropium  3 ml  03/07/18 16:00  03/09/18 11:07





  Duoneb 3 Mg/0.5 Mg (3 Ml) Ud  INH   3 ml





  RQ4 ABHILASH   Administration


 


Aspirin  81 mg  03/02/18 10:00  03/08/18 09:12





  Aspirin Chewable  PO   81 mg





  DAILY ABHILASH   Administration


 


Epoetin Vamsi  8,000 unit  03/07/18 09:00  03/09/18 11:19





  Procrit  IV   8,000 unit





  MWF ABHILASH   Administration


 


Ergocalciferol  1 cap  03/03/18 14:45  03/03/18 13:51





  Drisdol 50,000 Intl Units Cap  PO   1 cap





  Q7D ABHILASH   Administration


 


Ferrous Gluconate  324 mg  03/03/18 10:00  03/09/18 10:37





  Fergon  PO   Not Given





  TID ABHILASH   


 


Heparin Sodium/Sodium Chloride  25,000 units in 250 mls @ 10.161 mls/hr  03/07/ 18 08:20  03/08/18 18:00





  Heparin 42956 Units/250ml 1/2 Normal Saline  IV   14 units/kg/hr





  .Q24H PRN   10.161 mls/hr





  PROTOCOL   Administration





  Protocol   





  14 UNITS/KG/HR   


 


Aztreonam 1 gm/ Sodium  100 mls @ 100 mls/hr  03/09/18 01:30  03/09/18 01:00





  Chloride  IVPB   100 mls/hr





  Q12H ABHILASH   Administration


 


Metronidazole  500 mg in 100 mls @ 100 mls/hr  03/08/18 22:30  03/09/18 05:30





  Flagyl  IVPB   100 mls/hr





  Q8H ABHILASH   Administration


 


Insulin Aspart  0 unit  03/04/18 11:49  03/09/18 12:30





  Novolog  SC   2 unit





  Q6 ABHILASH   Administration





  Protocol   


 


Insulin Glargine  5 unit  03/06/18 22:00  03/08/18 21:35





  Lantus  SC   5 u





  HS ABHILASH   Administration


 


Methylprednisolone  40 mg  03/08/18 12:00  03/09/18 12:30





  Solu-Medrol  IVP   40 mg





  Q8H ABHILASH   Administration


 


Metoprolol Tartrate  25 mg  03/09/18 08:00  03/09/18 07:59





  Lopressor  PO   25 mg





  BID ABHILASH   Administration


 


Midodrine  10 mg  03/06/18 14:00  03/09/18 05:25





  Proamatine  NG   10 mg





  Q8 ABHILASH   Administration


 


Pantoprazole Sodium  40 mg  03/05/18 10:00  03/08/18 09:12





  Protonix Inj  IVP   40 mg





  DAILY ABHILASH   Administration


 


Rosuvastatin Calcium  5 mg  03/02/18 01:15  03/08/18 21:35





  Crestor  PO   5 mg





  HS ABHILASH   Administration


 


Vitamin B Complex/Vit C/Folic Acid  1 tab  03/03/18 08:00  03/09/18 07:58





  Nephro-Nneka  PO   1 tab





  0800 ABHILASH   Administration














- Patient Studies


Lab Studies: 


 Microbiology Studies











 03/06/18 Unknown Gram Stain - Final





 Trachasp Sputum Culture - Final





    NORMAL ORAL GENARO








 Lab Studies











  03/09/18 03/09/18 03/09/18 Range/Units





  12:16 06:26 06:25 


 


WBC    12.3 H  (4.8-10.8)  K/uL


 


RBC    3.35 L  (3.80-5.20)  Mil/uL


 


Hgb    9.3 L  (11.0-16.0)  g/dL


 


Hct    28.7 L  (34.0-47.0)  %


 


MCV    85.9  (81.0-99.0)  fL


 


MCH    27.9  (27.0-31.0)  pg


 


MCHC    32.5 L  (33.0-37.0)  g/dL


 


RDW    16.6 H  (11.5-14.5)  %


 


Plt Count    144  (130-400)  K/uL


 


MPV    10.2  (7.2-11.7)  fL


 


Neut % (Auto)    91.6 H  (50.0-75.0)  %


 


Lymph % (Auto)    3.7 L  (20.0-40.0)  %


 


Mono % (Auto)    4.6  (0.0-10.0)  %


 


Eos % (Auto)    0.0  (0.0-4.0)  %


 


Baso % (Auto)    0.1  (0.0-2.0)  %


 


Neut # (Auto)    11.3 H  (1.8-7.0)  K/uL


 


Lymph # (Auto)    0.5 L  (1.0-4.3)  K/uL


 


Mono # (Auto)    0.6  (0.0-0.8)  K/uL


 


Eos # (Auto)    0.0  (0.0-0.7)  K/uL


 


Baso # (Auto)    0.0  (0.0-0.2)  K/uL


 


Neutrophils % (Manual)    92 H  (50-75)  %


 


Band Neutrophils %    1  (0-2)  %


 


Lymphocytes % (Manual)    3 L  (20-40)  %


 


Monocytes % (Manual)    4  (0-10)  %


 


Toxic Granulation    Present  


 


Platelet Estimate    Normal  (NORMAL)  


 


Large Platelets    Present  


 


Polychromasia    Slight  


 


Hypochromasia (manual)    Slight  


 


Anisocytosis (manual)    Slight  


 


Macrocytosis (manual)    Slight  


 


Target Cells    Slight  


 


PT     (9.7-12.2)  SECONDS


 


INR     


 


APTT     (21-34)  SECONDS


 


Puncture Site     


 


pCO2     (35-45)  mm/Hg


 


pO2     ()  mm/Hg


 


HCO3     (21-28)  mmol/L


 


ABG pH     (7.35-7.45)  


 


ABG Total CO2     (22-28)  mmol/L


 


ABG O2 Saturation     (95-98)  %


 


ABG Base Excess     (-2.0-3.0)  mmol/L


 


Jesus Test     


 


ABG Potassium     (3.6-5.2)  mmol/L


 


A-a O2 Difference     mm/Hg


 


Respiratory Index     


 


Sodium   137   (132-148)  mmol/l


 


Chloride   97 L   ()  mmol/L


 


Glucose     ()  mg/dl


 


Lactate     (0.7-2.1)  mmol/L


 


Vent Mode     


 


Mechanical Rate     


 


FiO2     %


 


Tidal Volume     


 


PEEP     


 


Potassium   4.2   (3.6-5.2)  mmol/L


 


Carbon Dioxide   23   (22-30)  mmol/L


 


Anion Gap   21 H   (10-20)  


 


BUN   67 H   (7-17)  mg/dL


 


Creatinine   4.7 H   (0.7-1.2)  mg/dL


 


Est GFR ( Amer)   11   


 


Est GFR (Non-Af Amer)   9   


 


POC Glucose (mg/dL)  199 H    ()  mg/dL


 


Random Glucose   280 H   ()  mg/dL


 


Calcium   8.1 L   (8.6-10.4)  mg/dl


 


Phosphorus   5.6 H   (2.5-4.5)  mg/dL


 


Magnesium   2.1   (1.6-2.3)  mg/dL


 


Total Bilirubin   1.0   (0.2-1.3)  mg/dL


 


AST   191 H D   (14-36)  U/L


 


ALT   870 H D   (9-52)  U/L


 


Alkaline Phosphatase   211 H   ()  U/L


 


Total Protein   5.8 L   (6.3-8.3)  g/dL


 


Albumin   2.9 L   (3.5-5.0)  g/dL


 


Globulin   2.9   (2.2-3.9)  gm/dL


 


Albumin/Globulin Ratio   1.0   (1.0-2.1)  


 


Arterial Blood Potassium     (3.6-5.2)  mmol/L














  03/09/18 03/09/18 03/09/18 Range/Units





  06:00 05:33 05:12 


 


WBC     (4.8-10.8)  K/uL


 


RBC     (3.80-5.20)  Mil/uL


 


Hgb     (11.0-16.0)  g/dL


 


Hct     (34.0-47.0)  %


 


MCV     (81.0-99.0)  fL


 


MCH     (27.0-31.0)  pg


 


MCHC     (33.0-37.0)  g/dL


 


RDW     (11.5-14.5)  %


 


Plt Count     (130-400)  K/uL


 


MPV     (7.2-11.7)  fL


 


Neut % (Auto)     (50.0-75.0)  %


 


Lymph % (Auto)     (20.0-40.0)  %


 


Mono % (Auto)     (0.0-10.0)  %


 


Eos % (Auto)     (0.0-4.0)  %


 


Baso % (Auto)     (0.0-2.0)  %


 


Neut # (Auto)     (1.8-7.0)  K/uL


 


Lymph # (Auto)     (1.0-4.3)  K/uL


 


Mono # (Auto)     (0.0-0.8)  K/uL


 


Eos # (Auto)     (0.0-0.7)  K/uL


 


Baso # (Auto)     (0.0-0.2)  K/uL


 


Neutrophils % (Manual)     (50-75)  %


 


Band Neutrophils %     (0-2)  %


 


Lymphocytes % (Manual)     (20-40)  %


 


Monocytes % (Manual)     (0-10)  %


 


Toxic Granulation     


 


Platelet Estimate     (NORMAL)  


 


Large Platelets     


 


Polychromasia     


 


Hypochromasia (manual)     


 


Anisocytosis (manual)     


 


Macrocytosis (manual)     


 


Target Cells     


 


PT  17.3 H    (9.7-12.2)  SECONDS


 


INR  1.5    


 


APTT  60 H    (21-34)  SECONDS


 


Puncture Site    Rb  


 


pCO2    32 L  (35-45)  mm/Hg


 


pO2    84  ()  mm/Hg


 


HCO3    25.1  (21-28)  mmol/L


 


ABG pH    7.47 H  (7.35-7.45)  


 


ABG Total CO2    24.3  (22-28)  mmol/L


 


ABG O2 Saturation    98.7 H  (95-98)  %


 


ABG Base Excess    0.3  (-2.0-3.0)  mmol/L


 


Jesus Test    Na  


 


ABG Potassium    4.5  (3.6-5.2)  mmol/L


 


A-a O2 Difference    161.0  mm/Hg


 


Respiratory Index    1.9  


 


Sodium    135.0  (132-148)  mmol/l


 


Chloride    101.0  ()  mmol/L


 


Glucose    323 H  ()  mg/dl


 


Lactate    1.6  (0.7-2.1)  mmol/L


 


Vent Mode    Prvc  


 


Mechanical Rate    16  


 


FiO2    40.0  %


 


Tidal Volume    500  


 


PEEP    5  


 


Potassium     (3.6-5.2)  mmol/L


 


Carbon Dioxide     (22-30)  mmol/L


 


Anion Gap     (10-20)  


 


BUN     (7-17)  mg/dL


 


Creatinine     (0.7-1.2)  mg/dL


 


Est GFR (African Amer)     


 


Est GFR (Non-Af Amer)     


 


POC Glucose (mg/dL)   253 H   ()  mg/dL


 


Random Glucose     ()  mg/dL


 


Calcium     (8.6-10.4)  mg/dl


 


Phosphorus     (2.5-4.5)  mg/dL


 


Magnesium     (1.6-2.3)  mg/dL


 


Total Bilirubin     (0.2-1.3)  mg/dL


 


AST     (14-36)  U/L


 


ALT     (9-52)  U/L


 


Alkaline Phosphatase     ()  U/L


 


Total Protein     (6.3-8.3)  g/dL


 


Albumin     (3.5-5.0)  g/dL


 


Globulin     (2.2-3.9)  gm/dL


 


Albumin/Globulin Ratio     (1.0-2.1)  


 


Arterial Blood Potassium    4.5  (3.6-5.2)  mmol/L














  03/08/18 03/08/18 Range/Units





  23:58 17:36 


 


WBC    (4.8-10.8)  K/uL


 


RBC    (3.80-5.20)  Mil/uL


 


Hgb    (11.0-16.0)  g/dL


 


Hct    (34.0-47.0)  %


 


MCV    (81.0-99.0)  fL


 


MCH    (27.0-31.0)  pg


 


MCHC    (33.0-37.0)  g/dL


 


RDW    (11.5-14.5)  %


 


Plt Count    (130-400)  K/uL


 


MPV    (7.2-11.7)  fL


 


Neut % (Auto)    (50.0-75.0)  %


 


Lymph % (Auto)    (20.0-40.0)  %


 


Mono % (Auto)    (0.0-10.0)  %


 


Eos % (Auto)    (0.0-4.0)  %


 


Baso % (Auto)    (0.0-2.0)  %


 


Neut # (Auto)    (1.8-7.0)  K/uL


 


Lymph # (Auto)    (1.0-4.3)  K/uL


 


Mono # (Auto)    (0.0-0.8)  K/uL


 


Eos # (Auto)    (0.0-0.7)  K/uL


 


Baso # (Auto)    (0.0-0.2)  K/uL


 


Neutrophils % (Manual)    (50-75)  %


 


Band Neutrophils %    (0-2)  %


 


Lymphocytes % (Manual)    (20-40)  %


 


Monocytes % (Manual)    (0-10)  %


 


Toxic Granulation    


 


Platelet Estimate    (NORMAL)  


 


Large Platelets    


 


Polychromasia    


 


Hypochromasia (manual)    


 


Anisocytosis (manual)    


 


Macrocytosis (manual)    


 


Target Cells    


 


PT    (9.7-12.2)  SECONDS


 


INR    


 


APTT    (21-34)  SECONDS


 


Puncture Site    


 


pCO2    (35-45)  mm/Hg


 


pO2    ()  mm/Hg


 


HCO3    (21-28)  mmol/L


 


ABG pH    (7.35-7.45)  


 


ABG Total CO2    (22-28)  mmol/L


 


ABG O2 Saturation    (95-98)  %


 


ABG Base Excess    (-2.0-3.0)  mmol/L


 


Jesus Test    


 


ABG Potassium    (3.6-5.2)  mmol/L


 


A-a O2 Difference    mm/Hg


 


Respiratory Index    


 


Sodium    (132-148)  mmol/l


 


Chloride    ()  mmol/L


 


Glucose    ()  mg/dl


 


Lactate    (0.7-2.1)  mmol/L


 


Vent Mode    


 


Mechanical Rate    


 


FiO2    %


 


Tidal Volume    


 


PEEP    


 


Potassium    (3.6-5.2)  mmol/L


 


Carbon Dioxide    (22-30)  mmol/L


 


Anion Gap    (10-20)  


 


BUN    (7-17)  mg/dL


 


Creatinine    (0.7-1.2)  mg/dL


 


Est GFR (African Amer)    


 


Est GFR (Non-Af Amer)    


 


POC Glucose (mg/dL)  233 H  212 H  ()  mg/dL


 


Random Glucose    ()  mg/dL


 


Calcium    (8.6-10.4)  mg/dl


 


Phosphorus    (2.5-4.5)  mg/dL


 


Magnesium    (1.6-2.3)  mg/dL


 


Total Bilirubin    (0.2-1.3)  mg/dL


 


AST    (14-36)  U/L


 


ALT    (9-52)  U/L


 


Alkaline Phosphatase    ()  U/L


 


Total Protein    (6.3-8.3)  g/dL


 


Albumin    (3.5-5.0)  g/dL


 


Globulin    (2.2-3.9)  gm/dL


 


Albumin/Globulin Ratio    (1.0-2.1)  


 


Arterial Blood Potassium    (3.6-5.2)  mmol/L








 Laboratory Results - last 24 hr











  03/08/18 03/08/18 03/09/18





  17:36 23:58 05:12


 


WBC   


 


RBC   


 


Hgb   


 


Hct   


 


MCV   


 


MCH   


 


MCHC   


 


RDW   


 


Plt Count   


 


MPV   


 


Neut % (Auto)   


 


Lymph % (Auto)   


 


Mono % (Auto)   


 


Eos % (Auto)   


 


Baso % (Auto)   


 


Neut # (Auto)   


 


Lymph # (Auto)   


 


Mono # (Auto)   


 


Eos # (Auto)   


 


Baso # (Auto)   


 


Neutrophils % (Manual)   


 


Band Neutrophils %   


 


Lymphocytes % (Manual)   


 


Monocytes % (Manual)   


 


Toxic Granulation   


 


Platelet Estimate   


 


Large Platelets   


 


Polychromasia   


 


Hypochromasia (manual)   


 


Anisocytosis (manual)   


 


Macrocytosis (manual)   


 


Target Cells   


 


PT   


 


INR   


 


APTT   


 


Puncture Site    Rb


 


pCO2    32 L


 


pO2    84


 


HCO3    25.1


 


ABG pH    7.47 H


 


ABG Total CO2    24.3


 


ABG O2 Saturation    98.7 H


 


ABG Base Excess    0.3


 


Jesus Test    Na


 


ABG Potassium    4.5


 


A-a O2 Difference    161.0


 


Respiratory Index    1.9


 


Sodium    135.0


 


Chloride    101.0


 


Glucose    323 H


 


Lactate    1.6


 


Vent Mode    Prvc


 


Mechanical Rate    16


 


FiO2    40.0


 


Tidal Volume    500


 


PEEP    5


 


Potassium   


 


Carbon Dioxide   


 


Anion Gap   


 


BUN   


 


Creatinine   


 


Est GFR ( Amer)   


 


Est GFR (Non-Af Amer)   


 


POC Glucose (mg/dL)  212 H  233 H 


 


Random Glucose   


 


Calcium   


 


Phosphorus   


 


Magnesium   


 


Total Bilirubin   


 


AST   


 


ALT   


 


Alkaline Phosphatase   


 


Total Protein   


 


Albumin   


 


Globulin   


 


Albumin/Globulin Ratio   


 


Arterial Blood Potassium    4.5














  03/09/18 03/09/18 03/09/18





  05:33 06:00 06:25


 


WBC    12.3 H


 


RBC    3.35 L


 


Hgb    9.3 L


 


Hct    28.7 L


 


MCV    85.9


 


MCH    27.9


 


MCHC    32.5 L


 


RDW    16.6 H


 


Plt Count    144


 


MPV    10.2


 


Neut % (Auto)    91.6 H


 


Lymph % (Auto)    3.7 L


 


Mono % (Auto)    4.6


 


Eos % (Auto)    0.0


 


Baso % (Auto)    0.1


 


Neut # (Auto)    11.3 H


 


Lymph # (Auto)    0.5 L


 


Mono # (Auto)    0.6


 


Eos # (Auto)    0.0


 


Baso # (Auto)    0.0


 


Neutrophils % (Manual)    92 H


 


Band Neutrophils %    1


 


Lymphocytes % (Manual)    3 L


 


Monocytes % (Manual)    4


 


Toxic Granulation    Present


 


Platelet Estimate    Normal


 


Large Platelets    Present


 


Polychromasia    Slight


 


Hypochromasia (manual)    Slight


 


Anisocytosis (manual)    Slight


 


Macrocytosis (manual)    Slight


 


Target Cells    Slight


 


PT   17.3 H 


 


INR   1.5 


 


APTT   60 H 


 


Puncture Site   


 


pCO2   


 


pO2   


 


HCO3   


 


ABG pH   


 


ABG Total CO2   


 


ABG O2 Saturation   


 


ABG Base Excess   


 


Jesus Test   


 


ABG Potassium   


 


A-a O2 Difference   


 


Respiratory Index   


 


Sodium   


 


Chloride   


 


Glucose   


 


Lactate   


 


Vent Mode   


 


Mechanical Rate   


 


FiO2   


 


Tidal Volume   


 


PEEP   


 


Potassium   


 


Carbon Dioxide   


 


Anion Gap   


 


BUN   


 


Creatinine   


 


Est GFR ( Amer)   


 


Est GFR (Non-Af Amer)   


 


POC Glucose (mg/dL)  253 H  


 


Random Glucose   


 


Calcium   


 


Phosphorus   


 


Magnesium   


 


Total Bilirubin   


 


AST   


 


ALT   


 


Alkaline Phosphatase   


 


Total Protein   


 


Albumin   


 


Globulin   


 


Albumin/Globulin Ratio   


 


Arterial Blood Potassium   














  03/09/18 03/09/18





  06:26 12:16


 


WBC  


 


RBC  


 


Hgb  


 


Hct  


 


MCV  


 


MCH  


 


MCHC  


 


RDW  


 


Plt Count  


 


MPV  


 


Neut % (Auto)  


 


Lymph % (Auto)  


 


Mono % (Auto)  


 


Eos % (Auto)  


 


Baso % (Auto)  


 


Neut # (Auto)  


 


Lymph # (Auto)  


 


Mono # (Auto)  


 


Eos # (Auto)  


 


Baso # (Auto)  


 


Neutrophils % (Manual)  


 


Band Neutrophils %  


 


Lymphocytes % (Manual)  


 


Monocytes % (Manual)  


 


Toxic Granulation  


 


Platelet Estimate  


 


Large Platelets  


 


Polychromasia  


 


Hypochromasia (manual)  


 


Anisocytosis (manual)  


 


Macrocytosis (manual)  


 


Target Cells  


 


PT  


 


INR  


 


APTT  


 


Puncture Site  


 


pCO2  


 


pO2  


 


HCO3  


 


ABG pH  


 


ABG Total CO2  


 


ABG O2 Saturation  


 


ABG Base Excess  


 


Jesus Test  


 


ABG Potassium  


 


A-a O2 Difference  


 


Respiratory Index  


 


Sodium  137 


 


Chloride  97 L 


 


Glucose  


 


Lactate  


 


Vent Mode  


 


Mechanical Rate  


 


FiO2  


 


Tidal Volume  


 


PEEP  


 


Potassium  4.2 


 


Carbon Dioxide  23 


 


Anion Gap  21 H 


 


BUN  67 H 


 


Creatinine  4.7 H 


 


Est GFR ( Amer)  11 


 


Est GFR (Non-Af Amer)  9 


 


POC Glucose (mg/dL)   199 H


 


Random Glucose  280 H 


 


Calcium  8.1 L 


 


Phosphorus  5.6 H 


 


Magnesium  2.1 


 


Total Bilirubin  1.0 


 


AST  191 H D 


 


ALT  870 H D 


 


Alkaline Phosphatase  211 H 


 


Total Protein  5.8 L 


 


Albumin  2.9 L 


 


Globulin  2.9 


 


Albumin/Globulin Ratio  1.0 


 


Arterial Blood Potassium  











Fingerstick Blood Sugar Results: 199





Assessment/Plan





- Assessment and Plan (Free Text)


Plan: 





77F NSTEMI, ESRD on HD





Plan: 





Neuro: Intubated, sedated





Cardio: NSTEMI, Cards ([facility]). Family refusing cardiac cath as of now


* Continue heparin for now. 


* ASA 81 QD


* lopressor 12.5 BID


* midodrine 10 Q8


* crestor 5 PO HS





Pulm: Inutbated. Continues to tolerate CPAP without issue. Plan for extubation 

after dialysis today


* Duoneb Q4





GI: No acute issues


* Nepro tube feed at goal





Renal: ESRD on HD MWF. Nephro ([place])


* Procrit


* Vit D


* Fergon


* Vit B





Endo: IDDM


* aspart sc Q6 


* glargine 5u SC HS





ID: Sputum culture pending. ID ([id number])


* aztreonam 1g q12


* Flagyl 500 Q8





PPX: Protonix, Heparin drip, CI to SCD (swelling)





<LatefIsrrael M - Last Filed: 03/09/18 14:31>





CCU Objective





- Vital Signs / Intake & Output


Vital Signs (Last 4 hours): 


Vital Signs











  Temp Pulse Pulse Resp BP BP Pulse Ox


 


 03/09/18 14:00   116 H   19    91 L


 


 03/09/18 13:39   121 H   17  131/80   88 L


 


 03/09/18 13:38   112 H   19  175/67 H   97


 


 03/09/18 13:23   121 H   16  164/74 H   100


 


 03/09/18 13:08   121 H   15  139/75   99


 


 03/09/18 13:00   110 H  76  16   127/82  100


 


 03/09/18 12:53   113 H   16  127/82   100


 


 03/09/18 12:38   116 H   16  120/54 L   94 L


 


 03/09/18 12:30    98 H  16   120/54 L  100


 


 03/09/18 12:23   106 H   16  108/63   100


 


 03/09/18 12:09   110 H   16  124/70   100


 


 03/09/18 12:00  97.6 F  119 H  108 H  16   119/58 L  100


 


 03/09/18 11:54   110 H   16  119/58 L   100


 


 03/09/18 11:37   94 H   18  147/82   93 L


 


 03/09/18 11:30    95 H  16   145/83  100


 


 03/09/18 11:22   97 H   16  145/83   100


 


 03/09/18 11:08   82   16  136/75   100


 


 03/09/18 11:00   106 H  86  16   150/78  99


 


 03/09/18 10:52   94 H   16  150/78  


 


 03/09/18 10:37   94 H   16  135/85   93 L


 


 03/09/18 10:30    98 H  16   145/77  100











Intake and Output (Last 8hrs): 


 Intake & Output











 03/08/18 03/09/18 03/09/18





 22:59 06:59 14:59


 


Intake Total 540 580 430


 


Output Total   2500


 


Balance 540 580 -2070


 


Weight  160 lb 14.999 oz 


 


Intake:   


 


  Intake, IV Amount 180 280 80


 


    Left Internal Jugular  50 80





    Side-Port   


 


    Right Hand 80 30 


 


    Right Proximal Port 100 200 





    Internal Jugular   


 


  Oral 160 100 150


 


  Tube Feeding 200 200 200


 


Output:   


 


  Other   2500














- Medications


Active Medications: 


Active Medications











Generic Name Dose Route Start Last Admin





  Trade Name Amorq  PRN Reason Stop Dose Admin


 


Albuterol/Ipratropium  3 ml  03/07/18 16:00  03/09/18 11:07





  Duoneb 3 Mg/0.5 Mg (3 Ml) Ud  INH   3 ml





  RQ4 ABHILASH   Administration


 


Aspirin  81 mg  03/02/18 10:00  03/08/18 09:12





  Aspirin Chewable  PO   81 mg





  DAILY ABHILASH   Administration


 


Bisacodyl  5 mg  03/09/18 14:30  





  Dulcolax  PO  03/09/18 14:31  





  ONCE ONE   


 


Epoetin Vamsi  8,000 unit  03/07/18 09:00  03/09/18 11:19





  Procrit  IV   8,000 unit





  MWF ABHILASH   Administration


 


Ergocalciferol  1 cap  03/03/18 14:45  03/03/18 13:51





  Drisdol 50,000 Intl Units Cap  PO   1 cap





  Q7D ABHILASH   Administration


 


Ferrous Gluconate  324 mg  03/03/18 10:00  03/09/18 10:37





  Fergon  PO   Not Given





  TID ABHILASH   


 


Heparin Sodium/Sodium Chloride  25,000 units in 250 mls @ 10.161 mls/hr  03/07/ 18 08:20  03/08/18 18:00





  Heparin 86611 Units/250ml 1/2 Normal Saline  IV   14 units/kg/hr





  .Q24H PRN   10.161 mls/hr





  PROTOCOL   Administration





  Protocol   





  14 UNITS/KG/HR   


 


Aztreonam 1 gm/ Sodium  100 mls @ 100 mls/hr  03/09/18 01:30  03/09/18 01:00





  Chloride  IVPB   100 mls/hr





  Q12H ABHILASH   Administration


 


Metronidazole  500 mg in 100 mls @ 100 mls/hr  03/08/18 22:30  03/09/18 05:30





  Flagyl  IVPB   100 mls/hr





  Q8H ABHILASH   Administration


 


Insulin Aspart  0 unit  03/04/18 11:49  03/09/18 12:30





  Novolog  SC   2 unit





  Q6 ABHILASH   Administration





  Protocol   


 


Insulin Glargine  5 unit  03/06/18 22:00  03/08/18 21:35





  Lantus  SC   5 u





  HS ABHILASH   Administration


 


Methylprednisolone  40 mg  03/08/18 12:00  03/09/18 12:30





  Solu-Medrol  IVP   40 mg





  Q8H ABHILASH   Administration


 


Metoprolol Tartrate  25 mg  03/09/18 08:00  03/09/18 07:59





  Lopressor  PO   25 mg





  BID ABHILASH   Administration


 


Midodrine  10 mg  03/06/18 14:00  03/09/18 05:25





  Proamatine  NG   10 mg





  Q8 ABHILASH   Administration


 


Pantoprazole Sodium  40 mg  03/05/18 10:00  03/08/18 09:12





  Protonix Inj  IVP   40 mg





  DAILY ABHILASH   Administration


 


Rosuvastatin Calcium  5 mg  03/02/18 01:15  03/08/18 21:35





  Crestor  PO   5 mg





  HS ABHILASH   Administration


 


Vitamin B Complex/Vit C/Folic Acid  1 tab  03/03/18 08:00  03/09/18 07:58





  Nephro-Nneka  PO   1 tab





  0800 ABHILASH   Administration














- Patient Studies


Lab Studies: 


 Microbiology Studies











 03/06/18 Unknown Gram Stain - Final





 Trachasp Sputum Culture - Final





    NORMAL ORAL GENARO








 Lab Studies











  03/09/18 03/09/18 03/09/18 Range/Units





  12:16 06:26 06:25 


 


WBC    12.3 H  (4.8-10.8)  K/uL


 


RBC    3.35 L  (3.80-5.20)  Mil/uL


 


Hgb    9.3 L  (11.0-16.0)  g/dL


 


Hct    28.7 L  (34.0-47.0)  %


 


MCV    85.9  (81.0-99.0)  fL


 


MCH    27.9  (27.0-31.0)  pg


 


MCHC    32.5 L  (33.0-37.0)  g/dL


 


RDW    16.6 H  (11.5-14.5)  %


 


Plt Count    144  (130-400)  K/uL


 


MPV    10.2  (7.2-11.7)  fL


 


Neut % (Auto)    91.6 H  (50.0-75.0)  %


 


Lymph % (Auto)    3.7 L  (20.0-40.0)  %


 


Mono % (Auto)    4.6  (0.0-10.0)  %


 


Eos % (Auto)    0.0  (0.0-4.0)  %


 


Baso % (Auto)    0.1  (0.0-2.0)  %


 


Neut # (Auto)    11.3 H  (1.8-7.0)  K/uL


 


Lymph # (Auto)    0.5 L  (1.0-4.3)  K/uL


 


Mono # (Auto)    0.6  (0.0-0.8)  K/uL


 


Eos # (Auto)    0.0  (0.0-0.7)  K/uL


 


Baso # (Auto)    0.0  (0.0-0.2)  K/uL


 


Neutrophils % (Manual)    92 H  (50-75)  %


 


Band Neutrophils %    1  (0-2)  %


 


Lymphocytes % (Manual)    3 L  (20-40)  %


 


Monocytes % (Manual)    4  (0-10)  %


 


Toxic Granulation    Present  


 


Platelet Estimate    Normal  (NORMAL)  


 


Large Platelets    Present  


 


Polychromasia    Slight  


 


Hypochromasia (manual)    Slight  


 


Anisocytosis (manual)    Slight  


 


Macrocytosis (manual)    Slight  


 


Target Cells    Slight  


 


PT     (9.7-12.2)  SECONDS


 


INR     


 


APTT     (21-34)  SECONDS


 


Puncture Site     


 


pCO2     (35-45)  mm/Hg


 


pO2     ()  mm/Hg


 


HCO3     (21-28)  mmol/L


 


ABG pH     (7.35-7.45)  


 


ABG Total CO2     (22-28)  mmol/L


 


ABG O2 Saturation     (95-98)  %


 


ABG Base Excess     (-2.0-3.0)  mmol/L


 


Jesus Test     


 


ABG Potassium     (3.6-5.2)  mmol/L


 


A-a O2 Difference     mm/Hg


 


Respiratory Index     


 


Sodium   137   (132-148)  mmol/l


 


Chloride   97 L   ()  mmol/L


 


Glucose     ()  mg/dl


 


Lactate     (0.7-2.1)  mmol/L


 


Vent Mode     


 


Mechanical Rate     


 


FiO2     %


 


Tidal Volume     


 


PEEP     


 


Potassium   4.2   (3.6-5.2)  mmol/L


 


Carbon Dioxide   23   (22-30)  mmol/L


 


Anion Gap   21 H   (10-20)  


 


BUN   67 H   (7-17)  mg/dL


 


Creatinine   4.7 H   (0.7-1.2)  mg/dL


 


Est GFR ( Amer)   11   


 


Est GFR (Non-Af Amer)   9   


 


POC Glucose (mg/dL)  199 H    ()  mg/dL


 


Random Glucose   280 H   ()  mg/dL


 


Calcium   8.1 L   (8.6-10.4)  mg/dl


 


Phosphorus   5.6 H   (2.5-4.5)  mg/dL


 


Magnesium   2.1   (1.6-2.3)  mg/dL


 


Total Bilirubin   1.0   (0.2-1.3)  mg/dL


 


AST   191 H D   (14-36)  U/L


 


ALT   870 H D   (9-52)  U/L


 


Alkaline Phosphatase   211 H   ()  U/L


 


Total Protein   5.8 L   (6.3-8.3)  g/dL


 


Albumin   2.9 L   (3.5-5.0)  g/dL


 


Globulin   2.9   (2.2-3.9)  gm/dL


 


Albumin/Globulin Ratio   1.0   (1.0-2.1)  


 


Arterial Blood Potassium     (3.6-5.2)  mmol/L














  03/09/18 03/09/18 03/09/18 Range/Units





  06:00 05:33 05:12 


 


WBC     (4.8-10.8)  K/uL


 


RBC     (3.80-5.20)  Mil/uL


 


Hgb     (11.0-16.0)  g/dL


 


Hct     (34.0-47.0)  %


 


MCV     (81.0-99.0)  fL


 


MCH     (27.0-31.0)  pg


 


MCHC     (33.0-37.0)  g/dL


 


RDW     (11.5-14.5)  %


 


Plt Count     (130-400)  K/uL


 


MPV     (7.2-11.7)  fL


 


Neut % (Auto)     (50.0-75.0)  %


 


Lymph % (Auto)     (20.0-40.0)  %


 


Mono % (Auto)     (0.0-10.0)  %


 


Eos % (Auto)     (0.0-4.0)  %


 


Baso % (Auto)     (0.0-2.0)  %


 


Neut # (Auto)     (1.8-7.0)  K/uL


 


Lymph # (Auto)     (1.0-4.3)  K/uL


 


Mono # (Auto)     (0.0-0.8)  K/uL


 


Eos # (Auto)     (0.0-0.7)  K/uL


 


Baso # (Auto)     (0.0-0.2)  K/uL


 


Neutrophils % (Manual)     (50-75)  %


 


Band Neutrophils %     (0-2)  %


 


Lymphocytes % (Manual)     (20-40)  %


 


Monocytes % (Manual)     (0-10)  %


 


Toxic Granulation     


 


Platelet Estimate     (NORMAL)  


 


Large Platelets     


 


Polychromasia     


 


Hypochromasia (manual)     


 


Anisocytosis (manual)     


 


Macrocytosis (manual)     


 


Target Cells     


 


PT  17.3 H    (9.7-12.2)  SECONDS


 


INR  1.5    


 


APTT  60 H    (21-34)  SECONDS


 


Puncture Site    Rb  


 


pCO2    32 L  (35-45)  mm/Hg


 


pO2    84  ()  mm/Hg


 


HCO3    25.1  (21-28)  mmol/L


 


ABG pH    7.47 H  (7.35-7.45)  


 


ABG Total CO2    24.3  (22-28)  mmol/L


 


ABG O2 Saturation    98.7 H  (95-98)  %


 


ABG Base Excess    0.3  (-2.0-3.0)  mmol/L


 


Jesus Test    Na  


 


ABG Potassium    4.5  (3.6-5.2)  mmol/L


 


A-a O2 Difference    161.0  mm/Hg


 


Respiratory Index    1.9  


 


Sodium    135.0  (132-148)  mmol/l


 


Chloride    101.0  ()  mmol/L


 


Glucose    323 H  ()  mg/dl


 


Lactate    1.6  (0.7-2.1)  mmol/L


 


Vent Mode    Prvc  


 


Mechanical Rate    16  


 


FiO2    40.0  %


 


Tidal Volume    500  


 


PEEP    5  


 


Potassium     (3.6-5.2)  mmol/L


 


Carbon Dioxide     (22-30)  mmol/L


 


Anion Gap     (10-20)  


 


BUN     (7-17)  mg/dL


 


Creatinine     (0.7-1.2)  mg/dL


 


Est GFR (African Amer)     


 


Est GFR (Non-Af Amer)     


 


POC Glucose (mg/dL)   253 H   ()  mg/dL


 


Random Glucose     ()  mg/dL


 


Calcium     (8.6-10.4)  mg/dl


 


Phosphorus     (2.5-4.5)  mg/dL


 


Magnesium     (1.6-2.3)  mg/dL


 


Total Bilirubin     (0.2-1.3)  mg/dL


 


AST     (14-36)  U/L


 


ALT     (9-52)  U/L


 


Alkaline Phosphatase     ()  U/L


 


Total Protein     (6.3-8.3)  g/dL


 


Albumin     (3.5-5.0)  g/dL


 


Globulin     (2.2-3.9)  gm/dL


 


Albumin/Globulin Ratio     (1.0-2.1)  


 


Arterial Blood Potassium    4.5  (3.6-5.2)  mmol/L














  03/08/18 03/08/18 Range/Units





  23:58 17:36 


 


WBC    (4.8-10.8)  K/uL


 


RBC    (3.80-5.20)  Mil/uL


 


Hgb    (11.0-16.0)  g/dL


 


Hct    (34.0-47.0)  %


 


MCV    (81.0-99.0)  fL


 


MCH    (27.0-31.0)  pg


 


MCHC    (33.0-37.0)  g/dL


 


RDW    (11.5-14.5)  %


 


Plt Count    (130-400)  K/uL


 


MPV    (7.2-11.7)  fL


 


Neut % (Auto)    (50.0-75.0)  %


 


Lymph % (Auto)    (20.0-40.0)  %


 


Mono % (Auto)    (0.0-10.0)  %


 


Eos % (Auto)    (0.0-4.0)  %


 


Baso % (Auto)    (0.0-2.0)  %


 


Neut # (Auto)    (1.8-7.0)  K/uL


 


Lymph # (Auto)    (1.0-4.3)  K/uL


 


Mono # (Auto)    (0.0-0.8)  K/uL


 


Eos # (Auto)    (0.0-0.7)  K/uL


 


Baso # (Auto)    (0.0-0.2)  K/uL


 


Neutrophils % (Manual)    (50-75)  %


 


Band Neutrophils %    (0-2)  %


 


Lymphocytes % (Manual)    (20-40)  %


 


Monocytes % (Manual)    (0-10)  %


 


Toxic Granulation    


 


Platelet Estimate    (NORMAL)  


 


Large Platelets    


 


Polychromasia    


 


Hypochromasia (manual)    


 


Anisocytosis (manual)    


 


Macrocytosis (manual)    


 


Target Cells    


 


PT    (9.7-12.2)  SECONDS


 


INR    


 


APTT    (21-34)  SECONDS


 


Puncture Site    


 


pCO2    (35-45)  mm/Hg


 


pO2    ()  mm/Hg


 


HCO3    (21-28)  mmol/L


 


ABG pH    (7.35-7.45)  


 


ABG Total CO2    (22-28)  mmol/L


 


ABG O2 Saturation    (95-98)  %


 


ABG Base Excess    (-2.0-3.0)  mmol/L


 


Jesus Test    


 


ABG Potassium    (3.6-5.2)  mmol/L


 


A-a O2 Difference    mm/Hg


 


Respiratory Index    


 


Sodium    (132-148)  mmol/l


 


Chloride    ()  mmol/L


 


Glucose    ()  mg/dl


 


Lactate    (0.7-2.1)  mmol/L


 


Vent Mode    


 


Mechanical Rate    


 


FiO2    %


 


Tidal Volume    


 


PEEP    


 


Potassium    (3.6-5.2)  mmol/L


 


Carbon Dioxide    (22-30)  mmol/L


 


Anion Gap    (10-20)  


 


BUN    (7-17)  mg/dL


 


Creatinine    (0.7-1.2)  mg/dL


 


Est GFR (African Amer)    


 


Est GFR (Non-Af Amer)    


 


POC Glucose (mg/dL)  233 H  212 H  ()  mg/dL


 


Random Glucose    ()  mg/dL


 


Calcium    (8.6-10.4)  mg/dl


 


Phosphorus    (2.5-4.5)  mg/dL


 


Magnesium    (1.6-2.3)  mg/dL


 


Total Bilirubin    (0.2-1.3)  mg/dL


 


AST    (14-36)  U/L


 


ALT    (9-52)  U/L


 


Alkaline Phosphatase    ()  U/L


 


Total Protein    (6.3-8.3)  g/dL


 


Albumin    (3.5-5.0)  g/dL


 


Globulin    (2.2-3.9)  gm/dL


 


Albumin/Globulin Ratio    (1.0-2.1)  


 


Arterial Blood Potassium    (3.6-5.2)  mmol/L








 Laboratory Results - last 24 hr











  03/08/18 03/08/18 03/09/18





  17:36 23:58 05:12


 


WBC   


 


RBC   


 


Hgb   


 


Hct   


 


MCV   


 


MCH   


 


MCHC   


 


RDW   


 


Plt Count   


 


MPV   


 


Neut % (Auto)   


 


Lymph % (Auto)   


 


Mono % (Auto)   


 


Eos % (Auto)   


 


Baso % (Auto)   


 


Neut # (Auto)   


 


Lymph # (Auto)   


 


Mono # (Auto)   


 


Eos # (Auto)   


 


Baso # (Auto)   


 


Neutrophils % (Manual)   


 


Band Neutrophils %   


 


Lymphocytes % (Manual)   


 


Monocytes % (Manual)   


 


Toxic Granulation   


 


Platelet Estimate   


 


Large Platelets   


 


Polychromasia   


 


Hypochromasia (manual)   


 


Anisocytosis (manual)   


 


Macrocytosis (manual)   


 


Target Cells   


 


PT   


 


INR   


 


APTT   


 


Puncture Site    Rb


 


pCO2    32 L


 


pO2    84


 


HCO3    25.1


 


ABG pH    7.47 H


 


ABG Total CO2    24.3


 


ABG O2 Saturation    98.7 H


 


ABG Base Excess    0.3


 


Jesus Test    Na


 


ABG Potassium    4.5


 


A-a O2 Difference    161.0


 


Respiratory Index    1.9


 


Sodium    135.0


 


Chloride    101.0


 


Glucose    323 H


 


Lactate    1.6


 


Vent Mode    Prvc


 


Mechanical Rate    16


 


FiO2    40.0


 


Tidal Volume    500


 


PEEP    5


 


Potassium   


 


Carbon Dioxide   


 


Anion Gap   


 


BUN   


 


Creatinine   


 


Est GFR ( Amer)   


 


Est GFR (Non-Af Amer)   


 


POC Glucose (mg/dL)  212 H  233 H 


 


Random Glucose   


 


Calcium   


 


Phosphorus   


 


Magnesium   


 


Total Bilirubin   


 


AST   


 


ALT   


 


Alkaline Phosphatase   


 


Total Protein   


 


Albumin   


 


Globulin   


 


Albumin/Globulin Ratio   


 


Arterial Blood Potassium    4.5














  03/09/18 03/09/18 03/09/18





  05:33 06:00 06:25


 


WBC    12.3 H


 


RBC    3.35 L


 


Hgb    9.3 L


 


Hct    28.7 L


 


MCV    85.9


 


MCH    27.9


 


MCHC    32.5 L


 


RDW    16.6 H


 


Plt Count    144


 


MPV    10.2


 


Neut % (Auto)    91.6 H


 


Lymph % (Auto)    3.7 L


 


Mono % (Auto)    4.6


 


Eos % (Auto)    0.0


 


Baso % (Auto)    0.1


 


Neut # (Auto)    11.3 H


 


Lymph # (Auto)    0.5 L


 


Mono # (Auto)    0.6


 


Eos # (Auto)    0.0


 


Baso # (Auto)    0.0


 


Neutrophils % (Manual)    92 H


 


Band Neutrophils %    1


 


Lymphocytes % (Manual)    3 L


 


Monocytes % (Manual)    4


 


Toxic Granulation    Present


 


Platelet Estimate    Normal


 


Large Platelets    Present


 


Polychromasia    Slight


 


Hypochromasia (manual)    Slight


 


Anisocytosis (manual)    Slight


 


Macrocytosis (manual)    Slight


 


Target Cells    Slight


 


PT   17.3 H 


 


INR   1.5 


 


APTT   60 H 


 


Puncture Site   


 


pCO2   


 


pO2   


 


HCO3   


 


ABG pH   


 


ABG Total CO2   


 


ABG O2 Saturation   


 


ABG Base Excess   


 


Jesus Test   


 


ABG Potassium   


 


A-a O2 Difference   


 


Respiratory Index   


 


Sodium   


 


Chloride   


 


Glucose   


 


Lactate   


 


Vent Mode   


 


Mechanical Rate   


 


FiO2   


 


Tidal Volume   


 


PEEP   


 


Potassium   


 


Carbon Dioxide   


 


Anion Gap   


 


BUN   


 


Creatinine   


 


Est GFR ( Amer)   


 


Est GFR (Non-Af Amer)   


 


POC Glucose (mg/dL)  253 H  


 


Random Glucose   


 


Calcium   


 


Phosphorus   


 


Magnesium   


 


Total Bilirubin   


 


AST   


 


ALT   


 


Alkaline Phosphatase   


 


Total Protein   


 


Albumin   


 


Globulin   


 


Albumin/Globulin Ratio   


 


Arterial Blood Potassium   














  03/09/18 03/09/18





  06:26 12:16


 


WBC  


 


RBC  


 


Hgb  


 


Hct  


 


MCV  


 


MCH  


 


MCHC  


 


RDW  


 


Plt Count  


 


MPV  


 


Neut % (Auto)  


 


Lymph % (Auto)  


 


Mono % (Auto)  


 


Eos % (Auto)  


 


Baso % (Auto)  


 


Neut # (Auto)  


 


Lymph # (Auto)  


 


Mono # (Auto)  


 


Eos # (Auto)  


 


Baso # (Auto)  


 


Neutrophils % (Manual)  


 


Band Neutrophils %  


 


Lymphocytes % (Manual)  


 


Monocytes % (Manual)  


 


Toxic Granulation  


 


Platelet Estimate  


 


Large Platelets  


 


Polychromasia  


 


Hypochromasia (manual)  


 


Anisocytosis (manual)  


 


Macrocytosis (manual)  


 


Target Cells  


 


PT  


 


INR  


 


APTT  


 


Puncture Site  


 


pCO2  


 


pO2  


 


HCO3  


 


ABG pH  


 


ABG Total CO2  


 


ABG O2 Saturation  


 


ABG Base Excess  


 


Jesus Test  


 


ABG Potassium  


 


A-a O2 Difference  


 


Respiratory Index  


 


Sodium  137 


 


Chloride  97 L 


 


Glucose  


 


Lactate  


 


Vent Mode  


 


Mechanical Rate  


 


FiO2  


 


Tidal Volume  


 


PEEP  


 


Potassium  4.2 


 


Carbon Dioxide  23 


 


Anion Gap  21 H 


 


BUN  67 H 


 


Creatinine  4.7 H 


 


Est GFR ( Amer)  11 


 


Est GFR (Non-Af Amer)  9 


 


POC Glucose (mg/dL)   199 H


 


Random Glucose  280 H 


 


Calcium  8.1 L 


 


Phosphorus  5.6 H 


 


Magnesium  2.1 


 


Total Bilirubin  1.0 


 


AST  191 H D 


 


ALT  870 H D 


 


Alkaline Phosphatase  211 H 


 


Total Protein  5.8 L 


 


Albumin  2.9 L 


 


Globulin  2.9 


 


Albumin/Globulin Ratio  1.0 


 


Arterial Blood Potassium  














Assessment/Plan


(1) NSTEMI (non-ST elevated myocardial infarction)


Current Visit: Yes   Status: Acute   Comment: 


Troponin elevated 103, EKG with no acute changes, patient is chest pain free


EKG NSR, no ST or T wave changes


Cardiology consult, Dr Nichole


Aspirin 81mg daily 


Crestor 5mg PO HS


Heparin Drip 


F/U serial TROP, and EKG


ECHO   





(2) Acute on chronic renal failure


Current Visit: Yes   Status: Acute   Comment: - Acute kidney injury with 

Hyperkalemia, mostly prerenal disease also sec to sepsis and cardioreal need to 

R/O obstruction.


- Metabolic acidosis (anion gap)


- Hyperkalemia, EKG without peaked t-waves.


- Repeat Labs, EKG, lactic acid, CPK, phosphate


- Place a brewster and monitor urine outpu


- po bicarbonate


- Renal (low K) diet


- Cont outpatient meds (adjust for GFR)   





(3) Hyperglycemia without ketosis


Current Visit: Yes   Status: Acute   





(4) Metabolic acidosis


Current Visit: Yes   Status: Acute   





(5) Pleural effusion


Current Visit: Yes   Status: Acute   





(6) Pneumonia


Current Visit: Yes   Status: Acute   





(7) Altered mental status


Current Visit: No   Status: Acute   





(8) Congestive heart failure


Current Visit: Yes   Status: Acute   





Attending/Attestation





- Attestation


I have personally seen and examined this patient.: Yes


I have fully participated in the care of the patient.: Yes


I have reviewed all pertinent clinical information: Yes


Notes (Text): 





03/09/18 14:29


Today: Friday, March 09, 2018





The Patient was seen and examined at the bedside, Medical records reviewed, and 

management issues were discussed and formulated with the house staff.


I have reviewed all the relevant clinical, laboratory, hemodynamic, 

radiographic data and medications


Events reviewed


Pain issues, skin care, head of the bed elevation, glycemic control were 

addressed.


Agree with above resident's assessment and treatment plans of care as 

transcribed in Resident note.


Critically ill patient with acute hypoxemic respiratory failure, Respiratory 

acidosis from acuter pulmonary edema and pneumonia


Continue current antibiotics


Vent weaning in progress 


HD will fluid removal today 


GI PPX


DVT PPX


Code status: Full code


Total critical care time 45 minutes

## 2018-03-09 NOTE — CP.PCM.CON
History of Present Illness





- History of Present Illness


History of Present Illness: 





Palliative consult requested by Dr. Samson Gaxiola for goals of care discussion





Patient is 77 years old -American lady admitted to [facility] on 

March 1 with sternal chest pain for 1 day. On admission patient was found to 

have blood sugar of 500 and was severe metabolic acidosis. The CAT scan of the 

chest was significant for bilateral effusion, atelectasis. CAT scan of the head 

was negative for intracranial bleed. Cardiac consult was called. Patient was 

further diagnosed with pulmonary infarct, severe pulmonary hypertension, non-

STEMI.





Patient required respiratory support and was intubated and transferred to ICU 

for further care. Due to complexity of clinical presentation coronary 

intervention present right at risk.





Reports patient was made available of these and refused cardiac 

catheterization. The family is supportive of the patient and they would like to 

respect patient's wishes. Palliative care was called to assist family and 

patient during these decision making process.





Past medical history, anemia, arthritis, asthma, diabetes, TIA 3, colon cancer 

with colon resection 2010, ejection fraction 30%'s in 2017





Social history, single, , lives at home with daughter





Family history,  diabetes as in the family





Review of Systems





- Review of Systems


All systems: reviewed and no additional remarkable complaints except


Review of Systems: 





Review of system obtained from nursing, due to patient's being intubated. There 

are no sitting patient remained alert, and tachycardic. No fever





Past Patient History





- Infectious Disease


Hx of Infectious Diseases: None





- Tetanus Immunizations


Tetanus Immunization: Unknown





- Past Medical History & Family History


Past Medical History?: Yes





- Past Social History


Smoking Status: Former Smoker





- CARDIAC


Hx Hypercholesterolemia: Yes


Hx Hypertension: Yes


Hx Peripheral Edema: Yes





- PULMONARY


Hx Asthma: Yes (Dx 15 yrs ago,does not use home o2 anymore)


Hx Pneumonia: Yes (x2)





- NEUROLOGICAL


Hx Transient Ischemic Attacks (TIA): Yes (x3 about 20 yrs ago, 10 yrs ago, 7 

yrs ago)





- HEENT


Hx HEENT Problems: Yes


Hx Cataracts: Yes





- RENAL


Hx Chronic Kidney Disease: Yes





- ENDOCRINE/METABOLIC


Hx Diabetes Mellitus Type 1: Yes





- HEMATOLOGICAL/ONCOLOGICAL


Hx Anemia: Yes





- INTEGUMENTARY


Hx Dermatological Problems: No





- MUSCULOSKELETAL/RHEUMATOLOGICAL


Hx Arthritis: Yes


Hx Fractures: Yes (Right ankle)





- GASTROINTESTINAL


Hx Gastrointestinal Disorders: Yes


Other/Comment: Hx colon cancer





- GENITOURINARY/GYNECOLOGICAL


Hx Genitourinary Disorders: Yes


Other/Comment: Colon CA





- PSYCHIATRIC


Hx Depression: No


Hx Substance Use: No





- SURGICAL HISTORY


Hx Cataract Extraction: Yes (LEFT)





- ANESTHESIA


Hx Anesthesia: Yes


Hx Anesthesia Reactions: No


Hx Malignant Hyperthermia: No





Meds


Allergies/Adverse Reactions: 


 Allergies











Allergy/AdvReac Type Severity Reaction Status Date / Time


 


Penicillins Allergy Intermediate RASH Verified 03/01/18 20:11














- Medications


Medications: 


 Current Medications





Albuterol/Ipratropium (Duoneb 3 Mg/0.5 Mg (3 Ml) Ud)  3 ml INH RQ4 [facility]


   Last Admin: 03/09/18 07:23 Dose:  3 ml


Aspirin (Aspirin Chewable)  81 mg PO DAILY [facility]


   Last Admin: 03/08/18 09:12 Dose:  81 mg


Epoetin Vamsi (Procrit)  8,000 unit IV MWF [facility]


   Last Admin: 03/07/18 17:35 Dose:  8,000 unit


Ergocalciferol (Drisdol 50,000 Intl Units Cap)  1 cap PO Q7D [facility]


   Last Admin: 03/03/18 13:51 Dose:  1 cap


Ferrous Gluconate (Fergon)  324 mg PO TID [facility]


   Last Admin: 03/08/18 18:31 Dose:  324 mg


Heparin Sodium/Sodium Chloride (Heparin 09258 Units/250ml 1/2 Normal Saline)  25

,000 units in 250 mls @ 10.161 mls/hr IV .Q24H PRN; Protocol; 14 UNITS/KG/HR


   PRN Reason: PROTOCOL


   Last Admin: 03/07/18 09:00 Dose:  14 units/kg/hr, 10.161 mls/hr


Aztreonam 1 gm/ Sodium (Chloride)  100 mls @ 100 mls/hr IVPB Q12H [facility]


   Last Admin: 03/09/18 01:00 Dose:  100 mls/hr


Metronidazole (Flagyl)  500 mg in 100 mls @ 100 mls/hr IVPB Q8H [facility]


   Last Admin: 03/09/18 05:30 Dose:  100 mls/hr


Insulin Aspart (Novolog)  0 unit SC Q6 [facility]


   PRN Reason: Protocol


   Last Admin: 03/09/18 05:35 Dose:  6 unit


Insulin Glargine (Lantus)  5 unit SC HS [facility]


   Last Admin: 03/08/18 21:35 Dose:  5 u


Methylprednisolone (Solu-Medrol)  40 mg IVP Q8H [facility]


   Last Admin: 03/09/18 05:00 Dose:  40 mg


Metoprolol Tartrate (Lopressor)  25 mg PO BID [facility]


   Last Admin: 03/09/18 07:59 Dose:  25 mg


Midodrine (Proamatine)  10 mg NG Q8 [facility]


   Last Admin: 03/09/18 05:25 Dose:  10 mg


Pantoprazole Sodium (Protonix Inj)  40 mg IVP DAILY [facility]


   Last Admin: 03/08/18 09:12 Dose:  40 mg


Rosuvastatin Calcium (Crestor)  5 mg PO HS [facility]


   Last Admin: 03/08/18 21:35 Dose:  5 mg


Vitamin B Complex/Vit C/Folic Acid (Nephro-Nneka)  1 tab PO 0800 [facility]


   Last Admin: 03/09/18 07:58 Dose:  1 tab











Physical Exam





- Constitutional


Appears: In Acute Distress





- Head Exam


Head Exam: ATRAUMATIC, NORMAL INSPECTION, NORMOCEPHALIC





- Eye Exam


Eye Exam: EOMI, Normal appearance, PERRL


Pupil Exam: NORMAL ACCOMODATION, PERRL





- ENT Exam


ENT Exam: Mucous Membranes Dry


Additional comments: 





On mechanical ventilation





- Neck Exam


Additional comments: 





NG tube for feedings





- Respiratory Exam


Additional comments: 





On mechanical ventilation





- Cardiovascular Exam


Cardiovascular Exam: Tachycardia, Irregular Rhythm





- GI/Abdominal Exam


GI & Abdominal Exam: Hypoactive Bowel Sounds





- Rectal Exam


Rectal Exam: Deferred





- Extremities Exam


Extremities exam: Positive for: pedal edema





- Back Exam


Back exam: NORMAL INSPECTION





- Neurological Exam


Neurological exam: Alert





- Psychiatric Exam


Psychiatric exam: Flat Affect





- Skin


Skin Exam: Normal Color, Warm





Results





- Vital Signs


Recent Vital Signs: 


 Last Vital Signs











Temp  97.8 F   03/09/18 08:00


 


Pulse  106 H  03/09/18 09:00


 


Resp  16   03/09/18 09:00


 


BP  161/88 H  03/09/18 08:01


 


Pulse Ox  100   03/09/18 09:00














- Labs


Result Diagrams: 


 03/09/18 06:25





 03/09/18 06:26


Labs: 


 Laboratory Results - last 24 hr











  03/08/18 03/08/18 03/08/18





  11:12 17:36 23:58


 


WBC   


 


RBC   


 


Hgb   


 


Hct   


 


MCV   


 


MCH   


 


MCHC   


 


RDW   


 


Plt Count   


 


MPV   


 


Neut % (Auto)   


 


Lymph % (Auto)   


 


Mono % (Auto)   


 


Eos % (Auto)   


 


Baso % (Auto)   


 


Neut # (Auto)   


 


Lymph # (Auto)   


 


Mono # (Auto)   


 


Eos # (Auto)   


 


Baso # (Auto)   


 


Neutrophils % (Manual)   


 


Band Neutrophils %   


 


Lymphocytes % (Manual)   


 


Monocytes % (Manual)   


 


Toxic Granulation   


 


Platelet Estimate   


 


Large Platelets   


 


Polychromasia   


 


Hypochromasia (manual)   


 


Anisocytosis (manual)   


 


Macrocytosis (manual)   


 


Target Cells   


 


PT   


 


INR   


 


APTT   


 


Puncture Site   


 


pCO2   


 


pO2   


 


HCO3   


 


ABG pH   


 


ABG Total CO2   


 


ABG O2 Saturation   


 


ABG Base Excess   


 


Jesus Test   


 


ABG Potassium   


 


A-a O2 Difference   


 


Respiratory Index   


 


Sodium   


 


Chloride   


 


Glucose   


 


Lactate   


 


Vent Mode   


 


Mechanical Rate   


 


FiO2   


 


Tidal Volume   


 


PEEP   


 


Potassium   


 


Carbon Dioxide   


 


Anion Gap   


 


BUN   


 


Creatinine   


 


Est GFR ( Amer)   


 


Est GFR (Non-Af Amer)   


 


POC Glucose (mg/dL)  98  212 H  233 H


 


Random Glucose   


 


Calcium   


 


Phosphorus   


 


Magnesium   


 


Total Bilirubin   


 


AST   


 


ALT   


 


Alkaline Phosphatase   


 


Total Protein   


 


Albumin   


 


Globulin   


 


Albumin/Globulin Ratio   


 


Arterial Blood Potassium   














  03/09/18 03/09/18 03/09/18





  05:12 05:33 06:00


 


WBC   


 


RBC   


 


Hgb   


 


Hct   


 


MCV   


 


MCH   


 


MCHC   


 


RDW   


 


Plt Count   


 


MPV   


 


Neut % (Auto)   


 


Lymph % (Auto)   


 


Mono % (Auto)   


 


Eos % (Auto)   


 


Baso % (Auto)   


 


Neut # (Auto)   


 


Lymph # (Auto)   


 


Mono # (Auto)   


 


Eos # (Auto)   


 


Baso # (Auto)   


 


Neutrophils % (Manual)   


 


Band Neutrophils %   


 


Lymphocytes % (Manual)   


 


Monocytes % (Manual)   


 


Toxic Granulation   


 


Platelet Estimate   


 


Large Platelets   


 


Polychromasia   


 


Hypochromasia (manual)   


 


Anisocytosis (manual)   


 


Macrocytosis (manual)   


 


Target Cells   


 


PT    17.3 H


 


INR    1.5


 


APTT    60 H


 


Puncture Site  Rb  


 


pCO2  32 L  


 


pO2  84  


 


HCO3  25.1  


 


ABG pH  7.47 H  


 


ABG Total CO2  24.3  


 


ABG O2 Saturation  98.7 H  


 


ABG Base Excess  0.3  


 


Jesus Test  Na  


 


ABG Potassium  4.5  


 


A-a O2 Difference  161.0  


 


Respiratory Index  1.9  


 


Sodium  135.0  


 


Chloride  101.0  


 


Glucose  323 H  


 


Lactate  1.6  


 


Vent Mode  Prvc  


 


Mechanical Rate  16  


 


FiO2  40.0  


 


Tidal Volume  500  


 


PEEP  5  


 


Potassium   


 


Carbon Dioxide   


 


Anion Gap   


 


BUN   


 


Creatinine   


 


Est GFR ( Amer)   


 


Est GFR (Non-Af Amer)   


 


POC Glucose (mg/dL)   253 H 


 


Random Glucose   


 


Calcium   


 


Phosphorus   


 


Magnesium   


 


Total Bilirubin   


 


AST   


 


ALT   


 


Alkaline Phosphatase   


 


Total Protein   


 


Albumin   


 


Globulin   


 


Albumin/Globulin Ratio   


 


Arterial Blood Potassium  4.5  














  03/09/18 03/09/18





  06:25 06:26


 


WBC  12.3 H 


 


RBC  3.35 L 


 


Hgb  9.3 L 


 


Hct  28.7 L 


 


MCV  85.9 


 


MCH  27.9 


 


MCHC  32.5 L 


 


RDW  16.6 H 


 


Plt Count  144 


 


MPV  10.2 


 


Neut % (Auto)  91.6 H 


 


Lymph % (Auto)  3.7 L 


 


Mono % (Auto)  4.6 


 


Eos % (Auto)  0.0 


 


Baso % (Auto)  0.1 


 


Neut # (Auto)  11.3 H 


 


Lymph # (Auto)  0.5 L 


 


Mono # (Auto)  0.6 


 


Eos # (Auto)  0.0 


 


Baso # (Auto)  0.0 


 


Neutrophils % (Manual)  92 H 


 


Band Neutrophils %  1 


 


Lymphocytes % (Manual)  3 L 


 


Monocytes % (Manual)  4 


 


Toxic Granulation  Present 


 


Platelet Estimate  Normal 


 


Large Platelets  Present 


 


Polychromasia  Slight 


 


Hypochromasia (manual)  Slight 


 


Anisocytosis (manual)  Slight 


 


Macrocytosis (manual)  Slight 


 


Target Cells  Slight 


 


PT  


 


INR  


 


APTT  


 


Puncture Site  


 


pCO2  


 


pO2  


 


HCO3  


 


ABG pH  


 


ABG Total CO2  


 


ABG O2 Saturation  


 


ABG Base Excess  


 


Jesus Test  


 


ABG Potassium  


 


A-a O2 Difference  


 


Respiratory Index  


 


Sodium   137


 


Chloride   97 L


 


Glucose  


 


Lactate  


 


Vent Mode  


 


Mechanical Rate  


 


FiO2  


 


Tidal Volume  


 


PEEP  


 


Potassium   4.2


 


Carbon Dioxide   23


 


Anion Gap   21 H


 


BUN   67 H


 


Creatinine   4.7 H


 


Est GFR ( Amer)   11


 


Est GFR (Non-Af Amer)   9


 


POC Glucose (mg/dL)  


 


Random Glucose   280 H


 


Calcium   8.1 L


 


Phosphorus   5.6 H


 


Magnesium   2.1


 


Total Bilirubin   1.0


 


AST   191 H D


 


ALT   870 H D


 


Alkaline Phosphatase   211 H


 


Total Protein   5.8 L


 


Albumin   2.9 L


 


Globulin   2.9


 


Albumin/Globulin Ratio   1.0


 


Arterial Blood Potassium  














Assessment & Plan





- Assessment and Plan (Free Text)


Assessment: 





Palliative consult





Full Code, there is not advanced directive on chart, PPS 10 %


I reviewed medical records, all diagnostic studies, examining patient in the bed

, and met with the family





Patient seen and examined in the bed, alert, intubated, not sedation, able to 

follow simple commands, and communicates by blinking eyes. At the time of exam 

patient was on CPAP, , afebrile. Breath sounds diminished, minimal sputum 

production,





Abdomen soft, flat, active bowel sounds. Patient is able to move upper and 

lower extremities. There is discoloration to both feet. No open wounds.





WBC 12.3, hemoglobin 9.3, BUN 6-7, creatinine 4.7, blood sugar 280, calcium 8.1

, albumin 2.9.





Per History patient's kidney functions have worsened in the past but she was 

never "on hemodialysis. On this admission patient began hemodialysis and is 

given a left upper chest Port-A-Cath.





Family meeting attended by patient's 3 sons, 2 daughters, one granddaughter, 

and myself, held in separate room. I reviewed with patient's clinical 

presentation and elicited family's knowledge about patient's condition. Family 

agreed the patient condition declined lately. There main concern is patient's 

refusal of proposed interventions such as cardiac cath. I discussed with the 

family risks of such intervention at this moment as presented by Dr. Javier in 

his notes .





All family members evaluate united in intention to respect patient's wishes.





I return to the patient with the family at the bedside and using very simple 

words discussed with the patient her wishes for end-of-life care. When 

questioned if she would like us to perform chest compressions if that should be 

needed patient answered no. Patient also signaled that shewould not want any 

cardiac interventions such as cardiac catheterization. A further elaborated 

that lack of those interventions would result in death. Patient just looked at 

me without giving any signals for yes or no answer.





This was discussed with nursing and medical resident. Family and I am. To meet 

again this morning, to a invasive the same topic with the patient and make 

final decisions on goals of care.





Assessment





* Acutely ill patient on full life support





* Severe pulmonary congestion





* Severe metabolic acidosis





* Acute on chronic renal injuries





* Patient doesn't  want chest compressions and cardiac catheterization or any 

other aggressive interventions





* Family is supportive of patient's wishes





Plan





* Continue mechanical ventilation support





* Family meeting today for final decisions regarding further care. Code status 

to be defined








Advanced care planning 40 minutes

## 2018-03-09 NOTE — RAD
Chest x-ray single frontal view 



History: Intubated. 



Comparison: 03/08/2018 



Findings: 



Endotracheal tube approximately 1.6 centimeters above the peter. 



NG tube extending into the stomach. 



Other lines and tubes in stable position.  Additional overlying wires 

and tubing.  Clinical correlation. 



Moderate venous congestion with bibasilar airspace opacities and 

small to moderate left and small right pleural effusion. 



Cardiomegaly. 



Calcification at the aortic knob. 



Degenerative changes in the spine. 



Impression: 



Endotracheal tube approximately 1.6 centimeters above the peter. 



NG tube extending into the stomach. 



Other lines and tubes in stable position.  Additional overlying wires 

and tubing.  Clinical correlation. 



Moderate venous congestion with bibasilar airspace opacities and 

small to moderate left and small right pleural effusion. 



Cardiomegaly. 



Calcification at the aortic knob. 



Degenerative changes in the spine.

## 2018-03-09 NOTE — CP.PCM.PN
Subjective





- Date & Time of Evaluation


Date of Evaluation: 03/09/18


Time of Evaluation: 15:26





- Subjective


Subjective: 





Nephrology Consultation:





Assessment: critical


CKD stage 5 (N18.5) with hyperkalemia, acidosis now has ESRD on HD via [facility] 


pulm edema, CHF, NSTEMI, pleural effusions, pneumonia


AMS, hyperglycemia


s/p cardiac arrest, shock liver, A fib





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 


Hypertensive Chronic Kidney Disease (I12.9)


Anemia (D64.9), Hyperphosphatemia (E83.39), Secondary Hyperparathyroidism (E21.1

), HTN (I12.9), vit D insuff, hx of colon CA, TIAs





Plan


HD today as ordered. 


maintain hemodynamic stable. Patient not on ACEI/ARB due to low BP


started iron supplements, MVI, epogen with HD, and zemplar 2 mcg with HD. d/c 

oral vit d


vasc surgery has evaluated. ID and cardiology following


KDUR supplementation as needed





Dose meds/antibiotics for ESRD/HD status. Avoid fleets enema/magnesium based 

laxatives. Avoid nephrotoxins/NSAIDs


Glycemic control


Further work up/management as per primary team





Thanks for allowing me to participate in care of your patient. Will follow 

patient with you. Please call if any Qs. d/w team 


Dr Shad Panchal


Office: [phone] Cell: [phone] Fax: [phone]





Chief Complaint; unable to obtain


reason for consult: CKD management


source of Info: EMR


HPI: Pt is a 77 F with hx of diabetes Mellitus ( years), hypertension (years), 

CHF, TIA, Colon CA, CKD stage 4/5, has matured AVG in left arm presented with 

complaints of AMS and hyperglycemia as brought by family. now has elevated trop 

100, hyperkalemia, AMS and elevated sugar, pulm edema. renal consult for CKD 

management. 


pt with AMS unable to provide any hx. she had received medical management for 

hyperkalemia. 





ROS: unable to obtain





Physical Examination: seen on HD


General Appearance: ill appearing,intubated


Vitals reviewed and noted as below


Head; Atraumatic, normocephalic


ENT: intubated


EYES: PERRLA


Neck; supple no lymphadenopathy, no thyromegaly or bruit


Lungs: Normal respiratory rate/effort. Breath sounds bilateral clear, she is 

mechanically ventilated


Heart: Normal rate. s1s2 normal. No rub or gallop. 


Extremities: no edema. No varicose veins. chronic hyperpigmented changes in legs


Neurological: Patient is  awake not communicative


Skin: Warm and dry. Normal turgor. No rash. Palpitation: Normal elasticity for 

age


Abdomen: Abdomen is soft. Bowel sounds +. There is no abdominal tenderness, no 

guarding/rigidity no organomegaly


Psych: unable


MSK: no joint tenderness or swelling. Digits and nails normal, no deformity


: kidney or bladder not palpable


Access: Left AVG without bruit. has left IJ shiley





Labs/imaging reviewed.


Past medical history, past surgical history, family history, social history, 

allergy reviewed and noted as below


Family hx: no hx of CKD. Rest non-contributory








Objective





- Vital Signs/Intake and Output


Vital Signs (last 24 hours): 


 











Temp Pulse Resp BP Pulse Ox


 


 97.6 F   105 H  21   127/73   84 L


 


 03/09/18 12:00  03/09/18 15:00  03/09/18 15:00  03/09/18 14:39  03/09/18 15:00








Intake and Output: 


 











 03/09/18 03/09/18





 06:59 18:59


 


Intake Total 980 430


 


Output Total  2500


 


Balance 980 -2070














- Medications


Medications: 


 Current Medications





Albuterol/Ipratropium (Duoneb 3 Mg/0.5 Mg (3 Ml) Ud)  3 ml INH RQ4 [facility]


   Last Admin: 03/09/18 11:07 Dose:  3 ml


Aspirin (Aspirin Chewable)  81 mg PO DAILY [facility]


   Last Admin: 03/09/18 14:38 Dose:  81 mg


Epoetin Vamsi (Procrit)  8,000 unit IV MWF [facility]


   Last Admin: 03/09/18 11:19 Dose:  8,000 unit


Ferrous Gluconate (Fergon)  324 mg PO TID [facility]


   Last Admin: 03/09/18 14:38 Dose:  324 mg


Heparin Sodium/Sodium Chloride (Heparin 40930 Units/250ml 1/2 Normal Saline)  25

,000 units in 250 mls @ 10.161 mls/hr IV .Q24H PRN; Protocol; 14 UNITS/KG/HR


   PRN Reason: PROTOCOL


   Last Admin: 03/08/18 18:00 Dose:  14 units/kg/hr, 10.161 mls/hr


Aztreonam 1 gm/ Sodium (Chloride)  100 mls @ 100 mls/hr IVPB Q12H [facility]


   Last Admin: 03/09/18 14:37 Dose:  100 mls/hr


Metronidazole (Flagyl)  500 mg in 100 mls @ 100 mls/hr IVPB Q8H [facility]


   Last Admin: 03/09/18 14:37 Dose:  100 mls/hr


Insulin Aspart (Novolog)  0 unit SC Q6 [facility]


   PRN Reason: Protocol


   Last Admin: 03/09/18 12:30 Dose:  2 unit


Insulin Glargine (Lantus)  5 unit SC HS [facility]


   Last Admin: 03/08/18 21:35 Dose:  5 u


Methylprednisolone (Solu-Medrol)  40 mg IVP Q8H [facility]


   Last Admin: 03/09/18 12:30 Dose:  40 mg


Metoprolol Tartrate (Lopressor)  25 mg PO BID [facility]


   Last Admin: 03/09/18 07:59 Dose:  25 mg


Midodrine (Proamatine)  10 mg NG Q8 [facility]


   Last Admin: 03/09/18 14:38 Dose:  10 mg


Pantoprazole Sodium (Protonix Inj)  40 mg IVP DAILY [facility]


   Last Admin: 03/08/18 09:12 Dose:  40 mg


Paricalcitol (Zemplar)  2 mcg IV MWF [facility]


Rosuvastatin Calcium (Crestor)  5 mg PO [facility]


   Last Admin: 03/08/18 21:35 Dose:  5 mg


Vitamin B Complex/Vit C/Folic Acid (Nephro-Nneka)  1 tab PO 0800 [facility]


   Last Admin: 03/09/18 07:58 Dose:  1 tab











- Labs


Labs: 


 





 03/09/18 06:25 





 03/09/18 06:26 





 











PT  17.3 SECONDS (9.7-12.2)  H  03/09/18  06:00    


 


INR  1.5   03/09/18  06:00    


 


APTT  60 SECONDS (21-34)  H  03/09/18  06:00

## 2018-03-10 LAB
ALBUMIN SERPL-MCNC: 3.1 G/DL (ref 3.5–5)
ALBUMIN/GLOB SERPL: 1 {RATIO} (ref 1–2.1)
ALT SERPL-CCNC: 690 U/L (ref 9–52)
ANISOCYTOSIS BLD QL SMEAR: (no result)
ARTERIAL BLOOD GAS HEMOGLOBIN: 9.8 G/DL (ref 11.7–17.4)
ARTERIAL BLOOD GAS O2 SAT: 99.8 % (ref 95–98)
ARTERIAL BLOOD GAS PCO2: 31 MM/HG (ref 35–45)
ARTERIAL BLOOD GAS TCO2: 23.6 MMOL/L (ref 22–28)
AST SERPL-CCNC: 91 U/L (ref 14–36)
BASOPHILS # BLD AUTO: 0 K/UL (ref 0–0.2)
BASOPHILS NFR BLD: 0.1 % (ref 0–2)
BUN SERPL-MCNC: 66 MG/DL (ref 7–17)
BURR CELLS BLD QL SMEAR: SLIGHT
CALCIUM SERPL-MCNC: 8.5 MG/DL (ref 8.6–10.4)
EOSINOPHIL # BLD AUTO: 0.2 K/UL (ref 0–0.7)
EOSINOPHIL NFR BLD: 1.2 % (ref 0–4)
ERYTHROCYTE [DISTWIDTH] IN BLOOD BY AUTOMATED COUNT: 16.7 % (ref 11.5–14.5)
GFR NON-AFRICAN AMERICAN: 10
GIANT PLATELETS BLD QL SMEAR: PRESENT
HCO3 BLDA-SCNC: 24.5 MMOL/L (ref 21–28)
HGB BLD-MCNC: 9.3 G/DL (ref 11–16)
HYPOCHROMIC: SLIGHT
INHALED O2 CONCENTRATION: 40 %
LG PLATELETS BLD QL SMEAR: PRESENT
LYMPHOCYTE: 3 % (ref 20–40)
LYMPHOCYTES # BLD AUTO: 0.7 K/UL (ref 1–4.3)
LYMPHOCYTES NFR BLD AUTO: 3.9 % (ref 20–40)
MCH RBC QN AUTO: 28.2 PG (ref 27–31)
MCHC RBC AUTO-ENTMCNC: 32.9 G/DL (ref 33–37)
MCV RBC AUTO: 85.5 FL (ref 81–99)
MONOCYTE: 5 % (ref 0–10)
MONOCYTES # BLD: 1 K/UL (ref 0–0.8)
MONOCYTES NFR BLD: 5.7 % (ref 0–10)
NEUTROPHILS # BLD: 15.5 K/UL (ref 1.8–7)
NEUTROPHILS NFR BLD AUTO: 89 % (ref 50–75)
NEUTROPHILS NFR BLD AUTO: 89.1 % (ref 50–75)
NEUTS BAND NFR BLD: 3 % (ref 0–2)
NRBC BLD AUTO-RTO: 2 % (ref 0–2)
OVALOCYTES BLD QL SMEAR: SLIGHT
PH BLDA: 7.47 [PH] (ref 7.35–7.45)
PLATELET # BLD EST: NORMAL 10*3/UL
PLATELET # BLD: 180 K/UL (ref 130–400)
PMV BLD AUTO: 10.1 FL (ref 7.2–11.7)
PO2 BLDA: 175 MM/HG (ref 80–100)
POIKILOCYTOSIS BLD QL SMEAR: SLIGHT
POLYCHROMIC: SLIGHT
RBC # BLD AUTO: 3.32 MIL/UL (ref 3.8–5.2)
SCHISTOCYTES BLD QL SMEAR: SLIGHT
TOTAL CELLS COUNTED BLD: 100
TOXIC GRANULES BLD QL SMEAR: PRESENT
WBC # BLD AUTO: 17.4 K/UL (ref 4.8–10.8)

## 2018-03-10 RX ADMIN — INSULIN ASPART SCH UNIT: 100 INJECTION, SOLUTION INTRAVENOUS; SUBCUTANEOUS at 06:00

## 2018-03-10 RX ADMIN — INSULIN ASPART SCH UNIT: 100 INJECTION, SOLUTION INTRAVENOUS; SUBCUTANEOUS at 18:03

## 2018-03-10 RX ADMIN — IPRATROPIUM BROMIDE AND ALBUTEROL SULFATE SCH ML: .5; 3 SOLUTION RESPIRATORY (INHALATION) at 11:33

## 2018-03-10 RX ADMIN — IPRATROPIUM BROMIDE AND ALBUTEROL SULFATE SCH ML: .5; 3 SOLUTION RESPIRATORY (INHALATION) at 20:53

## 2018-03-10 RX ADMIN — METHYLPREDNISOLONE SODIUM SUCCINATE SCH MG: 40 INJECTION, POWDER, FOR SOLUTION INTRAMUSCULAR; INTRAVENOUS at 21:00

## 2018-03-10 RX ADMIN — INSULIN ASPART SCH UNIT: 100 INJECTION, SOLUTION INTRAVENOUS; SUBCUTANEOUS at 00:00

## 2018-03-10 RX ADMIN — WATER SCH MLS/HR: 1 INJECTION INTRAMUSCULAR; INTRAVENOUS; SUBCUTANEOUS at 14:36

## 2018-03-10 RX ADMIN — METHYLPREDNISOLONE SODIUM SUCCINATE SCH MG: 40 INJECTION, POWDER, FOR SOLUTION INTRAMUSCULAR; INTRAVENOUS at 05:30

## 2018-03-10 RX ADMIN — WATER SCH MLS/HR: 1 INJECTION INTRAMUSCULAR; INTRAVENOUS; SUBCUTANEOUS at 06:00

## 2018-03-10 RX ADMIN — IPRATROPIUM BROMIDE AND ALBUTEROL SULFATE SCH: .5; 3 SOLUTION RESPIRATORY (INHALATION) at 03:21

## 2018-03-10 RX ADMIN — Medication SCH TAB: at 09:25

## 2018-03-10 RX ADMIN — HEPARIN SODIUM PRN MLS/HR: 10000 INJECTION, SOLUTION INTRAVENOUS at 20:30

## 2018-03-10 RX ADMIN — INSULIN GLARGINE SCH U: 100 INJECTION, SOLUTION SUBCUTANEOUS at 22:15

## 2018-03-10 RX ADMIN — IPRATROPIUM BROMIDE AND ALBUTEROL SULFATE SCH ML: .5; 3 SOLUTION RESPIRATORY (INHALATION) at 16:58

## 2018-03-10 RX ADMIN — WATER SCH MLS/HR: 1 INJECTION INTRAMUSCULAR; INTRAVENOUS; SUBCUTANEOUS at 21:45

## 2018-03-10 RX ADMIN — METHYLPREDNISOLONE SODIUM SUCCINATE SCH MG: 40 INJECTION, POWDER, FOR SOLUTION INTRAMUSCULAR; INTRAVENOUS at 12:31

## 2018-03-10 RX ADMIN — IPRATROPIUM BROMIDE AND ALBUTEROL SULFATE SCH ML: .5; 3 SOLUTION RESPIRATORY (INHALATION) at 07:35

## 2018-03-10 RX ADMIN — INSULIN ASPART SCH UNIT: 100 INJECTION, SOLUTION INTRAVENOUS; SUBCUTANEOUS at 12:31

## 2018-03-10 NOTE — CP.PCM.PN
Subjective





- Date & Time of Evaluation


Date of Evaluation: 03/10/18


Time of Evaluation: 15:55





- Subjective


Subjective: 





Nephrology Consultation:





Assessment: critical


CKD stage 5 (N18.5) with hyperkalemia, acidosis now has ESRD on HD via [facility] 


pulm edema, CHF, NSTEMI, pleural effusions, pneumonia


AMS, hyperglycemia


s/p cardiac arrest, shock liver, A fib





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 


Hypertensive Chronic Kidney Disease (I12.9)


Anemia (D64.9), Hyperphosphatemia (E83.39), Secondary Hyperparathyroidism (E21.1

), HTN (I12.9), vit D insuff, hx of colon CA, TIAs





Plan


HD monday as ordered. no acute need today


maintain hemodynamic stable. Patient not on ACEI/ARB due to low BP. consider to 

taper down midodrine


started iron supplements, MVI, epogen with HD, and zemplar 2 mcg with HD. d/c 

oral vit d


vasc surgery has evaluated. ID and cardiology following


KDUR supplementation as needed





Dose meds/antibiotics for ESRD/HD status. Avoid fleets enema/magnesium based 

laxatives. Avoid nephrotoxins/NSAIDs


Glycemic control


Further work up/management as per primary team





Thanks for allowing me to participate in care of your patient. Will follow 

patient with you. Please call if any Qs. d/w son


Dr Shad Panchal


Office: [phone] Cell: [phone] Fax: [phone]





Chief Complaint; unable to obtain


reason for consult: CKD management


source of Info: EMR


HPI: Pt is a 77 F with hx of diabetes Mellitus ( years), hypertension (years), 

CHF, TIA, Colon CA, CKD stage 4/5, has matured AVG in left arm presented with 

complaints of AMS and hyperglycemia as brought by family. now has elevated trop 

100, hyperkalemia, AMS and elevated sugar, pulm edema. renal consult for CKD 

management. 


pt with AMS unable to provide any hx. she had received medical management for 

hyperkalemia. 





ROS: unable to obtain





Physical Examination: 


General Appearance: ill appearing,intubated


Vitals reviewed and noted as below


Head; Atraumatic, normocephalic


ENT: intubated


EYES: PERRLA


Neck; supple no lymphadenopathy, no thyromegaly or bruit


Lungs: Normal respiratory rate/effort. Breath sounds bilateral clear, she is 

mechanically ventilated


Heart: Normal rate. s1s2 normal. No rub or gallop. 


Extremities: no edema. No varicose veins. chronic hyperpigmented changes in legs


Neurological: Patient is  sleeping not communicative


Skin: Warm and dry. Normal turgor. No rash. Palpitation: Normal elasticity for 

age


Abdomen: Abdomen is soft. Bowel sounds +. There is no abdominal tenderness, no 

guarding/rigidity no organomegaly


Psych: unable


MSK: no joint tenderness or swelling. Digits and nails normal, no deformity


: kidney or bladder not palpable


Access: Left AVG without bruit. has left IJ shiley





Labs/imaging reviewed.


Past medical history, past surgical history, family history, social history, 

allergy reviewed and noted as below


Family hx: no hx of CKD. Rest non-contributory





Objective





- Vital Signs/Intake and Output


Vital Signs (last 24 hours): 


 











Temp Pulse Resp BP Pulse Ox


 


 97.4 F L  112 H  16   130/76   100 


 


 03/10/18 12:00  03/10/18 15:00  03/10/18 15:00  03/10/18 14:39  03/10/18 15:00








Intake and Output: 


 











 03/10/18 03/10/18





 06:59 18:59


 


Intake Total 550 315


 


Balance 550 315














- Medications


Medications: 


 Current Medications





Albuterol/Ipratropium (Duoneb 3 Mg/0.5 Mg (3 Ml) Ud)  3 ml INH RQ4 [facility]


   Last Admin: 03/10/18 11:33 Dose:  3 ml


Aspirin (Aspirin Chewable)  81 mg PO DAILY [facility]


   Last Admin: 03/10/18 09:25 Dose:  81 mg


Epoetin Vamsi (Procrit)  8,000 unit IV MWF [facility]


   Last Admin: 03/09/18 11:19 Dose:  8,000 unit


Ferrous Gluconate (Fergon)  324 mg PO TID [facility]


   Last Admin: 03/10/18 13:47 Dose:  324 mg


Aztreonam 1 gm/ Sodium (Chloride)  100 mls @ 100 mls/hr IVPB Q12H [facility]


   Last Admin: 03/10/18 13:47 Dose:  100 mls/hr


Metronidazole (Flagyl)  500 mg in 100 mls @ 100 mls/hr IVPB Q8H [facility]


   Last Admin: 03/10/18 14:36 Dose:  100 mls/hr


Insulin Aspart (Novolog)  0 unit SC Q6 [facility]


   PRN Reason: Protocol


   Last Admin: 03/10/18 12:31 Dose:  6 unit


Insulin Glargine (Lantus)  5 unit SC [facility]


   Last Admin: 03/09/18 21:41 Dose:  5 u


Methylprednisolone (Solu-Medrol)  40 mg IVP Q8H [facility]


   Last Admin: 03/10/18 12:31 Dose:  40 mg


Metoprolol Tartrate (Lopressor)  25 mg PO BID [facility]


   Last Admin: 03/10/18 09:25 Dose:  25 mg


Midodrine (Proamatine)  10 mg NG Q8 [facility]


   Last Admin: 03/10/18 13:48 Dose:  Not Given


Pantoprazole Sodium (Protonix Inj)  40 mg IVP DAILY [facility]


   Last Admin: 03/10/18 09:26 Dose:  40 mg


Paricalcitol (Zemplar)  2 mcg IV MWF [facility]


Rosuvastatin Calcium (Crestor)  5 mg PO [facility]


   Last Admin: 03/09/18 21:22 Dose:  5 mg


Vitamin B Complex/Vit C/Folic Acid (Nephro-Nneka)  1 tab PO 0800 [facility]


   Last Admin: 03/10/18 09:25 Dose:  1 tab











- Labs


Labs: 


 





 03/10/18 06:03 





 03/10/18 06:03 





 











PT  17.3 SECONDS (9.7-12.2)  H  03/09/18  06:00    


 


INR  1.5   03/09/18  06:00    


 


APTT  77 SECONDS (21-34)  H D 03/10/18  07:28

## 2018-03-10 NOTE — CP.PCM.PN
Subjective





- Date & Time of Evaluation


Date of Evaluation: 03/10/18


Time of Evaluation: 08:20





- Subjective


Subjective: 





Remains intubated. Back on [facility]





This morning was very awake and following me with her eyes during exam. Like 

previously she was raising her hands. 


She seemed to indicate with her head that she was not in pain when I asked her. 





She had HD yesterday successfully 


On the telemetry monitor she remains in atrial fibrillation HR was 110s area. 

Yesterday I tried increasing to lopressor 25 BID. 





I had a very long sit down conversation with the patient's POA Kristel Corral. 

Kristel explained to me there was no living will. Also she explained to me that 

her mother the patient indicated to them she does not want a cardiac 

catherization. I explained to Kristel that the patient's heart was very weak and 

low EF. There is a real risk her condition could decline. 





Objective





- Vital Signs/Intake and Output


Vital Signs (last 24 hours): 


 











Temp Pulse Resp BP Pulse Ox


 


 98.2 F   107 H  16   125/81   98 


 


 03/10/18 04:00  03/10/18 07:00  03/10/18 07:00  03/10/18 04:39  03/10/18 07:00








Intake and Output: 


 











 03/10/18 03/10/18





 06:59 18:59


 


Intake Total 550 35


 


Balance 550 35














- Medications


Medications: 


 Current Medications





Albuterol/Ipratropium (Duoneb 3 Mg/0.5 Mg (3 Ml) Ud)  3 ml INH RQ4 [facility]


   Last Admin: 03/10/18 07:35 Dose:  3 ml


Aspirin (Aspirin Chewable)  81 mg PO DAILY [facility]


   Last Admin: 03/09/18 14:38 Dose:  81 mg


Epoetin Vamsi (Procrit)  8,000 unit IV MWF [facility]


   Last Admin: 03/09/18 11:19 Dose:  8,000 unit


Ferrous Gluconate (Fergon)  324 mg PO TID [facility]


   Last Admin: 03/09/18 17:40 Dose:  324 mg


Heparin Sodium/Sodium Chloride (Heparin 43253 Units/250ml 1/2 Normal Saline)  25

,000 units in 250 mls @ 10.161 mls/hr IV .Q24H PRN; Protocol; 14 UNITS/KG/HR


   PRN Reason: PROTOCOL


   Last Admin: 03/08/18 18:00 Dose:  14 units/kg/hr, 10.161 mls/hr


Aztreonam 1 gm/ Sodium (Chloride)  100 mls @ 100 mls/hr IVPB Q12H [facility]


   Last Admin: 03/10/18 01:30 Dose:  100 mls/hr


Metronidazole (Flagyl)  500 mg in 100 mls @ 100 mls/hr IVPB Q8H [facility]


   Last Admin: 03/10/18 06:00 Dose:  100 mls/hr


Insulin Aspart (Novolog)  0 unit SC Q6 [facility]


   PRN Reason: Protocol


   Last Admin: 03/10/18 06:00 Dose:  4 unit


Insulin Glargine (Lantus)  5 unit SC [facility]


   Last Admin: 03/09/18 21:41 Dose:  5 u


Methylprednisolone (Solu-Medrol)  40 mg IVP Q8H [facility]


   Last Admin: 03/10/18 05:30 Dose:  40 mg


Metoprolol Tartrate (Lopressor)  25 mg PO BID [facility]


   Last Admin: 03/09/18 17:40 Dose:  25 mg


Midodrine (Proamatine)  10 mg NG Q8 [facility]


   Last Admin: 03/10/18 05:58 Dose:  10 mg


Pantoprazole Sodium (Protonix Inj)  40 mg IVP DAILY [facility]


   Last Admin: 03/09/18 12:30 Dose:  40 mg


Paricalcitol (Zemplar)  2 mcg IV MWF [facility]


Rosuvastatin Calcium (Crestor)  5 mg PO [facility]


   Last Admin: 03/09/18 21:22 Dose:  5 mg


Vitamin B Complex/Vit C/Folic Acid (Nephro-Nneka)  1 tab PO 0800 [facility]


   Last Admin: 03/09/18 07:58 Dose:  1 tab











- Labs


Labs: 


 





 03/10/18 06:03 





 03/10/18 06:03 





 











PT  17.3 SECONDS (9.7-12.2)  H  03/09/18  06:00    


 


INR  1.5   03/09/18  06:00    


 


APTT  77 SECONDS (21-34)  H D 03/10/18  07:28    














- Constitutional


Appears: Unkempt, Confused, Chronically Ill





- Head Exam


Head Exam: NORMAL INSPECTION, NORMOCEPHALIC





- Eye Exam


Eye Exam: EOMI, Normal appearance





- ENT Exam


ENT Exam: Mucous Membranes Moist





- Respiratory Exam


Respiratory Exam: Clear to Ausculation Bilateral, NORMAL BREATHING PATTERN





- Cardiovascular Exam


Cardiovascular Exam: Irregular Rhythm





- GI/Abdominal Exam


GI & Abdominal Exam: Soft, Normal Bowel Sounds





- Neurological Exam


Neurological Exam: Alert, Awake


Neuro motor strength exam: Left Upper Extremity: 4, Right Upper Extremity: 4





- Psychiatric Exam


Psychiatric exam: Depressed, Flat Affect





- Skin


Skin Exam: Normal Color, Warm





Assessment and Plan





- Assessment and Plan (Free Text)


Assessment: 





Assessment: 





As previously mentioned, this is a 77 year old female with a history of ESRD 

now getting HD, CHF, DM, HTN, hypercholesterolemia, and some sort of MI years 

before, repeated TIAs, and colon CA. She was brought in by family for altered 

mental status and ultimately found to have severe metabolic acidosis as well as 

a extremely elevated troponins. 





Plan: 





1 Acute NSTEMI ,  Asystol ,CPR / Sustained VT


3/10: I had long conversation with POA Kristel Corral and per the POA the patient 

indicated to them no cardiac catherization. 


I explained to POA that considering the weaknened heart function it may prove 

very difficult to successfully extubate patient.


At this moment she remains on heparin ggt, getting HD, and also weaning trials


3/9: Cardiac catherization was canceled


3/8: Potentially cardiac catherization shortly from now. Remains on heparin ggt


3/7: atrial fibrillation appearance on telemetry, check a new 12 lead EKG. 

Remains on heparin ggt.


3/6: Remains in heparin ggt, statin, ASA. Hopefully at some point can have 

cardiac catherization. 


3/5: Now off pressor medications. She remains on amiodarone   


Echo EF 30%. Troponins were as high as 103 then decreased. 


Currently on a heparin ggt, ASA, Statin.  





2 Respiratory failure, intubation


3/10: Again became tachycardic at night and was switched from PS/CPAP settings 

back to PRVC. Family is aware that it will be difficult to extubate


3/9: Back on PRVC after having tachy cardia


3/8: Has tolerated CPAP settings throughout the night


3/7: Yesterday afternoon she was moved back to PRVC


3/6: Now changed to CPAP/PS settings. The chest XRAYs look better this morning 

following HD last night.





3 Atrial Fibrillation 


3/10: remains in atrial fibrillation. On heparin and loppressor BID


3/9: Continue on heparin ggt, Lopressor increased to 25 BID





4 Acute renal failure 


3/10 Did well with HD yesterday


3/8: Toleratated HD well yesterday


3/7: Currently MWF HD


3/6: Underwent HD yesterday and tolerated it well. CXRAY looks better


3/5/2018: She has had one session of HD so far. [place] another session today





5 Leukocytosis concerning for sepsis


3/6: Today decreased WBC. Cultures negative so far


3/5/2018: The elevated WBC maybe stress related.  


She remains on abx Aztreonam IV and Flagyl IV





6 Acute systolic heart failure


3/6: Monitor chest XRAYs. 


  ECHO shows EF 30%





7 Pleural effusion


3/8: Still present on CXRAYs. Continue to moniotp


3/6: Improving CXRAYs


3/5/2018: Pleural effusions minimal at this time. Continue to monitor





8 Hyperglycemia and uncontrolled DM


3/6: Reccent accucheks 130s, 130, 160





9 Anemia of chronic disease, CKD


   3/6: Yesterday had 1 unit of PRBC given during HD, receiving EPO

## 2018-03-10 NOTE — CP.CCUPN
CCU Subjective





- Physician Review


Events Since Last Encounter (Free Text): 





03/10/18 17:53


Patient still on ventilator.


Awake, responding to


X-rays clear looking


Patient is having episodically tachycardia


Chest good air entry bilaterally regular heart sound.


Nontender abdomen





Chest x-ray noted.





Labs reviewed.


Assessment and plan:


77 female with a history of end-stage renal disease on dialysis, coronary 

artery disease, status post cardiac arrest, V. tach and V. fib.


Patient is at high risk for pulmonary edema.


Family is currently refusing for intervention for coronary.


In my opinion patient is at high risk for reintubation, and pulmonary edema 

recurrent.


We'll closely monitor.


Possibly extubate once patient is stable





CCU Objective





- Vital Signs / Intake & Output


Vital Signs (Last 4 hours): 


Vital Signs











  Temp Pulse Resp BP Pulse Ox


 


 03/10/18 17:00   101 H  16   100


 


 03/10/18 16:39   96 H  16  113/81  100


 


 03/10/18 16:00  98.7 F  99 H  16   100


 


 03/10/18 15:39   105 H  16  122/73  100


 


 03/10/18 15:00   112 H  16   100


 


 03/10/18 14:39   109 H  16  130/76  100


 


 03/10/18 14:00   111 H  16   100











Intake and Output (Last 8hrs): 


 Intake & Output











 03/10/18 03/10/18 03/10/18





 06:59 14:59 22:59


 


Intake Total 280 280 105


 


Balance 280 280 105


 


Intake:   


 


  Intake, IV Amount 80 80 30


 


    Left Internal Jugular 80 80 30





    Side-Port   


 


  Tube Feeding 200 200 75














- Physical Exam


Head: Positive for: Atraumatic, Normocephalic.  Negative for: Tenderness, 

Contusion, Swelling


Pupils: Positive for: PERRL.  Negative for: Sluggish, Non-Reactive


Extroacular Muscles: Positive for: EOMI


Conjunctiva: Positive for: Normal


Mouth: Positive for: Dry


Pharnyx: Positive for: Normal


Nose (External): Positive for: Atraumatic


Neck: Positive for: Trachea Midline.  Negative for: JVD


Respiratory/Chest: Positive for: Good Air Exchange, Accessory Muscle Use, 

Decreased Breath Sounds (on bilateral bases).  Negative for: Wheezes


Cardiovascular: Positive for: Tachycardic


Abdomen: Positive for: Distention, Normal Bowel Sounds.  Negative for: 

Peritoneal Signs


Upper Extremity: Negative for: Cyanosis, Edema


Lower Extremity: Negative for: Edema


Psychiatric: Positive for: Alert





- Medications


Active Medications: 


Active Medications











Generic Name Dose Route Start Last Admin





  Trade Name Freq  PRN Reason Stop Dose Admin


 


Albuterol/Ipratropium  3 ml  03/07/18 16:00  03/10/18 16:58





  Duoneb 3 Mg/0.5 Mg (3 Ml) Ud  INH   3 ml





  RQ4 [facility]   Administration


 


Aspirin  81 mg  03/02/18 10:00  03/10/18 09:25





  Aspirin Chewable  PO   81 mg





  DAILY [facility]   Administration


 


Epoetin Vamsi  8,000 unit  03/07/18 09:00  03/09/18 11:19





  Procrit  IV   8,000 unit





  [facility]   Administration


 


Ferrous Gluconate  324 mg  03/03/18 10:00  03/10/18 13:47





  Fergon  PO   324 mg





  TID [facility]   Administration


 


Aztreonam 1 gm/ Sodium  100 mls @ 100 mls/hr  03/09/18 01:30  03/10/18 13:47





  Chloride  IVPB   100 mls/hr





  Q12H [facility]   Administration


 


Metronidazole  500 mg in 100 mls @ 100 mls/hr  03/08/18 22:30  03/10/18 14:36





  Flagyl  IVPB   100 mls/hr





  Q8H [facility]   Administration


 


Insulin Aspart  0 unit  03/04/18 11:49  03/10/18 12:31





  Novolog  SC   6 unit





  Q6 [facility]   Administration





  Protocol   


 


Insulin Glargine  5 unit  03/06/18 22:00  03/09/18 21:41





  Lantus  SC   5 u





  HS [facility]   Administration


 


Methylprednisolone  40 mg  03/08/18 12:00  03/10/18 12:31





  Solu-Medrol  IVP   40 mg





  Q8H [facility]   Administration


 


Metoprolol Tartrate  25 mg  03/09/18 08:00  03/10/18 09:25





  Lopressor  PO   25 mg





  BID [facility]   Administration


 


Pantoprazole Sodium  40 mg  03/05/18 10:00  03/10/18 09:26





  Protonix Inj  IVP   40 mg





  DAILY [facility]   Administration


 


Paricalcitol  2 mcg  03/12/18 09:00  





  Zemplar  IV   





  MWF [facility]   


 


Rosuvastatin Calcium  5 mg  03/02/18 01:15  03/09/18 21:22





  Crestor  PO   5 mg





  HS [facility]   Administration


 


Vitamin B Complex/Vit C/Folic Acid  1 tab  03/03/18 08:00  03/10/18 09:25





  Nephro-Nneka  PO   1 tab





  0800 [facility]   Administration














- Patient Studies


Lab Studies: 


 Lab Studies











  03/10/18 03/10/18 03/10/18 Range/Units





  17:39 11:30 07:28 


 


WBC     (4.8-10.8)  K/uL


 


RBC     (3.80-5.20)  Mil/uL


 


Hgb     (11.0-16.0)  g/dL


 


Hct     (34.0-47.0)  %


 


MCV     (81.0-99.0)  fL


 


MCH     (27.0-31.0)  pg


 


MCHC     (33.0-37.0)  g/dL


 


RDW     (11.5-14.5)  %


 


Plt Count     (130-400)  K/uL


 


MPV     (7.2-11.7)  fL


 


Neut % (Auto)     (50.0-75.0)  %


 


Lymph % (Auto)     (20.0-40.0)  %


 


Mono % (Auto)     (0.0-10.0)  %


 


Eos % (Auto)     (0.0-4.0)  %


 


Baso % (Auto)     (0.0-2.0)  %


 


Neut # (Auto)     (1.8-7.0)  K/uL


 


Lymph # (Auto)     (1.0-4.3)  K/uL


 


Mono # (Auto)     (0.0-0.8)  K/uL


 


Eos # (Auto)     (0.0-0.7)  K/uL


 


Baso # (Auto)     (0.0-0.2)  K/uL


 


Neutrophils % (Manual)     (50-75)  %


 


Band Neutrophils %     (0-2)  %


 


Lymphocytes % (Manual)     (20-40)  %


 


Monocytes % (Manual)     (0-10)  %


 


Toxic Granulation     


 


Platelet Estimate     (NORMAL)  


 


Large Platelets     


 


Giant Platelets     


 


Polychromasia     


 


Hypochromasia (manual)     


 


Poikilocytosis (manual     


 


Anisocytosis (manual)     


 


Ovalocytes     


 


Clara Cells     


 


Schistocytes     


 


APTT    77 H D  (21-34)  SECONDS


 


Puncture Site     


 


pCO2     (35-45)  mm/Hg


 


pO2     ()  mm/Hg


 


HCO3     (21-28)  mmol/L


 


ABG pH     (7.35-7.45)  


 


ABG Total CO2     (22-28)  mmol/L


 


ABG O2 Saturation     (95-98)  %


 


ABG Base Excess     (-2.0-3.0)  mmol/L


 


ABG Hemoglobin     (11.7-17.4)  g/dL


 


ABG Carboxyhemoglobin     (0.5-1.5)  %


 


POC ABG HHb (Measured)     (0.0-5.0)  %


 


ABG Methemoglobin     (0.0-3.0)  %


 


Jesus Test     


 


A-a O2 Difference     mm/Hg


 


Respiratory Index     


 


Hgb O2 Saturation     (95.0-98.0)  %


 


Vent Mode     


 


Mechanical Rate     


 


FiO2     %


 


Tidal Volume     


 


PEEP     


 


Sodium     (132-148)  mmol/L


 


Potassium     (3.6-5.2)  mmol/L


 


Chloride     ()  mmol/L


 


Carbon Dioxide     (22-30)  mmol/L


 


Anion Gap     (10-20)  


 


BUN     (7-17)  mg/dL


 


Creatinine     (0.7-1.2)  mg/dL


 


Est GFR (African Amer)     


 


Est GFR (Non-Af Amer)     


 


POC Glucose (mg/dL)  261 H  247 H   ()  mg/dL


 


Random Glucose     ()  mg/dL


 


Calcium     (8.6-10.4)  mg/dl


 


Phosphorus     (2.5-4.5)  mg/dL


 


Magnesium     (1.6-2.3)  mg/dL


 


Total Bilirubin     (0.2-1.3)  mg/dL


 


AST     (14-36)  U/L


 


ALT     (9-52)  U/L


 


Alkaline Phosphatase     ()  U/L


 


Total Protein     (6.3-8.3)  g/dL


 


Albumin     (3.5-5.0)  g/dL


 


Globulin     (2.2-3.9)  gm/dL


 


Albumin/Globulin Ratio     (1.0-2.1)  














  03/10/18 03/10/18 03/10/18 Range/Units





  06:03 06:03 05:42 


 


WBC   17.4 H   (4.8-10.8)  K/uL


 


RBC   3.32 L   (3.80-5.20)  Mil/uL


 


Hgb   9.3 L   (11.0-16.0)  g/dL


 


Hct   28.4 L   (34.0-47.0)  %


 


MCV   85.5   (81.0-99.0)  fL


 


MCH   28.2   (27.0-31.0)  pg


 


MCHC   32.9 L   (33.0-37.0)  g/dL


 


RDW   16.7 H   (11.5-14.5)  %


 


Plt Count   180   (130-400)  K/uL


 


MPV   10.1   (7.2-11.7)  fL


 


Neut % (Auto)   89.1 H   (50.0-75.0)  %


 


Lymph % (Auto)   3.9 L   (20.0-40.0)  %


 


Mono % (Auto)   5.7   (0.0-10.0)  %


 


Eos % (Auto)   1.2   (0.0-4.0)  %


 


Baso % (Auto)   0.1   (0.0-2.0)  %


 


Neut # (Auto)   15.5 H   (1.8-7.0)  K/uL


 


Lymph # (Auto)   0.7 L   (1.0-4.3)  K/uL


 


Mono # (Auto)   1.0 H   (0.0-0.8)  K/uL


 


Eos # (Auto)   0.2   (0.0-0.7)  K/uL


 


Baso # (Auto)   0.0   (0.0-0.2)  K/uL


 


Neutrophils % (Manual)   89 H   (50-75)  %


 


Band Neutrophils %   3 H   (0-2)  %


 


Lymphocytes % (Manual)   3 L   (20-40)  %


 


Monocytes % (Manual)   5   (0-10)  %


 


Toxic Granulation   Present   


 


Platelet Estimate   Normal   (NORMAL)  


 


Large Platelets   Present   


 


Giant Platelets   Present   


 


Polychromasia   Slight   


 


Hypochromasia (manual)   Slight   


 


Poikilocytosis (manual   Slight   


 


Anisocytosis (manual)   Moderate   


 


Ovalocytes   Slight   


 


Clara Cells   Slight   


 


Schistocytes   Slight   


 


APTT     (21-34)  SECONDS


 


Puncture Site     


 


pCO2     (35-45)  mm/Hg


 


pO2     ()  mm/Hg


 


HCO3     (21-28)  mmol/L


 


ABG pH     (7.35-7.45)  


 


ABG Total CO2     (22-28)  mmol/L


 


ABG O2 Saturation     (95-98)  %


 


ABG Base Excess     (-2.0-3.0)  mmol/L


 


ABG Hemoglobin     (11.7-17.4)  g/dL


 


ABG Carboxyhemoglobin     (0.5-1.5)  %


 


POC ABG HHb (Measured)     (0.0-5.0)  %


 


ABG Methemoglobin     (0.0-3.0)  %


 


Jesus Test     


 


A-a O2 Difference     mm/Hg


 


Respiratory Index     


 


Hgb O2 Saturation     (95.0-98.0)  %


 


Vent Mode     


 


Mechanical Rate     


 


FiO2     %


 


Tidal Volume     


 


PEEP     


 


Sodium  135    (132-148)  mmol/L


 


Potassium  4.4    (3.6-5.2)  mmol/L


 


Chloride  98    ()  mmol/L


 


Carbon Dioxide  20 L    (22-30)  mmol/L


 


Anion Gap  21 H    (10-20)  


 


BUN  66 H    (7-17)  mg/dL


 


Creatinine  4.4 H    (0.7-1.2)  mg/dL


 


Est GFR ( Amer)  12    


 


Est GFR (Non-Af Amer)  10    


 


POC Glucose (mg/dL)    226 H  ()  mg/dL


 


Random Glucose  256 H    ()  mg/dL


 


Calcium  8.5 L    (8.6-10.4)  mg/dl


 


Phosphorus  5.7 H    (2.5-4.5)  mg/dL


 


Magnesium  2.1    (1.6-2.3)  mg/dL


 


Total Bilirubin  0.8    (0.2-1.3)  mg/dL


 


AST  91 H D    (14-36)  U/L


 


ALT  690 H D    (9-52)  U/L


 


Alkaline Phosphatase  199 H    ()  U/L


 


Total Protein  6.2 L    (6.3-8.3)  g/dL


 


Albumin  3.1 L    (3.5-5.0)  g/dL


 


Globulin  3.1    (2.2-3.9)  gm/dL


 


Albumin/Globulin Ratio  1.0    (1.0-2.1)  














  03/10/18 03/09/18 Range/Units





  05:20 23:40 


 


WBC    (4.8-10.8)  K/uL


 


RBC    (3.80-5.20)  Mil/uL


 


Hgb    (11.0-16.0)  g/dL


 


Hct    (34.0-47.0)  %


 


MCV    (81.0-99.0)  fL


 


MCH    (27.0-31.0)  pg


 


MCHC    (33.0-37.0)  g/dL


 


RDW    (11.5-14.5)  %


 


Plt Count    (130-400)  K/uL


 


MPV    (7.2-11.7)  fL


 


Neut % (Auto)    (50.0-75.0)  %


 


Lymph % (Auto)    (20.0-40.0)  %


 


Mono % (Auto)    (0.0-10.0)  %


 


Eos % (Auto)    (0.0-4.0)  %


 


Baso % (Auto)    (0.0-2.0)  %


 


Neut # (Auto)    (1.8-7.0)  K/uL


 


Lymph # (Auto)    (1.0-4.3)  K/uL


 


Mono # (Auto)    (0.0-0.8)  K/uL


 


Eos # (Auto)    (0.0-0.7)  K/uL


 


Baso # (Auto)    (0.0-0.2)  K/uL


 


Neutrophils % (Manual)    (50-75)  %


 


Band Neutrophils %    (0-2)  %


 


Lymphocytes % (Manual)    (20-40)  %


 


Monocytes % (Manual)    (0-10)  %


 


Toxic Granulation    


 


Platelet Estimate    (NORMAL)  


 


Large Platelets    


 


Giant Platelets    


 


Polychromasia    


 


Hypochromasia (manual)    


 


Poikilocytosis (manual    


 


Anisocytosis (manual)    


 


Ovalocytes    


 


Clara Cells    


 


Schistocytes    


 


APTT    (21-34)  SECONDS


 


Puncture Site  Rb   


 


pCO2  31 L   (35-45)  mm/Hg


 


pO2  175 H   ()  mm/Hg


 


HCO3  24.5   (21-28)  mmol/L


 


ABG pH  7.47 H   (7.35-7.45)  


 


ABG Total CO2  23.6   (22-28)  mmol/L


 


ABG O2 Saturation  99.8 H   (95-98)  %


 


ABG Base Excess  -0.6   (-2.0-3.0)  mmol/L


 


ABG Hemoglobin  9.8 L   (11.7-17.4)  g/dL


 


ABG Carboxyhemoglobin  1.8 H   (0.5-1.5)  %


 


POC ABG HHb (Measured)  0.2   (0.0-5.0)  %


 


ABG Methemoglobin  1.1   (0.0-3.0)  %


 


Jesus Test  Na   


 


A-a O2 Difference  71.0   mm/Hg


 


Respiratory Index  0.4   


 


Hgb O2 Saturation  96.9   (95.0-98.0)  %


 


Vent Mode  Prvc   


 


Mechanical Rate  16   


 


FiO2  40.0   %


 


Tidal Volume  500   


 


PEEP  5   


 


Sodium    (132-148)  mmol/L


 


Potassium    (3.6-5.2)  mmol/L


 


Chloride    ()  mmol/L


 


Carbon Dioxide    (22-30)  mmol/L


 


Anion Gap    (10-20)  


 


BUN    (7-17)  mg/dL


 


Creatinine    (0.7-1.2)  mg/dL


 


Est GFR (African Amer)    


 


Est GFR (Non-Af Amer)    


 


POC Glucose (mg/dL)   252 H  ()  mg/dL


 


Random Glucose    ()  mg/dL


 


Calcium    (8.6-10.4)  mg/dl


 


Phosphorus    (2.5-4.5)  mg/dL


 


Magnesium    (1.6-2.3)  mg/dL


 


Total Bilirubin    (0.2-1.3)  mg/dL


 


AST    (14-36)  U/L


 


ALT    (9-52)  U/L


 


Alkaline Phosphatase    ()  U/L


 


Total Protein    (6.3-8.3)  g/dL


 


Albumin    (3.5-5.0)  g/dL


 


Globulin    (2.2-3.9)  gm/dL


 


Albumin/Globulin Ratio    (1.0-2.1)  








 Laboratory Results - last 24 hr











  03/09/18 03/10/18 03/10/18





  23:40 05:20 05:42


 


WBC   


 


RBC   


 


Hgb   


 


Hct   


 


MCV   


 


MCH   


 


MCHC   


 


RDW   


 


Plt Count   


 


MPV   


 


Neut % (Auto)   


 


Lymph % (Auto)   


 


Mono % (Auto)   


 


Eos % (Auto)   


 


Baso % (Auto)   


 


Neut # (Auto)   


 


Lymph # (Auto)   


 


Mono # (Auto)   


 


Eos # (Auto)   


 


Baso # (Auto)   


 


Neutrophils % (Manual)   


 


Band Neutrophils %   


 


Lymphocytes % (Manual)   


 


Monocytes % (Manual)   


 


Toxic Granulation   


 


Platelet Estimate   


 


Large Platelets   


 


Giant Platelets   


 


Polychromasia   


 


Hypochromasia (manual)   


 


Poikilocytosis (manual   


 


Anisocytosis (manual)   


 


Ovalocytes   


 


[place] Cells   


 


Schistocytes   


 


APTT   


 


Puncture Site   Rb 


 


pCO2   31 L 


 


pO2   175 H 


 


HCO3   24.5 


 


ABG pH   7.47 H 


 


ABG Total CO2   23.6 


 


ABG O2 Saturation   99.8 H 


 


ABG Base Excess   -0.6 


 


ABG Hemoglobin   9.8 L 


 


ABG Carboxyhemoglobin   1.8 H 


 


POC ABG HHb (Measured)   0.2 


 


ABG Methemoglobin   1.1 


 


Jesus Test   Na 


 


A-a O2 Difference   71.0 


 


Respiratory Index   0.4 


 


Hgb O2 Saturation   96.9 


 


Vent Mode   Prvc 


 


Mechanical Rate   16 


 


FiO2   40.0 


 


Tidal Volume   500 


 


PEEP   5 


 


Sodium   


 


Potassium   


 


Chloride   


 


Carbon Dioxide   


 


Anion Gap   


 


BUN   


 


Creatinine   


 


Est GFR ( Amer)   


 


Est GFR (Non-Af Amer)   


 


POC Glucose (mg/dL)  252 H   226 H


 


Random Glucose   


 


Calcium   


 


Phosphorus   


 


Magnesium   


 


Total Bilirubin   


 


AST   


 


ALT   


 


Alkaline Phosphatase   


 


Total Protein   


 


Albumin   


 


Globulin   


 


Albumin/Globulin Ratio   














  03/10/18 03/10/18 03/10/18





  06:03 06:03 07:28


 


WBC  17.4 H  


 


RBC  3.32 L  


 


Hgb  9.3 L  


 


Hct  28.4 L  


 


MCV  85.5  


 


MCH  28.2  


 


MCHC  32.9 L  


 


RDW  16.7 H  


 


Plt Count  180  


 


MPV  10.1  


 


Neut % (Auto)  89.1 H  


 


Lymph % (Auto)  3.9 L  


 


Mono % (Auto)  5.7  


 


Eos % (Auto)  1.2  


 


Baso % (Auto)  0.1  


 


Neut # (Auto)  15.5 H  


 


Lymph # (Auto)  0.7 L  


 


Mono # (Auto)  1.0 H  


 


Eos # (Auto)  0.2  


 


Baso # (Auto)  0.0  


 


Neutrophils % (Manual)  89 H  


 


Band Neutrophils %  3 H  


 


Lymphocytes % (Manual)  3 L  


 


Monocytes % (Manual)  5  


 


Toxic Granulation  Present  


 


Platelet Estimate  Normal  


 


Large Platelets  Present  


 


Giant Platelets  Present  


 


Polychromasia  Slight  


 


Hypochromasia (manual)  Slight  


 


Poikilocytosis (manual  Slight  


 


Anisocytosis (manual)  Moderate  


 


Ovalocytes  Slight  


 


[place] Cells  Slight  


 


Schistocytes  Slight  


 


APTT    77 H D


 


Puncture Site   


 


pCO2   


 


pO2   


 


HCO3   


 


ABG pH   


 


ABG Total CO2   


 


ABG O2 Saturation   


 


ABG Base Excess   


 


ABG Hemoglobin   


 


ABG Carboxyhemoglobin   


 


POC ABG HHb (Measured)   


 


ABG Methemoglobin   


 


Jesus Test   


 


A-a O2 Difference   


 


Respiratory Index   


 


Hgb O2 Saturation   


 


Vent Mode   


 


Mechanical Rate   


 


FiO2   


 


Tidal Volume   


 


PEEP   


 


Sodium   135 


 


Potassium   4.4 


 


Chloride   98 


 


Carbon Dioxide   20 L 


 


Anion Gap   21 H 


 


BUN   66 H 


 


Creatinine   4.4 H 


 


Est GFR ( Amer)   12 


 


Est GFR (Non-Af Amer)   10 


 


POC Glucose (mg/dL)   


 


Random Glucose   256 H 


 


Calcium   8.5 L 


 


Phosphorus   5.7 H 


 


Magnesium   2.1 


 


Total Bilirubin   0.8 


 


AST   91 H D 


 


ALT   690 H D 


 


Alkaline Phosphatase   199 H 


 


Total Protein   6.2 L 


 


Albumin   3.1 L 


 


Globulin   3.1 


 


Albumin/Globulin Ratio   1.0 














  03/10/18 03/10/18





  11:30 17:39


 


WBC  


 


RBC  


 


Hgb  


 


Hct  


 


MCV  


 


MCH  


 


MCHC  


 


RDW  


 


Plt Count  


 


MPV  


 


Neut % (Auto)  


 


Lymph % (Auto)  


 


Mono % (Auto)  


 


Eos % (Auto)  


 


Baso % (Auto)  


 


Neut # (Auto)  


 


Lymph # (Auto)  


 


Mono # (Auto)  


 


Eos # (Auto)  


 


Baso # (Auto)  


 


Neutrophils % (Manual)  


 


Band Neutrophils %  


 


Lymphocytes % (Manual)  


 


Monocytes % (Manual)  


 


Toxic Granulation  


 


Platelet Estimate  


 


Large Platelets  


 


Giant Platelets  


 


Polychromasia  


 


Hypochromasia (manual)  


 


Poikilocytosis (manual  


 


Anisocytosis (manual)  


 


Ovalocytes  


 


Clara Cells  


 


Schistocytes  


 


APTT  


 


Puncture Site  


 


pCO2  


 


pO2  


 


HCO3  


 


ABG pH  


 


ABG Total CO2  


 


ABG O2 Saturation  


 


ABG Base Excess  


 


ABG Hemoglobin  


 


ABG Carboxyhemoglobin  


 


POC ABG HHb (Measured)  


 


ABG Methemoglobin  


 


Jesus Test  


 


A-a O2 Difference  


 


Respiratory Index  


 


Hgb O2 Saturation  


 


Vent Mode  


 


Mechanical Rate  


 


FiO2  


 


Tidal Volume  


 


PEEP  


 


Sodium  


 


Potassium  


 


Chloride  


 


Carbon Dioxide  


 


Anion Gap  


 


BUN  


 


Creatinine  


 


Est GFR ( Amer)  


 


Est GFR (Non-Af Amer)  


 


POC Glucose (mg/dL)  247 H  261 H


 


Random Glucose  


 


Calcium  


 


Phosphorus  


 


Magnesium  


 


Total Bilirubin  


 


AST  


 


ALT  


 


Alkaline Phosphatase  


 


Total Protein  


 


Albumin  


 


Globulin  


 


Albumin/Globulin Ratio  











Fingerstick Blood Sugar Results: 226

## 2018-03-10 NOTE — RAD
HISTORY:

Follow-up



COMPARISON:

Chest x-ray 3/9/18



TECHNIQUE:

Chest, one view.



FINDINGS:

Examination limited by habitus. 



Numerous external wires and leads obscure evaluation of the 

underlying parenchyma. 



Endotracheal tube terminates approximately 1.5 cm above the peter. 

Right IJ approach central venous catheter extends the cavoatrial 

junction. Left-sided central venous catheter extends the cavoatrial 

junction. Nasogastric tube extends expected location of the stomach. 



LUNGS:

Left lower lobe consolidation and or pleural effusion. Nonspecific 

hazy projects over the left mid lung zone of unclear significance. 



CARDIOVASCULAR:

Cardiomegaly. Dense atherosclerotic calcifications of the aorta. 



OSSEOUS STRUCTURES:

 Degenerative changes.



VISUALIZED UPPER ABDOMEN:

Unremarkable.



OTHER FINDINGS:

Dense calcifications evident projecting at the level of the left 

breast soft tissues.



IMPRESSION:

Support lines and tubes as above. Please note that endotracheal tube 

terminates approximately 1 cm above the peter and should be 

repositioned approximately 3 cm above the peter. 



Left lower lobe consolidation and or pleural effusion. Nonspecific 

hazy opacity projects over the left mid lung zone of unclear 

significance, possibly artifactual; attention on follow-up. 



Additional findings as above. 



Findings discussed with VALENCIA Mason performed 3/10/18 at 9:10 a.m..

## 2018-03-11 LAB
ALBUMIN SERPL-MCNC: 2.9 G/DL (ref 3.5–5)
ALBUMIN/GLOB SERPL: 1 {RATIO} (ref 1–2.1)
ALT SERPL-CCNC: 502 U/L (ref 9–52)
ANISOCYTOSIS BLD QL SMEAR: SLIGHT
ARTERIAL BLOOD GAS HEMOGLOBIN: 9 G/DL (ref 11.7–17.4)
ARTERIAL BLOOD GAS O2 SAT: 100 % (ref 95–98)
ARTERIAL BLOOD GAS PCO2: 28 MM/HG (ref 35–45)
ARTERIAL BLOOD GAS TCO2: 21.3 MMOL/L (ref 22–28)
ARTERIAL PATENCY WRIST A: (no result)
AST SERPL-CCNC: 57 U/L (ref 14–36)
BASOPHILS # BLD AUTO: 0 K/UL (ref 0–0.2)
BASOPHILS NFR BLD: 0.1 % (ref 0–2)
BUN SERPL-MCNC: 87 MG/DL (ref 7–17)
CALCIUM SERPL-MCNC: 8.3 MG/DL (ref 8.6–10.4)
EOSINOPHIL # BLD AUTO: 0 K/UL (ref 0–0.7)
EOSINOPHIL NFR BLD: 0 % (ref 0–4)
ERYTHROCYTE [DISTWIDTH] IN BLOOD BY AUTOMATED COUNT: 17 % (ref 11.5–14.5)
GFR NON-AFRICAN AMERICAN: 8
GIANT PLATELETS BLD QL SMEAR: PRESENT
HCO3 BLDA-SCNC: 22.9 MMOL/L (ref 21–28)
HGB BLD-MCNC: 8.7 G/DL (ref 11–16)
HYPOCHROMIC: SLIGHT
INHALED O2 CONCENTRATION: 40 %
LG PLATELETS BLD QL SMEAR: PRESENT
LYMPHOCYTE: 2 % (ref 20–40)
LYMPHOCYTES # BLD AUTO: 0.4 K/UL (ref 1–4.3)
LYMPHOCYTES NFR BLD AUTO: 2.2 % (ref 20–40)
MACROCYTES BLD QL SMEAR: SLIGHT
MCH RBC QN AUTO: 27.7 PG (ref 27–31)
MCHC RBC AUTO-ENTMCNC: 32.3 G/DL (ref 33–37)
MCV RBC AUTO: 85.8 FL (ref 81–99)
MONOCYTE: 2 % (ref 0–10)
MONOCYTES # BLD: 0.4 K/UL (ref 0–0.8)
MONOCYTES NFR BLD: 2.2 % (ref 0–10)
NEUTROPHILS # BLD: 16 K/UL (ref 1.8–7)
NEUTROPHILS NFR BLD AUTO: 93 % (ref 50–75)
NEUTROPHILS NFR BLD AUTO: 95.5 % (ref 50–75)
NEUTS BAND NFR BLD: 3 % (ref 0–2)
NRBC BLD AUTO-RTO: 0.5 % (ref 0–2)
NRBC BLD AUTO-RTO: 2 % (ref 0–0)
PH BLDA: 7.47 [PH] (ref 7.35–7.45)
PLATELET # BLD EST: NORMAL 10*3/UL
PLATELET # BLD: 169 K/UL (ref 130–400)
PMV BLD AUTO: 10.3 FL (ref 7.2–11.7)
PO2 BLDA: 176 MM/HG (ref 80–100)
POLYCHROMIC: SLIGHT
RBC # BLD AUTO: 3.16 MIL/UL (ref 3.8–5.2)
TARGETS BLD QL SMEAR: SLIGHT
TOTAL CELLS COUNTED BLD: 100
TOXIC GRANULES BLD QL SMEAR: PRESENT
WBC # BLD AUTO: 16.8 K/UL (ref 4.8–10.8)

## 2018-03-11 RX ADMIN — METHYLPREDNISOLONE SODIUM SUCCINATE SCH MG: 40 INJECTION, POWDER, FOR SOLUTION INTRAMUSCULAR; INTRAVENOUS at 05:00

## 2018-03-11 RX ADMIN — IPRATROPIUM BROMIDE AND ALBUTEROL SULFATE SCH ML: .5; 3 SOLUTION RESPIRATORY (INHALATION) at 19:42

## 2018-03-11 RX ADMIN — HEPARIN SODIUM PRN MLS/HR: 10000 INJECTION, SOLUTION INTRAVENOUS at 23:50

## 2018-03-11 RX ADMIN — Medication SCH TAB: at 09:41

## 2018-03-11 RX ADMIN — IPRATROPIUM BROMIDE AND ALBUTEROL SULFATE SCH ML: .5; 3 SOLUTION RESPIRATORY (INHALATION) at 00:41

## 2018-03-11 RX ADMIN — IPRATROPIUM BROMIDE AND ALBUTEROL SULFATE SCH ML: .5; 3 SOLUTION RESPIRATORY (INHALATION) at 04:38

## 2018-03-11 RX ADMIN — INSULIN ASPART SCH UNIT: 100 INJECTION, SOLUTION INTRAVENOUS; SUBCUTANEOUS at 18:10

## 2018-03-11 RX ADMIN — INSULIN ASPART SCH: 100 INJECTION, SOLUTION INTRAVENOUS; SUBCUTANEOUS at 01:00

## 2018-03-11 RX ADMIN — INSULIN GLARGINE SCH U: 100 INJECTION, SOLUTION SUBCUTANEOUS at 23:00

## 2018-03-11 RX ADMIN — WATER SCH MLS/HR: 1 INJECTION INTRAMUSCULAR; INTRAVENOUS; SUBCUTANEOUS at 13:40

## 2018-03-11 RX ADMIN — INSULIN ASPART SCH UNIT: 100 INJECTION, SOLUTION INTRAVENOUS; SUBCUTANEOUS at 12:41

## 2018-03-11 RX ADMIN — WATER SCH MLS/HR: 1 INJECTION INTRAMUSCULAR; INTRAVENOUS; SUBCUTANEOUS at 21:15

## 2018-03-11 RX ADMIN — IPRATROPIUM BROMIDE AND ALBUTEROL SULFATE SCH ML: .5; 3 SOLUTION RESPIRATORY (INHALATION) at 16:16

## 2018-03-11 RX ADMIN — IPRATROPIUM BROMIDE AND ALBUTEROL SULFATE SCH ML: .5; 3 SOLUTION RESPIRATORY (INHALATION) at 07:19

## 2018-03-11 RX ADMIN — IPRATROPIUM BROMIDE AND ALBUTEROL SULFATE SCH ML: .5; 3 SOLUTION RESPIRATORY (INHALATION) at 11:20

## 2018-03-11 RX ADMIN — INSULIN ASPART SCH UNIT: 100 INJECTION, SOLUTION INTRAVENOUS; SUBCUTANEOUS at 06:29

## 2018-03-11 RX ADMIN — WATER SCH MLS/HR: 1 INJECTION INTRAMUSCULAR; INTRAVENOUS; SUBCUTANEOUS at 06:00

## 2018-03-11 NOTE — RAD
HISTORY:

Follow up on left lung haziness and ET tube placem  



COMPARISON:

Multiple prior chest x-rays, the most recent performed 3/10/18 



TECHNIQUE:

Chest, one view.



FINDINGS:

Endotracheal tube terminates approximately 3.2 cm above the peter.  

Nasogastric tube extends expected location of the stomach. Left-sided 

central venous catheter extends the cavoatrial junction.  Right IJ 

approach central venous catheter extends to the expected location of 

the cavoatrial junction. 



Numerous external wires and leads. 



LUNGS:

Mild pulmonary venous congestion. Mild haziness persists at the left 

lower lobe likely related to mild layering pleural effusion and 

atelectasis/infiltrate. More dense opacity which previously projected 

over the left mid lung zone is no longer evident. 



CARDIOVASCULAR:

Cardiomegaly.  Atherosclerotic calcifications.



OSSEOUS STRUCTURES:

 Degenerative changes. 



VISUALIZED UPPER ABDOMEN:

Unremarkable.



OTHER FINDINGS:

None.



IMPRESSION:

Support lines and tubes as above.



Mild pulmonary venous congestion. Mild haziness persists at the left 

lower lobe likely related to mild layering pleural effusion and 

atelectasis/infiltrate. More dense opacity which previously projected 

over the left mid lung zone is no longer evident.

## 2018-03-11 NOTE — CP.CCUPN
CCU Subjective





- Physician Review


Events Since Last Encounter (Free Text): 





03/11/18 10:25


Patient still on ventilator.


Awake, responding to


x-ray improvingbilateral infiltrate


mild tachycardia noted


Chest good air entry bilaterally regular heart sound.


Nontender abdomen





Chest x-ray noted.








Labs reviewed.


no fever





Assessment and plan:


77 female with a history of end-stage renal disease on dialysis, coronary 

artery disease, status post cardiac arrest, V. tach and V. fib.


Patient is at high risk for pulmonary edema.


Family is currently refusing for intervention for coronary.


In my opinion patient is at high risk for reintubation, and pulmonary edema 

recurrent.


We'll closely monitor.


Possibly extubate once patient is stable





CCU Objective





- Vital Signs / Intake & Output


Vital Signs (Last 4 hours): 


Vital Signs











  Temp Pulse Resp BP Pulse Ox


 


 03/11/18 10:00   108 H  16   100


 


 03/11/18 09:41     126/62 


 


 03/11/18 09:39   105 H  16  126/62  100


 


 03/11/18 09:00   110 H  16   100


 


 03/11/18 08:57  97.4 F L    


 


 03/11/18 08:39   111 H  16  116/67  100


 


 03/11/18 08:00   109 H  17   100


 


 03/11/18 07:39   108 H  16  133/70 


 


 03/11/18 07:00   88  14  


 


 03/11/18 06:39   108 H  15  132/81 











Intake and Output (Last 8hrs): 


 Intake & Output











 03/10/18 03/11/18 03/11/18





 21:59 06:59 14:59


 


Intake Total   140


 


Output Total   0


 


Balance   140


 


Intake:   


 


  Intake, IV Amount   40


 


    Left Internal Jugular   40





    Side-Port   


 


    Right Proximal Port   





    Internal Jugular   


 


  Tube Feeding   100


 


Output:   


 


  Urine   0


 


    Urine, Voided   0


 


  Stool   0














- Physical Exam


Head: Positive for: Atraumatic, Normocephalic.  Negative for: Tenderness, 

Contusion, Swelling


Pupils: Positive for: PERRL.  Negative for: Sluggish, Non-Reactive


Extroacular Muscles: Positive for: EOMI


Conjunctiva: Positive for: Normal


Mouth: Positive for: Dry


Pharnyx: Positive for: Normal


Nose (External): Positive for: Atraumatic


Neck: Positive for: Trachea Midline.  Negative for: JVD


Respiratory/Chest: Positive for: Good Air Exchange, Accessory Muscle Use, 

Decreased Breath Sounds (on bilateral bases).  Negative for: Wheezes


Cardiovascular: Positive for: Tachycardic


Abdomen: Positive for: Distention, Normal Bowel Sounds.  Negative for: 

Peritoneal Signs


Upper Extremity: Negative for: Cyanosis, Edema


Lower Extremity: Negative for: Edema


Psychiatric: Positive for: Alert





- Medications


Active Medications: 


Active Medications











Generic Name Dose Route Start Last Admin





  Trade Name Freq  PRN Reason Stop Dose Admin


 


Albuterol/Ipratropium  3 ml  03/07/18 16:00  03/11/18 07:19





  Duoneb 3 Mg/0.5 Mg (3 Ml) Ud  INH   3 ml





  RQ4 [facility]   Administration


 


Aspirin  81 mg  03/02/18 10:00  03/11/18 09:41





  Aspirin Chewable  PO   81 mg





  DAILY [facility]   Administration


 


Epoetin Vamsi  8,000 unit  03/07/18 09:00  03/09/18 11:19





  Procrit  IV   8,000 unit





  MWF [facility]   Administration


 


Ferrous Gluconate  324 mg  03/03/18 10:00  03/11/18 09:41





  Fergon  PO   324 mg





  TID [facility]   Administration


 


Aztreonam 1 gm/ Sodium  100 mls @ 100 mls/hr  03/09/18 01:30  03/11/18 01:45





  Chloride  IVPB   100 mls/hr





  Q12H [facility]   Administration


 


Heparin Sodium/Sodium Chloride  25,000 units in 250 mls @ 10 mls/hr  03/10/18 20

:17  03/10/18 20:30





  Heparin 15909 Units/250ml 1/2 Normal Saline  IV   1,000 units/hr





  .Q24H PRN   10 mls/hr





  ADJUST RATE PER PROTOCOL   Administration





  Protocol   


 


Metronidazole  500 mg in 100 mls @ 100 mls/hr  03/11/18 14:00  





  Flagyl  IVPB   





  Q8 [facility]   


 


Insulin Aspart  0 unit  03/04/18 11:49  03/11/18 06:29





  Novolog  SC   6 unit





  Q6 [facility]   Administration





  Protocol   


 


Insulin Glargine  5 unit  03/06/18 22:00  03/10/18 22:15





  Lantus  SC   5 u





  HS [facility]   Administration


 


Methylprednisolone  40 mg  03/12/18 10:00  





  Solu-Medrol  IVP  03/15/18 10:01  





  DAILY [facility]   


 


Metoprolol Tartrate  25 mg  03/09/18 08:00  03/11/18 09:41





  Lopressor  PO   25 mg





  BID [facility]   Administration


 


Pantoprazole Sodium  40 mg  03/12/18 10:00  





  Protonix Ec Tab  PO   





  DAILY [facility]   


 


Paricalcitol  2 mcg  03/12/18 09:00  





  Zemplar  IV   





  MWF [facility]   


 


Rosuvastatin Calcium  5 mg  03/02/18 01:15  03/10/18 21:49





  Crestor  PO   5 mg





  HS [facility]   Administration


 


Vitamin B Complex/Vit C/Folic Acid  1 tab  03/03/18 08:00  03/11/18 09:41





  Nephro-Nneka  PO   1 tab





  0800 [facility]   Administration














- Patient Studies


Lab Studies: 


 Lab Studies











  03/11/18 03/11/18 03/11/18 Range/Units





  06:23 06:23 06:21 


 


WBC   16.8 H   (4.8-10.8)  K/uL


 


RBC   3.16 L   (3.80-5.20)  Mil/uL


 


Hgb   8.7 L   (11.0-16.0)  g/dL


 


Hct   27.1 L   (34.0-47.0)  %


 


MCV   85.8   (81.0-99.0)  fL


 


MCH   27.7   (27.0-31.0)  pg


 


MCHC   32.3 L   (33.0-37.0)  g/dL


 


RDW   17.0 H   (11.5-14.5)  %


 


Plt Count   169   (130-400)  K/uL


 


MPV   10.3   (7.2-11.7)  fL


 


Neut % (Auto)   95.5 H   (50.0-75.0)  %


 


Lymph % (Auto)   2.2 L   (20.0-40.0)  %


 


Mono % (Auto)   2.2   (0.0-10.0)  %


 


Eos % (Auto)   0.0   (0.0-4.0)  %


 


Baso % (Auto)   0.1   (0.0-2.0)  %


 


Neut # (Auto)   16.0 H   (1.8-7.0)  K/uL


 


Lymph # (Auto)   0.4 L   (1.0-4.3)  K/uL


 


Mono # (Auto)   0.4   (0.0-0.8)  K/uL


 


Eos # (Auto)   0.0   (0.0-0.7)  K/uL


 


Baso # (Auto)   0.0   (0.0-0.2)  K/uL


 


Neutrophils % (Manual)   93 H   (50-75)  %


 


Band Neutrophils %   3 H   (0-2)  %


 


Lymphocytes % (Manual)   2 L   (20-40)  %


 


Monocytes % (Manual)   2   (0-10)  %


 


Nucleated RBC %   2 H   (0-0)  %


 


Toxic Granulation   Present   


 


Platelet Estimate   Normal   (NORMAL)  


 


Large Platelets   Present   


 


Giant Platelets   Present   


 


Polychromasia   Slight   


 


Hypochromasia (manual)   Slight   


 


Anisocytosis (manual)   Slight   


 


Macrocytosis (manual)   Slight   


 


Target Cells   Slight   


 


APTT  77 H    (21-34)  SECONDS


 


Puncture Site     


 


pCO2     (35-45)  mm/Hg


 


pO2     ()  mm/Hg


 


HCO3     (21-28)  mmol/L


 


ABG pH     (7.35-7.45)  


 


ABG Total CO2     (22-28)  mmol/L


 


ABG O2 Saturation     (95-98)  %


 


ABG Base Excess     (-2.0-3.0)  mmol/L


 


ABG Hemoglobin     (11.7-17.4)  g/dL


 


ABG Carboxyhemoglobin     (0.5-1.5)  %


 


POC ABG HHb (Measured)     (0.0-5.0)  %


 


ABG Methemoglobin     (0.0-3.0)  %


 


Jesus Test     


 


A-a O2 Difference     mm/Hg


 


Respiratory Index     


 


Hgb O2 Saturation     (95.0-98.0)  %


 


Vent Mode     


 


Mechanical Rate     


 


FiO2     %


 


Tidal Volume     


 


PEEP     


 


Sodium    135  (132-148)  mmol/L


 


Potassium    4.6  (3.6-5.2)  mmol/L


 


Chloride    97 L  ()  mmol/L


 


Carbon Dioxide    23  (22-30)  mmol/L


 


Anion Gap    20  (10-20)  


 


BUN    87 H  (7-17)  mg/dL


 


Creatinine    5.2 H  (0.7-1.2)  mg/dL


 


Est GFR ( Amer)    10  


 


Est GFR (Non-Af Amer)    8  


 


POC Glucose (mg/dL)     ()  mg/dL


 


Random Glucose    299 H  ()  mg/dL


 


Calcium    8.3 L  (8.6-10.4)  mg/dl


 


Total Bilirubin    0.7  (0.2-1.3)  mg/dL


 


AST    57 H D  (14-36)  U/L


 


ALT    502 H D  (9-52)  U/L


 


Alkaline Phosphatase    216 H  ()  U/L


 


Total Protein    5.8 L  (6.3-8.3)  g/dL


 


Albumin    2.9 L  (3.5-5.0)  g/dL


 


Globulin    2.9  (2.2-3.9)  gm/dL


 


Albumin/Globulin Ratio    1.0  (1.0-2.1)  














  03/11/18 03/11/18 03/10/18 Range/Units





  06:06 05:30 23:25 


 


WBC     (4.8-10.8)  K/uL


 


RBC     (3.80-5.20)  Mil/uL


 


Hgb     (11.0-16.0)  g/dL


 


Hct     (34.0-47.0)  %


 


MCV     (81.0-99.0)  fL


 


MCH     (27.0-31.0)  pg


 


MCHC     (33.0-37.0)  g/dL


 


RDW     (11.5-14.5)  %


 


Plt Count     (130-400)  K/uL


 


MPV     (7.2-11.7)  fL


 


Neut % (Auto)     (50.0-75.0)  %


 


Lymph % (Auto)     (20.0-40.0)  %


 


Mono % (Auto)     (0.0-10.0)  %


 


Eos % (Auto)     (0.0-4.0)  %


 


Baso % (Auto)     (0.0-2.0)  %


 


Neut # (Auto)     (1.8-7.0)  K/uL


 


Lymph # (Auto)     (1.0-4.3)  K/uL


 


Mono # (Auto)     (0.0-0.8)  K/uL


 


Eos # (Auto)     (0.0-0.7)  K/uL


 


Baso # (Auto)     (0.0-0.2)  K/uL


 


Neutrophils % (Manual)     (50-75)  %


 


Band Neutrophils %     (0-2)  %


 


Lymphocytes % (Manual)     (20-40)  %


 


Monocytes % (Manual)     (0-10)  %


 


Nucleated RBC %     (0-0)  %


 


Toxic Granulation     


 


Platelet Estimate     (NORMAL)  


 


Large Platelets     


 


Giant Platelets     


 


Polychromasia     


 


Hypochromasia (manual)     


 


Anisocytosis (manual)     


 


Macrocytosis (manual)     


 


Target Cells     


 


APTT     (21-34)  SECONDS


 


Puncture Site   Rradial   


 


pCO2   28 L   (35-45)  mm/Hg


 


pO2   176 H   ()  mm/Hg


 


HCO3   22.9   (21-28)  mmol/L


 


ABG pH   7.47 H   (7.35-7.45)  


 


ABG Total CO2   21.3 L   (22-28)  mmol/L


 


ABG O2 Saturation   100.0 H   (95-98)  %


 


ABG Base Excess   -2.6 L   (-2.0-3.0)  mmol/L


 


ABG Hemoglobin   9.0 L   (11.7-17.4)  g/dL


 


ABG Carboxyhemoglobin   2.0 H   (0.5-1.5)  %


 


POC ABG HHb (Measured)   0.0   (0.0-5.0)  %


 


ABG Methemoglobin   1.2   (0.0-3.0)  %


 


Jesus Test   Pos   


 


A-a O2 Difference   74.0   mm/Hg


 


Respiratory Index   0.4   


 


Hgb O2 Saturation   96.8   (95.0-98.0)  %


 


Vent Mode   Prvc   


 


Mechanical Rate   16   


 


FiO2   40.0   %


 


Tidal Volume   500   


 


PEEP   5   


 


Sodium     (132-148)  mmol/L


 


Potassium     (3.6-5.2)  mmol/L


 


Chloride     ()  mmol/L


 


Carbon Dioxide     (22-30)  mmol/L


 


Anion Gap     (10-20)  


 


BUN     (7-17)  mg/dL


 


Creatinine     (0.7-1.2)  mg/dL


 


Est GFR (African Amer)     


 


Est GFR (Non-Af Amer)     


 


POC Glucose (mg/dL)  274 H   230 H  ()  mg/dL


 


Random Glucose     ()  mg/dL


 


Calcium     (8.6-10.4)  mg/dl


 


Total Bilirubin     (0.2-1.3)  mg/dL


 


AST     (14-36)  U/L


 


ALT     (9-52)  U/L


 


Alkaline Phosphatase     ()  U/L


 


Total Protein     (6.3-8.3)  g/dL


 


Albumin     (3.5-5.0)  g/dL


 


Globulin     (2.2-3.9)  gm/dL


 


Albumin/Globulin Ratio     (1.0-2.1)  














  03/10/18 03/10/18 Range/Units





  17:39 11:30 


 


WBC    (4.8-10.8)  K/uL


 


RBC    (3.80-5.20)  Mil/uL


 


Hgb    (11.0-16.0)  g/dL


 


Hct    (34.0-47.0)  %


 


MCV    (81.0-99.0)  fL


 


MCH    (27.0-31.0)  pg


 


MCHC    (33.0-37.0)  g/dL


 


RDW    (11.5-14.5)  %


 


Plt Count    (130-400)  K/uL


 


MPV    (7.2-11.7)  fL


 


Neut % (Auto)    (50.0-75.0)  %


 


Lymph % (Auto)    (20.0-40.0)  %


 


Mono % (Auto)    (0.0-10.0)  %


 


Eos % (Auto)    (0.0-4.0)  %


 


Baso % (Auto)    (0.0-2.0)  %


 


Neut # (Auto)    (1.8-7.0)  K/uL


 


Lymph # (Auto)    (1.0-4.3)  K/uL


 


Mono # (Auto)    (0.0-0.8)  K/uL


 


Eos # (Auto)    (0.0-0.7)  K/uL


 


Baso # (Auto)    (0.0-0.2)  K/uL


 


Neutrophils % (Manual)    (50-75)  %


 


Band Neutrophils %    (0-2)  %


 


Lymphocytes % (Manual)    (20-40)  %


 


Monocytes % (Manual)    (0-10)  %


 


Nucleated RBC %    (0-0)  %


 


Toxic Granulation    


 


Platelet Estimate    (NORMAL)  


 


Large Platelets    


 


Giant Platelets    


 


Polychromasia    


 


Hypochromasia (manual)    


 


Anisocytosis (manual)    


 


Macrocytosis (manual)    


 


Target Cells    


 


APTT    (21-34)  SECONDS


 


Puncture Site    


 


pCO2    (35-45)  mm/Hg


 


pO2    ()  mm/Hg


 


HCO3    (21-28)  mmol/L


 


ABG pH    (7.35-7.45)  


 


ABG Total CO2    (22-28)  mmol/L


 


ABG O2 Saturation    (95-98)  %


 


ABG Base Excess    (-2.0-3.0)  mmol/L


 


ABG Hemoglobin    (11.7-17.4)  g/dL


 


ABG Carboxyhemoglobin    (0.5-1.5)  %


 


POC ABG HHb (Measured)    (0.0-5.0)  %


 


ABG Methemoglobin    (0.0-3.0)  %


 


Jesus Test    


 


A-a O2 Difference    mm/Hg


 


Respiratory Index    


 


Hgb O2 Saturation    (95.0-98.0)  %


 


Vent Mode    


 


Mechanical Rate    


 


FiO2    %


 


Tidal Volume    


 


PEEP    


 


Sodium    (132-148)  mmol/L


 


Potassium    (3.6-5.2)  mmol/L


 


Chloride    ()  mmol/L


 


Carbon Dioxide    (22-30)  mmol/L


 


Anion Gap    (10-20)  


 


BUN    (7-17)  mg/dL


 


Creatinine    (0.7-1.2)  mg/dL


 


Est GFR (African Amer)    


 


Est GFR (Non-Af Amer)    


 


POC Glucose (mg/dL)  261 H  247 H  ()  mg/dL


 


Random Glucose    ()  mg/dL


 


Calcium    (8.6-10.4)  mg/dl


 


Total Bilirubin    (0.2-1.3)  mg/dL


 


AST    (14-36)  U/L


 


ALT    (9-52)  U/L


 


Alkaline Phosphatase    ()  U/L


 


Total Protein    (6.3-8.3)  g/dL


 


Albumin    (3.5-5.0)  g/dL


 


Globulin    (2.2-3.9)  gm/dL


 


Albumin/Globulin Ratio    (1.0-2.1)  








 Laboratory Results - last 24 hr











  03/10/18 03/10/18 03/10/18





  11:30 17:39 23:25


 


WBC   


 


RBC   


 


Hgb   


 


Hct   


 


MCV   


 


MCH   


 


MCHC   


 


RDW   


 


Plt Count   


 


MPV   


 


Neut % (Auto)   


 


Lymph % (Auto)   


 


Mono % (Auto)   


 


Eos % (Auto)   


 


Baso % (Auto)   


 


Neut # (Auto)   


 


Lymph # (Auto)   


 


Mono # (Auto)   


 


Eos # (Auto)   


 


Baso # (Auto)   


 


Neutrophils % (Manual)   


 


Band Neutrophils %   


 


Lymphocytes % (Manual)   


 


Monocytes % (Manual)   


 


Nucleated RBC %   


 


Toxic Granulation   


 


Platelet Estimate   


 


Large Platelets   


 


Giant Platelets   


 


Polychromasia   


 


Hypochromasia (manual)   


 


Anisocytosis (manual)   


 


Macrocytosis (manual)   


 


Target Cells   


 


APTT   


 


Puncture Site   


 


pCO2   


 


pO2   


 


HCO3   


 


ABG pH   


 


ABG Total CO2   


 


ABG O2 Saturation   


 


ABG Base Excess   


 


ABG Hemoglobin   


 


ABG Carboxyhemoglobin   


 


POC ABG HHb (Measured)   


 


ABG Methemoglobin   


 


Jesus Test   


 


A-a O2 Difference   


 


Respiratory Index   


 


Hgb O2 Saturation   


 


Vent Mode   


 


Mechanical Rate   


 


FiO2   


 


Tidal Volume   


 


PEEP   


 


Sodium   


 


Potassium   


 


Chloride   


 


Carbon Dioxide   


 


Anion Gap   


 


BUN   


 


Creatinine   


 


Est GFR ( Amer)   


 


Est GFR (Non-Af Amer)   


 


POC Glucose (mg/dL)  247 H  261 H  230 H


 


Random Glucose   


 


Calcium   


 


Total Bilirubin   


 


AST   


 


ALT   


 


Alkaline Phosphatase   


 


Total Protein   


 


Albumin   


 


Globulin   


 


Albumin/Globulin Ratio   














  03/11/18 03/11/18 03/11/18





  05:30 06:06 06:21


 


WBC   


 


RBC   


 


Hgb   


 


Hct   


 


MCV   


 


MCH   


 


MCHC   


 


RDW   


 


Plt Count   


 


MPV   


 


Neut % (Auto)   


 


Lymph % (Auto)   


 


Mono % (Auto)   


 


Eos % (Auto)   


 


Baso % (Auto)   


 


Neut # (Auto)   


 


Lymph # (Auto)   


 


Mono # (Auto)   


 


Eos # (Auto)   


 


Baso # (Auto)   


 


Neutrophils % (Manual)   


 


Band Neutrophils %   


 


Lymphocytes % (Manual)   


 


Monocytes % (Manual)   


 


Nucleated RBC %   


 


Toxic Granulation   


 


Platelet Estimate   


 


Large Platelets   


 


Giant Platelets   


 


Polychromasia   


 


Hypochromasia (manual)   


 


Anisocytosis (manual)   


 


Macrocytosis (manual)   


 


Target Cells   


 


APTT   


 


Puncture Site  Rradial  


 


pCO2  28 L  


 


pO2  176 H  


 


HCO3  22.9  


 


ABG pH  7.47 H  


 


ABG Total CO2  21.3 L  


 


ABG O2 Saturation  100.0 H  


 


ABG Base Excess  -2.6 L  


 


ABG Hemoglobin  9.0 L  


 


ABG Carboxyhemoglobin  2.0 H  


 


POC ABG HHb (Measured)  0.0  


 


ABG Methemoglobin  1.2  


 


Jesus Test  Pos  


 


A-a O2 Difference  74.0  


 


Respiratory Index  0.4  


 


Hgb O2 Saturation  96.8  


 


Vent Mode  Prvc  


 


Mechanical Rate  16  


 


FiO2  40.0  


 


Tidal Volume  500  


 


PEEP  5  


 


Sodium    135


 


Potassium    4.6


 


Chloride    97 L


 


Carbon Dioxide    23


 


Anion Gap    20


 


BUN    87 H


 


Creatinine    5.2 H


 


Est GFR ( Amer)    10


 


Est GFR (Non-Af Amer)    8


 


POC Glucose (mg/dL)   274 H 


 


Random Glucose    299 H


 


Calcium    8.3 L


 


Total Bilirubin    0.7


 


AST    57 H D


 


ALT    502 H D


 


Alkaline Phosphatase    216 H


 


Total Protein    5.8 L


 


Albumin    2.9 L


 


Globulin    2.9


 


Albumin/Globulin Ratio    1.0














  03/11/18 03/11/18





  06:23 06:23


 


WBC  16.8 H 


 


RBC  3.16 L 


 


Hgb  8.7 L 


 


Hct  27.1 L 


 


MCV  85.8 


 


MCH  27.7 


 


MCHC  32.3 L 


 


RDW  17.0 H 


 


Plt Count  169 


 


MPV  10.3 


 


Neut % (Auto)  95.5 H 


 


Lymph % (Auto)  2.2 L 


 


Mono % (Auto)  2.2 


 


Eos % (Auto)  0.0 


 


Baso % (Auto)  0.1 


 


Neut # (Auto)  16.0 H 


 


Lymph # (Auto)  0.4 L 


 


Mono # (Auto)  0.4 


 


Eos # (Auto)  0.0 


 


Baso # (Auto)  0.0 


 


Neutrophils % (Manual)  93 H 


 


Band Neutrophils %  3 H 


 


Lymphocytes % (Manual)  2 L 


 


Monocytes % (Manual)  2 


 


Nucleated RBC %  2 H 


 


Toxic Granulation  Present 


 


Platelet Estimate  Normal 


 


Large Platelets  Present 


 


Giant Platelets  Present 


 


Polychromasia  Slight 


 


Hypochromasia (manual)  Slight 


 


Anisocytosis (manual)  Slight 


 


Macrocytosis (manual)  Slight 


 


Target Cells  Slight 


 


APTT   77 H


 


Puncture Site  


 


pCO2  


 


pO2  


 


HCO3  


 


ABG pH  


 


ABG Total CO2  


 


ABG O2 Saturation  


 


ABG Base Excess  


 


ABG Hemoglobin  


 


ABG Carboxyhemoglobin  


 


POC ABG HHb (Measured)  


 


ABG Methemoglobin  


 


Jesus Test  


 


A-a O2 Difference  


 


Respiratory Index  


 


Hgb O2 Saturation  


 


Vent Mode  


 


Mechanical Rate  


 


FiO2  


 


Tidal Volume  


 


PEEP  


 


Sodium  


 


Potassium  


 


Chloride  


 


Carbon Dioxide  


 


Anion Gap  


 


BUN  


 


Creatinine  


 


Est GFR ( Amer)  


 


Est GFR (Non-Af Amer)  


 


POC Glucose (mg/dL)  


 


Random Glucose  


 


Calcium  


 


Total Bilirubin  


 


AST  


 


ALT  


 


Alkaline Phosphatase  


 


Total Protein  


 


Albumin  


 


Globulin  


 


Albumin/Globulin Ratio  











Fingerstick Blood Sugar Results: 274

## 2018-03-11 NOTE — CP.PCM.PN
Subjective





- Date & Time of Evaluation


Date of Evaluation: 03/11/18


Time of Evaluation: 08:05





- Subjective


Subjective: 





Patient remains intubated at this time





The patient was on PRVC setting throughout the night. 


HR is 100s to 110s atrial fibrillation





Yesterday I had a very long sit down discussion with the patient's daughter and 

POJOSSELYN - Kristel Corral. She explains there is no living will. 


As previously mentioned, patient refused the cardiac catherization from before. 

I explained to family that it maybe very difficult to extubate patient and that 

her heart function was very weak. 











Objective





- Vital Signs/Intake and Output


Vital Signs (last 24 hours): 


 











Temp Pulse Resp BP Pulse Ox


 


 97.8 F   108 H  16   133/70   98 


 


 03/11/18 05:00  03/11/18 07:39  03/11/18 07:39  03/11/18 07:39  03/11/18 04:39








Intake and Output: 


 











 03/11/18 03/11/18





 06:59 18:59


 


Intake Total  35


 


Output Total  0


 


Balance  35














- Medications


Medications: 


 Current Medications





Albuterol/Ipratropium (Duoneb 3 Mg/0.5 Mg (3 Ml) Ud)  3 ml INH RQ4 [facility]


   Last Admin: 03/11/18 07:19 Dose:  3 ml


Aspirin (Aspirin Chewable)  81 mg PO DAILY [facility]


   Last Admin: 03/10/18 09:25 Dose:  81 mg


Epoetin Vamsi (Procrit)  8,000 unit IV MWF [facility]


   Last Admin: 03/09/18 11:19 Dose:  8,000 unit


Ferrous Gluconate (Fergon)  324 mg PO TID [facility]


   Last Admin: 03/10/18 18:05 Dose:  324 mg


Aztreonam 1 gm/ Sodium (Chloride)  100 mls @ 100 mls/hr IVPB Q12H [facility]


   Last Admin: 03/11/18 01:45 Dose:  100 mls/hr


Heparin Sodium/Sodium Chloride (Heparin 42861 Units/250ml 1/2 Normal Saline)  25

,000 units in 250 mls @ 10 mls/hr IV .Q24H PRN; Protocol


   PRN Reason: ADJUST RATE PER PROTOCOL


   Last Admin: 03/10/18 20:30 Dose:  1,000 units/hr, 10 mls/hr


Metronidazole (Flagyl)  500 mg in 100 mls @ 100 mls/hr IVPB Q8 [facility]


Insulin Aspart (Novolog)  0 unit SC Q6 [facility]


   PRN Reason: Protocol


   Last Admin: 03/11/18 06:29 Dose:  6 unit


Insulin Glargine (Lantus)  5 unit SC HS [facility]


   Last Admin: 03/10/18 22:15 Dose:  5 u


Methylprednisolone (Solu-Medrol)  40 mg IVP Q8H [facility]


   Last Admin: 03/11/18 05:00 Dose:  40 mg


Metoprolol Tartrate (Lopressor)  25 mg PO BID [facility]


   Last Admin: 03/10/18 18:03 Dose:  25 mg


Pantoprazole Sodium (Protonix Inj)  40 mg IVP DAILY [facility]


   Last Admin: 03/10/18 09:26 Dose:  40 mg


Paricalcitol (Zemplar)  2 mcg IV MWF [facility]


Rosuvastatin Calcium (Crestor)  5 mg PO [facility]


   Last Admin: 03/10/18 21:49 Dose:  5 mg


Vitamin B Complex/Vit C/Folic Acid (Nephro-Nneka)  1 tab PO 0800 [facility]


   Last Admin: 03/10/18 09:25 Dose:  1 tab











- Labs


Labs: 


 





 03/11/18 06:23 





 03/11/18 06:21 





 











PT  17.3 SECONDS (9.7-12.2)  H  03/09/18  06:00    


 


INR  1.5   03/09/18  06:00    


 


APTT  77 SECONDS (21-34)  H  03/11/18  06:23    














- Constitutional


Appears: Unkempt, Older Than Stated Age, Confused, Chronically Ill





- Eye Exam


Eye Exam: EOMI





- ENT Exam


ENT Exam: Mucous Membranes Moist





- Respiratory Exam


Respiratory Exam: Clear to Ausculation Bilateral





- Cardiovascular Exam


Cardiovascular Exam: Irregular Rhythm





- GI/Abdominal Exam


GI & Abdominal Exam: Soft, Normal Bowel Sounds





- Neurological Exam


Neurological Exam: Alert, Awake





- Psychiatric Exam


Psychiatric exam: Depressed, Flat Affect





- Skin


Skin Exam: Normal Color, Warm





Assessment and Plan





- Assessment and Plan (Free Text)


Assessment: 








Assessment: 





As previously mentioned, this is a 77 year old female with a history of ESRD 

now getting HD, CHF, DM, HTN, hypercholesterolemia, and some sort of MI years 

before, repeated TIAs, and colon CA. She was brought in by family for altered 

mental status and ultimately found to have severe metabolic acidosis as well as 

a extremely elevated troponins. 





Plan: 





1 Acute NSTEMI ,  Asystol ,CPR / Sustained VT


3/11: 


3/10: I had long conversation with POA Kristel Corral and per the POA the patient 

indicated to them no cardiac catherization. 


I explained to POA that considering the weaknened heart function it may prove 

very difficult to successfully extubate patient.


At this moment she remains on heparin ggt, getting HD, and also weaning trials


3/9: Cardiac catherization was canceled


3/8: Potentially cardiac catherization shortly from now. Remains on heparin ggt


3/7: atrial fibrillation appearance on telemetry, check a new 12 lead EKG. 

Remains on heparin ggt.


3/6: Remains in heparin ggt, statin, ASA. Hopefully at some point can have 

cardiac catherization. 


3/5: Now off pressor medications. She remains on amiodarone   


Echo EF 30%. Troponins were as high as 103 then decreased. 


Currently on a heparin ggt, ASA, Statin.  





2 Respiratory failure, intubation


3/11: She may end up needing a trach and or PEG if she is not able to be 

succefully extubated


3/10: Again became tachycardic at night and was switched from PS/CPAP settings 

back to PRVC. Family is aware that it will be difficult to extubate


3/9: Back on PRVC after having tachy cardia


3/8: Has tolerated CPAP settings throughout the night


3/7: Yesterday afternoon she was moved back to PRVC


3/6: Now changed to CPAP/PS settings. The chest XRAYs look better this morning 

following HD last night.





3 Atrial Fibrillation 


3/10: remains in atrial fibrillation. On heparin and loppressor BID


3/9: Continue on heparin ggt, Lopressor increased to 25 BID





4 Acute renal failure 


3/10 Did well with HD yesterday


3/8: Toleratated HD well yesterday


3/7: Currently MWF HD


3/6: Underwent HD yesterday and tolerated it well. CXRAY looks better


3/5/2018: She has had one session of HD so far. Axel another session today





5 Leukocytosis concerning for sepsis


3/6: Today decreased WBC. Cultures negative so far


3/5/2018: The elevated WBC maybe stress related.  


She remains on abx Aztreonam IV and Flagyl IV





6 Acute systolic heart failure


3/6: Monitor chest XRAYs. 


  ECHO shows EF 30%





7 Pleural effusion


3/8: Still present on CXRAYs. Continue to moniotp


3/6: Improving CXRAYs


3/5/2018: Pleural effusions minimal at this time. Continue to monitor





8 Hyperglycemia and uncontrolled DM


3/6: Reccent accucheks 130s, 130, 160





9 Anemia of chronic disease, CKD


   3/6: Yesterday had 1 unit of PRBC given during HD, receiving EPO

## 2018-03-11 NOTE — CP.PCM.PN
Subjective





- Date & Time of Evaluation


Date of Evaluation: 03/11/18


Time of Evaluation: 17:35





- Subjective


Subjective: 





Nephrology Consultation:





Assessment: critical


CKD stage 5 (N18.5) with hyperkalemia, acidosis now has ESRD on HD via [facility] 


pulm edema, CHF, NSTEMI, pleural effusions, pneumonia


AMS, hyperglycemia


s/p cardiac arrest, shock liver, A fib





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 


Hypertensive Chronic Kidney Disease (I12.9)


Anemia (D64.9), Hyperphosphatemia (E83.39), Secondary Hyperparathyroidism (E21.1

), HTN (I12.9), vit D insuff, hx of colon CA, TIAs





Plan


HD monday as ordered. no acute need today


maintain hemodynamic stable. Patient not on ACEI/ARB due to low BP. off 

midodrine


started iron supplements, MVI, epogen with HD, and zemplar 2 mcg with HD. d/c 

oral vit d


vasc surgery has evaluated. 


ID and cardiology following


KDUR supplementation as needed





Dose meds/antibiotics for ESRD/HD status. Avoid fleets enema/magnesium based 

laxatives. Avoid nephrotoxins/NSAIDs


Glycemic control


Further work up/management as per primary team





Thanks for allowing me to participate in care of your patient. Will follow 

patient with you. Please call if any Qs. 


Dr Shad Panchal


Office: [phone] Cell: [phone] Fax: [phone]





Chief Complaint; unable to obtain


reason for consult: CKD management


source of Info: EMR


HPI: Pt is a 77 F with hx of diabetes Mellitus ( years), hypertension (years), 

CHF, TIA, Colon CA, CKD stage 4/5, has matured AVG in left arm presented with 

complaints of AMS and hyperglycemia as brought by family. now has elevated trop 

100, hyperkalemia, AMS and elevated sugar, pulm edema. renal consult for CKD 

management. 


pt with AMS unable to provide any hx. she had received medical management for 

hyperkalemia. 





ROS: unable to obtain





Physical Examination: 


General Appearance: ill appearing,intubated


Vitals reviewed and noted as below


Head; Atraumatic, normocephalic


ENT: intubated


EYES: PERRLA


Neck; supple no lymphadenopathy, no thyromegaly or bruit


Lungs: Normal respiratory rate/effort. Breath sounds bilateral clear, she is 

mechanically ventilated


Heart: Normal rate. s1s2 normal. No rub or gallop. 


Extremities: no edema. No varicose veins. chronic hyperpigmented changes in legs


Neurological: Patient is  awake follows commands


Skin: Warm and dry. Normal turgor. No rash. Palpitation: Normal elasticity for 

age


Abdomen: Abdomen is soft. Bowel sounds +. There is no abdominal tenderness, no 

guarding/rigidity no organomegaly


Psych: unable


MSK: no joint tenderness or swelling. Digits and nails normal, no deformity


: kidney or bladder not palpable


Access: Left AVG without bruit. has left IJ shiley





Labs/imaging reviewed.


Past medical history, past surgical history, family history, social history, 

allergy reviewed and noted as below


Family hx: no hx of CKD. Rest non-contributory








Objective





- Vital Signs/Intake and Output


Vital Signs (last 24 hours): 


 











Temp Pulse Resp BP Pulse Ox


 


 97.7 F   93 H  16   124/67   100 


 


 03/11/18 17:00  03/11/18 17:00  03/11/18 17:00  03/11/18 16:39  03/11/18 17:00








Intake and Output: 


 











 03/11/18 03/11/18





 06:59 18:59


 


Intake Total  585


 


Output Total  0


 


Balance  585














- Medications


Medications: 


 Current Medications





Albuterol/Ipratropium (Duoneb 3 Mg/0.5 Mg (3 Ml) Ud)  3 ml INH RQ4 [facility]


   Last Admin: 03/11/18 16:16 Dose:  3 ml


Aspirin (Aspirin Chewable)  81 mg PO DAILY [facility]


   Last Admin: 03/11/18 09:41 Dose:  81 mg


Epoetin Vamsi (Procrit)  8,000 unit IV MWF [facility]


   Last Admin: 03/09/18 11:19 Dose:  8,000 unit


Ferrous Gluconate (Fergon)  324 mg PO TID [facility]


   Last Admin: 03/11/18 13:40 Dose:  324 mg


Aztreonam 1 gm/ Sodium (Chloride)  100 mls @ 100 mls/hr IVPB Q12H [facility]


   Last Admin: 03/11/18 13:40 Dose:  100 mls/hr


Heparin Sodium/Sodium Chloride (Heparin 42156 Units/250ml 1/2 Normal Saline)  25

,000 units in 250 mls @ 10 mls/hr IV .Q24H PRN; Protocol


   PRN Reason: ADJUST RATE PER PROTOCOL


   Last Admin: 03/10/18 20:30 Dose:  1,000 units/hr, 10 mls/hr


Metronidazole (Flagyl)  500 mg in 100 mls @ 100 mls/hr IVPB Q8 [facility]


   Last Admin: 03/11/18 13:40 Dose:  100 mls/hr


Insulin Aspart (Novolog)  0 unit SC Q6 [facility]


   PRN Reason: Protocol


   Last Admin: 03/11/18 12:41 Dose:  6 unit


Insulin Glargine (Lantus)  5 unit SC HS [facility]


   Last Admin: 03/10/18 22:15 Dose:  5 u


Methylprednisolone (Solu-Medrol)  40 mg IVP DAILY [facility]


   Stop: 03/15/18 10:01


Metoprolol Tartrate (Lopressor)  25 mg PO BID [facility]


   Last Admin: 03/11/18 09:41 Dose:  25 mg


Pantoprazole Sodium (Protonix Ec Tab)  40 mg PO DAILY [facility]


Paricalcitol (Zemplar)  2 mcg IV MWF [facility]


Rosuvastatin Calcium (Crestor)  5 mg PO HS [facility]


   Last Admin: 03/10/18 21:49 Dose:  5 mg


Vitamin B Complex/Vit C/Folic Acid (Nephro-Nneka)  1 tab PO 0800 [facility]


   Last Admin: 03/11/18 09:41 Dose:  1 tab











- Labs


Labs: 


 





 03/11/18 06:23 





 03/11/18 06:21 





 











PT  17.3 SECONDS (9.7-12.2)  H  03/09/18  06:00    


 


INR  1.5   03/09/18  06:00    


 


APTT  77 SECONDS (21-34)  H  03/11/18  06:23

## 2018-03-12 LAB
ALBUMIN SERPL-MCNC: 2.7 G/DL (ref 3.5–5)
ALBUMIN/GLOB SERPL: 0.9 {RATIO} (ref 1–2.1)
ALT SERPL-CCNC: 382 U/L (ref 9–52)
ANISOCYTOSIS BLD QL SMEAR: SLIGHT
ARTERIAL BLOOD GAS HEMOGLOBIN: 9.1 G/DL (ref 11.7–17.4)
ARTERIAL BLOOD GAS O2 SAT: 91.8 % (ref 95–98)
ARTERIAL BLOOD GAS PCO2: 30 MM/HG (ref 35–45)
ARTERIAL BLOOD GAS TCO2: 21.3 MMOL/L (ref 22–28)
AST SERPL-CCNC: 49 U/L (ref 14–36)
BASOPHILS # BLD AUTO: 0 K/UL (ref 0–0.2)
BASOPHILS NFR BLD: 0.2 % (ref 0–2)
BUN SERPL-MCNC: 109 MG/DL (ref 7–17)
BURR CELLS BLD QL SMEAR: SLIGHT
CALCIUM SERPL-MCNC: 8.3 MG/DL (ref 8.6–10.4)
EOSINOPHIL # BLD AUTO: 0 K/UL (ref 0–0.7)
EOSINOPHIL NFR BLD: 0 % (ref 0–4)
ERYTHROCYTE [DISTWIDTH] IN BLOOD BY AUTOMATED COUNT: 16.7 % (ref 11.5–14.5)
GFR NON-AFRICAN AMERICAN: 7
HCO3 BLDA-SCNC: 22.4 MMOL/L (ref 21–28)
HGB BLD-MCNC: 8.5 G/DL (ref 11–16)
HYPOCHROMIC: SLIGHT
INHALED O2 CONCENTRATION: 40 %
LYMPHOCYTE: 2 % (ref 20–40)
LYMPHOCYTES # BLD AUTO: 0.5 K/UL (ref 1–4.3)
LYMPHOCYTES NFR BLD AUTO: 2.6 % (ref 20–40)
MCH RBC QN AUTO: 28 PG (ref 27–31)
MCHC RBC AUTO-ENTMCNC: 32.9 G/DL (ref 33–37)
MCV RBC AUTO: 85.2 FL (ref 81–99)
MONOCYTE: 1 % (ref 0–10)
MONOCYTES # BLD: 0.6 K/UL (ref 0–0.8)
MONOCYTES NFR BLD: 3 % (ref 0–10)
NEUTROPHILS # BLD: 18.2 K/UL (ref 1.8–7)
NEUTROPHILS NFR BLD AUTO: 94.2 % (ref 50–75)
NEUTROPHILS NFR BLD AUTO: 97 % (ref 50–75)
NRBC BLD AUTO-RTO: 0.3 % (ref 0–2)
OVALOCYTES BLD QL SMEAR: SLIGHT
PH BLDA: 7.44 [PH] (ref 7.35–7.45)
PLATELET # BLD EST: NORMAL 10*3/UL
PLATELET # BLD: 178 K/UL (ref 130–400)
PMV BLD AUTO: 10.4 FL (ref 7.2–11.7)
PO2 BLDA: 55 MM/HG (ref 80–100)
POLYCHROMIC: SLIGHT
RBC # BLD AUTO: 3.03 MIL/UL (ref 3.8–5.2)
SCHISTOCYTES BLD QL SMEAR: SLIGHT
TARGETS BLD QL SMEAR: SLIGHT
TOTAL CELLS COUNTED BLD: 100
WBC # BLD AUTO: 19.3 K/UL (ref 4.8–10.8)

## 2018-03-12 RX ADMIN — IPRATROPIUM BROMIDE AND ALBUTEROL SULFATE SCH ML: .5; 3 SOLUTION RESPIRATORY (INHALATION) at 03:12

## 2018-03-12 RX ADMIN — PARICALCITOL SCH MCG: 2 INJECTION, SOLUTION INTRAVENOUS at 10:28

## 2018-03-12 RX ADMIN — INSULIN ASPART SCH: 100 INJECTION, SOLUTION INTRAVENOUS; SUBCUTANEOUS at 12:16

## 2018-03-12 RX ADMIN — IPRATROPIUM BROMIDE AND ALBUTEROL SULFATE SCH ML: .5; 3 SOLUTION RESPIRATORY (INHALATION) at 19:20

## 2018-03-12 RX ADMIN — VANCOMYCIN HYDROCHLORIDE SCH MLS/HR: 1 INJECTION, POWDER, LYOPHILIZED, FOR SOLUTION INTRAVENOUS at 18:12

## 2018-03-12 RX ADMIN — WATER SCH MLS/HR: 1 INJECTION INTRAMUSCULAR; INTRAVENOUS; SUBCUTANEOUS at 05:00

## 2018-03-12 RX ADMIN — Medication SCH TAB: at 14:08

## 2018-03-12 RX ADMIN — IPRATROPIUM BROMIDE AND ALBUTEROL SULFATE SCH: .5; 3 SOLUTION RESPIRATORY (INHALATION) at 00:10

## 2018-03-12 RX ADMIN — INSULIN GLARGINE SCH U: 100 INJECTION, SOLUTION SUBCUTANEOUS at 23:47

## 2018-03-12 RX ADMIN — IPRATROPIUM BROMIDE AND ALBUTEROL SULFATE SCH: .5; 3 SOLUTION RESPIRATORY (INHALATION) at 11:50

## 2018-03-12 RX ADMIN — IPRATROPIUM BROMIDE AND ALBUTEROL SULFATE SCH ML: .5; 3 SOLUTION RESPIRATORY (INHALATION) at 07:45

## 2018-03-12 RX ADMIN — INSULIN ASPART SCH UNIT: 100 INJECTION, SOLUTION INTRAVENOUS; SUBCUTANEOUS at 05:15

## 2018-03-12 RX ADMIN — INSULIN ASPART SCH: 100 INJECTION, SOLUTION INTRAVENOUS; SUBCUTANEOUS at 00:00

## 2018-03-12 RX ADMIN — METHYLPREDNISOLONE SODIUM SUCCINATE SCH MG: 40 INJECTION, POWDER, FOR SOLUTION INTRAMUSCULAR; INTRAVENOUS at 13:56

## 2018-03-12 RX ADMIN — IPRATROPIUM BROMIDE AND ALBUTEROL SULFATE SCH ML: .5; 3 SOLUTION RESPIRATORY (INHALATION) at 15:38

## 2018-03-12 RX ADMIN — ERYTHROPOIETIN SCH UNIT: 10000 INJECTION, SOLUTION INTRAVENOUS; SUBCUTANEOUS at 10:28

## 2018-03-12 RX ADMIN — WATER SCH MLS/HR: 1 INJECTION INTRAMUSCULAR; INTRAVENOUS; SUBCUTANEOUS at 21:19

## 2018-03-12 RX ADMIN — INSULIN ASPART SCH: 100 INJECTION, SOLUTION INTRAVENOUS; SUBCUTANEOUS at 18:11

## 2018-03-12 RX ADMIN — WATER SCH MLS/HR: 1 INJECTION INTRAMUSCULAR; INTRAVENOUS; SUBCUTANEOUS at 14:09

## 2018-03-12 RX ADMIN — ERYTHROPOIETIN SCH: 4000 INJECTION, SOLUTION INTRAVENOUS; SUBCUTANEOUS at 18:57

## 2018-03-12 NOTE — CP.PCM.PCO
Physician Communication Note





- Physician Communication Note


Physician Communication Note: Family did not commit to scheduled family meeting 

on Friday. Will try today

## 2018-03-12 NOTE — CP.PCM.PN
Subjective





- Date & Time of Evaluation


Date of Evaluation: 03/12/18


Time of Evaluation: 15:00





- Subjective


Subjective: 





Nephrology Consultation:





Assessment: critical


CKD stage 5 (N18.5) with hyperkalemia, acidosis now has ESRD on HD via [facility] 


pulm edema, CHF, NSTEMI, pleural effusions, pneumonia


AMS, hyperglycemia


s/p cardiac arrest, shock liver, A fib





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 


Hypertensive Chronic Kidney Disease (I12.9)


Anemia (D64.9), Hyperphosphatemia (E83.39), Secondary Hyperparathyroidism (E21.1

), HTN (I12.9), vit D insuff, hx of colon CA, TIAs





Plan


HD today as ordered. had low BFR during HD.


maintain hemodynamic stable. Patient not on ACEI/ARB due to low BP. off 

midodrine


started iron supplements, MVI, epogen with HD, and zemplar 2 mcg with HD. d/c 

oral vit d


vasc surgery has evaluated. need follow up Re; Long term HD access


ID and cardiology following


KDUR supplementation as needed





Dose meds/antibiotics for ESRD/HD status. Avoid fleets enema/magnesium based 

laxatives. Avoid nephrotoxins/NSAIDs


Glycemic control


Further work up/management as per primary team





Thanks for allowing me to participate in care of your patient. Will follow 

patient with you. Please call if any Qs. d/w team


Dr Shad Panchal


Office: [phone] Cell: [phone] Fax: [phone]





Chief Complaint; unable to obtain


reason for consult: CKD management


source of Info: EMR


HPI: Pt is a 77 F with hx of diabetes Mellitus ( years), hypertension (years), 

CHF, TIA, Colon CA, CKD stage 4/5, has matured AVG in left arm presented with 

complaints of AMS and hyperglycemia as brought by family. now has elevated trop 

100, hyperkalemia, AMS and elevated sugar, pulm edema. renal consult for CKD 

management. 


pt with AMS unable to provide any hx. she had received medical management for 

hyperkalemia. 





ROS: unable to obtain





Physical Examination: 


General Appearance: ill appearing,intubated


Vitals reviewed and noted as below


Head; Atraumatic, normocephalic


ENT: intubated


EYES: PERRLA


Neck; supple no lymphadenopathy, no thyromegaly or bruit


Lungs: Normal respiratory rate/effort. Breath sounds bilateral clear, she is 

mechanically ventilated


Heart: Normal rate. s1s2 normal. No rub or gallop. 


Extremities: no edema. No varicose veins. chronic hyperpigmented changes in legs


Neurological: Patient is  awake follows commands


Skin: Warm and dry. Normal turgor. No rash. Palpitation: Normal elasticity for 

age


Abdomen: Abdomen is soft. Bowel sounds +. There is no abdominal tenderness, no 

guarding/rigidity no organomegaly


Psych: unable


MSK: no joint tenderness or swelling. Digits and nails normal, no deformity


: kidney or bladder not palpable


Access: Left AVG without bruit. has left IJ shiley





Labs/imaging reviewed.


Past medical history, past surgical history, family history, social history, 

allergy reviewed and noted as below


Family hx: no hx of CKD. Rest non-contributory





Objective





- Vital Signs/Intake and Output


Vital Signs (last 24 hours): 


 











Temp Pulse Resp BP Pulse Ox


 


 97.2 F L  109 H  20   125/56 L  100 


 


 03/12/18 13:05  03/12/18 14:23  03/12/18 14:23  03/12/18 14:23  03/12/18 14:23








Intake and Output: 


 











 03/12/18 03/12/18





 06:59 18:59


 


Intake Total 970 365


 


Output Total 50 0


 


Balance 920 365














- Medications


Medications: 


 Current Medications





Albuterol/Ipratropium (Duoneb 3 Mg/0.5 Mg (3 Ml) Ud)  3 ml INH RQ4 [facility]


   Last Admin: 03/12/18 11:50 Dose:  Not Given


Aspirin (Aspirin Chewable)  81 mg PO DAILY [facility]


   Last Admin: 03/12/18 13:55 Dose:  81 mg


Epoetin Vamsi (Procrit)  10,000 unit IV MWF [facility]


   Last Admin: 03/12/18 10:28 Dose:  10,000 unit


Ferrous Gluconate (Fergon)  324 mg PO TID [facility]


   Last Admin: 03/12/18 14:08 Dose:  324 mg


Heparin Sodium (Porcine) (Heparin)  5,000 units SC Q8 [facility]


   Last Admin: 03/12/18 14:08 Dose:  5,000 units


Aztreonam 1 gm/ Sodium (Chloride)  100 mls @ 100 mls/hr IVPB Q12H [facility]


   Last Admin: 03/12/18 14:08 Dose:  100 mls/hr


Metronidazole (Flagyl)  500 mg in 100 mls @ 100 mls/hr IVPB Q8 [facility]


   Last Admin: 03/12/18 14:09 Dose:  100 mls/hr


Vancomycin HCl 1 gm/ Sodium (Chloride)  250 mls @ 166.7 mls/hr IVPB MWF [facility]


   PRN Reason: Protocol


Insulin Aspart (Novolog)  0 unit SC Q6 [facility]


   PRN Reason: Protocol


   Last Admin: 03/12/18 12:16 Dose:  Not Given


Insulin Glargine (Lantus)  5 unit SC HS [facility]


   Last Admin: 03/11/18 23:00 Dose:  5 u


Methylprednisolone (Solu-Medrol)  40 mg IVP DAILY [facility]


   Stop: 03/15/18 10:01


   Last Admin: 03/12/18 13:56 Dose:  40 mg


Metoprolol Tartrate (Lopressor)  25 mg PO BID [facility]


   Last Admin: 03/12/18 13:55 Dose:  Not Given


Pantoprazole Sodium (Protonix Susp)  40 mg NG 0600 [facility]


Paricalcitol (Zemplar)  2 mcg IV MWF [facility]


   Last Admin: 03/12/18 10:28 Dose:  2 mcg


Rosuvastatin Calcium (Crestor)  5 mg PO [facility]


   Last Admin: 03/11/18 21:24 Dose:  5 mg


Senna (Senokot Syrup)  8.8 mg NG DAILY [facility]


   Last Admin: 03/12/18 13:56 Dose:  8.8 mg


Vitamin B Complex/Vit C/Folic Acid (Nephro-Nneka)  1 tab PO 0800 [facility]


   Last Admin: 03/12/18 14:08 Dose:  1 tab











- Labs


Labs: 


 





 03/12/18 06:27 





 03/12/18 06:27 





 











PT  17.3 SECONDS (9.7-12.2)  H  03/09/18  06:00    


 


INR  1.5   03/09/18  06:00    


 


APTT  83 SECONDS (21-34)  H D 03/12/18  06:27

## 2018-03-12 NOTE — CP.CCUPN
<Kimberly Yao - Last Filed: 03/12/18 14:45>





CCU Subjective





- Physician Review


Subjective (Free Text): 





03/12/18 11:56





Patient seen and examined at bedside. Per nursing no acute events overnight. 

Patient is intubated. Responds to verbal stimuli and following commands. ROS 

not obtained.








CCU Objective





- Vital Signs / Intake & Output


Vital Signs (Last 4 hours): 


Vital Signs











  Temp Pulse Pulse Resp BP BP Pulse Ox


 


 03/12/18 11:22   110 H   16  102/57 L   98


 


 03/12/18 11:07   115 H   19  105/57 L   100


 


 03/12/18 11:05       116/56 L 


 


 03/12/18 11:00   100 H   16    100


 


 03/12/18 10:52   96 H   16  116/56 L   100


 


 03/12/18 10:37   104 H   18  111/58 L   100


 


 03/12/18 10:35       111/58 L 


 


 03/12/18 10:22   94 H   17  114/51 L   100


 


 03/12/18 10:20       114/51 L 


 


 03/12/18 10:06   115 H   17  119/60  


 


 03/12/18 10:05       119/60 


 


 03/12/18 10:00   106 H   18    100


 


 03/12/18 09:51   111 H   18  117/61   100


 


 03/12/18 09:50       117/61 


 


 03/12/18 09:36   111 H   15  119/59 L   100


 


 03/12/18 09:35  97.4 F L   75  18   119/59 L  100


 


 03/12/18 09:30  97.4 F L  75   18  116/52 L  


 


 03/12/18 09:00   96 H   17    100


 


 03/12/18 08:39   103 H   16  116/62   100


 


 03/12/18 08:00  97.5 F L      











Intake and Output (Last 8hrs): 


 Intake & Output











 03/11/18 03/12/18 03/12/18





 22:59 06:59 14:59


 


Intake Total 730 480 265


 


Output Total 0 50 0


 


Balance 730 430 265


 


Weight  78 kg 


 


Intake:   


 


    


 


  Intake, IV Amount 280 280 140


 


    Left Internal Jugular 80 80 40





    Side-Port   


 


    Right Proximal Port 200 200 100





    Internal Jugular   


 


  Tube Feeding 200 200 125


 


Output:   


 


  Urine 0 50 0


 


    Urine, Voided 0 50 0


 


  Stool  0 0


 


Other:   


 


  # Voids   


 


    Urine, Voided  1 














- Physical Exam


Head: Positive for: Atraumatic, Normocephalic.  Negative for: Tenderness, 

Contusion, Swelling


Pupils: Positive for: PERRL.  Negative for: Sluggish, Non-Reactive


Extroacular Muscles: Positive for: EOMI


Conjunctiva: Positive for: Normal


Mouth: Positive for: Dry


Pharnyx: Positive for: Normal


Nose (External): Positive for: Atraumatic


Neck: Positive for: Trachea Midline, Other (R IJ central line ).  Negative for: 

JVD


Respiratory/Chest: Positive for: Good Air Exchange, Accessory Muscle Use, 

Decreased Breath Sounds (on bilateral bases).  Negative for: Wheezes


Cardiovascular: Positive for: Tachycardic


Abdomen: Positive for: Normal Bowel Sounds.  Negative for: Peritoneal Signs


Upper Extremity: Negative for: Cyanosis, Edema


Lower Extremity: Negative for: Edema


Psychiatric: Positive for: Alert





- Medications


Active Medications: 


Active Medications











Generic Name Dose Route Start Last Admin





  Trade Name Freq  PRN Reason Stop Dose Admin


 


Albuterol/Ipratropium  3 ml  03/07/18 16:00  03/12/18 11:50





  Duoneb 3 Mg/0.5 Mg (3 Ml) Ud  INH   Not Given





  RQ4 ABHILASH   


 


Aspirin  81 mg  03/02/18 10:00  03/11/18 09:41





  Aspirin Chewable  PO   81 mg





  DAILY ABHILASH   Administration


 


Epoetin Vamsi  10,000 unit  03/12/18 10:30  03/12/18 10:28





  Procrit  IV   10,000 unit





  MWF AB[facility]ASH   Administration


 


Ferrous Gluconate  324 mg  03/03/18 10:00  03/11/18 17:52





  Fergon  PO   324 mg





  TID ABHILASH   Administration


 


Heparin Sodium (Porcine)  5,000 units  03/12/18 14:00  





  Heparin  SC   





  Q8 ABHILASH   


 


Aztreonam 1 gm/ Sodium  100 mls @ 100 mls/hr  03/09/18 01:30  03/12/18 01:05





  Chloride  IVPB   100 mls/hr





  Q12H ABHILASH   Administration


 


Metronidazole  500 mg in 100 mls @ 100 mls/hr  03/11/18 14:00  03/12/18 05:00





  Flagyl  IVPB   100 mls/hr





  Q8 ABHILASH   Administration


 


Insulin Aspart  0 unit  03/04/18 11:49  03/12/18 05:15





  Novolog  SC   8 unit





  Q6 ABHILASH   Administration





  Protocol   


 


Insulin Glargine  5 unit  03/06/18 22:00  03/11/18 23:00





  Lantus  SC   5 u





  HS ABHILASH   Administration


 


Methylprednisolone  40 mg  03/12/18 10:00  





  Solu-Medrol  IVP  03/15/18 10:01  





  DAILY [facility]   


 


Metoprolol Tartrate  25 mg  03/09/18 08:00  03/11/18 17:52





  Lopressor  PO   25 mg





  BID ABHILASH   Administration


 


Pantoprazole Sodium  40 mg  03/13/18 06:00  





  Protonix Susp  NG   





  0600 [facility]   


 


Paricalcitol  2 mcg  03/12/18 09:00  03/12/18 10:28





  Zemplar  IV   2 mcg





  MWF ABHILASH   Administration


 


Rosuvastatin Calcium  5 mg  03/02/18 01:15  03/11/18 21:24





  Crestor  PO   5 mg





  HS [facility]   Administration


 


Senna  8.8 mg  03/12/18 12:00  





  Senokot Syrup  NG   





  DAILY [facility]   


 


Vitamin B Complex/Vit C/Folic Acid  1 tab  03/03/18 08:00  03/11/18 09:41





  Nephro-Nneka  PO   1 tab





  0800 ABHILASH   Administration














- Patient Studies


Lab Studies: 


 Lab Studies











  03/12/18 03/12/18 03/12/18 Range/Units





  11:41 06:27 06:27 


 


WBC     (4.8-10.8)  K/uL


 


RBC     (3.80-5.20)  Mil/uL


 


Hgb     (11.0-16.0)  g/dL


 


Hct     (34.0-47.0)  %


 


MCV     (81.0-99.0)  fL


 


MCH     (27.0-31.0)  pg


 


MCHC     (33.0-37.0)  g/dL


 


RDW     (11.5-14.5)  %


 


Plt Count     (130-400)  K/uL


 


MPV     (7.2-11.7)  fL


 


Neut % (Auto)     (50.0-75.0)  %


 


Lymph % (Auto)     (20.0-40.0)  %


 


Mono % (Auto)     (0.0-10.0)  %


 


Eos % (Auto)     (0.0-4.0)  %


 


Baso % (Auto)     (0.0-2.0)  %


 


Neut # (Auto)     (1.8-7.0)  K/uL


 


Lymph # (Auto)     (1.0-4.3)  K/uL


 


Mono # (Auto)     (0.0-0.8)  K/uL


 


Eos # (Auto)     (0.0-0.7)  K/uL


 


Baso # (Auto)     (0.0-0.2)  K/uL


 


Neutrophils % (Manual)     (50-75)  %


 


Lymphocytes % (Manual)     (20-40)  %


 


Monocytes % (Manual)     (0-10)  %


 


Platelet Estimate     (NORMAL)  


 


Polychromasia     


 


Hypochromasia (manual)     


 


Anisocytosis (manual)     


 


Target Cells     


 


Ovalocytes     


 


Clara Cells     


 


Schistocytes     


 


APTT   83 H D   (21-34)  SECONDS


 


Puncture Site     


 


pCO2     (35-45)  mm/Hg


 


pO2     ()  mm/Hg


 


HCO3     (21-28)  mmol/L


 


ABG pH     (7.35-7.45)  


 


ABG Total CO2     (22-28)  mmol/L


 


ABG O2 Saturation     (95-98)  %


 


ABG Base Excess     (-2.0-3.0)  mmol/L


 


ABG Hemoglobin     (11.7-17.4)  g/dL


 


ABG Carboxyhemoglobin     (0.5-1.5)  %


 


POC ABG HHb (Measured)     (0.0-5.0)  %


 


ABG Methemoglobin     (0.0-3.0)  %


 


Jesus Test     


 


A-a O2 Difference     mm/Hg


 


Respiratory Index     


 


Hgb O2 Saturation     (95.0-98.0)  %


 


Vent Mode     


 


Mechanical Rate     


 


FiO2     %


 


Tidal Volume     


 


PEEP     


 


Sodium    136  (132-148)  mmol/L


 


Potassium    4.3  (3.6-5.2)  mmol/L


 


Chloride    97 L  ()  mmol/L


 


Carbon Dioxide    22  (22-30)  mmol/L


 


Anion Gap    21 H  (10-20)  


 


BUN    109 H* D  (7-17)  mg/dL


 


Creatinine    5.9 H  (0.7-1.2)  mg/dL


 


Est GFR ( Amer)    8  


 


Est GFR (Non-Af Amer)    7  


 


POC Glucose (mg/dL)  120 H    ()  mg/dL


 


Random Glucose    289 H  ()  mg/dL


 


Calcium    8.3 L  (8.6-10.4)  mg/dl


 


Phosphorus    6.2 H  (2.5-4.5)  mg/dL


 


Magnesium    2.2  (1.6-2.3)  mg/dL


 


Total Bilirubin    0.7  (0.2-1.3)  mg/dL


 


AST    49 H  (14-36)  U/L


 


ALT    382 H D  (9-52)  U/L


 


Alkaline Phosphatase    204 H  ()  U/L


 


Total Protein    5.6 L  (6.3-8.3)  g/dL


 


Albumin    2.7 L  (3.5-5.0)  g/dL


 


Globulin    2.9  (2.2-3.9)  gm/dL


 


Albumin/Globulin Ratio    0.9 L  (1.0-2.1)  














  03/12/18 03/12/18 03/12/18 Range/Units





  06:27 05:05 04:30 


 


WBC  19.3 H    (4.8-10.8)  K/uL


 


RBC  3.03 L    (3.80-5.20)  Mil/uL


 


Hgb  8.5 L    (11.0-16.0)  g/dL


 


Hct  25.8 L    (34.0-47.0)  %


 


MCV  85.2    (81.0-99.0)  fL


 


MCH  28.0    (27.0-31.0)  pg


 


MCHC  32.9 L    (33.0-37.0)  g/dL


 


RDW  16.7 H    (11.5-14.5)  %


 


Plt Count  178    (130-400)  K/uL


 


MPV  10.4    (7.2-11.7)  fL


 


Neut % (Auto)  94.2 H    (50.0-75.0)  %


 


Lymph % (Auto)  2.6 L    (20.0-40.0)  %


 


Mono % (Auto)  3.0    (0.0-10.0)  %


 


Eos % (Auto)  0.0    (0.0-4.0)  %


 


Baso % (Auto)  0.2    (0.0-2.0)  %


 


Neut # (Auto)  18.2 H    (1.8-7.0)  K/uL


 


Lymph # (Auto)  0.5 L    (1.0-4.3)  K/uL


 


Mono # (Auto)  0.6    (0.0-0.8)  K/uL


 


Eos # (Auto)  0.0    (0.0-0.7)  K/uL


 


Baso # (Auto)  0.0    (0.0-0.2)  K/uL


 


Neutrophils % (Manual)  97 H    (50-75)  %


 


Lymphocytes % (Manual)  2 L    (20-40)  %


 


Monocytes % (Manual)  1    (0-10)  %


 


Platelet Estimate  Normal    (NORMAL)  


 


Polychromasia  Slight    


 


Hypochromasia (manual)  Slight    


 


Anisocytosis (manual)  Slight    


 


Target Cells  Slight    


 


Ovalocytes  Slight    


 


Clara Cells  Slight    


 


Schistocytes  Slight    


 


APTT     (21-34)  SECONDS


 


Puncture Site    Rb  


 


pCO2    30 L  (35-45)  mm/Hg


 


pO2    55 L  ()  mm/Hg


 


HCO3    22.4  (21-28)  mmol/L


 


ABG pH    7.44  (7.35-7.45)  


 


ABG Total CO2    21.3 L  (22-28)  mmol/L


 


ABG O2 Saturation    91.8 L  (95-98)  %


 


ABG Base Excess    -3.1 L  (-2.0-3.0)  mmol/L


 


ABG Hemoglobin    9.1 L  (11.7-17.4)  g/dL


 


ABG Carboxyhemoglobin    2.3 H  (0.5-1.5)  %


 


POC ABG HHb (Measured)    8.0 H  (0.0-5.0)  %


 


ABG Methemoglobin    0.7  (0.0-3.0)  %


 


Jesus Test    Na  


 


A-a O2 Difference    193.0  mm/Hg


 


Respiratory Index    3.5  


 


Hgb O2 Saturation    89.0 L  (95.0-98.0)  %


 


Vent Mode    Prvc  


 


Mechanical Rate    16  


 


FiO2    40.0  %


 


Tidal Volume    500  


 


PEEP    5  


 


Sodium     (132-148)  mmol/L


 


Potassium     (3.6-5.2)  mmol/L


 


Chloride     ()  mmol/L


 


Carbon Dioxide     (22-30)  mmol/L


 


Anion Gap     (10-20)  


 


BUN     (7-17)  mg/dL


 


Creatinine     (0.7-1.2)  mg/dL


 


Est GFR (African Amer)     


 


Est GFR (Non-Af Amer)     


 


POC Glucose (mg/dL)   347 H   ()  mg/dL


 


Random Glucose     ()  mg/dL


 


Calcium     (8.6-10.4)  mg/dl


 


Phosphorus     (2.5-4.5)  mg/dL


 


Magnesium     (1.6-2.3)  mg/dL


 


Total Bilirubin     (0.2-1.3)  mg/dL


 


AST     (14-36)  U/L


 


ALT     (9-52)  U/L


 


Alkaline Phosphatase     ()  U/L


 


Total Protein     (6.3-8.3)  g/dL


 


Albumin     (3.5-5.0)  g/dL


 


Globulin     (2.2-3.9)  gm/dL


 


Albumin/Globulin Ratio     (1.0-2.1)  














  03/11/18 03/11/18 Range/Units





  23:39 18:04 


 


WBC    (4.8-10.8)  K/uL


 


RBC    (3.80-5.20)  Mil/uL


 


Hgb    (11.0-16.0)  g/dL


 


Hct    (34.0-47.0)  %


 


MCV    (81.0-99.0)  fL


 


MCH    (27.0-31.0)  pg


 


MCHC    (33.0-37.0)  g/dL


 


RDW    (11.5-14.5)  %


 


Plt Count    (130-400)  K/uL


 


MPV    (7.2-11.7)  fL


 


Neut % (Auto)    (50.0-75.0)  %


 


Lymph % (Auto)    (20.0-40.0)  %


 


Mono % (Auto)    (0.0-10.0)  %


 


Eos % (Auto)    (0.0-4.0)  %


 


Baso % (Auto)    (0.0-2.0)  %


 


Neut # (Auto)    (1.8-7.0)  K/uL


 


Lymph # (Auto)    (1.0-4.3)  K/uL


 


Mono # (Auto)    (0.0-0.8)  K/uL


 


Eos # (Auto)    (0.0-0.7)  K/uL


 


Baso # (Auto)    (0.0-0.2)  K/uL


 


Neutrophils % (Manual)    (50-75)  %


 


Lymphocytes % (Manual)    (20-40)  %


 


Monocytes % (Manual)    (0-10)  %


 


Platelet Estimate    (NORMAL)  


 


Polychromasia    


 


Hypochromasia (manual)    


 


Anisocytosis (manual)    


 


Target Cells    


 


Ovalocytes    


 


[place] Cells    


 


Schistocytes    


 


APTT    (21-34)  SECONDS


 


Puncture Site    


 


pCO2    (35-45)  mm/Hg


 


pO2    ()  mm/Hg


 


HCO3    (21-28)  mmol/L


 


ABG pH    (7.35-7.45)  


 


ABG Total CO2    (22-28)  mmol/L


 


ABG O2 Saturation    (95-98)  %


 


ABG Base Excess    (-2.0-3.0)  mmol/L


 


ABG Hemoglobin    (11.7-17.4)  g/dL


 


ABG Carboxyhemoglobin    (0.5-1.5)  %


 


POC ABG HHb (Measured)    (0.0-5.0)  %


 


ABG Methemoglobin    (0.0-3.0)  %


 


Jesus Test    


 


A-a O2 Difference    mm/Hg


 


Respiratory Index    


 


Hgb O2 Saturation    (95.0-98.0)  %


 


Vent Mode    


 


Mechanical Rate    


 


FiO2    %


 


Tidal Volume    


 


PEEP    


 


Sodium    (132-148)  mmol/L


 


Potassium    (3.6-5.2)  mmol/L


 


Chloride    ()  mmol/L


 


Carbon Dioxide    (22-30)  mmol/L


 


Anion Gap    (10-20)  


 


BUN    (7-17)  mg/dL


 


Creatinine    (0.7-1.2)  mg/dL


 


Est GFR (African Amer)    


 


Est GFR (Non-Af Amer)    


 


POC Glucose (mg/dL)  250 H  262 H  ()  mg/dL


 


Random Glucose    ()  mg/dL


 


Calcium    (8.6-10.4)  mg/dl


 


Phosphorus    (2.5-4.5)  mg/dL


 


Magnesium    (1.6-2.3)  mg/dL


 


Total Bilirubin    (0.2-1.3)  mg/dL


 


AST    (14-36)  U/L


 


ALT    (9-52)  U/L


 


Alkaline Phosphatase    ()  U/L


 


Total Protein    (6.3-8.3)  g/dL


 


Albumin    (3.5-5.0)  g/dL


 


Globulin    (2.2-3.9)  gm/dL


 


Albumin/Globulin Ratio    (1.0-2.1)  








 Laboratory Results - last 24 hr











  03/11/18 03/11/18 03/12/18





  18:04 23:39 04:30


 


WBC   


 


RBC   


 


Hgb   


 


Hct   


 


MCV   


 


MCH   


 


MCHC   


 


RDW   


 


Plt Count   


 


MPV   


 


Neut % (Auto)   


 


Lymph % (Auto)   


 


Mono % (Auto)   


 


Eos % (Auto)   


 


Baso % (Auto)   


 


Neut # (Auto)   


 


Lymph # (Auto)   


 


Mono # (Auto)   


 


Eos # (Auto)   


 


Baso # (Auto)   


 


Neutrophils % (Manual)   


 


Lymphocytes % (Manual)   


 


Monocytes % (Manual)   


 


Platelet Estimate   


 


Polychromasia   


 


Hypochromasia (manual)   


 


Anisocytosis (manual)   


 


Target Cells   


 


Ovalocytes   


 


[place] Cells   


 


Schistocytes   


 


APTT   


 


Puncture Site    Rb


 


pCO2    30 L


 


pO2    55 L


 


HCO3    22.4


 


ABG pH    7.44


 


ABG Total CO2    21.3 L


 


ABG O2 Saturation    91.8 L


 


ABG Base Excess    -3.1 L


 


ABG Hemoglobin    9.1 L


 


ABG Carboxyhemoglobin    2.3 H


 


POC ABG HHb (Measured)    8.0 H


 


ABG Methemoglobin    0.7


 


Jesus Test    Na


 


A-a O2 Difference    193.0


 


Respiratory Index    3.5


 


Hgb O2 Saturation    89.0 L


 


Vent Mode    Prvc


 


Mechanical Rate    16


 


FiO2    40.0


 


Tidal Volume    500


 


PEEP    5


 


Sodium   


 


Potassium   


 


Chloride   


 


Carbon Dioxide   


 


Anion Gap   


 


BUN   


 


Creatinine   


 


Est GFR ( Amer)   


 


Est GFR (Non-Af Amer)   


 


POC Glucose (mg/dL)  262 H  250 H 


 


Random Glucose   


 


Calcium   


 


Phosphorus   


 


Magnesium   


 


Total Bilirubin   


 


AST   


 


ALT   


 


Alkaline Phosphatase   


 


Total Protein   


 


Albumin   


 


Globulin   


 


Albumin/Globulin Ratio   














  03/12/18 03/12/18 03/12/18





  05:05 06:27 06:27


 


WBC   19.3 H 


 


RBC   3.03 L 


 


Hgb   8.5 L 


 


Hct   25.8 L 


 


MCV   85.2 


 


MCH   28.0 


 


MCHC   32.9 L 


 


RDW   16.7 H 


 


Plt Count   178 


 


MPV   10.4 


 


Neut % (Auto)   94.2 H 


 


Lymph % (Auto)   2.6 L 


 


Mono % (Auto)   3.0 


 


Eos % (Auto)   0.0 


 


Baso % (Auto)   0.2 


 


Neut # (Auto)   18.2 H 


 


Lymph # (Auto)   0.5 L 


 


Mono # (Auto)   0.6 


 


Eos # (Auto)   0.0 


 


Baso # (Auto)   0.0 


 


Neutrophils % (Manual)   97 H 


 


Lymphocytes % (Manual)   2 L 


 


Monocytes % (Manual)   1 


 


Platelet Estimate   Normal 


 


Polychromasia   Slight 


 


Hypochromasia (manual)   Slight 


 


Anisocytosis (manual)   Slight 


 


Target Cells   Slight 


 


Ovalocytes   Slight 


 


Clara Cells   Slight 


 


Schistocytes   Slight 


 


APTT   


 


Puncture Site   


 


pCO2   


 


pO2   


 


HCO3   


 


ABG pH   


 


ABG Total CO2   


 


ABG O2 Saturation   


 


ABG Base Excess   


 


ABG Hemoglobin   


 


ABG Carboxyhemoglobin   


 


POC ABG HHb (Measured)   


 


ABG Methemoglobin   


 


Jesus Test   


 


A-a O2 Difference   


 


Respiratory Index   


 


Hgb O2 Saturation   


 


Vent Mode   


 


Mechanical Rate   


 


FiO2   


 


Tidal Volume   


 


PEEP   


 


Sodium    136


 


Potassium    4.3


 


Chloride    97 L


 


Carbon Dioxide    22


 


Anion Gap    21 H


 


BUN    109 H* D


 


Creatinine    5.9 H


 


Est GFR ( Amer)    8


 


Est GFR (Non-Af Amer)    7


 


POC Glucose (mg/dL)  347 H  


 


Random Glucose    289 H


 


Calcium    8.3 L


 


Phosphorus    6.2 H


 


Magnesium    2.2


 


Total Bilirubin    0.7


 


AST    49 H


 


ALT    382 H D


 


Alkaline Phosphatase    204 H


 


Total Protein    5.6 L


 


Albumin    2.7 L


 


Globulin    2.9


 


Albumin/Globulin Ratio    0.9 L














  03/12/18 03/12/18





  06:27 11:41


 


WBC  


 


RBC  


 


Hgb  


 


Hct  


 


MCV  


 


MCH  


 


MCHC  


 


RDW  


 


Plt Count  


 


MPV  


 


Neut % (Auto)  


 


Lymph % (Auto)  


 


Mono % (Auto)  


 


Eos % (Auto)  


 


Baso % (Auto)  


 


Neut # (Auto)  


 


Lymph # (Auto)  


 


Mono # (Auto)  


 


Eos # (Auto)  


 


Baso # (Auto)  


 


Neutrophils % (Manual)  


 


Lymphocytes % (Manual)  


 


Monocytes % (Manual)  


 


Platelet Estimate  


 


Polychromasia  


 


Hypochromasia (manual)  


 


Anisocytosis (manual)  


 


Target Cells  


 


Ovalocytes  


 


[place] Cells  


 


Schistocytes  


 


APTT  83 H D 


 


Puncture Site  


 


pCO2  


 


pO2  


 


HCO3  


 


ABG pH  


 


ABG Total CO2  


 


ABG O2 Saturation  


 


ABG Base Excess  


 


ABG Hemoglobin  


 


ABG Carboxyhemoglobin  


 


POC ABG HHb (Measured)  


 


ABG Methemoglobin  


 


Jesus Test  


 


A-a O2 Difference  


 


Respiratory Index  


 


Hgb O2 Saturation  


 


Vent Mode  


 


Mechanical Rate  


 


FiO2  


 


Tidal Volume  


 


PEEP  


 


Sodium  


 


Potassium  


 


Chloride  


 


Carbon Dioxide  


 


Anion Gap  


 


BUN  


 


Creatinine  


 


Est GFR ( Amer)  


 


Est GFR (Non-Af Amer)  


 


POC Glucose (mg/dL)   120 H


 


Random Glucose  


 


Calcium  


 


Phosphorus  


 


Magnesium  


 


Total Bilirubin  


 


AST  


 


ALT  


 


Alkaline Phosphatase  


 


Total Protein  


 


Albumin  


 


Globulin  


 


Albumin/Globulin Ratio  











Fingerstick Blood Sugar Results: 347





Assessment/Plan





- Assessment and Plan (Free Text)


Plan: 





This is a 77 year old female with a history of ESRD on HD, CHF, DM, HTN, 

hypercholesterolemia, and some sort of MI years before, repeated TIAs, and 

colon CA. She was brought in by family for altered mental status and ultimately 

found to have severe metabolic acidosis as well as a extremely elevated 

troponins.





Neurology:


-Patient is currently intubated 


-Will continue CPAP trial


-For possible extubation today





Cardiology:


-NSTEMI


-Family is currently refusing for intervention for coronary.


-Will need to discuss goals of care with the family, palliative care on board


-Heparin drip discontinued


-Continue Crestor 20mg PO HS, Aspirin 81mg daily 


-Continue Metoprolol 25mg PO BID


-Cardiology on consult, help appreciated





Pulmonary: 


-Currently Intubated


-Treating for Acute hypoxemic respiratory failure, respiratory acidosis from 

acute pulmonary edema and pneumonia


-CXR: Moderate venous congestion. Moderate loculated pleural effusion with 

consolidative changes in the left mid to lower lung zone, cardiomegaly.


-Solumedrol 40mg IVP daily





Hematology:


-Continue Ferrous sulfate 325mg PO TID





GI:


-Continue tube feeds


-No BM for last few days


-Sennakot daily and Ducolax suppository ordered 


-AST/ALT elevated likely secondary to shock liver, currently trending down


-Will continue to monitor, avoid hepatotoxic agents 





Renal:


-Continue Epoetin 


-Continue Hemodialysis as regularly scheduled (MWF)


-Will reconsult vascular surgery, needs evaluation for AV shunt/Permacath


-Nephrology on consult, f/u recommendations 





Endocrine:


-History of diabetes mellitus


-Continue Lantus 5 units SC HS


-ISS, accuchecks





Infectious Disease:


-Antibiotics: Aztreonam 1gm Q12H, Flagyl 500mg Q8H


-Leukocytosis 16.8 -> 19.3, on Solumedrol currently 


-Blood cultures have been negative 


-Infectious disease on consult, f/u recommendations 





GI/DVT ppx:


-Heparin 5000 units Q8H SC


-Protonix 40mg PO daily





Plan discussed with Dr Pickering





<Myra Pickering - Last Filed: 03/12/18 20:08>





CCU Objective





- Vital Signs / Intake & Output


Vital Signs (Last 4 hours): 


Vital Signs











  Temp Pulse Resp BP Pulse Ox


 


 03/12/18 20:00  97.6 F  119 H  22   99


 


 03/12/18 19:59   104 H  20  87/50 L  100


 


 03/12/18 19:23   89  25 H  95/50 L 


 


 03/12/18 19:00   100 H  24   100


 


 03/12/18 18:23   112 H  9 L  109/52 L  100


 


 03/12/18 18:00   104 H  24   100


 


 03/12/18 17:23   110 H  25 H  106/60  100


 


 03/12/18 17:00   102 H  24   100


 


 03/12/18 16:23   107 H  26 H  118/52 L  100











Intake and Output (Last 8hrs): 


 Intake & Output











 03/12/18 03/12/18 03/12/18





 06:59 14:59 22:59


 


Intake Total 480 365 300


 


Output Total 50 0 0


 


Balance 430 365 300


 


Weight 171 lb 15.369 oz  


 


Intake:   


 


  Intake, IV Amount 280 240 300


 


    Left Internal Jugular 80 40 





    Side-Port   


 


    Right Proximal Port 200 200 300





    Internal Jugular   


 


  Tube Feeding 200 125 


 


Output:   


 


  Urine 50 0 0


 


    Urine, Voided 50 0 0


 


  Stool 0 0 


 


Other:   


 


  # Voids   


 


    Urine, Voided 1  














- Medications


Active Medications: 


Active Medications











Generic Name Dose Route Start Last Admin





  Trade Name Jw  PRN Reason Stop Dose Admin


 


Albuterol/Ipratropium  3 ml  03/07/18 16:00  03/12/18 19:20





  Duoneb 3 Mg/0.5 Mg (3 Ml) Ud  INH   3 ml





  RQ4 [facility]   Administration


 


Aspirin  81 mg  03/02/18 10:00  03/12/18 13:55





  Aspirin Chewable  PO   81 mg





  DAILY [facility]   Administration


 


Epoetin Vamsi  10,000 unit  03/12/18 10:30  03/12/18 10:28





  Procrit  IV   10,000 unit





  [facility]   Administration


 


Ferrous Gluconate  324 mg  03/03/18 10:00  03/12/18 18:10





  Fergon  PO   Not Given





  TID [facility]   


 


Heparin Sodium (Porcine)  5,000 units  03/12/18 14:00  03/12/18 14:08





  Heparin  SC   5,000 units





  Q8 [facility]   Administration


 


Aztreonam 1 gm/ Sodium  100 mls @ 100 mls/hr  03/09/18 01:30  03/12/18 14:08





  Chloride  IVPB   100 mls/hr





  Q12H [facility]   Administration


 


Metronidazole  500 mg in 100 mls @ 100 mls/hr  03/11/18 14:00  03/12/18 14:09





  Flagyl  IVPB   100 mls/hr





  Q8 [facility]   Administration


 


Vancomycin HCl 1 gm/ Sodium  200 mls @ 166.7 mls/hr  03/12/18 16:00  03/12/18 18

:12





  Chloride  IVPB   166.7 mls/hr





  [facility]   Administration





  Protocol   


 


Insulin Aspart  0 unit  03/04/18 11:49  03/12/18 18:11





  Novolog  SC   Not Given





  Q6 [facility]   





  Protocol   


 


Insulin Glargine  5 unit  03/06/18 22:00  03/11/18 23:00





  Lantus  SC   5 u





  HS [facility]   Administration


 


Methylprednisolone  40 mg  03/12/18 10:00  03/12/18 13:56





  Solu-Medrol  IVP  03/15/18 10:01  40 mg





  DAILY [facility]   Administration


 


Metoprolol Tartrate  25 mg  03/09/18 08:00  03/12/18 18:10





  Lopressor  PO   Not Given





  BID [facility]   


 


Pantoprazole Sodium  40 mg  03/13/18 06:00  





  Protonix Susp  NG   





  0600 [facility]   


 


Paricalcitol  2 mcg  03/12/18 09:00  03/12/18 10:28





  Zemplar  IV   2 mcg





  MWF [facility]   Administration


 


Rosuvastatin Calcium  5 mg  03/02/18 01:15  03/11/18 21:24





  Crestor  PO   5 mg





  HS [facility]   Administration


 


Sennosides  8.6 mg  03/13/18 10:00  





  Senokot Tab  NG   





  DAILY [facility]   


 


Vitamin B Complex/Vit C/Folic Acid  1 tab  03/03/18 08:00  03/12/18 14:08





  Nephro-Nneka  PO   1 tab





  0800 [facility]   Administration














- Patient Studies


Lab Studies: 


 Lab Studies











  03/12/18 03/12/18 03/12/18 Range/Units





  17:39 11:41 06:27 


 


WBC     (4.8-10.8)  K/uL


 


RBC     (3.80-5.20)  Mil/uL


 


Hgb     (11.0-16.0)  g/dL


 


Hct     (34.0-47.0)  %


 


MCV     (81.0-99.0)  fL


 


MCH     (27.0-31.0)  pg


 


MCHC     (33.0-37.0)  g/dL


 


RDW     (11.5-14.5)  %


 


Plt Count     (130-400)  K/uL


 


MPV     (7.2-11.7)  fL


 


Neut % (Auto)     (50.0-75.0)  %


 


Lymph % (Auto)     (20.0-40.0)  %


 


Mono % (Auto)     (0.0-10.0)  %


 


Eos % (Auto)     (0.0-4.0)  %


 


Baso % (Auto)     (0.0-2.0)  %


 


Neut # (Auto)     (1.8-7.0)  K/uL


 


Lymph # (Auto)     (1.0-4.3)  K/uL


 


Mono # (Auto)     (0.0-0.8)  K/uL


 


Eos # (Auto)     (0.0-0.7)  K/uL


 


Baso # (Auto)     (0.0-0.2)  K/uL


 


Neutrophils % (Manual)     (50-75)  %


 


Lymphocytes % (Manual)     (20-40)  %


 


Monocytes % (Manual)     (0-10)  %


 


Platelet Estimate     (NORMAL)  


 


Polychromasia     


 


Hypochromasia (manual)     


 


Anisocytosis (manual)     


 


Target Cells     


 


Ovalocytes     


 


Clara Cells     


 


Schistocytes     


 


APTT    83 H D  (21-34)  SECONDS


 


Puncture Site     


 


pCO2     (35-45)  mm/Hg


 


pO2     ()  mm/Hg


 


HCO3     (21-28)  mmol/L


 


ABG pH     (7.35-7.45)  


 


ABG Total CO2     (22-28)  mmol/L


 


ABG O2 Saturation     (95-98)  %


 


ABG Base Excess     (-2.0-3.0)  mmol/L


 


ABG Hemoglobin     (11.7-17.4)  g/dL


 


ABG Carboxyhemoglobin     (0.5-1.5)  %


 


POC ABG HHb (Measured)     (0.0-5.0)  %


 


ABG Methemoglobin     (0.0-3.0)  %


 


Jesus Test     


 


A-a O2 Difference     mm/Hg


 


Respiratory Index     


 


Hgb O2 Saturation     (95.0-98.0)  %


 


Vent Mode     


 


Mechanical Rate     


 


FiO2     %


 


Tidal Volume     


 


PEEP     


 


Sodium     (132-148)  mmol/L


 


Potassium     (3.6-5.2)  mmol/L


 


Chloride     ()  mmol/L


 


Carbon Dioxide     (22-30)  mmol/L


 


Anion Gap     (10-20)  


 


BUN     (7-17)  mg/dL


 


Creatinine     (0.7-1.2)  mg/dL


 


Est GFR (African Amer)     


 


Est GFR (Non-Af Amer)     


 


POC Glucose (mg/dL)  192 H  120 H   ()  mg/dL


 


Random Glucose     ()  mg/dL


 


Calcium     (8.6-10.4)  mg/dl


 


Phosphorus     (2.5-4.5)  mg/dL


 


Magnesium     (1.6-2.3)  mg/dL


 


Total Bilirubin     (0.2-1.3)  mg/dL


 


AST     (14-36)  U/L


 


ALT     (9-52)  U/L


 


Alkaline Phosphatase     ()  U/L


 


Total Protein     (6.3-8.3)  g/dL


 


Albumin     (3.5-5.0)  g/dL


 


Globulin     (2.2-3.9)  gm/dL


 


Albumin/Globulin Ratio     (1.0-2.1)  














  03/12/18 03/12/18 03/12/18 Range/Units





  06:27 06:27 05:05 


 


WBC   19.3 H   (4.8-10.8)  K/uL


 


RBC   3.03 L   (3.80-5.20)  Mil/uL


 


Hgb   8.5 L   (11.0-16.0)  g/dL


 


Hct   25.8 L   (34.0-47.0)  %


 


MCV   85.2   (81.0-99.0)  fL


 


MCH   28.0   (27.0-31.0)  pg


 


MCHC   32.9 L   (33.0-37.0)  g/dL


 


RDW   16.7 H   (11.5-14.5)  %


 


Plt Count   178   (130-400)  K/uL


 


MPV   10.4   (7.2-11.7)  fL


 


Neut % (Auto)   94.2 H   (50.0-75.0)  %


 


Lymph % (Auto)   2.6 L   (20.0-40.0)  %


 


Mono % (Auto)   3.0   (0.0-10.0)  %


 


Eos % (Auto)   0.0   (0.0-4.0)  %


 


Baso % (Auto)   0.2   (0.0-2.0)  %


 


Neut # (Auto)   18.2 H   (1.8-7.0)  K/uL


 


Lymph # (Auto)   0.5 L   (1.0-4.3)  K/uL


 


Mono # (Auto)   0.6   (0.0-0.8)  K/uL


 


Eos # (Auto)   0.0   (0.0-0.7)  K/uL


 


Baso # (Auto)   0.0   (0.0-0.2)  K/uL


 


Neutrophils % (Manual)   97 H   (50-75)  %


 


Lymphocytes % (Manual)   2 L   (20-40)  %


 


Monocytes % (Manual)   1   (0-10)  %


 


Platelet Estimate   Normal   (NORMAL)  


 


Polychromasia   Slight   


 


Hypochromasia (manual)   Slight   


 


Anisocytosis (manual)   Slight   


 


Target Cells   Slight   


 


Ovalocytes   Slight   


 


Clara Cells   Slight   


 


Schistocytes   Slight   


 


APTT     (21-34)  SECONDS


 


Puncture Site     


 


pCO2     (35-45)  mm/Hg


 


pO2     ()  mm/Hg


 


HCO3     (21-28)  mmol/L


 


ABG pH     (7.35-7.45)  


 


ABG Total CO2     (22-28)  mmol/L


 


ABG O2 Saturation     (95-98)  %


 


ABG Base Excess     (-2.0-3.0)  mmol/L


 


ABG Hemoglobin     (11.7-17.4)  g/dL


 


ABG Carboxyhemoglobin     (0.5-1.5)  %


 


POC ABG HHb (Measured)     (0.0-5.0)  %


 


ABG Methemoglobin     (0.0-3.0)  %


 


Jesus Test     


 


A-a O2 Difference     mm/Hg


 


Respiratory Index     


 


Hgb O2 Saturation     (95.0-98.0)  %


 


Vent Mode     


 


Mechanical Rate     


 


FiO2     %


 


Tidal Volume     


 


PEEP     


 


Sodium  136    (132-148)  mmol/L


 


Potassium  4.3    (3.6-5.2)  mmol/L


 


Chloride  97 L    ()  mmol/L


 


Carbon Dioxide  22    (22-30)  mmol/L


 


Anion Gap  21 H    (10-20)  


 


BUN  109 H* D    (7-17)  mg/dL


 


Creatinine  5.9 H    (0.7-1.2)  mg/dL


 


Est GFR ( Amer)  8    


 


Est GFR (Non-Af Amer)  7    


 


POC Glucose (mg/dL)    347 H  ()  mg/dL


 


Random Glucose  289 H    ()  mg/dL


 


Calcium  8.3 L    (8.6-10.4)  mg/dl


 


Phosphorus  6.2 H    (2.5-4.5)  mg/dL


 


Magnesium  2.2    (1.6-2.3)  mg/dL


 


Total Bilirubin  0.7    (0.2-1.3)  mg/dL


 


AST  49 H    (14-36)  U/L


 


ALT  382 H D    (9-52)  U/L


 


Alkaline Phosphatase  204 H    ()  U/L


 


Total Protein  5.6 L    (6.3-8.3)  g/dL


 


Albumin  2.7 L    (3.5-5.0)  g/dL


 


Globulin  2.9    (2.2-3.9)  gm/dL


 


Albumin/Globulin Ratio  0.9 L    (1.0-2.1)  














  03/12/18 03/11/18 03/11/18 Range/Units





  04:30 23:39 18:04 


 


WBC     (4.8-10.8)  K/uL


 


RBC     (3.80-5.20)  Mil/uL


 


Hgb     (11.0-16.0)  g/dL


 


Hct     (34.0-47.0)  %


 


MCV     (81.0-99.0)  fL


 


MCH     (27.0-31.0)  pg


 


MCHC     (33.0-37.0)  g/dL


 


RDW     (11.5-14.5)  %


 


Plt Count     (130-400)  K/uL


 


MPV     (7.2-11.7)  fL


 


Neut % (Auto)     (50.0-75.0)  %


 


Lymph % (Auto)     (20.0-40.0)  %


 


Mono % (Auto)     (0.0-10.0)  %


 


Eos % (Auto)     (0.0-4.0)  %


 


Baso % (Auto)     (0.0-2.0)  %


 


Neut # (Auto)     (1.8-7.0)  K/uL


 


Lymph # (Auto)     (1.0-4.3)  K/uL


 


Mono # (Auto)     (0.0-0.8)  K/uL


 


Eos # (Auto)     (0.0-0.7)  K/uL


 


Baso # (Auto)     (0.0-0.2)  K/uL


 


Neutrophils % (Manual)     (50-75)  %


 


Lymphocytes % (Manual)     (20-40)  %


 


Monocytes % (Manual)     (0-10)  %


 


Platelet Estimate     (NORMAL)  


 


Polychromasia     


 


Hypochromasia (manual)     


 


Anisocytosis (manual)     


 


Target Cells     


 


Ovalocytes     


 


[place] Cells     


 


Schistocytes     


 


APTT     (21-34)  SECONDS


 


Puncture Site  Rb    


 


pCO2  30 L    (35-45)  mm/Hg


 


pO2  55 L    ()  mm/Hg


 


HCO3  22.4    (21-28)  mmol/L


 


ABG pH  7.44    (7.35-7.45)  


 


ABG Total CO2  21.3 L    (22-28)  mmol/L


 


ABG O2 Saturation  91.8 L    (95-98)  %


 


ABG Base Excess  -3.1 L    (-2.0-3.0)  mmol/L


 


ABG Hemoglobin  9.1 L    (11.7-17.4)  g/dL


 


ABG Carboxyhemoglobin  2.3 H    (0.5-1.5)  %


 


POC ABG HHb (Measured)  8.0 H    (0.0-5.0)  %


 


ABG Methemoglobin  0.7    (0.0-3.0)  %


 


Jesus Test  Na    


 


A-a O2 Difference  193.0    mm/Hg


 


Respiratory Index  3.5    


 


Hgb O2 Saturation  89.0 L    (95.0-98.0)  %


 


Vent Mode  Prvc    


 


Mechanical Rate  16    


 


FiO2  40.0    %


 


Tidal Volume  500    


 


PEEP  5    


 


Sodium     (132-148)  mmol/L


 


Potassium     (3.6-5.2)  mmol/L


 


Chloride     ()  mmol/L


 


Carbon Dioxide     (22-30)  mmol/L


 


Anion Gap     (10-20)  


 


BUN     (7-17)  mg/dL


 


Creatinine     (0.7-1.2)  mg/dL


 


Est GFR (African Amer)     


 


Est GFR (Non-Af Amer)     


 


POC Glucose (mg/dL)   250 H  262 H  ()  mg/dL


 


Random Glucose     ()  mg/dL


 


Calcium     (8.6-10.4)  mg/dl


 


Phosphorus     (2.5-4.5)  mg/dL


 


Magnesium     (1.6-2.3)  mg/dL


 


Total Bilirubin     (0.2-1.3)  mg/dL


 


AST     (14-36)  U/L


 


ALT     (9-52)  U/L


 


Alkaline Phosphatase     ()  U/L


 


Total Protein     (6.3-8.3)  g/dL


 


Albumin     (3.5-5.0)  g/dL


 


Globulin     (2.2-3.9)  gm/dL


 


Albumin/Globulin Ratio     (1.0-2.1)  








 Laboratory Results - last 24 hr











  03/11/18 03/11/18 03/12/18





  18:04 23:39 04:30


 


WBC   


 


RBC   


 


Hgb   


 


Hct   


 


MCV   


 


MCH   


 


MCHC   


 


RDW   


 


Plt Count   


 


MPV   


 


Neut % (Auto)   


 


Lymph % (Auto)   


 


Mono % (Auto)   


 


Eos % (Auto)   


 


Baso % (Auto)   


 


Neut # (Auto)   


 


Lymph # (Auto)   


 


Mono # (Auto)   


 


Eos # (Auto)   


 


Baso # (Auto)   


 


Neutrophils % (Manual)   


 


Lymphocytes % (Manual)   


 


Monocytes % (Manual)   


 


Platelet Estimate   


 


Polychromasia   


 


Hypochromasia (manual)   


 


Anisocytosis (manual)   


 


Target Cells   


 


Ovalocytes   


 


Clara Cells   


 


Schistocytes   


 


APTT   


 


Puncture Site    Rb


 


pCO2    30 L


 


pO2    55 L


 


HCO3    22.4


 


ABG pH    7.44


 


ABG Total CO2    21.3 L


 


ABG O2 Saturation    91.8 L


 


ABG Base Excess    -3.1 L


 


ABG Hemoglobin    9.1 L


 


ABG Carboxyhemoglobin    2.3 H


 


POC ABG HHb (Measured)    8.0 H


 


ABG Methemoglobin    0.7


 


Jesus Test    Na


 


A-a O2 Difference    193.0


 


Respiratory Index    3.5


 


Hgb O2 Saturation    89.0 L


 


Vent Mode    Prvc


 


Mechanical Rate    16


 


FiO2    40.0


 


Tidal Volume    500


 


PEEP    5


 


Sodium   


 


Potassium   


 


Chloride   


 


Carbon Dioxide   


 


Anion Gap   


 


BUN   


 


Creatinine   


 


Est GFR ( Amer)   


 


Est GFR (Non-Af Amer)   


 


POC Glucose (mg/dL)  262 H  250 H 


 


Random Glucose   


 


Calcium   


 


Phosphorus   


 


Magnesium   


 


Total Bilirubin   


 


AST   


 


ALT   


 


Alkaline Phosphatase   


 


Total Protein   


 


Albumin   


 


Globulin   


 


Albumin/Globulin Ratio   














  03/12/18 03/12/18 03/12/18





  05:05 06:27 06:27


 


WBC   19.3 H 


 


RBC   3.03 L 


 


Hgb   8.5 L 


 


Hct   25.8 L 


 


MCV   85.2 


 


MCH   28.0 


 


MCHC   32.9 L 


 


RDW   16.7 H 


 


Plt Count   178 


 


MPV   10.4 


 


Neut % (Auto)   94.2 H 


 


Lymph % (Auto)   2.6 L 


 


Mono % (Auto)   3.0 


 


Eos % (Auto)   0.0 


 


Baso % (Auto)   0.2 


 


Neut # (Auto)   18.2 H 


 


Lymph # (Auto)   0.5 L 


 


Mono # (Auto)   0.6 


 


Eos # (Auto)   0.0 


 


Baso # (Auto)   0.0 


 


Neutrophils % (Manual)   97 H 


 


Lymphocytes % (Manual)   2 L 


 


Monocytes % (Manual)   1 


 


Platelet Estimate   Normal 


 


Polychromasia   Slight 


 


Hypochromasia (manual)   Slight 


 


Anisocytosis (manual)   Slight 


 


Target Cells   Slight 


 


Ovalocytes   Slight 


 


Clara Cells   Slight 


 


Schistocytes   Slight 


 


APTT   


 


Puncture Site   


 


pCO2   


 


pO2   


 


HCO3   


 


ABG pH   


 


ABG Total CO2   


 


ABG O2 Saturation   


 


ABG Base Excess   


 


ABG Hemoglobin   


 


ABG Carboxyhemoglobin   


 


POC ABG HHb (Measured)   


 


ABG Methemoglobin   


 


Jesus Test   


 


A-a O2 Difference   


 


Respiratory Index   


 


Hgb O2 Saturation   


 


Vent Mode   


 


Mechanical Rate   


 


FiO2   


 


Tidal Volume   


 


PEEP   


 


Sodium    136


 


Potassium    4.3


 


Chloride    97 L


 


Carbon Dioxide    22


 


Anion Gap    21 H


 


BUN    109 H* D


 


Creatinine    5.9 H


 


Est GFR ( Amer)    8


 


Est GFR (Non-Af Amer)    7


 


POC Glucose (mg/dL)  347 H  


 


Random Glucose    289 H


 


Calcium    8.3 L


 


Phosphorus    6.2 H


 


Magnesium    2.2


 


Total Bilirubin    0.7


 


AST    49 H


 


ALT    382 H D


 


Alkaline Phosphatase    204 H


 


Total Protein    5.6 L


 


Albumin    2.7 L


 


Globulin    2.9


 


Albumin/Globulin Ratio    0.9 L














  03/12/18 03/12/18 03/12/18





  06:27 11:41 17:39


 


WBC   


 


RBC   


 


Hgb   


 


Hct   


 


MCV   


 


MCH   


 


MCHC   


 


RDW   


 


Plt Count   


 


MPV   


 


Neut % (Auto)   


 


Lymph % (Auto)   


 


Mono % (Auto)   


 


Eos % (Auto)   


 


Baso % (Auto)   


 


Neut # (Auto)   


 


Lymph # (Auto)   


 


Mono # (Auto)   


 


Eos # (Auto)   


 


Baso # (Auto)   


 


Neutrophils % (Manual)   


 


Lymphocytes % (Manual)   


 


Monocytes % (Manual)   


 


Platelet Estimate   


 


Polychromasia   


 


Hypochromasia (manual)   


 


Anisocytosis (manual)   


 


Target Cells   


 


Ovalocytes   


 


[place] Cells   


 


Schistocytes   


 


APTT  83 H D  


 


Puncture Site   


 


pCO2   


 


pO2   


 


HCO3   


 


ABG pH   


 


ABG Total CO2   


 


ABG O2 Saturation   


 


ABG Base Excess   


 


ABG Hemoglobin   


 


ABG Carboxyhemoglobin   


 


POC ABG HHb (Measured)   


 


ABG Methemoglobin   


 


Jesus Test   


 


A-a O2 Difference   


 


Respiratory Index   


 


Hgb O2 Saturation   


 


Vent Mode   


 


Mechanical Rate   


 


FiO2   


 


Tidal Volume   


 


PEEP   


 


Sodium   


 


Potassium   


 


Chloride   


 


Carbon Dioxide   


 


Anion Gap   


 


BUN   


 


Creatinine   


 


Est GFR ( Amer)   


 


Est GFR (Non-Af Amer)   


 


POC Glucose (mg/dL)   120 H  192 H


 


Random Glucose   


 


Calcium   


 


Phosphorus   


 


Magnesium   


 


Total Bilirubin   


 


AST   


 


ALT   


 


Alkaline Phosphatase   


 


Total Protein   


 


Albumin   


 


Globulin   


 


Albumin/Globulin Ratio   














Critical Care Progress Note





- Nutrition


Nutrition: 


 Nutrition











 Category Date Time Status


 


 NPO Diet [DIET] Diets  03/12/18 Dinner Active














Attending/Attestation





- Attestation


I have personally seen and examined this patient.: Yes


I have fully participated in the care of the patient.: Yes


I have reviewed all pertinent clinical information: Yes


Notes (Text): 





03/12/18 20:07


During the rounds the patient was evaluated, management was discussed.


Patient had hemodialysis today.


After the hemodialysis patient was tolerating the CPAP trial.


Clinical stable.


Patient was extubated.


Placed on 40% Ventimask.


We'll continue to monitor.

## 2018-03-12 NOTE — RAD
Chest x-ray single frontal view 



History: Intubated. 



Comparison: 03/11/2018 



Findings: 



Lines and tubes stable position. 



Moderate venous congestion. 



Moderate loculated left pleural effusion with consolidative changes 

in the left mid to lower lung zone. 



Cardiomegaly. 



Calcification at the aortic knob. 



Degenerative changes in the spine and shoulders. 



Impression: 



Lines and tubes stable position. 



Moderate venous congestion. 



Moderate loculated left pleural effusion with consolidative changes 

in the left mid to lower lung zone. 



Cardiomegaly. 



Calcification at the aortic knob.

## 2018-03-12 NOTE — CP.PCM.PN
Subjective





- Date & Time of Evaluation


Date of Evaluation: 03/12/18


Time of Evaluation: 08:47





- Subjective


Subjective: 


Seen and examined.Patient is awake and responsive and follow commands


Respiratory therapist at bedside. She was on a/c last night. Jus changed to 

CPAP ,tolerating .


chest x ray this morning looks better.








Objective





- Vital Signs/Intake and Output


Vital Signs (last 24 hours): 


 











Temp Pulse Resp BP Pulse Ox


 


 98.3 F   88   16   118/70   100 


 


 03/12/18 05:00  03/12/18 07:00  03/12/18 07:00  03/12/18 06:39  03/12/18 07:00








Intake and Output: 


 











 03/12/18 03/12/18





 06:59 18:59


 


Intake Total 970 135


 


Output Total 50 0


 


Balance 920 135














- Medications


Medications: 


 Current Medications





Albuterol/Ipratropium (Duoneb 3 Mg/0.5 Mg (3 Ml) Ud)  3 ml INH RQ4 [facility]


   Last Admin: 03/12/18 07:45 Dose:  3 ml


Aspirin (Aspirin Chewable)  81 mg PO DAILY [facility]


   Last Admin: 03/11/18 09:41 Dose:  81 mg


Epoetin Vamsi (Procrit)  8,000 unit IV MWF [facility]


   Last Admin: 03/09/18 11:19 Dose:  8,000 unit


Ferrous Gluconate (Fergon)  324 mg PO TID [facility]


   Last Admin: 03/11/18 17:52 Dose:  324 mg


Aztreonam 1 gm/ Sodium (Chloride)  100 mls @ 100 mls/hr IVPB Q12H [facility]


   Last Admin: 03/12/18 01:05 Dose:  100 mls/hr


Heparin Sodium/Sodium Chloride (Heparin 80185 Units/250ml 1/2 Normal Saline)  25

,000 units in 250 mls @ 10 mls/hr IV .Q24H PRN; Protocol


   PRN Reason: ADJUST RATE PER PROTOCOL


   Last Admin: 03/11/18 23:50 Dose:  1,000 units/hr, 10 mls/hr


Metronidazole (Flagyl)  500 mg in 100 mls @ 100 mls/hr IVPB Q8 [facility]


   Last Admin: 03/12/18 05:00 Dose:  100 mls/hr


Insulin Aspart (Novolog)  0 unit SC Q6 [facility]


   PRN Reason: Protocol


   Last Admin: 03/12/18 05:15 Dose:  8 unit


Insulin Glargine (Lantus)  5 unit SC [facility]


   Last Admin: 03/11/18 23:00 Dose:  5 u


Methylprednisolone (Solu-Medrol)  40 mg IVP DAILY [facility]


   Stop: 03/15/18 10:01


Metoprolol Tartrate (Lopressor)  25 mg PO BID [facility]


   Last Admin: 03/11/18 17:52 Dose:  25 mg


Pantoprazole Sodium (Protonix Ec Tab)  40 mg PO DAILY [facility]


Paricalcitol (Zemplar)  2 mcg IV MWF [facility]


Rosuvastatin Calcium (Crestor)  5 mg PO [facility]


   Last Admin: 03/11/18 21:24 Dose:  5 mg


Vitamin B Complex/Vit C/Folic Acid (Nephro-Nneka)  1 tab PO 0800 [facility]


   Last Admin: 03/11/18 09:41 Dose:  1 tab











- Labs


Labs: 


 





 03/12/18 06:27 





 03/12/18 06:27 





 











PT  17.3 SECONDS (9.7-12.2)  H  03/09/18  06:00    


 


INR  1.5   03/09/18  06:00    


 


APTT  83 SECONDS (21-34)  H D 03/12/18  06:27    














- Constitutional


Appears: No Acute Distress





- Head Exam


Head Exam: NORMAL INSPECTION (intubated)





- Eye Exam


Eye Exam: Normal appearance





- ENT Exam


ENT Exam: Mucous Membranes Moist





- Neck Exam


Neck Exam: absent: Full ROM (intubated)





- Respiratory Exam


Respiratory Exam: Clear to Ausculation Bilateral





- Cardiovascular Exam


Cardiovascular Exam: Irregular Rhythm





- GI/Abdominal Exam


GI & Abdominal Exam: Soft, Normal Bowel Sounds





- Extremities Exam


Extremities Exam: Pedal Edema





- Neurological Exam


Neurological Exam: Awake.  absent: Oriented x3 (intubated,awake and responsive)





- Psychiatric Exam


Psychiatric exam: Normal Mood





- Skin


Skin Exam: Dry





Assessment and Plan





- Assessment and Plan (Free Text)


Assessment: 





This is a 77 year old female with a history of ESRD now getting HD, CHF, DM, HTN

, hypercholesterolemia, and some sort of MI years before, repeated TIAs, and 

colon CA. She was brought in by family for altered mental status and ultimately 

found to have severe metabolic acidosis as well as a extremely elevated 

troponins. 


Plan: 








1 Acute NSTEMI ,  Asystol ,CPR / Sustained VT


3/12: Afib rate is controlled on heparin drip. Weaning trial ,On Cpap


3/10: I had long conversation with POA Kristel Corral and per the POA the patient 

indicated to them no cardiac catherization. 


I explained to POA that considering the weaknened heart function it may prove 

very difficult to successfully extubate patient.


At this moment she remains on heparin ggt, getting HD, and also weaning trials


3/9: Cardiac catherization was canceled


3/8: Potentially cardiac catherization shortly from now. Remains on heparin ggt


3/7: atrial fibrillation appearance on telemetry, check a new 12 lead EKG. 

Remains on heparin ggt.


3/6: Remains in heparin ggt, statin, ASA. Hopefully at some point can have 

cardiac catherization. 


3/5: Now off pressor medications. She remains on amiodarone   


Echo EF 15 to 20%. 


Currently on a heparin ggt, ASA, Statin.  





2 Respiratory failure, intubation


3/12 Weaning trial.chest xray looks better. Just switched to CPAP


3/11: She may end up needing a trach and or PEG if she is not able to be 

succefully extubated


3/10: Again became tachycardic at night and was switched from PS/CPAP settings 

back to PRVC. Family is aware that it will be difficult to extubate


3/9: Back on PRVC after having tachy cardia


3/8: Has tolerated CPAP settings throughout the night


3/7: Yesterday afternoon she was moved back to PRVC


3/6: Now changed to CPAP/PS settings. The chest XRAYs look better this morning 

following HD last night.





3 Atrial Fibrillation 


3/12-Remains on heparin,rate controlled


3/10: remains in atrial fibrillation. On heparin and loppressor BID


3/9: Continue on heparin ggt, Lopressor increased to 25 BID





4 Acute renal failure on chronic renal disease/Now ESRD on HD


3/12-HD today


3/8: Toleratated HD well yesterday


3/7: Currently MWF HD


3/6: Underwent HD yesterday and tolerated it well. CXRAY looks better


3/5/2018: She has had one session of HD so far. Axel another session today





5 Leukocytosis concerning for sepsis


3/12 on Aztreonam and flagyl.Has leukocytosis/on steroid.follow ID


3/6: Today decreased WBC. Cultures negative so far


3/5/2018: The elevated WBC maybe stress related.  


She remains on abx Aztreonam IV and Flagyl IV





6 Acute systolic heart failure


3/6: Monitor chest XRAYs. 


  ECHO shows EF 15 to 20%





7 Pleural effusion


3/8: Still present on CXRAYs. Continue to moniotp


3/6: Improving CXRAYs


3/5/2018: Pleural effusions minimal at this time. Continue to monitor





8 Hyperglycemia and uncontrolled DM


3/6: Reccent accucheks 130s, 130, 160





9 Anemia of chronic disease, CKD


   3/6: Yesterday had 1 unit of PRBC given during HD, receiving EPO





10.DVT prophylaxis on heparin and GI prophylaxis on protonix

## 2018-03-12 NOTE — CP.PCM.PCO
Physician Communication Note





- Physician Communication Note


Physician Communication Note: Permacath tomorrow in OR

## 2018-03-12 NOTE — CP.PCM.PN
Subjective





- Date & Time of Evaluation


Date of Evaluation: 03/12/18


Time of Evaluation: 03:00





- Subjective


Subjective: 





dictated





Objective





- Vital Signs/Intake and Output


Vital Signs (last 24 hours): 


 











Temp Pulse Resp BP Pulse Ox


 


 97.6 F   119 H  22   87/50 L  99 


 


 03/12/18 20:00  03/12/18 20:00  03/12/18 20:00  03/12/18 19:59  03/12/18 20:00








Intake and Output: 


 











 03/12/18 03/13/18





 18:59 06:59


 


Intake Total 665 


 


Output Total 0 0


 


Balance 665 0














- Medications


Medications: 


 Current Medications





Albuterol/Ipratropium (Duoneb 3 Mg/0.5 Mg (3 Ml) Ud)  3 ml INH RQ4 [facility]


   Last Admin: 03/12/18 19:20 Dose:  3 ml


Aspirin (Aspirin Chewable)  81 mg PO DAILY [facility]


   Last Admin: 03/12/18 13:55 Dose:  81 mg


Epoetin Vamsi (Procrit)  10,000 unit IV MWF [facility]


   Last Admin: 03/12/18 10:28 Dose:  10,000 unit


Ferrous Gluconate (Fergon)  324 mg PO TID [facility]


   Last Admin: 03/12/18 18:10 Dose:  Not Given


Heparin Sodium (Porcine) (Heparin)  5,000 units SC Q8 [facility]


   Last Admin: 03/12/18 14:08 Dose:  5,000 units


Aztreonam 1 gm/ Sodium (Chloride)  100 mls @ 100 mls/hr IVPB Q12H [facility]


   Last Admin: 03/12/18 14:08 Dose:  100 mls/hr


Metronidazole (Flagyl)  500 mg in 100 mls @ 100 mls/hr IVPB Q8 [facility]


   Last Admin: 03/12/18 14:09 Dose:  100 mls/hr


Vancomycin HCl 1 gm/ Sodium (Chloride)  200 mls @ 166.7 mls/hr IVPB MWF [facility]


   PRN Reason: Protocol


   Last Admin: 03/12/18 18:12 Dose:  166.7 mls/hr


Insulin Aspart (Novolog)  0 unit SC Q6 [facility]


   PRN Reason: Protocol


   Last Admin: 03/12/18 18:11 Dose:  Not Given


Insulin Glargine (Lantus)  5 unit SC HS [facility]


   Last Admin: 03/11/18 23:00 Dose:  5 u


Methylprednisolone (Solu-Medrol)  40 mg IVP DAILY [facility]


   Stop: 03/15/18 10:01


   Last Admin: 03/12/18 13:56 Dose:  40 mg


Metoprolol Tartrate (Lopressor)  25 mg PO BID [facility]


   Last Admin: 03/12/18 18:10 Dose:  Not Given


Pantoprazole Sodium (Protonix Susp)  40 mg NG 0600 [facility]


Paricalcitol (Zemplar)  2 mcg IV MWF [facility]


   Last Admin: 03/12/18 10:28 Dose:  2 mcg


Rosuvastatin Calcium (Crestor)  5 mg PO HS [facility]


   Last Admin: 03/11/18 21:24 Dose:  5 mg


Sennosides (Senokot Tab)  8.6 mg NG DAILY [facility]


Vitamin B Complex/Vit C/Folic Acid (Nephro-Nneka)  1 tab PO 0800 [facility]


   Last Admin: 03/12/18 14:08 Dose:  1 tab











- Labs


Labs: 


 





 03/12/18 06:27 





 03/12/18 06:27 





 











PT  17.3 SECONDS (9.7-12.2)  H  03/09/18  06:00    


 


INR  1.5   03/09/18  06:00    


 


APTT  83 SECONDS (21-34)  H D 03/12/18  06:27

## 2018-03-12 NOTE — CP.PCM.PN
Subjective





- Date & Time of Evaluation


Date of Evaluation: 03/12/18


Time of Evaluation: 13:00





- Subjective


Subjective: 





Still intubated, on hemodialysis, more alert. The family elected medical 

management for the cardiac status.





Objective





- Vital Signs/Intake and Output


Vital Signs (last 24 hours): 


 











Temp Pulse Resp BP Pulse Ox


 


 97.2 F L  107 H  16   99/68 L  100 


 


 03/12/18 13:05  03/12/18 13:05  03/12/18 13:05  03/12/18 13:05  03/12/18 13:05








Intake and Output: 


 











 03/12/18 03/12/18





 06:59 18:59


 


Intake Total 970 265


 


Output Total 50 0


 


Balance 920 265














- Medications


Medications: 


 Current Medications





Albuterol/Ipratropium (Duoneb 3 Mg/0.5 Mg (3 Ml) Ud)  3 ml INH RQ4 [facility]


   Last Admin: 03/12/18 11:50 Dose:  Not Given


Aspirin (Aspirin Chewable)  81 mg PO DAILY [facility]


   Last Admin: 03/11/18 09:41 Dose:  81 mg


Epoetin Vamsi (Procrit)  10,000 unit IV MWF [facility]


   Last Admin: 03/12/18 10:28 Dose:  10,000 unit


Ferrous Gluconate (Fergon)  324 mg PO TID [facility]


   Last Admin: 03/11/18 17:52 Dose:  324 mg


Heparin Sodium (Porcine) (Heparin)  5,000 units SC Q8 [facility]


Aztreonam 1 gm/ Sodium (Chloride)  100 mls @ 100 mls/hr IVPB Q12H [facility]


   Last Admin: 03/12/18 01:05 Dose:  100 mls/hr


Metronidazole (Flagyl)  500 mg in 100 mls @ 100 mls/hr IVPB Q8 [facility]


   Last Admin: 03/12/18 05:00 Dose:  100 mls/hr


Insulin Aspart (Novolog)  0 unit SC Q6 [facility]


   PRN Reason: Protocol


   Last Admin: 03/12/18 12:16 Dose:  Not Given


Insulin Glargine (Lantus)  5 unit SC HS [facility]


   Last Admin: 03/11/18 23:00 Dose:  5 u


Methylprednisolone (Solu-Medrol)  40 mg IVP DAILY [facility]


   Stop: 03/15/18 10:01


Metoprolol Tartrate (Lopressor)  25 mg PO BID [facility]


   Last Admin: 03/11/18 17:52 Dose:  25 mg


Pantoprazole Sodium (Protonix Susp)  40 mg NG 0600 [facility]


Paricalcitol (Zemplar)  2 mcg IV MWF [facility]


   Last Admin: 03/12/18 10:28 Dose:  2 mcg


Rosuvastatin Calcium (Crestor)  5 mg PO HS [facility]


   Last Admin: 03/11/18 21:24 Dose:  5 mg


Senna (Senokot Syrup)  8.8 mg NG DAILY [facility]


Vitamin B Complex/Vit C/Folic Acid (Nephro-Nneka)  1 tab PO 0800 [facility]


   Last Admin: 03/11/18 09:41 Dose:  1 tab











- Labs


Labs: 


 





 03/12/18 06:27 





 03/12/18 06:27 





 











PT  17.3 SECONDS (9.7-12.2)  H  03/09/18  06:00    


 


INR  1.5   03/09/18  06:00    


 


APTT  83 SECONDS (21-34)  H D 03/12/18  06:27    














- Constitutional


Appears: Non-toxic





- Head Exam


Head Exam: ATRAUMATIC





- Eye Exam


Eye Exam: EOMI





- ENT Exam


ENT Exam: Mucous Membranes Moist





- Neck Exam


Neck Exam: absent: Lymphadenopathy, Thyromegaly





- Respiratory Exam


Respiratory Exam: Clear to Ausculation Bilateral.  absent: Rales





- Cardiovascular Exam


Cardiovascular Exam: REGULAR RHYTHM.  absent: Murmur





- GI/Abdominal Exam


GI & Abdominal Exam: Normal Bowel Sounds.  absent: Organomegaly





- Rectal Exam


Rectal Exam: Deferred





- Extremities Exam


Extremities Exam: Normal Capillary Refill





- Neurological Exam


Neurological Exam: Alert





- Psychiatric Exam


Psychiatric exam: Normal Mood





- Skin


Skin Exam: Dry





Assessment and Plan


(1) Severe sepsis


Status: Acute   





(2) Acute on chronic renal failure


Status: Acute   





(3) Congestive heart failure


Status: Acute   





(4) NSTEMI (non-ST elevated myocardial infarction)


Status: Acute   





(5) Pulmonary HTN


Status: Chronic   





(6) Malignant neoplasm of transverse colon


Status: Chronic

## 2018-03-13 LAB
ALBUMIN SERPL-MCNC: 3.1 G/DL (ref 3.5–5)
ALBUMIN/GLOB SERPL: 1 {RATIO} (ref 1–2.1)
ALT SERPL-CCNC: 329 U/L (ref 9–52)
ANISOCYTOSIS BLD QL SMEAR: SLIGHT
APTT BLD: 27 SECONDS (ref 21–34)
AST SERPL-CCNC: 49 U/L (ref 14–36)
BASOPHILS # BLD AUTO: 0 K/UL (ref 0–0.2)
BASOPHILS NFR BLD: 0.1 % (ref 0–2)
BUN SERPL-MCNC: 78 MG/DL (ref 7–17)
CALCIUM SERPL-MCNC: 8.5 MG/DL (ref 8.6–10.4)
EOSINOPHIL # BLD AUTO: 0 K/UL (ref 0–0.7)
EOSINOPHIL NFR BLD: 0 % (ref 0–4)
ERYTHROCYTE [DISTWIDTH] IN BLOOD BY AUTOMATED COUNT: 17.2 % (ref 11.5–14.5)
GFR NON-AFRICAN AMERICAN: 10
HGB BLD-MCNC: 9.6 G/DL (ref 11–16)
HYPOCHROMIC: SLIGHT
INR PPP: 1.6
LYMPHOCYTE: 1 % (ref 20–40)
LYMPHOCYTES # BLD AUTO: 0.3 K/UL (ref 1–4.3)
LYMPHOCYTES NFR BLD AUTO: 1.3 % (ref 20–40)
MCH RBC QN AUTO: 27.3 PG (ref 27–31)
MCHC RBC AUTO-ENTMCNC: 31.5 G/DL (ref 33–37)
MCV RBC AUTO: 86.8 FL (ref 81–99)
MONOCYTE: 2 % (ref 0–10)
MONOCYTES # BLD: 0.8 K/UL (ref 0–0.8)
MONOCYTES NFR BLD: 3.1 % (ref 0–10)
NEUTROPHILS # BLD: 24.7 K/UL (ref 1.8–7)
NEUTROPHILS NFR BLD AUTO: 95.5 % (ref 50–75)
NEUTROPHILS NFR BLD AUTO: 97 % (ref 50–75)
NRBC BLD AUTO-RTO: 0.2 % (ref 0–2)
NRBC BLD AUTO-RTO: 2 % (ref 0–0)
OVALOCYTES BLD QL SMEAR: SLIGHT
PLATELET # BLD EST: NORMAL 10*3/UL
PLATELET # BLD: 236 K/UL (ref 130–400)
PMV BLD AUTO: 9.7 FL (ref 7.2–11.7)
POLYCHROMIC: SLIGHT
PROTHROMBIN TIME: 19 SECONDS (ref 9.7–12.2)
RBC # BLD AUTO: 3.5 MIL/UL (ref 3.8–5.2)
TARGETS BLD QL SMEAR: SLIGHT
TOTAL CELLS COUNTED BLD: 100
WBC # BLD AUTO: 25.9 K/UL (ref 4.8–10.8)

## 2018-03-13 PROCEDURE — 02HV33Z INSERTION OF INFUSION DEVICE INTO SUPERIOR VENA CAVA, PERCUTANEOUS APPROACH: ICD-10-PCS | Performed by: SURGERY

## 2018-03-13 PROCEDURE — 05PY33Z REMOVAL OF INFUSION DEVICE FROM UPPER VEIN, PERCUTANEOUS APPROACH: ICD-10-PCS | Performed by: SURGERY

## 2018-03-13 RX ADMIN — IPRATROPIUM BROMIDE AND ALBUTEROL SULFATE SCH ML: .5; 3 SOLUTION RESPIRATORY (INHALATION) at 15:35

## 2018-03-13 RX ADMIN — INSULIN GLARGINE SCH U: 100 INJECTION, SOLUTION SUBCUTANEOUS at 22:25

## 2018-03-13 RX ADMIN — IPRATROPIUM BROMIDE AND ALBUTEROL SULFATE SCH: .5; 3 SOLUTION RESPIRATORY (INHALATION) at 00:24

## 2018-03-13 RX ADMIN — IPRATROPIUM BROMIDE AND ALBUTEROL SULFATE SCH ML: .5; 3 SOLUTION RESPIRATORY (INHALATION) at 19:07

## 2018-03-13 RX ADMIN — IPRATROPIUM BROMIDE AND ALBUTEROL SULFATE SCH: .5; 3 SOLUTION RESPIRATORY (INHALATION) at 03:20

## 2018-03-13 RX ADMIN — WATER SCH MLS/HR: 1 INJECTION INTRAMUSCULAR; INTRAVENOUS; SUBCUTANEOUS at 06:05

## 2018-03-13 RX ADMIN — WATER SCH MLS/HR: 1 INJECTION INTRAMUSCULAR; INTRAVENOUS; SUBCUTANEOUS at 22:26

## 2018-03-13 RX ADMIN — IPRATROPIUM BROMIDE AND ALBUTEROL SULFATE SCH ML: .5; 3 SOLUTION RESPIRATORY (INHALATION) at 07:23

## 2018-03-13 RX ADMIN — WATER SCH MLS/HR: 1 INJECTION INTRAMUSCULAR; INTRAVENOUS; SUBCUTANEOUS at 14:00

## 2018-03-13 RX ADMIN — Medication SCH: at 08:00

## 2018-03-13 RX ADMIN — METHYLPREDNISOLONE SODIUM SUCCINATE SCH MG: 40 INJECTION, POWDER, FOR SOLUTION INTRAMUSCULAR; INTRAVENOUS at 10:04

## 2018-03-13 RX ADMIN — INSULIN ASPART SCH: 100 INJECTION, SOLUTION INTRAVENOUS; SUBCUTANEOUS at 00:32

## 2018-03-13 RX ADMIN — INSULIN ASPART SCH: 100 INJECTION, SOLUTION INTRAVENOUS; SUBCUTANEOUS at 12:00

## 2018-03-13 RX ADMIN — INSULIN ASPART SCH: 100 INJECTION, SOLUTION INTRAVENOUS; SUBCUTANEOUS at 18:01

## 2018-03-13 RX ADMIN — INSULIN ASPART SCH: 100 INJECTION, SOLUTION INTRAVENOUS; SUBCUTANEOUS at 05:36

## 2018-03-13 RX ADMIN — IPRATROPIUM BROMIDE AND ALBUTEROL SULFATE SCH ML: .5; 3 SOLUTION RESPIRATORY (INHALATION) at 11:13

## 2018-03-13 NOTE — RAD
HISTORY:

s/p Permacath placement  



COMPARISON:

3/13/2018 at 6:55 a.m. 



FINDINGS:



LUNGS:

Vaguely rounded opacity at right base. Follow up to clearing to 

exclude underlying neoplasm. Hazy opacity lower portion of both lungs,

 unchanged on right and improved compared to earlier examination of 

the same date on the left side. 



PLEURA:

Small left pleural effusion.  No evidence of right pleural effusion.



CARDIOVASCULAR:

Normal heart size. Right IJ central venous catheter.  Left tunneled 

central venous dialysis catheter. Mitral annular calcification.



OSSEOUS STRUCTURES:

No significant abnormalities.



VISUALIZED UPPER ABDOMEN:

Normal.



OTHER FINDINGS:

None.



IMPRESSION:

Hazy opacity lower portion of both lungs.  Rounded opacity at right 

base.  Followup to clearing to exclude neoplasm.  Small left pleural 

effusion.  Lines and tubes unchanged.

## 2018-03-13 NOTE — PCM.SURG1
Surgeon's Initial Post Op Note





- Surgeon's Notes


Surgeon: Dr. Alvarado


Assistant: [place] PGY1; Rae MS3


Type of Anesthesia: IV Sedation, Local


Pre-Operative Diagnosis: Chronic Kidney Disease


Operative Findings: see operative report


Post-Operative Diagnosis: same


Operation Performed: Left IJ wire transfer Permacath placement


Specimen/Specimens Removed: Catheter tip culture


Estimated Blood Loss: EBL {In ML}: 10


Blood Products Given: N/A


Drains Used: No Drains


Post-Op Condition: Fair


Date of Surgery/Procedure: 03/13/18


Time of Surgery/Procedure: 14:46

## 2018-03-13 NOTE — OP
PROCEDURE DATE:  03/13/2018



PREOPERATIVE DIAGNOSIS:  Renal failure.



POSTOPERATIVE DIAGNOSIS:  Renal failure.



PROCEDURE CARRIED OUT:  Removal of jugular vein catheter and placement of

Perm-A-Cath via left internal jugular vein.



SURGEON:  Linus Alvarado Jr., MD



ASSISTANT:  Dr. Brennon Delarosa.



ANESTHESIOLOGIST:  Mr. Amos.



INDICATIONS:  The patient is an older middle aged woman with renal

insufficiency, carotid shunt, had a temporary catheter placed.  She was in

poor condition, needs Perm-A-Cath.



OPERATIVE FINDINGS:  An 0.035 wire was placed through the old catheter

which was then removed.  She was dilated and passed over this and then a

new catheter positioned into the superior vena cava, originated on the left

chest wall, went to the left internal jugular vein and terminated in the

superior vena cava.  It was flushed with heparinized saline and secured to

the skin with silk sutures.  Blood loss of procedure was 25 mL.  Operation

carried out, Perm-A-Cath left jugular vein.





__________________________________________

Linus Alvarado Jr., MD





DD:  03/13/2018 14:46:48

DT:  03/13/2018 15:07:49

Job # [id number]

## 2018-03-13 NOTE — CP.PCM.PN
Subjective





- Date & Time of Evaluation


Date of Evaluation: 03/13/18


Time of Evaluation: 03:30





- Subjective


Subjective: 





dictated





Objective





- Vital Signs/Intake and Output


Vital Signs (last 24 hours): 


 











Temp Pulse Resp BP Pulse Ox


 


 97.7 F   104 H  14   116/77   100 


 


 03/13/18 20:00  03/13/18 21:00  03/13/18 21:00  03/13/18 20:57  03/13/18 21:00








Intake and Output: 


 











 03/13/18 03/14/18





 18:59 06:59


 


Intake Total 220 0


 


Output Total  0


 


Balance 220 0














- Medications


Medications: 


 Current Medications





Albuterol/Ipratropium (Duoneb 3 Mg/0.5 Mg (3 Ml) Ud)  3 ml INH RQ4 [facility]


   Last Admin: 03/13/18 19:07 Dose:  3 ml


Aspirin (Aspirin Chewable)  81 mg PO DAILY [facility]


   Last Admin: 03/13/18 10:05 Dose:  Not Given


Diltiazem HCl (Cardizem)  30 mg PO Q6H [facility]


   Last Admin: 03/13/18 19:28 Dose:  30 mg


Epoetin Vamsi (Procrit)  10,000 unit IV MWF [facility]


   Last Admin: 03/12/18 10:28 Dose:  10,000 unit


Ferrous Gluconate (Fergon)  324 mg PO TID [facility]


   Last Admin: 03/13/18 18:03 Dose:  324 mg


Heparin Sodium (Porcine) (Heparin)  5,000 units SC Q8 [facility]


   Last Admin: 03/13/18 14:00 Dose:  Not Given


Hydromorphone HCl (Dilaudid)  0.5 mg IVP Q6H [facility]


   PRN Reason: Pain, moderate (4-7)


Aztreonam 1 gm/ Sodium (Chloride)  100 mls @ 100 mls/hr IVPB Q12H [facility]


   Last Admin: 03/13/18 12:55 Dose:  100 mls/hr


Metronidazole (Flagyl)  500 mg in 100 mls @ 100 mls/hr IVPB Q8 [facility]


   Last Admin: 03/13/18 14:00 Dose:  100 mls/hr


Vancomycin HCl 1 gm/ Sodium (Chloride)  200 mls @ 166.7 mls/hr IVPB F [facility]


   PRN Reason: Protocol


   Last Admin: 03/12/18 18:12 Dose:  166.7 mls/hr


Insulin Aspart (Novolog)  0 unit SC Q6 [facility]


   PRN Reason: Protocol


   Last Admin: 03/13/18 18:01 Dose:  Not Given


Insulin Glargine (Lantus)  5 unit SC [facility]


   Last Admin: 03/12/18 23:47 Dose:  5 u


Methylprednisolone (Solu-Medrol)  40 mg IVP DAILY [facility]


   Stop: 03/15/18 10:01


   Last Admin: 03/13/18 10:04 Dose:  40 mg


Metoprolol Tartrate (Lopressor)  25 mg PO BID [facility]


   Last Admin: 03/13/18 18:03 Dose:  25 mg


Pantoprazole Sodium (Protonix Inj)  40 mg IVP DAILY [facility]


   Last Admin: 03/13/18 10:04 Dose:  40 mg


Paricalcitol (Zemplar)  2 mcg IV MWF [facility]


   Last Admin: 03/12/18 10:28 Dose:  2 mcg


Rosuvastatin Calcium (Crestor)  5 mg PO [facility]


   Last Admin: 03/12/18 21:24 Dose:  5 mg


Sennosides (Senokot Tab)  8.6 mg NG DAILY [facility]


   Last Admin: 03/13/18 10:06 Dose:  Not Given


Vitamin B Complex/Vit C/Folic Acid (Nephro-Nneka)  1 tab PO 0800 [facility]


   Last Admin: 03/13/18 08:00 Dose:  Not Given


Warfarin Sodium (Coumadin)  10 mg PO 1800 [facility]


   Stop: 03/14/18 18:01











- Labs


Labs: 


 





 03/13/18 05:46 





 03/13/18 05:44 





 











PT  19.0 SECONDS (9.7-12.2)  H  03/13/18  10:19    


 


INR  1.6   03/13/18  10:19    


 


APTT  27 SECONDS (21-34)  D 03/13/18  10:19

## 2018-03-13 NOTE — CP.PCM.PN
Subjective





- Date & Time of Evaluation


Date of Evaluation: 03/13/18


Time of Evaluation: 09:30





- Subjective


Subjective: 


Seen and examined


patient is awake and responsive.Lying comfortable and doing good


Going for permacath today








Objective





- Vital Signs/Intake and Output


Vital Signs (last 24 hours): 


 











Temp Pulse Resp BP Pulse Ox


 


 98.3 F   97 H  18   117/86   100 


 


 03/13/18 12:00  03/13/18 13:00  03/13/18 13:00  03/13/18 12:59  03/13/18 13:00








Intake and Output: 


 











 03/13/18 03/13/18





 06:59 18:59


 


Intake Total 350 100


 


Output Total 0 


 


Balance 350 100














- Medications


Medications: 


 Current Medications





Albuterol/Ipratropium (Duoneb 3 Mg/0.5 Mg (3 Ml) Ud)  3 ml INH RQ4 [facility]


   Last Admin: 03/13/18 11:13 Dose:  3 ml


Aspirin (Aspirin Chewable)  81 mg PO DAILY [facility]


   Last Admin: 03/13/18 10:05 Dose:  Not Given


Diltiazem HCl (Cardizem)  30 mg PO Q6H [facility]


   Last Admin: 03/13/18 07:00 Dose:  Not Given


Epoetin Vamsi (Procrit)  10,000 unit IV MWF [facility]


   Last Admin: 03/12/18 10:28 Dose:  10,000 unit


Ferrous Gluconate (Fergon)  324 mg PO TID [facility]


   Last Admin: 03/13/18 10:05 Dose:  Not Given


Heparin Sodium (Porcine) (Heparin)  5,000 units SC Q8 [facility]


   Last Admin: 03/13/18 05:35 Dose:  Not Given


Aztreonam 1 gm/ Sodium (Chloride)  100 mls @ 100 mls/hr IVPB Q12H [facility]


   Last Admin: 03/13/18 12:55 Dose:  100 mls/hr


Metronidazole (Flagyl)  500 mg in 100 mls @ 100 mls/hr IVPB Q8 [facility]


   Last Admin: 03/13/18 06:05 Dose:  100 mls/hr


Vancomycin HCl 1 gm/ Sodium (Chloride)  200 mls @ 166.7 mls/hr IVPB MWF [facility]


   PRN Reason: Protocol


   Last Admin: 03/12/18 18:12 Dose:  166.7 mls/hr


Insulin Aspart (Novolog)  0 unit SC Q6 [facility]


   PRN Reason: Protocol


   Last Admin: 03/13/18 12:00 Dose:  Not Given


Insulin Glargine (Lantus)  5 unit SC [facility]


   Last Admin: 03/12/18 23:47 Dose:  5 u


Methylprednisolone (Solu-Medrol)  40 mg IVP DAILY [facility]


   Stop: 03/15/18 10:01


   Last Admin: 03/13/18 10:04 Dose:  40 mg


Metoprolol Tartrate (Lopressor)  25 mg PO BID [facility]


   Last Admin: 03/13/18 10:04 Dose:  Not Given


Pantoprazole Sodium (Protonix Inj)  40 mg IVP DAILY [facility]


   Last Admin: 03/13/18 10:04 Dose:  40 mg


Paricalcitol (Zemplar)  2 mcg IV MWF [facility]


   Last Admin: 03/12/18 10:28 Dose:  2 mcg


Rosuvastatin Calcium (Crestor)  5 mg PO [facility]


   Last Admin: 03/12/18 21:24 Dose:  5 mg


Sennosides (Senokot Tab)  8.6 mg NG DAILY [facility]


   Last Admin: 03/13/18 10:06 Dose:  Not Given


Vitamin B Complex/Vit C/Folic Acid (Nephro-Nneka)  1 tab PO 0800 [facility]


   Last Admin: 03/13/18 08:00 Dose:  Not Given











- Labs


Labs: 


 





 03/13/18 05:46 





 03/13/18 05:44 





 











PT  19.0 SECONDS (9.7-12.2)  H  03/13/18  10:19    


 


INR  1.6   03/13/18  10:19    


 


APTT  27 SECONDS (21-34)  D 03/13/18  10:19    














- Constitutional


Appears: No Acute Distress





- Head Exam


Head Exam: NORMAL INSPECTION





- Eye Exam


Eye Exam: Normal appearance





- ENT Exam


ENT Exam: Mucous Membranes Moist





- Neck Exam


Neck Exam: Full ROM





- Respiratory Exam


Respiratory Exam: Clear to Ausculation Bilateral, NORMAL BREATHING PATTERN





- Cardiovascular Exam


Cardiovascular Exam: REGULAR RHYTHM





- GI/Abdominal Exam


GI & Abdominal Exam: Soft, Normal Bowel Sounds





- Extremities Exam


Extremities Exam: Full ROM





- Back Exam


Back Exam: NORMAL INSPECTION





- Neurological Exam


Neurological Exam: Alert.  absent: Altered (responsive,hoarseness of voice)





- Psychiatric Exam


Psychiatric exam: Normal Mood





- Skin


Skin Exam: Dry





Assessment and Plan





- Assessment and Plan (Free Text)


Assessment: 





This is a 77 year old female with a history of ESRD now getting HD, CHF, DM, HTN

, hypercholesterolemia, and some sort of MI years before, repeated TIAs, and 

colon CA. She was brought in by family for altered mental status and ultimately 

found to have severe metabolic acidosis as well as a extremely elevated 

troponins. 








Plan: 











1 Acute NSTEMI ,  Asystol ,CPR / Sustained VT


3/13-onasprin and statin


3/12: Heparin drip discontinued


3/10: I had long conversation with POA Kristel Corral and per the POA the patient 

indicated to them no cardiac catherization. 


I explained to POA that considering the weakened heart function it may prove 

very difficult to successfully extubate patient.


At this moment she remains on heparin ggt, getting HD, and also weaning trials


3/9: Cardiac catherization was canceled


3/8: Potentially cardiac catherization shortly from now. Remains on heparin ggt


3/7: atrial fibrillation appearance on telemetry, check a new 12 lead EKG. 

Remains on heparin ggt.


3/6: Remains in heparin ggt, statin, ASA. Hopefully at some point can have 

cardiac catherization. 


3/5: Now off pressor medications. She remains on amiodarone   


Echo EF 15 to 20%. 








2 Respiratory failure, intubation


3/13 Saturating well on nasal cannula 4l


3/12 Weaning trial.chest xray looks better. Just switched to CPAP


3/11: She may end up needing a trach and or PEG if she is not able to be 

successfully extubated


3/10: Again became tachycardic at night and was switched from PS/CPAP settings 

back to PRVC. Family is aware that it will be difficult to extubate


3/9: Back on PRVC after having tachy cardia


3/8: Has tolerated CPAP settings throughout the night


3/7: Yesterday afternoon she was moved back to PRVC


3/6: Now changed to CPAP/PS settings. The chest XRAYs look better this morning 

following HD last night.





3 Atrial Fibrillation 


3/12 Patient has CHADS-VA score 8. has high CHADS score . INR is 1.6. I will 

start on Coumadin 10mg tonight  without heparin drip bridge.Check INR 

tomorrow.If INR < 1.5 tomorrow may need brighe with heparin drip. I will folow 

woth DR Giles. Spoke to DR Correia. patient refuses cath/intervension family 

aware of the risk of life threatening  arrhythmia


         


3/10: remains in atrial fibrillation. On heparin and loppressor BID


3/9: Continue on heparin ggt, Lopressor increased to 25 BID





4 Acute renal failure on chronic renal disease/Now ESRD on HD


3/13-s/p L IJpermacath.  left shiley removed


3/12-HD today


3/8: Toleratated HD well yesterday


3/7: Currently MWF HD


3/6: Underwent HD yesterday and tolerated it well. CXRAY looks better


3/5/2018: She has had one session of HD so far. [place] another session today





5 Leukocytosis concerning for sepsis


3/13 on Aztreonam ,vanco and flagyl.follow ID


          cultures negative


3/6: Today decreased WBC. Cultures negative so far


3/5/2018: The elevated WBC maybe stress related.  


She remains on abx Aztreonam IV and Flagyl IV





6 Acute systolic heart failure


3/6: Monitor chest XRAYs. 


  ECHO shows EF 15 to 20%


  follow cardio





7 Pleural effusion


3/8: Still present on CXRAYs. Continue to moniotp


3/6: Improving CXRAYs


3/5/2018: Pleural effusions minimal at this time. Continue to monitor





8 Hyperglycemia and uncontrolled DM


3/6: Reccent accucheks 130s, 130, 160





9 Anemia of chronic disease, CKD


   3/6: Yesterday had 1 unit of PRBC given during HD, receiving EPO





10.DVT prophylaxis on heparin and GI prophylaxis on protonix

## 2018-03-13 NOTE — CP.PCM.PN
Subjective





- Date & Time of Evaluation


Date of Evaluation: 03/13/18


Time of Evaluation: 14:06





- Subjective


Subjective: 





Nephrology Consultation:





Assessment: critical


CKD stage 5 (N18.5) with hyperkalemia, acidosis now has ESRD on HD via [facility] 


pulm edema, CHF, NSTEMI, pleural effusions, pneumonia


AMS, hyperglycemia


s/p cardiac arrest, shock liver, A fib





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 


Hypertensive Chronic Kidney Disease (I12.9)


Anemia (D64.9), Hyperphosphatemia (E83.39), Secondary Hyperparathyroidism (E21.1

), HTN (I12.9), vit D insuff, hx of colon CA, TIAs





Plan


HD Tomorrow as ordered. She is planned for permacath insertion today, no acute 

need for dialysis today


maintain hemodynamic stable. Patient not on ACEI/ARB due to low BP. off 

midodrine


started iron supplements, MVI, epogen with HD, and zemplar 2 mcg with HD. d/c 

oral vit d


ID and cardiology following





Dose meds/antibiotics for ESRD/HD status. Avoid fleets enema/magnesium based 

laxatives. Avoid nephrotoxins/NSAIDs


Glycemic control


Further work up/management as per primary team


 consult for outpatient dialysis placement.





Thanks for allowing me to participate in care of your patient. Will follow 

patient with you. Please call if any Qs.


Dr Shad Panchal


Office: [phone] Cell: [phone] Fax: [phone]





Chief Complaint; unable to obtain


reason for consult: CKD management


source of Info: EMR


HPI: Pt is a 77 F with hx of diabetes Mellitus ( years), hypertension (years), 

CHF, TIA, Colon CA, CKD stage 4/5, has matured AVG in left arm presented with 

complaints of AMS and hyperglycemia as brought by family. now has elevated trop 

100, hyperkalemia, AMS and elevated sugar, pulm edema. renal consult for CKD 

management. 


pt with AMS unable to provide any hx. she had received medical management for 

hyperkalemia. 





ROS: unable to obtain





Physical Examination: Daughter bedside


General Appearance: ill appearing, on O2 via face mask


Vitals reviewed and noted as below


Head; Atraumatic, normocephalic


ENT: On O2 by facemask


EYES: PERRLA


Neck; supple no lymphadenopathy, no thyromegaly or bruit


Lungs: Normal respiratory rate/effort. Breath sounds bilateral clear, she is 

mechanically ventilated


Heart: Normal rate. s1s2 normal. No rub or gallop. 


Extremities: no edema. No varicose veins. chronic hyperpigmented changes in legs


Neurological: Patient is  awake follows commands, Not much communicative 

verbally


Skin: Warm and dry. Normal turgor. No rash. Palpitation: Normal elasticity for 

age


Abdomen: Abdomen is soft. Bowel sounds +. There is no abdominal tenderness, no 

guarding/rigidity no organomegaly


Psych: unable


MSK: no joint tenderness or swelling. Digits and nails normal, no deformity


: kidney or bladder not palpable


Access: Left AVG without bruit. [facility]





Labs/imaging reviewed.


Past medical history, past surgical history, family history, social history, 

allergy reviewed and noted as below


Family hx: no hx of CKD. Rest non-contributory








Objective





- Vital Signs/Intake and Output


Vital Signs (last 24 hours): 


 











Temp Pulse Resp BP Pulse Ox


 


 98.3 F   97 H  18   117/86   100 


 


 03/13/18 12:00  03/13/18 13:00  03/13/18 13:00  03/13/18 12:59  03/13/18 13:00








Intake and Output: 


 











 03/13/18 03/13/18





 06:59 18:59


 


Intake Total 350 100


 


Output Total 0 


 


Balance 350 100














- Medications


Medications: 


 Current Medications





Albuterol/Ipratropium (Duoneb 3 Mg/0.5 Mg (3 Ml) Ud)  3 ml INH RQ4 [facility]


   Last Admin: 03/13/18 11:13 Dose:  3 ml


Aspirin (Aspirin Chewable)  81 mg PO DAILY [facility]


   Last Admin: 03/13/18 10:05 Dose:  Not Given


Diltiazem HCl (Cardizem)  30 mg PO Q6H [facility]


   Last Admin: 03/13/18 07:00 Dose:  Not Given


Epoetin Vamsi (Procrit)  10,000 unit IV MWF [facility]


   Last Admin: 03/12/18 10:28 Dose:  10,000 unit


Ferrous Gluconate (Fergon)  324 mg PO TID [facility]


   Last Admin: 03/13/18 10:05 Dose:  Not Given


Heparin Sodium (Porcine) (Heparin)  5,000 units SC Q8 [facility]


   Last Admin: 03/13/18 05:35 Dose:  Not Given


Aztreonam 1 gm/ Sodium (Chloride)  100 mls @ 100 mls/hr IVPB Q12H [facility]


   Last Admin: 03/13/18 12:55 Dose:  100 mls/hr


Metronidazole (Flagyl)  500 mg in 100 mls @ 100 mls/hr IVPB Q8 [facility]


   Last Admin: 03/13/18 06:05 Dose:  100 mls/hr


Vancomycin HCl 1 gm/ Sodium (Chloride)  200 mls @ 166.7 mls/hr IVPB MWF [facility]


   PRN Reason: Protocol


   Last Admin: 03/12/18 18:12 Dose:  166.7 mls/hr


Insulin Aspart (Novolog)  0 unit SC Q6 [facility]


   PRN Reason: Protocol


   Last Admin: 03/13/18 12:00 Dose:  Not Given


Insulin Glargine (Lantus)  5 unit SC [facility]


   Last Admin: 03/12/18 23:47 Dose:  5 u


Methylprednisolone (Solu-Medrol)  40 mg IVP DAILY [facility]


   Stop: 03/15/18 10:01


   Last Admin: 03/13/18 10:04 Dose:  40 mg


Metoprolol Tartrate (Lopressor)  25 mg PO BID [facility]


   Last Admin: 03/13/18 10:04 Dose:  Not Given


Pantoprazole Sodium (Protonix Inj)  40 mg IVP DAILY [facility]


   Last Admin: 03/13/18 10:04 Dose:  40 mg


Paricalcitol (Zemplar)  2 mcg IV [facility]


   Last Admin: 03/12/18 10:28 Dose:  2 mcg


Rosuvastatin Calcium (Crestor)  5 mg PO [facility]


   Last Admin: 03/12/18 21:24 Dose:  5 mg


Sennosides (Senokot Tab)  8.6 mg NG DAILY [facility]


   Last Admin: 03/13/18 10:06 Dose:  Not Given


Vitamin B Complex/Vit C/Folic Acid (Nephro-Nneka)  1 tab PO 0800 [facility]


   Last Admin: 03/13/18 08:00 Dose:  Not Given











- Labs


Labs: 


 





 03/13/18 05:46 





 03/13/18 05:44 





 











PT  19.0 SECONDS (9.7-12.2)  H  03/13/18  10:19    


 


INR  1.6   03/13/18  10:19    


 


APTT  27 SECONDS (21-34)  D 03/13/18  10:19

## 2018-03-13 NOTE — CP.CCUPN
<Kimberly Yao - Last Filed: 03/13/18 12:19>





CCU Subjective





- Physician Review


Subjective (Free Text): 





03/13/18 11:54





Patient seen and examined at bedside. Per nursing, patient with HR . Patient 

was extubated yesterday. Patient is currently doing well, Offers no complaints 

at this time. NPO, Plan for permacath placement later today with General 

Surgery. 








CCU Objective





- Vital Signs / Intake & Output


Vital Signs (Last 4 hours): 


Vital Signs











  Temp Pulse Resp BP Pulse Ox


 


 03/13/18 10:00   98 H  21   100


 


 03/13/18 09:59   110 H  27 H  108/72  100


 


 03/13/18 08:59   94 H  20  112/73  100


 


 03/13/18 08:00  97.9 F  116 H  21   100


 


 03/13/18 07:59     123/69 











Intake and Output (Last 8hrs): 


 Intake & Output











 03/12/18 03/13/18 03/13/18





 22:59 06:59 14:59


 


Intake Total 350 300 0


 


Output Total 0 0 


 


Balance 350 300 0


 


Intake:   


 


  Intake, IV Amount 300 300 


 


    Right Proximal Port 300 300 





    Internal Jugular   


 


  Oral 50  0


 


Output:   


 


  Urine 0 0 


 


    Urine, Voided 0 0 


 


  Stool  0 


 


Other:   


 


  # Bowel Movements 1 1 














- Physical Exam


Head: Positive for: Atraumatic, Normocephalic.  Negative for: Tenderness, 

Contusion, Swelling


Pupils: Positive for: PERRL.  Negative for: Sluggish, Non-Reactive


Extroacular Muscles: Positive for: EOMI


Conjunctiva: Positive for: Normal


Mouth: Positive for: Dry


Pharnyx: Positive for: Normal


Nose (External): Positive for: Atraumatic


Neck: Positive for: Trachea Midline, Other (R IJ central line, dialysis 

catheter on the ).  Negative for: JVD


Respiratory/Chest: Positive for: Good Air Exchange, Decreased Breath Sounds (on 

bilateral bases).  Negative for: Wheezes


Cardiovascular: Positive for: Tachycardic


Abdomen: Positive for: Normal Bowel Sounds.  Negative for: Peritoneal Signs


Upper Extremity: Negative for: Cyanosis, Edema


Lower Extremity: Negative for: Edema


Skin: Positive for: Warm, Dry


Psychiatric: Positive for: Alert





- Medications


Active Medications: 


Active Medications











Generic Name Dose Route Start Last Admin





  Trade Name Freq  PRN Reason Stop Dose Admin


 


Albuterol/Ipratropium  3 ml  03/07/18 16:00  03/13/18 11:13





  Duoneb 3 Mg/0.5 Mg (3 Ml) Ud  INH   3 ml





  RQ4 [facility]   Administration


 


Aspirin  81 mg  03/02/18 10:00  03/13/18 10:05





  Aspirin Chewable  PO   Not Given





  DAILY [facility]   


 


Diltiazem HCl  30 mg  03/13/18 07:00  03/13/18 07:00





  Cardizem  PO   Not Given





  Q6H [facility]   


 


Epoetin Vamsi  10,000 unit  03/12/18 10:30  03/12/18 10:28





  Procrit  IV   10,000 unit





  [facility]   Administration


 


Ferrous Gluconate  324 mg  03/03/18 10:00  03/13/18 10:05





  Fergon  PO   Not Given





  TID [facility]   


 


Heparin Sodium (Porcine)  5,000 units  03/12/18 14:00  03/13/18 05:35





  Heparin  SC   Not Given





  Q8 [facility]   


 


Aztreonam 1 gm/ Sodium  100 mls @ 100 mls/hr  03/09/18 01:30  03/13/18 02:04





  Chloride  IVPB   100 mls/hr





  Q12H [facility]   Administration


 


Metronidazole  500 mg in 100 mls @ 100 mls/hr  03/11/18 14:00  03/13/18 06:05





  Flagyl  IVPB   100 mls/hr





  Q8 [facility]   Administration


 


Vancomycin HCl 1 gm/ Sodium  200 mls @ 166.7 mls/hr  03/12/18 16:00  03/12/18 18

:12





  Chloride  IVPB   166.7 mls/hr





  [facility]   Administration





  Protocol   


 


Insulin Aspart  0 unit  03/04/18 11:49  03/13/18 05:36





  Novolog  SC   Not Given





  Q6 [facility]   





  Protocol   


 


Insulin Glargine  5 unit  03/06/18 22:00  03/12/18 23:47





  Lantus  SC   5 u





  HS [facility]   Administration


 


Methylprednisolone  40 mg  03/12/18 10:00  03/13/18 10:04





  Solu-Medrol  IVP  03/15/18 10:01  40 mg





  DAILY [facility]   Administration


 


Metoprolol Tartrate  25 mg  03/09/18 08:00  03/13/18 10:04





  Lopressor  PO   Not Given





  BID [facility]   


 


Pantoprazole Sodium  40 mg  03/13/18 10:00  03/13/18 10:04





  Protonix Inj  IVP   40 mg





  DAILY [facility]   Administration


 


Paricalcitol  2 mcg  03/12/18 09:00  03/12/18 10:28





  Zemplar  IV   2 mcg





  [facility]   Administration


 


Rosuvastatin Calcium  5 mg  03/02/18 01:15  03/12/18 21:24





  Crestor  PO   5 mg





  [facility]   Administration


 


Sennosides  8.6 mg  03/13/18 10:00  03/13/18 10:06





  Senokot Tab  NG   Not Given





  DAILY [facility]   


 


Vitamin B Complex/Vit C/Folic Acid  1 tab  03/03/18 08:00  03/13/18 08:00





  Nephro-Nneka  PO   Not Given





  0800 [facility]   














- Patient Studies


Lab Studies: 


 Lab Studies











  03/13/18 03/13/18 03/13/18 Range/Units





  10:19 05:46 05:44 


 


WBC   25.9 H   (4.8-10.8)  K/uL


 


RBC   3.50 L   (3.80-5.20)  Mil/uL


 


Hgb   9.6 L   (11.0-16.0)  g/dL


 


Hct   30.4 L   (34.0-47.0)  %


 


MCV   86.8   (81.0-99.0)  fL


 


MCH   27.3   (27.0-31.0)  pg


 


MCHC   31.5 L   (33.0-37.0)  g/dL


 


RDW   17.2 H   (11.5-14.5)  %


 


Plt Count   236   (130-400)  K/uL


 


MPV   9.7   (7.2-11.7)  fL


 


Neut % (Auto)   95.5 H   (50.0-75.0)  %


 


Lymph % (Auto)   1.3 L   (20.0-40.0)  %


 


Mono % (Auto)   3.1   (0.0-10.0)  %


 


Eos % (Auto)   0.0   (0.0-4.0)  %


 


Baso % (Auto)   0.1   (0.0-2.0)  %


 


Neut # (Auto)   24.7 H   (1.8-7.0)  K/uL


 


Lymph # (Auto)   0.3 L   (1.0-4.3)  K/uL


 


Mono # (Auto)   0.8   (0.0-0.8)  K/uL


 


Eos # (Auto)   0.0   (0.0-0.7)  K/uL


 


Baso # (Auto)   0.0   (0.0-0.2)  K/uL


 


Neutrophils % (Manual)   97 H   (50-75)  %


 


Lymphocytes % (Manual)   1 L   (20-40)  %


 


Monocytes % (Manual)   2   (0-10)  %


 


Nucleated RBC %   2 H   (0-0)  %


 


Platelet Estimate   Normal   (NORMAL)  


 


Polychromasia   Slight   


 


Hypochromasia (manual)   Slight   


 


Anisocytosis (manual)   Slight   


 


Target Cells   Slight   


 


Ovalocytes   Slight   


 


PT  19.0 H    (9.7-12.2)  SECONDS


 


INR  1.6    


 


APTT  27  D    (21-34)  SECONDS


 


Sodium    137  (132-148)  mmol/L


 


Potassium    4.6  (3.6-5.2)  mmol/L


 


Chloride    96 L  ()  mmol/L


 


Carbon Dioxide    23  (22-30)  mmol/L


 


Anion Gap    22 H  (10-20)  


 


BUN    78 H  (7-17)  mg/dL


 


Creatinine    4.2 H  (0.7-1.2)  mg/dL


 


Est GFR ( Amer)    12  


 


Est GFR (Non-Af Amer)    10  


 


POC Glucose (mg/dL)     ()  mg/dL


 


Random Glucose    253 H  ()  mg/dL


 


Calcium    8.5 L  (8.6-10.4)  mg/dl


 


Phosphorus    6.6 H  (2.5-4.5)  mg/dL


 


Magnesium    2.1  (1.6-2.3)  mg/dL


 


Total Bilirubin    0.8  (0.2-1.3)  mg/dL


 


AST    49 H  (14-36)  U/L


 


ALT    329 H  (9-52)  U/L


 


Alkaline Phosphatase    215 H  ()  U/L


 


Total Protein    6.2 L  (6.3-8.3)  g/dL


 


Albumin    3.1 L  (3.5-5.0)  g/dL


 


Globulin    3.1  (2.2-3.9)  gm/dL


 


Albumin/Globulin Ratio    1.0  (1.0-2.1)  














  03/13/18 03/12/18 03/12/18 Range/Units





  05:35 23:30 17:39 


 


WBC     (4.8-10.8)  K/uL


 


RBC     (3.80-5.20)  Mil/uL


 


Hgb     (11.0-16.0)  g/dL


 


Hct     (34.0-47.0)  %


 


MCV     (81.0-99.0)  fL


 


MCH     (27.0-31.0)  pg


 


MCHC     (33.0-37.0)  g/dL


 


RDW     (11.5-14.5)  %


 


Plt Count     (130-400)  K/uL


 


MPV     (7.2-11.7)  fL


 


Neut % (Auto)     (50.0-75.0)  %


 


Lymph % (Auto)     (20.0-40.0)  %


 


Mono % (Auto)     (0.0-10.0)  %


 


Eos % (Auto)     (0.0-4.0)  %


 


Baso % (Auto)     (0.0-2.0)  %


 


Neut # (Auto)     (1.8-7.0)  K/uL


 


Lymph # (Auto)     (1.0-4.3)  K/uL


 


Mono # (Auto)     (0.0-0.8)  K/uL


 


Eos # (Auto)     (0.0-0.7)  K/uL


 


Baso # (Auto)     (0.0-0.2)  K/uL


 


Neutrophils % (Manual)     (50-75)  %


 


Lymphocytes % (Manual)     (20-40)  %


 


Monocytes % (Manual)     (0-10)  %


 


Nucleated RBC %     (0-0)  %


 


Platelet Estimate     (NORMAL)  


 


Polychromasia     


 


Hypochromasia (manual)     


 


Anisocytosis (manual)     


 


Target Cells     


 


Ovalocytes     


 


PT     (9.7-12.2)  SECONDS


 


INR     


 


APTT     (21-34)  SECONDS


 


Sodium     (132-148)  mmol/L


 


Potassium     (3.6-5.2)  mmol/L


 


Chloride     ()  mmol/L


 


Carbon Dioxide     (22-30)  mmol/L


 


Anion Gap     (10-20)  


 


BUN     (7-17)  mg/dL


 


Creatinine     (0.7-1.2)  mg/dL


 


Est GFR (African Amer)     


 


Est GFR (Non-Af Amer)     


 


POC Glucose (mg/dL)  252 H  233 H  192 H  ()  mg/dL


 


Random Glucose     ()  mg/dL


 


Calcium     (8.6-10.4)  mg/dl


 


Phosphorus     (2.5-4.5)  mg/dL


 


Magnesium     (1.6-2.3)  mg/dL


 


Total Bilirubin     (0.2-1.3)  mg/dL


 


AST     (14-36)  U/L


 


ALT     (9-52)  U/L


 


Alkaline Phosphatase     ()  U/L


 


Total Protein     (6.3-8.3)  g/dL


 


Albumin     (3.5-5.0)  g/dL


 


Globulin     (2.2-3.9)  gm/dL


 


Albumin/Globulin Ratio     (1.0-2.1)  








 Laboratory Results - last 24 hr











  03/12/18 03/12/18 03/13/18





  17:39 23:30 05:35


 


WBC   


 


RBC   


 


Hgb   


 


Hct   


 


MCV   


 


MCH   


 


MCHC   


 


RDW   


 


Plt Count   


 


MPV   


 


Neut % (Auto)   


 


Lymph % (Auto)   


 


Mono % (Auto)   


 


Eos % (Auto)   


 


Baso % (Auto)   


 


Neut # (Auto)   


 


Lymph # (Auto)   


 


Mono # (Auto)   


 


Eos # (Auto)   


 


Baso # (Auto)   


 


Neutrophils % (Manual)   


 


Lymphocytes % (Manual)   


 


Monocytes % (Manual)   


 


Nucleated RBC %   


 


Platelet Estimate   


 


Polychromasia   


 


Hypochromasia (manual)   


 


Anisocytosis (manual)   


 


Target Cells   


 


Ovalocytes   


 


PT   


 


INR   


 


APTT   


 


Sodium   


 


Potassium   


 


Chloride   


 


Carbon Dioxide   


 


Anion Gap   


 


BUN   


 


Creatinine   


 


Est GFR ( Amer)   


 


Est GFR (Non-Af Amer)   


 


POC Glucose (mg/dL)  192 H  233 H  252 H


 


Random Glucose   


 


Calcium   


 


Phosphorus   


 


Magnesium   


 


Total Bilirubin   


 


AST   


 


ALT   


 


Alkaline Phosphatase   


 


Total Protein   


 


Albumin   


 


Globulin   


 


Albumin/Globulin Ratio   














  03/13/18 03/13/18 03/13/18





  05:44 05:46 10:19


 


WBC   25.9 H 


 


RBC   3.50 L 


 


Hgb   9.6 L 


 


Hct   30.4 L 


 


MCV   86.8 


 


MCH   27.3 


 


MCHC   31.5 L 


 


RDW   17.2 H 


 


Plt Count   236 


 


MPV   9.7 


 


Neut % (Auto)   95.5 H 


 


Lymph % (Auto)   1.3 L 


 


Mono % (Auto)   3.1 


 


Eos % (Auto)   0.0 


 


Baso % (Auto)   0.1 


 


Neut # (Auto)   24.7 H 


 


Lymph # (Auto)   0.3 L 


 


Mono # (Auto)   0.8 


 


Eos # (Auto)   0.0 


 


Baso # (Auto)   0.0 


 


Neutrophils % (Manual)   97 H 


 


Lymphocytes % (Manual)   1 L 


 


Monocytes % (Manual)   2 


 


Nucleated RBC %   2 H 


 


Platelet Estimate   Normal 


 


Polychromasia   Slight 


 


Hypochromasia (manual)   Slight 


 


Anisocytosis (manual)   Slight 


 


Target Cells   Slight 


 


Ovalocytes   Slight 


 


PT    19.0 H


 


INR    1.6


 


APTT    27  D


 


Sodium  137  


 


Potassium  4.6  


 


Chloride  96 L  


 


Carbon Dioxide  23  


 


Anion Gap  22 H  


 


BUN  78 H  


 


Creatinine  4.2 H  


 


Est GFR ( Amer)  12  


 


Est GFR (Non-Af Amer)  10  


 


POC Glucose (mg/dL)   


 


Random Glucose  253 H  


 


Calcium  8.5 L  


 


Phosphorus  6.6 H  


 


Magnesium  2.1  


 


Total Bilirubin  0.8  


 


AST  49 H  


 


ALT  329 H  


 


Alkaline Phosphatase  215 H  


 


Total Protein  6.2 L  


 


Albumin  3.1 L  


 


Globulin  3.1  


 


Albumin/Globulin Ratio  1.0  











Fingerstick Blood Sugar Results: 252





Critical Care Progress Note





- Nutrition


Nutrition: 


 Nutrition











 Category Date Time Status


 


 NPO Diet [DIET] Diets  03/12/18 Dinner Active














Assessment/Plan





- Assessment and Plan (Free Text)


Assessment: 





This is a 77 year old female with a history of ESRD on HD, CHF, DM, HTN, 

hypercholesterolemia, and some sort of MI years before, repeated TIAs, and 

colon CA. She was brought in by family for altered mental status and ultimately 

found to have severe metabolic acidosis as well as a extremely elevated 

troponins.





Neurology:


-Patient is awake and alert


-Extubated yesterday


-Swallow eval and PT eval ordered





Cardiology:


-NSTEMI


-Family is currently refusing for intervention for coronary.


-Will need to discuss goals of care with the family, palliative care on board


-Heparin drip discontinued yesterday


-Continue Crestor 20mg PO HS, Aspirin 81mg daily 


-Continue Metoprolol 25mg PO BID


-HR 120s this morning, given Lopressor 5mg IVP x 1


-Cardiology on consult, help appreciated





Pulmonary: 


-Extubated yesterday, sating well on ventimask


-Treating for Acute hypoxemic respiratory failure, respiratory acidosis from 

acute pulmonary edema and pneumonia


-CXR: Moderate venous congestion. Moderate loculated pleural effusion with 

consolidative changes in the left mid to lower lung zone, cardiomegaly.


-Solumedrol 40mg IVP daily





Hematology:


-Continue Ferrous sulfate 325mg PO TID





GI:


-NPO for permacath placement today 


-Sennakot daily and Ducolax suppository ordered 


-AST/ALT elevated likely secondary to shock liver, currently trending down


-Will continue to monitor, avoid hepatotoxic agents 





Renal:


-Continue Epoetin 


-Continue Hemodialysis as regularly scheduled (MWF)


-Vascular surgery on consult, help appreciated 


-Nephrology on consult, f/u recommendations 





Endocrine:


-History of diabetes mellitus


-Continue Lantus 5 units SC HS


-ISS, accuchecks





Infectious Disease:


-Antibiotics: Aztreonam 1gm Q12H, Flagyl 500mg Q8H


-Leukocytosis 19.3 -> 25.9, on Solumedrol currently 


-Procalcitonin ordered, plan to culture catheter tip


-Blood cultures have been negative 


-Infectious disease on consult, f/u recommendations 





GI/DVT ppx:


-Heparin 5000 units Q8H SC


-Protonix 40mg PO daily





Plan discussed with Dr Rao





<Rancho Rao - Last Filed: 03/13/18 17:23>





CCU Objective





- Vital Signs / Intake & Output


Vital Signs (Last 4 hours): 


Vital Signs











  Temp Pulse Resp BP Pulse Ox


 


 03/13/18 17:00   107 H  15   100


 


 03/13/18 16:45   93 H  12   100


 


 03/13/18 16:40   101 H  12  100/55 L  100


 


 03/13/18 16:30   91 H  13   100


 


 03/13/18 16:15   93 H  12   100


 


 03/13/18 16:05   107 H  11 L  94/52 L  100


 


 03/13/18 16:00  97.4 F L  93 H  13   100


 


 03/13/18 15:50   111 H  12  101/41 L  99


 


 03/13/18 15:35   90  11 L  95/47 L  99


 


 03/13/18 15:20   113 H  11 L  91/62 L  99


 


 03/13/18 15:06   117 H  12  102/63  99


 


 03/13/18 15:00   125 H  12   98


 


 03/13/18 14:45  97.5 F L  129 H  12  142/88  97











Intake and Output (Last 8hrs): 


 Intake & Output











 03/13/18 03/13/18 03/13/18





 06:59 14:59 22:59


 


Intake Total 300 200 0


 


Output Total 0  


 


Balance 300 200 0


 


Weight  158 lb 1.143 oz 


 


Intake:   


 


  Intake, IV Amount 300 200 


 


    Right Proximal Port 300 200 





    Internal Jugular   


 


  Oral  0 0


 


Output:   


 


  Urine 0  


 


    Urine, Voided 0  


 


  Stool 0  


 


Other:   


 


  # Bowel Movements 1  














- Medications


Active Medications: 


Active Medications











Generic Name Dose Route Start Last Admin





  Trade Name Freq  PRN Reason Stop Dose Admin


 


Albuterol/Ipratropium  3 ml  03/07/18 16:00  03/13/18 11:13





  Duoneb 3 Mg/0.5 Mg (3 Ml) Ud  INH   3 ml





  RQ4 [facility]   Administration


 


Aspirin  81 mg  03/02/18 10:00  03/13/18 10:05





  Aspirin Chewable  PO   Not Given





  DAILY [facility]   


 


Diltiazem HCl  30 mg  03/13/18 07:00  03/13/18 13:00





  Cardizem  PO   Not Given





  Q6H [facility]   


 


Epoetin Vamsi  10,000 unit  03/12/18 10:30  03/12/18 10:28





  Procrit  IV   10,000 unit





  [facility]   Administration


 


Ferrous Gluconate  324 mg  03/03/18 10:00  03/13/18 15:04





  Fergon  PO   Not Given





  TID [facility]   


 


Heparin Sodium (Porcine)  5,000 units  03/12/18 14:00  03/13/18 14:00





  Heparin  SC   Not Given





  Q8 [facility]   


 


Hydromorphone HCl  0.5 mg  03/13/18 14:50  





  Dilaudid  IVP   





  Q6H PRN   





  Pain, moderate (4-7)   


 


Aztreonam 1 gm/ Sodium  100 mls @ 100 mls/hr  03/09/18 01:30  03/13/18 12:55





  Chloride  IVPB   100 mls/hr





  Q12H [facility]   Administration


 


Metronidazole  500 mg in 100 mls @ 100 mls/hr  03/11/18 14:00  03/13/18 14:00





  Flagyl  IVPB   100 mls/hr





  Q8 [facility]   Administration


 


Vancomycin HCl 1 gm/ Sodium  200 mls @ 166.7 mls/hr  03/12/18 16:00  03/12/18 18

:12





  Chloride  IVPB   166.7 mls/hr





  [facility]   Administration





  Protocol   


 


Insulin Aspart  0 unit  03/04/18 11:49  03/13/18 12:00





  Novolog  SC   Not Given





  Q6 [facility]   





  Protocol   


 


Insulin Glargine  5 unit  03/06/18 22:00  03/12/18 23:47





  Lantus  SC   5 u





  HS [facility]   Administration


 


Methylprednisolone  40 mg  03/12/18 10:00  03/13/18 10:04





  Solu-Medrol  IVP  03/15/18 10:01  40 mg





  DAILY [facility]   Administration


 


Metoprolol Tartrate  25 mg  03/09/18 08:00  03/13/18 10:04





  Lopressor  PO   Not Given





  BID [facility]   


 


Pantoprazole Sodium  40 mg  03/13/18 10:00  03/13/18 10:04





  Protonix Inj  IVP   40 mg





  [facility]   Administration


 


Paricalcitol  2 mcg  03/12/18 09:00  03/12/18 10:28





  Zemplar  IV   2 mcg





  [facility]   Administration


 


Rosuvastatin Calcium  5 mg  03/02/18 01:15  03/12/18 21:24





  Crestor  PO   5 mg





  [facility]   Administration


 


Sennosides  8.6 mg  03/13/18 10:00  03/13/18 10:06





  Senokot Tab  NG   Not Given





  DAILY [facility]   


 


Vitamin B Complex/Vit C/Folic Acid  1 tab  03/03/18 08:00  03/13/18 08:00





  Nephro-Nneka  PO   Not Given





  0800 [facility]   














- Patient Studies


Lab Studies: 


 Microbiology Studies











 03/13/18 11:30 Gram Stain - Preliminary





 Trachasp 








 Lab Studies











  03/13/18 03/13/18 03/13/18 Range/Units





  11:55 10:19 10:19 


 


WBC     (4.8-10.8)  K/uL


 


RBC     (3.80-5.20)  Mil/uL


 


Hgb     (11.0-16.0)  g/dL


 


Hct     (34.0-47.0)  %


 


MCV     (81.0-99.0)  fL


 


MCH     (27.0-31.0)  pg


 


MCHC     (33.0-37.0)  g/dL


 


RDW     (11.5-14.5)  %


 


Plt Count     (130-400)  K/uL


 


MPV     (7.2-11.7)  fL


 


Neut % (Auto)     (50.0-75.0)  %


 


Lymph % (Auto)     (20.0-40.0)  %


 


Mono % (Auto)     (0.0-10.0)  %


 


Eos % (Auto)     (0.0-4.0)  %


 


Baso % (Auto)     (0.0-2.0)  %


 


Neut # (Auto)     (1.8-7.0)  K/uL


 


Lymph # (Auto)     (1.0-4.3)  K/uL


 


Mono # (Auto)     (0.0-0.8)  K/uL


 


Eos # (Auto)     (0.0-0.7)  K/uL


 


Baso # (Auto)     (0.0-0.2)  K/uL


 


Neutrophils % (Manual)     (50-75)  %


 


Lymphocytes % (Manual)     (20-40)  %


 


Monocytes % (Manual)     (0-10)  %


 


Nucleated RBC %     (0-0)  %


 


Platelet Estimate     (NORMAL)  


 


Polychromasia     


 


Hypochromasia (manual)     


 


Anisocytosis (manual)     


 


Target Cells     


 


Ovalocytes     


 


PT   19.0 H   (9.7-12.2)  SECONDS


 


INR   1.6   


 


APTT   27  D   (21-34)  SECONDS


 


Sodium     (132-148)  mmol/L


 


Potassium     (3.6-5.2)  mmol/L


 


Chloride     ()  mmol/L


 


Carbon Dioxide     (22-30)  mmol/L


 


Anion Gap     (10-20)  


 


BUN     (7-17)  mg/dL


 


Creatinine     (0.7-1.2)  mg/dL


 


Est GFR (African Amer)     


 


Est GFR (Non-Af Amer)     


 


POC Glucose (mg/dL)  236 H    ()  mg/dL


 


Random Glucose     ()  mg/dL


 


Calcium     (8.6-10.4)  mg/dl


 


Phosphorus     (2.5-4.5)  mg/dL


 


Magnesium     (1.6-2.3)  mg/dL


 


Total Bilirubin     (0.2-1.3)  mg/dL


 


AST     (14-36)  U/L


 


ALT     (9-52)  U/L


 


Alkaline Phosphatase     ()  U/L


 


Total Protein     (6.3-8.3)  g/dL


 


Albumin     (3.5-5.0)  g/dL


 


Globulin     (2.2-3.9)  gm/dL


 


Albumin/Globulin Ratio     (1.0-2.1)  


 


Procalcitonin    6.11 H  (0.19-0.49)  NG/ML














  03/13/18 03/13/18 03/13/18 Range/Units





  05:46 05:44 05:35 


 


WBC  25.9 H    (4.8-10.8)  K/uL


 


RBC  3.50 L    (3.80-5.20)  Mil/uL


 


Hgb  9.6 L    (11.0-16.0)  g/dL


 


Hct  30.4 L    (34.0-47.0)  %


 


MCV  86.8    (81.0-99.0)  fL


 


MCH  27.3    (27.0-31.0)  pg


 


MCHC  31.5 L    (33.0-37.0)  g/dL


 


RDW  17.2 H    (11.5-14.5)  %


 


Plt Count  236    (130-400)  K/uL


 


MPV  9.7    (7.2-11.7)  fL


 


Neut % (Auto)  95.5 H    (50.0-75.0)  %


 


Lymph % (Auto)  1.3 L    (20.0-40.0)  %


 


Mono % (Auto)  3.1    (0.0-10.0)  %


 


Eos % (Auto)  0.0    (0.0-4.0)  %


 


Baso % (Auto)  0.1    (0.0-2.0)  %


 


Neut # (Auto)  24.7 H    (1.8-7.0)  K/uL


 


Lymph # (Auto)  0.3 L    (1.0-4.3)  K/uL


 


Mono # (Auto)  0.8    (0.0-0.8)  K/uL


 


Eos # (Auto)  0.0    (0.0-0.7)  K/uL


 


Baso # (Auto)  0.0    (0.0-0.2)  K/uL


 


Neutrophils % (Manual)  97 H    (50-75)  %


 


Lymphocytes % (Manual)  1 L    (20-40)  %


 


Monocytes % (Manual)  2    (0-10)  %


 


Nucleated RBC %  2 H    (0-0)  %


 


Platelet Estimate  Normal    (NORMAL)  


 


Polychromasia  Slight    


 


Hypochromasia (manual)  Slight    


 


Anisocytosis (manual)  Slight    


 


Target Cells  Slight    


 


Ovalocytes  Slight    


 


PT     (9.7-12.2)  SECONDS


 


INR     


 


APTT     (21-34)  SECONDS


 


Sodium   137   (132-148)  mmol/L


 


Potassium   4.6   (3.6-5.2)  mmol/L


 


Chloride   96 L   ()  mmol/L


 


Carbon Dioxide   23   (22-30)  mmol/L


 


Anion Gap   22 H   (10-20)  


 


BUN   78 H   (7-17)  mg/dL


 


Creatinine   4.2 H   (0.7-1.2)  mg/dL


 


Est GFR ( Amer)   12   


 


Est GFR (Non-Af Amer)   10   


 


POC Glucose (mg/dL)    252 H  ()  mg/dL


 


Random Glucose   253 H   ()  mg/dL


 


Calcium   8.5 L   (8.6-10.4)  mg/dl


 


Phosphorus   6.6 H   (2.5-4.5)  mg/dL


 


Magnesium   2.1   (1.6-2.3)  mg/dL


 


Total Bilirubin   0.8   (0.2-1.3)  mg/dL


 


AST   49 H   (14-36)  U/L


 


ALT   329 H   (9-52)  U/L


 


Alkaline Phosphatase   215 H   ()  U/L


 


Total Protein   6.2 L   (6.3-8.3)  g/dL


 


Albumin   3.1 L   (3.5-5.0)  g/dL


 


Globulin   3.1   (2.2-3.9)  gm/dL


 


Albumin/Globulin Ratio   1.0   (1.0-2.1)  


 


Procalcitonin     (0.19-0.49)  NG/ML














  03/12/18 03/12/18 Range/Units





  23:30 17:39 


 


WBC    (4.8-10.8)  K/uL


 


RBC    (3.80-5.20)  Mil/uL


 


Hgb    (11.0-16.0)  g/dL


 


Hct    (34.0-47.0)  %


 


MCV    (81.0-99.0)  fL


 


MCH    (27.0-31.0)  pg


 


MCHC    (33.0-37.0)  g/dL


 


RDW    (11.5-14.5)  %


 


Plt Count    (130-400)  K/uL


 


MPV    (7.2-11.7)  fL


 


Neut % (Auto)    (50.0-75.0)  %


 


Lymph % (Auto)    (20.0-40.0)  %


 


Mono % (Auto)    (0.0-10.0)  %


 


Eos % (Auto)    (0.0-4.0)  %


 


Baso % (Auto)    (0.0-2.0)  %


 


Neut # (Auto)    (1.8-7.0)  K/uL


 


Lymph # (Auto)    (1.0-4.3)  K/uL


 


Mono # (Auto)    (0.0-0.8)  K/uL


 


Eos # (Auto)    (0.0-0.7)  K/uL


 


Baso # (Auto)    (0.0-0.2)  K/uL


 


Neutrophils % (Manual)    (50-75)  %


 


Lymphocytes % (Manual)    (20-40)  %


 


Monocytes % (Manual)    (0-10)  %


 


Nucleated RBC %    (0-0)  %


 


Platelet Estimate    (NORMAL)  


 


Polychromasia    


 


Hypochromasia (manual)    


 


Anisocytosis (manual)    


 


Target Cells    


 


Ovalocytes    


 


PT    (9.7-12.2)  SECONDS


 


INR    


 


APTT    (21-34)  SECONDS


 


Sodium    (132-148)  mmol/L


 


Potassium    (3.6-5.2)  mmol/L


 


Chloride    ()  mmol/L


 


Carbon Dioxide    (22-30)  mmol/L


 


Anion Gap    (10-20)  


 


BUN    (7-17)  mg/dL


 


Creatinine    (0.7-1.2)  mg/dL


 


Est GFR (African Amer)    


 


Est GFR (Non-Af Amer)    


 


POC Glucose (mg/dL)  233 H  192 H  ()  mg/dL


 


Random Glucose    ()  mg/dL


 


Calcium    (8.6-10.4)  mg/dl


 


Phosphorus    (2.5-4.5)  mg/dL


 


Magnesium    (1.6-2.3)  mg/dL


 


Total Bilirubin    (0.2-1.3)  mg/dL


 


AST    (14-36)  U/L


 


ALT    (9-52)  U/L


 


Alkaline Phosphatase    ()  U/L


 


Total Protein    (6.3-8.3)  g/dL


 


Albumin    (3.5-5.0)  g/dL


 


Globulin    (2.2-3.9)  gm/dL


 


Albumin/Globulin Ratio    (1.0-2.1)  


 


Procalcitonin    (0.19-0.49)  NG/ML








 Laboratory Results - last 24 hr











  03/12/18 03/12/18 03/13/18





  17:39 23:30 05:35


 


WBC   


 


RBC   


 


Hgb   


 


Hct   


 


MCV   


 


MCH   


 


MCHC   


 


RDW   


 


Plt Count   


 


MPV   


 


Neut % (Auto)   


 


Lymph % (Auto)   


 


Mono % (Auto)   


 


Eos % (Auto)   


 


Baso % (Auto)   


 


Neut # (Auto)   


 


Lymph # (Auto)   


 


Mono # (Auto)   


 


Eos # (Auto)   


 


Baso # (Auto)   


 


Neutrophils % (Manual)   


 


Lymphocytes % (Manual)   


 


Monocytes % (Manual)   


 


Nucleated RBC %   


 


Platelet Estimate   


 


Polychromasia   


 


Hypochromasia (manual)   


 


Anisocytosis (manual)   


 


Target Cells   


 


Ovalocytes   


 


PT   


 


INR   


 


APTT   


 


Sodium   


 


Potassium   


 


Chloride   


 


Carbon Dioxide   


 


Anion Gap   


 


BUN   


 


Creatinine   


 


Est GFR ( Amer)   


 


Est GFR (Non-Af Amer)   


 


POC Glucose (mg/dL)  192 H  233 H  252 H


 


Random Glucose   


 


Calcium   


 


Phosphorus   


 


Magnesium   


 


Total Bilirubin   


 


AST   


 


ALT   


 


Alkaline Phosphatase   


 


Total Protein   


 


Albumin   


 


Globulin   


 


Albumin/Globulin Ratio   


 


Procalcitonin   














  03/13/18 03/13/18 03/13/18





  05:44 05:46 10:19


 


WBC   25.9 H 


 


RBC   3.50 L 


 


Hgb   9.6 L 


 


Hct   30.4 L 


 


MCV   86.8 


 


MCH   27.3 


 


MCHC   31.5 L 


 


RDW   17.2 H 


 


Plt Count   236 


 


MPV   9.7 


 


Neut % (Auto)   95.5 H 


 


Lymph % (Auto)   1.3 L 


 


Mono % (Auto)   3.1 


 


Eos % (Auto)   0.0 


 


Baso % (Auto)   0.1 


 


Neut # (Auto)   24.7 H 


 


Lymph # (Auto)   0.3 L 


 


Mono # (Auto)   0.8 


 


Eos # (Auto)   0.0 


 


Baso # (Auto)   0.0 


 


Neutrophils % (Manual)   97 H 


 


Lymphocytes % (Manual)   1 L 


 


Monocytes % (Manual)   2 


 


Nucleated RBC %   2 H 


 


Platelet Estimate   Normal 


 


Polychromasia   Slight 


 


Hypochromasia (manual)   Slight 


 


Anisocytosis (manual)   Slight 


 


Target Cells   Slight 


 


Ovalocytes   Slight 


 


PT   


 


INR   


 


APTT   


 


Sodium  137  


 


Potassium  4.6  


 


Chloride  96 L  


 


Carbon Dioxide  23  


 


Anion Gap  22 H  


 


BUN  78 H  


 


Creatinine  4.2 H  


 


Est GFR ( Amer)  12  


 


Est GFR (Non-Af Amer)  10  


 


POC Glucose (mg/dL)   


 


Random Glucose  253 H  


 


Calcium  8.5 L  


 


Phosphorus  6.6 H  


 


Magnesium  2.1  


 


Total Bilirubin  0.8  


 


AST  49 H  


 


ALT  329 H  


 


Alkaline Phosphatase  215 H  


 


Total Protein  6.2 L  


 


Albumin  3.1 L  


 


Globulin  3.1  


 


Albumin/Globulin Ratio  1.0  


 


Procalcitonin    6.11 H














  03/13/18 03/13/18





  10:19 11:55


 


WBC  


 


RBC  


 


Hgb  


 


Hct  


 


MCV  


 


MCH  


 


MCHC  


 


RDW  


 


Plt Count  


 


MPV  


 


Neut % (Auto)  


 


Lymph % (Auto)  


 


Mono % (Auto)  


 


Eos % (Auto)  


 


Baso % (Auto)  


 


Neut # (Auto)  


 


Lymph # (Auto)  


 


Mono # (Auto)  


 


Eos # (Auto)  


 


Baso # (Auto)  


 


Neutrophils % (Manual)  


 


Lymphocytes % (Manual)  


 


Monocytes % (Manual)  


 


Nucleated RBC %  


 


Platelet Estimate  


 


Polychromasia  


 


Hypochromasia (manual)  


 


Anisocytosis (manual)  


 


Target Cells  


 


Ovalocytes  


 


PT  19.0 H 


 


INR  1.6 


 


APTT  27  D 


 


Sodium  


 


Potassium  


 


Chloride  


 


Carbon Dioxide  


 


Anion Gap  


 


BUN  


 


Creatinine  


 


Est GFR ( Amer)  


 


Est GFR (Non-Af Amer)  


 


POC Glucose (mg/dL)   236 H


 


Random Glucose  


 


Calcium  


 


Phosphorus  


 


Magnesium  


 


Total Bilirubin  


 


AST  


 


ALT  


 


Alkaline Phosphatase  


 


Total Protein  


 


Albumin  


 


Globulin  


 


Albumin/Globulin Ratio  


 


Procalcitonin  














Critical Care Progress Note





- Nutrition


Nutrition: 


 Nutrition











 Category Date Time Status


 


 NPO Diet [DIET] Diets  03/12/18 Dinner Active














Attending/Attestation





- Attestation


I have personally seen and examined this patient.: Yes


I have fully participated in the care of the patient.: Yes


I have reviewed all pertinent clinical information: Yes


Notes (Text): 





03/13/18 17:22


patient seen and examinedin the intensive care unit.


Status post permacath placement today


Extubated yesterday and no respiratory distress


Continue present treatment

## 2018-03-13 NOTE — PN
DATE:



INFECTIOUS DISEASE FOLLOWUP NOTE



SUBJECTIVE:  The patient was seen today.  She remains intubated.  She did

not have cardiac cath as family refused.  She is on hemodialysis now and

renal attending was there.  She is getting dialysis on Monday, Wednesday,

and Friday.  Still remains intubated.  They were ____ T-tube.  She is

status post cardiac arrest and has atrial fib and has chronic kidney

disease.  Her white count is increasing at this time.



PHYSICAL EXAMINATION:

VITAL SIGNS:  On exam, her T-max was 97.6, heart rate of 119, blood

pressure 87/50, respirations are 22, saturation 99.  She has a triple lumen

on the left side of her neck.

HEENT:  Head is atraumatic.  She was opening her eyes.  T-tube present,

intubated.

NECK:  Supple.

LUNGS:  Have coarse breath sounds.

HEART:  S1, S2.  Tachycardic, atrial fib.

ABDOMEN:  Soft, nontender.  No guarding.  No rigidity present.

EXTREMITIES:  Has Venodyne boots on.



LABORATORY DATA:  White count is 19.3 today, hemoglobin 8.5, hematocrit

25.8, platelet count is 178.  White count is increasing.  Sodium is 136,

potassium 4.3, chloride is 97, CO2 is 22, BUN is 109, creatinine 5.9.  She

is a dialysis patient.  Her AST is 49, ALT is 382, alk phos is 204.  Micro

wise, the cultures have been all negative on 03/06.



X-ray:  Chest x-ray was done today.  X-ray shows cardiomegaly and shows

moderate loculated left pleural effusion with consolidated changes in the

left mid to lower lung field and moderate venous congestion.  Lines and

tubes are stable.  So she still has a consolidation.



She has been on Azactam, and she is also on steroids, Solu-Medrol at this

time, and she was on Flagyl IV, and she is ALLERGIC TO PENICILLIN, so at

this time, I am going to add vancomycin 1 gm today, and then MWF, she is

getting dialysis, she can get it post dialysis, and also to repeat the

sputum.  The white count may be related to the steroids also and she is

being followed by multiple consultants at this time and is on dialysis,

with atrial fib, acute respiratory failure with cardiac arrest, and seems

to be awake.  We will follow.





__________________________________________

Mahnaz Tate MD





DD:  03/12/2018 20:45:09

DT:  03/12/2018 23:37:20

[facility] # [id number] yes

## 2018-03-13 NOTE — RAD
PROCEDURE:  Intraoperative Fluoroscopy. 



HISTORY:

RENAL FAILURE



FINDINGS:

Fluoroscopic assistance was provided for left-sided dialysis catheter 

insertion. Please refer to the operative report from GRACIA Felix.

## 2018-03-13 NOTE — RAD
HISTORY:

s/p extubation  



COMPARISON:

3/12/2018 



FINDINGS:



LUNGS:

0 nodes probable subsegmental atelectasis mid to lower left lung.  

Hazy opacity a [place] to left hilum, nonspecific. This represents 

interval change from prior examination and may represent an acute 

infiltrate.



PLEURA:

Small left pleural effusion slightly increased from prior 

examination.  No right pleural effusion.  No pneumothorax.



CARDIOVASCULAR:

Mild cardiomegaly.  Status post removal of endotracheal tube and 

nasogastric tube.  Right IJ SUSHMA multi lumen central venous catheter 

unchanged.  Left tunneled central venous dialysis catheter unchanged. 

Mitral annular calcification noted.



OSSEOUS STRUCTURES:

No significant abnormalities.



VISUALIZED UPPER ABDOMEN:

Normal.



OTHER FINDINGS:

None.



IMPRESSION:

Mild increase left pleural effusion.  New left perihilar opacity.  

Removal of endotracheal tube and nasogastric tube.

## 2018-03-14 LAB
ALBUMIN SERPL-MCNC: 2.9 G/DL (ref 3.5–5)
ALBUMIN/GLOB SERPL: 1 {RATIO} (ref 1–2.1)
ALT SERPL-CCNC: 245 U/L (ref 9–52)
ANISOCYTOSIS BLD QL SMEAR: SLIGHT
ARTERIAL BLOOD GAS O2 SAT: 85.8 % (ref 95–98)
ARTERIAL BLOOD GAS PCO2: 33 MM/HG (ref 35–45)
ARTERIAL BLOOD GAS TCO2: 21 MMOL/L (ref 22–28)
ARTERIAL PATENCY WRIST A: (no result)
AST SERPL-CCNC: 32 U/L (ref 14–36)
BASOPHILS # BLD AUTO: 0.1 K/UL (ref 0–0.2)
BASOPHILS NFR BLD: 0.3 % (ref 0–2)
BUN SERPL-MCNC: 98 MG/DL (ref 7–17)
BURR CELLS BLD QL SMEAR: SLIGHT
CALCIUM SERPL-MCNC: 8.7 MG/DL (ref 8.6–10.4)
EOSINOPHIL # BLD AUTO: 0 K/UL (ref 0–0.7)
EOSINOPHIL NFR BLD: 0 % (ref 0–4)
ERYTHROCYTE [DISTWIDTH] IN BLOOD BY AUTOMATED COUNT: 17.3 % (ref 11.5–14.5)
GFR NON-AFRICAN AMERICAN: 8
HCO3 BLDA-SCNC: 21.3 MMOL/L (ref 21–28)
HGB BLD-MCNC: 8.9 G/DL (ref 11–16)
HYPOCHROMIC: SLIGHT
INHALED O2 CONCENTRATION: 100 %
INR PPP: 1.7
LYMPHOCYTE: 3 % (ref 20–40)
LYMPHOCYTES # BLD AUTO: 0.3 K/UL (ref 1–4.3)
LYMPHOCYTES NFR BLD AUTO: 1.3 % (ref 20–40)
MCH RBC QN AUTO: 27.4 PG (ref 27–31)
MCHC RBC AUTO-ENTMCNC: 31.9 G/DL (ref 33–37)
MCV RBC AUTO: 86 FL (ref 81–99)
MONOCYTE: 2 % (ref 0–10)
MONOCYTES # BLD: 0.6 K/UL (ref 0–0.8)
MONOCYTES NFR BLD: 3.3 % (ref 0–10)
NEUTROPHILS # BLD: 17.9 K/UL (ref 1.8–7)
NEUTROPHILS NFR BLD AUTO: 95 % (ref 50–75)
NEUTROPHILS NFR BLD AUTO: 95.1 % (ref 50–75)
NRBC BLD AUTO-RTO: 0.1 % (ref 0–2)
OVALOCYTES BLD QL SMEAR: SLIGHT
PH BLDA: 7.39 [PH] (ref 7.35–7.45)
PLATELET # BLD EST: NORMAL 10*3/UL
PLATELET # BLD: 214 K/UL (ref 130–400)
PMV BLD AUTO: 9.7 FL (ref 7.2–11.7)
PO2 BLDA: 47 MM/HG (ref 80–100)
POLYCHROMIC: SLIGHT
PROTHROMBIN TIME: 19.2 SECONDS (ref 9.7–12.2)
RBC # BLD AUTO: 3.25 MIL/UL (ref 3.8–5.2)
TARGETS BLD QL SMEAR: SLIGHT
TEARDROP CELLS: SLIGHT
TOTAL CELLS COUNTED BLD: 100
WBC # BLD AUTO: 18.8 K/UL (ref 4.8–10.8)

## 2018-03-14 RX ADMIN — METHYLPREDNISOLONE SODIUM SUCCINATE SCH MG: 40 INJECTION, POWDER, FOR SOLUTION INTRAMUSCULAR; INTRAVENOUS at 09:18

## 2018-03-14 RX ADMIN — IPRATROPIUM BROMIDE AND ALBUTEROL SULFATE SCH ML: .5; 3 SOLUTION RESPIRATORY (INHALATION) at 15:50

## 2018-03-14 RX ADMIN — Medication SCH TAB: at 07:50

## 2018-03-14 RX ADMIN — WATER SCH MLS/HR: 1 INJECTION INTRAMUSCULAR; INTRAVENOUS; SUBCUTANEOUS at 05:54

## 2018-03-14 RX ADMIN — IPRATROPIUM BROMIDE AND ALBUTEROL SULFATE SCH ML: .5; 3 SOLUTION RESPIRATORY (INHALATION) at 19:15

## 2018-03-14 RX ADMIN — IPRATROPIUM BROMIDE AND ALBUTEROL SULFATE SCH ML: .5; 3 SOLUTION RESPIRATORY (INHALATION) at 11:15

## 2018-03-14 RX ADMIN — INSULIN GLARGINE SCH U: 100 INJECTION, SOLUTION SUBCUTANEOUS at 21:32

## 2018-03-14 RX ADMIN — INSULIN ASPART SCH: 100 INJECTION, SOLUTION INTRAVENOUS; SUBCUTANEOUS at 06:22

## 2018-03-14 RX ADMIN — INSULIN ASPART SCH UNIT: 100 INJECTION, SOLUTION INTRAVENOUS; SUBCUTANEOUS at 17:55

## 2018-03-14 RX ADMIN — IPRATROPIUM BROMIDE AND ALBUTEROL SULFATE SCH: .5; 3 SOLUTION RESPIRATORY (INHALATION) at 04:01

## 2018-03-14 RX ADMIN — IPRATROPIUM BROMIDE AND ALBUTEROL SULFATE SCH ML: .5; 3 SOLUTION RESPIRATORY (INHALATION) at 07:25

## 2018-03-14 RX ADMIN — VANCOMYCIN HYDROCHLORIDE SCH MLS/HR: 1 INJECTION, POWDER, LYOPHILIZED, FOR SOLUTION INTRAVENOUS at 13:24

## 2018-03-14 RX ADMIN — INSULIN ASPART SCH: 100 INJECTION, SOLUTION INTRAVENOUS; SUBCUTANEOUS at 00:28

## 2018-03-14 RX ADMIN — IPRATROPIUM BROMIDE AND ALBUTEROL SULFATE SCH ML: .5; 3 SOLUTION RESPIRATORY (INHALATION) at 00:14

## 2018-03-14 RX ADMIN — ERYTHROPOIETIN SCH UNIT: 10000 INJECTION, SOLUTION INTRAVENOUS; SUBCUTANEOUS at 11:18

## 2018-03-14 RX ADMIN — PARICALCITOL SCH MCG: 2 INJECTION, SOLUTION INTRAVENOUS at 11:55

## 2018-03-14 RX ADMIN — INSULIN ASPART SCH UNIT: 100 INJECTION, SOLUTION INTRAVENOUS; SUBCUTANEOUS at 12:18

## 2018-03-14 NOTE — PN
DATE:  03/13/2018



SUBJECTIVE:  The patient was seen today.  She got a new subclavian catheter

on the left side for dialysis.  She was very drowsy, probably sedated, and

was mouth breathing.  She has been extubated.  NG tube was out too.



OBJECTIVE:

VITAL SIGNS:  T-max is 97.7, heart rate of 104, blood pressure 111/71,

respirations 19.

HEENT:  Head is atraumatic but she is very drowsy.

NECK:  Supple.

LUNGS:  Clear.  Bilateral rhonchi, however, probably conducted sounds as

she was breathing by mouth.

HEART:   S1, S2 is irregularly irregular.

ABDOMEN:  Soft, nontender.  No guarding, no rigidity present.

EXTREMITIES:   Had bilateral foot protectors, [employer] boots.



LABORATORY DATA:  Labs are noted.  Labs show white count today was 25.9,

hemoglobin 9.6, hematocrit 30.4, platelet count is 236.  Her white count is

increased in spite of being on antibiotic.  She does have a triple lumen on

the right side now, and the sputum culture is pending from 03/13, which we

will follow.  At this time, she is on vancomycin, Flagyl, and Azactam.



The white count could be related to the steroids also as she is on it, but

she has been extubated, has a new dialysis catheter, and we will follow and

may need to pull out the lines. If she continues to have increased white

count, also to look for any diarrhea or C. diff.  X-ray was done today

which showed a hazy opacity of lower lobe portion of both lungs, rounded

opacity at right base.  Followup to clearing to exclude neoplasm, small

left pleural effusion, lines and tubes unchanged.



So, we will follow, but clinically we will continue antibiotics for today,

and we will see if we need to change them or to discontinue them but I need

to bring this white count down.





__________________________________________

Mahnaz Tate MD





DD:  03/13/2018 21:20:40

DT:  03/13/2018 21:22:15

[facility] # [id number]

## 2018-03-14 NOTE — CP.PCM.PN
Subjective





- Date & Time of Evaluation


Date of Evaluation: 03/14/18


Time of Evaluation: 10:00





- Subjective


Subjective: 





Vascular Surgery Progress note. Dr. Alvarado





Pt seen and examined at bedside. no acute events overnight. Having dialysis 

through left IJ permacath now. No complications noted. 





Objective





- Vital Signs/Intake and Output


Vital Signs (last 24 hours): 


 











Temp Pulse Resp BP Pulse Ox


 


 97.5 F L  101 H  15   114/63   96 


 


 03/14/18 12:00  03/14/18 14:01  03/14/18 14:01  03/14/18 14:01  03/14/18 14:01








Intake and Output: 


 











 03/14/18 03/14/18





 06:59 18:59


 


Intake Total 400 213


 


Output Total 0 0


 


Balance 400 213














- Medications


Medications: 


 Current Medications





Albuterol/Ipratropium (Duoneb 3 Mg/0.5 Mg (3 Ml) Ud)  3 ml INH RQ4 [facility]


   Last Admin: 03/14/18 11:15 Dose:  3 ml


Aspirin (Aspirin Chewable)  81 mg PO DAILY [facility]


   Last Admin: 03/14/18 09:18 Dose:  81 mg


Diltiazem HCl (Cardizem)  30 mg PO Q6H [facility]


   Last Admin: 03/14/18 13:19 Dose:  30 mg


Epoetin Vamsi (Procrit)  10,000 unit IV MWF [facility]


   Last Admin: 03/14/18 11:18 Dose:  10,000 unit


Ferrous Gluconate (Fergon)  324 mg PO TID [facility]


   Last Admin: 03/14/18 13:19 Dose:  324 mg


Heparin Sodium (Porcine) (Heparin)  1,000 units IVP [facility]


   Last Admin: 03/14/18 11:40 Dose:  3.9 units


Hydromorphone HCl (Dilaudid)  0.5 mg IVP Q6H PRN


   PRN Reason: Pain, moderate (4-7)


Vancomycin HCl 1 gm/ Sodium (Chloride)  200 mls @ 166.7 mls/hr IVPB [facility]


   PRN Reason: Protocol


   Last Admin: 03/14/18 13:24 Dose:  166.7 mls/hr


Aztreonam 1 gm/ Sodium (Chloride)  100 mls @ 100 mls/hr IVPB [facility]


   PRN Reason: Protocol


   Stop: 03/15/18 14:54


Insulin Aspart (Novolog)  0 unit SC Q6 [facility]


   PRN Reason: Protocol


   Last Admin: 03/14/18 12:18 Dose:  4 unit


Insulin Glargine (Lantus)  5 unit SC [facility]


   Last Admin: 03/13/18 22:25 Dose:  5 u


Methylprednisolone (Solu-Medrol)  40 mg IVP DAILY [facility]


   Stop: 03/15/18 10:01


   Last Admin: 03/14/18 09:18 Dose:  40 mg


Metoprolol Tartrate (Lopressor)  25 mg PO BID [facility]


   Last Admin: 03/14/18 09:18 Dose:  25 mg


Pantoprazole Sodium (Protonix Inj)  40 mg IVP DAILY [facility]


   Last Admin: 03/14/18 09:18 Dose:  40 mg


Paricalcitol (Zemplar)  2 mcg IV MWF [facility]


   Last Admin: 03/14/18 11:55 Dose:  2 mcg


Rosuvastatin Calcium (Crestor)  5 mg PO [facility]


   Last Admin: 03/13/18 21:38 Dose:  Not Given


Sennosides (Senokot Tab)  8.6 mg NG DAILY [facility]


   Last Admin: 03/14/18 09:20 Dose:  8.6 mg


Sevelamer Carbonate (Renvela)  800 mg PO TIDCC [facility]


   Last Admin: 03/14/18 13:02 Dose:  Not Given


Vitamin B Complex/Vit C/Folic Acid (Nephro-Nneka)  1 tab PO 0800 [facility]


   Last Admin: 03/14/18 07:50 Dose:  1 tab


Warfarin Sodium (Coumadin)  5 mg PO 1800 [facility]


   Stop: 03/14/18 18:01











- Labs


Labs: 


 





 03/14/18 06:10 





 03/14/18 06:10 





 











PT  19.2 SECONDS (9.7-12.2)  H  03/14/18  06:10    


 


INR  1.7   03/14/18  06:10    


 


APTT  27 SECONDS (21-34)  D 03/13/18  10:19    














- Constitutional


Appears: Non-toxic, No Acute Distress





- Head Exam


Head Exam: ATRAUMATIC, NORMAL INSPECTION, NORMOCEPHALIC





- Eye Exam


Eye Exam: EOMI, Normal appearance





- ENT Exam


ENT Exam: Mucous Membranes Moist





- Respiratory Exam


Respiratory Exam: NORMAL BREATHING PATTERN.  absent: Accessory Muscle Use, 

Respiratory Distress





- Cardiovascular Exam


Cardiovascular Exam: RRR.  absent: JVD


Additional comments: 





left chest permacath site clean, dry and intact. 


Getting HD now. 


no signs of bleeding. 





- GI/Abdominal Exam


GI & Abdominal Exam: Soft.  absent: Guarding, Rigid, Tenderness, Rebound





- Extremities Exam


Extremities Exam: Normal Inspection.  absent: Calf Tenderness





- Neurological Exam


Neurological Exam: Alert





- Psychiatric Exam


Psychiatric exam: Normal Affect, Normal Mood





- Skin


Skin Exam: Dry, Intact, Normal Color, Warm





Assessment and Plan





- Assessment and Plan (Free Text)


Assessment: 





76yo F s/p left IJ permacath exchange over wire


Plan: 





- Continue HD via permacath


- No further surgical intervention needed at this time


- Please re-consult as needed





Further recs as per Dr. Jenny Delarosa PGY1


surgery pager: [phone]

## 2018-03-14 NOTE — CP.CCUPN
<Kimberly Yao - Last Filed: 03/14/18 11:49>





CCU Subjective





- Physician Review


Subjective (Free Text): 





03/14/18 11:38





Patient seen and examined at bedside. Per nursing no acute events overnight. 

Patient was started on Coumadin yesterday for afib. Plan was to transfer 

patient to telemetry floor however patient is not tolerating dialysis at this 

time. BPs 80s/40-50s with HR up to 140s. Will discontine hemodialysis at this 

time and cancel transfer. 








CCU Objective





- Vital Signs / Intake & Output


Vital Signs (Last 4 hours): 


Vital Signs











  Temp Pulse Resp BP Pulse Ox


 


 03/14/18 10:00   97 H  18   100


 


 03/14/18 09:57   99 H  19  115/67  100


 


 03/14/18 09:55  97.5 F L  94 H  18  115/67 


 


 03/14/18 09:30   88  15   100


 


 03/14/18 09:18     106/57 L 


 


 03/14/18 09:00   92 H  17   100


 


 03/14/18 08:57   97 H  18  106/57 L  100


 


 03/14/18 08:30   106 H  18   100


 


 03/14/18 08:00  97.9 F  91 H  17   100


 


 03/14/18 07:57   90  14  116/57 L  100











Intake and Output (Last 8hrs): 


 Intake & Output











 03/13/18 03/14/18 03/14/18





 22:59 06:59 14:59


 


Intake Total 120 300 60


 


Output Total 0 0 0


 


Balance 120 300 60


 


Weight   71.7 kg


 


Intake:   


 


  Intake, IV Amount 100 200 


 


    Right Proximal Port 100 200 





    Internal Jugular   


 


  Oral 20 100 60


 


Output:   


 


  Urine  0 0


 


    Urine, Voided  0 0


 


  Stool 0 0 














- Physical Exam


Head: Positive for: Atraumatic, Normocephalic.  Negative for: Tenderness, 

Contusion, Swelling


Pupils: Positive for: PERRL.  Negative for: Sluggish, Non-Reactive


Extroacular Muscles: Positive for: EOMI


Conjunctiva: Positive for: Normal


Mouth: Positive for: Dry


Pharnyx: Positive for: Normal


Nose (External): Positive for: Atraumatic


Neck: Positive for: Trachea Midline, Other (R IJ central line, dialysis 

catheter on the left).  Negative for: JVD


Respiratory/Chest: Positive for: Good Air Exchange, Decreased Breath Sounds (on 

bilateral bases).  Negative for: Wheezes


Cardiovascular: Positive for: Tachycardic


Abdomen: Positive for: Normal Bowel Sounds.  Negative for: Peritoneal Signs


Upper Extremity: Negative for: Cyanosis, Edema


Lower Extremity: Negative for: Edema


Skin: Positive for: Warm, Dry


Psychiatric: Positive for: Alert





- Medications


Active Medications: 


Active Medications











Generic Name Dose Route Start Last Admin





  Trade Name Freq  PRN Reason Stop Dose Admin


 


Albuterol/Ipratropium  3 ml  03/07/18 16:00  03/14/18 11:15





  Duoneb 3 Mg/0.5 Mg (3 Ml) Ud  INH   3 ml





  RQ4 [facility]   Administration


 


Aspirin  81 mg  03/02/18 10:00  03/14/18 09:18





  Aspirin Chewable  PO   81 mg





  DAILY [facility]   Administration


 


Diltiazem HCl  30 mg  03/13/18 07:00  03/14/18 06:25





  Cardizem  PO   30 mg





  Q6H [facility]   Administration


 


Epoetin Vamsi  10,000 unit  03/12/18 10:30  03/14/18 11:18





  Procrit  IV   10,000 unit





  [facility]   Administration


 


Ferrous Gluconate  324 mg  03/03/18 10:00  03/14/18 09:20





  Fergon  PO   324 mg





  TID [facility]IL[facility]   Administration


 


Hydromorphone HCl  0.5 mg  03/13/18 14:50  





  Dilaudid  IVP   





  Q6H PRN   





  Pain, moderate (4-7)   


 


Metronidazole  500 mg in 100 mls @ 100 mls/hr  03/11/18 14:00  03/14/18 05:54





  Flagyl  IVPB   100 mls/hr





  Q8 ABHIL[facility]   Administration


 


Vancomycin HCl 1 gm/ Sodium  200 mls @ 166.7 mls/hr  03/12/18 16:00  03/12/18 18

:12





  Chloride  IVPB   166.7 mls/hr





  MWF [facility]   Administration





  Protocol   


 


Insulin Aspart  0 unit  03/04/18 11:49  03/14/18 06:22





  Novolog  SC   Not Given





  Q6 [facility]   





  Protocol   


 


Insulin Glargine  5 unit  03/06/18 22:00  03/13/18 22:25





  Lantus  SC   5 u





  HS [facility]IL[facility]   Administration


 


Methylprednisolone  40 mg  03/12/18 10:00  03/14/18 09:18





  Solu-Medrol  IVP  03/15/18 10:01  40 mg





  DAILY [facility]   Administration


 


Metoprolol Tartrate  25 mg  03/09/18 08:00  03/14/18 09:18





  Lopressor  PO   25 mg





  BID [facility]   Administration


 


Pantoprazole Sodium  40 mg  03/13/18 10:00  03/14/18 09:18





  Protonix Inj  IVP   40 mg





  DAILY [facility]   Administration


 


Paricalcitol  2 mcg  03/12/18 09:00  03/12/18 10:28





  Zemplar  IV   2 mcg





  MWF [facility]   Administration


 


Rosuvastatin Calcium  5 mg  03/02/18 01:15  03/13/18 21:38





  Crestor  PO   Not Given





  HS [facility]   


 


Sennosides  8.6 mg  03/13/18 10:00  03/14/18 09:20





  Senokot Tab  NG   8.6 mg





  DAILY [facility]   Administration


 


Sevelamer Carbonate  800 mg  03/14/18 12:00  





  Renvela  PO   





  TIDCC [facility]   


 


Vitamin B Complex/Vit C/Folic Acid  1 tab  03/03/18 08:00  03/14/18 07:50





  Nephro-Nneka  PO   1 tab





  0800 AB[facility]   Administration


 


Warfarin Sodium  5 mg  03/14/18 18:00  





  Coumadin  PO  03/14/18 18:01  





  1800 [facility]   














- Patient Studies


Lab Studies: 


 Microbiology Studies











 03/13/18 11:30 Gram Stain - Preliminary





 Trachasp Sputum Culture - Preliminary





    Gram Negative Ander








 Lab Studies











  03/14/18 03/14/18 03/14/18 Range/Units





  06:10 06:10 06:10 


 


WBC    18.8 H  (4.8-10.8)  K/uL


 


RBC    3.25 L  (3.80-5.20)  Mil/uL


 


Hgb    8.9 L  (11.0-16.0)  g/dL


 


Hct    28.0 L  (34.0-47.0)  %


 


MCV    86.0  (81.0-99.0)  fL


 


MCH    27.4  (27.0-31.0)  pg


 


MCHC    31.9 L  (33.0-37.0)  g/dL


 


RDW    17.3 H  (11.5-14.5)  %


 


Plt Count    214  (130-400)  K/uL


 


MPV    9.7  (7.2-11.7)  fL


 


Neut % (Auto)    95.1 H  (50.0-75.0)  %


 


Lymph % (Auto)    1.3 L  (20.0-40.0)  %


 


Mono % (Auto)    3.3  (0.0-10.0)  %


 


Eos % (Auto)    0.0  (0.0-4.0)  %


 


Baso % (Auto)    0.3  (0.0-2.0)  %


 


Neut # (Auto)    17.9 H  (1.8-7.0)  K/uL


 


Lymph # (Auto)    0.3 L  (1.0-4.3)  K/uL


 


Mono # (Auto)    0.6  (0.0-0.8)  K/uL


 


Eos # (Auto)    0.0  (0.0-0.7)  K/uL


 


Baso # (Auto)    0.1  (0.0-0.2)  K/uL


 


Neutrophils % (Manual)    95 H  (50-75)  %


 


Lymphocytes % (Manual)    3 L  (20-40)  %


 


Monocytes % (Manual)    2  (0-10)  %


 


Platelet Estimate    Normal  (NORMAL)  


 


Polychromasia    Slight  


 


Hypochromasia (manual)    Slight  


 


Anisocytosis (manual)    Slight  


 


Target Cells    Slight  


 


Tear Drop Cells    Slight  


 


Ovalocytes    Slight  


 


Tower Cells    Slight  


 


PT  19.2 H    (9.7-12.2)  SECONDS


 


INR  1.7    


 


Sodium   135   (132-148)  mmol/L


 


Potassium   5.0   (3.6-5.2)  mmol/L


 


Chloride   98   ()  mmol/L


 


Carbon Dioxide   22   (22-30)  mmol/L


 


Anion Gap   20   (10-20)  


 


BUN   98 H   (7-17)  mg/dL


 


Creatinine   5.5 H   (0.7-1.2)  mg/dL


 


Est GFR ( Amer)   9   


 


Est GFR (Non-Af Amer)   8   


 


POC Glucose (mg/dL)     ()  mg/dL


 


Random Glucose   279 H   ()  mg/dL


 


Calcium   8.7   (8.6-10.4)  mg/dl


 


Phosphorus   9.2 H   (2.5-4.5)  mg/dL


 


Magnesium   2.3   (1.6-2.3)  mg/dL


 


Total Bilirubin   0.8   (0.2-1.3)  mg/dL


 


AST   32   (14-36)  U/L


 


ALT   245 H D   (9-52)  U/L


 


Alkaline Phosphatase   160 H D   ()  U/L


 


Total Protein   5.8 L   (6.3-8.3)  g/dL


 


Albumin   2.9 L   (3.5-5.0)  g/dL


 


Globulin   2.9   (2.2-3.9)  gm/dL


 


Albumin/Globulin Ratio   1.0   (1.0-2.1)  


 


Procalcitonin     (0.19-0.49)  NG/ML














  03/14/18 03/13/18 03/13/18 Range/Units





  05:24 23:20 17:31 


 


WBC     (4.8-10.8)  K/uL


 


RBC     (3.80-5.20)  Mil/uL


 


Hgb     (11.0-16.0)  g/dL


 


Hct     (34.0-47.0)  %


 


MCV     (81.0-99.0)  fL


 


MCH     (27.0-31.0)  pg


 


MCHC     (33.0-37.0)  g/dL


 


RDW     (11.5-14.5)  %


 


Plt Count     (130-400)  K/uL


 


MPV     (7.2-11.7)  fL


 


Neut % (Auto)     (50.0-75.0)  %


 


Lymph % (Auto)     (20.0-40.0)  %


 


Mono % (Auto)     (0.0-10.0)  %


 


Eos % (Auto)     (0.0-4.0)  %


 


Baso % (Auto)     (0.0-2.0)  %


 


Neut # (Auto)     (1.8-7.0)  K/uL


 


Lymph # (Auto)     (1.0-4.3)  K/uL


 


Mono # (Auto)     (0.0-0.8)  K/uL


 


Eos # (Auto)     (0.0-0.7)  K/uL


 


Baso # (Auto)     (0.0-0.2)  K/uL


 


Neutrophils % (Manual)     (50-75)  %


 


Lymphocytes % (Manual)     (20-40)  %


 


Monocytes % (Manual)     (0-10)  %


 


Platelet Estimate     (NORMAL)  


 


Polychromasia     


 


Hypochromasia (manual)     


 


Anisocytosis (manual)     


 


Target Cells     


 


Tear Drop Cells     


 


Ovalocytes     


 


Clara Cells     


 


PT     (9.7-12.2)  SECONDS


 


INR     


 


Sodium     (132-148)  mmol/L


 


Potassium     (3.6-5.2)  mmol/L


 


Chloride     ()  mmol/L


 


Carbon Dioxide     (22-30)  mmol/L


 


Anion Gap     (10-20)  


 


BUN     (7-17)  mg/dL


 


Creatinine     (0.7-1.2)  mg/dL


 


Est GFR (African Amer)     


 


Est GFR (Non-Af Amer)     


 


POC Glucose (mg/dL)  288 H  292 H  294 H  ()  mg/dL


 


Random Glucose     ()  mg/dL


 


Calcium     (8.6-10.4)  mg/dl


 


Phosphorus     (2.5-4.5)  mg/dL


 


Magnesium     (1.6-2.3)  mg/dL


 


Total Bilirubin     (0.2-1.3)  mg/dL


 


AST     (14-36)  U/L


 


ALT     (9-52)  U/L


 


Alkaline Phosphatase     ()  U/L


 


Total Protein     (6.3-8.3)  g/dL


 


Albumin     (3.5-5.0)  g/dL


 


Globulin     (2.2-3.9)  gm/dL


 


Albumin/Globulin Ratio     (1.0-2.1)  


 


Procalcitonin     (0.19-0.49)  NG/ML














  03/13/18 03/13/18 Range/Units





  11:55 10:19 


 


WBC    (4.8-10.8)  K/uL


 


RBC    (3.80-5.20)  Mil/uL


 


Hgb    (11.0-16.0)  g/dL


 


Hct    (34.0-47.0)  %


 


MCV    (81.0-99.0)  fL


 


MCH    (27.0-31.0)  pg


 


MCHC    (33.0-37.0)  g/dL


 


RDW    (11.5-14.5)  %


 


Plt Count    (130-400)  K/uL


 


MPV    (7.2-11.7)  fL


 


Neut % (Auto)    (50.0-75.0)  %


 


Lymph % (Auto)    (20.0-40.0)  %


 


Mono % (Auto)    (0.0-10.0)  %


 


Eos % (Auto)    (0.0-4.0)  %


 


Baso % (Auto)    (0.0-2.0)  %


 


Neut # (Auto)    (1.8-7.0)  K/uL


 


Lymph # (Auto)    (1.0-4.3)  K/uL


 


Mono # (Auto)    (0.0-0.8)  K/uL


 


Eos # (Auto)    (0.0-0.7)  K/uL


 


Baso # (Auto)    (0.0-0.2)  K/uL


 


Neutrophils % (Manual)    (50-75)  %


 


Lymphocytes % (Manual)    (20-40)  %


 


Monocytes % (Manual)    (0-10)  %


 


Platelet Estimate    (NORMAL)  


 


Polychromasia    


 


Hypochromasia (manual)    


 


Anisocytosis (manual)    


 


Target Cells    


 


Tear Drop Cells    


 


Ovalocytes    


 


Clara Cells    


 


PT    (9.7-12.2)  SECONDS


 


INR    


 


Sodium    (132-148)  mmol/L


 


Potassium    (3.6-5.2)  mmol/L


 


Chloride    ()  mmol/L


 


Carbon Dioxide    (22-30)  mmol/L


 


Anion Gap    (10-20)  


 


BUN    (7-17)  mg/dL


 


Creatinine    (0.7-1.2)  mg/dL


 


Est GFR (African Amer)    


 


Est GFR (Non-Af Amer)    


 


POC Glucose (mg/dL)  236 H   ()  mg/dL


 


Random Glucose    ()  mg/dL


 


Calcium    (8.6-10.4)  mg/dl


 


Phosphorus    (2.5-4.5)  mg/dL


 


Magnesium    (1.6-2.3)  mg/dL


 


Total Bilirubin    (0.2-1.3)  mg/dL


 


AST    (14-36)  U/L


 


ALT    (9-52)  U/L


 


Alkaline Phosphatase    ()  U/L


 


Total Protein    (6.3-8.3)  g/dL


 


Albumin    (3.5-5.0)  g/dL


 


Globulin    (2.2-3.9)  gm/dL


 


Albumin/Globulin Ratio    (1.0-2.1)  


 


Procalcitonin   6.11 H  (0.19-0.49)  NG/ML








 Laboratory Results - last 24 hr











  03/13/18 03/13/18 03/13/18





  10:19 11:55 17:31


 


WBC   


 


RBC   


 


Hgb   


 


Hct   


 


MCV   


 


MCH   


 


MCHC   


 


RDW   


 


Plt Count   


 


MPV   


 


Neut % (Auto)   


 


Lymph % (Auto)   


 


Mono % (Auto)   


 


Eos % (Auto)   


 


Baso % (Auto)   


 


Neut # (Auto)   


 


Lymph # (Auto)   


 


Mono # (Auto)   


 


Eos # (Auto)   


 


Baso # (Auto)   


 


Neutrophils % (Manual)   


 


Lymphocytes % (Manual)   


 


Monocytes % (Manual)   


 


Platelet Estimate   


 


Polychromasia   


 


Hypochromasia (manual)   


 


Anisocytosis (manual)   


 


Target Cells   


 


Tear Drop Cells   


 


Ovalocytes   


 


Tower Cells   


 


PT   


 


INR   


 


Sodium   


 


Potassium   


 


Chloride   


 


Carbon Dioxide   


 


Anion Gap   


 


BUN   


 


Creatinine   


 


Est GFR ( Amer)   


 


Est GFR (Non-Af Amer)   


 


POC Glucose (mg/dL)   236 H  294 H


 


Random Glucose   


 


Calcium   


 


Phosphorus   


 


Magnesium   


 


Total Bilirubin   


 


AST   


 


ALT   


 


Alkaline Phosphatase   


 


Total Protein   


 


Albumin   


 


Globulin   


 


Albumin/Globulin Ratio   


 


Procalcitonin  6.11 H  














  03/13/18 03/14/18 03/14/18





  23:20 05:24 06:10


 


WBC    18.8 H


 


RBC    3.25 L


 


Hgb    8.9 L


 


Hct    28.0 L


 


MCV    86.0


 


MCH    27.4


 


MCHC    31.9 L


 


RDW    17.3 H


 


Plt Count    214


 


MPV    9.7


 


Neut % (Auto)    95.1 H


 


Lymph % (Auto)    1.3 L


 


Mono % (Auto)    3.3


 


Eos % (Auto)    0.0


 


Baso % (Auto)    0.3


 


Neut # (Auto)    17.9 H


 


Lymph # (Auto)    0.3 L


 


Mono # (Auto)    0.6


 


Eos # (Auto)    0.0


 


Baso # (Auto)    0.1


 


Neutrophils % (Manual)    95 H


 


Lymphocytes % (Manual)    3 L


 


Monocytes % (Manual)    2


 


Platelet Estimate    Normal


 


Polychromasia    Slight


 


Hypochromasia (manual)    Slight


 


Anisocytosis (manual)    Slight


 


Target Cells    Slight


 


Tear Drop Cells    Slight


 


Ovalocytes    Slight


 


Clara Cells    Slight


 


PT   


 


INR   


 


Sodium   


 


Potassium   


 


Chloride   


 


Carbon Dioxide   


 


Anion Gap   


 


BUN   


 


Creatinine   


 


Est GFR ( Amer)   


 


Est GFR (Non-Af Amer)   


 


POC Glucose (mg/dL)  292 H  288 H 


 


Random Glucose   


 


Calcium   


 


Phosphorus   


 


Magnesium   


 


Total Bilirubin   


 


AST   


 


ALT   


 


Alkaline Phosphatase   


 


Total Protein   


 


Albumin   


 


Globulin   


 


Albumin/Globulin Ratio   


 


Procalcitonin   














  03/14/18 03/14/18





  06:10 06:10


 


WBC  


 


RBC  


 


Hgb  


 


Hct  


 


MCV  


 


MCH  


 


MCHC  


 


RDW  


 


Plt Count  


 


MPV  


 


Neut % (Auto)  


 


Lymph % (Auto)  


 


Mono % (Auto)  


 


Eos % (Auto)  


 


Baso % (Auto)  


 


Neut # (Auto)  


 


Lymph # (Auto)  


 


Mono # (Auto)  


 


Eos # (Auto)  


 


Baso # (Auto)  


 


Neutrophils % (Manual)  


 


Lymphocytes % (Manual)  


 


Monocytes % (Manual)  


 


Platelet Estimate  


 


Polychromasia  


 


Hypochromasia (manual)  


 


Anisocytosis (manual)  


 


Target Cells  


 


Tear Drop Cells  


 


Ovalocytes  


 


Tower Cells  


 


PT   19.2 H


 


INR   1.7


 


Sodium  135 


 


Potassium  5.0 


 


Chloride  98 


 


Carbon Dioxide  22 


 


Anion Gap  20 


 


BUN  98 H 


 


Creatinine  5.5 H 


 


Est GFR ( Amer)  9 


 


Est GFR (Non-Af Amer)  8 


 


POC Glucose (mg/dL)  


 


Random Glucose  279 H 


 


Calcium  8.7 


 


Phosphorus  9.2 H 


 


Magnesium  2.3 


 


Total Bilirubin  0.8 


 


AST  32 


 


ALT  245 H D 


 


Alkaline Phosphatase  160 H D 


 


Total Protein  5.8 L 


 


Albumin  2.9 L 


 


Globulin  2.9 


 


Albumin/Globulin Ratio  1.0 


 


Procalcitonin  











Fingerstick Blood Sugar Results: 288





Critical Care Progress Note





- Nutrition


Nutrition: 


 Nutrition











 Category Date Time Status


 


 NPO Diet [DIET] Diets  03/12/18 Dinner Active














Assessment/Plan





- Assessment and Plan (Free Text)


Assessment: 





This is a 77 year old female with a history of ESRD on HD, CHF, DM, HTN, 

hypercholesterolemia, and some sort of MI years before, repeated TIAs, and 

colon CA. She was brought in by family for altered mental status and ultimately 

found to have severe metabolic acidosis as well as a extremely elevated 

troponins.





Neurology:


-Patient is awake and alert


-Sating well on NC 


-Swallow eval and PT eval ordered





Cardiology:


-NSTEMI, Hx of afib


-Family is currently refusing for intervention for coronary.


-Will need to discuss goals of care with the family, palliative care on board


-Patient started on coumadin for a fib 


-Continue Crestor 20mg PO HS, Aspirin 81mg daily 


-Continue Cardizem 30mg PO Q6H 


-Continue Metoprolol 25mg PO BID


-HR 140s with hypotension during dialysis, will stop dialysis at this time and 

monitor


-Cardiology on consult, help appreciated





Pulmonary: 


-Extubated, sating well on nasal cannula


-Treating for Acute hypoxemic respiratory failure, respiratory acidosis from 

acute pulmonary edema and pneumonia


-CXR: Moderate venous congestion. Moderate loculated pleural effusion with 

consolidative changes in the left mid to lower lung zone, cardiomegaly.


-Solumedrol 40mg IVP daily


-Sputum culture growing gram negative rods, awaiting sensitivities





Hematology:


-Continue Ferrous sulfate 325mg PO TID





GI:


-Sennakot daily and Ducolax suppository ordered 


-AST/ALT elevated likely secondary to shock liver, currently trending down


-Will continue to monitor, avoid hepatotoxic agents 





Renal:


-S/P permacath placement yesterday


-Continue Epoetin 


-Started on Renvela


-Continue Hemodialysis as regularly scheduled (MWF)


-Social work consult placed, patient will need long term dialysis 


-Vascular surgery on consult, help appreciated 


-Nephrology on consult, f/u recommendations 





Endocrine:


-History of diabetes mellitus


-Continue Lantus 5 units SC HS


-ISS, accuchecks





Infectious Disease:


-Permacath tip sent for culture 


-Sputum culture growing gram negative rods, awaiting sensitivities


-Antibiotics: Aztreonam 1gm Q12H, Flagyl 500mg Q8H


-Leukocytosis improving 18.8


-Procalcitonin 6.11


-Blood cultures have been negative 


-Infectious disease on consult, f/u recommendations 





GI/DVT ppx:


-Heparin 5000 units Q8H SC


-Protonix 40mg PO daily





Plan discussed with Dr Rao





<Rancho Rao - Last Filed: 03/14/18 18:08>





CCU Objective





- Vital Signs / Intake & Output


Vital Signs (Last 4 hours): 


Vital Signs











  Temp Pulse Resp BP Pulse Ox


 


 03/14/18 17:33   96 H  25 H  80/38 L  86 L


 


 03/14/18 17:17   78  25 H  69/35 L  85 L


 


 03/14/18 17:14   71  26 H  69/37 L 


 


 03/14/18 17:01   87  27 H   89 L


 


 03/14/18 17:00   82  27 H  


 


 03/14/18 16:30   96 H  17   96


 


 03/14/18 16:00  97.6 F  91 H  18   97


 


 03/14/18 15:59   95 H  21  111/62  93 L


 


 03/14/18 15:30   97 H  16   100


 


 03/14/18 15:00   97 H  19   100


 


 03/14/18 14:59   86  17  110/65  100


 


 03/14/18 14:30   97 H  22   100


 


 03/14/18 14:01   101 H  15  114/63  96


 


 03/14/18 14:00   110 H  17   95











Intake and Output (Last 8hrs): 


 Intake & Output











 03/14/18 03/14/18 03/14/18





 06:59 14:59 22:59


 


Intake Total 300 213 127


 


Output Total 0 0 0


 


Balance 300 213 127


 


Weight  158 lb 1.143 oz 


 


Intake:   


 


  Intake, IV Amount 200 133 67


 


    Right Proximal Port 200 133 67





    Internal Jugular   


 


  Oral 100 60 


 


  Tube Feeding  20 60


 


Output:   


 


  Urine 0 0 0


 


    Urine, Voided 0 0 0


 


  Stool 0  














- Medications


Active Medications: 


Active Medications











Generic Name Dose Route Start Last Admin





  Trade Name Freq  PRN Reason Stop Dose Admin


 


Albuterol/Ipratropium  3 ml  03/07/18 16:00  03/14/18 11:15





  Duoneb 3 Mg/0.5 Mg (3 Ml) Ud  INH   3 ml





  RQ4 ABHILASH   Administration


 


Aspirin  81 mg  03/02/18 10:00  03/14/18 09:18





  Aspirin Chewable  PO   81 mg





  DAILY [facility]   Administration


 


Diltiazem HCl  30 mg  03/13/18 07:00  03/14/18 13:19





  Cardizem  PO   30 mg





  Q6H ABHILASH   Administration


 


Epoetin Vamsi  10,000 unit  03/12/18 10:30  03/14/18 11:18





  Procrit  IV   10,000 unit





  [facility]   Administration


 


Ferrous Gluconate  324 mg  03/03/18 10:00  03/14/18 13:19





  Fergon  PO   324 mg





  TID [facility]   Administration


 


Heparin Sodium (Porcine)  1,000 units  03/14/18 09:00  03/14/18 11:40





  Heparin  IVP   3.9 units





  [facility]   Administration


 


Hydromorphone HCl  0.5 mg  03/13/18 14:50  





  Dilaudid  IVP   





  Q6H PRN   





  Pain, moderate (4-7)   


 


Vancomycin HCl 1 gm/ Sodium  200 mls @ 166.7 mls/hr  03/12/18 16:00  03/14/18 13

:24





  Chloride  IVPB   166.7 mls/hr





  [facility] [place]   Administration





  Protocol   


 


Aztreonam 1 gm/ Sodium  100 mls @ 100 mls/hr  03/15/18 13:55  





  Chloride  IVPB  03/15/18 14:54  





  MWF ONE   





  Protocol   


 


Norepinephrine Bitartrate 4 mg  254 mls @ 15.24 mls/hr  03/14/18 17:30  03/14/ 18 17:33





  / Sodium Chloride  IV   10 mcg/min





  .[id number] PRN   38.1 mls/hr





  TITRATE PER MD ORDER   Administration





  Protocol   





  4 MCG/MIN   


 


Insulin Aspart  0 unit  03/04/18 11:49  03/14/18 12:18





  Novolog  SC   4 unit





  Q6 [facility]   Administration





  Protocol   


 


Insulin Glargine  5 unit  03/06/18 22:00  03/13/18 22:25





  Lantus  SC   5 u





  HS [facility]   Administration


 


Methylprednisolone  40 mg  03/12/18 10:00  03/14/18 09:18





  Solu-Medrol  IVP  03/15/18 10:01  40 mg





  DAILY [facility]   Administration


 


Metoprolol Tartrate  25 mg  03/09/18 08:00  03/14/18 09:18





  Lopressor  PO   25 mg





  BID [facility]   Administration


 


Pantoprazole Sodium  40 mg  03/13/18 10:00  03/14/18 09:18





  Protonix Inj  IVP   40 mg





  DAILY [facility]   Administration


 


Paricalcitol  2 mcg  03/12/18 09:00  03/14/18 11:55





  Zemplar  IV   2 mcg





  MWF [facility]   Administration


 


Rosuvastatin Calcium  5 mg  03/02/18 01:15  03/13/18 21:38





  Crestor  PO   Not Given





  HS [facility]   


 


Sennosides  8.6 mg  03/13/18 10:00  03/14/18 09:20





  Senokot Tab  NG   8.6 mg





  DAILY [facility]   Administration


 


Sevelamer Carbonate  800 mg  03/14/18 12:00  03/14/18 13:02





  Renvela  PO   Not Given





  [facility]   


 


Vitamin B Complex/Vit C/Folic Acid  1 tab  03/03/18 08:00  03/14/18 07:50





  Nephro-Nneka  PO   1 tab





  0800 [facility]   Administration


 


Warfarin Sodium  5 mg  03/14/18 18:00  





  Coumadin  PO  03/14/18 18:01  





  1800 [facility]   














- Patient Studies


Lab Studies: 


 Microbiology Studies











 03/13/18 12:18 Catheter Tip Culture - Preliminary





 Catheter Tip    No growth.


 


 03/13/18 11:30 Gram Stain - Preliminary





 Trachasp Sputum Culture - Preliminary





    Gram Negative Ander








 Lab Studies











  03/14/18 03/14/18 03/14/18 Range/Units





  17:20 11:47 06:10 


 


WBC     (4.8-10.8)  K/uL


 


RBC     (3.80-5.20)  Mil/uL


 


Hgb     (11.0-16.0)  g/dL


 


Hct     (34.0-47.0)  %


 


MCV     (81.0-99.0)  fL


 


MCH     (27.0-31.0)  pg


 


MCHC     (33.0-37.0)  g/dL


 


RDW     (11.5-14.5)  %


 


Plt Count     (130-400)  K/uL


 


MPV     (7.2-11.7)  fL


 


Neut % (Auto)     (50.0-75.0)  %


 


Lymph % (Auto)     (20.0-40.0)  %


 


Mono % (Auto)     (0.0-10.0)  %


 


Eos % (Auto)     (0.0-4.0)  %


 


Baso % (Auto)     (0.0-2.0)  %


 


Neut # (Auto)     (1.8-7.0)  K/uL


 


Lymph # (Auto)     (1.0-4.3)  K/uL


 


Mono # (Auto)     (0.0-0.8)  K/uL


 


Eos # (Auto)     (0.0-0.7)  K/uL


 


Baso # (Auto)     (0.0-0.2)  K/uL


 


Neutrophils % (Manual)     (50-75)  %


 


Lymphocytes % (Manual)     (20-40)  %


 


Monocytes % (Manual)     (0-10)  %


 


Platelet Estimate     (NORMAL)  


 


Polychromasia     


 


Hypochromasia (manual)     


 


Anisocytosis (manual)     


 


Target Cells     


 


Tear Drop Cells     


 


Ovalocytes     


 


Tower Cells     


 


PT    19.2 H  (9.7-12.2)  SECONDS


 


INR    1.7  


 


Sodium     (132-148)  mmol/L


 


Potassium     (3.6-5.2)  mmol/L


 


Chloride     ()  mmol/L


 


Carbon Dioxide     (22-30)  mmol/L


 


Anion Gap     (10-20)  


 


BUN     (7-17)  mg/dL


 


Creatinine     (0.7-1.2)  mg/dL


 


Est GFR (African Amer)     


 


Est GFR (Non-Af Amer)     


 


POC Glucose (mg/dL)  276 H  227 H   ()  mg/dL


 


Random Glucose     ()  mg/dL


 


Calcium     (8.6-10.4)  mg/dl


 


Phosphorus     (2.5-4.5)  mg/dL


 


Magnesium     (1.6-2.3)  mg/dL


 


Total Bilirubin     (0.2-1.3)  mg/dL


 


AST     (14-36)  U/L


 


ALT     (9-52)  U/L


 


Alkaline Phosphatase     ()  U/L


 


Total Protein     (6.3-8.3)  g/dL


 


Albumin     (3.5-5.0)  g/dL


 


Globulin     (2.2-3.9)  gm/dL


 


Albumin/Globulin Ratio     (1.0-2.1)  














  03/14/18 03/14/18 03/14/18 Range/Units





  06:10 06:10 05:24 


 


WBC   18.8 H   (4.8-10.8)  K/uL


 


RBC   3.25 L   (3.80-5.20)  Mil/uL


 


Hgb   8.9 L   (11.0-16.0)  g/dL


 


Hct   28.0 L   (34.0-47.0)  %


 


MCV   86.0   (81.0-99.0)  fL


 


MCH   27.4   (27.0-31.0)  pg


 


MCHC   31.9 L   (33.0-37.0)  g/dL


 


RDW   17.3 H   (11.5-14.5)  %


 


Plt Count   214   (130-400)  K/uL


 


MPV   9.7   (7.2-11.7)  fL


 


Neut % (Auto)   95.1 H   (50.0-75.0)  %


 


Lymph % (Auto)   1.3 L   (20.0-40.0)  %


 


Mono % (Auto)   3.3   (0.0-10.0)  %


 


Eos % (Auto)   0.0   (0.0-4.0)  %


 


Baso % (Auto)   0.3   (0.0-2.0)  %


 


Neut # (Auto)   17.9 H   (1.8-7.0)  K/uL


 


Lymph # (Auto)   0.3 L   (1.0-4.3)  K/uL


 


Mono # (Auto)   0.6   (0.0-0.8)  K/uL


 


Eos # (Auto)   0.0   (0.0-0.7)  K/uL


 


Baso # (Auto)   0.1   (0.0-0.2)  K/uL


 


Neutrophils % (Manual)   95 H   (50-75)  %


 


Lymphocytes % (Manual)   3 L   (20-40)  %


 


Monocytes % (Manual)   2   (0-10)  %


 


Platelet Estimate   Normal   (NORMAL)  


 


Polychromasia   Slight   


 


Hypochromasia (manual)   Slight   


 


Anisocytosis (manual)   Slight   


 


Target Cells   Slight   


 


Tear Drop Cells   Slight   


 


Ovalocytes   Slight   


 


Tower Cells   Slight   


 


PT     (9.7-12.2)  SECONDS


 


INR     


 


Sodium  135    (132-148)  mmol/L


 


Potassium  5.0    (3.6-5.2)  mmol/L


 


Chloride  98    ()  mmol/L


 


Carbon Dioxide  22    (22-30)  mmol/L


 


Anion Gap  20    (10-20)  


 


BUN  98 H    (7-17)  mg/dL


 


Creatinine  5.5 H    (0.7-1.2)  mg/dL


 


Est GFR ( Amer)  9    


 


Est GFR (Non-Af Amer)  8    


 


POC Glucose (mg/dL)    288 H  ()  mg/dL


 


Random Glucose  279 H    ()  mg/dL


 


Calcium  8.7    (8.6-10.4)  mg/dl


 


Phosphorus  9.2 H    (2.5-4.5)  mg/dL


 


Magnesium  2.3    (1.6-2.3)  mg/dL


 


Total Bilirubin  0.8    (0.2-1.3)  mg/dL


 


AST  32    (14-36)  U/L


 


ALT  245 H D    (9-52)  U/L


 


Alkaline Phosphatase  160 H D    ()  U/L


 


Total Protein  5.8 L    (6.3-8.3)  g/dL


 


Albumin  2.9 L    (3.5-5.0)  g/dL


 


Globulin  2.9    (2.2-3.9)  gm/dL


 


Albumin/Globulin Ratio  1.0    (1.0-2.1)  














  03/13/18 Range/Units





  23:20 


 


WBC   (4.8-10.8)  K/uL


 


RBC   (3.80-5.20)  Mil/uL


 


Hgb   (11.0-16.0)  g/dL


 


Hct   (34.0-47.0)  %


 


MCV   (81.0-99.0)  fL


 


MCH   (27.0-31.0)  pg


 


MCHC   (33.0-37.0)  g/dL


 


RDW   (11.5-14.5)  %


 


Plt Count   (130-400)  K/uL


 


MPV   (7.2-11.7)  fL


 


Neut % (Auto)   (50.0-75.0)  %


 


Lymph % (Auto)   (20.0-40.0)  %


 


Mono % (Auto)   (0.0-10.0)  %


 


Eos % (Auto)   (0.0-4.0)  %


 


Baso % (Auto)   (0.0-2.0)  %


 


Neut # (Auto)   (1.8-7.0)  K/uL


 


Lymph # (Auto)   (1.0-4.3)  K/uL


 


Mono # (Auto)   (0.0-0.8)  K/uL


 


Eos # (Auto)   (0.0-0.7)  K/uL


 


Baso # (Auto)   (0.0-0.2)  K/uL


 


Neutrophils % (Manual)   (50-75)  %


 


Lymphocytes % (Manual)   (20-40)  %


 


Monocytes % (Manual)   (0-10)  %


 


Platelet Estimate   (NORMAL)  


 


Polychromasia   


 


Hypochromasia (manual)   


 


Anisocytosis (manual)   


 


Target Cells   


 


Tear Drop Cells   


 


Ovalocytes   


 


Clara Cells   


 


PT   (9.7-12.2)  SECONDS


 


INR   


 


Sodium   (132-148)  mmol/L


 


Potassium   (3.6-5.2)  mmol/L


 


Chloride   ()  mmol/L


 


Carbon Dioxide   (22-30)  mmol/L


 


Anion Gap   (10-20)  


 


BUN   (7-17)  mg/dL


 


Creatinine   (0.7-1.2)  mg/dL


 


Est GFR (African Amer)   


 


Est GFR (Non-Af Amer)   


 


POC Glucose (mg/dL)  292 H  ()  mg/dL


 


Random Glucose   ()  mg/dL


 


Calcium   (8.6-10.4)  mg/dl


 


Phosphorus   (2.5-4.5)  mg/dL


 


Magnesium   (1.6-2.3)  mg/dL


 


Total Bilirubin   (0.2-1.3)  mg/dL


 


AST   (14-36)  U/L


 


ALT   (9-52)  U/L


 


Alkaline Phosphatase   ()  U/L


 


Total Protein   (6.3-8.3)  g/dL


 


Albumin   (3.5-5.0)  g/dL


 


Globulin   (2.2-3.9)  gm/dL


 


Albumin/Globulin Ratio   (1.0-2.1)  








 Laboratory Results - last 24 hr











  03/13/18 03/14/18 03/14/18





  23:20 05:24 06:10


 


WBC    18.8 H


 


RBC    3.25 L


 


Hgb    8.9 L


 


Hct    28.0 L


 


MCV    86.0


 


MCH    27.4


 


MCHC    31.9 L


 


RDW    17.3 H


 


Plt Count    214


 


MPV    9.7


 


Neut % (Auto)    95.1 H


 


Lymph % (Auto)    1.3 L


 


Mono % (Auto)    3.3


 


Eos % (Auto)    0.0


 


Baso % (Auto)    0.3


 


Neut # (Auto)    17.9 H


 


Lymph # (Auto)    0.3 L


 


Mono # (Auto)    0.6


 


Eos # (Auto)    0.0


 


Baso # (Auto)    0.1


 


Neutrophils % (Manual)    95 H


 


Lymphocytes % (Manual)    3 L


 


Monocytes % (Manual)    2


 


Platelet Estimate    Normal


 


Polychromasia    Slight


 


Hypochromasia (manual)    Slight


 


Anisocytosis (manual)    Slight


 


Target Cells    Slight


 


Tear Drop Cells    Slight


 


Ovalocytes    Slight


 


Clara Cells    Slight


 


PT   


 


INR   


 


Sodium   


 


Potassium   


 


Chloride   


 


Carbon Dioxide   


 


Anion Gap   


 


BUN   


 


Creatinine   


 


Est GFR ( Amer)   


 


Est GFR (Non-Af Amer)   


 


POC Glucose (mg/dL)  292 H  288 H 


 


Random Glucose   


 


Calcium   


 


Phosphorus   


 


Magnesium   


 


Total Bilirubin   


 


AST   


 


ALT   


 


Alkaline Phosphatase   


 


Total Protein   


 


Albumin   


 


Globulin   


 


Albumin/Globulin Ratio   














  03/14/18 03/14/18 03/14/18





  06:10 06:10 11:47


 


WBC   


 


RBC   


 


Hgb   


 


Hct   


 


MCV   


 


MCH   


 


MCHC   


 


RDW   


 


Plt Count   


 


MPV   


 


Neut % (Auto)   


 


Lymph % (Auto)   


 


Mono % (Auto)   


 


Eos % (Auto)   


 


Baso % (Auto)   


 


Neut # (Auto)   


 


Lymph # (Auto)   


 


Mono # (Auto)   


 


Eos # (Auto)   


 


Baso # (Auto)   


 


Neutrophils % (Manual)   


 


Lymphocytes % (Manual)   


 


Monocytes % (Manual)   


 


Platelet Estimate   


 


Polychromasia   


 


Hypochromasia (manual)   


 


Anisocytosis (manual)   


 


Target Cells   


 


Tear Drop Cells   


 


Ovalocytes   


 


Tower Cells   


 


PT   19.2 H 


 


INR   1.7 


 


Sodium  135  


 


Potassium  5.0  


 


Chloride  98  


 


Carbon Dioxide  22  


 


Anion Gap  20  


 


BUN  98 H  


 


Creatinine  5.5 H  


 


Est GFR ( Amer)  9  


 


Est GFR (Non-Af Amer)  8  


 


POC Glucose (mg/dL)    227 H


 


Random Glucose  279 H  


 


Calcium  8.7  


 


Phosphorus  9.2 H  


 


Magnesium  2.3  


 


Total Bilirubin  0.8  


 


AST  32  


 


ALT  245 H D  


 


Alkaline Phosphatase  160 H D  


 


Total Protein  5.8 L  


 


Albumin  2.9 L  


 


Globulin  2.9  


 


Albumin/Globulin Ratio  1.0  














  03/14/18





  17:20


 


WBC 


 


RBC 


 


Hgb 


 


Hct 


 


MCV 


 


MCH 


 


MCHC 


 


RDW 


 


Plt Count 


 


MPV 


 


Neut % (Auto) 


 


Lymph % (Auto) 


 


Mono % (Auto) 


 


Eos % (Auto) 


 


Baso % (Auto) 


 


Neut # (Auto) 


 


Lymph # (Auto) 


 


Mono # (Auto) 


 


Eos # (Auto) 


 


Baso # (Auto) 


 


Neutrophils % (Manual) 


 


Lymphocytes % (Manual) 


 


Monocytes % (Manual) 


 


Platelet Estimate 


 


Polychromasia 


 


Hypochromasia (manual) 


 


Anisocytosis (manual) 


 


Target Cells 


 


Tear Drop Cells 


 


Ovalocytes 


 


Tower Cells 


 


PT 


 


INR 


 


Sodium 


 


Potassium 


 


Chloride 


 


Carbon Dioxide 


 


Anion Gap 


 


BUN 


 


Creatinine 


 


Est GFR ( Amer) 


 


Est GFR (Non-Af Amer) 


 


POC Glucose (mg/dL)  276 H


 


Random Glucose 


 


Calcium 


 


Phosphorus 


 


Magnesium 


 


Total Bilirubin 


 


AST 


 


ALT 


 


Alkaline Phosphatase 


 


Total Protein 


 


Albumin 


 


Globulin 


 


Albumin/Globulin Ratio 














Critical Care Progress Note





- Nutrition


Nutrition: 


 Nutrition











 Category Date Time Status


 


 NPO Diet [DIET] Diets  03/12/18 Dinner Active














Attending/Attestation





- Attestation


I have personally seen and examined this patient.: Yes


I have fully participated in the care of the patient.: Yes


I have reviewed all pertinent clinical information: Yes


Notes (Text): 





03/14/18 17:47


patient seen and examined in the intensive care unit.


Case discussed with house staff in the morning rounds.


During dialysis patient became hypotensive And tachycardic


Continue ICU observation


Case discussed with family


Started on Levophed


Follow up ABG and chest x-ray

## 2018-03-14 NOTE — CP.PCM.PN
Subjective





- Date & Time of Evaluation


Date of Evaluation: 03/14/18


Time of Evaluation: 08:30





- Subjective


Subjective: 





Seen and examined. Patient is awake and responsive with hoarse voice. Denies 

pain. lying comfortable


No distress. Afib with rate around 90ies


Swallowing study not done yesterday. She was drowsy after Permcath


d/w RN.





Objective





- Vital Signs/Intake and Output


Vital Signs (last 24 hours): 


 











Temp Pulse Resp BP Pulse Ox


 


 97.9 F   91 H  17   116/57 L  100 


 


 03/14/18 08:00  03/14/18 08:00  03/14/18 08:00  03/14/18 07:57  03/14/18 08:00








Intake and Output: 


 











 03/14/18 03/14/18





 06:59 18:59


 


Intake Total 400 0


 


Output Total 0 0


 


Balance 400 0














- Medications


Medications: 


 Current Medications





Albuterol/Ipratropium (Duoneb 3 Mg/0.5 Mg (3 Ml) Ud)  3 ml INH RQ4 [facility]


   Last Admin: 03/14/18 07:25 Dose:  3 ml


Aspirin (Aspirin Chewable)  81 mg PO DAILY [facility]


   Last Admin: 03/13/18 10:05 Dose:  Not Given


Diltiazem HCl (Cardizem)  30 mg PO Q6H [facility]


   Last Admin: 03/14/18 06:25 Dose:  30 mg


Epoetin Vamsi (Procrit)  10,000 unit IV MWF [facility]


   Last Admin: 03/12/18 10:28 Dose:  10,000 unit


Ferrous Gluconate (Fergon)  324 mg PO TID [facility]


   Last Admin: 03/13/18 18:03 Dose:  324 mg


Heparin Sodium (Porcine) (Heparin)  5,000 units SC Q8 [facility]


   Last Admin: 03/14/18 06:21 Dose:  5,000 units


Hydromorphone HCl (Dilaudid)  0.5 mg IVP Q6H PRN


   PRN Reason: Pain, moderate (4-7)


Aztreonam 1 gm/ Sodium (Chloride)  100 mls @ 100 mls/hr IVPB Q12H [facility]


   Last Admin: 03/14/18 01:00 Dose:  100 mls/hr


Metronidazole (Flagyl)  500 mg in 100 mls @ 100 mls/hr IVPB Q8 [facility]


   Last Admin: 03/14/18 05:54 Dose:  100 mls/hr


Vancomycin HCl 1 gm/ Sodium (Chloride)  200 mls @ 166.7 mls/hr IVPB MWF [facility]


   PRN Reason: Protocol


   Last Admin: 03/12/18 18:12 Dose:  166.7 mls/hr


Insulin Aspart (Novolog)  0 unit SC Q6 [facility]


   PRN Reason: Protocol


   Last Admin: 03/14/18 06:22 Dose:  Not Given


Insulin Glargine (Lantus)  5 unit SC [facility]


   Last Admin: 03/13/18 22:25 Dose:  5 u


Methylprednisolone (Solu-Medrol)  40 mg IVP DAILY [facility]


   Stop: 03/15/18 10:01


   Last Admin: 03/13/18 10:04 Dose:  40 mg


Metoprolol Tartrate (Lopressor)  25 mg PO BID [facility]


   Last Admin: 03/13/18 18:03 Dose:  25 mg


Pantoprazole Sodium (Protonix Inj)  40 mg IVP DAILY [facility]


   Last Admin: 03/13/18 10:04 Dose:  40 mg


Paricalcitol (Zemplar)  2 mcg IV MWF [facility]


   Last Admin: 03/12/18 10:28 Dose:  2 mcg


Rosuvastatin Calcium (Crestor)  5 mg PO [facility]


   Last Admin: 03/13/18 21:38 Dose:  Not Given


Sennosides (Senokot Tab)  8.6 mg NG DAILY [facility]


   Last Admin: 03/13/18 10:06 Dose:  Not Given


Vitamin B Complex/Vit C/Folic Acid (Nephro-Nneka)  1 tab PO 0800 [facility]


   Last Admin: 03/14/18 07:50 Dose:  1 tab


Warfarin Sodium (Coumadin)  5 mg PO 1800 [facility]


   Stop: 03/14/18 18:01











- Labs


Labs: 


 





 03/14/18 06:10 





 03/14/18 06:10 





 











PT  19.2 SECONDS (9.7-12.2)  H  03/14/18  06:10    


 


INR  1.7   03/14/18  06:10    


 


APTT  27 SECONDS (21-34)  D 03/13/18  10:19    














- Constitutional


Appears: No Acute Distress, Chronically Ill





- Head Exam


Head Exam: NORMAL INSPECTION





- Eye Exam


Eye Exam: Normal appearance





- ENT Exam


ENT Exam: Mucous Membranes Moist





- Neck Exam


Neck Exam: Full ROM





- Respiratory Exam


Respiratory Exam: Clear to Ausculation Bilateral, NORMAL BREATHING PATTERN





- Cardiovascular Exam


Cardiovascular Exam: Irregular Rhythm





- GI/Abdominal Exam


GI & Abdominal Exam: Soft, Normal Bowel Sounds





- Extremities Exam


Extremities Exam: Full ROM.  absent: Tenderness





- Neurological Exam


Neurological Exam: Awake





- Psychiatric Exam


Psychiatric exam: Normal Affect, Normal Mood





- Skin


Skin Exam: Dry, Normal Color





Assessment and Plan





- Assessment and Plan (Free Text)


Assessment: 





This is a 77 year old female with a history of ESRD now getting HD, CHF, DM, HTN

, hypercholesterolemia, and some sort of MI years before, repeated TIAs, and 

colon CA. She was brought in by family for altered mental status and ultimately 

found to have severe metabolic acidosis as well as a extremely elevated 

troponins. S/P cardiac arrest,VT,Vifb and s/p extubation.Has afib 





 


Plan: 

















1 Acute NSTEMI ,  Asystol ,CPR / Sustained VT


3/14 --Continue asprin,cardizem and metoprolol. On coumadin for afib


3/12: Heparin drip discontinued


3/10: I had long conversation with POA Kristel Corral and per the POA the patient 

indicated to them no cardiac catherization. 


I explained to POA that considering the weakened heart function it may prove 

very difficult to successfully extubate patient.


At this moment she remains on heparin ggt, getting HD, and also weaning trials


3/9: Cardiac catherization was canceled


3/8: Potentially cardiac catherization shortly from now. Remains on heparin ggt


3/7: atrial fibrillation appearance on telemetry, check a new 12 lead EKG. 

Remains on heparin ggt.


3/6: Remains in heparin ggt, statin, ASA. Hopefully at some point can have 

cardiac catherization. 


3/5: Now off pressor medications. She remains on amiodarone   


Echo EF 15 to 20%. 








2 Respiratory failure, intubation


3/13 Saturating well on nasal cannula 4l


3/12 Weaning trial.chest xray looks better. Just switched to CPAP


3/11: She may end up needing a trach and or PEG if she is not able to be 

successfully extubated


3/10: Again became tachycardic at night and was switched from PS/CPAP settings 

back to PRVC. Family is aware that it will be difficult to extubate


3/9: Back on PRVC after having tachy cardia


3/8: Has tolerated CPAP settings throughout the night


3/7: Yesterday afternoon she was moved back to PRVC


3/6: Now changed to CPAP/PS settings. The chest XRAYs look better this morning 

following HD last night.





3 Atrial Fibrillation 


3/14-She is in afib . Patient has CHADS-VA score 8. has high CHADS score . INR  

today 1.7  Coumadin 5mg tonight  without heparin bridge


        


3/10: remains in atrial fibrillation. On heparin and loppressor BID


3/9: Continue on heparin ggt, Lopressor increased to 25 BID





4 Acute renal failure on chronic renal disease/Now ESRD on HD


3/41-Planning for dialysis today


3/13-s/p L IJpermacath.  left shiley removed


3/12-HD today


3/8: Toleratated HD well yesterday


3/7: Currently MWF HD


3/6: Underwent HD yesterday and tolerated it well. CXRAY looks better


3/5/2018: She has had one session of HD so far. Likley another session today





5 Leukocytosis concerning for sepsis


 3/14-Continue antibiotics.seen by Dr luna yesterday. Has high procalcitonin 

6.1. WBC is  high /18 today


3/13 on Aztreonam ,vanco and flagyl.follow ID


          cultures negative


3/6: Today decreased WBC. Cultures negative so far


3/5/2018: The elevated WBC maybe stress related.  


She remains on abx Aztreonam IV and Flagyl IV





6 Acute systolic heart failure


   Patient refuses cardiac cath. has high risk for arrhythmia and poor prognosis


   lying without sob


3/6: Monitor chest XRAYs. 


  ECHO shows EF 15 to 20%


  follow cardio





7 Pleural effusion


3/8: Still present on CXRAYs. Continue to moniotp


3/6: Improving CXRAYs


3/5/2018: Pleural effusions minimal at this time. Continue to monitor





8 Hyperglycemia and uncontrolled DM


3/6: Reccent accucheks 130s, 130, 160





9 Anemia of chronic disease, CKD


   3/6: Yesterday had 1 unit of PRBC given during HD, receiving EPO





10.DVT prophylaxis on heparin and GI prophylaxis on protonix


     Pending swallowing evaluation to start on diet.

## 2018-03-14 NOTE — CP.PCM.PN
Subjective





- Date & Time of Evaluation


Date of Evaluation: 03/14/18


Time of Evaluation: 14:49





- Subjective


Subjective: 





Nephrology Consultation:





Assessment: stable


CKD stage 5 (N18.5) with hyperkalemia, acidosis now has ESRD on HD via 

permacath MWF 


pulm edema, CHF, NSTEMI, pleural effusions, pneumonia


AMS, hyperglycemia


s/p cardiac arrest, shock liver, A fib





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 


Hypertensive Chronic Kidney Disease (I12.9)


Anemia (D64.9), Hyperphosphatemia (E83.39), Secondary Hyperparathyroidism (E21.1

), HTN (I12.9), vit D insuff, hx of colon CA, TIAs





Plan


HD Today as ordered as per MWF schedule. 


maintain hemodynamic stable. Patient not on ACEI/ARB due to low BP. off 

midodrine


started iron supplements, MVI, epogen with HD, and zemplar 2 mcg with HD.


started on phos binders as renvela





Dose meds/antibiotics for ESRD/HD status. Avoid fleets enema/magnesium based 

laxatives. Avoid nephrotoxins/NSAIDs


Glycemic control


Further work up/management as per primary team


 consult for outpatient dialysis placement.





Thanks for allowing me to participate in care of your patient. Will follow 

patient with you. Please call if any Qs.


Dr Shad Panchal


Office: [phone] Cell: [phone] Fax: [phone]





Chief Complaint; unable to obtain


reason for consult: CKD management


source of Info: EMR


HPI: Pt is a 77 F with hx of diabetes Mellitus ( years), hypertension (years), 

CHF, TIA, Colon CA, CKD stage 4/5, has matured AVG in left arm presented with 

complaints of AMS and hyperglycemia as brought by family. now has elevated trop 

100, hyperkalemia, AMS and elevated sugar, pulm edema. renal consult for CKD 

management. 


pt with AMS unable to provide any hx. she had received medical management for 

hyperkalemia. 





ROS: unable to obtain





Physical Examination: 


General Appearance: ill appearing, on O2 


Vitals reviewed and noted as below


Head; Atraumatic, normocephalic


ENT: On O2 


EYES: PERRLA


Neck; supple no lymphadenopathy, no thyromegaly or bruit


Lungs: Normal respiratory rate/effort. Breath sounds bilateral clear, she is 

mechanically ventilated


Heart: Normal rate. s1s2 normal. No rub or gallop. 


Extremities: no edema. No varicose veins. chronic hyperpigmented changes in legs


Neurological: Patient is  awake follows commands, Not much communicative 

verbally


Skin: Warm and dry. Normal turgor. No rash. Palpitation: Normal elasticity for 

age


Abdomen: Abdomen is soft. Bowel sounds +. There is no abdominal tenderness, no 

guarding/rigidity no organomegaly


Psych: unable


MSK: no joint tenderness or swelling. Digits and nails normal, no deformity


: kidney or bladder not palpable


Access: Left AVG without bruit. has left permacath





Labs/imaging reviewed.


Past medical history, past surgical history, family history, social history, 

allergy reviewed and noted as below


Family hx: no hx of CKD. Rest non-contributory





Objective





- Vital Signs/Intake and Output


Vital Signs (last 24 hours): 


 











Temp Pulse Resp BP Pulse Ox


 


 97.5 F L  101 H  15   114/63   96 


 


 03/14/18 12:00  03/14/18 14:01  03/14/18 14:01  03/14/18 14:01  03/14/18 14:01








Intake and Output: 


 











 03/14/18 03/14/18





 06:59 18:59


 


Intake Total 400 213


 


Output Total 0 0


 


Balance 400 213














- Medications


Medications: 


 Current Medications





Albuterol/Ipratropium (Duoneb 3 Mg/0.5 Mg (3 Ml) Ud)  3 ml INH RQ4 [facility]


   Last Admin: 03/14/18 11:15 Dose:  3 ml


Aspirin (Aspirin Chewable)  81 mg PO DAILY [facility]


   Last Admin: 03/14/18 09:18 Dose:  81 mg


Diltiazem HCl (Cardizem)  30 mg PO Q6H [facility]


   Last Admin: 03/14/18 13:19 Dose:  30 mg


Epoetin Vamsi (Procrit)  10,000 unit IV MWF [facility]


   Last Admin: 03/14/18 11:18 Dose:  10,000 unit


Ferrous Gluconate (Fergon)  324 mg PO TID [facility]


   Last Admin: 03/14/18 13:19 Dose:  324 mg


Heparin Sodium (Porcine) (Heparin)  1,000 units IVP MWF [facility]


   Last Admin: 03/14/18 11:40 Dose:  3.9 units


Hydromorphone HCl (Dilaudid)  0.5 mg IVP Q6H PRN


   PRN Reason: Pain, moderate (4-7)


Vancomycin HCl 1 gm/ Sodium (Chloride)  200 mls @ 166.7 mls/hr IVPB MWF [facility]


   PRN Reason: Protocol


   Last Admin: 03/14/18 13:24 Dose:  166.7 mls/hr


Aztreonam 1 gm/ Sodium (Chloride)  100 mls @ 100 mls/hr IVPB [facility]


   PRN Reason: Protocol


   Stop: 03/15/18 14:54


Insulin Aspart (Novolog)  0 unit SC Q6 [facility]


   PRN Reason: Protocol


   Last Admin: 03/14/18 12:18 Dose:  4 unit


Insulin Glargine (Lantus)  5 unit SC HS [facility]


   Last Admin: 03/13/18 22:25 Dose:  5 u


Methylprednisolone (Solu-Medrol)  40 mg IVP DAILY [facility]


   Stop: 03/15/18 10:01


   Last Admin: 03/14/18 09:18 Dose:  40 mg


Metoprolol Tartrate (Lopressor)  25 mg PO BID [facility]


   Last Admin: 03/14/18 09:18 Dose:  25 mg


Pantoprazole Sodium (Protonix Inj)  40 mg IVP DAILY [facility]


   Last Admin: 03/14/18 09:18 Dose:  40 mg


Paricalcitol (Zemplar)  2 mcg IV MWF [facility]


   Last Admin: 03/14/18 11:55 Dose:  2 mcg


Rosuvastatin Calcium (Crestor)  5 mg PO HS [facility]


   Last Admin: 03/13/18 21:38 Dose:  Not Given


Sennosides (Senokot Tab)  8.6 mg NG DAILY [facility]


   Last Admin: 03/14/18 09:20 Dose:  8.6 mg


Sevelamer Carbonate (Renvela)  800 mg PO TIDCC [facility]


   Last Admin: 03/14/18 13:02 Dose:  Not Given


Vitamin B Complex/Vit C/Folic Acid (Nephro-Nneka)  1 tab PO 0800 [facility]


   Last Admin: 03/14/18 07:50 Dose:  1 tab


Warfarin Sodium (Coumadin)  5 mg PO 1800 [facility]


   Stop: 03/14/18 18:01











- Labs


Labs: 


 





 03/14/18 06:10 





 03/14/18 06:10 





 











PT  19.2 SECONDS (9.7-12.2)  H  03/14/18  06:10    


 


INR  1.7   03/14/18  06:10    


 


APTT  27 SECONDS (21-34)  D 03/13/18  10:19

## 2018-03-14 NOTE — CP.PCM.PN
Subjective





- Date & Time of Evaluation


Date of Evaluation: 03/14/18


Time of Evaluation: 03:10





- Subjective


Subjective: 





dictated





Objective





- Vital Signs/Intake and Output


Vital Signs (last 24 hours): 


 











Temp Pulse Resp BP Pulse Ox


 


 97.8 F   112 H  21   117/64   100 


 


 03/14/18 20:00  03/14/18 19:59  03/14/18 19:59  03/14/18 19:59  03/14/18 20:00








Intake and Output: 


 











 03/14/18 03/15/18





 18:59 06:59


 


Intake Total 412.9 11.3


 


Output Total 0 0


 


Balance 412.9 11.3














- Medications


Medications: 


 Current Medications





Albuterol/Ipratropium (Duoneb 3 Mg/0.5 Mg (3 Ml) Ud)  3 ml INH RQ4 [facility]


   Last Admin: 03/14/18 19:15 Dose:  3 ml


Aspirin (Aspirin Chewable)  81 mg PO DAILY [facility]


   Last Admin: 03/14/18 09:18 Dose:  81 mg


Diltiazem HCl (Cardizem)  30 mg PO Q6H [facility]


   Last Admin: 03/14/18 18:53 Dose:  Not Given


Epoetin Vamsi (Procrit)  10,000 unit IV MWF [facility]


   Last Admin: 03/14/18 11:18 Dose:  10,000 unit


Ferrous Gluconate (Fergon)  324 mg PO TID [facility]


   Last Admin: 03/14/18 18:05 Dose:  Not Given


Heparin Sodium (Porcine) (Heparin)  1,000 units IVP [facility]


   Last Admin: 03/14/18 11:40 Dose:  3.9 units


Hydromorphone HCl (Dilaudid)  0.5 mg IVP Q6H [facility]


   PRN Reason: Pain, moderate (4-7)


Vancomycin HCl 1 gm/ Sodium (Chloride)  200 mls @ 166.7 mls/hr IVPB MWF [facility]


   PRN Reason: Protocol


   Last Admin: 03/14/18 13:24 Dose:  166.7 mls/hr


Aztreonam 1 gm/ Sodium (Chloride)  100 mls @ 100 mls/hr IVPB [facility]


   PRN Reason: Protocol


   Stop: 03/15/18 14:54


Norepinephrine Bitartrate 4 mg (/ Sodium Chloride)  254 mls @ 15.24 mls/hr IV 

.[id number] PRN; Protocol; 4 MCG/MIN


   PRN Reason: TITRATE PER MD ORDER


   Last Titration: 03/14/18 18:35 Dose:  2 mcg/min, 7.62 mls/hr


Insulin Aspart (Novolog)  0 unit SC Q6 [facility]


   PRN Reason: Protocol


   Last Admin: 03/14/18 17:55 Dose:  6 unit


Insulin Glargine (Lantus)  5 unit SC [facility]


   Last Admin: 03/13/18 22:25 Dose:  5 u


Methylprednisolone (Solu-Medrol)  40 mg IVP DAILY [facility]


   Stop: 03/15/18 10:01


   Last Admin: 03/14/18 09:18 Dose:  40 mg


Metoprolol Tartrate (Lopressor)  25 mg PO BID [facility]


   Last Admin: 03/14/18 17:54 Dose:  Not Given


Pantoprazole Sodium (Protonix Inj)  40 mg IVP DAILY [facility]


   Last Admin: 03/14/18 09:18 Dose:  40 mg


Paricalcitol (Zemplar)  2 mcg IV MWF [facility]


   Last Admin: 03/14/18 11:55 Dose:  2 mcg


Rosuvastatin Calcium (Crestor)  5 mg PO [facility]


   Last Admin: 03/13/18 21:38 Dose:  Not Given


Sennosides (Senokot Tab)  8.6 mg NG DAILY [facility]


   Last Admin: 03/14/18 09:20 Dose:  8.6 mg


Sevelamer Carbonate (Renvela)  800 mg PO TIDCC [facility]


   Last Admin: 03/14/18 18:03 Dose:  Not Given


Vitamin B Complex/Vit C/Folic Acid (Nephro-Nneka)  1 tab PO 0800 [facility]


   Last Admin: 03/14/18 07:50 Dose:  1 tab











- Labs


Labs: 


 





 03/14/18 06:10 





 03/14/18 06:10 





 











PT  19.2 SECONDS (9.7-12.2)  H  03/14/18  06:10    


 


INR  1.7   03/14/18  06:10    


 


APTT  27 SECONDS (21-34)  D 03/13/18  10:19

## 2018-03-15 LAB
ALBUMIN SERPL-MCNC: 3 G/DL (ref 3.5–5)
ALBUMIN/GLOB SERPL: 1 {RATIO} (ref 1–2.1)
ALT SERPL-CCNC: 214 U/L (ref 9–52)
ANISOCYTOSIS BLD QL SMEAR: SLIGHT
APTT BLD: 29 SECONDS (ref 21–34)
AST SERPL-CCNC: 29 U/L (ref 14–36)
BASOPHILS # BLD AUTO: 0 K/UL (ref 0–0.2)
BASOPHILS NFR BLD: 0.1 % (ref 0–2)
BUN SERPL-MCNC: 95 MG/DL (ref 7–17)
BURR CELLS BLD QL SMEAR: (no result)
CALCIUM SERPL-MCNC: 8.7 MG/DL (ref 8.6–10.4)
EOSINOPHIL # BLD AUTO: 0 K/UL (ref 0–0.7)
EOSINOPHIL NFR BLD: 0 % (ref 0–4)
ERYTHROCYTE [DISTWIDTH] IN BLOOD BY AUTOMATED COUNT: 17.7 % (ref 11.5–14.5)
GFR NON-AFRICAN AMERICAN: 8
HGB BLD-MCNC: 9.2 G/DL (ref 11–16)
HYPOCHROMIC: SLIGHT
INR PPP: 2.4
LG PLATELETS BLD QL SMEAR: PRESENT
LYMPHOCYTE: 1 % (ref 20–40)
LYMPHOCYTES # BLD AUTO: 0.2 K/UL (ref 1–4.3)
LYMPHOCYTES NFR BLD AUTO: 0.8 % (ref 20–40)
MCH RBC QN AUTO: 27.7 PG (ref 27–31)
MCHC RBC AUTO-ENTMCNC: 31.8 G/DL (ref 33–37)
MCV RBC AUTO: 87 FL (ref 81–99)
MONOCYTE: 1 % (ref 0–10)
MONOCYTES # BLD: 0.5 K/UL (ref 0–0.8)
MONOCYTES NFR BLD: 2.2 % (ref 0–10)
NEUTROPHILS # BLD: 23.3 K/UL (ref 1.8–7)
NEUTROPHILS NFR BLD AUTO: 96.9 % (ref 50–75)
NEUTROPHILS NFR BLD AUTO: 98 % (ref 50–75)
NRBC BLD AUTO-RTO: 0.3 % (ref 0–2)
OVALOCYTES BLD QL SMEAR: SLIGHT
PLATELET # BLD EST: NORMAL 10*3/UL
PLATELET # BLD: 228 K/UL (ref 130–400)
PMV BLD AUTO: 9.7 FL (ref 7.2–11.7)
POIKILOCYTOSIS BLD QL SMEAR: SLIGHT
PROTHROMBIN TIME: 28.9 SECONDS (ref 9.7–12.2)
RBC # BLD AUTO: 3.34 MIL/UL (ref 3.8–5.2)
TARGETS BLD QL SMEAR: SLIGHT
TOTAL CELLS COUNTED BLD: 100
WBC # BLD AUTO: 24 K/UL (ref 4.8–10.8)

## 2018-03-15 RX ADMIN — Medication SCH TAB: at 08:12

## 2018-03-15 RX ADMIN — INSULIN GLARGINE SCH U: 100 INJECTION, SOLUTION SUBCUTANEOUS at 21:24

## 2018-03-15 RX ADMIN — INSULIN ASPART SCH UNIT: 100 INJECTION, SOLUTION INTRAVENOUS; SUBCUTANEOUS at 05:43

## 2018-03-15 RX ADMIN — INSULIN ASPART SCH UNIT: 100 INJECTION, SOLUTION INTRAVENOUS; SUBCUTANEOUS at 17:09

## 2018-03-15 RX ADMIN — METHYLPREDNISOLONE SODIUM SUCCINATE SCH MG: 40 INJECTION, POWDER, FOR SOLUTION INTRAMUSCULAR; INTRAVENOUS at 09:20

## 2018-03-15 RX ADMIN — IPRATROPIUM BROMIDE AND ALBUTEROL SULFATE SCH: .5; 3 SOLUTION RESPIRATORY (INHALATION) at 11:12

## 2018-03-15 RX ADMIN — IPRATROPIUM BROMIDE AND ALBUTEROL SULFATE SCH ML: .5; 3 SOLUTION RESPIRATORY (INHALATION) at 23:37

## 2018-03-15 RX ADMIN — INSULIN ASPART SCH UNIT: 100 INJECTION, SOLUTION INTRAVENOUS; SUBCUTANEOUS at 12:07

## 2018-03-15 RX ADMIN — IPRATROPIUM BROMIDE AND ALBUTEROL SULFATE SCH ML: .5; 3 SOLUTION RESPIRATORY (INHALATION) at 19:20

## 2018-03-15 RX ADMIN — IPRATROPIUM BROMIDE AND ALBUTEROL SULFATE SCH ML: .5; 3 SOLUTION RESPIRATORY (INHALATION) at 00:06

## 2018-03-15 RX ADMIN — INSULIN ASPART SCH: 100 INJECTION, SOLUTION INTRAVENOUS; SUBCUTANEOUS at 00:25

## 2018-03-15 RX ADMIN — IPRATROPIUM BROMIDE AND ALBUTEROL SULFATE SCH ML: .5; 3 SOLUTION RESPIRATORY (INHALATION) at 07:54

## 2018-03-15 RX ADMIN — IPRATROPIUM BROMIDE AND ALBUTEROL SULFATE SCH: .5; 3 SOLUTION RESPIRATORY (INHALATION) at 04:00

## 2018-03-15 RX ADMIN — IPRATROPIUM BROMIDE AND ALBUTEROL SULFATE SCH: .5; 3 SOLUTION RESPIRATORY (INHALATION) at 16:00

## 2018-03-15 NOTE — CP.PCM.PN
Subjective





- Date & Time of Evaluation


Date of Evaluation: 03/15/18


Time of Evaluation: 02:00





- Subjective


Subjective: 





dictated





Objective





- Vital Signs/Intake and Output


Vital Signs (last 24 hours): 


 











Temp Pulse Resp BP Pulse Ox


 


 97.4 F L  100 H  20   97/59 L  100 


 


 03/15/18 18:27  03/15/18 18:27  03/15/18 18:27  03/15/18 18:27  03/15/18 18:27








Intake and Output: 


 











 03/15/18 03/15/18





 06:59 18:59


 


Intake Total 56.7 775


 


Output Total 0 0


 


Balance 56.7 775














- Medications


Medications: 


 Current Medications





Albuterol/Ipratropium (Duoneb 3 Mg/0.5 Mg (3 Ml) Ud)  3 ml INH RQ4 [facility]


   Last Admin: 03/15/18 11:12 Dose:  Not Given


Aspirin (Aspirin Chewable)  81 mg PO DAILY [facility]


   Last Admin: 03/15/18 09:20 Dose:  81 mg


Diltiazem HCl (Cardizem)  30 mg PO Q6H [facility]


   Last Admin: 03/15/18 18:29 Dose:  Not Given


Epoetin Vamsi (Procrit)  12,000 unit IV TTS [facility]


Ferrous Gluconate (Fergon)  324 mg PO TID [facility]


   Last Admin: 03/15/18 17:09 Dose:  324 mg


Heparin Sodium (Porcine) (Heparin)  1,000 units IVP TTS [facility]


Hydromorphone HCl (Dilaudid)  0.5 mg IVP Q6H [facility]


   PRN Reason: Pain, moderate (4-7)


Vancomycin/Sodium Chloride (Vancomycin 1 Gm/Ns 200 Ml)  1 gm in 200 mls @ 

133.333 mls/hr IVPB [facility]


   PRN Reason: Protocol


   Stop: 03/22/18 10:01


Aztreonam 1 gm/ Sodium (Chloride)  100 mls @ 100 mls/hr IVPB DAILY [facility]


   PRN Reason: Protocol


Insulin Aspart (Novolog)  0 unit SC Q6 [facility]


   PRN Reason: Protocol


   Last Admin: 03/15/18 17:09 Dose:  6 unit


Insulin Glargine (Lantus)  5 unit SC HS [facility]


   Last Admin: 03/14/18 21:32 Dose:  5 u


Metoprolol Tartrate (Lopressor)  25 mg PO BID [facility]


   Last Admin: 03/15/18 17:06 Dose:  25 mg


Midodrine (Proamatine)  10 mg PO TTS [facility]


Midodrine (Proamatine)  10 mg PO TTS [facility]


Pantoprazole Sodium (Protonix Inj)  40 mg IVP DAILY [facility]


   Last Admin: 03/15/18 09:20 Dose:  40 mg


Paricalcitol (Zemplar)  2 mcg IV TTS [facility]


Rosuvastatin Calcium (Crestor)  5 mg PO HS [facility]


   Last Admin: 03/14/18 21:32 Dose:  5 mg


Sennosides (Senokot Tab)  8.6 mg NG DAILY [facility]


   Last Admin: 03/15/18 09:21 Dose:  8.6 mg


Sevelamer Carbonate (Renvela)  800 mg PO TIDCC [facility]


   Last Admin: 03/15/18 17:06 Dose:  800 mg


Vitamin B Complex/Vit C/Folic Acid (Nephro-Nneka)  1 tab PO 0800 [facility]


   Last Admin: 03/15/18 08:12 Dose:  1 tab











- Labs


Labs: 


 





 03/15/18 06:12 





 03/15/18 06:10 





 











PT  28.9 SECONDS (9.7-12.2)  H D 03/15/18  08:24    


 


INR  2.4  D 03/15/18  08:24    


 


APTT  29 SECONDS (21-34)   03/15/18  08:24

## 2018-03-15 NOTE — PN
DATE:  03/14/2018



SUBJECTIVE:  She still is in ICU and the patient offers no complaints.



OBJECTIVE:

VITAL SIGNS:  T-max is 97.8, heart rate of 112, blood pressure 117/64,

respirations are 21.

GENERAL:  She has been off the vent, remains poorly responsive, has a

dialysis catheter on the left side now and a triple lumen on the right

side.

LUNGS:  Have coarse breath sounds.

HEART:  S1, S2 is tachycardic.

ABDOMEN:  Soft, nontender.  No guarding, no rigidity present.

EXTREMITIES:  Have bilateral boots present.



LABORATORY DATA:  Her white count is 18.8 today, hemoglobin 8.9, hematocrit

28, platelet count is 214, neutrophils are 95.  White count was 25.9

yesterday, it is coming down and chemistry shows she is still with BUN of

98, creatinine 5.5.  She will be on dialysis.  Micro cultures, sputum grew

gram-negative rods, ID and sensitivity is pending.  Catheter tip which was

removed is negative so far, so she has been on Azactam and vancomycin at

this time of the report.



ASSESSMENT AND PLAN:  She had chest x-ray done today, results of which is

pending at this time.  We will monitor that x-ray report and she also had a

dialysis catheter placed yesterday and this time, I have continued Azactam

1 g daily as she is dependent on dialysis now and she is still on steroids

which are being tapered slowly and she still is on norepinephrine and on

vancomycin, Monday, Wednesday, Friday.  We will follow chest x-ray report

and white blood cell count and we are awaiting for the sputum culture and

sensitivity to see if it is sensitive to Azactam as she is allergic to

penicillin.  We will follow and follow with the intensive care unit team.





__________________________________________

Mahnaz Tate MD





DD:  03/14/2018 21:04:31

DT:  03/14/2018 21:06:35

Job # [id number]

## 2018-03-15 NOTE — CP.PCM.PN
Subjective





- Date & Time of Evaluation


Date of Evaluation: 03/15/18


Time of Evaluation: 14:17





- Subjective


Subjective: 





Nephrology Consultation:





Assessment: stable


CKD stage 5 (N18.5) with hyperkalemia, acidosis now has ESRD on HD via 

permacath TTS 


pulm edema, CHF, NSTEMI, pleural effusions, pneumonia


AMS, hyperglycemia


s/p cardiac arrest, shock liver, A fib





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 


Hypertensive Chronic Kidney Disease (I12.9)


Anemia (D64.9), Hyperphosphatemia (E83.39), Secondary Hyperparathyroidism (E21.1

), HTN (I12.9), vit D insuff, hx of colon CA, TIAs





Plan


HD Today as ordered as per TTS schedule. 


maintain hemodynamic stable. Patient not on ACEI/ARB due to low BP. on 

midodrine prior to HD


started iron supplements, MVI, epogen with HD, and zemplar 2 mcg with HD.


started on phos binders as renvela





Dose meds/antibiotics for ESRD/HD status. Avoid fleets enema/magnesium based 

laxatives. Avoid nephrotoxins/NSAIDs


Glycemic control


Further work up/management as per primary team


 consult for outpatient dialysis placement.





Thanks for allowing me to participate in care of your patient. Will follow 

patient with you. Please call if any Qs.


Dr Shad Panchal


Office: [phone] Cell: [phone] Fax: [phone]





Chief Complaint; unable to obtain


reason for consult: CKD management


source of Info: EMR


HPI: Pt is a 77 F with hx of diabetes Mellitus ( years), hypertension (years), 

CHF, TIA, Colon CA, CKD stage 4/5, has matured AVG in left arm presented with 

complaints of AMS and hyperglycemia as brought by family. now has elevated trop 

100, hyperkalemia, AMS and elevated sugar, pulm edema. renal consult for CKD 

management. 


pt with AMS unable to provide any hx. she had received medical management for 

hyperkalemia. 





ROS: unable to obtain





Physical Examination: 


General Appearance: ill appearing, on O2 


Vitals reviewed and noted as below


Head; Atraumatic, normocephalic


ENT: On O2 


EYES: PERRLA


Neck; supple no lymphadenopathy, no thyromegaly or bruit


Lungs: Normal respiratory rate/effort. Breath sounds bilateral clear, she is 

mechanically ventilated


Heart: Increased rate. s1s2 normal. No rub or gallop. 


Extremities: no edema. No varicose veins. chronic hyperpigmented changes in legs


Neurological: Patient is  awake follows commands, Not much communicative 

verbally


Skin: Warm and dry. Normal turgor. No rash. Palpitation: Normal elasticity for 

age


Abdomen: Abdomen is soft. Bowel sounds +. There is no abdominal tenderness, no 

guarding/rigidity no organomegaly


Psych: unable


MSK: no joint tenderness or swelling. Digits and nails normal, no deformity


: kidney or bladder not palpable


Access: Left AVG without bruit. has left permacath





Labs/imaging reviewed.


Past medical history, past surgical history, family history, social history, 

allergy reviewed and noted as below


Family hx: no hx of CKD. Rest non-contributory





Objective





- Vital Signs/Intake and Output


Vital Signs (last 24 hours): 


 











Temp Pulse Resp BP Pulse Ox


 


 96.6 F L  116 H  20   101/44 L  100 


 


 03/15/18 13:10  03/15/18 13:10  03/15/18 13:10  03/15/18 13:10  03/15/18 13:10








Intake and Output: 


 











 03/15/18 03/15/18





 06:59 18:59


 


Intake Total 56.7 345


 


Output Total 0 


 


Balance 56.7 345














- Medications


Medications: 


 Current Medications





Albuterol/Ipratropium (Duoneb 3 Mg/0.5 Mg (3 Ml) Ud)  3 ml INH RQ4 [facility]


   Last Admin: 03/15/18 11:12 Dose:  Not Given


Aspirin (Aspirin Chewable)  81 mg PO DAILY [facility]


   Last Admin: 03/15/18 09:20 Dose:  81 mg


Diltiazem HCl (Cardizem)  30 mg PO Q6H [facility]


   Last Admin: 03/15/18 13:49 Dose:  30 mg


Epoetin Vamsi (Procrit)  12,000 unit IV TTS [facility]


Ferrous Gluconate (Fergon)  324 mg PO TID [facility]


   Last Admin: 03/15/18 13:49 Dose:  324 mg


Heparin Sodium (Porcine) (Heparin)  1,000 units IVP TTS [facility]


Hydromorphone HCl (Dilaudid)  0.5 mg IVP Q6H [facility]


   PRN Reason: Pain, moderate (4-7)


Vancomycin/Sodium Chloride (Vancomycin 1 Gm/Ns 200 Ml)  1 gm in 200 mls @ 

133.333 mls/hr IVPB [facility]TS [facility]


   PRN Reason: Protocol


   Stop: 03/22/18 10:01


Aztreonam 1 gm/ Sodium (Chloride)  100 mls @ 100 mls/hr IVPB [facility]TS [facility]


   PRN Reason: Protocol


Aztreonam 1 gm/ Sodium (Chloride)  100 mls @ 100 mls/hr IVPB [facility]


   PRN Reason: Protocol


   Stop: 03/15/18 15:59


Insulin Aspart (Novolog)  0 unit SC Q6 [facility]


   PRN Reason: Protocol


   Last Admin: 03/15/18 12:07 Dose:  2 unit


Insulin Glargine (Lantus)  5 unit SC HS [facility]


   Last Admin: 03/14/18 21:32 Dose:  5 u


Metoprolol Tartrate (Lopressor)  25 mg PO BID [facility]


   Last Admin: 03/15/18 09:22 Dose:  Not Given


Midodrine (Proamatine)  10 mg PO TTS [facility]


Pantoprazole Sodium (Protonix Inj)  40 mg IVP DAILY [facility]


   Last Admin: 03/15/18 09:20 Dose:  40 mg


Paricalcitol (Zemplar)  2 mcg IV TTS [facility]


Rosuvastatin Calcium (Crestor)  5 mg PO HS [facility]


   Last Admin: 03/14/18 21:32 Dose:  5 mg


Sennosides (Senokot Tab)  8.6 mg NG DAILY [facility]


   Last Admin: 03/15/18 09:21 Dose:  8.6 mg


Sevelamer Carbonate (Renvela)  800 mg PO TIDCC [facility]


   Last Admin: 03/15/18 12:08 Dose:  800 mg


Vitamin B Complex/Vit C/Folic Acid (Nephro-Nneka)  1 tab PO 0800 [facility]


   Last Admin: 03/15/18 08:12 Dose:  1 tab


Warfarin Sodium (Coumadin)  2.5 mg PO 1800 [facility]


   Stop: 03/15/18 18:01











- Labs


Labs: 


 





 03/15/18 06:12 





 03/15/18 06:10 





 











PT  28.9 SECONDS (9.7-12.2)  H D 03/15/18  08:24    


 


INR  2.4  D 03/15/18  08:24    


 


APTT  29 SECONDS (21-34)   03/15/18  08:24

## 2018-03-15 NOTE — CP.PCM.PN
Subjective





- Date & Time of Evaluation


Date of Evaluation: 03/15/18


Time of Evaluation: 11:45





- Subjective


Subjective: 


Patient was seen and examined. getting dialysis. Tolerating ok.Awake and  

Responsive. denies pain


She became hypotensive and tachycardic during dialysis yesterday








Objective





- Vital Signs/Intake and Output


Vital Signs (last 24 hours): 


 











Temp Pulse Resp BP Pulse Ox


 


 97.4 F L  118 H  20   111/61   100 


 


 03/15/18 09:40  03/15/18 10:59  03/15/18 10:59  03/15/18 11:10  03/15/18 10:30








Intake and Output: 


 











 03/15/18 03/15/18





 06:59 18:59


 


Intake Total 56.7 260


 


Output Total 0 


 


Balance 56.7 260














- Medications


Medications: 


 Current Medications





Albuterol/Ipratropium (Duoneb 3 Mg/0.5 Mg (3 Ml) Ud)  3 ml INH RQ4 [facility]


   Last Admin: 03/15/18 11:12 Dose:  Not Given


Aspirin (Aspirin Chewable)  81 mg PO DAILY [facility]


   Last Admin: 03/15/18 09:20 Dose:  81 mg


Diltiazem HCl (Cardizem)  30 mg PO Q6H [facility]


   Last Admin: 03/15/18 07:03 Dose:  30 mg


Epoetin Vamsi (Procrit)  10,000 unit IV [facility]


   Last Admin: 03/14/18 11:18 Dose:  10,000 unit


Ferrous Gluconate (Fergon)  324 mg PO TID [facility]


   Last Admin: 03/15/18 09:21 Dose:  324 mg


Heparin Sodium (Porcine) (Heparin)  1,000 units IVP [facility]


   Last Admin: 03/14/18 11:40 Dose:  3.9 units


Hydromorphone HCl (Dilaudid)  0.5 mg IVP Q6H [facility]


   PRN Reason: Pain, moderate (4-7)


Vancomycin HCl 1 gm/ Sodium (Chloride)  200 mls @ 166.7 mls/hr IVPB [facility]


   PRN Reason: Protocol


   Last Admin: 03/14/18 13:24 Dose:  166.7 mls/hr


Aztreonam 1 gm/ Sodium (Chloride)  [facility]


   PRN Reason: Protocol


   Stop: 03/15/18 14:54


Norepinephrine Bitartrate 4 mg (/ Sodium Chloride)  254 mls @ 15.24 mls/hr IV 

[id number] PRN; Protocol; 4 MCG/MIN


   PRN Reason: TITRATE PER MD ORDER


   Last Titration: 03/14/18 20:00 Dose:  1 mcg/min, 3.81 mls/hr


Insulin Aspart (Novolog)  0 unit SC Q6 [facility]


   PRN Reason: Protocol


   Last Admin: 03/15/18 05:43 Dose:  6 unit


Insulin Glargine (Lantus)  5 unit SC [facility]


   Last Admin: 03/14/18 21:32 Dose:  5 u


Metoprolol Tartrate (Lopressor)  25 mg PO BID [facility]


   Last Admin: 03/15/18 09:22 Dose:  Not Given


Midodrine (Proamatine)  10 mg PO TID [facility]


Pantoprazole Sodium (Protonix Inj)  40 mg IVP DAILY [facility]


   Last Admin: 03/15/18 09:20 Dose:  40 mg


Paricalcitol (Zemplar)  2 mcg IV MWF [facility]


   Last Admin: 03/14/18 11:55 Dose:  2 mcg


Rosuvastatin Calcium (Crestor)  5 mg PO [facility]


   Last Admin: 03/14/18 21:32 Dose:  5 mg


Sennosides (Senokot Tab)  8.6 mg NG DAILY [facility]


   Last Admin: 03/15/18 09:21 Dose:  8.6 mg


Sevelamer Carbonate (Renvela)  800 mg PO TIDCC [facility]


   Last Admin: 03/15/18 08:12 Dose:  800 mg


Vitamin B Complex/Vit C/Folic Acid (Nephro-Nneka)  1 tab PO 0800 [facility]


   Last Admin: 03/15/18 08:12 Dose:  1 tab


Warfarin Sodium (Coumadin)  2.5 mg PO 1800 [facility]


   Stop: 03/15/18 18:01











- Labs


Labs: 


 





 03/15/18 06:12 





 03/15/18 06:10 





 











PT  28.9 SECONDS (9.7-12.2)  H D 03/15/18  08:24    


 


INR  2.4  D 03/15/18  08:24    


 


APTT  29 SECONDS (21-34)   03/15/18  08:24    














- Constitutional


Appears: No Acute Distress, Chronically Ill





- Head Exam


Head Exam: NORMAL INSPECTION





- Eye Exam


Eye Exam: Normal appearance





- ENT Exam


ENT Exam: Mucous Membranes Moist





- Neck Exam


Neck Exam: Full ROM





- Respiratory Exam


Respiratory Exam: Clear to Ausculation Bilateral, NORMAL BREATHING PATTERN





- Cardiovascular Exam


Cardiovascular Exam: Tachycardia, Irregular Rhythm





- GI/Abdominal Exam


GI & Abdominal Exam: Normal Bowel Sounds





- Extremities Exam


Extremities Exam: Full ROM, Normal Inspection





- Back Exam


Back Exam: NORMAL INSPECTION





- Neurological Exam


Neurological Exam: Awake, Oriented x3





- Psychiatric Exam


Psychiatric exam: Normal Affect, Normal Mood





- Skin


Skin Exam: Dry, Erythema, Intact





Assessment and Plan





- Assessment and Plan (Free Text)


Assessment: 





This is a 77 year old female with a history of ESRD now getting HD, CHF, DM, HTN

, hypercholesterolemia, and some sort of MI years before, repeated TIAs, and 

colon CA. She was brought in by family for altered mental status and ultimately 

found to have severe metabolic acidosis as well as a extremely elevated 

troponins. S/P cardiac arrest,VT,Vifb and s/p extubation.Afib on coumadin





Plan: 








1 Acute NSTEMI ,  Asystol ,CPR / Sustained VT


3/14 --Continue asprin,cardizem and metoprolol. On coumadin for afib.INR 2.4,

Reduce the dose of coumadin to 2.5mg tonight. follow INR


3/12: Heparin drip discontinued


3/10: I had long conversation with POA Kristel Corral and per the POA the patient 

indicated to them no cardiac catherization. 


I explained to POA that considering the weakened heart function it may prove 

very difficult to successfully extubate patient.


At this moment she remains on heparin ggt, getting HD, and also weaning trials


3/9: Cardiac catherization was canceled


3/8: Potentially cardiac catherization shortly from now. Remains on heparin ggt


3/7: atrial fibrillation appearance on telemetry, check a new 12 lead EKG. 

Remains on heparin ggt.


3/6: Remains in heparin ggt, statin, ASA. Hopefully at some point can have 

cardiac catherization. 


3/5: Now off pressor medications. She remains on amiodarone   


Echo EF 15 to 20%. 








2 Respiratory failure, intubation


3/5-Doing well 


3/13 Saturating well on nasal cannula 4l


3/12 Weaning trial.chest xray looks better. Just switched to CPAP


3/11: She may end up needing a trach and or PEG if she is not able to be 

successfully extubated


3/10: Again became tachycardic at night and was switched from PS/CPAP settings 

back to PRVC. Family is aware that it will be difficult to extubate


3/9: Back on PRVC after having tachy cardia


3/8: Has tolerated CPAP settings throughout the night


3/7: Yesterday afternoon she was moved back to PRVC


3/6: Now changed to CPAP/PS settings. The chest XRAYs look better this morning 

following HD last night.





3 Atrial Fibrillation 


3/14-She is in afib . Patient has CHADS-VA score 8. has high CHADS score . INR  

today 2.4  Coumadin 2.5mg tonight  


        


3/10: remains in atrial fibrillation. On heparin and loppressor BID


3/9: Continue on heparin ggt, Lopressor increased to 25 BID





4 Acute renal failure on chronic renal disease/Now ESRD on HD


3/15-gettin dialysis today. yesterday she was hypotensive and tachy. had only 

500ml removed


3/13-s/p L IJpermacath.  left shiley removed


3/12-HD today


3/8: Toleratated HD well yesterday


3/7: Currently MWF HD


3/6: Underwent HD yesterday and tolerated it well. CXRAY looks better


3/5/2018: She has had one session of HD so far. Axel another session today





5 Leukocytosis concerning for sepsis


 3/14-Continue antibiotics.seen by Dr luna yesterday. Has high procalcitonin 

6.1. WBC is  high /18 today


3/13 on Aztreonam ,vanco and flagyl.follow ID


          cultures negative


3/6: Today decreased WBC. Cultures negative so far


3/5/2018: The elevated WBC maybe stress related.  


She remains on abx Aztreonam IV and Flagyl IV





6 Acute systolic heart failure


   Patient refuses cardiac cath. has high risk for arrhythmia and poor prognosis


   Family aware of her condition


   lying without sob


3/6: Monitor chest XRAYs. 


  ECHO shows EF 15 to 20%


  follow cardio





7 Pleural effusion


3/8: Still present


3/6: Improving CXRAYs


3/5/2018: Pleural effusions minimal at this time. Continue to monitor





8 Hyperglycemia and uncontrolled DM


3/6: Recent accucheks 130s, 130, 160





9 Anemia of chronic disease, CKD


   3/6: Yesterday had 1 unit of PRBC given during HD, receiving EPO





10.DVT prophylaxis on heparin and GI prophylaxis on protonix


     Gi consult requested for peg.Getting NG feeding

## 2018-03-15 NOTE — CP.CCUPN
<Kimberly Yao - Last Filed: 03/15/18 17:29>





CCU Subjective





- Physician Review


Subjective (Free Text): 





03/15/18 17:14





Patient seen and examined at bedside this morning. Per nursing no acute events 

overnight. Yesterday patient was hypotensive with SBPs in the 60s, Levophed was 

started. Currently Patient is off levophed drip, BPs stable. Patient is non-

verbal but following simple commands. 








CCU Objective





- Vital Signs / Intake & Output


Vital Signs (Last 4 hours): 


Vital Signs











  Temp Pulse Resp BP Pulse Ox


 


 03/15/18 17:06     110/69 


 


 03/15/18 16:04   100 H  17  87/56 L  100


 


 03/15/18 16:00  96.4 F L  113 H  18   100


 


 03/15/18 15:30   108 H  17   100


 


 03/15/18 15:03   104 H  15  101/67  100


 


 03/15/18 15:02   101 H  18   100


 


 03/15/18 15:00   104 H  13   93 L


 


 03/15/18 14:30   107 H  19   88 L


 


 03/15/18 14:28   118 H  19  96/58 L  100


 


 03/15/18 14:00   123 H  20   100


 


 03/15/18 13:30   123 H  21   97


 


 03/15/18 13:29   124 H  17  118/64  100


 


 03/15/18 13:15   106 H  21  101/44 L 











Intake and Output (Last 8hrs): 


 Intake & Output











 03/15/18 03/15/18 03/15/18





 06:59 14:59 22:59


 


Intake Total 22.8 525 160


 


Output Total 0  


 


Balance 22.8 525 160


 


Weight  65.9 kg 


 


Intake:   


 


  Intake, IV Amount 22.8 100 100


 


    RT ij DISTAL PORT 22.8 0 


 


    Right Proximal Port  100 100





    Internal Jugular   


 


  Tube Feeding  175 60


 


  Other  250 


 


Output:   


 


  Urine 0  


 


    Urine, Voided 0  














- Physical Exam


Head: Positive for: Atraumatic, Normocephalic.  Negative for: Tenderness, 

Contusion, Swelling


Pupils: Positive for: PERRL.  Negative for: Sluggish, Non-Reactive


Extroacular Muscles: Positive for: EOMI


Conjunctiva: Positive for: Normal


Mouth: Positive for: Dry


Pharnyx: Positive for: Normal


Nose (External): Positive for: Atraumatic, Other (NGT)


Neck: Positive for: Trachea Midline, Other (R IJ central line, dialysis 

catheter on the left).  Negative for: JVD


Respiratory/Chest: Positive for: Good Air Exchange, Decreased Breath Sounds (on 

bilateral bases).  Negative for: Wheezes


Cardiovascular: Positive for: Irregular Rhythm, Tachycardic


Abdomen: Positive for: Normal Bowel Sounds.  Negative for: Peritoneal Signs


Upper Extremity: Negative for: Cyanosis, Edema


Lower Extremity: Negative for: Edema


Skin: Positive for: Warm, Dry


Psychiatric: Positive for: Alert





- Medications


Active Medications: 


Active Medications











Generic Name Dose Route Start Last Admin





  Trade Name Freq  PRN Reason Stop Dose Admin


 


Albuterol/Ipratropium  3 ml  03/07/18 16:00  03/15/18 11:12





  Duoneb 3 Mg/0.5 Mg (3 Ml) Ud  INH   Not Given





  RQ4 ABHILASH   


 


Aspirin  81 mg  03/02/18 10:00  03/15/18 09:20





  Aspirin Chewable  PO   81 mg





  DAILY ABHILASH   Administration


 


Diltiazem HCl  30 mg  03/13/18 07:00  03/15/18 13:49





  Cardizem  PO   30 mg





  Q6H ABHILASH   Administration


 


Epoetin Vamsi  12,000 unit  03/17/18 10:00  





  Procrit  IV   





  TTS [facility]   


 


Ferrous Gluconate  324 mg  03/03/18 10:00  03/15/18 17:09





  Fergon  PO   324 mg





  TID ABHILASH   Administration


 


Heparin Sodium (Porcine)  1,000 units  03/17/18 10:00  





  Heparin  IVP   





  TTS [facility]   


 


Hydromorphone HCl  0.5 mg  03/13/18 14:50  





  Dilaudid  IVP   





  Q6H PRN   





  Pain, moderate (4-7)   


 


Vancomycin/Sodium Chloride  1 gm in 200 mls @ 133.333 mls/hr  03/17/18 10:00  





  Vancomycin 1 Gm/Ns 200 Ml  IVPB  03/22/18 10:01  





  TTS [facility]   





  Protocol   


 


Aztreonam 1 gm/ Sodium  100 mls @ 100 mls/hr  03/17/18 10:00  





  Chloride  IVPB   





  TTS [facility]   





  Protocol   


 


Insulin Aspart  0 unit  03/04/18 11:49  03/15/18 17:09





  Novolog  SC   6 unit





  Q6 ABHILASH   Administration





  Protocol   


 


Insulin Glargine  5 unit  03/06/18 22:00  03/14/18 21:32





  Lantus  SC   5 u





  HS ABHILASH   Administration


 


Metoprolol Tartrate  25 mg  03/09/18 08:00  03/15/18 17:06





  Lopressor  PO   25 mg





  BID ABHILASH   Administration


 


Midodrine  10 mg  03/17/18 10:00  





  Proamatine  PO   





  TTS ABHILASH   


 


Pantoprazole Sodium  40 mg  03/13/18 10:00  03/15/18 09:20





  Protonix Inj  IVP   40 mg





  DAILY ABHILASH   Administration


 


Paricalcitol  2 mcg  03/17/18 10:00  





  Zemplar  IV   





  TTS ABHILASH   


 


Rosuvastatin Calcium  5 mg  03/02/18 01:15  03/14/18 21:32





  Crestor  PO   5 mg





  HS ABHILASH   Administration


 


Sennosides  8.6 mg  03/13/18 10:00  03/15/18 09:21





  Senokot Tab  NG   8.6 mg





  DAILY ABHILASH   Administration


 


Sevelamer Carbonate  800 mg  03/14/18 12:00  03/15/18 17:06





  Renvela  PO   800 mg





  TIDCC ABHILASH   Administration


 


Vitamin B Complex/Vit C/Folic Acid  1 tab  03/03/18 08:00  03/15/18 08:12





  Nephro-Nneka  PO   1 tab





  0800 ABHILASH   Administration


 


Warfarin Sodium  2.5 mg  03/15/18 18:00  03/15/18 17:06





  Coumadin  PO  03/15/18 18:01  2.5 mg





  1800 ABHILASH   Administration














- Patient Studies


Lab Studies: 


 Microbiology Studies











 03/13/18 11:30 Gram Stain - Final





 Trachasp Sputum Culture - Final





    Pseudomonas Aeruginosa


 


 03/13/18 12:18 Catheter Tip Culture - Final





 Catheter Tip    No Growth








 Lab Studies











  03/15/18 03/15/18 03/15/18 Range/Units





  17:07 11:16 08:24 


 


WBC     (4.8-10.8)  K/uL


 


RBC     (3.80-5.20)  Mil/uL


 


Hgb     (11.0-16.0)  g/dL


 


Hct     (34.0-47.0)  %


 


MCV     (81.0-99.0)  fL


 


MCH     (27.0-31.0)  pg


 


MCHC     (33.0-37.0)  g/dL


 


RDW     (11.5-14.5)  %


 


Plt Count     (130-400)  K/uL


 


MPV     (7.2-11.7)  fL


 


Neut % (Auto)     (50.0-75.0)  %


 


Lymph % (Auto)     (20.0-40.0)  %


 


Mono % (Auto)     (0.0-10.0)  %


 


Eos % (Auto)     (0.0-4.0)  %


 


Baso % (Auto)     (0.0-2.0)  %


 


Neut # (Auto)     (1.8-7.0)  K/uL


 


Lymph # (Auto)     (1.0-4.3)  K/uL


 


Mono # (Auto)     (0.0-0.8)  K/uL


 


Eos # (Auto)     (0.0-0.7)  K/uL


 


Baso # (Auto)     (0.0-0.2)  K/uL


 


Neutrophils % (Manual)     (50-75)  %


 


Lymphocytes % (Manual)     (20-40)  %


 


Monocytes % (Manual)     (0-10)  %


 


Platelet Estimate     (NORMAL)  


 


Large Platelets     


 


Hypochromasia (manual)     


 


Poikilocytosis (manual     


 


Anisocytosis (manual)     


 


Target Cells     


 


Ovalocytes     


 


Rebuck Cells     


 


PT    28.9 H D  (9.7-12.2)  SECONDS


 


INR    2.4  D  


 


APTT    29  (21-34)  SECONDS


 


Puncture Site     


 


pCO2     (35-45)  mm/Hg


 


pO2     ()  mm/Hg


 


HCO3     (21-28)  mmol/L


 


ABG pH     (7.35-7.45)  


 


ABG Total CO2     (22-28)  mmol/L


 


ABG O2 Saturation     (95-98)  %


 


ABG Base Excess     (-2.0-3.0)  mmol/L


 


Jesus Test     


 


ABG Potassium     (3.6-5.2)  mmol/L


 


A-a O2 Difference     mm/Hg


 


Respiratory Index     


 


Sodium     (132-148)  mmol/l


 


Chloride     ()  mmol/L


 


Glucose     ()  mg/dl


 


Lactate     (0.7-2.1)  mmol/L


 


FiO2     %


 


Potassium     (3.6-5.2)  mmol/L


 


Carbon Dioxide     (22-30)  mmol/L


 


Anion Gap     (10-20)  


 


BUN     (7-17)  mg/dL


 


Creatinine     (0.7-1.2)  mg/dL


 


Est GFR (African Amer)     


 


Est GFR (Non-Af Amer)     


 


POC Glucose (mg/dL)  251 H  173 H   ()  mg/dL


 


Random Glucose     ()  mg/dL


 


Calcium     (8.6-10.4)  mg/dl


 


Phosphorus     (2.5-4.5)  mg/dL


 


Magnesium     (1.6-2.3)  mg/dL


 


Total Bilirubin     (0.2-1.3)  mg/dL


 


AST     (14-36)  U/L


 


ALT     (9-52)  U/L


 


Alkaline Phosphatase     ()  U/L


 


Total Protein     (6.3-8.3)  g/dL


 


Albumin     (3.5-5.0)  g/dL


 


Globulin     (2.2-3.9)  gm/dL


 


Albumin/Globulin Ratio     (1.0-2.1)  


 


Arterial Blood Potassium     (3.6-5.2)  mmol/L














  03/15/18 03/15/18 03/15/18 Range/Units





  06:12 06:10 05:31 


 


WBC  24.0 H    (4.8-10.8)  K/uL


 


RBC  3.34 L    (3.80-5.20)  Mil/uL


 


Hgb  9.2 L    (11.0-16.0)  g/dL


 


Hct  29.1 L    (34.0-47.0)  %


 


MCV  87.0    (81.0-99.0)  fL


 


MCH  27.7    (27.0-31.0)  pg


 


MCHC  31.8 L    (33.0-37.0)  g/dL


 


RDW  17.7 H    (11.5-14.5)  %


 


Plt Count  228    (130-400)  K/uL


 


MPV  9.7    (7.2-11.7)  fL


 


Neut % (Auto)  96.9 H    (50.0-75.0)  %


 


Lymph % (Auto)  0.8 L    (20.0-40.0)  %


 


Mono % (Auto)  2.2    (0.0-10.0)  %


 


Eos % (Auto)  0.0    (0.0-4.0)  %


 


Baso % (Auto)  0.1    (0.0-2.0)  %


 


Neut # (Auto)  23.3 H    (1.8-7.0)  K/uL


 


Lymph # (Auto)  0.2 L    (1.0-4.3)  K/uL


 


Mono # (Auto)  0.5    (0.0-0.8)  K/uL


 


Eos # (Auto)  0.0    (0.0-0.7)  K/uL


 


Baso # (Auto)  0.0    (0.0-0.2)  K/uL


 


Neutrophils % (Manual)  98 H    (50-75)  %


 


Lymphocytes % (Manual)  1 L    (20-40)  %


 


Monocytes % (Manual)  1    (0-10)  %


 


Platelet Estimate  Normal    (NORMAL)  


 


Large Platelets  Present    


 


Hypochromasia (manual)  Slight    


 


Poikilocytosis (manual  Slight    


 


Anisocytosis (manual)  Slight    


 


Target Cells  Slight    


 


Ovalocytes  Slight    


 


Rebuck Cells  Moderate    


 


PT     (9.7-12.2)  SECONDS


 


INR     


 


APTT     (21-34)  SECONDS


 


Puncture Site     


 


pCO2     (35-45)  mm/Hg


 


pO2     ()  mm/Hg


 


HCO3     (21-28)  mmol/L


 


ABG pH     (7.35-7.45)  


 


ABG Total CO2     (22-28)  mmol/L


 


ABG O2 Saturation     (95-98)  %


 


ABG Base Excess     (-2.0-3.0)  mmol/L


 


Jesus Test     


 


ABG Potassium     (3.6-5.2)  mmol/L


 


A-a O2 Difference     mm/Hg


 


Respiratory Index     


 


Sodium   137   (132-148)  mmol/l


 


Chloride   98   ()  mmol/L


 


Glucose     ()  mg/dl


 


Lactate     (0.7-2.1)  mmol/L


 


FiO2     %


 


Potassium   4.9   (3.6-5.2)  mmol/L


 


Carbon Dioxide   19 L   (22-30)  mmol/L


 


Anion Gap   25 H   (10-20)  


 


BUN   95 H   (7-17)  mg/dL


 


Creatinine   5.1 H   (0.7-1.2)  mg/dL


 


Est GFR ( Amer)   10   


 


Est GFR (Non-Af Amer)   8   


 


POC Glucose (mg/dL)    273 H  ()  mg/dL


 


Random Glucose   236 H   ()  mg/dL


 


Calcium   8.7   (8.6-10.4)  mg/dl


 


Phosphorus   8.6 H   (2.5-4.5)  mg/dL


 


Magnesium   2.3   (1.6-2.3)  mg/dL


 


Total Bilirubin   0.8   (0.2-1.3)  mg/dL


 


AST   29   (14-36)  U/L


 


ALT   214 H   (9-52)  U/L


 


Alkaline Phosphatase   159 H   ()  U/L


 


Total Protein   6.0 L   (6.3-8.3)  g/dL


 


Albumin   3.0 L   (3.5-5.0)  g/dL


 


Globulin   3.0   (2.2-3.9)  gm/dL


 


Albumin/Globulin Ratio   1.0   (1.0-2.1)  


 


Arterial Blood Potassium     (3.6-5.2)  mmol/L














  03/15/18 03/14/18 03/14/18 Range/Units





  00:14 17:55 17:20 


 


WBC     (4.8-10.8)  K/uL


 


RBC     (3.80-5.20)  Mil/uL


 


Hgb     (11.0-16.0)  g/dL


 


Hct     (34.0-47.0)  %


 


MCV     (81.0-99.0)  fL


 


MCH     (27.0-31.0)  pg


 


MCHC     (33.0-37.0)  g/dL


 


RDW     (11.5-14.5)  %


 


Plt Count     (130-400)  K/uL


 


MPV     (7.2-11.7)  fL


 


Neut % (Auto)     (50.0-75.0)  %


 


Lymph % (Auto)     (20.0-40.0)  %


 


Mono % (Auto)     (0.0-10.0)  %


 


Eos % (Auto)     (0.0-4.0)  %


 


Baso % (Auto)     (0.0-2.0)  %


 


Neut # (Auto)     (1.8-7.0)  K/uL


 


Lymph # (Auto)     (1.0-4.3)  K/uL


 


Mono # (Auto)     (0.0-0.8)  K/uL


 


Eos # (Auto)     (0.0-0.7)  K/uL


 


Baso # (Auto)     (0.0-0.2)  K/uL


 


Neutrophils % (Manual)     (50-75)  %


 


Lymphocytes % (Manual)     (20-40)  %


 


Monocytes % (Manual)     (0-10)  %


 


Platelet Estimate     (NORMAL)  


 


Large Platelets     


 


Hypochromasia (manual)     


 


Poikilocytosis (manual     


 


Anisocytosis (manual)     


 


Target Cells     


 


Ovalocytes     


 


Rebuck Cells     


 


PT     (9.7-12.2)  SECONDS


 


INR     


 


APTT     (21-34)  SECONDS


 


Puncture Site   Rra   


 


pCO2   33 L   (35-45)  mm/Hg


 


pO2   47 L   ()  mm/Hg


 


HCO3   21.3   (21-28)  mmol/L


 


ABG pH   7.39   (7.35-7.45)  


 


ABG Total CO2   21.0 L   (22-28)  mmol/L


 


ABG O2 Saturation   85.8 L   (95-98)  %


 


ABG Base Excess   -4.1 L   (-2.0-3.0)  mmol/L


 


Jesus Test   Pos   


 


ABG Potassium   4.4   (3.6-5.2)  mmol/L


 


A-a O2 Difference   625.0   mm/Hg


 


Respiratory Index   13.3   


 


Sodium   137.0   (132-148)  mmol/l


 


Chloride   102.0   ()  mmol/L


 


Glucose   249 H   ()  mg/dl


 


Lactate   2.0   (0.7-2.1)  mmol/L


 


FiO2   100.0   %


 


Potassium     (3.6-5.2)  mmol/L


 


Carbon Dioxide     (22-30)  mmol/L


 


Anion Gap     (10-20)  


 


BUN     (7-17)  mg/dL


 


Creatinine     (0.7-1.2)  mg/dL


 


Est GFR (African Amer)     


 


Est GFR (Non-Af Amer)     


 


POC Glucose (mg/dL)  210 H   276 H  ()  mg/dL


 


Random Glucose     ()  mg/dL


 


Calcium     (8.6-10.4)  mg/dl


 


Phosphorus     (2.5-4.5)  mg/dL


 


Magnesium     (1.6-2.3)  mg/dL


 


Total Bilirubin     (0.2-1.3)  mg/dL


 


AST     (14-36)  U/L


 


ALT     (9-52)  U/L


 


Alkaline Phosphatase     ()  U/L


 


Total Protein     (6.3-8.3)  g/dL


 


Albumin     (3.5-5.0)  g/dL


 


Globulin     (2.2-3.9)  gm/dL


 


Albumin/Globulin Ratio     (1.0-2.1)  


 


Arterial Blood Potassium   4.4   (3.6-5.2)  mmol/L








 Laboratory Results - last 24 hr











  03/14/18 03/14/18 03/15/18





  17:20 17:55 00:14


 


WBC   


 


RBC   


 


Hgb   


 


Hct   


 


MCV   


 


MCH   


 


MCHC   


 


RDW   


 


Plt Count   


 


MPV   


 


Neut % (Auto)   


 


Lymph % (Auto)   


 


Mono % (Auto)   


 


Eos % (Auto)   


 


Baso % (Auto)   


 


Neut # (Auto)   


 


Lymph # (Auto)   


 


Mono # (Auto)   


 


Eos # (Auto)   


 


Baso # (Auto)   


 


Neutrophils % (Manual)   


 


Lymphocytes % (Manual)   


 


Monocytes % (Manual)   


 


Platelet Estimate   


 


Large Platelets   


 


Hypochromasia (manual)   


 


Poikilocytosis (manual   


 


Anisocytosis (manual)   


 


Target Cells   


 


Ovalocytes   


 


Rebuck Cells   


 


PT   


 


INR   


 


APTT   


 


Puncture Site   Rra 


 


pCO2   33 L 


 


pO2   47 L 


 


HCO3   21.3 


 


ABG pH   7.39 


 


ABG Total CO2   21.0 L 


 


ABG O2 Saturation   85.8 L 


 


ABG Base Excess   -4.1 L 


 


Jesus Test   Pos 


 


ABG Potassium   4.4 


 


A-a O2 Difference   625.0 


 


Respiratory Index   13.3 


 


Sodium   137.0 


 


Chloride   102.0 


 


Glucose   249 H 


 


Lactate   2.0 


 


FiO2   100.0 


 


Potassium   


 


Carbon Dioxide   


 


Anion Gap   


 


BUN   


 


Creatinine   


 


Est GFR ( Amer)   


 


Est GFR (Non-Af Amer)   


 


POC Glucose (mg/dL)  276 H   210 H


 


Random Glucose   


 


Calcium   


 


Phosphorus   


 


Magnesium   


 


Total Bilirubin   


 


AST   


 


ALT   


 


Alkaline Phosphatase   


 


Total Protein   


 


Albumin   


 


Globulin   


 


Albumin/Globulin Ratio   


 


Arterial Blood Potassium   4.4 














  03/15/18 03/15/18 03/15/18





  05:31 06:10 06:12


 


WBC    24.0 H


 


RBC    3.34 L


 


Hgb    9.2 L


 


Hct    29.1 L


 


MCV    87.0


 


MCH    27.7


 


MCHC    31.8 L


 


RDW    17.7 H


 


Plt Count    228


 


MPV    9.7


 


Neut % (Auto)    96.9 H


 


Lymph % (Auto)    0.8 L


 


Mono % (Auto)    2.2


 


Eos % (Auto)    0.0


 


Baso % (Auto)    0.1


 


Neut # (Auto)    23.3 H


 


Lymph # (Auto)    0.2 L


 


Mono # (Auto)    0.5


 


Eos # (Auto)    0.0


 


Baso # (Auto)    0.0


 


Neutrophils % (Manual)    98 H


 


Lymphocytes % (Manual)    1 L


 


Monocytes % (Manual)    1


 


Platelet Estimate    Normal


 


Large Platelets    Present


 


Hypochromasia (manual)    Slight


 


Poikilocytosis (manual    Slight


 


Anisocytosis (manual)    Slight


 


Target Cells    Slight


 


Ovalocytes    Slight


 


Rebuck Cells    Moderate


 


PT   


 


INR   


 


APTT   


 


Puncture Site   


 


pCO2   


 


pO2   


 


HCO3   


 


ABG pH   


 


ABG Total CO2   


 


ABG O2 Saturation   


 


ABG Base Excess   


 


Jesus Test   


 


ABG Potassium   


 


A-a O2 Difference   


 


Respiratory Index   


 


Sodium   137 


 


Chloride   98 


 


Glucose   


 


Lactate   


 


FiO2   


 


Potassium   4.9 


 


Carbon Dioxide   19 L 


 


Anion Gap   25 H 


 


BUN   95 H 


 


Creatinine   5.1 H 


 


Est GFR ( Amer)   10 


 


Est GFR (Non-Af Amer)   8 


 


POC Glucose (mg/dL)  273 H  


 


Random Glucose   236 H 


 


Calcium   8.7 


 


Phosphorus   8.6 H 


 


Magnesium   2.3 


 


Total Bilirubin   0.8 


 


AST   29 


 


ALT   214 H 


 


Alkaline Phosphatase   159 H 


 


Total Protein   6.0 L 


 


Albumin   3.0 L 


 


Globulin   3.0 


 


Albumin/Globulin Ratio   1.0 


 


Arterial Blood Potassium   














  03/15/18 03/15/18 03/15/18





  08:24 11:16 17:07


 


WBC   


 


RBC   


 


Hgb   


 


Hct   


 


MCV   


 


MCH   


 


MCHC   


 


RDW   


 


Plt Count   


 


MPV   


 


Neut % (Auto)   


 


Lymph % (Auto)   


 


Mono % (Auto)   


 


Eos % (Auto)   


 


Baso % (Auto)   


 


Neut # (Auto)   


 


Lymph # (Auto)   


 


Mono # (Auto)   


 


Eos # (Auto)   


 


Baso # (Auto)   


 


Neutrophils % (Manual)   


 


Lymphocytes % (Manual)   


 


Monocytes % (Manual)   


 


Platelet Estimate   


 


Large Platelets   


 


Hypochromasia (manual)   


 


Poikilocytosis (manual   


 


Anisocytosis (manual)   


 


Target Cells   


 


Ovalocytes   


 


Clara Cells   


 


PT  28.9 H D  


 


INR  2.4  D  


 


APTT  29  


 


Puncture Site   


 


pCO2   


 


pO2   


 


HCO3   


 


ABG pH   


 


ABG Total CO2   


 


ABG O2 Saturation   


 


ABG Base Excess   


 


Jesus Test   


 


ABG Potassium   


 


A-a O2 Difference   


 


Respiratory Index   


 


Sodium   


 


Chloride   


 


Glucose   


 


Lactate   


 


FiO2   


 


Potassium   


 


Carbon Dioxide   


 


Anion Gap   


 


BUN   


 


Creatinine   


 


Est GFR ( Amer)   


 


Est GFR (Non-Af Amer)   


 


POC Glucose (mg/dL)   173 H  251 H


 


Random Glucose   


 


Calcium   


 


Phosphorus   


 


Magnesium   


 


Total Bilirubin   


 


AST   


 


ALT   


 


Alkaline Phosphatase   


 


Total Protein   


 


Albumin   


 


Globulin   


 


Albumin/Globulin Ratio   


 


Arterial Blood Potassium   











Fingerstick Blood Sugar Results: 251





Critical Care Progress Note





- Nutrition


Nutrition: 


 Nutrition











 Category Date Time Status


 


 NPO Diet [DIET] Diets  03/12/18 Dinner Active














Assessment/Plan





- Assessment and Plan (Free Text)


Assessment: 





This is a 77 year old female with a history of ESRD on HD, CHF, DM, HTN, 

hypercholesterolemia, and some sort of MI years before, repeated TIAs, and 

colon CA. She was brought in by family for altered mental status and ultimately 

found to have severe metabolic acidosis as well as a extremely elevated 

troponins.





Neurology:


-Patient is awake and alert


-Sating well on NC 


-Patient failed nursing bedside swallow evaluation, NGT in place 


-Swallow eval and PT eval ordered


-Will downgrade patient to Med/Surg telemetry





Cardiology:


-NSTEMI, Hx of afib


-Patient was hypotensive yesterday, currently normotensive


-Will start Midodrine 10mg Q12H on TThS


-Family is currently refusing for intervention for coronary.


-Will need to discuss goals of care with the family, palliative care on board


-INR 2.4 today 


-Coumadin dose decreased 2.5mg PO daily


-Continue Crestor 20mg PO HS, Aspirin 81mg daily 


-Continue Cardizem 30mg PO Q6H 


-Continue Metoprolol 25mg PO BID


-Cardiology on consult, help appreciated





Pulmonary: 


-Extubated, sating well on nasal cannula


-Treating for Acute hypoxemic respiratory failure, respiratory acidosis from 

acute pulmonary edema and pneumonia


-CXR: Moderate venous congestion. Moderate loculated pleural effusion with 

consolidative changes in the left mid to lower lung zone, cardiomegaly.


-Solumedrol 40mg IVP daily


-Sputum culture growing pseudomonas, sensitivities reviewed





Hematology:


-Continue Ferrous sulfate 325mg PO TID





GI:


-Sennakot daily and Ducolax suppository ordered 


-AST/ALT elevated likely secondary to shock liver, currently trending down


-Will continue to monitor, avoid hepatotoxic agents 


-Patient failed nursing bedside swallow eval, swallow evaluation ordered


-GI consulted for peg tube placement, will f/u recommendation 





Renal:


-S/P permacath placement 


-Continue Epoetin 


-Contiune Renvela


-Patient received Hemodialysis today and tolerated it, scheduled changed to [facility]ThS


-Social work consult placed, patient will need long term dialysis 


-Vascular surgery on consult, help appreciated 


-Nephrology on consult, f/u recommendations 





Endocrine:


-History of diabetes mellitus


-Continue Lantus 5 units SC HS


-ISS, accuchecks





Infectious Disease:


-Permacath tip sent for culture, no growth


-Sputum culture growing pseudomonas, sensitivities reviewed


-Antibiotics: Aztreonam 1gm Q12H [facility], Vancomycin 1gm IV daily [facility]


-Leukocytosis increased 24 today


-Blood cultures have been negative 


-Infectious disease on consult, f/u recommendations 





GI/DVT ppx:


-Heparin 5000 units Q8H SC


-Protonix 40mg IVP daily








<Vikash Ryan - Last Filed: 03/15/18 18:50>





CCU Objective





- Vital Signs / Intake & Output


Vital Signs (Last 4 hours): 


Vital Signs











  Temp Pulse Resp BP Pulse Ox


 


 03/15/18 18:27  97.4 F L  100 H  20  97/59 L  100


 


 03/15/18 17:06     110/69 


 


 03/15/18 17:03   112 H  22  110/69  100


 


 03/15/18 17:01   119 H  25 H  106/61  100


 


 03/15/18 17:00   105 H  13   100


 


 03/15/18 16:30   99 H  17   100


 


 03/15/18 16:04   100 H  17  87/56 L  100


 


 03/15/18 16:00  96.4 F L  113 H  18   100


 


 03/15/18 15:30   108 H  17   100


 


 03/15/18 15:03   104 H  15  101/67  100


 


 03/15/18 15:02   101 H  18   100


 


 03/15/18 15:00   104 H  13   93 L











Intake and Output (Last 8hrs): 


 Intake & Output











 03/15/18 03/15/18 03/15/18





 06:59 14:59 22:59


 


Intake Total 22.8 525 250


 


Output Total 0  0


 


Balance 22.8 525 250


 


Weight  145 lb 4.554 oz 


 


Intake:   


 


  Intake, IV Amount 22.8 100 100


 


    RT ij DISTAL PORT 22.8 0 


 


    Right Proximal Port  100 100





    Internal Jugular   


 


  Tube Feeding  175 90


 


  Other  250 60


 


Output:   


 


  Urine 0  0


 


    Urine, Voided 0  0














- Medications


Active Medications: 


Active Medications











Generic Name Dose Route Start Last Admin





  Trade Name Freq  PRN Reason Stop Dose Admin


 


Albuterol/Ipratropium  3 ml  03/07/18 16:00  03/15/18 11:12





  Duoneb 3 Mg/0.5 Mg (3 Ml) Ud  INH   Not Given





  RQ4 [facility]   


 


Aspirin  81 mg  03/02/18 10:00  03/15/18 09:20





  Aspirin Chewable  PO   81 mg





  DAILY ABHILASH   Administration


 


Diltiazem HCl  30 mg  03/13/18 07:00  03/15/18 18:29





  Cardizem  PO   Not Given





  Q6H [facility]   


 


Epoetin Vamsi  12,000 unit  03/17/18 10:00  





  Procrit  IV   





  TTS [facility]   


 


Ferrous Gluconate  324 mg  03/03/18 10:00  03/15/18 17:09





  Fergon  PO   324 mg





  TID ABHILASH   Administration


 


Heparin Sodium (Porcine)  1,000 units  03/17/18 10:00  





  Heparin  IVP   





  TTS [facility]   


 


Hydromorphone HCl  0.5 mg  03/13/18 14:50  





  Dilaudid  IVP   





  Q6H PRN   





  Pain, moderate (4-7)   


 


Vancomycin/Sodium Chloride  1 gm in 200 mls @ 133.333 mls/hr  03/17/18 10:00  





  Vancomycin 1 Gm/Ns 200 Ml  IVPB  03/22/18 10:01  





  TTS ABHILASH   





  Protocol   


 


Aztreonam 1 gm/ Sodium  100 mls @ 100 mls/hr  03/17/18 10:00  





  Chloride  IVPB   





  TTS [facility]   





  Protocol   


 


Insulin Aspart  0 unit  03/04/18 11:49  03/15/18 17:09





  Novolog  SC   6 unit





  Q6 ABHILASH   Administration





  Protocol   


 


Insulin Glargine  5 unit  03/06/18 22:00  03/14/18 21:32





  Lantus  SC   5 u





  HS ABHILASH   Administration


 


Metoprolol Tartrate  25 mg  03/09/18 08:00  03/15/18 17:06





  Lopressor  PO   25 mg





  BID ABHILASH   Administration


 


Midodrine  10 mg  03/17/18 10:00  





  Proamatine  PO   





  TTS ABHILASH   


 


Midodrine  10 mg  03/17/18 18:00  





  Proamatine  PO   





  TTS ABHILASH   


 


Pantoprazole Sodium  40 mg  03/13/18 10:00  03/15/18 09:20





  Protonix Inj  IVP   40 mg





  DAILY ABHILASH   Administration


 


Paricalcitol  2 mcg  03/17/18 10:00  





  Zemplar  IV   





  TTS ABHILASH   


 


Rosuvastatin Calcium  5 mg  03/02/18 01:15  03/14/18 21:32





  Crestor  PO   5 mg





  HS ABHILASH   Administration


 


Sennosides  8.6 mg  03/13/18 10:00  03/15/18 09:21





  Senokot Tab  NG   8.6 mg





  DAILY ABHILASH   Administration


 


Sevelamer Carbonate  800 mg  03/14/18 12:00  03/15/18 17:06





  Renvela  PO   800 mg





  TIDCC ABHILASH   Administration


 


Vitamin B Complex/Vit C/Folic Acid  1 tab  03/03/18 08:00  03/15/18 08:12





  Nephro-Nneka  PO   1 tab





  0800 ABHILASH   Administration














- Patient Studies


Lab Studies: 


 Microbiology Studies











 03/13/18 11:30 Gram Stain - Final





 Trachasp Sputum Culture - Final





    Pseudomonas Aeruginosa


 


 03/13/18 12:18 Catheter Tip Culture - Final





 Catheter Tip    No Growth








 Lab Studies











  03/15/18 03/15/18 03/15/18 Range/Units





  17:07 11:16 08:24 


 


WBC     (4.8-10.8)  K/uL


 


RBC     (3.80-5.20)  Mil/uL


 


Hgb     (11.0-16.0)  g/dL


 


Hct     (34.0-47.0)  %


 


MCV     (81.0-99.0)  fL


 


MCH     (27.0-31.0)  pg


 


MCHC     (33.0-37.0)  g/dL


 


RDW     (11.5-14.5)  %


 


Plt Count     (130-400)  K/uL


 


MPV     (7.2-11.7)  fL


 


Neut % (Auto)     (50.0-75.0)  %


 


Lymph % (Auto)     (20.0-40.0)  %


 


Mono % (Auto)     (0.0-10.0)  %


 


Eos % (Auto)     (0.0-4.0)  %


 


Baso % (Auto)     (0.0-2.0)  %


 


Neut # (Auto)     (1.8-7.0)  K/uL


 


Lymph # (Auto)     (1.0-4.3)  K/uL


 


Mono # (Auto)     (0.0-0.8)  K/uL


 


Eos # (Auto)     (0.0-0.7)  K/uL


 


Baso # (Auto)     (0.0-0.2)  K/uL


 


Neutrophils % (Manual)     (50-75)  %


 


Lymphocytes % (Manual)     (20-40)  %


 


Monocytes % (Manual)     (0-10)  %


 


Platelet Estimate     (NORMAL)  


 


Large Platelets     


 


Hypochromasia (manual)     


 


Poikilocytosis (manual     


 


Anisocytosis (manual)     


 


Target Cells     


 


Ovalocytes     


 


Clara Cells     


 


PT    28.9 H D  (9.7-12.2)  SECONDS


 


INR    2.4  D  


 


APTT    29  (21-34)  SECONDS


 


Sodium     (132-148)  mmol/L


 


Potassium     (3.6-5.2)  mmol/L


 


Chloride     ()  mmol/L


 


Carbon Dioxide     (22-30)  mmol/L


 


Anion Gap     (10-20)  


 


BUN     (7-17)  mg/dL


 


Creatinine     (0.7-1.2)  mg/dL


 


Est GFR (African Amer)     


 


Est GFR (Non-Af Amer)     


 


POC Glucose (mg/dL)  251 H  173 H   ()  mg/dL


 


Random Glucose     ()  mg/dL


 


Calcium     (8.6-10.4)  mg/dl


 


Phosphorus     (2.5-4.5)  mg/dL


 


Magnesium     (1.6-2.3)  mg/dL


 


Total Bilirubin     (0.2-1.3)  mg/dL


 


AST     (14-36)  U/L


 


ALT     (9-52)  U/L


 


Alkaline Phosphatase     ()  U/L


 


Total Protein     (6.3-8.3)  g/dL


 


Albumin     (3.5-5.0)  g/dL


 


Globulin     (2.2-3.9)  gm/dL


 


Albumin/Globulin Ratio     (1.0-2.1)  














  03/15/18 03/15/18 03/15/18 Range/Units





  06:12 06:10 05:31 


 


WBC  24.0 H    (4.8-10.8)  K/uL


 


RBC  3.34 L    (3.80-5.20)  Mil/uL


 


Hgb  9.2 L    (11.0-16.0)  g/dL


 


Hct  29.1 L    (34.0-47.0)  %


 


MCV  87.0    (81.0-99.0)  fL


 


MCH  27.7    (27.0-31.0)  pg


 


MCHC  31.8 L    (33.0-37.0)  g/dL


 


RDW  17.7 H    (11.5-14.5)  %


 


Plt Count  228    (130-400)  K/uL


 


MPV  9.7    (7.2-11.7)  fL


 


Neut % (Auto)  96.9 H    (50.0-75.0)  %


 


Lymph % (Auto)  0.8 L    (20.0-40.0)  %


 


Mono % (Auto)  2.2    (0.0-10.0)  %


 


Eos % (Auto)  0.0    (0.0-4.0)  %


 


Baso % (Auto)  0.1    (0.0-2.0)  %


 


Neut # (Auto)  23.3 H    (1.8-7.0)  K/uL


 


Lymph # (Auto)  0.2 L    (1.0-4.3)  K/uL


 


Mono # (Auto)  0.5    (0.0-0.8)  K/uL


 


Eos # (Auto)  0.0    (0.0-0.7)  K/uL


 


Baso # (Auto)  0.0    (0.0-0.2)  K/uL


 


Neutrophils % (Manual)  98 H    (50-75)  %


 


Lymphocytes % (Manual)  1 L    (20-40)  %


 


Monocytes % (Manual)  1    (0-10)  %


 


Platelet Estimate  Normal    (NORMAL)  


 


Large Platelets  Present    


 


Hypochromasia (manual)  Slight    


 


Poikilocytosis (manual  Slight    


 


Anisocytosis (manual)  Slight    


 


Target Cells  Slight    


 


Ovalocytes  Slight    


 


Rebuck Cells  Moderate    


 


PT     (9.7-12.2)  SECONDS


 


INR     


 


APTT     (21-34)  SECONDS


 


Sodium   137   (132-148)  mmol/L


 


Potassium   4.9   (3.6-5.2)  mmol/L


 


Chloride   98   ()  mmol/L


 


Carbon Dioxide   19 L   (22-30)  mmol/L


 


Anion Gap   25 H   (10-20)  


 


BUN   95 H   (7-17)  mg/dL


 


Creatinine   5.1 H   (0.7-1.2)  mg/dL


 


Est GFR ( Amer)   10   


 


Est GFR (Non-Af Amer)   8   


 


POC Glucose (mg/dL)    273 H  ()  mg/dL


 


Random Glucose   236 H   ()  mg/dL


 


Calcium   8.7   (8.6-10.4)  mg/dl


 


Phosphorus   8.6 H   (2.5-4.5)  mg/dL


 


Magnesium   2.3   (1.6-2.3)  mg/dL


 


Total Bilirubin   0.8   (0.2-1.3)  mg/dL


 


AST   29   (14-36)  U/L


 


ALT   214 H   (9-52)  U/L


 


Alkaline Phosphatase   159 H   ()  U/L


 


Total Protein   6.0 L   (6.3-8.3)  g/dL


 


Albumin   3.0 L   (3.5-5.0)  g/dL


 


Globulin   3.0   (2.2-3.9)  gm/dL


 


Albumin/Globulin Ratio   1.0   (1.0-2.1)  














  03/15/18 Range/Units





  00:14 


 


WBC   (4.8-10.8)  K/uL


 


RBC   (3.80-5.20)  Mil/uL


 


Hgb   (11.0-16.0)  g/dL


 


Hct   (34.0-47.0)  %


 


MCV   (81.0-99.0)  fL


 


MCH   (27.0-31.0)  pg


 


MCHC   (33.0-37.0)  g/dL


 


RDW   (11.5-14.5)  %


 


Plt Count   (130-400)  K/uL


 


MPV   (7.2-11.7)  fL


 


Neut % (Auto)   (50.0-75.0)  %


 


Lymph % (Auto)   (20.0-40.0)  %


 


Mono % (Auto)   (0.0-10.0)  %


 


Eos % (Auto)   (0.0-4.0)  %


 


Baso % (Auto)   (0.0-2.0)  %


 


Neut # (Auto)   (1.8-7.0)  K/uL


 


Lymph # (Auto)   (1.0-4.3)  K/uL


 


Mono # (Auto)   (0.0-0.8)  K/uL


 


Eos # (Auto)   (0.0-0.7)  K/uL


 


Baso # (Auto)   (0.0-0.2)  K/uL


 


Neutrophils % (Manual)   (50-75)  %


 


Lymphocytes % (Manual)   (20-40)  %


 


Monocytes % (Manual)   (0-10)  %


 


Platelet Estimate   (NORMAL)  


 


Large Platelets   


 


Hypochromasia (manual)   


 


Poikilocytosis (manual   


 


Anisocytosis (manual)   


 


Target Cells   


 


Ovalocytes   


 


Rebuck Cells   


 


PT   (9.7-12.2)  SECONDS


 


INR   


 


APTT   (21-34)  SECONDS


 


Sodium   (132-148)  mmol/L


 


Potassium   (3.6-5.2)  mmol/L


 


Chloride   ()  mmol/L


 


Carbon Dioxide   (22-30)  mmol/L


 


Anion Gap   (10-20)  


 


BUN   (7-17)  mg/dL


 


Creatinine   (0.7-1.2)  mg/dL


 


Est GFR (African Amer)   


 


Est GFR (Non-Af Amer)   


 


POC Glucose (mg/dL)  210 H  ()  mg/dL


 


Random Glucose   ()  mg/dL


 


Calcium   (8.6-10.4)  mg/dl


 


Phosphorus   (2.5-4.5)  mg/dL


 


Magnesium   (1.6-2.3)  mg/dL


 


Total Bilirubin   (0.2-1.3)  mg/dL


 


AST   (14-36)  U/L


 


ALT   (9-52)  U/L


 


Alkaline Phosphatase   ()  U/L


 


Total Protein   (6.3-8.3)  g/dL


 


Albumin   (3.5-5.0)  g/dL


 


Globulin   (2.2-3.9)  gm/dL


 


Albumin/Globulin Ratio   (1.0-2.1)  








 Laboratory Results - last 24 hr











  03/15/18 03/15/18 03/15/18





  00:14 05:31 06:10


 


WBC   


 


RBC   


 


Hgb   


 


Hct   


 


MCV   


 


MCH   


 


MCHC   


 


RDW   


 


Plt Count   


 


MPV   


 


Neut % (Auto)   


 


Lymph % (Auto)   


 


Mono % (Auto)   


 


Eos % (Auto)   


 


Baso % (Auto)   


 


Neut # (Auto)   


 


Lymph # (Auto)   


 


Mono # (Auto)   


 


Eos # (Auto)   


 


Baso # (Auto)   


 


Neutrophils % (Manual)   


 


Lymphocytes % (Manual)   


 


Monocytes % (Manual)   


 


Platelet Estimate   


 


Large Platelets   


 


Hypochromasia (manual)   


 


Poikilocytosis (manual   


 


Anisocytosis (manual)   


 


Target Cells   


 


Ovalocytes   


 


Rebuck Cells   


 


PT   


 


INR   


 


APTT   


 


Sodium    137


 


Potassium    4.9


 


Chloride    98


 


Carbon Dioxide    19 L


 


Anion Gap    25 H


 


BUN    95 H


 


Creatinine    5.1 H


 


Est GFR ( Amer)    10


 


Est GFR (Non-Af Amer)    8


 


POC Glucose (mg/dL)  210 H  273 H 


 


Random Glucose    236 H


 


Calcium    8.7


 


Phosphorus    8.6 H


 


Magnesium    2.3


 


Total Bilirubin    0.8


 


AST    29


 


ALT    214 H


 


Alkaline Phosphatase    159 H


 


Total Protein    6.0 L


 


Albumin    3.0 L


 


Globulin    3.0


 


Albumin/Globulin Ratio    1.0














  03/15/18 03/15/18 03/15/18





  06:12 08:24 11:16


 


WBC  24.0 H  


 


RBC  3.34 L  


 


Hgb  9.2 L  


 


Hct  29.1 L  


 


MCV  87.0  


 


MCH  27.7  


 


MCHC  31.8 L  


 


RDW  17.7 H  


 


Plt Count  228  


 


MPV  9.7  


 


Neut % (Auto)  96.9 H  


 


Lymph % (Auto)  0.8 L  


 


Mono % (Auto)  2.2  


 


Eos % (Auto)  0.0  


 


Baso % (Auto)  0.1  


 


Neut # (Auto)  23.3 H  


 


Lymph # (Auto)  0.2 L  


 


Mono # (Auto)  0.5  


 


Eos # (Auto)  0.0  


 


Baso # (Auto)  0.0  


 


Neutrophils % (Manual)  98 H  


 


Lymphocytes % (Manual)  1 L  


 


Monocytes % (Manual)  1  


 


Platelet Estimate  Normal  


 


Large Platelets  Present  


 


Hypochromasia (manual)  Slight  


 


Poikilocytosis (manual  Slight  


 


Anisocytosis (manual)  Slight  


 


Target Cells  Slight  


 


Ovalocytes  Slight  


 


Rebuck Cells  Moderate  


 


PT   28.9 H D 


 


INR   2.4  D 


 


APTT   29 


 


Sodium   


 


Potassium   


 


Chloride   


 


Carbon Dioxide   


 


Anion Gap   


 


BUN   


 


Creatinine   


 


Est GFR ( Amer)   


 


Est GFR (Non-Af Amer)   


 


POC Glucose (mg/dL)    173 H


 


Random Glucose   


 


Calcium   


 


Phosphorus   


 


Magnesium   


 


Total Bilirubin   


 


AST   


 


ALT   


 


Alkaline Phosphatase   


 


Total Protein   


 


Albumin   


 


Globulin   


 


Albumin/Globulin Ratio   














  03/15/18





  17:07


 


WBC 


 


RBC 


 


Hgb 


 


Hct 


 


MCV 


 


MCH 


 


MCHC 


 


RDW 


 


Plt Count 


 


MPV 


 


Neut % (Auto) 


 


Lymph % (Auto) 


 


Mono % (Auto) 


 


Eos % (Auto) 


 


Baso % (Auto) 


 


Neut # (Auto) 


 


Lymph # (Auto) 


 


Mono # (Auto) 


 


Eos # (Auto) 


 


Baso # (Auto) 


 


Neutrophils % (Manual) 


 


Lymphocytes % (Manual) 


 


Monocytes % (Manual) 


 


Platelet Estimate 


 


Large Platelets 


 


Hypochromasia (manual) 


 


Poikilocytosis (manual 


 


Anisocytosis (manual) 


 


Target Cells 


 


Ovalocytes 


 


Rebuck Cells 


 


PT 


 


INR 


 


APTT 


 


Sodium 


 


Potassium 


 


Chloride 


 


Carbon Dioxide 


 


Anion Gap 


 


BUN 


 


Creatinine 


 


Est GFR ( Amer) 


 


Est GFR (Non-Af Amer) 


 


POC Glucose (mg/dL)  251 H


 


Random Glucose 


 


Calcium 


 


Phosphorus 


 


Magnesium 


 


Total Bilirubin 


 


AST 


 


ALT 


 


Alkaline Phosphatase 


 


Total Protein 


 


Albumin 


 


Globulin 


 


Albumin/Globulin Ratio 














Critical Care Progress Note





- Nutrition


Nutrition: 


 Nutrition











 Category Date Time Status


 


 NPO Diet [DIET] Diets  03/12/18 Dinner Active














Assessment/Plan


(1) Cardiac arrest


Current Visit: Yes   Status: Acute   





Attending/Attestation





- Attestation


I have personally seen and examined this patient.: Yes


I have fully participated in the care of the patient.: Yes


I have reviewed all pertinent clinical information: Yes


Notes (Text): 





03/15/18 18:49


I have seen and examined the patient.  Medical records, lab studies, and 

imaging were reviewed by me and a management plan was formulated on 

multidisciplinary rounds with resident Dr. Yao. I agree with their documented 

assessment and plan.





Patient will most likely need PEG placement, GI consulted, wants tto give her 

more time for improvement for swallow evaluation.  Started on Midodrine on 

dialysis days.  Patient will also need albumin during dialysis to prevent 

hypotension.  Clinically stable for downgrade to the floors.





Critical Care Time 35 minutes.





Multi-disciplinary rounds were performed with house staff, nursing, speech 

therapy, respiratory therapy, pharmacy and nutrition with integrated input from 

the primary team/attending and other consulting services.  The documented time 

is cumulative and includes review of patient data/exams/labs/chart review and 

examination of the patient on rounds and throughout the day; time is exclusive 

of any procedures or teaching time.

## 2018-03-15 NOTE — CP.PCM.CON
<Reshma Melo - Last Filed: 03/15/18 14:37>





History of Present Illness





- History of Present Illness


History of Present Illness: 





Gastroenterology Consult Note 





77 year old  female with PMHx of ESRD started on HD during this 

admission (TTS), Systolic CHF with EF 15-20%, moderate to severe Pulmonary 

hypertension, HTN, HLD, DM, Several TIAs, Colon CA s/p resection, hx DVT, and 

Hx Atrial Fibrillation was admitted on 18 for AMS and chest pain. Patient 

was found to have a NSTEMI, septic with pleural effusions, and in metabolic 

acidosis. Both patient and family refused cardiac catherization, patient is 

currrently being medically managed. Patient was started on dialysis on the 

night of admission and became hypotensive and underwent 2 rounds of CPR 

performed secondary to asystole and then sustained VTACH. She was intubated  and successfully extubated 3/12/18. Patient failed swallow eval 3/14/18, NGT 

was placed and is receiving feedings. As per EMR, no dysphasia noted prior to 

admission. No prior colonoscopy noted in EMR.Patient denied any abdominal pain, 

admitted to not having a BM for several days. 





PMD: Yuli 





PMHx: ESRD started on HD on this admission (initially MWF, now TTS), DM, HTN, 

hypercholesterolemia, MI (many years ago, no known intervention), TIA x 3 (

years ago), PAD, CHF, asthma, colon CA in  (treated with chemotherapy and 

surgery), arthritis


PSHx: unknown surgery for colon CA (); endoscopy 


Medications: As per MAR


Allergies: Penicillin (rash)





SHx:Lives alone in [place] (son and daughter visit daily to assist); 

ambulates without assistance; at baseline is able to walk around and cook 

without difficulty; retired; former smoker (25+ pack years); no current ETOH use

; no current recreational drug use 


FHx: Limited; Mother- of cancer; Father- for unknown reason. 





In the event of emergency, daughter Huber Corral () will make 

healthcare decisions. Patient's son is also involved in her care: Bryle Burke (

[phone]) 





Past Patient History





- Infectious Disease


Hx of Infectious Diseases: None





- Tetanus Immunizations


Tetanus Immunization: Unknown





- Past Medical History & Family History


Past Medical History?: Yes





- Past Social History


Smoking Status: Former Smoker





- CARDIAC


Hx Hypercholesterolemia: Yes


Hx Hypertension: Yes





- PULMONARY


Hx Asthma: Yes (Dx 15 yrs ago,does not use home o2 anymore)


Hx Pneumonia: Yes (x2)





- NEUROLOGICAL


Hx Transient Ischemic Attacks (TIA): Yes (x3 about 20 yrs ago, 10 yrs ago, 7 

yrs ago)





- HEENT


Hx HEENT Problems: Yes


Hx Cataracts: Yes





- RENAL


Hx Chronic Kidney Disease: Yes





- ENDOCRINE/METABOLIC


Hx Diabetes Mellitus Type 1: Yes





- HEMATOLOGICAL/ONCOLOGICAL


Hx Anemia: Yes





- INTEGUMENTARY


Hx Dermatological Problems: No





- MUSCULOSKELETAL/RHEUMATOLOGICAL


Hx Arthritis: Yes





- GASTROINTESTINAL


Hx Gastrointestinal Disorders: Yes


Other/Comment: Hx colon cancer





- GENITOURINARY/GYNECOLOGICAL


Hx Genitourinary Disorders: Yes


Other/Comment: Colon CA





- PSYCHIATRIC


Hx Depression: No


Hx Substance Use: No





- SURGICAL HISTORY


Hx Cataract Extraction: Yes (LEFT)





- ANESTHESIA


Hx Anesthesia: Yes


Hx Anesthesia Reactions: No


Hx Malignant Hyperthermia: No





Meds


Allergies/Adverse Reactions: 


 Allergies











Allergy/AdvReac Type Severity Reaction Status Date / Time


 


Penicillins Allergy Intermediate RASH Verified 18 20:11














- Medications


Medications: 


 Current Medications





Albuterol/Ipratropium (Duoneb 3 Mg/0.5 Mg (3 Ml) Ud)  3 ml INH RQ4 [facility]


   Last Admin: 03/15/18 11:12 Dose:  Not Given


Aspirin (Aspirin Chewable)  81 mg PO DAILY [facility]


   Last Admin: 03/15/18 09:20 Dose:  81 mg


Diltiazem HCl (Cardizem)  30 mg PO Q6H [facility]


   Last Admin: 03/15/18 07:03 Dose:  30 mg


Epoetin Vamsi (Procrit)  10,000 unit IV [facility]


   Last Admin: 18 11:18 Dose:  10,000 unit


Ferrous Gluconate (Fergon)  324 mg PO TID [facility]


   Last Admin: 03/15/18 09:21 Dose:  324 mg


Heparin Sodium (Porcine) (Heparin)  1,000 units IVP F [facility]


   Last Admin: 18 11:40 Dose:  3.9 units


Hydromorphone HCl (Dilaudid)  0.5 mg IVP Q6H PRN


   PRN Reason: Pain, moderate (4-7)


Norepinephrine Bitartrate 4 mg (/ Sodium Chloride)  254 mls @ 15.24 mls/hr IV 

[id number] PRN; Protocol; 4 MCG/MIN


   PRN Reason: TITRATE PER MD ORDER


   Last Titration: 18 20:00 Dose:  1 mcg/min, 3.81 mls/hr


Vancomycin/Sodium Chloride (Vancomycin 1 Gm/Ns 200 Ml)  1 gm in 200 mls @ 

133.333 mls/hr IVPB TTS [facility]


   PRN Reason: Protocol


   Stop: 18 10:01


Aztreonam 1 gm/ Sodium (Chloride)  100 mls @ 100 mls/hr IVPB TTS [facility]


   PRN Reason: Protocol


Vancomycin HCl 500 mg/ Sodium (Chloride)  100 mls @ 100 mls/hr IVPB ONCE ONE


   PRN Reason: Protocol


   Stop: 03/15/18 13:59


Aztreonam 1 gm/ Sodium (Chloride)  100 mls @ 100 mls/hr IVPB ONCE ONE


   PRN Reason: Protocol


   Stop: 03/15/18 15:59


Insulin Aspart (Novolog)  0 unit SC Q6 [facility]


   PRN Reason: Protocol


   Last Admin: 03/15/18 12:07 Dose:  2 unit


Insulin Glargine (Lantus)  5 unit SC HS [facility]


   Last Admin: 18 21:32 Dose:  5 u


Metoprolol Tartrate (Lopressor)  25 mg PO BID [facility]


   Last Admin: 03/15/18 09:22 Dose:  Not Given


Midodrine (Proamatine)  10 mg PO TTS [facility]


Pantoprazole Sodium (Protonix Inj)  40 mg IVP DAILY [facility]


   Last Admin: 03/15/18 09:20 Dose:  40 mg


Paricalcitol (Zemplar)  2 mcg IV MWF [facility]


   Last Admin: 18 11:55 Dose:  2 mcg


Rosuvastatin Calcium (Crestor)  5 mg PO HS [facility]


   Last Admin: 18 21:32 Dose:  5 mg


Sennosides (Senokot Tab)  8.6 mg NG DAILY [facility]


   Last Admin: 03/15/18 09:21 Dose:  8.6 mg


Sevelamer Carbonate (Renvela)  800 mg PO TIDCC [facility]


   Last Admin: 03/15/18 12:08 Dose:  800 mg


Vitamin B Complex/Vit C/Folic Acid (Nephro-Nneka)  1 tab PO 0800 [facility]


   Last Admin: 03/15/18 08:12 Dose:  1 tab


Warfarin Sodium (Coumadin)  2.5 mg PO 1800 [facility]


   Stop: 03/15/18 18:01











Physical Exam





- Constitutional


Appears: No Acute Distress





- Head Exam


Head Exam: NORMAL INSPECTION, NORMOCEPHALIC





- Eye Exam


Eye Exam: EOMI, Normal appearance, PERRL.  absent: Nystagmus, Scleral icterus


Pupil Exam: NORMAL ACCOMODATION





- ENT Exam


ENT Exam: Mucous Membranes Moist, Normal Exam





- Respiratory Exam


Respiratory Exam: Decreased Breath Sounds, NORMAL BREATHING PATTERN





- Cardiovascular Exam


Cardiovascular Exam: Tachycardia, Irregular Rhythm





- GI/Abdominal Exam


GI & Abdominal Exam: Normal Bowel Sounds, Soft.  absent: Distended, Mass, 

Organomegaly, Tenderness





- Rectal Exam


Rectal Exam: Deferred





- Extremities Exam


Extremities exam: Positive for: normal inspection, pedal pulses present.  

Negative for: pedal edema, tenderness





- Back Exam


Back exam: NORMAL INSPECTION





- Neurological Exam


Neurological exam: Alert, Oriented x3





- Psychiatric Exam


Psychiatric exam: Normal Affect, Normal Mood





- Skin


Skin Exam: Dry, Intact, Normal Color, Warm





Results





- Vital Signs


Recent Vital Signs: 


 Last Vital Signs











Temp  96.6 F L  03/15/18 12:00


 


Pulse  112 H  03/15/18 13:00


 


Resp  13   03/15/18 13:00


 


BP  99/54 L  03/15/18 12:59


 


Pulse Ox  99   03/15/18 12:00














- Labs


Result Diagrams: 


 03/15/18 06:12





 03/15/18 06:10


Labs: 


 Laboratory Results - last 24 hr











  03/14/18 03/14/18 03/15/18





  17:20 17:55 00:14


 


WBC   


 


RBC   


 


Hgb   


 


Hct   


 


MCV   


 


MCH   


 


MCHC   


 


RDW   


 


Plt Count   


 


MPV   


 


Neut % (Auto)   


 


Lymph % (Auto)   


 


Mono % (Auto)   


 


Eos % (Auto)   


 


Baso % (Auto)   


 


Neut # (Auto)   


 


Lymph # (Auto)   


 


Mono # (Auto)   


 


Eos # (Auto)   


 


Baso # (Auto)   


 


Neutrophils % (Manual)   


 


Lymphocytes % (Manual)   


 


Monocytes % (Manual)   


 


Platelet Estimate   


 


Large Platelets   


 


Hypochromasia (manual)   


 


Poikilocytosis (manual   


 


Anisocytosis (manual)   


 


Target Cells   


 


Ovalocytes   


 


Clara Cells   


 


PT   


 


INR   


 


APTT   


 


Puncture Site   Rra 


 


pCO2   33 L 


 


pO2   47 L 


 


HCO3   21.3 


 


ABG pH   7.39 


 


ABG Total CO2   21.0 L 


 


ABG O2 Saturation   85.8 L 


 


ABG Base Excess   -4.1 L 


 


Jesus Test   Pos 


 


ABG Potassium   4.4 


 


A-a O2 Difference   625.0 


 


Respiratory Index   13.3 


 


Sodium   137.0 


 


Chloride   102.0 


 


Glucose   249 H 


 


Lactate   2.0 


 


FiO2   100.0 


 


Potassium   


 


Carbon Dioxide   


 


Anion Gap   


 


BUN   


 


Creatinine   


 


Est GFR ( Amer)   


 


Est GFR (Non-Af Amer)   


 


POC Glucose (mg/dL)  276 H   210 H


 


Random Glucose   


 


Calcium   


 


Phosphorus   


 


Magnesium   


 


Total Bilirubin   


 


AST   


 


ALT   


 


Alkaline Phosphatase   


 


Total Protein   


 


Albumin   


 


Globulin   


 


Albumin/Globulin Ratio   


 


Arterial Blood Potassium   4.4 














  03/15/18 03/15/18 03/15/18





  05:31 06:10 06:12


 


WBC    24.0 H


 


RBC    3.34 L


 


Hgb    9.2 L


 


Hct    29.1 L


 


MCV    87.0


 


MCH    27.7


 


MCHC    31.8 L


 


RDW    17.7 H


 


Plt Count    228


 


MPV    9.7


 


Neut % (Auto)    96.9 H


 


Lymph % (Auto)    0.8 L


 


Mono % (Auto)    2.2


 


Eos % (Auto)    0.0


 


Baso % (Auto)    0.1


 


Neut # (Auto)    23.3 H


 


Lymph # (Auto)    0.2 L


 


Mono # (Auto)    0.5


 


Eos # (Auto)    0.0


 


Baso # (Auto)    0.0


 


Neutrophils % (Manual)    98 H


 


Lymphocytes % (Manual)    1 L


 


Monocytes % (Manual)    1


 


Platelet Estimate    Normal


 


Large Platelets    Present


 


Hypochromasia (manual)    Slight


 


Poikilocytosis (manual    Slight


 


Anisocytosis (manual)    Slight


 


Target Cells    Slight


 


Ovalocytes    Slight


 


Clara Cells    Moderate


 


PT   


 


INR   


 


APTT   


 


Puncture Site   


 


pCO2   


 


pO2   


 


HCO3   


 


ABG pH   


 


ABG Total CO2   


 


ABG O2 Saturation   


 


ABG Base Excess   


 


Jesus Test   


 


ABG Potassium   


 


A-a O2 Difference   


 


Respiratory Index   


 


Sodium   137 


 


Chloride   98 


 


Glucose   


 


Lactate   


 


FiO2   


 


Potassium   4.9 


 


Carbon Dioxide   19 L 


 


Anion Gap   25 H 


 


BUN   95 H 


 


Creatinine   5.1 H 


 


Est GFR ( Amer)   10 


 


Est GFR (Non-Af Amer)   8 


 


POC Glucose (mg/dL)  273 H  


 


Random Glucose   236 H 


 


Calcium   8.7 


 


Phosphorus   8.6 H 


 


Magnesium   2.3 


 


Total Bilirubin   0.8 


 


AST   29 


 


ALT   214 H 


 


Alkaline Phosphatase   159 H 


 


Total Protein   6.0 L 


 


Albumin   3.0 L 


 


Globulin   3.0 


 


Albumin/Globulin Ratio   1.0 


 


Arterial Blood Potassium   














  03/15/18 03/15/18





  08:24 11:16


 


WBC  


 


RBC  


 


Hgb  


 


Hct  


 


MCV  


 


MCH  


 


MCHC  


 


RDW  


 


Plt Count  


 


MPV  


 


Neut % (Auto)  


 


Lymph % (Auto)  


 


Mono % (Auto)  


 


Eos % (Auto)  


 


Baso % (Auto)  


 


Neut # (Auto)  


 


Lymph # (Auto)  


 


Mono # (Auto)  


 


Eos # (Auto)  


 


Baso # (Auto)  


 


Neutrophils % (Manual)  


 


Lymphocytes % (Manual)  


 


Monocytes % (Manual)  


 


Platelet Estimate  


 


Large Platelets  


 


Hypochromasia (manual)  


 


Poikilocytosis (manual  


 


Anisocytosis (manual)  


 


Target Cells  


 


Ovalocytes  


 


McLean Cells  


 


PT  28.9 H D 


 


INR  2.4  D 


 


APTT  29 


 


Puncture Site  


 


pCO2  


 


pO2  


 


HCO3  


 


ABG pH  


 


ABG Total CO2  


 


ABG O2 Saturation  


 


ABG Base Excess  


 


Jesus Test  


 


ABG Potassium  


 


A-a O2 Difference  


 


Respiratory Index  


 


Sodium  


 


Chloride  


 


Glucose  


 


Lactate  


 


FiO2  


 


Potassium  


 


Carbon Dioxide  


 


Anion Gap  


 


BUN  


 


Creatinine  


 


Est GFR ( Amer)  


 


Est GFR (Non-Af Amer)  


 


POC Glucose (mg/dL)   173 H


 


Random Glucose  


 


Calcium  


 


Phosphorus  


 


Magnesium  


 


Total Bilirubin  


 


AST  


 


ALT  


 


Alkaline Phosphatase  


 


Total Protein  


 


Albumin  


 


Globulin  


 


Albumin/Globulin Ratio  


 


Arterial Blood Potassium  














Assessment & Plan





- Assessment and Plan (Free Text)


Assessment: 





77 year old  female with PMHx of ESRD started on HD during this 

admission (), Systolic CHF with EF 15-20%, moderate to severe Pulmonary 

hypertension, HTN, HLD, DM, Several TIAs, Colon CA s/p resection, hx DVT, and 

Hx Atrial Fibrillation was admitted on 18 for AMS and chest pain. Patient 

was found to have a NSTEMI, septic with pleural effusions, and in metabolic 

acidosis. Both patient and family refused cardiac catherization, patient is 

currrently being medically managed. Patient was started on dialysis on the 

night of admission and became hypotensive and underwent 2 rounds of CPR 

performed secondary to asystole and then sustained VTACH. She was intubated  and successfully extubated 3/12/18. Patient failed swallow eval 3/14/18, NGT 

was placed and is receiving feedings. As per EMR, no dysphasia noted prior to 

admission. No prior colonoscopy noted in EMR.





Evaluation for PEG


Dysphagia s/p extubation 3/12/18


NSTEMI


Sepsis likely secondary to pleural effusions on admission 


ESRD now on HD


Systolic CHF with EF 15-20%


Hx Atrial Fibrillation 


Colon CA s/p resection () 








Plan: 





- Continue NGT feedings as tolerated 


- Patient is recently extubated on 3/12/18


- No prior history of dysphagia on admission


- Will need repeat swallow eval next week 18


- In the meantime, will need to determine if family and patient are agreeable 

to PEG


- If all agreeable and patient continues to fail swallow evals, then patient 

will need to be made medically optimized for PEG placement 


- We will sign off at this time. Please reconsult as necessary





DW Dr. Scott,


Reshma Melo DO, PGY-1 





<Reinaldo Scott - Last Filed: 03/15/18 15:45>





Meds





- Medications


Medications: 


 Current Medications





Albuterol/Ipratropium (Duoneb 3 Mg/0.5 Mg (3 Ml) Ud)  3 ml INH RQ4 [facility]


   Last Admin: 03/15/18 11:12 Dose:  Not Given


Aspirin (Aspirin Chewable)  81 mg PO DAILY [facility]


   Last Admin: 03/15/18 09:20 Dose:  81 mg


Diltiazem HCl (Cardizem)  30 mg PO Q6H [facility]


   Last Admin: 03/15/18 13:49 Dose:  30 mg


Epoetin Vamsi (Procrit)  12,000 unit IV TTS [facility]


Ferrous Gluconate (Fergon)  324 mg PO TID [facility]


   Last Admin: 03/15/18 13:49 Dose:  324 mg


Heparin Sodium (Porcine) (Heparin)  1,000 units IVP TTS [facility]


Hydromorphone HCl (Dilaudid)  0.5 mg IVP Q6H [facility]


   PRN Reason: Pain, moderate (4-7)


Vancomycin/Sodium Chloride (Vancomycin 1 Gm/Ns 200 Ml)  1 gm in 200 mls @ 

133.333 mls/hr IVPB TTS [facility]


   PRN Reason: Protocol


   Stop: 18 10:01


Aztreonam 1 gm/ Sodium (Chloride)  100 mls @ 100 mls/hr IVPB TTS [facility]


   PRN Reason: Protocol


Aztreonam 1 gm/ Sodium (Chloride)  100 mls @ 100 mls/hr IVPB ONCE ONE


   PRN Reason: Protocol


   Stop: 03/15/18 15:59


   Last Admin: 03/15/18 14:21 Dose:  100 mls/hr


Insulin Aspart (Novolog)  0 unit SC Q6 [facility]


   PRN Reason: Protocol


   Last Admin: 03/15/18 12:07 Dose:  2 unit


Insulin Glargine (Lantus)  5 unit SC HS [facility]


   Last Admin: 18 21:32 Dose:  5 u


Metoprolol Tartrate (Lopressor)  25 mg PO BID [facility]


   Last Admin: 03/15/18 09:22 Dose:  Not Given


Midodrine (Proamatine)  10 mg PO TTS [facility]


Pantoprazole Sodium (Protonix Inj)  40 mg IVP DAILY [facility]


   Last Admin: 03/15/18 09:20 Dose:  40 mg


Paricalcitol (Zemplar)  2 mcg IV TTS [facility]


Rosuvastatin Calcium (Crestor)  5 mg PO HS [facility]


   Last Admin: 18 21:32 Dose:  5 mg


Sennosides (Senokot Tab)  8.6 mg NG DAILY [facility]


   Last Admin: 03/15/18 09:21 Dose:  8.6 mg


Sevelamer Carbonate (Renvela)  800 mg PO TIDCC [facility]


   Last Admin: 03/15/18 12:08 Dose:  800 mg


Vitamin B Complex/Vit C/Folic Acid (Nephro-Nneka)  1 tab PO 0800 [facility]


   Last Admin: 03/15/18 08:12 Dose:  1 tab


Warfarin Sodium (Coumadin)  2.5 mg PO 1800 [facility]


   Stop: 03/15/18 18:01











Results





- Vital Signs


Recent Vital Signs: 


 Last Vital Signs











Temp  96.6 F L  03/15/18 13:10


 


Pulse  101 H  03/15/18 15:02


 


Resp  18   03/15/18 15:02


 


BP  101/67   03/15/18 15:03


 


Pulse Ox  100   03/15/18 15:02














- Labs


Result Diagrams: 


 03/15/18 06:12





 03/15/18 06:10


Labs: 


 Laboratory Results - last 24 hr











  03/14/18 03/14/18 03/15/18





  17:20 17:55 00:14


 


WBC   


 


RBC   


 


Hgb   


 


Hct   


 


MCV   


 


MCH   


 


MCHC   


 


RDW   


 


Plt Count   


 


MPV   


 


Neut % (Auto)   


 


Lymph % (Auto)   


 


Mono % (Auto)   


 


Eos % (Auto)   


 


Baso % (Auto)   


 


Neut # (Auto)   


 


Lymph # (Auto)   


 


Mono # (Auto)   


 


Eos # (Auto)   


 


Baso # (Auto)   


 


Neutrophils % (Manual)   


 


Lymphocytes % (Manual)   


 


Monocytes % (Manual)   


 


Platelet Estimate   


 


Large Platelets   


 


Hypochromasia (manual)   


 


Poikilocytosis (manual   


 


Anisocytosis (manual)   


 


Target Cells   


 


Ovalocytes   


 


McLean Cells   


 


PT   


 


INR   


 


APTT   


 


Puncture Site   Rra 


 


pCO2   33 L 


 


pO2   47 L 


 


HCO3   21.3 


 


ABG pH   7.39 


 


ABG Total CO2   21.0 L 


 


ABG O2 Saturation   85.8 L 


 


ABG Base Excess   -4.1 L 


 


Jesus Test   Pos 


 


ABG Potassium   4.4 


 


A-a O2 Difference   625.0 


 


Respiratory Index   13.3 


 


Sodium   137.0 


 


Chloride   102.0 


 


Glucose   249 H 


 


Lactate   2.0 


 


FiO2   100.0 


 


Potassium   


 


Carbon Dioxide   


 


Anion Gap   


 


BUN   


 


Creatinine   


 


Est GFR ( Amer)   


 


Est GFR (Non-Af Amer)   


 


POC Glucose (mg/dL)  276 H   210 H


 


Random Glucose   


 


Calcium   


 


Phosphorus   


 


Magnesium   


 


Total Bilirubin   


 


AST   


 


ALT   


 


Alkaline Phosphatase   


 


Total Protein   


 


Albumin   


 


Globulin   


 


Albumin/Globulin Ratio   


 


Arterial Blood Potassium   4.4 














  03/15/18 03/15/18 03/15/18





  05:31 06:10 06:12


 


WBC    24.0 H


 


RBC    3.34 L


 


Hgb    9.2 L


 


Hct    29.1 L


 


MCV    87.0


 


MCH    27.7


 


MCHC    31.8 L


 


RDW    17.7 H


 


Plt Count    228


 


MPV    9.7


 


Neut % (Auto)    96.9 H


 


Lymph % (Auto)    0.8 L


 


Mono % (Auto)    2.2


 


Eos % (Auto)    0.0


 


Baso % (Auto)    0.1


 


Neut # (Auto)    23.3 H


 


Lymph # (Auto)    0.2 L


 


Mono # (Auto)    0.5


 


Eos # (Auto)    0.0


 


Baso # (Auto)    0.0


 


Neutrophils % (Manual)    98 H


 


Lymphocytes % (Manual)    1 L


 


Monocytes % (Manual)    1


 


Platelet Estimate    Normal


 


Large Platelets    Present


 


Hypochromasia (manual)    Slight


 


Poikilocytosis (manual    Slight


 


Anisocytosis (manual)    Slight


 


Target Cells    Slight


 


Ovalocytes    Slight


 


McLean Cells    Moderate


 


PT   


 


INR   


 


APTT   


 


Puncture Site   


 


pCO2   


 


pO2   


 


HCO3   


 


ABG pH   


 


ABG Total CO2   


 


ABG O2 Saturation   


 


ABG Base Excess   


 


Jesus Test   


 


ABG Potassium   


 


A-a O2 Difference   


 


Respiratory Index   


 


Sodium   137 


 


Chloride   98 


 


Glucose   


 


Lactate   


 


FiO2   


 


Potassium   4.9 


 


Carbon Dioxide   19 L 


 


Anion Gap   25 H 


 


BUN   95 H 


 


Creatinine   5.1 H 


 


Est GFR ( Amer)   10 


 


Est GFR (Non-Af Amer)   8 


 


POC Glucose (mg/dL)  273 H  


 


Random Glucose   236 H 


 


Calcium   8.7 


 


Phosphorus   8.6 H 


 


Magnesium   2.3 


 


Total Bilirubin   0.8 


 


AST   29 


 


ALT   214 H 


 


Alkaline Phosphatase   159 H 


 


Total Protein   6.0 L 


 


Albumin   3.0 L 


 


Globulin   3.0 


 


Albumin/Globulin Ratio   1.0 


 


Arterial Blood Potassium   














  03/15/18 03/15/18





  08:24 11:16


 


WBC  


 


RBC  


 


Hgb  


 


Hct  


 


MCV  


 


MCH  


 


MCHC  


 


RDW  


 


Plt Count  


 


MPV  


 


Neut % (Auto)  


 


Lymph % (Auto)  


 


Mono % (Auto)  


 


Eos % (Auto)  


 


Baso % (Auto)  


 


Neut # (Auto)  


 


Lymph # (Auto)  


 


Mono # (Auto)  


 


Eos # (Auto)  


 


Baso # (Auto)  


 


Neutrophils % (Manual)  


 


Lymphocytes % (Manual)  


 


Monocytes % (Manual)  


 


Platelet Estimate  


 


Large Platelets  


 


Hypochromasia (manual)  


 


Poikilocytosis (manual  


 


Anisocytosis (manual)  


 


Target Cells  


 


Ovalocytes  


 


McLean Cells  


 


PT  28.9 H D 


 


INR  2.4  D 


 


APTT  29 


 


Puncture Site  


 


pCO2  


 


pO2  


 


HCO3  


 


ABG pH  


 


ABG Total CO2  


 


ABG O2 Saturation  


 


ABG Base Excess  


 


Jesus Test  


 


ABG Potassium  


 


A-a O2 Difference  


 


Respiratory Index  


 


Sodium  


 


Chloride  


 


Glucose  


 


Lactate  


 


FiO2  


 


Potassium  


 


Carbon Dioxide  


 


Anion Gap  


 


BUN  


 


Creatinine  


 


Est GFR ( Amer)  


 


Est GFR (Non-Af Amer)  


 


POC Glucose (mg/dL)   173 H


 


Random Glucose  


 


Calcium  


 


Phosphorus  


 


Magnesium  


 


Total Bilirubin  


 


AST  


 


ALT  


 


Alkaline Phosphatase  


 


Total Protein  


 


Albumin  


 


Globulin  


 


Albumin/Globulin Ratio  


 


Arterial Blood Potassium  














Attending/Attestation





- Attestation


I have personally seen and examined this patient.: Yes


I have fully participated in the care of the patient.: Yes


I have reviewed all pertinent clinical information: Yes


Notes (Text): 





03/15/18 15:36


I have seen and examined patient with medical resident and GI fellow.  Agree 

with above documentation with the following additions.  In brief, this is a 77 

year old female with medical history of DM, HTN, CHF, pulmonary HTN, TIA, colon 

cancer s/p resection, DVT, atrial fibrillation who was initially brought to 

hospital for AMS and chest pain.  Her hospital course has been complicated by 

development of renal failure requiring initiation of dialysis, NSTEMI, cardiac 

arrest, respiratory failure s/p intubation with recent extubation.  GI called 

for evaluation of PEG placement.  Patient herself is unable to participate in 

meaningful conversation due to current medical condition, additional 

information obtained via chart review, discussion with nursing staff, and 

patient family members.  Apparently prior to hospital admission, patient was 

able to swallow and consume food without difficulty.  NGT was placed yesterday 

and patient tolerating tube feeding thus far.  There is no reported abdominal 

pain, nausea, vomiting.  





12 point review of systems not able to be performed given patient mental status





DM / HTN


CHF


Atrial fibrillation on coumadin


TIA


History of colon cancer s/p resection


DVT


NSTEMI, cardiac arrest


Respiratory failure, pneumonia - sepsis


Dysphagia





- Continue with tube feeding as tolerated, monitor for residuals


- Maintain aspiration precautions


- Continue with antibiotic therapy


- Patient currently not medically optimized for potential endoscopic procedure 

with feeding tube placement, also NGT just placed yesterday.  Would defer 

procedure for time being due to current medical condition and would reconsider 

after some time if it is noted that patient has not regained swallowing 

capabilities.  Will need to carefully discuss with family members regarding 

risks/benefits of procedure and if agreeable can consider in future when 

patient is able to come off Coumadin and cardiology notes no contraindications 

to proceed with procedure.


- Will sign off case, please reconsult as necessary, thank you.

## 2018-03-15 NOTE — RAD
Chest x-ray single frontal view 



History: Hypoxemia 



Comparison: 03/13/2018 



Findings: 



Persistent rounded mass like opacity at the right lung base laterally 

for which underlying lesion cannot be excluded.  Correlation with 

chest CT and or follow-up x-ray would be helpful if clinically 

indicated. 



Persistent moderate loculated left pleural effusion with 

consolidative changes seen within the left mid to lower lung zone. 



Cardiomegaly. 



Calcification at the aortic knob. 



NG tube extending into the stomach. 



Right-sided venous catheter tip extending into the right atrium. 



Left central venous catheter with tip extending into the right 

atrium. 



Impression: 



Persistent rounded mass like opacity at the right lung base laterally 

for which underlying lesion cannot be excluded.  Correlation with 

chest CT and or follow-up x-ray would be helpful if clinically 

indicated. 



Persistent moderate loculated left pleural effusion with 

consolidative changes seen within the left mid to lower lung zone. 



Cardiomegaly. 



Calcification at the aortic knob. 



NG tube extending into the stomach. 



Right-sided venous catheter tip extending into the right atrium. 



Left central venous catheter with tip extending into the right 

atrium.

## 2018-03-16 LAB
ALBUMIN SERPL-MCNC: 2.9 G/DL (ref 3.5–5)
ALBUMIN/GLOB SERPL: 1 {RATIO} (ref 1–2.1)
ALT SERPL-CCNC: 170 U/L (ref 9–52)
ANISOCYTOSIS BLD QL SMEAR: SLIGHT
AST SERPL-CCNC: 33 U/L (ref 14–36)
BASOPHILS # BLD AUTO: 0 K/UL (ref 0–0.2)
BASOPHILS NFR BLD: 0.1 % (ref 0–2)
BUN SERPL-MCNC: 71 MG/DL (ref 7–17)
CALCIUM SERPL-MCNC: 8.3 MG/DL (ref 8.6–10.4)
EOSINOPHIL # BLD AUTO: 0 K/UL (ref 0–0.7)
EOSINOPHIL NFR BLD: 0 % (ref 0–4)
ERYTHROCYTE [DISTWIDTH] IN BLOOD BY AUTOMATED COUNT: 17.6 % (ref 11.5–14.5)
GFR NON-AFRICAN AMERICAN: 10
HGB BLD-MCNC: 9 G/DL (ref 11–16)
HYPOCHROMIC: SLIGHT
INR PPP: 2.8
LYMPHOCYTE: 1 % (ref 20–40)
LYMPHOCYTES # BLD AUTO: 0.3 K/UL (ref 1–4.3)
LYMPHOCYTES NFR BLD AUTO: 1.3 % (ref 20–40)
MCH RBC QN AUTO: 27.2 PG (ref 27–31)
MCHC RBC AUTO-ENTMCNC: 31.4 G/DL (ref 33–37)
MCV RBC AUTO: 86.6 FL (ref 81–99)
MONOCYTE: 2 % (ref 0–10)
MONOCYTES # BLD: 0.6 K/UL (ref 0–0.8)
MONOCYTES NFR BLD: 2.5 % (ref 0–10)
NEUTROPHILS # BLD: 23.7 K/UL (ref 1.8–7)
NEUTROPHILS NFR BLD AUTO: 96.1 % (ref 50–75)
NEUTROPHILS NFR BLD AUTO: 97 % (ref 50–75)
NRBC BLD AUTO-RTO: 0.4 % (ref 0–2)
PLATELET # BLD EST: NORMAL 10*3/UL
PLATELET # BLD: 230 K/UL (ref 130–400)
PMV BLD AUTO: 8.7 FL (ref 7.2–11.7)
POLYCHROMIC: SLIGHT
PROTHROMBIN TIME: 32.5 SECONDS (ref 9.7–12.2)
RBC # BLD AUTO: 3.31 MIL/UL (ref 3.8–5.2)
TOTAL CELLS COUNTED BLD: 100
WBC # BLD AUTO: 24.7 K/UL (ref 4.8–10.8)

## 2018-03-16 RX ADMIN — INSULIN ASPART SCH UNIT: 100 INJECTION, SOLUTION INTRAVENOUS; SUBCUTANEOUS at 12:35

## 2018-03-16 RX ADMIN — IPRATROPIUM BROMIDE AND ALBUTEROL SULFATE SCH ML: .5; 3 SOLUTION RESPIRATORY (INHALATION) at 21:45

## 2018-03-16 RX ADMIN — INSULIN ASPART SCH UNIT: 100 INJECTION, SOLUTION INTRAVENOUS; SUBCUTANEOUS at 06:35

## 2018-03-16 RX ADMIN — INSULIN GLARGINE SCH UNITS: 100 INJECTION, SOLUTION SUBCUTANEOUS at 22:00

## 2018-03-16 RX ADMIN — IPRATROPIUM BROMIDE AND ALBUTEROL SULFATE SCH ML: .5; 3 SOLUTION RESPIRATORY (INHALATION) at 07:21

## 2018-03-16 RX ADMIN — INSULIN ASPART SCH: 100 INJECTION, SOLUTION INTRAVENOUS; SUBCUTANEOUS at 01:00

## 2018-03-16 RX ADMIN — IPRATROPIUM BROMIDE AND ALBUTEROL SULFATE SCH ML: .5; 3 SOLUTION RESPIRATORY (INHALATION) at 16:45

## 2018-03-16 RX ADMIN — Medication SCH TAB: at 08:13

## 2018-03-16 RX ADMIN — IPRATROPIUM BROMIDE AND ALBUTEROL SULFATE SCH ML: .5; 3 SOLUTION RESPIRATORY (INHALATION) at 03:09

## 2018-03-16 RX ADMIN — INSULIN ASPART SCH UNIT: 100 INJECTION, SOLUTION INTRAVENOUS; SUBCUTANEOUS at 18:22

## 2018-03-16 RX ADMIN — IPRATROPIUM BROMIDE AND ALBUTEROL SULFATE SCH ML: .5; 3 SOLUTION RESPIRATORY (INHALATION) at 23:41

## 2018-03-16 RX ADMIN — IPRATROPIUM BROMIDE AND ALBUTEROL SULFATE SCH ML: .5; 3 SOLUTION RESPIRATORY (INHALATION) at 11:16

## 2018-03-16 NOTE — PN
DATE:



SUBJECTIVE:  The patient is afebrile, but she is on a nonrebreathing mask

at this time.



PHYSICAL EXAMINATION:

VITAL SIGNS:  T-max is 97.4, pulse of 100, blood pressure is low at 97/59,

respirations are 20.

GENERAL:  She is lethargic.

NECK:  Supple.

LUNGS:  Have coarse breath sounds.

HEART:  S1 and S2 irregularly regular.

ABDOMEN:  Soft, flabby, and nontender.

EXTREMITIES:  Bilateral Venodyne boots on, and the patient may have some

edema.  She has been on dialysis.



LABORATORY DATA:  White count is 24, hemoglobin is 9.2, hematocrit 29, and

platelet count is 228.  Neutrophils are 96, lymphs 0.8, and she has been on

Azactam, and she is on dialysis, so at this time we will continue Azactam 1

gm daily and we will follow.



IMPRESSION:  The patient has pseudomonas in the sputum, and she needs to be

treated and it is a pretty sensitive organism, hence, I think Azactam is

working.  We will continue Azactam daily as it needs to be given daily.  We

will follow.





__________________________________________

Mahnaz Tate MD



cc:  Dr. Tate.



DD:  03/15/2018 18:54:19

DT:  03/15/2018 20:16:13

Job # [id number]

## 2018-03-16 NOTE — CP.PCM.PN
Subjective





- Date & Time of Evaluation


Date of Evaluation: 03/16/18


Time of Evaluation: 16:11





- Subjective


Subjective: 





Nephrology Consultation:





Assessment: stable


CKD stage 5 (N18.5) with hyperkalemia, acidosis now has ESRD on HD via 

permacath TTS 


pulm edema, CHF, NSTEMI, pleural effusions, pneumonia


AMS, hyperglycemia


s/p cardiac arrest, shock liver, A fib





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 


Hypertensive Chronic Kidney Disease (I12.9)


Anemia (D64.9), Hyperphosphatemia (E83.39), Secondary Hyperparathyroidism (E21.1

), HTN (I12.9), vit D insuff, hx of colon CA, TIAs





Plan


HD Tomorrow as ordered as per TTS schedule. 


maintain hemodynamic stable. Patient not on ACEI/ARB due to low BP. on 

midodrine prior to HD


started iron supplements, MVI, epogen with HD, and zemplar 2 mcg with HD.


started on phos binders as renvela





Dose meds/antibiotics for ESRD/HD status. Avoid fleets enema/magnesium based 

laxatives. Avoid nephrotoxins/NSAIDs


Glycemic control


Further work up/management as per primary team


 consult for outpatient dialysis placement.





Thanks for allowing me to participate in care of your patient. Will follow 

patient with you. Please call if any Qs.


Dr Shad Panchal


Office: [phone] Cell: [phone] Fax: [phone]





Chief Complaint; unable to obtain


reason for consult: CKD management


source of Info: EMR


HPI: Pt is a 77 F with hx of diabetes Mellitus ( years), hypertension (years), 

CHF, TIA, Colon CA, CKD stage 4/5, has matured AVG in left arm presented with 

complaints of AMS and hyperglycemia as brought by family. now has elevated trop 

100, hyperkalemia, AMS and elevated sugar, pulm edema. renal consult for CKD 

management. 


pt with AMS unable to provide any hx. she had received medical management for 

hyperkalemia. 





ROS: unable to obtain





Physical Examination: 


General Appearance: ill appearing, on O2 


Vitals reviewed and noted as below


Head; Atraumatic, normocephalic


ENT: On O2 


EYES: PERRLA


Neck; supple no lymphadenopathy, no thyromegaly or bruit


Lungs: Normal respiratory rate/effort. Breath sounds bilateral clear, she is 

mechanically ventilated


Heart: Increased rate. s1s2 normal. No rub or gallop. 


Extremities: no edema. No varicose veins. chronic hyperpigmented changes in legs


Neurological: Patient is  Not communicative verbally


Skin: Warm and dry. Normal turgor. No rash. Palpitation: Normal elasticity for 

age


Abdomen: Abdomen is soft. Bowel sounds +. There is no abdominal tenderness, no 

guarding/rigidity no organomegaly


Psych: unable


MSK: no joint tenderness or swelling. Digits and nails normal, no deformity


: kidney or bladder not palpable


Access: Left AVG without bruit. has left permacath





Labs/imaging reviewed.


Past medical history, past surgical history, family history, social history, 

allergy reviewed and noted as below


Family hx: no hx of CKD. Rest non-contributory








Objective





- Vital Signs/Intake and Output


Vital Signs (last 24 hours): 


 











Temp Pulse Resp BP Pulse Ox


 


 98.2 F   92 H  18   108/54 L  100 


 


 03/16/18 15:00  03/16/18 15:00  03/16/18 15:00  03/16/18 15:00  03/16/18 15:00








Intake and Output: 


 











 03/16/18 03/16/18





 06:59 18:59


 


Intake Total 880 


 


Balance 880 














- Medications


Medications: 


 Current Medications





Albumin Human (Albumin Human 25% (12.5 Gm/50 Ml))  25 gm IV TTS PRN


   PRN Reason: Other


Albuterol/Ipratropium (Duoneb 3 Mg/0.5 Mg (3 Ml) Ud)  3 ml INH RQ4 [facility]


   Last Admin: 03/16/18 11:16 Dose:  3 ml


Aspirin (Aspirin Chewable)  81 mg PO DAILY [facility]


   Last Admin: 03/16/18 11:12 Dose:  81 mg


Diltiazem HCl (Cardizem)  30 mg PO Q6H [facility]


   Last Admin: 03/16/18 12:35 Dose:  30 mg


Epoetin Vamsi (Procrit)  10,000 unit IV TTS [facility]


Epoetin Vamsi (Procrit)  2,000 u IV TTS [facility]


Ferrous Gluconate (Fergon)  324 mg PO TID [facility]


   Last Admin: 03/16/18 14:32 Dose:  324 mg


Heparin Sodium (Porcine) (Heparin)  1,000 units IVP TTS [facility]


Hydromorphone HCl (Dilaudid)  0.5 mg IVP Q6H PRN


   PRN Reason: Pain, moderate (4-7)


Aztreonam 1 gm/ Sodium (Chloride)  100 mls @ 100 mls/hr IVPB DAILY [facility]


   PRN Reason: Protocol


   Last Admin: 03/16/18 11:14 Dose:  100 mls/hr


Insulin Aspart (Novolog)  0 unit SC Q6 [facility]


   PRN Reason: Protocol


   Last Admin: 03/16/18 12:35 Dose:  6 unit


Insulin Glargine (Lantus)  5 unit SC HS [facility]


   Last Admin: 03/15/18 21:24 Dose:  5 u


Metoprolol Tartrate (Lopressor)  25 mg PO BID [facility]


   Last Admin: 03/16/18 11:13 Dose:  25 mg


Midodrine (Proamatine)  10 mg PO TTS [facility]


Midodrine (Proamatine)  10 mg PO TTS [facility]


Pantoprazole Sodium (Protonix Inj)  40 mg IVP DAILY [facility]


   Last Admin: 03/16/18 11:12 Dose:  40 mg


Paricalcitol (Zemplar)  2 mcg IV TTS [facility]


Rosuvastatin Calcium (Crestor)  5 mg PO HS [facility]


   Last Admin: 03/15/18 21:24 Dose:  5 mg


Sennosides (Senokot Tab)  8.6 mg NG DAILY [facility]


   Last Admin: 03/16/18 11:12 Dose:  8.6 mg


Sevelamer Carbonate (Renvela)  800 mg PO TIDCC [facility]


   Last Admin: 03/16/18 12:35 Dose:  800 mg


Vitamin B Complex/Vit C/Folic Acid (Nephro-Nneka)  1 tab PO 0800 [facility]


   Last Admin: 03/16/18 08:13 Dose:  1 tab











- Labs


Labs: 


 





 03/16/18 07:17 





 03/16/18 07:17 





 











PT  32.5 SECONDS (9.7-12.2)  H*  03/16/18  07:17    


 


INR  2.8   03/16/18  07:17    


 


APTT  29 SECONDS (21-34)   03/15/18  08:24

## 2018-03-16 NOTE — CP.PCM.PN
<Mike Silva - Last Filed: 03/16/18 17:06>





Subjective





- Date & Time of Evaluation


Date of Evaluation: 03/16/18


Time of Evaluation: 09:00





- Subjective


Subjective: 





Medicine progress note for Dr. Simpson





Patient seen and examined. Patient is in no acute distress, but she is unable 

to speak. Patient moves her lips but is unable to produce sound. ROS could not 

be obtained.





Objective





- Vital Signs/Intake and Output


Vital Signs (last 24 hours): 


 











Temp Pulse Resp BP Pulse Ox


 


 98.7 F   102 H  18   110/67   99 


 


 03/16/18 07:05  03/16/18 07:53  03/16/18 07:05  03/16/18 07:05  03/16/18 07:05








Intake and Output: 


 











 03/16/18 03/16/18





 06:59 18:59


 


Intake Total 880 


 


Balance 880 














- Medications


Medications: 


 Current Medications





Albumin Human (Albumin Human 25% (12.5 Gm/50 Ml))  25 gm IV TTS PRN


   PRN Reason: Other


Albuterol/Ipratropium (Duoneb 3 Mg/0.5 Mg (3 Ml) Ud)  3 ml INH RQ4 [facility]


   Last Admin: 03/16/18 07:21 Dose:  3 ml


Aspirin (Aspirin Chewable)  81 mg PO DAILY [facility]


   Last Admin: 03/15/18 09:20 Dose:  81 mg


Diltiazem HCl (Cardizem)  30 mg PO Q6H [facility]


   Last Admin: 03/16/18 06:35 Dose:  30 mg


Epoetin Vamsi (Procrit)  12,000 unit IV TTS [facility]


Ferrous Gluconate (Fergon)  324 mg PO TID [facility]


   Last Admin: 03/15/18 17:09 Dose:  324 mg


Heparin Sodium (Porcine) (Heparin)  1,000 units IVP TTS [facility]


Hydromorphone HCl (Dilaudid)  0.5 mg IVP Q6H PRN


   PRN Reason: Pain, moderate (4-7)


Vancomycin/Sodium Chloride (Vancomycin 1 Gm/Ns 200 Ml)  1 gm in 200 mls @ 

133.333 mls/hr IVPB TTS [facility]


   PRN Reason: Protocol


   Stop: 03/22/18 10:01


Aztreonam 1 gm/ Sodium (Chloride)  100 mls @ 100 mls/hr IVPB DAILY [facility]


   PRN Reason: Protocol


Insulin Aspart (Novolog)  0 unit SC Q6 [facility]


   PRN Reason: Protocol


   Last Admin: 03/16/18 06:35 Dose:  6 unit


Insulin Glargine (Lantus)  5 unit SC HS [facility]


   Last Admin: 03/15/18 21:24 Dose:  5 u


Metoprolol Tartrate (Lopressor)  25 mg PO BID [facility]


   Last Admin: 03/15/18 17:06 Dose:  25 mg


Midodrine (Proamatine)  10 mg PO TTS [facility]


Midodrine (Proamatine)  10 mg PO TTS [facility]


Pantoprazole Sodium (Protonix Inj)  40 mg IVP DAILY [facility]


   Last Admin: 03/15/18 09:20 Dose:  40 mg


Paricalcitol (Zemplar)  2 mcg IV TTS [facility]


Rosuvastatin Calcium (Crestor)  5 mg PO HS [facility]


   Last Admin: 03/15/18 21:24 Dose:  5 mg


Sennosides (Senokot Tab)  8.6 mg NG DAILY [facility]


   Last Admin: 03/15/18 09:21 Dose:  8.6 mg


Sevelamer Carbonate (Renvela)  800 mg PO TIDCC [facility]


   Last Admin: 03/16/18 08:13 Dose:  800 mg


Vitamin B Complex/Vit C/Folic Acid (Nephro-Nneka)  1 tab PO 0800 [facility]


   Last Admin: 03/16/18 08:13 Dose:  1 tab











- Labs


Labs: 


 





 03/16/18 07:17 





 03/16/18 07:17 





 











PT  32.5 SECONDS (9.7-12.2)  H*  03/16/18  07:17    


 


INR  2.8   03/16/18  07:17    


 


APTT  29 SECONDS (21-34)   03/15/18  08:24    














- Constitutional


Appears: No Acute Distress, Chronically Ill





- Eye Exam


Eye Exam: EOMI, Normal appearance





- ENT Exam


ENT Exam: Mucous Membranes Moist


Additional comments: 





NGT in place





- Respiratory Exam


Additional comments: 





Very coarse breath sounds bilaterally





- Cardiovascular Exam


Cardiovascular Exam: Irregular Rhythm, +S1, +S2





- GI/Abdominal Exam


GI & Abdominal Exam: Soft, Hypoactive Bowel Sounds.  absent: Distended, Guarding

, Tenderness





- Extremities Exam


Additional comments: 





Evidence of chronic venous stasis


Pedal pulses weak





- Neurological Exam


Neurological Exam: Awake





- Skin


Skin Exam: Dry, Warm





Assessment and Plan





- Assessment and Plan (Free Text)


Plan: 





1. Acute NSTEMI,  Asystole ,CPR/Sustained VT


Previously on Heparin GGT


Cardiology consult, Dr. Nichole. Help appreciated


Family elected against cardiac catheterization despite the advice of multiple 

professionals


Echo shows EF of 15-20%


Cardizem 30 mg PO Q6


Lopressor 25 mg PO BID


ASA 81 mg PO daily


Crestor 5 mg HS








2. Respiratory failure, s/p intubation and later extubation


Previously intubated in the ICU and now extubated


Saturating well on non-rebreather mask





3. Atrial Fibrillation 


CHADS-VAsc score 8


INR  today 2.8 


Holding Coumadin tonight  


Rate controlled with Lopressor and Cardizem





4. Acute renal failure on chronic renal disease/Now ESRD on HD


Nephrology consult, Dr. Wheeler, help appreciated


Dialysis T,Th,S


Renvela TID





On 3/13:  L IJ permacath placed and left shiley removed








5. Sepsis


Previously on Vancomycin, flagyl, and aztreonam


Now currently only on Aztreonam for pseudomonas in the sputum


Elevated procalcitonin 6.1








6. Acute systolic heart failure


Family refuses cardiac cath. has high risk for arrhythmia and poor prognosis


Family aware of her condition


ECHO shows EF 15 to 20%








7. Pleural effusion


Persistent left loculated pleural effusion





8. Hyperglycemia and uncontrolled DM


Aspart sliding scale


Lantus 5 units HS





9. Anemia of chronic disease, CKD


Procrit T,Th,S


Ferrous gluconate 324 mg TID





10. Prophylaxis


Tube feeds through NGT


Protonix 40 mg IV daily


DVT prophylaxis with Coumadin, currently therapeutic INR





Discussed with Dr. Tatiana Silva PGY-1





<Judy Simpson - Last Filed: 03/16/18 17:47>





Objective





- Vital Signs/Intake and Output


Vital Signs (last 24 hours): 


 











Temp Pulse Resp BP Pulse Ox


 


 98.2 F   92 H  18   108/54 L  100 


 


 03/16/18 15:00  03/16/18 15:00  03/16/18 15:00  03/16/18 15:00  03/16/18 15:00








Intake and Output: 


 











 03/16/18 03/16/18





 06:59 18:59


 


Intake Total 880 


 


Balance 880 














- Medications


Medications: 


 Current Medications





Albumin Human (Albumin Human 25% (12.5 Gm/50 Ml))  25 gm IV TTS PRN


   PRN Reason: Other


Albuterol/Ipratropium (Duoneb 3 Mg/0.5 Mg (3 Ml) Ud)  3 ml INH RQ4 [facility]


   Last Admin: 03/16/18 11:16 Dose:  3 ml


Aspirin (Aspirin Chewable)  81 mg PO DAILY [facility]


   Last Admin: 03/16/18 11:12 Dose:  81 mg


Dextrose (Dextrose 50% Inj)  0 ml IV STAT PRN; Protocol


   PRN Reason: Hypoglycemia Protocol


Dextrose (Glutose 15)  0 gm PO ONCE PRN; Protocol


   PRN Reason: Hypoglycemia Protocol


Diltiazem HCl (Cardizem)  30 mg PO Q6H [facility]


   Last Admin: 03/16/18 12:35 Dose:  30 mg


Epoetin Vamsi (Procrit)  10,000 unit IV TTS [facility]


Epoetin Vamsi (Procrit)  2,000 u IV TTS [facility]


Ferrous Gluconate (Fergon)  324 mg PO TID [facility]


   Last Admin: 03/16/18 14:32 Dose:  324 mg


Glucagon (Glucagen Diagnostic Kit)  0 mg IM STAT PRN; Protocol


   PRN Reason: Hypoglycemia Protocol


Heparin Sodium (Porcine) (Heparin)  1,000 units IVP TTS [facility]


Hydromorphone HCl (Dilaudid)  0.5 mg IVP Q6H [facility]


   PRN Reason: Pain, moderate (4-7)


Aztreonam 1 gm/ Sodium (Chloride)  100 mls @ 100 mls/hr IVPB DAILY [facility]


   PRN Reason: Protocol


   Last Admin: 03/16/18 11:14 Dose:  100 mls/hr


Dextrose (Dextrose 5% In Water 1000 Ml)  1,000 mls @ 0 mls/hr IV .Q0M PRN; 

Protocol; Per Protocol


   PRN Reason: Hypoglycemia Protocol


Insulin Aspart (Novolog)  0 unit SC Q6 [facility]


   PRN Reason: Protocol


   Last Admin: 03/16/18 12:35 Dose:  6 unit


Insulin Glargine (Lantus)  5 unit SC HS [facility]


   Last Admin: 03/15/18 21:24 Dose:  5 u


Metoprolol Tartrate (Lopressor)  25 mg PO BID [facility]


   Last Admin: 03/16/18 11:13 Dose:  25 mg


Midodrine (Proamatine)  10 mg PO TTS [facility]


Midodrine (Proamatine)  10 mg PO TTS [facility]


Pantoprazole Sodium (Protonix Inj)  40 mg IVP DAILY [facility]


   Last Admin: 03/16/18 11:12 Dose:  40 mg


Paricalcitol (Zemplar)  2 mcg IV TTS [facility]


Rosuvastatin Calcium (Crestor)  5 mg PO HS [facility]


   Last Admin: 03/15/18 21:24 Dose:  5 mg


Sennosides (Senokot Tab)  8.6 mg NG DAILY [facility]


   Last Admin: 03/16/18 11:12 Dose:  8.6 mg


Sevelamer Carbonate (Renvela)  800 mg PO TIDCC [facility]


   Last Admin: 03/16/18 12:35 Dose:  800 mg


Vitamin B Complex/Vit C/Folic Acid (Nephro-Nneka)  1 tab PO 0800 [facility]


   Last Admin: 03/16/18 08:13 Dose:  1 tab











- Labs


Labs: 


 





 03/16/18 07:17 





 03/16/18 07:17 





 











PT  32.5 SECONDS (9.7-12.2)  H*  03/16/18  07:17    


 


INR  2.8   03/16/18  07:17    


 


APTT  29 SECONDS (21-34)   03/15/18  08:24    














Attending/Attestation





- Attestation


I have personally seen and examined this patient.: Yes


I have fully participated in the care of the patient.: Yes


I have reviewed all pertinent clinical information, including history, physical 

exam and plan: Yes


Notes (Text): 


Patient was seen and examined this morning . Sitting on bed with face mask/ non 

rebreather on. Denies sob.


Alert and responsive. Not able to talk/trying to talk without voice. failed 

swallowing study.on NG feeding. Repeat swallowing study on Monday.Getting 

dialysis TIW.We will get PT on Monday if she is stable to do.


Patient refused cardiac cath/cardiac intervention. Has EF 15 to 20%. High risk 

for life threatening arrhythmia. Spoke to her daughter Kristel and granddaughter 

yesterday.


Patient is full code. She is on Coumadin for afib. INR today is 2.8. I will 

hold her Coumadin tonight. check INR tomorrow and resume Coumadin if INR not 

going up


continue Azactam. Has Resp c/s pseudomonas. Cath tip without growth. off vanco. 

Discussed with Dr luna


I agree with the resident's documentation of the assessment and the plan

## 2018-03-17 LAB
ALBUMIN SERPL-MCNC: 2.8 G/DL (ref 3.5–5)
ALBUMIN/GLOB SERPL: 0.9 {RATIO} (ref 1–2.1)
ALT SERPL-CCNC: 134 U/L (ref 9–52)
ANISOCYTOSIS BLD QL SMEAR: SLIGHT
AST SERPL-CCNC: 26 U/L (ref 14–36)
BASOPHILS # BLD AUTO: 0 K/UL (ref 0–0.2)
BASOPHILS NFR BLD: 0.2 % (ref 0–2)
BUN SERPL-MCNC: 97 MG/DL (ref 7–17)
CALCIUM SERPL-MCNC: 8.3 MG/DL (ref 8.6–10.4)
EOSINOPHIL # BLD AUTO: 0 K/UL (ref 0–0.7)
EOSINOPHIL NFR BLD: 0.2 % (ref 0–4)
ERYTHROCYTE [DISTWIDTH] IN BLOOD BY AUTOMATED COUNT: 17.2 % (ref 11.5–14.5)
GFR NON-AFRICAN AMERICAN: 9
HGB BLD-MCNC: 8.7 G/DL (ref 11–16)
HYPOCHROMIC: SLIGHT
INR PPP: 2.9
LYMPHOCYTE: 3 % (ref 20–40)
LYMPHOCYTES # BLD AUTO: 0.4 K/UL (ref 1–4.3)
LYMPHOCYTES NFR BLD AUTO: 2 % (ref 20–40)
MACROCYTES BLD QL SMEAR: SLIGHT
MCH RBC QN AUTO: 27.6 PG (ref 27–31)
MCHC RBC AUTO-ENTMCNC: 31.6 G/DL (ref 33–37)
MCV RBC AUTO: 87.5 FL (ref 81–99)
MICROCYTES BLD QL SMEAR: SLIGHT
MONOCYTE: 4 % (ref 0–10)
MONOCYTES # BLD: 0.6 K/UL (ref 0–0.8)
MONOCYTES NFR BLD: 3 % (ref 0–10)
NEUTROPHILS # BLD: 20.7 K/UL (ref 1.8–7)
NEUTROPHILS NFR BLD AUTO: 92 % (ref 50–75)
NEUTROPHILS NFR BLD AUTO: 94.6 % (ref 50–75)
NEUTS BAND NFR BLD: 1 % (ref 0–2)
NRBC BLD AUTO-RTO: 0.3 % (ref 0–2)
OVALOCYTES BLD QL SMEAR: SLIGHT
PLATELET # BLD EST: NORMAL 10*3/UL
PLATELET # BLD: 207 K/UL (ref 130–400)
PMV BLD AUTO: 9.1 FL (ref 7.2–11.7)
POIKILOCYTOSIS BLD QL SMEAR: SLIGHT
POLYCHROMIC: SLIGHT
PROTHROMBIN TIME: 34 SECONDS (ref 9.7–12.2)
RBC # BLD AUTO: 3.15 MIL/UL (ref 3.8–5.2)
SCHISTOCYTES BLD QL SMEAR: SLIGHT
TEARDROP CELLS: SLIGHT
TOTAL CELLS COUNTED BLD: 100
WBC # BLD AUTO: 21.9 K/UL (ref 4.8–10.8)

## 2018-03-17 RX ADMIN — ERYTHROPOIETIN SCH UNIT: 10000 INJECTION, SOLUTION INTRAVENOUS; SUBCUTANEOUS at 14:44

## 2018-03-17 RX ADMIN — INSULIN GLARGINE SCH UNIT: 100 INJECTION, SOLUTION SUBCUTANEOUS at 21:28

## 2018-03-17 RX ADMIN — INSULIN ASPART SCH: 100 INJECTION, SOLUTION INTRAVENOUS; SUBCUTANEOUS at 00:25

## 2018-03-17 RX ADMIN — INSULIN ASPART SCH UNIT: 100 INJECTION, SOLUTION INTRAVENOUS; SUBCUTANEOUS at 17:20

## 2018-03-17 RX ADMIN — INSULIN ASPART SCH UNIT: 100 INJECTION, SOLUTION INTRAVENOUS; SUBCUTANEOUS at 06:09

## 2018-03-17 RX ADMIN — INSULIN ASPART SCH: 100 INJECTION, SOLUTION INTRAVENOUS; SUBCUTANEOUS at 12:41

## 2018-03-17 RX ADMIN — ERYTHROPOIETIN SCH U: 2000 INJECTION, SOLUTION INTRAVENOUS; SUBCUTANEOUS at 14:44

## 2018-03-17 RX ADMIN — IPRATROPIUM BROMIDE AND ALBUTEROL SULFATE SCH ML: .5; 3 SOLUTION RESPIRATORY (INHALATION) at 03:13

## 2018-03-17 RX ADMIN — Medication SCH: at 08:30

## 2018-03-17 RX ADMIN — PARICALCITOL SCH MCG: 2 INJECTION, SOLUTION INTRAVENOUS at 14:43

## 2018-03-17 RX ADMIN — IPRATROPIUM BROMIDE AND ALBUTEROL SULFATE SCH ML: .5; 3 SOLUTION RESPIRATORY (INHALATION) at 11:30

## 2018-03-17 RX ADMIN — IPRATROPIUM BROMIDE AND ALBUTEROL SULFATE SCH ML: .5; 3 SOLUTION RESPIRATORY (INHALATION) at 07:10

## 2018-03-17 NOTE — CP.PCM.PN
Subjective





- Date & Time of Evaluation


Date of Evaluation: 03/17/18


Time of Evaluation: 11:13





- Subjective


Subjective: 





plan shunt thrombectomy /declotting monday





Objective





- Vital Signs/Intake and Output


Vital Signs (last 24 hours): 


 











Temp Pulse Resp BP Pulse Ox


 


 97.9 F   100 H  18   117/66   100 


 


 03/17/18 07:00  03/17/18 08:00  03/17/18 07:00  03/17/18 07:00  03/17/18 07:00








Intake and Output: 


 











 03/17/18 03/17/18





 06:59 18:59


 


Intake Total 890 


 


Balance 890 














- Medications


Medications: 


 Current Medications





Albumin Human (Albumin Human 25% (12.5 Gm/50 Ml))  25 gm IV TTS PRN


   PRN Reason: Other


Albuterol/Ipratropium (Duoneb 3 Mg/0.5 Mg (3 Ml) Ud)  3 ml INH RQ4 [facility]


   Last Admin: 03/17/18 07:10 Dose:  3 ml


Aspirin (Aspirin Chewable)  81 mg PO DAILY [facility]


   Last Admin: 03/17/18 10:18 Dose:  81 mg


Dextrose (Dextrose 50% Inj)  0 ml IV STAT PRN; Protocol


   PRN Reason: Hypoglycemia Protocol


Dextrose (Glutose 15)  0 gm PO ONCE PRN; Protocol


   PRN Reason: Hypoglycemia Protocol


Diltiazem HCl (Cardizem)  30 mg PO Q6H [facility]


   Last Admin: 03/17/18 06:11 Dose:  30 mg


Epoetin Vamsi (Procrit)  10,000 unit IV TTS [facility]


Epoetin Vamsi (Procrit)  2,000 u IV TTS [facility]


Ferrous Gluconate (Fergon)  324 mg PO TID [facility]


   Last Admin: 03/17/18 10:19 Dose:  324 mg


Glucagon (Glucagen Diagnostic Kit)  0 mg IM STAT PRN; Protocol


   PRN Reason: Hypoglycemia Protocol


Heparin Sodium (Porcine) (Heparin)  1,000 units IVP TTS [facility]


Hydromorphone HCl (Dilaudid)  0.5 mg IVP Q6H [facility]


   PRN Reason: Pain, moderate (4-7)


Aztreonam 1 gm/ Sodium (Chloride)  100 mls @ 100 mls/hr IVPB DAILY [facility]


   PRN Reason: Protocol


   Last Admin: 03/17/18 10:22 Dose:  100 mls/hr


Dextrose (Dextrose 5% In Water 1000 Ml)  1,000 mls @ 0 mls/hr IV .Q0M PRN; 

Protocol; Per Protocol


   PRN Reason: Hypoglycemia Protocol


Insulin Aspart (Novolog)  0 unit SC Q6 [facility]


   PRN Reason: Protocol


   Last Admin: 03/17/18 06:09 Dose:  6 unit


Insulin Glargine (Lantus)  10 unit SC HS [facility]


Metoprolol Tartrate (Lopressor)  25 mg PO BID [facility]


   Last Admin: 03/17/18 10:20 Dose:  Not Given


Midodrine (Proamatine)  10 mg PO TTS [facility]


Midodrine (Proamatine)  10 mg PO TTS [facility]


Pantoprazole Sodium (Protonix Inj)  40 mg IVP DAILY [facility]


   Last Admin: 03/17/18 10:21 Dose:  40 mg


Paricalcitol (Zemplar)  2 mcg IV TTS [facility]


Rosuvastatin Calcium (Crestor)  5 mg PO HS [facility]


   Last Admin: 03/16/18 22:01 Dose:  5 mg


Sennosides (Senokot Tab)  8.6 mg NG DAILY [facility]


   Last Admin: 03/17/18 10:18 Dose:  8.6 mg


Sevelamer Carbonate (Renvela)  800 mg PO TIDCC [facility]


   Last Admin: 03/17/18 08:43 Dose:  800 mg


Vitamin B Complex/Vit C/Folic Acid (Nephro-Nneka)  1 tab PO 0800 [facility]


   Last Admin: 03/17/18 08:30 Dose:  Not Given











- Labs


Labs: 


 





 03/17/18 07:35 





 03/17/18 07:35 





 











PT  34.0 SECONDS (9.7-12.2)  H*  03/17/18  07:35    


 


INR  2.9   03/17/18  07:35    


 


APTT  29 SECONDS (21-34)   03/15/18  08:24

## 2018-03-17 NOTE — CP.PCM.PN
Objective





- Vital Signs/Intake and Output


Vital Signs (last 24 hours): 


 











Temp Pulse Resp BP Pulse Ox


 


 97.9 F   100 H  18   117/66   100 


 


 03/17/18 07:00  03/17/18 08:00  03/17/18 07:00  03/17/18 07:00  03/17/18 07:00








Intake and Output: 


 











 03/17/18 03/17/18





 06:59 18:59


 


Intake Total 890 


 


Balance 890 














- Medications


Medications: 


 Current Medications





Albumin Human (Albumin Human 25% (12.5 Gm/50 Ml))  25 gm IV TTS PRN


   PRN Reason: Other


Aspirin (Aspirin Chewable)  81 mg PO DAILY [facility]


   Last Admin: 03/17/18 10:18 Dose:  81 mg


Dextrose (Dextrose 50% Inj)  0 ml IV STAT PRN; Protocol


   PRN Reason: Hypoglycemia Protocol


Dextrose (Glutose 15)  0 gm PO ONCE PRN; Protocol


   PRN Reason: Hypoglycemia Protocol


Diltiazem HCl (Cardizem)  30 mg PO Q6H [facility]


   Last Admin: 03/17/18 06:11 Dose:  30 mg


Epoetin Vamsi (Procrit)  10,000 unit IV TTS [facility]


Epoetin Vamsi (Procrit)  2,000 u IV TTS [facility]


Ferrous Gluconate (Fergon)  324 mg PO TID [facility]


   Last Admin: 03/17/18 10:19 Dose:  324 mg


Glucagon (Glucagen Diagnostic Kit)  0 mg IM STAT PRN; Protocol


   PRN Reason: Hypoglycemia Protocol


Heparin Sodium (Porcine) (Heparin)  1,000 units IVP TTS [facility]


Hydromorphone HCl (Dilaudid)  0.5 mg IVP Q6H PRN


   PRN Reason: Pain, moderate (4-7)


Aztreonam 1 gm/ Sodium (Chloride)  100 mls @ 100 mls/hr IVPB DAILY [facility]


   PRN Reason: Protocol


   Last Admin: 03/17/18 10:22 Dose:  100 mls/hr


Dextrose (Dextrose 5% In Water 1000 Ml)  1,000 mls @ 0 mls/hr IV .Q0M PRN; 

Protocol; Per Protocol


   PRN Reason: Hypoglycemia Protocol


Insulin Aspart (Novolog)  0 unit SC Q6 [facility]


   PRN Reason: Protocol


   Last Admin: 03/17/18 12:41 Dose:  Not Given


Insulin Glargine (Lantus)  10 unit SC HS [facility]


Metoprolol Tartrate (Lopressor)  25 mg PO BID [facility]


   Last Admin: 03/17/18 10:20 Dose:  Not Given


Midodrine (Proamatine)  10 mg PO TTS [facility]


Midodrine (Proamatine)  10 mg PO TTS [facility]


Pantoprazole Sodium (Protonix Inj)  40 mg IVP DAILY [facility]


   Last Admin: 03/17/18 10:21 Dose:  40 mg


Paricalcitol (Zemplar)  2 mcg IV TTS [facility]


Rosuvastatin Calcium (Crestor)  5 mg PO HS [facility]


   Last Admin: 03/16/18 22:01 Dose:  5 mg


Sennosides (Senokot Tab)  8.6 mg NG DAILY [facility]


   Last Admin: 03/17/18 10:18 Dose:  8.6 mg


Sevelamer Carbonate (Renvela)  800 mg PO TIDCC [facility]


   Last Admin: 03/17/18 08:43 Dose:  800 mg


Vitamin B Complex/Vit C/Folic Acid (Nephro-Nneka)  1 tab PO 0800 [facility]


   Last Admin: 03/17/18 08:30 Dose:  Not Given











- Labs


Labs: 


 





 03/17/18 07:35 





 03/17/18 07:35 





 











PT  34.0 SECONDS (9.7-12.2)  H*  03/17/18  07:35    


 


INR  2.9   03/17/18  07:35    


 


APTT  29 SECONDS (21-34)   03/15/18  08:24    














Assessment and Plan





- Assessment and Plan (Free Text)


Plan: 





1.Acute NSTEMI,  Asystole ,CPR/Sustained VT


Cardiology consult, Dr. Nichole. Help appreciated


Patient and Family not want her to go for cardiac cath 


Echo shows EF of 15-20%-High risk for arrhythmia and poor prognosis


Cardizem 30 mg PO Q6


Lopressor 25 mg PO BID


ASA 81 mg PO daily


Crestor 5 mg HS


continue medical management








2. Respiratory failure, s/p intubation and later extubation


Previously intubated in the ICU and now extubated


Saturating well on non-re breather mask


Comfortable. wean oxygen as tolerated





3. Atrial Fibrillation 


CHADS-VAsc score 8


INR  today 2.9


Holding Coumadin tonight ,follow INR 


Rate controlled with Lopressor and Cardizem





4. ESRD . s/p Acute renal failure on chronic renal disease


Nephrology consult, Dr. Wheeler, help appreciated


Dialysis T,Th,S


Renvela TID


On 3/13:  L IJ permacath placed and left shiley removed


getting Midodrine during dialysis for hypotension





5. Sepsis


Previously on Vancomycin, flagyl, and aztreonam


Now currently only on Aztreonam for pseudomonas in the sputum





7. Pleural effusion


Persistent left loculated pleural effusion





8. Hyperglycemia and uncontrolled DM


Aspart sliding scale


Lantus 5 units HS





9. Anemia of chronic disease, CKD


Procrit T,Th,S


Ferrous gluconate 324 mg TID





10. Prophylaxis and supportive care


Tube feeds through NGT


Protonix 40 mg IV daily


DVT prophylaxis with Coumadin, currently therapeutic INR

## 2018-03-17 NOTE — CP.PCM.PN
Subjective





- Date & Time of Evaluation


Date of Evaluation: 03/17/18


Time of Evaluation: 10:20





- Subjective


Subjective: 


renal note:





Assessment: stable


CKD stage 5 (N18.5) with hyperkalemia, acidosis now has ESRD on HD via 

permacath TTS 


pulm edema, CHF, NSTEMI, pleural effusions, pneumonia


AMS, hyperglycemia


s/p cardiac arrest, shock liver, A fib


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 


Hypertensive Chronic Kidney Disease (I12.9)


Anemia (D64.9), Hyperphosphatemia (E83.39), Secondary Hyperparathyroidism (E21.1

), HTN (I12.9), vit D insuff, hx of colon CA, TIAs





Plan


HD TTS schedule. 


maintain hemodynamic stable. on midodrine prior to HD


started  MVI, epogen with HD, and zemplar 2 mcg with HD.


started on phos binders as renvela


Dose meds/antibiotics for ESRD/HD status. 


 consult for outpatient dialysis placement.








Physical Examination: 


General Appearance: ill appearing, on O2 


Vitals reviewed


Head; Atraumatic, normocephalic


ENT: On O2 


Neck; supple


Lungs: Normal respiratory rate/effort. Breath sounds bilateral clear, she is 

mechanically ventilated


Heart: Increased rate. s1s2 normal. No rub or gallop. 


Extremities: no edema. 


Neurological: Patient is  Not communicative verbally


Skin: Warm and dry. Normal turgor. No rash. 


Abdomen: Abdomen is soft. Bowel sounds +. There is no abdominal tenderness


Psych: unable


MSK: no joint tenderness or swelling. Digits and nails normal, no deformity


: kidney or bladder not palpable


Access: Left AVG without bruit. has left permacath





Objective





- Vital Signs/Intake and Output


Vital Signs (last 24 hours): 


 











Temp Pulse Resp BP Pulse Ox


 


 97.9 F   100 H  18   117/66   100 


 


 03/17/18 07:00  03/17/18 07:00  03/17/18 07:00  03/17/18 07:00  03/17/18 07:00








Intake and Output: 


 











 03/17/18 03/17/18





 06:59 18:59


 


Intake Total 890 


 


Balance 890 














- Medications


Medications: 


 Current Medications





Albumin Human (Albumin Human 25% (12.5 Gm/50 Ml))  25 gm IV TTS PRN


   PRN Reason: Other


Albuterol/Ipratropium (Duoneb 3 Mg/0.5 Mg (3 Ml) Ud)  3 ml INH RQ4 [facility]


   Last Admin: 03/17/18 07:10 Dose:  3 ml


Aspirin (Aspirin Chewable)  81 mg PO DAILY [facility]


   Last Admin: 03/16/18 11:12 Dose:  81 mg


Dextrose (Dextrose 50% Inj)  0 ml IV STAT PRN; Protocol


   PRN Reason: Hypoglycemia Protocol


Dextrose (Glutose 15)  0 gm PO ONCE PRN; Protocol


   PRN Reason: Hypoglycemia Protocol


Diltiazem HCl (Cardizem)  30 mg PO Q6H [facility]


   Last Admin: 03/17/18 06:11 Dose:  30 mg


Epoetin Vamsi (Procrit)  10,000 unit IV TTS [facility]


Epoetin Vamsi (Procrit)  2,000 u IV TTS [facility]


Ferrous Gluconate (Fergon)  324 mg PO TID [facility]


   Last Admin: 03/16/18 18:24 Dose:  324 mg


Glucagon (Glucagen Diagnostic Kit)  0 mg IM STAT PRN; Protocol


   PRN Reason: Hypoglycemia Protocol


Heparin Sodium (Porcine) (Heparin)  1,000 units IVP TTS [facility]


Hydromorphone HCl (Dilaudid)  0.5 mg IVP Q6H PRN


   PRN Reason: Pain, moderate (4-7)


Aztreonam 1 gm/ Sodium (Chloride)  100 mls @ 100 mls/hr IVPB DAILY [facility]


   PRN Reason: Protocol


   Last Admin: 03/16/18 11:14 Dose:  100 mls/hr


Dextrose (Dextrose 5% In Water 1000 Ml)  1,000 mls @ 0 mls/hr IV .Q0M PRN; 

Protocol; Per Protocol


   PRN Reason: Hypoglycemia Protocol


Insulin Aspart (Novolog)  0 unit SC Q6 [facility]


   PRN Reason: Protocol


   Last Admin: 03/17/18 06:09 Dose:  6 unit


Insulin Glargine (Lantus)  10 unit SC [facility]


Metoprolol Tartrate (Lopressor)  25 mg PO BID [facility]


   Last Admin: 03/16/18 18:23 Dose:  25 mg


Midodrine (Proamatine)  10 mg PO TTS [facility]


Midodrine (Proamatine)  10 mg PO TTS [facility]


Pantoprazole Sodium (Protonix Inj)  40 mg IVP DAILY [facility]


   Last Admin: 03/16/18 11:12 Dose:  40 mg


Paricalcitol (Zemplar)  2 mcg IV TTS [facility]


Rosuvastatin Calcium (Crestor)  5 mg PO HS [facility]


   Last Admin: 03/16/18 22:01 Dose:  5 mg


Sennosides (Senokot Tab)  8.6 mg NG DAILY [facility]


   Last Admin: 03/16/18 11:12 Dose:  8.6 mg


Sevelamer Carbonate (Renvela)  800 mg PO TIDCC [facility]


   Last Admin: 03/17/18 08:43 Dose:  800 mg


Vitamin B Complex/Vit C/Folic Acid (Nephro-Nneka)  1 tab PO 0800 [facility]


   Last Admin: 03/17/18 08:30 Dose:  Not Given











- Labs


Labs: 


 





 03/17/18 07:35 





 03/17/18 07:35 





 











PT  34.0 SECONDS (9.7-12.2)  H*  03/17/18  07:35    


 


INR  2.9   03/17/18  07:35    


 


APTT  29 SECONDS (21-34)   03/15/18  08:24

## 2018-03-17 NOTE — CP.PCM.PN
Subjective





- Date & Time of Evaluation


Date of Evaluation: 03/17/18


Time of Evaluation: 11:16





- Subjective


Subjective: 





aware of elevated INR 


should help in management





Objective





- Vital Signs/Intake and Output


Vital Signs (last 24 hours): 


 











Temp Pulse Resp BP Pulse Ox


 


 97.9 F   100 H  18   117/66   100 


 


 03/17/18 07:00  03/17/18 08:00  03/17/18 07:00  03/17/18 07:00  03/17/18 07:00








Intake and Output: 


 











 03/17/18 03/17/18





 06:59 18:59


 


Intake Total 890 


 


Balance 890 














- Medications


Medications: 


 Current Medications





Albumin Human (Albumin Human 25% (12.5 Gm/50 Ml))  25 gm IV TTS PRN


   PRN Reason: Other


Albuterol/Ipratropium (Duoneb 3 Mg/0.5 Mg (3 Ml) Ud)  3 ml INH RQ4 [facility]


   Last Admin: 03/17/18 07:10 Dose:  3 ml


Aspirin (Aspirin Chewable)  81 mg PO DAILY [facility]


   Last Admin: 03/17/18 10:18 Dose:  81 mg


Dextrose (Dextrose 50% Inj)  0 ml IV STAT PRN; Protocol


   PRN Reason: Hypoglycemia Protocol


Dextrose (Glutose 15)  0 gm PO ONCE PRN; Protocol


   PRN Reason: Hypoglycemia Protocol


Diltiazem HCl (Cardizem)  30 mg PO Q6H [facility]


   Last Admin: 03/17/18 06:11 Dose:  30 mg


Epoetin Vamsi (Procrit)  10,000 unit IV TTS [facility]


Epoetin Vamsi (Procrit)  2,000 u IV TTS [facility]


Ferrous Gluconate (Fergon)  324 mg PO TID [facility]


   Last Admin: 03/17/18 10:19 Dose:  324 mg


Glucagon (Glucagen Diagnostic Kit)  0 mg IM STAT PRN; Protocol


   PRN Reason: Hypoglycemia Protocol


Heparin Sodium (Porcine) (Heparin)  1,000 units IVP TTS [facility]


Hydromorphone HCl (Dilaudid)  0.5 mg IVP Q6H [facility]


   PRN Reason: Pain, moderate (4-7)


Aztreonam 1 gm/ Sodium (Chloride)  100 mls @ 100 mls/hr IVPB DAILY [facility]


   PRN Reason: Protocol


   Last Admin: 03/17/18 10:22 Dose:  100 mls/hr


Dextrose (Dextrose 5% In Water 1000 Ml)  1,000 mls @ 0 mls/hr IV .Q0M PRN; 

Protocol; Per Protocol


   PRN Reason: Hypoglycemia Protocol


Insulin Aspart (Novolog)  0 unit SC Q6 [facility]


   PRN Reason: Protocol


   Last Admin: 03/17/18 06:09 Dose:  6 unit


Insulin Glargine (Lantus)  10 unit SC HS [facility]


Metoprolol Tartrate (Lopressor)  25 mg PO BID [facility]


   Last Admin: 03/17/18 10:20 Dose:  Not Given


Midodrine (Proamatine)  10 mg PO TTS [facility]


Midodrine (Proamatine)  10 mg PO TTS [facility]


Pantoprazole Sodium (Protonix Inj)  40 mg IVP DAILY [facility]


   Last Admin: 03/17/18 10:21 Dose:  40 mg


Paricalcitol (Zemplar)  2 mcg IV TTS [facility]


Rosuvastatin Calcium (Crestor)  5 mg PO HS [facility]


   Last Admin: 03/16/18 22:01 Dose:  5 mg


Sennosides (Senokot Tab)  8.6 mg NG DAILY [facility]


   Last Admin: 03/17/18 10:18 Dose:  8.6 mg


Sevelamer Carbonate (Renvela)  800 mg PO TIDCC [facility]


   Last Admin: 03/17/18 08:43 Dose:  800 mg


Vitamin B Complex/Vit C/Folic Acid (Nephro-Nneka)  1 tab PO 0800 [facility]


   Last Admin: 03/17/18 08:30 Dose:  Not Given











- Labs


Labs: 


 





 03/17/18 07:35 





 03/17/18 07:35 





 











PT  34.0 SECONDS (9.7-12.2)  H*  03/17/18  07:35    


 


INR  2.9   03/17/18  07:35    


 


APTT  29 SECONDS (21-34)   03/15/18  08:24

## 2018-03-17 NOTE — CP.PCM.PN
<Liu,Weilin - Last Filed: 03/17/18 14:07>





Subjective





- Date & Time of Evaluation


Date of Evaluation: 03/17/18


Time of Evaluation: 07:00





- Subjective


Subjective: 





Medicine progress note for Dr. Simpson





Patient seen and examined. No acute events reported overnight. Patient is 

currently awake an responsive. Unable to obtain detailed ROS due to patient's 

condition





Objective





- Vital Signs/Intake and Output


Vital Signs (last 24 hours): 


 











Temp Pulse Resp BP Pulse Ox


 


 97.9 F   100 H  18   117/66   100 


 


 03/17/18 07:00  03/17/18 08:00  03/17/18 07:00  03/17/18 07:00  03/17/18 07:00








Intake and Output: 


 











 03/17/18 03/17/18





 06:59 18:59


 


Intake Total 890 


 


Balance 890 














- Medications


Medications: 


 Current Medications





Albumin Human (Albumin Human 25% (12.5 Gm/50 Ml))  25 gm IV TTS PRN


   PRN Reason: Other


Aspirin (Aspirin Chewable)  81 mg PO DAILY [facility]


   Last Admin: 03/17/18 10:18 Dose:  81 mg


Dextrose (Dextrose 50% Inj)  0 ml IV STAT PRN; Protocol


   PRN Reason: Hypoglycemia Protocol


Dextrose (Glutose 15)  0 gm PO ONCE PRN; Protocol


   PRN Reason: Hypoglycemia Protocol


Diltiazem HCl (Cardizem)  30 mg PO Q6H [facility]


   Last Admin: 03/17/18 06:11 Dose:  30 mg


Epoetin Vamsi (Procrit)  10,000 unit IV TTS [facility]


Epoetin Vamsi (Procrit)  2,000 u IV TTS [facility]


Ferrous Gluconate (Fergon)  324 mg PO TID [facility]


   Last Admin: 03/17/18 10:19 Dose:  324 mg


Glucagon (Glucagen Diagnostic Kit)  0 mg IM STAT PRN; Protocol


   PRN Reason: Hypoglycemia Protocol


Heparin Sodium (Porcine) (Heparin)  1,000 units IVP TTS [facility]


Hydromorphone HCl (Dilaudid)  0.5 mg IVP Q6H [facility]


   PRN Reason: Pain, moderate (4-7)


Aztreonam 1 gm/ Sodium (Chloride)  100 mls @ 100 mls/hr IVPB DAILY [facility]


   PRN Reason: Protocol


   Last Admin: 03/17/18 10:22 Dose:  100 mls/hr


Dextrose (Dextrose 5% In Water 1000 Ml)  1,000 mls @ 0 mls/hr IV .Q0M PRN; 

Protocol; Per Protocol


   PRN Reason: Hypoglycemia Protocol


Insulin Aspart (Novolog)  0 unit SC Q6 [facility]


   PRN Reason: Protocol


   Last Admin: 03/17/18 12:41 Dose:  Not Given


Insulin Glargine (Lantus)  10 unit SC HS [facility]


Metoprolol Tartrate (Lopressor)  25 mg PO BID [facility]


   Last Admin: 03/17/18 10:20 Dose:  Not Given


Midodrine (Proamatine)  10 mg PO TTS [facility]


Midodrine (Proamatine)  10 mg PO TTS [facility]


Pantoprazole Sodium (Protonix Inj)  40 mg IVP DAILY [facility]


   Last Admin: 03/17/18 10:21 Dose:  40 mg


Paricalcitol (Zemplar)  2 mcg IV TTS [facility]


Rosuvastatin Calcium (Crestor)  5 mg PO HS [facility]


   Last Admin: 03/16/18 22:01 Dose:  5 mg


Sennosides (Senokot Tab)  8.6 mg NG DAILY [facility]


   Last Admin: 03/17/18 10:18 Dose:  8.6 mg


Sevelamer Carbonate (Renvela)  800 mg PO TIDCC [facility]


   Last Admin: 03/17/18 08:43 Dose:  800 mg


Vitamin B Complex/Vit C/Folic Acid (Nephro-Nneka)  1 tab PO 0800 [facility]


   Last Admin: 03/17/18 08:30 Dose:  Not Given











- Labs


Labs: 


 





 03/17/18 07:35 





 03/17/18 07:35 





 











PT  34.0 SECONDS (9.7-12.2)  H*  03/17/18  07:35    


 


INR  2.9   03/17/18  07:35    


 


APTT  29 SECONDS (21-34)   03/15/18  08:24    














- Constitutional


Appears: No Acute Distress, Chronically Ill





- Eye Exam


Eye Exam: EOMI, Normal appearance





- ENT Exam


ENT Exam: Mucous Membranes Moist


Additional comments: 





NG Tube in place





- Respiratory Exam


Respiratory Exam: Decreased Breath Sounds, NORMAL BREATHING PATTERN.  absent: 

Respiratory Distress





- Cardiovascular Exam


Cardiovascular Exam: Irregular Rhythm, +S1, +S2





- GI/Abdominal Exam


GI & Abdominal Exam: Soft, Hypoactive Bowel Sounds





- Extremities Exam


Additional comments: 





chronic venous stasis, weak lower extremity pulses





- Neurological Exam


Neurological Exam: Awake





- Skin


Skin Exam: Dry, Warm





Assessment and Plan





- Assessment and Plan (Free Text)


Assessment: 





1.Acute NSTEMI,  Asystole ,CPR/Sustained VT


Cardiology consult, Dr. Nichole. Help appreciated


Patient and Family not want her to go for cardiac cath 


Echo shows EF of 15-20%-High risk for arrhythmia and poor prognosis


Cardizem 30 mg PO Q6


Lopressor 25 mg PO BID


ASA 81 mg PO daily


Crestor 5 mg HS


continue medical management








2. Respiratory failure, s/p intubation and later extubation


Previously intubated in the ICU and now extubated


Saturating well on non-re breather mask


Comfortable. wean oxygen as tolerated





3. Atrial Fibrillation 


CHADS-VAsc score 8


INR  today 2.9


Holding Coumadin tonight ,follow INR 


Rate controlled with Lopressor and Cardizem





4. ESRD . s/p Acute renal failure on chronic renal disease


Nephrology consult, Dr. Wheeler, help appreciated


Dialysis T,Th,S


Renvela TID


On 3/13:  L IJ permacath placed and left shiley removed


getting Midodrine during dialysis for hypotension





5. Sepsis


Previously on Vancomycin, flagyl, and aztreonam


Now currently only on Aztreonam for pseudomonas in the sputum





7. Pleural effusion


Persistent left loculated pleural effusion





8. Hyperglycemia and uncontrolled DM


Aspart sliding scale


Lantus 10 units HS





9. Anemia of chronic disease, CKD


Procrit T,Th,S


Ferrous gluconate 324 mg TID





10. Prophylaxis and supportive care


Tube feeds through NGT


Protonix 40 mg IV daily


DVT prophylaxis with Coumadin, currently therapeutic INR





Discussed with attending Dr. Simpson





<Judy Simpson - Last Filed: 03/17/18 15:28>





Objective





- Vital Signs/Intake and Output


Vital Signs (last 24 hours): 


 











Temp Pulse Resp BP Pulse Ox


 


 97.7 F   72   17   99/59 L  100 


 


 03/17/18 12:20  03/17/18 12:20  03/17/18 12:20  03/17/18 14:50  03/17/18 12:20








Intake and Output: 


 











 03/17/18 03/17/18





 06:59 18:59


 


Intake Total 890 


 


Balance 890 














- Medications


Medications: 


 Current Medications





Albumin Human (Albumin Human 25% (12.5 Gm/50 Ml))  25 gm IV TTS PRN


   PRN Reason: Other


   Last Admin: 03/17/18 14:46 Dose:  25 gm


Aspirin (Aspirin Chewable)  81 mg PO DAILY [facility]


   Last Admin: 03/17/18 10:18 Dose:  81 mg


Dextrose (Dextrose 50% Inj)  0 ml IV STAT PRN; Protocol


   PRN Reason: Hypoglycemia Protocol


Dextrose (Glutose 15)  0 gm PO ONCE PRN; Protocol


   PRN Reason: Hypoglycemia Protocol


Diltiazem HCl (Cardizem)  30 mg PO Q6H [facility]


   Last Admin: 03/17/18 14:07 Dose:  Not Given


Epoetin Vamsi (Procrit)  10,000 unit IV TTS [facility]


   Last Admin: 03/17/18 14:44 Dose:  10,000 unit


Epoetin Vamsi (Procrit)  2,000 u IV TTS [facility]


   Last Admin: 03/17/18 14:44 Dose:  2,000 u


Ferrous Gluconate (Fergon)  324 mg PO TID [facility]


   Last Admin: 03/17/18 14:07 Dose:  Not Given


Glucagon (Glucagen Diagnostic Kit)  0 mg IM STAT PRN; Protocol


   PRN Reason: Hypoglycemia Protocol


Heparin Sodium (Porcine) (Heparin)  1,000 units IVP TTS [facility]


   Last Admin: 03/17/18 14:59 Dose:  1,000 units


Aztreonam 1 gm/ Sodium (Chloride)  100 mls @ 100 mls/hr IVPB DAILY [facility]


   PRN Reason: Protocol


   Last Admin: 03/17/18 10:22 Dose:  100 mls/hr


Dextrose (Dextrose 5% In Water 1000 Ml)  1,000 mls @ 0 mls/hr IV .Q0M PRN; 

Protocol; Per Protocol


   PRN Reason: Hypoglycemia Protocol


Insulin Aspart (Novolog)  0 unit SC Q6 [facility]


   PRN Reason: Protocol


   Last Admin: 03/17/18 12:41 Dose:  Not Given


Insulin Glargine (Lantus)  10 unit SC [facility]


Metoprolol Tartrate (Lopressor)  25 mg PO BID [facility]


   Last Admin: 03/17/18 10:20 Dose:  Not Given


Midodrine (Proamatine)  10 mg PO TTS [facility]


Midodrine (Proamatine)  10 mg PO TTS [facility]


Pantoprazole Sodium (Protonix Inj)  40 mg IVP DAILY [facility]


   Last Admin: 03/17/18 10:21 Dose:  40 mg


Paricalcitol (Zemplar)  2 mcg IV TTS [facility]


   Last Admin: 03/17/18 14:43 Dose:  2 mcg


Rosuvastatin Calcium (Crestor)  5 mg PO HS [facility]


   Last Admin: 03/16/18 22:01 Dose:  5 mg


Sennosides (Senokot Tab)  8.6 mg NG DAILY [facility]


   Last Admin: 03/17/18 10:18 Dose:  8.6 mg


Sevelamer Carbonate (Renvela)  800 mg PO TIDCC [facility]


   Last Admin: 03/17/18 14:08 Dose:  Not Given


Vitamin B Complex/Vit C/Folic Acid (Nephro-Nneka)  1 tab PO 0800 [facility]


   Last Admin: 03/17/18 08:30 Dose:  Not Given











- Labs


Labs: 


 





 03/17/18 07:35 





 03/17/18 07:35 





 











PT  34.0 SECONDS (9.7-12.2)  H*  03/17/18  07:35    


 


INR  2.9   03/17/18  07:35    


 


APTT  29 SECONDS (21-34)   03/15/18  08:24    














Attending/Attestation





- Attestation


I have personally seen and examined this patient.: Yes


I have fully participated in the care of the patient.: Yes


I have reviewed all pertinent clinical information, including history, physical 

exam and plan: Yes


Notes (Text): 





Patient was seen and examined. She is resting on bed. Responsive and denies 

pain. Has cough. no sob,on non rebreather


Going for dialysis today. Her prognosis bad. She is full code


continue antibiotics Azactam as per ID. Hold Coumadin her INR is 2.9,follow INR


continue NG feeds. follow swallowing study. PT on Monday if she is stable


Case discussed with the resident and the RN


I agree with the documentation of the assessment and the plan of the resident

## 2018-03-18 LAB
ALBUMIN SERPL-MCNC: 2.8 G/DL (ref 3.5–5)
ALBUMIN/GLOB SERPL: 0.9 {RATIO} (ref 1–2.1)
ALT SERPL-CCNC: 101 U/L (ref 9–52)
ANISOCYTOSIS BLD QL SMEAR: SLIGHT
AST SERPL-CCNC: 29 U/L (ref 14–36)
BASOPHILS # BLD AUTO: 0 K/UL (ref 0–0.2)
BASOPHILS NFR BLD: 0.2 % (ref 0–2)
BUN SERPL-MCNC: 69 MG/DL (ref 7–17)
CALCIUM SERPL-MCNC: 8.2 MG/DL (ref 8.6–10.4)
EOSINOPHIL # BLD AUTO: 0.1 K/UL (ref 0–0.7)
EOSINOPHIL NFR BLD: 0.3 % (ref 0–4)
ERYTHROCYTE [DISTWIDTH] IN BLOOD BY AUTOMATED COUNT: 18.3 % (ref 11.5–14.5)
GFR NON-AFRICAN AMERICAN: 12
GIANT PLATELETS BLD QL SMEAR: PRESENT
HGB BLD-MCNC: 8.4 G/DL (ref 11–16)
HYPOCHROMIC: SLIGHT
INR PPP: 2.2
LG PLATELETS BLD QL SMEAR: PRESENT
LYMPHOCYTE: 1 % (ref 20–40)
LYMPHOCYTES # BLD AUTO: 0.3 K/UL (ref 1–4.3)
LYMPHOCYTES NFR BLD AUTO: 1.7 % (ref 20–40)
MACROCYTES BLD QL SMEAR: SLIGHT
MCH RBC QN AUTO: 27.9 PG (ref 27–31)
MCHC RBC AUTO-ENTMCNC: 31.7 G/DL (ref 33–37)
MCV RBC AUTO: 87.9 FL (ref 81–99)
MICROCYTES BLD QL SMEAR: SLIGHT
MONOCYTE: 2 % (ref 0–10)
MONOCYTES # BLD: 0.3 K/UL (ref 0–0.8)
MONOCYTES NFR BLD: 2 % (ref 0–10)
NEUTROPHILS # BLD: 16.9 K/UL (ref 1.8–7)
NEUTROPHILS NFR BLD AUTO: 95 % (ref 50–75)
NEUTROPHILS NFR BLD AUTO: 95.8 % (ref 50–75)
NEUTS BAND NFR BLD: 2 % (ref 0–2)
NRBC BLD AUTO-RTO: 0.1 % (ref 0–2)
OVALOCYTES BLD QL SMEAR: SLIGHT
PLATELET # BLD EST: NORMAL 10*3/UL
PLATELET # BLD: 200 K/UL (ref 130–400)
PMV BLD AUTO: 9.4 FL (ref 7.2–11.7)
POIKILOCYTOSIS BLD QL SMEAR: SLIGHT
POLYCHROMIC: SLIGHT
PROTHROMBIN TIME: 25.4 SECONDS (ref 9.7–12.2)
RBC # BLD AUTO: 3.03 MIL/UL (ref 3.8–5.2)
SPHEROCYTES BLD QL SMEAR: SLIGHT
TARGETS BLD QL SMEAR: SLIGHT
TEARDROP CELLS: SLIGHT
TOTAL CELLS COUNTED BLD: 100
TOXIC GRANULES BLD QL SMEAR: PRESENT
WBC # BLD AUTO: 17.6 K/UL (ref 4.8–10.8)

## 2018-03-18 RX ADMIN — INSULIN ASPART SCH UNIT: 100 INJECTION, SOLUTION INTRAVENOUS; SUBCUTANEOUS at 06:15

## 2018-03-18 RX ADMIN — INSULIN ASPART SCH: 100 INJECTION, SOLUTION INTRAVENOUS; SUBCUTANEOUS at 01:04

## 2018-03-18 RX ADMIN — INSULIN ASPART SCH UNIT: 100 INJECTION, SOLUTION INTRAVENOUS; SUBCUTANEOUS at 18:11

## 2018-03-18 RX ADMIN — IPRATROPIUM BROMIDE AND ALBUTEROL SULFATE PRN ML: .5; 3 SOLUTION RESPIRATORY (INHALATION) at 11:42

## 2018-03-18 RX ADMIN — INSULIN ASPART SCH UNIT: 100 INJECTION, SOLUTION INTRAVENOUS; SUBCUTANEOUS at 12:29

## 2018-03-18 RX ADMIN — Medication SCH: at 08:14

## 2018-03-18 NOTE — CP.PCM.PN
<Liu,Weilin - Last Filed: 03/18/18 11:26>





Subjective





- Date & Time of Evaluation


Date of Evaluation: 03/18/18


Time of Evaluation: 09:00





- Subjective


Subjective: 





Patient seen and examined at bedside. No acute events reported overnight. 

Patient is unable to speak but awake and responsive to verbal stimuli. Unable 

to obtain meaningful ROS due to current condition.








Objective





- Vital Signs/Intake and Output


Vital Signs (last 24 hours): 


 











Temp Pulse Resp BP Pulse Ox


 


 98.1 F   81   20   106/66   96 


 


 03/18/18 08:00  03/18/18 08:00  03/18/18 08:00  03/18/18 10:08  03/18/18 08:00








Intake and Output: 


 











 03/18/18 03/18/18





 06:59 18:59


 


Intake Total 240 470


 


Balance 240 470














- Medications


Medications: 


 Current Medications





Albumin Human (Albumin Human 25% (12.5 Gm/50 Ml))  25 gm IV TTS PRN


   PRN Reason: Other


   Last Admin: 03/17/18 14:46 Dose:  25 gm


Albuterol/Ipratropium (Duoneb 3 Mg/0.5 Mg (3 Ml) Ud)  3 ml INH RQ4 PRN


   PRN Reason: Shortness of Breath


Aspirin (Aspirin Chewable)  81 mg PO DAILY [facility]


   Last Admin: 03/18/18 10:06 Dose:  81 mg


Dextrose (Dextrose 50% Inj)  0 ml IV STAT PRN; Protocol


   PRN Reason: Hypoglycemia Protocol


Dextrose (Glutose 15)  0 gm PO ONCE PRN; Protocol


   PRN Reason: Hypoglycemia Protocol


Diltiazem HCl (Cardizem)  30 mg PO Q6H [facility]


   Last Admin: 03/18/18 06:07 Dose:  30 mg


Epoetin Vamsi (Procrit)  10,000 unit IV TTS [facility]


   Last Admin: 03/17/18 14:44 Dose:  10,000 unit


Epoetin Vamsi (Procrit)  2,000 u IV TTS [facility]


   Last Admin: 03/17/18 14:44 Dose:  2,000 u


Ferrous Gluconate (Fergon)  324 mg PO TID [facility]


   Last Admin: 03/18/18 10:08 Dose:  324 mg


Glucagon (Glucagen Diagnostic Kit)  0 mg IM STAT PRN; Protocol


   PRN Reason: Hypoglycemia Protocol


Heparin Sodium (Porcine) (Heparin)  1,000 units IVP TTS [facility]


   Last Admin: 03/17/18 14:59 Dose:  1,000 units


Aztreonam 1 gm/ Sodium (Chloride)  100 mls @ 100 mls/hr IVPB DAILY [facility]


   PRN Reason: Protocol


   Last Admin: 03/18/18 10:06 Dose:  100 mls/hr


Dextrose (Dextrose 5% In Water 1000 Ml)  1,000 mls @ 0 mls/hr IV .Q0M PRN; 

Protocol; Per Protocol


   PRN Reason: Hypoglycemia Protocol


Insulin Aspart (Novolog)  0 unit SC Q6 [facility]


   PRN Reason: Protocol


   Last Admin: 03/18/18 06:15 Dose:  8 unit


Insulin Glargine (Lantus)  10 unit SC HS [facility]


   Last Admin: 03/17/18 21:28 Dose:  10 unit


Metoprolol Tartrate (Lopressor)  25 mg PO BID [facility]


   Last Admin: 03/18/18 10:08 Dose:  Not Given


Midodrine (Proamatine)  10 mg PO TTS [facility]


Midodrine (Proamatine)  10 mg PO TTS [facility]


   Last Admin: 03/17/18 17:22 Dose:  10 mg


Pantoprazole Sodium (Protonix Inj)  40 mg IVP DAILY [facility]


   Last Admin: 03/18/18 10:06 Dose:  40 mg


Paricalcitol (Zemplar)  2 mcg IV TTS [facility]


   Last Admin: 03/17/18 14:43 Dose:  2 mcg


Rosuvastatin Calcium (Crestor)  5 mg PO HS [facility]


   Last Admin: 03/17/18 21:28 Dose:  5 mg


Sennosides (Senokot Tab)  8.6 mg NG DAILY [facility]


   Last Admin: 03/18/18 10:06 Dose:  8.6 mg


Sevelamer Carbonate (Renvela)  800 mg PO TIDCC [facility]


   Last Admin: 03/18/18 08:14 Dose:  800 mg


Vitamin B Complex/Vit C/Folic Acid (Nephro-Nneka)  1 tab PO 0800 [facility]


   Last Admin: 03/18/18 08:14 Dose:  Not Given











- Labs


Labs: 


 





 03/18/18 08:26 





 03/18/18 08:26 





 











PT  25.4 SECONDS (9.7-12.2)  H D 03/18/18  08:26    


 


INR  2.2  D 03/18/18  08:26    


 


APTT  29 SECONDS (21-34)   03/15/18  08:24    














- Constitutional


Appears: No Acute Distress, Chronically Ill





- Head Exam


Additional comments: 





NT tube, non-rebreath mask in place





- Eye Exam


Eye Exam: Normal appearance





- ENT Exam


ENT Exam: Mucous Membranes Moist





- Respiratory Exam


Respiratory Exam: Decreased Breath Sounds, NORMAL BREATHING PATTERN





- Cardiovascular Exam


Cardiovascular Exam: Irregular Rhythm, +S1, +S2





- GI/Abdominal Exam


GI & Abdominal Exam: Soft, Hypoactive Bowel Sounds





- Extremities Exam


Additional comments: 





chronic venous stasis, weak lower extremity pulses





- Neurological Exam


Neurological Exam: Awake





- Skin


Skin Exam: Dry, Warm





Assessment and Plan





- Assessment and Plan (Free Text)


Assessment: 





1.Acute NSTEMI,  Asystole ,CPR/Sustained VT


Cardiology consult, Dr. Nichole. Help appreciated


Patient and Family not want her to go for cardiac cath 


Echo shows EF of 15-20%-High risk for arrhythmia and poor prognosis


Cardizem 30 mg PO Q6


Lopressor 25 mg PO BID


ASA 81 mg PO daily


Crestor 5 mg HS


continue medical management








2. Respiratory failure, s/p intubation and later extubation


Previously intubated in the ICU and now extubated


Saturating well on non-re breather mask


Comfortable. wean oxygen as tolerated





3. Atrial Fibrillation 


CHADS-VAsc score 8


INR  today 2.2


Holding Coumadin tonight ,follow INR 


Rate controlled with Lopressor and Cardizem





4. ESRD . s/p Acute renal failure on chronic renal disease


Nephrology consult, Dr. Wheeler, help appreciated


Dialysis T,Th,S


Renvela TID


On 3/13:  L IJ permacath placed and left shiley removed


getting Midodrine during dialysis for hypotension





5. Sepsis


Previously on Vancomycin, flagyl, and aztreonam


Now currently only on Aztreonam for pseudomonas in the sputum





7. Pleural effusion


Persistent left loculated pleural effusion





8. Hyperglycemia and uncontrolled DM


Aspart sliding scale


Lantus 10 units HS





9. Anemia of chronic disease, CKD


Procrit T,Th,S


Ferrous gluconate 324 mg TID





10. Prophylaxis and supportive care


Tube feeds through NGT


Protonix 40 mg IV daily


DVT prophylaxis with Coumadin, currently therapeutic INR





Discussed with attending Dr. Simpson








<Judy Simpson - Last Filed: 03/18/18 15:39>





Objective





- Vital Signs/Intake and Output


Vital Signs (last 24 hours): 


 











Temp Pulse Resp BP Pulse Ox


 


 98.1 F   114 H  22   95/55 L  99 


 


 03/18/18 08:00  03/18/18 12:22  03/18/18 12:22  03/18/18 12:22  03/18/18 12:22








Intake and Output: 


 











 03/18/18 03/18/18





 06:59 18:59


 


Intake Total 240 470


 


Balance 240 470














- Medications


Medications: 


 Current Medications





Albumin Human (Albumin Human 25% (12.5 Gm/50 Ml))  25 gm IV TTS PRN


   PRN Reason: Other


   Last Admin: 03/17/18 14:46 Dose:  25 gm


Albuterol/Ipratropium (Duoneb 3 Mg/0.5 Mg (3 Ml) Ud)  3 ml INH RQ4 PRN


   PRN Reason: Shortness of Breath


   Last Admin: 03/18/18 11:42 Dose:  3 ml


Aspirin (Aspirin Chewable)  81 mg PO DAILY [facility]


   Last Admin: 03/18/18 10:06 Dose:  81 mg


Dextrose (Dextrose 50% Inj)  0 ml IV STAT PRN; Protocol


   PRN Reason: Hypoglycemia Protocol


Dextrose (Glutose 15)  0 gm PO ONCE PRN; Protocol


   PRN Reason: Hypoglycemia Protocol


Diltiazem HCl (Cardizem)  30 mg PO Q6H [facility]


   Last Admin: 03/18/18 13:04 Dose:  Not Given


Epoetin Vamsi (Procrit)  10,000 unit IV TTS [facility]


   Last Admin: 03/17/18 14:44 Dose:  10,000 unit


Epoetin Vamsi (Procrit)  2,000 u IV TTS [facility]


   Last Admin: 03/17/18 14:44 Dose:  2,000 u


Ferrous Gluconate (Fergon)  324 mg PO TID [facility]


   Last Admin: 03/18/18 13:24 Dose:  324 mg


Glucagon (Glucagen Diagnostic Kit)  0 mg IM STAT PRN; Protocol


   PRN Reason: Hypoglycemia Protocol


Heparin Sodium (Porcine) (Heparin)  1,000 units IVP TTS [facility]


   Last Admin: 03/17/18 14:59 Dose:  1,000 units


Aztreonam 1 gm/ Sodium (Chloride)  100 mls @ 100 mls/hr IVPB DAILY [facility]


   PRN Reason: Protocol


   Last Admin: 03/18/18 10:06 Dose:  100 mls/hr


Dextrose (Dextrose 5% In Water 1000 Ml)  1,000 mls @ 0 mls/hr IV .Q0M PRN; 

Protocol; Per Protocol


   PRN Reason: Hypoglycemia Protocol


Insulin Aspart (Novolog)  0 unit SC Q6 [facility]


   PRN Reason: Protocol


   Last Admin: 03/18/18 12:29 Dose:  6 unit


Insulin Glargine (Lantus)  10 unit SC HS [facility]


   Last Admin: 03/17/18 21:28 Dose:  10 unit


Metoprolol Tartrate (Lopressor)  25 mg PO BID [facility]


   Last Admin: 03/18/18 10:08 Dose:  Not Given


Midodrine (Proamatine)  10 mg PO TTS [facility]


Midodrine (Proamatine)  10 mg PO TTS [facility]


   Last Admin: 03/17/18 17:22 Dose:  10 mg


Pantoprazole Sodium (Protonix Inj)  40 mg IVP DAILY [facility]


   Last Admin: 03/18/18 10:06 Dose:  40 mg


Paricalcitol (Zemplar)  2 mcg IV TTS [facility]


   Last Admin: 03/17/18 14:43 Dose:  2 mcg


Rosuvastatin Calcium (Crestor)  5 mg PO [facility]


   Last Admin: 03/17/18 21:28 Dose:  5 mg


Sennosides (Senokot Tab)  8.6 mg NG DAILY [facility]


   Last Admin: 03/18/18 10:06 Dose:  8.6 mg


Sevelamer Carbonate (Renvela)  800 mg PO TIDCC [facility]


   Last Admin: 03/18/18 12:23 Dose:  800 mg


Vitamin B Complex/Vit C/Folic Acid (Nephro-Nneka)  1 tab PO 0800 [facility]


   Last Admin: 03/18/18 08:14 Dose:  Not Given


Warfarin Sodium (Coumadin)  2.5 mg PO 1800 [facility]


   Stop: 03/18/18 18:01











- Labs


Labs: 


 





 03/18/18 08:26 





 03/18/18 08:26 





 











PT  25.4 SECONDS (9.7-12.2)  H D 03/18/18  08:26    


 


INR  2.2  D 03/18/18  08:26    


 


APTT  29 SECONDS (21-34)   03/15/18  08:24    














Attending/Attestation





- Attestation


I have personally seen and examined this patient.: Yes


I have fully participated in the care of the patient.: Yes


I have reviewed all pertinent clinical information, including history, physical 

exam and plan: Yes


Notes (Text): 


Seen and examined,She is awake and responsive. Looks comfortable. On 

nonrebreather


Had dialysis yesterday


She is full code


continue antibiotics Azactam as per ID. Hb 8.4,wbc coming down ,today 17.6


 resume Coumadin 2.5mg tonight .her INR is 2.2,follow INR


continue NG feeds. follow swallowing study. PT on Monday if she is stable


Case discussed with the resident and the RN. Try to wean from nonrebreather


I agree with the documentation of the assessment and the plan of the resident

## 2018-03-19 LAB
ALBUMIN SERPL-MCNC: 2.8 G/DL (ref 3.5–5)
ALBUMIN/GLOB SERPL: 0.9 {RATIO} (ref 1–2.1)
ALT SERPL-CCNC: 90 U/L (ref 9–52)
ANISOCYTOSIS BLD QL SMEAR: SLIGHT
AST SERPL-CCNC: 26 U/L (ref 14–36)
BASOPHILS # BLD AUTO: 0.1 K/UL (ref 0–0.2)
BASOPHILS NFR BLD: 0.4 % (ref 0–2)
BUN SERPL-MCNC: 90 MG/DL (ref 7–17)
BURR CELLS BLD QL SMEAR: SLIGHT
CALCIUM SERPL-MCNC: 7.9 MG/DL (ref 8.6–10.4)
EOSINOPHIL # BLD AUTO: 0.1 K/UL (ref 0–0.7)
EOSINOPHIL NFR BLD: 0.4 % (ref 0–4)
ERYTHROCYTE [DISTWIDTH] IN BLOOD BY AUTOMATED COUNT: 18.7 % (ref 11.5–14.5)
GFR NON-AFRICAN AMERICAN: 9
HGB BLD-MCNC: 8.4 G/DL (ref 11–16)
HYPOCHROMIC: SLIGHT
INR PPP: 1.8
LG PLATELETS BLD QL SMEAR: PRESENT
LYMPHOCYTE: 3 % (ref 20–40)
LYMPHOCYTES # BLD AUTO: 0.3 K/UL (ref 1–4.3)
LYMPHOCYTES NFR BLD AUTO: 1.8 % (ref 20–40)
MCH RBC QN AUTO: 27.9 PG (ref 27–31)
MCHC RBC AUTO-ENTMCNC: 31.9 G/DL (ref 33–37)
MCV RBC AUTO: 87.6 FL (ref 81–99)
MICROCYTES BLD QL SMEAR: SLIGHT
MONOCYTE: 3 % (ref 0–10)
MONOCYTES # BLD: 0.6 K/UL (ref 0–0.8)
MONOCYTES NFR BLD: 3.4 % (ref 0–10)
NEUTROPHILS # BLD: 16 K/UL (ref 1.8–7)
NEUTROPHILS NFR BLD AUTO: 94 % (ref 50–75)
NEUTROPHILS NFR BLD AUTO: 94 % (ref 50–75)
NRBC BLD AUTO-RTO: 0 % (ref 0–2)
OVALOCYTES BLD QL SMEAR: SLIGHT
PLATELET # BLD EST: NORMAL 10*3/UL
PLATELET # BLD: 196 K/UL (ref 130–400)
PMV BLD AUTO: 10 FL (ref 7.2–11.7)
POIKILOCYTOSIS BLD QL SMEAR: SLIGHT
POLYCHROMIC: SLIGHT
PROTHROMBIN TIME: 21 SECONDS (ref 9.7–12.2)
RBC # BLD AUTO: 3.01 MIL/UL (ref 3.8–5.2)
TOTAL CELLS COUNTED BLD: 100
WBC # BLD AUTO: 17 K/UL (ref 4.8–10.8)

## 2018-03-19 RX ADMIN — PANTOPRAZOLE SODIUM SCH: 40 TABLET, DELAYED RELEASE ORAL at 11:45

## 2018-03-19 RX ADMIN — Medication SCH: at 09:57

## 2018-03-19 RX ADMIN — INSULIN GLARGINE SCH UNIT: 100 INJECTION, SOLUTION SUBCUTANEOUS at 00:21

## 2018-03-19 RX ADMIN — INSULIN ASPART SCH UNIT: 100 INJECTION, SOLUTION INTRAVENOUS; SUBCUTANEOUS at 00:23

## 2018-03-19 RX ADMIN — INSULIN ASPART SCH UNIT: 100 INJECTION, SOLUTION INTRAVENOUS; SUBCUTANEOUS at 06:57

## 2018-03-19 RX ADMIN — INSULIN GLARGINE SCH UNIT: 100 INJECTION, SOLUTION SUBCUTANEOUS at 21:33

## 2018-03-19 RX ADMIN — INSULIN ASPART SCH UNIT: 100 INJECTION, SOLUTION INTRAVENOUS; SUBCUTANEOUS at 18:12

## 2018-03-19 RX ADMIN — IPRATROPIUM BROMIDE AND ALBUTEROL SULFATE PRN ML: .5; 3 SOLUTION RESPIRATORY (INHALATION) at 14:15

## 2018-03-19 RX ADMIN — INSULIN ASPART SCH UNIT: 100 INJECTION, SOLUTION INTRAVENOUS; SUBCUTANEOUS at 12:40

## 2018-03-19 NOTE — CP.PCM.PN
Subjective





- Date & Time of Evaluation


Date of Evaluation: 03/19/18


Time of Evaluation: 13:02





- Subjective


Subjective: 





Nephrology Consultation:





Assessment: stable


CKD stage 5 (N18.5) with hyperkalemia, acidosis now has ESRD on HD via 

permacath TTS 


pulm edema, CHF, NSTEMI, pleural effusions, pneumonia


AMS, hyperglycemia


s/p cardiac arrest, shock liver, A fib





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 


Hypertensive Chronic Kidney Disease (I12.9)


Anemia (D64.9), Hyperphosphatemia (E83.39), Secondary Hyperparathyroidism (E21.1

), HTN (I12.9), vit D insuff, hx of colon CA, TIA/CVAs





Plan


HD Tomorrow as ordered as per TTS schedule. 


maintain hemodynamic stable. Patient not on ACEI/ARB due to low BP. on 

midodrine prior to HD


started iron supplements, MVI, epogen with HD, and zemplar 2 mcg with HD.


started on phos binders as renvela





Dose meds/antibiotics for ESRD/HD status. Avoid fleets enema/magnesium based 

laxatives. Avoid nephrotoxins/NSAIDs


Glycemic control


Further work up/management as per primary team


 consult for outpatient dialysis placement.





Thanks for allowing me to participate in care of your patient. Will follow 

patient with you. Please call if any Qs.


Dr Shad Panchal


Office: [phone] Cell: [phone] Fax: [phone]





Chief Complaint; unable to obtain


reason for consult: CKD management


source of Info: EMR


HPI: Pt is a 77 F with hx of diabetes Mellitus ( years), hypertension (years), 

CHF, TIA, Colon CA, CKD stage 4/5, has matured AVG in left arm presented with 

complaints of AMS and hyperglycemia as brought by family. now has elevated trop 

100, hyperkalemia, AMS and elevated sugar, pulm edema. renal consult for CKD 

management. pt with AMS unable to provide any hx. 





ROS: unable to obtain





Physical Examination: 


General Appearance: ill appearing, on O2 


Vitals reviewed and noted as below


Head; Atraumatic, normocephalic


ENT: On O2 


EYES: PERRLA


Neck; supple no lymphadenopathy, no thyromegaly or bruit


Lungs: Normal respiratory rate/effort. Breath sounds bilateral clear anteriorly 

b/l


Heart: normal rate. s1s2 normal. No rub or gallop. A fib


Extremities: no edema. No varicose veins. chronic hyperpigmented changes in legs


Neurological: Patient is  Not communicative verbally


Skin: Warm and dry. Normal turgor. No rash. Palpitation: Normal elasticity for 

age


Abdomen: Abdomen is soft. Bowel sounds +. There is no abdominal tenderness, no 

guarding/rigidity no organomegaly


Psych: unable


MSK: no joint tenderness or swelling. Digits and nails normal, no deformity


: kidney or bladder not palpable


Access: Left AVG without bruit. has left permacath





Labs/imaging reviewed.


Past medical history, past surgical history, family history, social history, 

allergy reviewed and noted as below


Family hx: no hx of CKD. Rest non-contributory








Objective





- Vital Signs/Intake and Output


Vital Signs (last 24 hours): 


 











Temp Pulse Resp BP Pulse Ox


 


 98.8 F   103 H  20   107/72   99 


 


 03/19/18 07:10  03/19/18 08:00  03/19/18 07:10  03/19/18 07:10  03/19/18 07:10








Intake and Output: 


 











 03/19/18 03/19/18





 06:59 18:59


 


Intake Total 420 90


 


Balance 420 90














- Medications


Medications: 


 Current Medications





Albumin Human (Albumin Human 25% (12.5 Gm/50 Ml))  25 gm IV TTS PRN


   PRN Reason: Other


   Last Admin: 03/17/18 14:46 Dose:  25 gm


Albuterol/Ipratropium (Duoneb 3 Mg/0.5 Mg (3 Ml) Ud)  3 ml INH RQ4 PRN


   PRN Reason: Shortness of Breath


   Last Admin: 03/18/18 11:42 Dose:  3 ml


Aspirin (Aspirin Chewable)  81 mg PO DAILY [facility]


   Last Admin: 03/19/18 11:44 Dose:  Not Given


Dextrose (Dextrose 50% Inj)  0 ml IV STAT PRN; Protocol


   PRN Reason: Hypoglycemia Protocol


Dextrose (Glutose 15)  0 gm PO ONCE PRN; Protocol


   PRN Reason: Hypoglycemia Protocol


Diltiazem HCl (Cardizem)  30 mg PO Q6H [facility]


   Last Admin: 03/19/18 06:05 Dose:  30 mg


Epoetin Vamsi (Procrit)  10,000 unit IV TTS [facility]


   Last Admin: 03/17/18 14:44 Dose:  10,000 unit


Epoetin Vamsi (Procrit)  2,000 u IV TTS [facility]


   Last Admin: 03/17/18 14:44 Dose:  2,000 u


Ferrous Gluconate (Fergon)  324 mg PO TID [facility]


   Last Admin: 03/19/18 11:45 Dose:  Not Given


Glucagon (Glucagen Diagnostic Kit)  0 mg IM STAT PRN; Protocol


   PRN Reason: Hypoglycemia Protocol


Heparin Sodium (Porcine) (Heparin)  1,000 units IVP TTS [facility]


   Last Admin: 03/17/18 14:59 Dose:  1,000 units


Aztreonam 1 gm/ Sodium (Chloride)  100 mls @ 100 mls/hr IVPB DAILY [facility]


   PRN Reason: Protocol


   Last Admin: 03/18/18 10:06 Dose:  100 mls/hr


Dextrose (Dextrose 5% In Water 1000 Ml)  1,000 mls @ 0 mls/hr IV .Q0M PRN; 

Protocol; Per Protocol


   PRN Reason: Hypoglycemia Protocol


Insulin Aspart (Novolog)  0 unit SC Q6 [facility]


   PRN Reason: Protocol


   Last Admin: 03/19/18 06:57 Dose:  6 unit


Insulin Glargine (Lantus)  10 unit SC [facility]


   Last Admin: 03/19/18 00:21 Dose:  10 unit


Metoprolol Tartrate (Lopressor)  25 mg PO BID [facility]


   Last Admin: 03/19/18 11:45 Dose:  Not Given


Midodrine (Proamatine)  10 mg PO TTS [facility]


Midodrine (Proamatine)  10 mg PO TTS [facility]


   Last Admin: 03/17/18 17:22 Dose:  10 mg


Pantoprazole Sodium (Protonix Ec Tab)  40 mg PO DAILY [facility]


   Last Admin: 03/19/18 11:45 Dose:  Not Given


Paricalcitol (Zemplar)  2 mcg IV TTS [facility]


   Last Admin: 03/17/18 14:43 Dose:  2 mcg


Rosuvastatin Calcium (Crestor)  5 mg PO [facility]


   Last Admin: 03/18/18 22:32 Dose:  5 mg


Sennosides (Senokot Tab)  8.6 mg NG DAILY [facility]


   Last Admin: 03/19/18 11:45 Dose:  Not Given


Sevelamer Carbonate (Renvela)  800 mg PO TIDCC [facility]


   Last Admin: 03/19/18 11:46 Dose:  Not Given


Vitamin B Complex/Vit C/Folic Acid (Nephro-Nneka)  1 tab PO 0800 [facility]


   Last Admin: 03/19/18 09:57 Dose:  Not Given











- Labs


Labs: 


 





 03/19/18 06:51 





 03/19/18 06:51 





 











PT  21.0 SECONDS (9.7-12.2)  H  03/19/18  06:51    


 


INR  1.8   03/19/18  06:51    


 


APTT  29 SECONDS (21-34)   03/15/18  08:24

## 2018-03-19 NOTE — CP.PCM.PN
Subjective





- Date & Time of Evaluation


Date of Evaluation: 03/19/18


Time of Evaluation: 11:00





- Subjective


Subjective: 





Does not offer complaints.Afebrile.





Objective





- Vital Signs/Intake and Output


Vital Signs (last 24 hours): 


 











Temp Pulse Resp BP Pulse Ox


 


 98.8 F   92 H  20   107/72   99 


 


 03/19/18 07:10  03/19/18 14:15  03/19/18 07:10  03/19/18 07:10  03/19/18 07:10








Intake and Output: 


 











 03/19/18 03/19/18





 06:59 18:59


 


Intake Total 420 90


 


Balance 420 90














- Medications


Medications: 


 Current Medications





Albumin Human (Albumin Human 25% (12.5 Gm/50 Ml))  25 gm IV TTS PRN


   PRN Reason: Other


   Last Admin: 03/17/18 14:46 Dose:  25 gm


Albuterol/Ipratropium (Duoneb 3 Mg/0.5 Mg (3 Ml) Ud)  3 ml INH RQ4 PRN


   PRN Reason: Shortness of Breath


   Last Admin: 03/19/18 14:15 Dose:  3 ml


Aspirin (Aspirin Chewable)  81 mg PO DAILY [facility]


   Last Admin: 03/19/18 11:44 Dose:  Not Given


Dextrose (Dextrose 50% Inj)  0 ml IV STAT PRN; Protocol


   PRN Reason: Hypoglycemia Protocol


Dextrose (Glutose 15)  0 gm PO ONCE PRN; Protocol


   PRN Reason: Hypoglycemia Protocol


Diltiazem HCl (Cardizem)  30 mg PO Q6H [facility]


   Last Admin: 03/19/18 13:45 Dose:  30 mg


Epoetin Avmsi (Procrit)  10,000 unit IV TTS [facility]


   Last Admin: 03/17/18 14:44 Dose:  10,000 unit


Epoetin Vamsi (Procrit)  2,000 u IV TTS [facility]


   Last Admin: 03/17/18 14:44 Dose:  2,000 u


Ferrous Gluconate (Fergon)  324 mg PO TID [facility]


   Last Admin: 03/19/18 14:22 Dose:  324 mg


Glucagon (Glucagen Diagnostic Kit)  0 mg IM STAT PRN; Protocol


   PRN Reason: Hypoglycemia Protocol


Heparin Sodium (Porcine) (Heparin)  1,000 units IVP TTS [facility]


   Last Admin: 03/17/18 14:59 Dose:  1,000 units


Aztreonam 1 gm/ Sodium (Chloride)  100 mls @ 100 mls/hr IVPB DAILY [facility]


   PRN Reason: Protocol


   Last Admin: 03/18/18 10:06 Dose:  100 mls/hr


Dextrose (Dextrose 5% In Water 1000 Ml)  1,000 mls @ 0 mls/hr IV .Q0M PRN; 

Protocol; Per Protocol


   PRN Reason: Hypoglycemia Protocol


Insulin Aspart (Novolog)  0 unit SC Q6 [facility]


   PRN Reason: Protocol


   Last Admin: 03/19/18 12:40 Dose:  4 unit


Insulin Glargine (Lantus)  10 unit SC HS [facility]


   Last Admin: 03/19/18 00:21 Dose:  10 unit


Metoprolol Tartrate (Lopressor)  25 mg PO BID [facility]


   Last Admin: 03/19/18 11:45 Dose:  Not Given


Midodrine (Proamatine)  10 mg PO TTS [facility]


Midodrine (Proamatine)  10 mg PO TTS [facility]


   Last Admin: 03/17/18 17:22 Dose:  10 mg


Pantoprazole Sodium (Protonix Ec Tab)  40 mg PO DAILY [facility]


   Last Admin: 03/19/18 11:45 Dose:  Not Given


Paricalcitol (Zemplar)  2 mcg IV TTS [facility]


   Last Admin: 03/17/18 14:43 Dose:  2 mcg


Rosuvastatin Calcium (Crestor)  5 mg PO HS [facility]


   Last Admin: 03/18/18 22:32 Dose:  5 mg


Sennosides (Senokot Tab)  8.6 mg NG DAILY [facility]


   Last Admin: 03/19/18 11:45 Dose:  Not Given


Sevelamer Carbonate (Renvela)  800 mg PO TIDCC [facility]


   Last Admin: 03/19/18 11:46 Dose:  Not Given


Vitamin B Complex/Vit C/Folic Acid (Nephro-Nneka)  1 tab PO 0800 [facility]


   Last Admin: 03/19/18 09:57 Dose:  Not Given


Warfarin Sodium (Coumadin)  2.5 mg PO 1800 [facility]


   Stop: 03/19/18 18:01











- Labs


Labs: 


 





 03/19/18 06:51 





 03/19/18 06:51 





 











PT  21.0 SECONDS (9.7-12.2)  H  03/19/18  06:51    


 


INR  1.8   03/19/18  06:51    


 


APTT  29 SECONDS (21-34)   03/15/18  08:24    














- Constitutional


Appears: In Acute Distress, Chronically Ill





- Head Exam


Head Exam: ATRAUMATIC, NORMAL INSPECTION, NORMOCEPHALIC





- Eye Exam


Eye Exam: EOMI, Normal appearance, PERRL


Pupil Exam: NORMAL ACCOMODATION, PERRL





- ENT Exam


ENT Exam: Mucous Membranes Moist





- Neck Exam


Neck Exam: Normal Inspection





- Respiratory Exam


Respiratory Exam: Decreased Breath Sounds


Additional comments: 





Wet, sounds, excessive sputum production.





- Cardiovascular Exam


Cardiovascular Exam: Tachycardia





- GI/Abdominal Exam


GI & Abdominal Exam: Diminished Bowel Sounds


Additional comments: 





NGT in place for feedings





- Rectal Exam


Rectal Exam: Deferred





- Extremities Exam


Extremities Exam: Pedal Edema





- Back Exam


Back Exam: NORMAL INSPECTION





- Neurological Exam


Neurological Exam: Alert, Altered


Neuro motor strength exam: Left Upper Extremity: 2/1, Right Upper Extremity: 2/1

, Left Lower Extremity: 2/1, Right Lower Extremity: 2/1





- Psychiatric Exam


Psychiatric exam: Flat Affect





- Skin


Skin Exam: Normal Color





Assessment and Plan





- Assessment and Plan (Free Text)


Assessment: 





Progress note





Patient seen and examined in bed, alert, looking very weak, able to make eye 

contacts and fallows simple commends. Patient is unable to talk.NGT in for 

feedings.  Patient is on 10 L O2 via non rebrither mask. Patient can not 

reposition in bed on her own. 


Breath sounds are diminished, a lot of secretion, patient unable to spit it up. 

Abdomen flat, hypoactive bowel sounds, anuric, on HD. /72, HR 92.





I  spoke to patient face to face, and tried to discuss her condition. basically 

I told patient that there was no much for us to do any more as her condition 

become very poor despite all prudent Medical interventions. i asked her to 

blink her eyes if she would want us to proceed with PEG tube. Patient nodded 

her head for ' NO answer. 





Later on I met with patient's daughter Kristel and son Gray. I presented them 

with patient's clinical presentation and elicited their goals for the patient. 

I reinforced quality of life concerns. Both of then were united in valuing 

patient's comfort over longevity. They understood the patient was approaching 

End of her life.





I discussed comfort care and offered more information on Hospice care. They 

stated undestanding. Code status discussed. POLST introduced. Family choose DNR/

DNI. These was discussed to Doctor Khalida, nursing, and  Barbie.








Impression





* Terminally ill patient with multi organ failure


* generalized weakness


* Aphasia


* Dysphagia


* Refuses aggressive interventions


* Family supportive of comfort care/ Hospice








Suggestion





* Would consider comfort care at NH, [facility]


* DNR/DNI








Thank you for the consult.


palliative care will sign off at this time.

## 2018-03-19 NOTE — CP.PCM.PN
<Mike Silva - Last Filed: 03/19/18 15:24>





Subjective





- Date & Time of Evaluation


Date of Evaluation: 03/19/18


Time of Evaluation: 07:20





- Subjective


Subjective: 





Medicine progress note for Dr. Gaxiola





Patient seen and examined. Patient is non-verbal at the moment but able to nod 

and shake her head to questioning. Patient following simple commands. Could not 

obtain ROS due to clinical status.





Objective





- Vital Signs/Intake and Output


Vital Signs (last 24 hours): 


 











Temp Pulse Resp BP Pulse Ox


 


 98.8 F   109 H  20   120/71   95 


 


 03/18/18 23:10  03/19/18 01:00  03/18/18 23:10  03/19/18 05:00  03/18/18 23:10








Intake and Output: 


 











 03/19/18 03/19/18





 06:59 18:59


 


Intake Total 420 


 


Balance 420 














- Medications


Medications: 


 Current Medications





Albumin Human (Albumin Human 25% (12.5 Gm/50 Ml))  25 gm IV TTS PRN


   PRN Reason: Other


   Last Admin: 03/17/18 14:46 Dose:  25 gm


Albuterol/Ipratropium (Duoneb 3 Mg/0.5 Mg (3 Ml) Ud)  3 ml INH RQ4 PRN


   PRN Reason: Shortness of Breath


   Last Admin: 03/18/18 11:42 Dose:  3 ml


Aspirin (Aspirin Chewable)  81 mg PO DAILY [facility]


   Last Admin: 03/18/18 10:06 Dose:  81 mg


Dextrose (Dextrose 50% Inj)  0 ml IV STAT PRN; Protocol


   PRN Reason: Hypoglycemia Protocol


Dextrose (Glutose 15)  0 gm PO ONCE PRN; Protocol


   PRN Reason: Hypoglycemia Protocol


Diltiazem HCl (Cardizem)  30 mg PO Q6H [facility]


   Last Admin: 03/19/18 06:05 Dose:  30 mg


Epoetin Vamsi (Procrit)  10,000 unit IV TTS [facility]


   Last Admin: 03/17/18 14:44 Dose:  10,000 unit


Epoetin Vamsi (Procrit)  2,000 u IV TTS [facility]


   Last Admin: 03/17/18 14:44 Dose:  2,000 u


Ferrous Gluconate (Fergon)  324 mg PO TID [facility]


   Last Admin: 03/18/18 18:11 Dose:  324 mg


Glucagon (Glucagen Diagnostic Kit)  0 mg IM STAT PRN; Protocol


   PRN Reason: Hypoglycemia Protocol


Heparin Sodium (Porcine) (Heparin)  1,000 units IVP TTS [facility]


   Last Admin: 03/17/18 14:59 Dose:  1,000 units


Aztreonam 1 gm/ Sodium (Chloride)  100 mls @ 100 mls/hr IVPB DAILY [facility]


   PRN Reason: Protocol


   Last Admin: 03/18/18 10:06 Dose:  100 mls/hr


Dextrose (Dextrose 5% In Water 1000 Ml)  1,000 mls @ 0 mls/hr IV .Q0M PRN; 

Protocol; Per Protocol


   PRN Reason: Hypoglycemia Protocol


Insulin Aspart (Novolog)  0 unit SC Q6 [facility]


   PRN Reason: Protocol


   Last Admin: 03/19/18 06:57 Dose:  6 unit


Insulin Glargine (Lantus)  10 unit SC HS [facility]


   Last Admin: 03/19/18 00:21 Dose:  10 unit


Metoprolol Tartrate (Lopressor)  25 mg PO BID [facility]


   Last Admin: 03/18/18 18:58 Dose:  25 mg


Midodrine (Proamatine)  10 mg PO TTS [facility]


Midodrine (Proamatine)  10 mg PO TTS [facility]


   Last Admin: 03/17/18 17:22 Dose:  10 mg


Pantoprazole Sodium (Protonix Ec Tab)  40 mg PO DAILY [facility]


Paricalcitol (Zemplar)  2 mcg IV TTS [facility]


   Last Admin: 03/17/18 14:43 Dose:  2 mcg


Rosuvastatin Calcium (Crestor)  5 mg PO HS [facility]


   Last Admin: 03/18/18 22:32 Dose:  5 mg


Sennosides (Senokot Tab)  8.6 mg NG DAILY [facility]


   Last Admin: 03/18/18 10:06 Dose:  8.6 mg


Sevelamer Carbonate (Renvela)  800 mg PO TIDCC [facility]


   Last Admin: 03/18/18 18:00 Dose:  800 mg


Vitamin B Complex/Vit C/Folic Acid (Nephro-Nneka)  1 tab PO 0800 [facility]


   Last Admin: 03/18/18 08:14 Dose:  Not Given











- Labs


Labs: 


 





 03/19/18 06:51 





 03/18/18 08:26 





 











PT  21.0 SECONDS (9.7-12.2)  H  03/19/18  06:51    


 


INR  1.8   03/19/18  06:51    


 


APTT  29 SECONDS (21-34)   03/15/18  08:24    














- Additional Findings


Additional findings: 





- Constitutional


Appears: No Acute Distress, Chronically Ill





- Eye Exam


Eye Exam: EOMI, Normal appearance





- ENT Exam


ENT Exam: Mucous Membranes Moist


Additional comments: 





NGT in place





- Respiratory Exam


Additional comments: 





Very coarse breath sounds bilaterally





- Cardiovascular Exam


Cardiovascular Exam: Irregular Rhythm, +S1, +S2





- GI/Abdominal Exam


GI & Abdominal Exam: Soft, Hypoactive Bowel Sounds.  absent: Distended, Guarding

, Tenderness





- Extremities Exam


Additional comments: 





Evidence of chronic venous stasis


Pedal pulses weak





- Neurological Exam


Neurological Exam: Awake





- Skin


Skin Exam: Dry, Warm





Assessment and Plan





- Assessment and Plan (Free Text)


Plan: 





1. Acute NSTEMI,  Asystole ,CPR/Sustained VT


Previously on Heparin GGT


Cardiology consult, Dr. Nichole. Help appreciated


Family elected against cardiac catheterization despite the advice of multiple 

professionals


Echo shows EF of 15-20%


Cardizem 30 mg PO Q6


Lopressor 25 mg PO BID


ASA 81 mg PO daily


Crestor 5 mg HS








2. Respiratory failure, s/p intubation and later extubation


Previously intubated in the ICU and now extubated


Saturating well on non-rebreather mask





3. Atrial Fibrillation 


CHADS-VAsc score 8


INR  today 1.8 


Coumadin 2.5 mg tonight 


Rate controlled with Lopressor and Cardizem





4. Acute renal failure on chronic renal disease/Now ESRD on HD


Nephrology consult, Dr. Wheeler, help appreciated


Dialysis T,Th,S


Renvela TID





On 3/13:  L IJ permacath placed and left shiley removed








5. Sepsis


Previously on Vancomycin, flagyl, and aztreonam


Now currently only on Aztreonam for pseudomonas in the sputum


Elevated procalcitonin 6.1








6. Acute systolic heart failure


Family refuses cardiac cath. has high risk for arrhythmia and poor prognosis


Family aware of her condition


ECHO shows EF 15 to 20%





7. Pleural effusion


Persistent left loculated pleural effusion





8. Hyperglycemia and uncontrolled DM


Aspart sliding scale


Lantus 10 units HS





9. Anemia of chronic disease, CKD


Procrit T,Th,S


Ferrous gluconate 324 mg TID





10. Prophylaxis


Tube feeds through NGT


Protonix 40 mg IV daily


DVT prophylaxis with Coumadin, currently subtherapeutic INR. Will administer 

Coumadin today and recheck tomorrow.





Disposition: Family has requested DNR/DNI per palliative. Hospice evaluation 

was placed.





Discussed with Dr. Khalida Silva PGY-1





<Samson Gaxiola H - Last Filed: 03/19/18 17:59>





Objective





- Vital Signs/Intake and Output


Vital Signs (last 24 hours): 


 











Temp Pulse Resp BP Pulse Ox


 


 98.6 F   113 H  20   101/63   100 


 


 03/19/18 15:00  03/19/18 16:00  03/19/18 15:00  03/19/18 15:00  03/19/18 15:00








Intake and Output: 


 











 03/19/18 03/19/18





 06:59 18:59


 


Intake Total 420 90


 


Balance 420 90














- Medications


Medications: 


 Current Medications





Albumin Human (Albumin Human 25% (12.5 Gm/50 Ml))  25 gm IV TTS PRN


   PRN Reason: Other


   Last Admin: 03/17/18 14:46 Dose:  25 gm


Albuterol/Ipratropium (Duoneb 3 Mg/0.5 Mg (3 Ml) Ud)  3 ml INH RQ4 PRN


   PRN Reason: Shortness of Breath


   Last Admin: 03/19/18 14:15 Dose:  3 ml


Aspirin (Aspirin Chewable)  81 mg PO DAILY [facility]


   Last Admin: 03/19/18 11:44 Dose:  Not Given


Dextrose (Dextrose 50% Inj)  0 ml IV STAT PRN; Protocol


   PRN Reason: Hypoglycemia Protocol


Dextrose (Glutose 15)  0 gm PO ONCE PRN; Protocol


   PRN Reason: Hypoglycemia Protocol


Diltiazem HCl (Cardizem)  30 mg PO Q6H [facility]


   Last Admin: 03/19/18 13:45 Dose:  30 mg


Epoetin Vamsi (Procrit)  10,000 unit IV TTS [facility]


   Last Admin: 03/17/18 14:44 Dose:  10,000 unit


Epoetin Vamsi (Procrit)  2,000 u IV TTS [facility]


   Last Admin: 03/17/18 14:44 Dose:  2,000 u


Ferrous Gluconate (Fergon)  324 mg PO TID [facility]


   Last Admin: 03/19/18 14:22 Dose:  324 mg


Glucagon (Glucagen Diagnostic Kit)  0 mg IM STAT PRN; Protocol


   PRN Reason: Hypoglycemia Protocol


Heparin Sodium (Porcine) (Heparin)  1,000 units IVP TTS [facility]


   Last Admin: 03/17/18 14:59 Dose:  1,000 units


Aztreonam 1 gm/ Sodium (Chloride)  100 mls @ 100 mls/hr IVPB DAILY [facility]


   PRN Reason: Protocol


   Last Admin: 03/19/18 16:32 Dose:  100 mls/hr


Dextrose (Dextrose 5% In Water 1000 Ml)  1,000 mls @ 0 mls/hr IV .Q0M PRN; 

Protocol; Per Protocol


   PRN Reason: Hypoglycemia Protocol


Insulin Aspart (Novolog)  0 unit SC Q6 [facility]


   PRN Reason: Protocol


   Last Admin: 03/19/18 12:40 Dose:  4 unit


Insulin Glargine (Lantus)  10 unit SC HS [facility]


   Last Admin: 03/19/18 00:21 Dose:  10 unit


Metoprolol Tartrate (Lopressor)  25 mg PO BID [facility]


   Last Admin: 03/19/18 11:45 Dose:  Not Given


Midodrine (Proamatine)  10 mg PO TTS [facility]


Midodrine (Proamatine)  10 mg PO TTS [facility]


   Last Admin: 03/17/18 17:22 Dose:  10 mg


Pantoprazole Sodium (Protonix Ec Tab)  40 mg PO DAILY [facility]


   Last Admin: 03/19/18 11:45 Dose:  Not Given


Paricalcitol (Zemplar)  2 mcg IV TTS [facility]


   Last Admin: 03/17/18 14:43 Dose:  2 mcg


Rosuvastatin Calcium (Crestor)  5 mg PO HS [facility]


   Last Admin: 03/18/18 22:32 Dose:  5 mg


Sennosides (Senokot Tab)  8.6 mg NG DAILY [facility]


   Last Admin: 03/19/18 11:45 Dose:  Not Given


Sevelamer Carbonate (Renvela)  800 mg PO TIDCC [facility]


   Last Admin: 03/19/18 11:46 Dose:  Not Given


Vitamin B Complex/Vit C/Folic Acid (Nephro-Nneka)  1 tab PO 0800 [facility]


   Last Admin: 03/19/18 09:57 Dose:  Not Given


Warfarin Sodium (Coumadin)  2.5 mg PO 1800 [facility]


   Stop: 03/19/18 18:01











- Labs


Labs: 


 





 03/19/18 06:51 





 03/19/18 06:51 





 











PT  21.0 SECONDS (9.7-12.2)  H  03/19/18  06:51    


 


INR  1.8   03/19/18  06:51    


 


APTT  29 SECONDS (21-34)   03/15/18  08:24    














Attending/Attestation





- Attestation


I have personally seen and examined this patient.: Yes


I have fully participated in the care of the patient.: Yes


I have reviewed all pertinent clinical information, including history, physical 

exam and plan: Yes


Notes (Text): 





03/19/18 17:53


Medical attending: Patient was seen and examined by me. Agree with the above 

note by the resident





The patient is known to me from the previous week. At that time she was still 

intubated in the ICU after having a large NSTEMI. Since then she is now 

extubated and out of the ICU. She remains on HD as well as anticoagulation for 

atrial fibrillation.





Her overall condition is unfortunately is very poor. She was non verbal when I 

saw her with the resident this morning. She does follow some very basic 

commands such as lifting her arms up when asked. She also seemed to indicate 

with gestures that she was NOT in pain. Her breathing situation is NOT good. 

She was on a nonrebreather mask and there were loud audible rales and rhonci on 

examination.  





They have been giving her NGT feedings. Because of her situation we thought she 

may be could benefit from a PEG tube. My thoughts were that if the patient's 

family wanted everything done then she would need an LTAC eval - if not then we 

would be purisng the hospice/comfort care rout.





Later in the day palliative care consult explained to me that they spoke with 

the family and after discussing with them it was explained that they have made 

patient DNR/DNI. We will get a palliative/hospice evaluation as well. 





For now continue all medications as before.





thank you


Samson Gaxiola

## 2018-03-19 NOTE — CP.PCM.PN
Subjective





- Date & Time of Evaluation


Date of Evaluation: 03/19/18


Time of Evaluation: 03:30





- Subjective


Subjective: 





dictated





Objective





- Vital Signs/Intake and Output


Vital Signs (last 24 hours): 


 











Temp Pulse Resp BP Pulse Ox


 


 98.6 F   104 H  20   110/55 L  100 


 


 03/19/18 15:00  03/19/18 18:08  03/19/18 15:00  03/19/18 18:11  03/19/18 15:00








Intake and Output: 


 











 03/19/18 03/20/18





 18:59 06:59


 


Intake Total 90 


 


Balance 90 














- Medications


Medications: 


 Current Medications





Albumin Human (Albumin Human 25% (12.5 Gm/50 Ml))  25 gm IV TTS PRN


   PRN Reason: Other


   Last Admin: 03/17/18 14:46 Dose:  25 gm


Albuterol/Ipratropium (Duoneb 3 Mg/0.5 Mg (3 Ml) Ud)  3 ml INH RQ4 PRN


   PRN Reason: Shortness of Breath


   Last Admin: 03/19/18 14:15 Dose:  3 ml


Aspirin (Aspirin Chewable)  81 mg PO DAILY [facility]


   Last Admin: 03/19/18 11:44 Dose:  Not Given


Dextrose (Dextrose 50% Inj)  0 ml IV STAT PRN; Protocol


   PRN Reason: Hypoglycemia Protocol


Dextrose (Glutose 15)  0 gm PO ONCE PRN; Protocol


   PRN Reason: Hypoglycemia Protocol


Diltiazem HCl (Cardizem)  30 mg PO Q6H [facility]


   Last Admin: 03/19/18 18:12 Dose:  30 mg


Epoetin Vamsi (Procrit)  10,000 unit IV TTS [facility]


   Last Admin: 03/17/18 14:44 Dose:  10,000 unit


Epoetin Vamsi (Procrit)  2,000 u IV TTS [facility]


   Last Admin: 03/17/18 14:44 Dose:  2,000 u


Ferrous Gluconate (Fergon)  324 mg PO TID [facility]


   Last Admin: 03/19/18 18:13 Dose:  324 mg


Glucagon (Glucagen Diagnostic Kit)  0 mg IM STAT PRN; Protocol


   PRN Reason: Hypoglycemia Protocol


Heparin Sodium (Porcine) (Heparin)  1,000 units IVP TTS [facility]


   Last Admin: 03/17/18 14:59 Dose:  1,000 units


Aztreonam 1 gm/ Sodium (Chloride)  100 mls @ 100 mls/hr IVPB DAILY [facility]


   PRN Reason: Protocol


   Last Admin: 03/19/18 16:32 Dose:  100 mls/hr


Dextrose (Dextrose 5% In Water 1000 Ml)  1,000 mls @ 0 mls/hr IV .Q0M PRN; 

Protocol; Per Protocol


   PRN Reason: Hypoglycemia Protocol


Insulin Aspart (Novolog)  0 unit SC Q6 [facility]


   PRN Reason: Protocol


   Last Admin: 03/19/18 18:12 Dose:  2 unit


Insulin Glargine (Lantus)  10 unit SC [facility]


   Last Admin: 03/19/18 00:21 Dose:  10 unit


Metoprolol Tartrate (Lopressor)  25 mg PO BID [facility]


   Last Admin: 03/19/18 18:11 Dose:  25 mg


Midodrine (Proamatine)  10 mg PO TTS [facility]


Midodrine (Proamatine)  10 mg PO TTS [facility]


   Last Admin: 03/17/18 17:22 Dose:  10 mg


Pantoprazole Sodium (Protonix Ec Tab)  40 mg PO DAILY [facility]


   Last Admin: 03/19/18 11:45 Dose:  Not Given


Paricalcitol (Zemplar)  2 mcg IV TTS [facility]


   Last Admin: 03/17/18 14:43 Dose:  2 mcg


Rosuvastatin Calcium (Crestor)  5 mg PO HS [facility]


   Last Admin: 03/18/18 22:32 Dose:  5 mg


Sennosides (Senokot Tab)  8.6 mg NG DAILY [facility]


   Last Admin: 03/19/18 11:45 Dose:  Not Given


Sevelamer Carbonate (Renvela)  800 mg PO TIDCC [facility]


   Last Admin: 03/19/18 18:11 Dose:  800 mg


Vitamin B Complex/Vit C/Folic Acid (Nephro-Nneka)  1 tab PO 0800 [facility]


   Last Admin: 03/19/18 09:57 Dose:  Not Given











- Labs


Labs: 


 





 03/19/18 06:51 





 03/19/18 06:51 





 











PT  21.0 SECONDS (9.7-12.2)  H  03/19/18  06:51    


 


INR  1.8   03/19/18  06:51    


 


APTT  29 SECONDS (21-34)   03/15/18  08:24

## 2018-03-19 NOTE — CP.PCM.PN
Subjective





- Date & Time of Evaluation


Date of Evaluation: 03/19/18


Time of Evaluation: 06:45





- Subjective


Subjective: 





Surgery Progress note. Dr. Alvarado





Pt seen and examined at bedside. No acute events overnight. Patient is not 

alert and oriented. Has increased respiratory effort with gurgling sounds. No F/

C. 





Objective





- Vital Signs/Intake and Output


Vital Signs (last 24 hours): 


 











Temp Pulse Resp BP Pulse Ox


 


 98.8 F   103 H  20   107/72   99 


 


 03/19/18 07:10  03/19/18 08:00  03/19/18 07:10  03/19/18 07:10  03/19/18 07:10








Intake and Output: 


 











 03/19/18 03/19/18





 06:59 18:59


 


Intake Total 420 90


 


Balance 420 90














- Medications


Medications: 


 Current Medications





Albumin Human (Albumin Human 25% (12.5 Gm/50 Ml))  25 gm IV TTS PRN


   PRN Reason: Other


   Last Admin: 03/17/18 14:46 Dose:  25 gm


Albuterol/Ipratropium (Duoneb 3 Mg/0.5 Mg (3 Ml) Ud)  3 ml INH RQ4 PRN


   PRN Reason: Shortness of Breath


   Last Admin: 03/18/18 11:42 Dose:  3 ml


Aspirin (Aspirin Chewable)  81 mg PO DAILY [facility]


   Last Admin: 03/18/18 10:06 Dose:  81 mg


Dextrose (Dextrose 50% Inj)  0 ml IV STAT PRN; Protocol


   PRN Reason: Hypoglycemia Protocol


Dextrose (Glutose 15)  0 gm PO ONCE PRN; Protocol


   PRN Reason: Hypoglycemia Protocol


Diltiazem HCl (Cardizem)  30 mg PO Q6H [facility]


   Last Admin: 03/19/18 06:05 Dose:  30 mg


Epoetin Vamsi (Procrit)  10,000 unit IV TTS [facility]


   Last Admin: 03/17/18 14:44 Dose:  10,000 unit


Epoetin Vamsi (Procrit)  2,000 u IV TTS [facility]


   Last Admin: 03/17/18 14:44 Dose:  2,000 u


Ferrous Gluconate (Fergon)  324 mg PO TID [facility]


   Last Admin: 03/18/18 18:11 Dose:  324 mg


Glucagon (Glucagen Diagnostic Kit)  0 mg IM STAT PRN; Protocol


   PRN Reason: Hypoglycemia Protocol


Heparin Sodium (Porcine) (Heparin)  1,000 units IVP TTS [facility]


   Last Admin: 03/17/18 14:59 Dose:  1,000 units


Aztreonam 1 gm/ Sodium (Chloride)  100 mls @ 100 mls/hr IVPB DAILY [facility]


   PRN Reason: Protocol


   Last Admin: 03/18/18 10:06 Dose:  100 mls/hr


Dextrose (Dextrose 5% In Water 1000 Ml)  1,000 mls @ 0 mls/hr IV .Q0M PRN; 

Protocol; Per Protocol


   PRN Reason: Hypoglycemia Protocol


Insulin Aspart (Novolog)  0 unit SC Q6 [facility]


   PRN Reason: Protocol


   Last Admin: 03/19/18 06:57 Dose:  6 unit


Insulin Glargine (Lantus)  10 unit SC HS [facility]


   Last Admin: 03/19/18 00:21 Dose:  10 unit


Metoprolol Tartrate (Lopressor)  25 mg PO BID [facility]


   Last Admin: 03/18/18 18:58 Dose:  25 mg


Midodrine (Proamatine)  10 mg PO TTS [facility]


Midodrine (Proamatine)  10 mg PO TTS [facility]


   Last Admin: 03/17/18 17:22 Dose:  10 mg


Pantoprazole Sodium (Protonix Ec Tab)  40 mg PO DAILY [facility]


Paricalcitol (Zemplar)  2 mcg IV TTS [facility]


   Last Admin: 03/17/18 14:43 Dose:  2 mcg


Rosuvastatin Calcium (Crestor)  5 mg PO HS [facility]


   Last Admin: 03/18/18 22:32 Dose:  5 mg


Sennosides (Senokot Tab)  8.6 mg NG DAILY [facility]


   Last Admin: 03/18/18 10:06 Dose:  8.6 mg


Sevelamer Carbonate (Renvela)  800 mg PO TIDCC [facility]


   Last Admin: 03/19/18 09:57 Dose:  Not Given


Vitamin B Complex/Vit C/Folic Acid (Nephro-Nneka)  1 tab PO 0800 [facility]


   Last Admin: 03/19/18 09:57 Dose:  Not Given











- Labs


Labs: 


 





 03/19/18 06:51 





 03/19/18 06:51 





 











PT  21.0 SECONDS (9.7-12.2)  H  03/19/18  06:51    


 


INR  1.8   03/19/18  06:51    


 


APTT  29 SECONDS (21-34)   03/15/18  08:24    














- Constitutional


Appears: Non-toxic, Chronically Ill





- Head Exam


Head Exam: ATRAUMATIC, NORMAL INSPECTION, NORMOCEPHALIC





- Eye Exam


Eye Exam: EOMI, Normal appearance





- ENT Exam


ENT Exam: Mucous Membranes Moist





- Respiratory Exam


Additional comments: 





Decreased resp sounds. Rhonchi diffusely. 





- Cardiovascular Exam


Cardiovascular Exam: RRR.  absent: JVD





- GI/Abdominal Exam


GI & Abdominal Exam: Soft.  absent: Distended, Firm, Guarding, Rigid, Tenderness





- Extremities Exam


Additional comments: 





No palpable thrill appreciated at L AVF





- Neurological Exam


Neurological Exam: Awake.  absent: Alert, Oriented x3





Assessment and Plan





- Assessment and Plan (Free Text)


Assessment: 





78yo F with Thrombosed Left AVF


Plan: 





- OR cancelled today due to patient condition


- Delayed procedure until patients mental status and respiratory status improves


- Medical maximization 





Further recs as per Dr. Jenny Delarosa PGY1


surgery pager: [phone]

## 2018-03-20 LAB
ALBUMIN SERPL-MCNC: 2.8 G/DL (ref 3.5–5)
ALBUMIN/GLOB SERPL: 0.9 {RATIO} (ref 1–2.1)
ALT SERPL-CCNC: 70 U/L (ref 9–52)
ANISOCYTOSIS BLD QL SMEAR: SLIGHT
AST SERPL-CCNC: 25 U/L (ref 14–36)
BASOPHILS # BLD AUTO: 0 K/UL (ref 0–0.2)
BASOPHILS NFR BLD: 0.2 % (ref 0–2)
BUN SERPL-MCNC: 107 MG/DL (ref 7–17)
CALCIUM SERPL-MCNC: 8.5 MG/DL (ref 8.6–10.4)
EOSINOPHIL # BLD AUTO: 0.1 K/UL (ref 0–0.7)
EOSINOPHIL NFR BLD: 0.4 % (ref 0–4)
EOSINOPHIL NFR BLD: 1 % (ref 0–4)
ERYTHROCYTE [DISTWIDTH] IN BLOOD BY AUTOMATED COUNT: 18.2 % (ref 11.5–14.5)
GFR NON-AFRICAN AMERICAN: 8
HGB BLD-MCNC: 8 G/DL (ref 11–16)
HYPOCHROMIC: SLIGHT
INR PPP: 2
LYMPHOCYTE: 2 % (ref 20–40)
LYMPHOCYTES # BLD AUTO: 0.3 K/UL (ref 1–4.3)
LYMPHOCYTES NFR BLD AUTO: 2.4 % (ref 20–40)
MCH RBC QN AUTO: 27.9 PG (ref 27–31)
MCHC RBC AUTO-ENTMCNC: 32 G/DL (ref 33–37)
MCV RBC AUTO: 87.4 FL (ref 81–99)
MONOCYTE: 1 % (ref 0–10)
MONOCYTES # BLD: 0.6 K/UL (ref 0–0.8)
MONOCYTES NFR BLD: 4.1 % (ref 0–10)
NEUTROPHILS # BLD: 13.2 K/UL (ref 1.8–7)
NEUTROPHILS NFR BLD AUTO: 92.9 % (ref 50–75)
NEUTROPHILS NFR BLD AUTO: 95 % (ref 50–75)
NEUTS BAND NFR BLD: 1 % (ref 0–2)
NRBC BLD AUTO-RTO: 0.1 % (ref 0–2)
OVALOCYTES BLD QL SMEAR: SLIGHT
PLATELET # BLD EST: NORMAL 10*3/UL
PLATELET # BLD: 177 K/UL (ref 130–400)
PMV BLD AUTO: 9.7 FL (ref 7.2–11.7)
POLYCHROMIC: SLIGHT
PROTHROMBIN TIME: 23.2 SECONDS (ref 9.7–12.2)
RBC # BLD AUTO: 2.86 MIL/UL (ref 3.8–5.2)
TARGETS BLD QL SMEAR: SLIGHT
TOTAL CELLS COUNTED BLD: 100
WBC # BLD AUTO: 14.3 K/UL (ref 4.8–10.8)

## 2018-03-20 RX ADMIN — PARICALCITOL SCH MCG: 2 INJECTION, SOLUTION INTRAVENOUS at 11:53

## 2018-03-20 RX ADMIN — PANTOPRAZOLE SODIUM SCH MG: 40 TABLET, DELAYED RELEASE ORAL at 14:06

## 2018-03-20 RX ADMIN — INSULIN ASPART SCH UNIT: 100 INJECTION, SOLUTION INTRAVENOUS; SUBCUTANEOUS at 06:23

## 2018-03-20 RX ADMIN — INSULIN GLARGINE SCH UNIT: 100 INJECTION, SOLUTION SUBCUTANEOUS at 21:34

## 2018-03-20 RX ADMIN — INSULIN ASPART SCH: 100 INJECTION, SOLUTION INTRAVENOUS; SUBCUTANEOUS at 12:23

## 2018-03-20 RX ADMIN — ERYTHROPOIETIN SCH U: 2000 INJECTION, SOLUTION INTRAVENOUS; SUBCUTANEOUS at 11:52

## 2018-03-20 RX ADMIN — IPRATROPIUM BROMIDE AND ALBUTEROL SULFATE PRN ML: .5; 3 SOLUTION RESPIRATORY (INHALATION) at 01:21

## 2018-03-20 RX ADMIN — INSULIN ASPART SCH: 100 INJECTION, SOLUTION INTRAVENOUS; SUBCUTANEOUS at 00:07

## 2018-03-20 RX ADMIN — SODIUM CHLORIDE SCH UNITS: 9 INJECTION, SOLUTION INTRAMUSCULAR; INTRAVENOUS; SUBCUTANEOUS at 12:25

## 2018-03-20 RX ADMIN — ERYTHROPOIETIN SCH UNIT: 10000 INJECTION, SOLUTION INTRAVENOUS; SUBCUTANEOUS at 11:53

## 2018-03-20 RX ADMIN — INSULIN ASPART SCH UNIT: 100 INJECTION, SOLUTION INTRAVENOUS; SUBCUTANEOUS at 17:27

## 2018-03-20 NOTE — CP.PCM.PN
Subjective





- Date & Time of Evaluation


Date of Evaluation: 03/20/18


Time of Evaluation: 03:00





- Subjective


Subjective: 





dictated





Objective





- Vital Signs/Intake and Output


Vital Signs (last 24 hours): 


 











Temp Pulse Resp BP Pulse Ox


 


 97.6 F   103 H  20   110/65   93 L


 


 03/20/18 15:53  03/20/18 16:33  03/20/18 15:53  03/20/18 17:24  03/20/18 15:53








Intake and Output: 


 











 03/20/18 03/21/18





 18:59 06:59


 


Intake Total 760 520


 


Balance 760 520














- Medications


Medications: 


 Current Medications





Albumin Human (Albumin Human 25% (12.5 Gm/50 Ml))  25 gm IV TTS PRN


   PRN Reason: Other


   Last Admin: 03/17/18 14:46 Dose:  25 gm


Albuterol/Ipratropium (Duoneb 3 Mg/0.5 Mg (3 Ml) Ud)  3 ml INH RQ4 PRN


   PRN Reason: Shortness of Breath


   Last Admin: 03/20/18 01:21 Dose:  3 ml


Aspirin (Aspirin Chewable)  81 mg NG DAILY [facility]


   Last Admin: 03/20/18 14:06 Dose:  81 mg


Dextrose (Dextrose 50% Inj)  0 ml IV STAT PRN; Protocol


   PRN Reason: Hypoglycemia Protocol


Dextrose (Glutose 15)  0 gm PO ONCE PRN; Protocol


   PRN Reason: Hypoglycemia Protocol


Diltiazem HCl (Cardizem)  30 mg NG Q6H [facility]


   Last Admin: 03/20/18 19:00 Dose:  30 mg


Epoetin Vamsi (Procrit)  10,000 unit IV TTS [facility]


   Last Admin: 03/20/18 11:53 Dose:  10,000 unit


Epoetin Vamsi (Procrit)  2,000 u IV TTS [facility]


   Last Admin: 03/20/18 11:52 Dose:  2,000 u


Ferrous Gluconate (Fergon)  324 mg PO TID [facility]


   Last Admin: 03/20/18 18:00 Dose:  324 mg


Glucagon (Glucagen Diagnostic Kit)  0 mg IM STAT PRN; Protocol


   PRN Reason: Hypoglycemia Protocol


Heparin Sodium (Porcine) (Heparin)  3,700 units IVP TTS [facility]


   Stop: 03/31/18 10:01


   Last Admin: 03/20/18 12:25 Dose:  3,700 units


Aztreonam 1 gm/ Sodium (Chloride)  100 mls @ 100 mls/hr IVPB DAILY [facility]


   PRN Reason: Protocol


   Last Admin: 03/20/18 14:07 Dose:  100 mls/hr


Dextrose (Dextrose 5% In Water 1000 Ml)  1,000 mls @ 0 mls/hr IV .Q0M PRN; 

Protocol; Per Protocol


   PRN Reason: Hypoglycemia Protocol


Insulin Aspart (Novolog)  0 unit SC Q6 [facility]


   PRN Reason: Protocol


   Last Admin: 03/20/18 17:27 Dose:  4 unit


Insulin Glargine (Lantus)  10 unit SC HS [facility]


   Last Admin: 03/20/18 21:34 Dose:  10 unit


Metoprolol Tartrate (Lopressor)  25 mg NG BID [facility]


   Last Admin: 03/20/18 17:24 Dose:  25 mg


Midodrine (Proamatine)  10 mg NG TTS [facility]


   Last Admin: 03/20/18 10:06 Dose:  10 mg


Pantoprazole Sodium (Protonix Ec Tab)  40 mg PO DAILY [facility]


   Last Admin: 03/20/18 14:06 Dose:  40 mg


Paricalcitol (Zemplar)  2 mcg IV TTS [facility]


   Last Admin: 03/20/18 11:53 Dose:  2 mcg


Rosuvastatin Calcium (Crestor)  5 mg NG HS [facility]


   Last Admin: 03/20/18 21:35 Dose:  5 mg


Sennosides (Senokot Tab)  8.6 mg NG DAILY [facility]


   Last Admin: 03/20/18 14:07 Dose:  8.6 mg


Sevelamer Carbonate (Renvela)  800 mg NG TIDCC [facility]


   Last Admin: 03/20/18 16:27 Dose:  800 mg


Vitamin B Complex/Vit C/Folic Acid (Nephro-Nneka)  1 tab NG 0800 [facility]











- Labs


Labs: 


 





 03/20/18 07:19 





 03/20/18 07:19 





 











PT  23.2 SECONDS (9.7-12.2)  H  03/20/18  07:19    


 


INR  2.0   03/20/18  07:19    


 


APTT  29 SECONDS (21-34)   03/15/18  08:24

## 2018-03-20 NOTE — CP.PCM.PN
Subjective





- Date & Time of Evaluation


Date of Evaluation: 03/20/18


Time of Evaluation: 11:19





- Subjective


Subjective: 


Nephrology Consultation:





Assessment: stable


CKD stage 5 (N18.5) with hyperkalemia, acidosis now has ESRD on HD via 

permacath TTS 


pulm edema, CHF, NSTEMI, pleural effusions, pneumonia


AMS, hyperglycemia


s/p cardiac arrest, shock liver, A fib





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 


Hypertensive Chronic Kidney Disease (I12.9)


Anemia (D64.9), Hyperphosphatemia (E83.39), Secondary Hyperparathyroidism (E21.1

), HTN (I12.9), vit D insuff, hx of colon CA, TIA/CVAs





Plan


HD Tomorrow as ordered as per TTS schedule. 


maintain hemodynamic stable. Patient not on ACEI/ARB due to low BP. on 

midodrine prior to HD


started iron supplements, MVI, epogen with HD, and zemplar 2 mcg with HD.


started on phos binders as renvela





Dose meds/antibiotics for ESRD/HD status. Avoid fleets enema/magnesium based 

laxatives. Avoid nephrotoxins/NSAIDs


Glycemic control


Further work up/management as per primary team


 consult for outpatient dialysis placement.


palliative care consult noted. Hospice being discussed. 





Thanks for allowing me to participate in care of your patient. Will follow 

patient with you. Please call if any Qs.


Dr Shad Panchal


Office: [phone] Cell: [phone] Fax: [phone]





Chief Complaint; unable to obtain


reason for consult: CKD management


source of Info: EMR


HPI: Pt is a 77 F with hx of diabetes Mellitus ( years), hypertension (years), 

CHF, TIA, Colon CA, CKD stage 4/5, has matured AVG in left arm presented with 

complaints of AMS and hyperglycemia as brought by family. now has elevated trop 

100, hyperkalemia, AMS and elevated sugar, pulm edema. renal consult for CKD 

management. pt with AMS unable to provide any hx. 





ROS: unable to obtain





Physical Examination: seen on HD


General Appearance: ill appearing, on O2 


Vitals reviewed and noted as below


Head; Atraumatic, normocephalic


ENT: On O2 


EYES: PERRLA


Neck; supple no lymphadenopathy, no thyromegaly or bruit


Lungs: Normal respiratory rate/effort. Breath sounds bilateral rales anteriorly 

b/l


Heart: normal rate. s1s2 normal. No rub or gallop. A fib


Extremities: no edema. No varicose veins. chronic hyperpigmented changes in legs


Neurological: Patient is  Not communicative verbally


Skin: Warm and dry. Normal turgor. No rash. Palpitation: Normal elasticity for 

age


Abdomen: Abdomen is soft. Bowel sounds +. There is no abdominal tenderness, no 

guarding/rigidity no organomegaly


Psych: unable


MSK: no joint tenderness or swelling. Digits and nails normal, no deformity


: kidney or bladder not palpable


Access: Left AVG without bruit. has left permacath





Labs/imaging reviewed.


Past medical history, past surgical history, family history, social history, 

allergy reviewed and noted as below


Family hx: no hx of CKD. Rest non-contributory








Objective





- Vital Signs/Intake and Output


Vital Signs (last 24 hours): 


 











Temp Pulse Resp BP Pulse Ox


 


 96 F L  88   18   109/71   100 


 


 03/20/18 09:15  03/20/18 09:15  03/20/18 09:20  03/20/18 10:20  03/20/18 09:20








Intake and Output: 


 











 03/20/18 03/20/18





 06:59 18:59


 


Intake Total  320


 


Balance  320














- Medications


Medications: 


 Current Medications





Albumin Human (Albumin Human 25% (12.5 Gm/50 Ml))  25 gm IV TTS PRN


   PRN Reason: Other


   Last Admin: 03/17/18 14:46 Dose:  25 gm


Albuterol/Ipratropium (Duoneb 3 Mg/0.5 Mg (3 Ml) Ud)  3 ml INH RQ4 PRN


   PRN Reason: Shortness of Breath


   Last Admin: 03/20/18 01:21 Dose:  3 ml


Aspirin (Aspirin Chewable)  81 mg NG DAILY [facility]


Dextrose (Dextrose 50% Inj)  0 ml IV STAT PRN; Protocol


   PRN Reason: Hypoglycemia Protocol


Dextrose (Glutose 15)  0 gm PO ONCE PRN; Protocol


   PRN Reason: Hypoglycemia Protocol


Diltiazem HCl (Cardizem)  30 mg NG Q6H [facility]


Epoetin Vamsi (Procrit)  10,000 unit IV TTS [facility]


   Last Admin: 03/17/18 14:44 Dose:  10,000 unit


Epoetin Vamsi (Procrit)  2,000 u IV TTS [facility]


   Last Admin: 03/17/18 14:44 Dose:  2,000 u


Ferrous Gluconate (Fergon)  324 mg PO TID Counts include 234 beds at the [facility]


   Last Admin: 03/19/18 18:13 Dose:  324 mg


Glucagon (Glucagen Diagnostic Kit)  0 mg IM STAT PRN; Protocol


   PRN Reason: Hypoglycemia Protocol


Aztreonam 1 gm/ Sodium (Chloride)  100 mls @ 100 mls/hr IVPB DAILY [facility]


   PRN Reason: Protocol


   Last Admin: 03/19/18 16:32 Dose:  100 mls/hr


Dextrose (Dextrose 5% In Water 1000 Ml)  1,000 mls @ 0 mls/hr IV .Q0M PRN; 

Protocol; Per Protocol


   PRN Reason: Hypoglycemia Protocol


Insulin Aspart (Novolog)  0 unit SC Q6 [facility]


   PRN Reason: Protocol


   Last Admin: 03/20/18 06:23 Dose:  4 unit


Insulin Glargine (Lantus)  10 unit SC HS Counts include 234 beds at the [facility]


   Last Admin: 03/19/18 21:33 Dose:  10 unit


Metoprolol Tartrate (Lopressor)  25 mg NG BID Counts include 234 beds at the [facility]


Midodrine (Proamatine)  10 mg NG TTS Counts include 234 beds at the [facility]


   Last Admin: 03/20/18 10:06 Dose:  10 mg


Pantoprazole Sodium (Protonix Ec Tab)  40 mg PO DAILY Counts include 234 beds at the [facility]


   Last Admin: 03/19/18 11:45 Dose:  Not Given


Paricalcitol (Zemplar)  2 mcg IV TTS Counts include 234 beds at the [facility]


   Last Admin: 03/17/18 14:43 Dose:  2 mcg


Rosuvastatin Calcium (Crestor)  5 mg NG HS Counts include 234 beds at the [facility]


Sennosides (Senokot Tab)  8.6 mg NG DAILY Counts include 234 beds at the [facility]


   Last Admin: 03/19/18 11:45 Dose:  Not Given


Sevelamer Carbonate (Renvela)  800 mg NG TIDCC Counts include 234 beds at the [facility]


Vitamin B Complex/Vit C/Folic Acid (Nephro-Nneka)  1 tab NG 0800 Counts include 234 beds at the [facility]











- Labs


Labs: 


 





 03/20/18 07:19 





 03/20/18 07:19 





 











PT  23.2 SECONDS (9.7-12.2)  H  03/20/18  07:19    


 


INR  2.0   03/20/18  07:19    


 


APTT  29 SECONDS (21-34)   03/15/18  08:24

## 2018-03-20 NOTE — PN
DATE:



SUBJECTIVE:  The patient was unresponsive, on a non-rebreather mask.  I saw

her after several days.



OBJECTIVE:

VITAL SIGNS:  T-max is 98, heart rate of 103, blood pressure 107/72,

respirations are 20.  She is on a non-rebreather mask at this time.

NECK:  Supple.

LUNGS:  Diffuse rhonchi.

HEART:  S1, S2 regular.

ABDOMEN:  Soft, nontender.

EXTREMITIES:  Venodyne boots on.



LABORATORY STUDIES: Labs are noted.  Labs show white count is 17,

hemoglobin 8.4, hematocrit 26.3, platelet count is 196.  Chemistry, she was

on dialysis, creatinine is 4.7, BUN is 90.



She is DNR and DNI now, and micro wise, she had Pseudomonas, and she has

been on Azactam which is being continued since she had allergy to

penicillin, and the patient remains in poor condition with weak heart ____

with Pseudomonas, status post respiratory failure, now on a non-rebreather

mask.





__________________________________________

Mahnaz Tate MD





DD:  03/19/2018 20:09:59

DT:  03/19/2018 20:12:06

Job # [id number]

## 2018-03-20 NOTE — RAD
HISTORY:

reassess volume overload, pneumonia  



COMPARISON:

03/14/2018 



FINDINGS:



LUNGS:

Progressive consolidative change, collapse right lower lobe. Stable 

findings left lung.



PLEURA:

Persistent left pleural effusion



CARDIOVASCULAR:

 No radiographic findings to suggest acute or significant 

cardiovascular disease. Venous access catheter in stable, 

satisfactory position.



OSSEOUS STRUCTURES:

No significant abnormalities.



VISUALIZED UPPER ABDOMEN:

Nasogastric tube courses through the esophagus into a decompressed 

stomach in satisfactory position.



OTHER FINDINGS:

None.



IMPRESSION:

New collapse, volume loss right lower lobe.  Otherwise no interval 

change.

## 2018-03-20 NOTE — CP.PCM.PN
<Mike Silva - Last Filed: 03/20/18 11:29>





Subjective





- Date & Time of Evaluation


Date of Evaluation: 03/20/18


Time of Evaluation: 07:40





- Subjective


Subjective: 





Medicine progress note for Dr. Gaxiola





Patient seen and examined. Patient is lethargic and unable to speak. She does 

respond to very simple questioning. Cannot obtain ROS due to current clinical 

situation. No acute events overnight per nursing staff.





Objective





- Vital Signs/Intake and Output


Vital Signs (last 24 hours): 


 











Temp Pulse Resp BP Pulse Ox


 


 97.4 F L  118 H  20   110/70   97 


 


 03/20/18 00:13  03/20/18 04:39  03/20/18 00:13  03/20/18 00:13  03/20/18 00:13








Intake and Output: 


 











 03/20/18 03/20/18





 06:59 18:59


 


Intake Total  320


 


Balance  320














- Medications


Medications: 


 Current Medications





Albumin Human (Albumin Human 25% (12.5 Gm/50 Ml))  25 gm IV TTS PRN


   PRN Reason: Other


   Last Admin: 03/17/18 14:46 Dose:  25 gm


Albuterol/Ipratropium (Duoneb 3 Mg/0.5 Mg (3 Ml) Ud)  3 ml INH RQ4 PRN


   PRN Reason: Shortness of Breath


   Last Admin: 03/20/18 01:21 Dose:  3 ml


Aspirin (Aspirin Chewable)  81 mg PO DAILY [facility]


   Last Admin: 03/19/18 11:44 Dose:  Not Given


Dextrose (Dextrose 50% Inj)  0 ml IV STAT PRN; Protocol


   PRN Reason: Hypoglycemia Protocol


Dextrose (Glutose 15)  0 gm PO ONCE PRN; Protocol


   PRN Reason: Hypoglycemia Protocol


Diltiazem HCl (Cardizem)  30 mg PO Q6H [facility]


   Last Admin: 03/20/18 06:16 Dose:  30 mg


Epoetin Vamsi (Procrit)  10,000 unit IV TTS [facility]


   Last Admin: 03/17/18 14:44 Dose:  10,000 unit


Epoetin Vamsi (Procrit)  2,000 u IV TTS [facility]


   Last Admin: 03/17/18 14:44 Dose:  2,000 u


Ferrous Gluconate (Fergon)  324 mg PO TID [facility]


   Last Admin: 03/19/18 18:13 Dose:  324 mg


Glucagon (Glucagen Diagnostic Kit)  0 mg IM STAT PRN; Protocol


   PRN Reason: Hypoglycemia Protocol


Heparin Sodium (Porcine) (Heparin)  1,000 units IVP TTS [facility]


   Last Admin: 03/17/18 14:59 Dose:  1,000 units


Aztreonam 1 gm/ Sodium (Chloride)  100 mls @ 100 mls/hr IVPB DAILY [facility]


   PRN Reason: Protocol


   Last Admin: 03/19/18 16:32 Dose:  100 mls/hr


Dextrose (Dextrose 5% In Water 1000 Ml)  1,000 mls @ 0 mls/hr IV .Q0M PRN; 

Protocol; Per Protocol


   PRN Reason: Hypoglycemia Protocol


Insulin Aspart (Novolog)  0 unit SC Q6 [facility]


   PRN Reason: Protocol


   Last Admin: 03/20/18 06:23 Dose:  4 unit


Insulin Glargine (Lantus)  10 unit SC HS [facility]


   Last Admin: 03/19/18 21:33 Dose:  10 unit


Metoprolol Tartrate (Lopressor)  25 mg PO BID [facility]


   Last Admin: 03/19/18 18:11 Dose:  25 mg


Midodrine (Proamatine)  10 mg PO TTS [facility]


Midodrine (Proamatine)  10 mg PO TTS [facility]


   Last Admin: 03/17/18 17:22 Dose:  10 mg


Pantoprazole Sodium (Protonix Ec Tab)  40 mg PO DAILY [facility]


   Last Admin: 03/19/18 11:45 Dose:  Not Given


Paricalcitol (Zemplar)  2 mcg IV TTS [facility]


   Last Admin: 03/17/18 14:43 Dose:  2 mcg


Rosuvastatin Calcium (Crestor)  5 mg PO [facility]


   Last Admin: 03/19/18 21:31 Dose:  5 mg


Sennosides (Senokot Tab)  8.6 mg NG DAILY [facility]


   Last Admin: 03/19/18 11:45 Dose:  Not Given


Sevelamer Carbonate (Renvela)  800 mg PO TIDCC [facility]


   Last Admin: 03/19/18 18:11 Dose:  800 mg


Vitamin B Complex/Vit C/Folic Acid (Nephro-Nneka)  1 tab PO 0800 [facility]


   Last Admin: 03/19/18 09:57 Dose:  Not Given











- Labs


Labs: 


 





 03/19/18 06:51 





 03/19/18 06:51 





 











PT  21.0 SECONDS (9.7-12.2)  H  03/19/18  06:51    


 


INR  1.8   03/19/18  06:51    


 


APTT  29 SECONDS (21-34)   03/15/18  08:24    














- Additional Findings


Additional findings: 





- Constitutional


Appears: No Acute Distress, Chronically Ill





- Eye Exam


Eye Exam: EOMI, Normal appearance





- ENT Exam


ENT Exam: Mucous Membranes Moist


Additional comments: 





NGT in place





- Respiratory Exam


Additional comments: 





Coarse breath sounds bilaterally with some rales and congestion





- Cardiovascular Exam


Cardiovascular Exam: Irregular Rhythm, +S1, +S2





- GI/Abdominal Exam


GI & Abdominal Exam: Soft, Hypoactive Bowel Sounds.  absent: Distended, Guarding

, Tenderness





- Extremities Exam


Additional comments: 





Evidence of chronic venous stasis


Pedal pulses weak





- Neurological Exam


Neurological Exam: Awake





- Skin


Skin Exam: Dry, Warm





Assessment and Plan





- Assessment and Plan (Free Text)


Plan: 





1. Acute NSTEMI,  Asystole ,CPR/Sustained VT


Previously on Heparin GGT


Cardiology consult, Dr. Nichole. Help appreciated


Family elected against cardiac catheterization despite the advice of multiple 

professionals


Echo shows EF of 15-20%


Cardizem 30 mg PO Q6


Lopressor 25 mg PO BID


ASA 81 mg PO daily


Crestor 5 mg HS








2. Respiratory failure, s/p intubation and later extubation


Previously intubated in the ICU and now extubated


Saturating well on non-rebreather mask





3. Atrial Fibrillation 


CHADS-VAsc score 8


INR  today 2.0 


Coumadin 2.5 mg tonight 


Rate controlled with Lopressor and Cardizem





4. Acute renal failure on chronic renal disease/Now ESRD on HD


Nephrology consult, Dr. Wheeler, help appreciated


Dialysis T,Th,S


Renvela TID





On 3/13:  L IJ permacath placed and left shiley removed








5. Sepsis


Previously on Vancomycin, flagyl, and aztreonam


Now currently only on Aztreonam for pseudomonas in the sputum


Elevated procalcitonin 6.1








6. Acute systolic heart failure


Family refuses cardiac cath. has high risk for arrhythmia and poor prognosis


Family aware of her condition


ECHO shows EF 15 to 20%





7. Pleural effusion


Persistent left loculated pleural effusion





8. Hyperglycemia and uncontrolled DM


Aspart sliding scale


Lantus 10 units HS





9. Anemia of chronic disease, CKD


Procrit T,Th,S


Ferrous gluconate 324 mg TID





10. Prophylaxis


Tube feeds through NGT


Protonix 40 mg IV daily


DVT prophylaxis with Coumadin, currently therapeutic INR. Will administer 

Coumadin today and recheck tomorrow.





Disposition: Family has requested DNR/DNI per palliative. Hospice evaluation 

was placed. Family wishes for [facility] hospice. Follow up with case management team 

to determine what her insurance will allow.





Discussed with Dr. Khalida Silva PGY-1





<Samson Gaxiola - Last Filed: 03/20/18 13:28>





Objective





- Vital Signs/Intake and Output


Vital Signs (last 24 hours): 


 











Temp Pulse Resp BP Pulse Ox


 


 96 F L  88   18   90/53 L  100 


 


 03/20/18 09:15  03/20/18 09:15  03/20/18 09:20  03/20/18 12:55  03/20/18 09:20








Intake and Output: 


 











 03/20/18 03/20/18





 06:59 18:59


 


Intake Total  320


 


Balance  320














- Medications


Medications: 


 Current Medications





Albumin Human (Albumin Human 25% (12.5 Gm/50 Ml))  25 gm IV TTS PRN


   PRN Reason: Other


   Last Admin: 03/17/18 14:46 Dose:  25 gm


Albuterol/Ipratropium (Duoneb 3 Mg/0.5 Mg (3 Ml) Ud)  3 ml INH RQ4 PRN


   PRN Reason: Shortness of Breath


   Last Admin: 03/20/18 01:21 Dose:  3 ml


Aspirin (Aspirin Chewable)  81 mg NG DAILY [facility]


Dextrose (Dextrose 50% Inj)  0 ml IV STAT PRN; Protocol


   PRN Reason: Hypoglycemia Protocol


Dextrose (Glutose 15)  0 gm PO ONCE PRN; Protocol


   PRN Reason: Hypoglycemia Protocol


Diltiazem HCl (Cardizem)  30 mg NG Q6H [facility]


Epoetin Vamsi (Procrit)  10,000 unit IV TTS [facility]


   Last Admin: 03/20/18 11:53 Dose:  10,000 unit


Epoetin Vamsi (Procrit)  2,000 u IV TTS [facility]


   Last Admin: 03/20/18 11:52 Dose:  2,000 u


Ferrous Gluconate (Fergon)  324 mg PO TID [facility]


   Last Admin: 03/20/18 10:30 Dose:  Not Given


Glucagon (Glucagen Diagnostic Kit)  0 mg IM STAT PRN; Protocol


   PRN Reason: Hypoglycemia Protocol


Heparin Sodium (Porcine) (Heparin)  3,700 units IVP TTS [facility]


   Stop: 03/31/18 10:01


   Last Admin: 03/20/18 12:25 Dose:  3,700 units


Aztreonam 1 gm/ Sodium (Chloride)  100 mls @ 100 mls/hr IVPB DAILY [facility]


   PRN Reason: Protocol


   Last Admin: 03/19/18 16:32 Dose:  100 mls/hr


Dextrose (Dextrose 5% In Water 1000 Ml)  1,000 mls @ 0 mls/hr IV .Q0M PRN; 

Protocol; Per Protocol


   PRN Reason: Hypoglycemia Protocol


Insulin Aspart (Novolog)  0 unit SC Q6 [facility]


   PRN Reason: Protocol


   Last Admin: 03/20/18 12:23 Dose:  Not Given


Insulin Glargine (Lantus)  10 unit SC HS [facility]


   Last Admin: 03/19/18 21:33 Dose:  10 unit


Metoprolol Tartrate (Lopressor)  25 mg NG BID [facility]


   Last Admin: 03/20/18 10:30 Dose:  Not Given


Midodrine (Proamatine)  10 mg NG TTS [facility]


   Last Admin: 03/20/18 10:06 Dose:  10 mg


Pantoprazole Sodium (Protonix Ec Tab)  40 mg PO DAILY [facility]


   Last Admin: 03/19/18 11:45 Dose:  Not Given


Paricalcitol (Zemplar)  2 mcg IV TTS [facility]


   Last Admin: 03/20/18 11:53 Dose:  2 mcg


Rosuvastatin Calcium (Crestor)  5 mg NG HS [facility]


Sennosides (Senokot Tab)  8.6 mg NG DAILY [facility]


   Last Admin: 03/19/18 11:45 Dose:  Not Given


Sevelamer Carbonate (Renvela)  800 mg NG TIDCC [facility]


   Last Admin: 03/20/18 12:23 Dose:  Not Given


Vitamin B Complex/Vit C/Folic Acid (Nephro-Nneka)  1 tab NG 0800 [facility]


Warfarin Sodium (Coumadin)  2.5 mg NG 1800 [facility]


   Stop: 03/20/18 18:01











- Labs


Labs: 


 





 03/20/18 07:19 





 03/20/18 07:19 





 











PT  23.2 SECONDS (9.7-12.2)  H  03/20/18  07:19    


 


INR  2.0   03/20/18  07:19    


 


APTT  29 SECONDS (21-34)   03/15/18  08:24    














Attending/Attestation





- Attestation


I have personally seen and examined this patient.: Yes


I have fully participated in the care of the patient.: Yes


I have reviewed all pertinent clinical information, including history, physical 

exam and plan: Yes


Notes (Text): 





03/20/18 13:25


Medical attending: Patient was seen and examined by me. Agree with the above 

note by the resident





Today we saw patient down at [facility]. 





As previously mentioned she was only able to raise arms as well as nodd yes or 

no when we asked her very basic things. 





Also as mentioned previously - the patient is now DNR and DNI. Family is 

considering hospice/palliative care. 





She remains on the non-rebreather mask due to poor respirations and infection. 





In the mean time we will continue with the IV abx for infection, 

anticoagulation from atrial fibrillation, HD. 





thank you


Samson Gaxiola

## 2018-03-21 LAB
ALBUMIN SERPL-MCNC: 2.8 G/DL (ref 3.5–5)
ALBUMIN/GLOB SERPL: 0.8 {RATIO} (ref 1–2.1)
ALT SERPL-CCNC: 67 U/L (ref 9–52)
ANISOCYTOSIS BLD QL SMEAR: SLIGHT
AST SERPL-CCNC: 25 U/L (ref 14–36)
BASOPHILS # BLD AUTO: 0 K/UL (ref 0–0.2)
BASOPHILS NFR BLD: 0.4 % (ref 0–2)
BUN SERPL-MCNC: 72 MG/DL (ref 7–17)
CALCIUM SERPL-MCNC: 8.2 MG/DL (ref 8.6–10.4)
EOSINOPHIL # BLD AUTO: 0 K/UL (ref 0–0.7)
EOSINOPHIL NFR BLD: 0.3 % (ref 0–4)
ERYTHROCYTE [DISTWIDTH] IN BLOOD BY AUTOMATED COUNT: 18.2 % (ref 11.5–14.5)
GFR NON-AFRICAN AMERICAN: 11
HGB BLD-MCNC: 7.8 G/DL (ref 11–16)
HYPOCHROMIC: SLIGHT
INR PPP: 2
LYMPHOCYTE: 2 % (ref 20–40)
LYMPHOCYTES # BLD AUTO: 0.4 K/UL (ref 1–4.3)
LYMPHOCYTES NFR BLD AUTO: 3.5 % (ref 20–40)
MCH RBC QN AUTO: 27.7 PG (ref 27–31)
MCHC RBC AUTO-ENTMCNC: 31.8 G/DL (ref 33–37)
MCV RBC AUTO: 86.9 FL (ref 81–99)
MONOCYTE: 2 % (ref 0–10)
MONOCYTES # BLD: 0.5 K/UL (ref 0–0.8)
MONOCYTES NFR BLD: 4.4 % (ref 0–10)
NEUTROPHILS # BLD: 10.7 K/UL (ref 1.8–7)
NEUTROPHILS NFR BLD AUTO: 91.4 % (ref 50–75)
NEUTROPHILS NFR BLD AUTO: 96 % (ref 50–75)
NRBC BLD AUTO-RTO: 0.3 % (ref 0–2)
OVALOCYTES BLD QL SMEAR: SLIGHT
PLATELET # BLD EST: NORMAL 10*3/UL
PLATELET # BLD: 170 K/UL (ref 130–400)
PMV BLD AUTO: 9.7 FL (ref 7.2–11.7)
POLYCHROMIC: SLIGHT
PROTHROMBIN TIME: 23.4 SECONDS (ref 9.7–12.2)
RBC # BLD AUTO: 2.8 MIL/UL (ref 3.8–5.2)
TARGETS BLD QL SMEAR: SLIGHT
TOTAL CELLS COUNTED BLD: 100
WBC # BLD AUTO: 11.7 K/UL (ref 4.8–10.8)

## 2018-03-21 RX ADMIN — Medication SCH TAB: at 08:37

## 2018-03-21 RX ADMIN — INSULIN GLARGINE SCH UNIT: 100 INJECTION, SOLUTION SUBCUTANEOUS at 23:27

## 2018-03-21 RX ADMIN — PANTOPRAZOLE SODIUM SCH MG: 40 TABLET, DELAYED RELEASE ORAL at 09:08

## 2018-03-21 RX ADMIN — INSULIN GLARGINE SCH: 100 INJECTION, SOLUTION SUBCUTANEOUS at 21:25

## 2018-03-21 RX ADMIN — INSULIN ASPART SCH: 100 INJECTION, SOLUTION INTRAVENOUS; SUBCUTANEOUS at 00:30

## 2018-03-21 RX ADMIN — INSULIN ASPART SCH UNIT: 100 INJECTION, SOLUTION INTRAVENOUS; SUBCUTANEOUS at 07:39

## 2018-03-21 RX ADMIN — INSULIN ASPART SCH: 100 INJECTION, SOLUTION INTRAVENOUS; SUBCUTANEOUS at 13:00

## 2018-03-21 RX ADMIN — INSULIN ASPART SCH: 100 INJECTION, SOLUTION INTRAVENOUS; SUBCUTANEOUS at 18:47

## 2018-03-21 NOTE — CP.PCM.PN
<Mike Silva - Last Filed: 03/21/18 10:21>





Subjective





- Date & Time of Evaluation


Date of Evaluation: 03/21/18


Time of Evaluation: 07:10





- Subjective


Subjective: 





Medicine progress note for Dr. Gaxiola





Patient seen and examined. Patient is lethargic and unable to speak. She does 

respond to very simple questioning. Cannot obtain ROS due to current clinical 

situation. No acute events overnight.





Objective





- Vital Signs/Intake and Output


Vital Signs (last 24 hours): 


 











Temp Pulse Resp BP Pulse Ox


 


 97.3 F L  74   20   111/56 L  95 


 


 03/20/18 23:25  03/21/18 06:26  03/20/18 23:25  03/21/18 06:26  03/20/18 23:25








Intake and Output: 


 











 03/21/18 03/21/18





 06:59 18:59


 


Intake Total 520 


 


Balance 520 














- Medications


Medications: 


 Current Medications





Albumin Human (Albumin Human 25% (12.5 Gm/50 Ml))  25 gm IV TTS PRN


   PRN Reason: Other


   Last Admin: 03/17/18 14:46 Dose:  25 gm


Albuterol/Ipratropium (Duoneb 3 Mg/0.5 Mg (3 Ml) Ud)  3 ml INH RQ4 PRN


   PRN Reason: Shortness of Breath


   Last Admin: 03/20/18 01:21 Dose:  3 ml


Aspirin (Aspirin Chewable)  81 mg NG DAILY [facility]


   Last Admin: 03/20/18 14:06 Dose:  81 mg


Dextrose (Dextrose 50% Inj)  0 ml IV STAT PRN; Protocol


   PRN Reason: Hypoglycemia Protocol


Dextrose (Glutose 15)  0 gm PO ONCE PRN; Protocol


   PRN Reason: Hypoglycemia Protocol


Diltiazem HCl (Cardizem)  30 mg NG Q6H [facility]


   Last Admin: 03/21/18 06:28 Dose:  30 mg


Epoetin Vamsi (Procrit)  10,000 unit IV TTS [facility]


   Last Admin: 03/20/18 11:53 Dose:  10,000 unit


Epoetin Vamsi (Procrit)  2,000 u IV TTS [facility]


   Last Admin: 03/20/18 11:52 Dose:  2,000 u


Ferrous Gluconate (Fergon)  324 mg PO TID [facility]


   Last Admin: 03/20/18 18:00 Dose:  324 mg


Glucagon (Glucagen Diagnostic Kit)  0 mg IM STAT PRN; Protocol


   PRN Reason: Hypoglycemia Protocol


Heparin Sodium (Porcine) (Heparin)  3,700 units IVP TTS [facility]


   Stop: 03/31/18 10:01


   Last Admin: 03/20/18 12:25 Dose:  3,700 units


Aztreonam 1 gm/ Sodium (Chloride)  100 mls @ 100 mls/hr IVPB DAILY [facility]


   PRN Reason: Protocol


   Last Admin: 03/20/18 14:07 Dose:  100 mls/hr


Dextrose (Dextrose 5% In Water 1000 Ml)  1,000 mls @ 0 mls/hr IV .Q0M PRN; 

Protocol; Per Protocol


   PRN Reason: Hypoglycemia Protocol


Insulin Aspart (Novolog)  0 unit SC Q6 [facility]


   PRN Reason: Protocol


   Last Admin: 03/20/18 17:27 Dose:  4 unit


Insulin Glargine (Lantus)  10 unit SC HS [facility]


   Last Admin: 03/20/18 21:34 Dose:  10 unit


Metoprolol Tartrate (Lopressor)  25 mg NG BID [facility]


   Last Admin: 03/20/18 17:24 Dose:  25 mg


Midodrine (Proamatine)  10 mg NG TTS [facility]


   Last Admin: 03/20/18 10:06 Dose:  10 mg


Pantoprazole Sodium (Protonix Ec Tab)  40 mg PO DAILY [facility]


   Last Admin: 03/20/18 14:06 Dose:  40 mg


Paricalcitol (Zemplar)  2 mcg IV TTS [facility]


   Last Admin: 03/20/18 11:53 Dose:  2 mcg


Rosuvastatin Calcium (Crestor)  5 mg NG HS [facility]


   Last Admin: 03/20/18 21:35 Dose:  5 mg


Sennosides (Senokot Tab)  8.6 mg NG DAILY [facility]


   Last Admin: 03/20/18 14:07 Dose:  8.6 mg


Sevelamer Carbonate (Renvela)  800 mg NG TIDCC [facility]


   Last Admin: 03/20/18 16:27 Dose:  800 mg


Vitamin B Complex/Vit C/Folic Acid (Nephro-Nneka)  1 tab NG 0800 [facility]











- Labs


Labs: 


 





 03/20/18 07:19 





 03/20/18 07:19 





 











PT  23.2 SECONDS (9.7-12.2)  H  03/20/18  07:19    


 


INR  2.0   03/20/18  07:19    


 


APTT  29 SECONDS (21-34)   03/15/18  08:24    














- Additional Findings


Additional findings: 





- Constitutional


Appears: No Acute Distress, Chronically Ill





- Eye Exam


Eye Exam: EOMI, Normal appearance





- ENT Exam


ENT Exam: Mucous Membranes Moist


Additional comments: 





NGT in place





- Respiratory Exam


Additional comments: 





Coarse breath sounds bilaterally with some rales and congestion





- Cardiovascular Exam


Cardiovascular Exam: Irregular Rhythm, +S1, +S2





- GI/Abdominal Exam


GI & Abdominal Exam: Soft, Hypoactive Bowel Sounds.  absent: Distended, Guarding

, Tenderness





- Extremities Exam


Additional comments: 





Evidence of chronic venous stasis


Pedal pulses weak





- Neurological Exam


Neurological Exam: Awake





- Skin


Skin Exam: Dry, Warm








Assessment and Plan





- Assessment and Plan (Free Text)


Plan: 





1. Acute NSTEMI,  Asystole ,CPR/Sustained VT


Previously on Heparin GGT


Cardiology consult, Dr. Nichole. Help appreciated


Family elected against cardiac catheterization despite the advice of multiple 

professionals


Echo shows EF of 15-20%


Cardizem 30 mg PO Q6


Lopressor 25 mg PO BID


ASA 81 mg PO daily


Crestor 5 mg HS





2. Respiratory failure, s/p intubation and later extubation


Previously intubated in the ICU and now extubated


Saturating well on non-rebreather mask


CXR on 3/20/18 shows new collapse, volume loss of right lower lobe





3. Atrial Fibrillation 


CHADS-VAsc score 8


INR  today 2.0 


Coumadin 2.5 mg tonight 


Rate controlled with Lopressor and Cardizem





4. Acute renal failure on chronic renal disease/Now ESRD on HD


Nephrology consult, Dr. Wheeler, help appreciated


Dialysis T,Th,S


Renvela TID





On 3/13:  L IJ permacath placed and left shiley removed








5. Sepsis


Previously on Vancomycin, flagyl, and aztreonam


Now currently only on Aztreonam for pseudomonas in the sputum


Elevated procalcitonin 6.1








6. Acute systolic heart failure


Family refuses cardiac cath. has high risk for arrhythmia and poor prognosis


Family aware of her condition


ECHO shows EF 15 to 20%





7. Pleural effusion


Persistent left loculated pleural effusion





8. Hyperglycemia and uncontrolled DM


Aspart sliding scale


Lantus 10 units HS





9. Anemia of chronic disease, CKD


Procrit T,Th,S


Ferrous gluconate 324 mg TID





10. Prophylaxis


Tube feeds through NGT


Protonix 40 mg IV daily


DVT prophylaxis with Coumadin, currently therapeutic INR. Will administer 

Coumadin today and recheck tomorrow.





Disposition: Family has requested DNR/DNI per palliative. Hospice evaluation 

was placed. Family wishes for [facility] hospice. However, since the patient does not 

have a secondary insurance, family will need to decide if they wish to pay for 

part of the cost for the nursing home out of pocket.





Discussed with Dr. Khalida Silva PGY-1





<Samson Gaxiola H - Last Filed: 03/21/18 11:46>





Objective





- Vital Signs/Intake and Output


Vital Signs (last 24 hours): 


 











Temp Pulse Resp BP Pulse Ox


 


 97.6 F   86   20   105/68   94 L


 


 03/21/18 07:25  03/21/18 07:51  03/21/18 07:25  03/21/18 09:09  03/21/18 07:25








Intake and Output: 


 











 03/21/18 03/21/18





 06:59 18:59


 


Intake Total 520 


 


Balance 520 














- Medications


Medications: 


 Current Medications





Albumin Human (Albumin Human 25% (12.5 Gm/50 Ml))  25 gm IV TTS PRN


   PRN Reason: Other


   Last Admin: 03/17/18 14:46 Dose:  25 gm


Albuterol/Ipratropium (Duoneb 3 Mg/0.5 Mg (3 Ml) Ud)  3 ml INH RQ4 PRN


   PRN Reason: Shortness of Breath


   Last Admin: 03/20/18 01:21 Dose:  3 ml


Aspirin (Aspirin Chewable)  81 mg NG DAILY [facility]


   Last Admin: 03/21/18 09:08 Dose:  81 mg


Dextrose (Dextrose 50% Inj)  0 ml IV STAT PRN; Protocol


   PRN Reason: Hypoglycemia Protocol


Dextrose (Glutose 15)  0 gm PO ONCE PRN; Protocol


   PRN Reason: Hypoglycemia Protocol


Diltiazem HCl (Cardizem)  30 mg NG Q6H [facility]


   Last Admin: 03/21/18 06:28 Dose:  30 mg


Epoetin Vamsi (Procrit)  10,000 unit IV TTS [facility]


   Last Admin: 03/20/18 11:53 Dose:  10,000 unit


Epoetin Vamsi (Procrit)  4,000 unit IV TTS [facility]


Ferrous Gluconate (Fergon)  324 mg PO TID [facility]


   Last Admin: 03/21/18 09:08 Dose:  324 mg


Glucagon (Glucagen Diagnostic Kit)  0 mg IM STAT PRN; Protocol


   PRN Reason: Hypoglycemia Protocol


Heparin Sodium (Porcine) (Heparin)  3,700 units IVP TTS [facility]


   Stop: 03/31/18 10:01


   Last Admin: 03/20/18 12:25 Dose:  3,700 units


Aztreonam 1 gm/ Sodium (Chloride)  100 mls @ 100 mls/hr IVPB DAILY [facility]


   PRN Reason: Protocol


   Last Admin: 03/21/18 09:07 Dose:  100 mls/hr


Dextrose (Dextrose 5% In Water 1000 Ml)  1,000 mls @ 0 mls/hr IV .Q0M PRN; 

Protocol; Per Protocol


   PRN Reason: Hypoglycemia Protocol


Insulin Aspart (Novolog)  0 unit SC Q6 [facility]


   PRN Reason: Protocol


   Last Admin: 03/21/18 07:39 Dose:  6 unit


Insulin Glargine (Lantus)  10 unit SC HS [facility]


   Last Admin: 03/20/18 21:34 Dose:  10 unit


Metoprolol Tartrate (Lopressor)  25 mg NG BID [facility]


   Last Admin: 03/21/18 09:09 Dose:  25 mg


Midodrine (Proamatine)  10 mg NG TTS [facility]


   Last Admin: 03/20/18 10:06 Dose:  10 mg


Pantoprazole Sodium (Protonix Ec Tab)  40 mg PO DAILY [facility]


   Last Admin: 03/21/18 09:08 Dose:  40 mg


Paricalcitol (Zemplar)  2 mcg IV TTS [facility]


   Last Admin: 03/20/18 11:53 Dose:  2 mcg


Rosuvastatin Calcium (Crestor)  5 mg NG HS [facility]


   Last Admin: 03/20/18 21:35 Dose:  5 mg


Sennosides (Senokot Tab)  8.6 mg NG DAILY [facility]


   Last Admin: 03/21/18 09:08 Dose:  8.6 mg


Sevelamer Carbonate (Renvela)  800 mg NG TIDCC [facility]


   Last Admin: 03/21/18 08:37 Dose:  800 mg


Vitamin B Complex/Vit C/Folic Acid (Nephro-Nneka)  1 tab NG 0800 [facility]


   Last Admin: 03/21/18 08:37 Dose:  1 tab











- Labs


Labs: 


 





 03/21/18 07:14 





 03/21/18 07:14 





 











PT  23.4 SECONDS (9.7-12.2)  H  03/21/18  07:14    


 


INR  2.0   03/21/18  07:14    


 


APTT  29 SECONDS (21-34)   03/15/18  08:24    














Attending/Attestation





- Attestation


I have personally seen and examined this patient.: Yes


I have fully participated in the care of the patient.: Yes


I have reviewed all pertinent clinical information, including history, physical 

exam and plan: Yes


Notes (Text): 





03/21/18 11:43


Medical attending: Patient was seen and examined by me, I saw the patient 

together with medical resident. Agrees the above note by the resident. 





Yesterday she was having hemodialysis. Today when we saw her her situation is 

unchanged from before. She remains nonverbal, she is responsive to some very 

simple commands stimuli. She is able to raise her arms up command.





As mentioned previously the family is exploring hospice/comfort care decisions 

and where to go to. She is now DNR and DNI.





I spoke with the patient's power of , her name is Kristel Corral. And I 

updated her with regards to the situation with the patient. 





We might give her blood transfusion tomorrow we'll have to see how she's doing, 

and depending on how her chest x-rays looking how she is breathing we may have 

to go to [facility] tomorrow. The chest x-rays as I explained to the power of 

 haven't been looking





Furthermore will continue with IV antibiotics as well.





From my discussion with the family the trying to see if she could go to 

[facility] for comfort care/hospice care





Thank you very much,


Samson Gaxiola

## 2018-03-21 NOTE — PN
DATE:  03/20/2018



SUBJECTIVE:  The patient has been very lethargic and the nurse is telling

me that her fistula is not functioning, they have been using the

hemodialysis catheter.  She remains lethargic and unresponsive and on

nonrebreather mask.



PHYSICAL EXAMINATION:

VITAL SIGNS:  T-max is 97.6, pulse of 84, blood pressure 102/53,

respirations are 20.

GENERAL:  She is not on any sedation.

NECK:  Supple.

LUNGS:  Clear.  No crackles or rales present.

HEART:  S1 and S2 is present.  She has a triple lumen as a dialysis

catheter on the left chest wall.

ABDOMEN:  Soft, flabby, non tender.

EXTREMITIES:  Have foot protectors present.



LABORATORY DATA:  Her white count is 14.3 today, hemoglobin 8, hematocrit

25, and platelet count is 177.  INR is 2.  Her BUN is 107 and creatinine is

5.5, she is a dialysis patient.



ASSESSMENT:  She has altered mental status, which is new and is this stroke

or is this related to her poor heart, cardiomyopathy or she has chest x-ray

shows new collapse volume loss right lower lobe, otherwise no interval

change so has she collapsed her lungs, should need pulmonary evaluation to

followup and she is on Azactam, will continue with Azactam, I renewed it

yesterday and that she had pseudomonas in the sputum before and no MRSA was

detected hence we will hold off on the vancomycin.  Prognosis still remains

poor and will follow up.







__________________________________________

Mahnaz Tate MD





DD:  03/20/2018 22:57:27

DT:  03/21/2018 0:52:38

Job # [id number]

## 2018-03-21 NOTE — CP.PCM.PN
Subjective





- Date & Time of Evaluation


Date of Evaluation: 03/21/18


Time of Evaluation: 13:26





- Subjective


Subjective: 


Nephrology Consultation Note:





Assessment: stable


CKD stage 5 (N18.5) with hyperkalemia, acidosis now has ESRD on HD via 

permacath TTS 


pulm edema, CHF, NSTEMI, pleural effusions, pneumonia


AMS, hyperglycemia


s/p cardiac arrest, shock liver, A fib





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 


Hypertensive Chronic Kidney Disease (I12.9)


Anemia (D64.9), Hyperphosphatemia (E83.39), Secondary Hyperparathyroidism (E21.1

), HTN (I12.9), vit D insuff, hx of colon CA, TIA/CVAs





Plan


HD Tomorrow as ordered as per TTS schedule. 


maintain hemodynamic stable. Patient not on ACEI/ARB due to low BP. on 

midodrine prior to HD


started iron supplements, MVI, epogen with HD, and zemplar 2 mcg with HD.


started on phos binders as renvela





Dose meds/antibiotics for ESRD/HD status. Avoid fleets enema/magnesium based 

laxatives. Avoid nephrotoxins/NSAIDs


Glycemic control


Further work up/management as per primary team


 consult for outpatient dialysis placement.


palliative care consult noted. Hospice being discussed. 





Thanks for allowing me to participate in care of your patient. Will follow 

patient with you. Please call if any Qs.


Dr Shad Panchal


Office: [phone] Cell: [phone] Fax: [phone]





Chief Complaint; unable to obtain


reason for consult: CKD management


source of Info: EMR


HPI: Pt is a 77 F with hx of diabetes Mellitus ( years), hypertension (years), 

CHF, TIA, Colon CA, CKD stage 4/5, has matured AVG in left arm presented with 

complaints of AMS and hyperglycemia as brought by family. now has elevated trop 

100, hyperkalemia, AMS and elevated sugar, pulm edema. renal consult for CKD 

management. pt with AMS unable to provide any hx. 





ROS: unable to obtain





Physical Examination: seen on HD


General Appearance: ill appearing, on O2  NRB


Vitals reviewed and noted as below


Head; Atraumatic, normocephalic


ENT: On O2 


EYES: PERRLA


Neck; supple no lymphadenopathy, no thyromegaly or bruit


Lungs: Normal respiratory rate/effort. Breath sounds bilateral rales anteriorly 

b/l


Heart: normal rate. s1s2 normal. No rub or gallop. A fib


Extremities: no edema. No varicose veins. chronic hyperpigmented changes in legs


Neurological: Patient is  Not communicative verbally


Skin: Warm and dry. Normal turgor. No rash. Palpitation: Normal elasticity for 

age


Abdomen: Abdomen is soft. Bowel sounds +. There is no abdominal tenderness, no 

guarding/rigidity no organomegaly


Psych: unable


MSK: no joint tenderness or swelling. Digits and nails normal, no deformity


: kidney or bladder not palpable


Access: Left AVG without bruit. has left permacath





Labs/imaging reviewed.


Past medical history, past surgical history, family history, social history, 

allergy reviewed and noted as below


Family hx: no hx of CKD. Rest non-contributory








Objective





- Vital Signs/Intake and Output


Vital Signs (last 24 hours): 


 











Temp Pulse Resp BP Pulse Ox


 


 97.6 F   86   20   105/68   94 L


 


 03/21/18 07:25  03/21/18 07:51  03/21/18 07:25  03/21/18 09:09  03/21/18 07:25








Intake and Output: 


 











 03/21/18 03/21/18





 06:59 18:59


 


Intake Total 520 


 


Balance 520 














- Medications


Medications: 


 Current Medications





Albumin Human (Albumin Human 25% (12.5 Gm/50 Ml))  25 gm IV TTS PRN


   PRN Reason: Other


   Last Admin: 03/17/18 14:46 Dose:  25 gm


Albuterol/Ipratropium (Duoneb 3 Mg/0.5 Mg (3 Ml) Ud)  3 ml INH RQ4 PRN


   PRN Reason: Shortness of Breath


   Last Admin: 03/20/18 01:21 Dose:  3 ml


Aspirin (Aspirin Chewable)  81 mg NG DAILY [facility]


   Last Admin: 03/21/18 09:08 Dose:  81 mg


Dextrose (Dextrose 50% Inj)  0 ml IV STAT PRN; Protocol


   PRN Reason: Hypoglycemia Protocol


Dextrose (Glutose 15)  0 gm PO ONCE PRN; Protocol


   PRN Reason: Hypoglycemia Protocol


Diltiazem HCl (Cardizem)  30 mg NG Q6H [facility]


   Last Admin: 03/21/18 06:28 Dose:  30 mg


Epoetin Vamsi (Procrit)  10,000 unit IV TTS [facility]


   Last Admin: 03/20/18 11:53 Dose:  10,000 unit


Epoetin Vamis (Procrit)  4,000 unit IV TTS [facility]


Ferrous Gluconate (Fergon)  324 mg PO TID [facility]


   Last Admin: 03/21/18 09:08 Dose:  324 mg


Glucagon (Glucagen Diagnostic Kit)  0 mg IM STAT PRN; Protocol


   PRN Reason: Hypoglycemia Protocol


Heparin Sodium (Porcine) (Heparin)  3,700 units IVP TTS [facility]


   Stop: 03/31/18 10:01


   Last Admin: 03/20/18 12:25 Dose:  3,700 units


Aztreonam 1 gm/ Sodium (Chloride)  100 mls @ 100 mls/hr IVPB DAILY [facility]


   PRN Reason: Protocol


   Last Admin: 03/21/18 09:07 Dose:  100 mls/hr


Dextrose (Dextrose 5% In Water 1000 Ml)  1,000 mls @ 0 mls/hr IV .Q0M PRN; 

Protocol; Per Protocol


   PRN Reason: Hypoglycemia Protocol


Insulin Aspart (Novolog)  0 unit SC Q6 [facility]


   PRN Reason: Protocol


   Last Admin: 03/21/18 13:00 Dose:  Not Given


Insulin Glargine (Lantus)  10 unit SC [facility]


   Last Admin: 03/20/18 21:34 Dose:  10 unit


Metoprolol Tartrate (Lopressor)  25 mg NG BID [facility]


   Last Admin: 03/21/18 09:09 Dose:  25 mg


Midodrine (Proamatine)  10 mg NG TTS [facility]


   Last Admin: 03/20/18 10:06 Dose:  10 mg


Pantoprazole Sodium (Protonix Ec Tab)  40 mg PO DAILY [facility]


   Last Admin: 03/21/18 09:08 Dose:  40 mg


Paricalcitol (Zemplar)  2 mcg IV TTS [facility]


   Last Admin: 03/20/18 11:53 Dose:  2 mcg


Rosuvastatin Calcium (Crestor)  5 mg NG HS [facility]


   Last Admin: 03/20/18 21:35 Dose:  5 mg


Sennosides (Senokot Tab)  8.6 mg NG DAILY [facility]


   Last Admin: 03/21/18 09:08 Dose:  8.6 mg


Sevelamer Carbonate (Renvela)  800 mg NG TIDCC [facility]


   Last Admin: 03/21/18 08:37 Dose:  800 mg


Vitamin B Complex/Vit C/Folic Acid (Nephro-Nneka)  1 tab NG 0800 [facility]


   Last Admin: 03/21/18 08:37 Dose:  1 tab











- Labs


Labs: 


 





 03/21/18 07:14 





 03/21/18 07:14 





 











PT  23.4 SECONDS (9.7-12.2)  H  03/21/18  07:14    


 


INR  2.0   03/21/18  07:14    


 


APTT  29 SECONDS (21-34)   03/15/18  08:24

## 2018-03-22 VITALS — HEART RATE: 128 BPM | HEART RATE: 128 BPM

## 2018-03-22 LAB
ALBUMIN SERPL-MCNC: 2.7 G/DL (ref 3.5–5)
ALBUMIN/GLOB SERPL: 0.8 {RATIO} (ref 1–2.1)
ALT SERPL-CCNC: 63 U/L (ref 9–52)
ANISOCYTOSIS BLD QL SMEAR: SLIGHT
AST SERPL-CCNC: 31 U/L (ref 14–36)
BASOPHILS # BLD AUTO: 0 K/UL (ref 0–0.2)
BASOPHILS NFR BLD: 0.5 % (ref 0–2)
BUN SERPL-MCNC: 85 MG/DL (ref 7–17)
CALCIUM SERPL-MCNC: 8.3 MG/DL (ref 8.6–10.4)
EOSINOPHIL # BLD AUTO: 0 K/UL (ref 0–0.7)
EOSINOPHIL NFR BLD: 0.3 % (ref 0–4)
ERYTHROCYTE [DISTWIDTH] IN BLOOD BY AUTOMATED COUNT: 17.8 % (ref 11.5–14.5)
GFR NON-AFRICAN AMERICAN: 9
HGB BLD-MCNC: 7.8 G/DL (ref 11–16)
HYPOCHROMIC: (no result)
INR PPP: 1.9
LYMPHOCYTE: 2 % (ref 20–40)
LYMPHOCYTES # BLD AUTO: 0.5 K/UL (ref 1–4.3)
LYMPHOCYTES NFR BLD AUTO: 4.2 % (ref 20–40)
MCH RBC QN AUTO: 27.6 PG (ref 27–31)
MCHC RBC AUTO-ENTMCNC: 31.7 G/DL (ref 33–37)
MCV RBC AUTO: 87 FL (ref 81–99)
MONOCYTE: 3 % (ref 0–10)
MONOCYTES # BLD: 0.5 K/UL (ref 0–0.8)
MONOCYTES NFR BLD: 4.3 % (ref 0–10)
NEUTROPHILS # BLD: 10 K/UL (ref 1.8–7)
NEUTROPHILS NFR BLD AUTO: 90.7 % (ref 50–75)
NEUTROPHILS NFR BLD AUTO: 91 % (ref 50–75)
NEUTS BAND NFR BLD: 4 % (ref 0–2)
NRBC BLD AUTO-RTO: 0.4 % (ref 0–2)
PLATELET # BLD EST: NORMAL 10*3/UL
PLATELET # BLD: 148 K/UL (ref 130–400)
PMV BLD AUTO: 9.9 FL (ref 7.2–11.7)
POLYCHROMIC: SLIGHT
PROTHROMBIN TIME: 21.8 SECONDS (ref 9.7–12.2)
RBC # BLD AUTO: 2.82 MIL/UL (ref 3.8–5.2)
TARGETS BLD QL SMEAR: SLIGHT
TOTAL CELLS COUNTED BLD: 100
WBC # BLD AUTO: 11 K/UL (ref 4.8–10.8)

## 2018-03-22 RX ADMIN — INSULIN ASPART SCH: 100 INJECTION, SOLUTION INTRAVENOUS; SUBCUTANEOUS at 18:57

## 2018-03-22 RX ADMIN — Medication SCH: at 07:40

## 2018-03-22 RX ADMIN — ERYTHROPOIETIN SCH UNIT: 10000 INJECTION, SOLUTION INTRAVENOUS; SUBCUTANEOUS at 14:47

## 2018-03-22 RX ADMIN — INSULIN ASPART SCH: 100 INJECTION, SOLUTION INTRAVENOUS; SUBCUTANEOUS at 06:05

## 2018-03-22 RX ADMIN — INSULIN ASPART SCH: 100 INJECTION, SOLUTION INTRAVENOUS; SUBCUTANEOUS at 00:28

## 2018-03-22 RX ADMIN — HEPARIN SODIUM PRN MLS/HR: 10000 INJECTION, SOLUTION INTRAVENOUS at 00:57

## 2018-03-22 RX ADMIN — INSULIN GLARGINE SCH UNIT: 100 INJECTION, SOLUTION SUBCUTANEOUS at 22:37

## 2018-03-22 RX ADMIN — PARICALCITOL SCH MCG: 2 INJECTION, SOLUTION INTRAVENOUS at 14:48

## 2018-03-22 RX ADMIN — SODIUM CHLORIDE SCH UNITS: 9 INJECTION, SOLUTION INTRAMUSCULAR; INTRAVENOUS; SUBCUTANEOUS at 14:46

## 2018-03-22 NOTE — CP.PCM.PN
Subjective





- Date & Time of Evaluation


Date of Evaluation: 03/22/18


Time of Evaluation: 15:50





- Subjective


Subjective: 


Nephrology Consultation Note:





Assessment: stable


CKD stage 5 (N18.5) with hyperkalemia, acidosis now has ESRD on HD via 

permacath TTS 


pulm edema, CHF, NSTEMI, pleural effusions, pneumonia


AMS, hyperglycemia


s/p cardiac arrest, shock liver, A fib





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 


Hypertensive Chronic Kidney Disease (I12.9)


Anemia (D64.9), Hyperphosphatemia (E83.39), Secondary Hyperparathyroidism (E21.1

), HTN (I12.9), vit D insuff, hx of colon CA, TIA/CVAs





Plan


HD Today as ordered as per TTS schedule. 


maintain hemodynamic stable. Patient not on ACEI/ARB due to low BP. on 

midodrine prior to HD


started iron supplements, MVI, epogen with HD, and zemplar 2 mcg with HD.


started on phos binders as renvela





Dose meds/antibiotics for ESRD/HD status. Avoid fleets enema/magnesium based 

laxatives. Avoid nephrotoxins/NSAIDs


Glycemic control


Further work up/management as per primary team


 consult for outpatient dialysis placement.


palliative care consult noted. Hospice being discussed. 





Thanks for allowing me to participate in care of your patient. Will follow 

patient with you. Please call if any Qs. d/w son


Dr Shad Panchal


Office: [phone] Cell: [phone] Fax: [phone]





Chief Complaint; unable to obtain


reason for consult: CKD management


source of Info: EMR


HPI: Pt is a 77 F with hx of diabetes Mellitus ( years), hypertension (years), 

CHF, TIA, Colon CA, CKD stage 4/5, has matured AVG in left arm presented with 

complaints of AMS and hyperglycemia as brought by family. now has elevated trop 

100, hyperkalemia, AMS and elevated sugar, pulm edema. renal consult for CKD 

management. pt with AMS unable to provide any hx. 





ROS: unable to obtain





Physical Examination: seen on HD


General Appearance: ill appearing, on O2  NRB


Vitals reviewed and noted as below


Head; Atraumatic, normocephalic


ENT: On O2 


EYES: PERRLA


Neck; supple no lymphadenopathy, no thyromegaly or bruit


Lungs: Normal respiratory rate/effort. Breath sounds bilateral rales anteriorly 

b/l


Heart: normal rate. s1s2 normal. No rub or gallop. A fib


Extremities: no edema. No varicose veins. chronic hyperpigmented changes in legs


Neurological: Patient is  Not communicative verbally


Skin: Warm and dry. Normal turgor. No rash. Palpitation: Normal elasticity for 

age


Abdomen: Abdomen is soft. Bowel sounds +. There is no abdominal tenderness, no 

guarding/rigidity no organomegaly


Psych: unable


MSK: no joint tenderness or swelling. Digits and nails normal, no deformity


: kidney or bladder not palpable


Access: Left AVG without bruit. has left permacath





Labs/imaging reviewed.


Past medical history, past surgical history, family history, social history, 

allergy reviewed and noted as below


Family hx: no hx of CKD. Rest non-contributory











Objective





- Vital Signs/Intake and Output


Vital Signs (last 24 hours): 


 











Temp Pulse Resp BP Pulse Ox


 


 98.1 F   86   20   112/72   100 


 


 03/22/18 14:00  03/22/18 14:00  03/22/18 14:00  03/22/18 15:30  03/22/18 14:00








Intake and Output: 


 











 03/22/18 03/22/18





 06:59 18:59


 


Intake Total 25 


 


Balance 25 














- Medications


Medications: 


 Current Medications





Albumin Human (Albumin Human 25% (12.5 Gm/50 Ml))  25 gm IV TTS PRN


   PRN Reason: Other


   Last Admin: 03/17/18 14:46 Dose:  25 gm


Albuterol/Ipratropium (Duoneb 3 Mg/0.5 Mg (3 Ml) Ud)  3 ml INH RQ4 PRN


   PRN Reason: Shortness of Breath


   Last Admin: 03/20/18 01:21 Dose:  3 ml


Aspirin (Aspirin Chewable)  81 mg NG DAILY [facility]


   Last Admin: 03/22/18 09:42 Dose:  Not Given


Dextrose (Dextrose 50% Inj)  0 ml IV STAT PRN; Protocol


   PRN Reason: Hypoglycemia Protocol


Dextrose (Glutose 15)  0 gm PO ONCE PRN; Protocol


   PRN Reason: Hypoglycemia Protocol


Diltiazem HCl (Cardizem)  30 mg NG Q6H [facility]


   Last Admin: 03/22/18 07:38 Dose:  Not Given


Epoetin Vamsi (Procrit)  10,000 unit IV TTS [facility]


   Last Admin: 03/22/18 14:47 Dose:  10,000 unit


Epoetin Vamsi (Procrit)  4,000 unit IV TTS [facility]


   Last Admin: 03/22/18 14:48 Dose:  4,000 unit


Ferrous Gluconate (Fergon)  324 mg PO TID [facility]


   Last Admin: 03/22/18 09:42 Dose:  Not Given


Glucagon (Glucagen Diagnostic Kit)  0 mg IM STAT PRN; Protocol


   PRN Reason: Hypoglycemia Protocol


Heparin Sodium (Porcine) (Heparin)  3,700 units IVP TTS [facility]


   Stop: 03/31/18 10:01


   Last Admin: 03/22/18 14:46 Dose:  3,700 units


Aztreonam 1 gm/ Sodium (Chloride)  100 mls @ 100 mls/hr IVPB DAILY [facility]


   PRN Reason: Protocol


   Last Admin: 03/22/18 09:42 Dose:  100 mls/hr


Dextrose (Dextrose 5% In Water 1000 Ml)  1,000 mls @ 0 mls/hr IV .Q0M PRN; 

Protocol; Per Protocol


   PRN Reason: Hypoglycemia Protocol


Heparin Sodium/Sodium Chloride (Heparin 99056 Units/250ml 1/2 Normal Saline)  25

,000 units in 250 mls @ 7.908 mls/hr IV .Q24H PRN; Protocol; 12 UNITS/KG/HR


   PRN Reason: PROTOCOL


   Last Admin: 03/22/18 00:57 Dose:  12 units/kg/hr, 7.908 mls/hr


Multivitamins/Vitamin C 10 ml/Heparin Sodium (Porcine) 1, 000 units/ Amino Acids

/Electrolytes/Dextrose  1,011 mls @ 42 mls/hr IV .Q24H [facility]


   Stop: 03/23/18 17:59


Fat Emulsion Intravenous (Intralipid 20%)  250 mls @ 42 mls/hr IV QOD [facility]


Insulin Aspart (Novolog)  0 unit SC Q6 [facility]


   PRN Reason: Protocol


   Last Admin: 03/22/18 06:05 Dose:  Not Given


Insulin Glargine (Lantus)  10 unit SC HS [facility]


   Last Admin: 03/21/18 23:27 Dose:  10 unit


Metoprolol Tartrate (Lopressor)  25 mg NG BID [facility]


   Last Admin: 03/22/18 09:42 Dose:  Not Given


Midodrine (Proamatine)  10 mg NG TTS [facility]


   Last Admin: 03/20/18 10:06 Dose:  10 mg


Pantoprazole Sodium (Protonix Inj)  40 mg IVP DAILY [facility]


   Last Admin: 03/22/18 09:42 Dose:  40 mg


Paricalcitol (Zemplar)  2 mcg IV TTS [facility]


   Last Admin: 03/22/18 14:48 Dose:  2 mcg


Rosuvastatin Calcium (Crestor)  5 mg NG HS [facility]


   Last Admin: 03/21/18 21:24 Dose:  Not Given


Sennosides (Senokot Tab)  8.6 mg NG DAILY [facility]


   Last Admin: 03/21/18 09:08 Dose:  8.6 mg


Sevelamer Carbonate (Renvela)  800 mg NG TIDCC [facility]


   Last Admin: 03/22/18 07:40 Dose:  Not Given


Vitamin B Complex/Vit C/Folic Acid (Nephro-Nneka)  1 tab NG 0800 [facility]


   Last Admin: 03/22/18 07:40 Dose:  Not Given











- Labs


Labs: 


 





 03/22/18 06:54 





 03/22/18 06:54 





 











PT  21.8 SECONDS (9.7-12.2)  H  03/22/18  06:54    


 


INR  1.9   03/22/18  06:54    


 


APTT  45 SECONDS (21-34)  H  03/22/18  14:11

## 2018-03-22 NOTE — CP.PCM.PN
<Mike Silva - Last Filed: 03/22/18 14:56>





Subjective





- Date & Time of Evaluation


Date of Evaluation: 03/22/18


Time of Evaluation: 07:00





- Subjective


Subjective: 





Medicine progress note for Dr. Gaxiola





Patient seen and examined. Patient is unable to speak due to weakness and 

lethargy. Could not obtain ROS. Per nursing staff, patient's heart rate is 

constantly fluctuating. Patient is for dialysis later today.





Objective





- Vital Signs/Intake and Output


Vital Signs (last 24 hours): 


 











Temp Pulse Resp BP Pulse Ox


 


 98.2 F   54 L  18   111/58 L  94 L


 


 03/21/18 23:13  03/21/18 23:13  03/21/18 23:13  03/21/18 23:13  03/21/18 23:13








Intake and Output: 


 











 03/22/18 03/22/18





 06:59 18:59


 


Intake Total 25 


 


Balance 25 














- Medications


Medications: 


 Current Medications





Albumin Human (Albumin Human 25% (12.5 Gm/50 Ml))  25 gm IV TTS PRN


   PRN Reason: Other


   Last Admin: 03/17/18 14:46 Dose:  25 gm


Albuterol/Ipratropium (Duoneb 3 Mg/0.5 Mg (3 Ml) Ud)  3 ml INH RQ4 PRN


   PRN Reason: Shortness of Breath


   Last Admin: 03/20/18 01:21 Dose:  3 ml


Aspirin (Aspirin Chewable)  81 mg NG DAILY [facility]


   Last Admin: 03/21/18 09:08 Dose:  81 mg


Dextrose (Dextrose 50% Inj)  0 ml IV STAT PRN; Protocol


   PRN Reason: Hypoglycemia Protocol


Dextrose (Glutose 15)  0 gm PO ONCE PRN; Protocol


   PRN Reason: Hypoglycemia Protocol


Diltiazem HCl (Cardizem)  30 mg NG Q6H [facility]


   Last Admin: 03/22/18 01:00 Dose:  Not Given


Epoetin Vamsi (Procrit)  10,000 unit IV TTS [facility]


   Last Admin: 03/20/18 11:53 Dose:  10,000 unit


Epoetin Vamsi (Procrit)  4,000 unit IV TTS [facility]


Ferrous Gluconate (Fergon)  324 mg PO TID [facility]


   Last Admin: 03/21/18 18:46 Dose:  Not Given


Glucagon (Glucagen Diagnostic Kit)  0 mg IM STAT PRN; Protocol


   PRN Reason: Hypoglycemia Protocol


Heparin Sodium (Porcine) (Heparin)  3,700 units IVP TTS [facility]


   Stop: 03/31/18 10:01


   Last Admin: 03/20/18 12:25 Dose:  3,700 units


Aztreonam 1 gm/ Sodium (Chloride)  100 mls @ 100 mls/hr IVPB DAILY [facility]


   PRN Reason: Protocol


   Last Admin: 03/21/18 09:07 Dose:  100 mls/hr


Dextrose (Dextrose 5% In Water 1000 Ml)  1,000 mls @ 0 mls/hr IV .Q0M PRN; 

Protocol; Per Protocol


   PRN Reason: Hypoglycemia Protocol


Dextrose/Sodium Chloride (Dextrose 5%/0.45% Ns 1000 Ml)  1,000 mls @ 40 mls/hr 

IV .Q24H [facility]


   Last Admin: 03/21/18 17:30 Dose:  40 mls/hr


Heparin Sodium/Sodium Chloride (Heparin 42073 Units/250ml 1/2 Normal Saline)  25

,000 units in 250 mls @ 7.908 mls/hr IV .Q24H PRN; Protocol; 12 UNITS/KG/HR


   PRN Reason: PROTOCOL


   Last Admin: 03/22/18 00:57 Dose:  12 units/kg/hr, 7.908 mls/hr


Insulin Aspart (Novolog)  0 unit SC Q6 [facility]


   PRN Reason: Protocol


   Last Admin: 03/22/18 06:05 Dose:  Not Given


Insulin Glargine (Lantus)  10 unit SC HS [facility]


   Last Admin: 03/21/18 23:27 Dose:  10 unit


Metoprolol Tartrate (Lopressor)  25 mg NG BID [facility]


   Last Admin: 03/21/18 18:47 Dose:  Not Given


Midodrine (Proamatine)  10 mg NG TTS [facility]


   Last Admin: 03/20/18 10:06 Dose:  10 mg


Pantoprazole Sodium (Protonix Inj)  40 mg IVP DAILY [facility]


Paricalcitol (Zemplar)  2 mcg IV TTS [facility]


   Last Admin: 03/20/18 11:53 Dose:  2 mcg


Rosuvastatin Calcium (Crestor)  5 mg NG HS [facility]


   Last Admin: 03/21/18 21:24 Dose:  Not Given


Sennosides (Senokot Tab)  8.6 mg NG DAILY [facility]


   Last Admin: 03/21/18 09:08 Dose:  8.6 mg


Sevelamer Carbonate (Renvela)  800 mg NG TIDCC [facility]


   Last Admin: 03/21/18 18:47 Dose:  Not Given


Vitamin B Complex/Vit C/Folic Acid (Nephro-Nneka)  1 tab NG 0800 [facility]


   Last Admin: 03/21/18 08:37 Dose:  1 tab











- Labs


Labs: 


 





 03/22/18 06:54 





 03/21/18 07:14 





 











PT  23.4 SECONDS (9.7-12.2)  H  03/21/18  07:14    


 


INR  2.0   03/21/18  07:14    


 


APTT  38 SECONDS (21-34)  H D 03/21/18  23:37    














- Additional Findings


Additional findings: 





- Constitutional


Appears: No Acute Distress, Chronically Ill





- Eye Exam


Eye Exam: EOMI, Normal appearance





- ENT Exam


ENT Exam: Mucous Membranes Moist





- Respiratory Exam


Additional comments: 





Coarse breath sounds bilaterally with some rales and congestion





- Cardiovascular Exam


Cardiovascular Exam: Irregular Rhythm, +S1, +S2





- GI/Abdominal Exam


GI & Abdominal Exam: Soft, Hypoactive Bowel Sounds.  absent: Distended, Guarding

, Tenderness





- Extremities Exam


Additional comments: 





Evidence of chronic venous stasis


Pedal pulses weak





- Neurological Exam


Neurological Exam: Awake





- Skin


Skin Exam: Dry, Warm








Assessment and Plan





- Assessment and Plan (Free Text)


Plan: 





1. Acute NSTEMI,  Asystole ,CPR/Sustained VT


Previously on Heparin GGT


Cardiology consult, Dr. Nichole. Help appreciated


Family elected against cardiac catheterization despite the advice of multiple 

professionals


Echo shows EF of 15-20%


Cardizem 30 mg PO Q6--cannot give because of lack of NGT


Lopressor 25 mg PO BID--cannot give because of lack of NGT


ASA 81 mg PO daily--cannot give because of lack of NGT


Crestor 5 mg HS--cannot give because of lack of NGT





2. Respiratory failure, s/p intubation and later extubation


Previously intubated in the ICU and now extubated


Saturating well on non-rebreather mask


CXR on 3/20/18 shows new collapse, volume loss of right lower lobe





3. Atrial Fibrillation 


CHADS-VAsc score 8


Continue Heparin drip


Rate controlled with one dose of IV Lopressor 5 mg and IV digoxin 0.125 mg





4. Acute renal failure on chronic renal disease/Now ESRD on HD


Nephrology consult, Dr. Wheeler, help appreciated


Dialysis T,Th,S


Renvela TID--cannot give because of lack of NGT





On 3/13:  L IJ permacath placed and left shiley removed








5. Sepsis


Previously on Vancomycin, flagyl, and aztreonam


Now currently only on Aztreonam for pseudomonas in the sputum


Elevated procalcitonin 6.1








6. Acute systolic heart failure


Family refuses cardiac cath. has high risk for arrhythmia and poor prognosis


Family aware of her condition


ECHO shows EF 15 to 20%





7. Pleural effusion


Persistent left loculated pleural effusion





8. Hyperglycemia and uncontrolled DM


Aspart sliding scale


Lantus 10 units HS





9. Anemia of chronic disease, CKD


Procrit T,Th,S


Ferrous gluconate 324 mg TID





10. Prophylaxis


TPN ordered for nutrition


Protonix 40 mg IV daily


DVT prophylaxis: on Heparin drip.





Disposition: Family has requested DNR/DNI per palliative. Hospice evaluation 

was placed. Family wishes for [facility] hospice. However, since the patient does not 

have a secondary insurance, family will need to decide if they wish to pay for 

part of the cost for the nursing home out of pocket. We are still awaiting the 

family's decision at this time.





Discussed with Dr. Khalida Silva PGY-1








<Samson Gaxiola H - Last Filed: 03/22/18 15:19>





Objective





- Vital Signs/Intake and Output


Vital Signs (last 24 hours): 


 











Temp Pulse Resp BP Pulse Ox


 


 98.1 F   86   20   107/77   100 


 


 03/22/18 14:00  03/22/18 14:00  03/22/18 14:00  03/22/18 15:00  03/22/18 14:00








Intake and Output: 


 











 03/22/18 03/22/18





 06:59 18:59


 


Intake Total 25 


 


Balance 25 














- Medications


Medications: 


 Current Medications





Albumin Human (Albumin Human 25% (12.5 Gm/50 Ml))  25 gm IV TTS PRN


   PRN Reason: Other


   Last Admin: 03/17/18 14:46 Dose:  25 gm


Albuterol/Ipratropium (Duoneb 3 Mg/0.5 Mg (3 Ml) Ud)  3 ml INH RQ4 PRN


   PRN Reason: Shortness of Breath


   Last Admin: 03/20/18 01:21 Dose:  3 ml


Aspirin (Aspirin Chewable)  81 mg NG DAILY [facility]


   Last Admin: 03/22/18 09:42 Dose:  Not Given


Dextrose (Dextrose 50% Inj)  0 ml IV STAT PRN; Protocol


   PRN Reason: Hypoglycemia Protocol


Dextrose (Glutose 15)  0 gm PO ONCE PRN; Protocol


   PRN Reason: Hypoglycemia Protocol


Diltiazem HCl (Cardizem)  30 mg NG Q6H [facility]


   Last Admin: 03/22/18 07:38 Dose:  Not Given


Epoetin Vamsi (Procrit)  10,000 unit IV TTS [facility]


   Last Admin: 03/22/18 14:47 Dose:  10,000 unit


Epoetin Vamsi (Procrit)  4,000 unit IV TTS [facility]


   Last Admin: 03/22/18 14:48 Dose:  4,000 unit


Ferrous Gluconate (Fergon)  324 mg PO TID [facility]


   Last Admin: 03/22/18 09:42 Dose:  Not Given


Glucagon (Glucagen Diagnostic Kit)  0 mg IM STAT PRN; Protocol


   PRN Reason: Hypoglycemia Protocol


Heparin Sodium (Porcine) (Heparin)  3,700 units IVP TTS [facility]


   Stop: 03/31/18 10:01


   Last Admin: 03/22/18 14:46 Dose:  3,700 units


Aztreonam 1 gm/ Sodium (Chloride)  100 mls @ 100 mls/hr IVPB DAILY [facility]


   PRN Reason: Protocol


   Last Admin: 03/22/18 09:42 Dose:  100 mls/hr


Dextrose (Dextrose 5% In Water 1000 Ml)  1,000 mls @ 0 mls/hr IV .Q0M PRN; 

Protocol; Per Protocol


   PRN Reason: Hypoglycemia Protocol


Dextrose/Sodium Chloride (Dextrose 5%/0.45% Ns 1000 Ml)  1,000 mls @ 40 mls/hr 

IV .Q24H [facility]


   Last Admin: 03/21/18 17:30 Dose:  40 mls/hr


Heparin Sodium/Sodium Chloride (Heparin 61178 Units/250ml 1/2 Normal Saline)  25

,000 units in 250 mls @ 7.908 mls/hr IV .Q24H PRN; Protocol; 12 UNITS/KG/HR


   PRN Reason: PROTOCOL


   Last Admin: 03/22/18 00:57 Dose:  12 units/kg/hr, 7.908 mls/hr


Multivitamins/Vitamin C 10 ml/Heparin Sodium (Porcine) 1, 000 units/ Amino Acids

/Electrolytes/Dextrose  1,011 mls @ 42 mls/hr IV .Q24H [facility]


   Stop: 03/23/18 17:59


Fat Emulsion Intravenous (Intralipid 20%)  250 mls @ 42 mls/hr IV QOD [facility]


Insulin Aspart (Novolog)  0 unit SC Q6 [facility]


   PRN Reason: Protocol


   Last Admin: 03/22/18 06:05 Dose:  Not Given


Insulin Glargine (Lantus)  10 unit SC HS [facility]


   Last Admin: 03/21/18 23:27 Dose:  10 unit


Metoprolol Tartrate (Lopressor)  25 mg NG BID [facility]


   Last Admin: 03/22/18 09:42 Dose:  Not Given


Midodrine (Proamatine)  10 mg NG TTS [facility]


   Last Admin: 03/20/18 10:06 Dose:  10 mg


Pantoprazole Sodium (Protonix Inj)  40 mg IVP DAILY [facility]


   Last Admin: 03/22/18 09:42 Dose:  40 mg


Paricalcitol (Zemplar)  2 mcg IV TTS [facility]


   Last Admin: 03/22/18 14:48 Dose:  2 mcg


Rosuvastatin Calcium (Crestor)  5 mg NG HS [facility]


   Last Admin: 03/21/18 21:24 Dose:  Not Given


Sennosides (Senokot Tab)  8.6 mg NG DAILY [facility]


   Last Admin: 03/21/18 09:08 Dose:  8.6 mg


Sevelamer Carbonate (Renvela)  800 mg NG TIDCC [facility]


   Last Admin: 03/22/18 07:40 Dose:  Not Given


Vitamin B Complex/Vit C/Folic Acid (Nephro-Nneka)  1 tab NG 0800 [facility]


   Last Admin: 03/22/18 07:40 Dose:  Not Given











- Labs


Labs: 


 





 03/22/18 06:54 





 03/22/18 06:54 





 











PT  21.8 SECONDS (9.7-12.2)  H  03/22/18  06:54    


 


INR  1.9   03/22/18  06:54    


 


APTT  45 SECONDS (21-34)  H  03/22/18  14:11    














Attending/Attestation





- Attestation


I have personally seen and examined this patient.: Yes


I have fully participated in the care of the patient.: Yes


I have reviewed all pertinent clinical information, including history, physical 

exam and plan: Yes


Notes (Text): 





03/22/18 15:18


Medical attending: Patient was seen and examined by me, agrees the above note 

by the medical resident.





As mentioned above in the resident note, yesterday the NG tube ceased to 

function. It had to be removed and I attempted to place and a new NG tube. I 

was not successful she simply fought way too much and it caused her a lot of 

distress and discomfort. After 2 attempts I did not have the heart try again. 

Unfortunately this does complicate the picture even further. Were not able to 

give the patient oral Cardizem for rate control. This morning the resident gave 

IV digoxin I agree with this.


Also were not able to give any oral anticoagulation so were currently having 

the patient on a heparin drip.





Again the family is trying to figure out hospice care. I understand from the 

caseworkers that they're hoping to go to [facility] however there is some 

financial issues that they have to try to resolve





Thank you very much,


Samson Gaxiola

## 2018-03-23 ENCOUNTER — HOSPITAL ENCOUNTER (INPATIENT)
Dept: HOSPITAL 31 - C.5S | Age: 78
DRG: 283 | End: 2018-03-23
Attending: HOSPITALIST | Admitting: HOSPITALIST
Payer: COMMERCIAL

## 2018-03-23 VITALS — DIASTOLIC BLOOD PRESSURE: 54 MMHG | OXYGEN SATURATION: 96 % | TEMPERATURE: 98.8 F | SYSTOLIC BLOOD PRESSURE: 112 MMHG

## 2018-03-23 VITALS — RESPIRATION RATE: 20 BRPM

## 2018-03-23 VITALS — HEART RATE: 93 BPM

## 2018-03-23 VITALS — BODY MASS INDEX: 24 KG/M2

## 2018-03-23 DIAGNOSIS — E11.22: ICD-10-CM

## 2018-03-23 DIAGNOSIS — N18.6: ICD-10-CM

## 2018-03-23 DIAGNOSIS — C18.9: ICD-10-CM

## 2018-03-23 DIAGNOSIS — Z99.2: ICD-10-CM

## 2018-03-23 DIAGNOSIS — I21.4: ICD-10-CM

## 2018-03-23 DIAGNOSIS — I22.9: ICD-10-CM

## 2018-03-23 DIAGNOSIS — Z79.4: ICD-10-CM

## 2018-03-23 DIAGNOSIS — Z66: ICD-10-CM

## 2018-03-23 DIAGNOSIS — I13.2: Primary | ICD-10-CM

## 2018-03-23 DIAGNOSIS — E78.00: ICD-10-CM

## 2018-03-23 DIAGNOSIS — I50.23: ICD-10-CM

## 2018-03-23 LAB
ALBUMIN SERPL-MCNC: 2.6 G/DL (ref 3.5–5)
ALBUMIN/GLOB SERPL: 0.8 {RATIO} (ref 1–2.1)
ALT SERPL-CCNC: 59 U/L (ref 9–52)
ANISOCYTOSIS BLD QL SMEAR: SLIGHT
APTT BLD: 39 SECONDS (ref 21–34)
AST SERPL-CCNC: 23 U/L (ref 14–36)
BASOPHILS # BLD AUTO: 0.1 K/UL (ref 0–0.2)
BASOPHILS NFR BLD: 0.6 % (ref 0–2)
BUN SERPL-MCNC: 53 MG/DL (ref 7–17)
BURR CELLS BLD QL SMEAR: SLIGHT
CALCIUM SERPL-MCNC: 8.1 MG/DL (ref 8.6–10.4)
EOSINOPHIL # BLD AUTO: 0 K/UL (ref 0–0.7)
EOSINOPHIL NFR BLD: 0.4 % (ref 0–4)
ERYTHROCYTE [DISTWIDTH] IN BLOOD BY AUTOMATED COUNT: 18.3 % (ref 11.5–14.5)
GFR NON-AFRICAN AMERICAN: 13
HGB BLD-MCNC: 7.6 G/DL (ref 11–16)
HYPOCHROMIC: (no result)
INR PPP: 1.9
LYMPHOCYTE: 3 % (ref 20–40)
LYMPHOCYTES # BLD AUTO: 0.4 K/UL (ref 1–4.3)
LYMPHOCYTES NFR BLD AUTO: 3.8 % (ref 20–40)
MCH RBC QN AUTO: 27.9 PG (ref 27–31)
MCHC RBC AUTO-ENTMCNC: 32 G/DL (ref 33–37)
MCV RBC AUTO: 87.1 FL (ref 81–99)
MONOCYTE: 2 % (ref 0–10)
MONOCYTES # BLD: 0.3 K/UL (ref 0–0.8)
MONOCYTES NFR BLD: 2.9 % (ref 0–10)
NEUTROPHILS # BLD: 9.2 K/UL (ref 1.8–7)
NEUTROPHILS NFR BLD AUTO: 92.3 % (ref 50–75)
NEUTROPHILS NFR BLD AUTO: 94 % (ref 50–75)
NEUTS BAND NFR BLD: 1 % (ref 0–2)
NRBC BLD AUTO-RTO: 0.1 % (ref 0–2)
OVALOCYTES BLD QL SMEAR: SLIGHT
PLATELET # BLD EST: NORMAL 10*3/UL
PLATELET # BLD: 132 K/UL (ref 130–400)
PMV BLD AUTO: 9.9 FL (ref 7.2–11.7)
POIKILOCYTOSIS BLD QL SMEAR: SLIGHT
PROTHROMBIN TIME: 21.5 SECONDS (ref 9.7–12.2)
RBC # BLD AUTO: 2.72 MIL/UL (ref 3.8–5.2)
TARGETS BLD QL SMEAR: SLIGHT
TEARDROP CELLS: SLIGHT
TOTAL CELLS COUNTED BLD: 100
WBC # BLD AUTO: 10 K/UL (ref 4.8–10.8)

## 2018-03-23 RX ADMIN — INSULIN ASPART SCH UNIT: 100 INJECTION, SOLUTION INTRAVENOUS; SUBCUTANEOUS at 01:33

## 2018-03-23 RX ADMIN — INSULIN ASPART SCH UNIT: 100 INJECTION, SOLUTION INTRAVENOUS; SUBCUTANEOUS at 06:50

## 2018-03-23 RX ADMIN — IPRATROPIUM BROMIDE AND ALBUTEROL SULFATE PRN ML: .5; 3 SOLUTION RESPIRATORY (INHALATION) at 12:45

## 2018-03-23 RX ADMIN — Medication SCH: at 08:16

## 2018-03-23 RX ADMIN — HEPARIN SODIUM PRN MLS/HR: 10000 INJECTION, SOLUTION INTRAVENOUS at 01:36

## 2018-03-23 RX ADMIN — IPRATROPIUM BROMIDE AND ALBUTEROL SULFATE PRN ML: .5; 3 SOLUTION RESPIRATORY (INHALATION) at 08:32

## 2018-03-23 RX ADMIN — INSULIN ASPART SCH UNIT: 100 INJECTION, SOLUTION INTRAVENOUS; SUBCUTANEOUS at 12:22

## 2018-03-23 RX ADMIN — INSULIN ASPART SCH UNIT: 100 INJECTION, SOLUTION INTRAVENOUS; SUBCUTANEOUS at 01:32

## 2018-03-23 NOTE — CP.PCM.PN
Subjective





- Date & Time of Evaluation


Date of Evaluation: 03/23/18


Time of Evaluation: 08:16





- Subjective


Subjective: 





Patient in bed awake and conscious


No nausea or vomiting








Objective





- Vital Signs/Intake and Output


Vital Signs (last 24 hours): 


 











Temp Pulse Resp BP Pulse Ox


 


 97.8 F   113 H  20   107/67   95 


 


 03/23/18 00:00  03/23/18 01:00  03/23/18 00:00  03/23/18 00:00  03/23/18 00:00








Intake and Output: 


 











 03/23/18 03/23/18





 06:59 18:59


 


Intake Total 741.2 


 


Balance 741.2 














- Medications


Medications: 


 Current Medications





Albumin Human (Albumin Human 25% (12.5 Gm/50 Ml))  25 gm IV TTS PRN


   PRN Reason: Other


   Last Admin: 03/17/18 14:46 Dose:  25 gm


Albuterol/Ipratropium (Duoneb 3 Mg/0.5 Mg (3 Ml) Ud)  3 ml INH RQ4 PRN


   PRN Reason: Shortness of Breath


   Last Admin: 03/20/18 01:21 Dose:  3 ml


Aspirin (Aspirin Chewable)  81 mg NG DAILY [facility]


   Last Admin: 03/22/18 09:42 Dose:  Not Given


Dextrose (Dextrose 50% Inj)  0 ml IV STAT PRN; Protocol


   PRN Reason: Hypoglycemia Protocol


Dextrose (Glutose 15)  0 gm PO ONCE PRN; Protocol


   PRN Reason: Hypoglycemia Protocol


Diltiazem HCl (Cardizem)  30 mg NG Q6H [facility]


   Last Admin: 03/23/18 01:26 Dose:  Not Given


Epoetin Vamsi (Procrit)  10,000 unit IV TTS [facility]


   Last Admin: 03/22/18 14:47 Dose:  10,000 unit


Epoetin Vamsi (Procrit)  4,000 unit IV TTS ABHILASH


   Last Admin: 03/22/18 14:48 Dose:  4,000 unit


Ferrous Gluconate (Fergon)  324 mg PO TID [facility]


   Last Admin: 03/22/18 18:56 Dose:  Not Given


Glucagon (Glucagen Diagnostic Kit)  0 mg IM STAT PRN; Protocol


   PRN Reason: Hypoglycemia Protocol


Heparin Sodium (Porcine) (Heparin)  3,700 units IVP TTS ABHILASH


   Stop: 03/31/18 10:01


   Last Admin: 03/22/18 14:46 Dose:  3,700 units


Aztreonam 1 gm/ Sodium (Chloride)  100 mls @ 100 mls/hr IVPB DAILY ABHILSAH


   PRN Reason: Protocol


   Last Admin: 03/22/18 09:42 Dose:  100 mls/hr


Dextrose (Dextrose 5% In Water 1000 Ml)  1,000 mls @ 0 mls/hr IV .Q0M PRN; 

Protocol; Per Protocol


   PRN Reason: Hypoglycemia Protocol


Heparin Sodium/Sodium Chloride (Heparin 57948 Units/250ml 1/2 Normal Saline)  25

,000 units in 250 mls @ 7.908 mls/hr IV .Q24H PRN; Protocol; 12 UNITS/KG/HR


   PRN Reason: PROTOCOL


   Last Admin: 03/23/18 01:36 Dose:  12 units/kg/hr, 7.908 mls/hr


Multivitamins/Vitamin C 10 ml/Heparin Sodium (Porcine) 1, 000 units/ Amino Acids

/Electrolytes/Dextrose  1,011 mls @ 42 mls/hr IV .Q24H ONE


   Stop: 03/23/18 17:59


   Last Admin: 03/22/18 18:20 Dose:  42 mls/hr


Fat Emulsion Intravenous (Intralipid 20%)  250 mls @ 42 mls/hr IV QOD ABHILASH


Insulin Aspart (Novolog)  0 unit SC Q6 ABHILASH


   PRN Reason: Protocol


   Last Admin: 03/23/18 06:50 Dose:  12 unit


Insulin Glargine (Lantus)  10 unit SC HS [facility]


   Last Admin: 03/22/18 22:37 Dose:  10 unit


Metoprolol Tartrate (Lopressor)  25 mg NG BID [facility]


   Last Admin: 03/22/18 18:21 Dose:  Not Given


Midodrine (Proamatine)  10 mg NG TTS [facility]


   Last Admin: 03/20/18 10:06 Dose:  10 mg


Pantoprazole Sodium (Protonix Inj)  40 mg IVP DAILY [facility]


   Last Admin: 03/22/18 09:42 Dose:  40 mg


Paricalcitol (Zemplar)  2 mcg IV TTS [facility]


   Last Admin: 03/22/18 14:48 Dose:  2 mcg


Rosuvastatin Calcium (Crestor)  5 mg NG HS [facility]


   Last Admin: 03/22/18 21:55 Dose:  Not Given


Sennosides (Senokot Tab)  8.6 mg NG DAILY [facility]


   Last Admin: 03/21/18 09:08 Dose:  8.6 mg


Sevelamer Carbonate (Renvela)  800 mg NG TIDCC [facility]


   Last Admin: 03/22/18 17:50 Dose:  Not Given


Vitamin B Complex/Vit C/Folic Acid (Nephro-Nneka)  1 tab NG 0800 [facility] hospital


   Last Admin: 03/22/18 07:40 Dose:  Not Given











- Labs


Labs: 


 





 03/23/18 06:59 





 03/23/18 06:59 





 











PT  21.5 SECONDS (9.7-12.2)  H  03/23/18  06:59    


 


INR  1.9   03/23/18  06:59    


 


APTT  39 SECONDS (21-34)  H D 03/23/18  06:59    














- Constitutional


Appears: No Acute Distress





- ENT Exam


ENT Exam: Mucous Membranes Dry





- Neck Exam


Neck Exam: absent: Lymphadenopathy





- Respiratory Exam


Respiratory Exam: Rhonchi, NORMAL BREATHING PATTERN.  absent: Chest Wall 

Tenderness





- GI/Abdominal Exam


GI & Abdominal Exam: Soft, Normal Bowel Sounds





- Extremities Exam


Extremities Exam: absent: Calf Tenderness





- Back Exam


Back Exam: absent: CVA tenderness (L), CVA tenderness (R)





- Neurological Exam


Neurological Exam: Alert





- Psychiatric Exam


Psychiatric exam: Anxious





- Skin


Skin Exam: absent: Cyanosis





Assessment and Plan


(1) Acute on chronic renal failure


Assessment & Plan: 


CKD stage 5 (N18.5) with hyperkalemia, acidosis now has ESRD on HD via 

permacath TTS 


pulm edema, CHF, NSTEMI, pleural effusions, pneumonia


AMS, hyperglycemia


s/p cardiac arrest, shock liver, A fib





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 


Hypertensive Chronic Kidney Disease (I12.9)


Anemia (D64.9), Hyperphosphatemia (E83.39), Secondary Hyperparathyroidism (E21.1

), HTN (I12.9), vit D insuff, hx of colon CA, TIA/CVAs





patient completed hemodialysis yesterday and she is scheduled to have 

hemodialysis for tomorrow 


Patient has a rapid heart rate in the range of 114 as per cardiology.


Glycemic control





Status: Acute   





(2) Cardiac arrest


Status: Acute

## 2018-03-23 NOTE — CP.PCM.HP
<Mike Silva - Last Filed: 18 17:38>





History of Present Illness





- History of Present Illness


History of Present Illness: 





PGY-1 H&P for Dr. Gaxiola


CC: [facility]





Patient previously admitted on 3/2/18 to [facility] due to NSTEMI and 

severe sepsis due to pneumonia. Patient with significantly elevated troponins. 

Cardiac catheterization was recommended, but the family refused, and therefore 

medical management was the only option remaining. Patient had an NSTEMI leading 

to severe acute systolic CHF with low ejection fraction. Patient also with 

pseudomonas in the sputum and pleural effusions. She was treated with Aztreonam 

for the pseudomonas. Patient with ESRD, and a dialysis catheter was inserted on 

this admission in order to begin a , , S dialysis schedule. Patient's family 

elected to change her code status to DNR/DNI and were deliberating on goals of 

care. Patient's family ultimately elected for hospice care given the patient's 

poor prognosis and for her comfort. Patient is being discharged to inpatient 

hospice care under [facility]. Cannot obtain ROS because of 

patient's lethargic condition.








PMD: Dr. Vickers 


PMH: NSTEMI, Systolic CHF, ESRD, DM, HTN, hypercholesterolemia, MI (many years 

ago, no known intervention), TIA x 3 (years ago), PAD, CHF, asthma, colon CA in 

 (treated with chemotherapy and surgery), arthritis


PSH: unknown surgery for colon CA (); endoscopy, dialysis catheter 

placement 


Allergies: Penicillin (rash)


Family History: Limited; Mother- of cancer; Father- for unknown reason. 


Social History: Lives alone in [place] (son and daughter visit daily to 

assist); ambulates without assistance; at baseline is able to walk around and 

cook without difficulty; retired; former smoker (25+ pack years); no current 

ETOH use; no current recreational drug use 





Present on Admission





- Present on Admission


Any Indicators Present on Admission: No





Review of Systems





- Review of Systems


Systems not reviewed;Unavailable: Acuity of Condition





Past Patient History





- Infectious Disease


Hx of Infectious Diseases: None





- Tetanus Immunizations


Tetanus Immunization: Unknown





- Past Medical History & Family History


Past Medical History?: Yes





- Past Social History


Smoking Status: Former Smoker





- CARDIAC


Hx Hypercholesterolemia: Yes


Hx Hypertension: Yes





- PULMONARY


Hx Asthma: Yes (Dx 15 yrs ago,does not use home o2 anymore)


Hx Pneumonia: Yes (x2)





- NEUROLOGICAL


Hx Transient Ischemic Attacks (TIA): Yes (x3 about 20 yrs ago, 10 yrs ago, 7 

yrs ago)





- HEENT


Hx HEENT Problems: Yes


Hx Cataracts: Yes





- RENAL


Hx Chronic Kidney Disease: Yes





- ENDOCRINE/METABOLIC


Hx Diabetes Mellitus Type 1: Yes





- HEMATOLOGICAL/ONCOLOGICAL


Hx Anemia: Yes





- INTEGUMENTARY


Hx Dermatological Problems: No





- MUSCULOSKELETAL/RHEUMATOLOGICAL


Hx Arthritis: Yes





- GASTROINTESTINAL


Hx Gastrointestinal Disorders: Yes


Other/Comment: Hx colon cancer





- GENITOURINARY/GYNECOLOGICAL


Hx Genitourinary Disorders: Yes


Other/Comment: Colon CA





- PSYCHIATRIC


Hx Depression: No


Hx Substance Use: No





- SURGICAL HISTORY


Hx Cataract Extraction: Yes (LEFT)





- ANESTHESIA


Hx Anesthesia: Yes


Hx Anesthesia Reactions: No


Hx Malignant Hyperthermia: No





Meds


Allergies/Adverse Reactions: 


 Allergies











Allergy/AdvReac Type Severity Reaction Status Date / Time


 


Penicillins Allergy Intermediate RASH Verified 18 20:11














Physical Exam





- Additional Findings


Additional findings: 





- Constitutional


Appears: No Acute Distress, Chronically Ill





- Eye Exam


Eye Exam: EOMI, Normal appearance





- ENT Exam


ENT Exam: Mucous Membranes Moist





- Respiratory Exam


Additional comments: 





Coarse breath sounds bilaterally with some rales and congestion





- Cardiovascular Exam


Cardiovascular Exam: Irregular Rhythm, +S1, +S2





- GI/Abdominal Exam


GI & Abdominal Exam: Soft, Hypoactive Bowel Sounds.  absent: Distended, Guarding

, Tenderness





- Extremities Exam


Additional comments: 





Evidence of chronic venous stasis


Pedal pulses weak





- Neurological Exam


Neurological Exam: Awake





- Skin


Skin Exam: Dry, Warm





Assessment & Plan





- Assessment and Plan (Free Text)


Plan: 





Inpatient Hospice


[facility]


Erin Recommendations to start:


   Morphine drip 1 mg/hr and titrate up as needed for patient comfort


   Ativan 0.5 mg Q6 prn anxiety


   Hyosyamine .125 mg SL Q4H prn secretions





Discussed with Dr. Gaxiola





<Samson Gaxiola - Last Filed: 18 18:26>





Attending/Attestation





- Attestation


I have personally seen and examined this patient.: Yes


I have fully participated in the care of the patient.: Yes


I have reviewed all pertinent clinical information: Yes


Notes (Text): 





18 18:25


Medical attending: Please note the patient passed away shortly thereafter. The 

time of expiration was 18:01.


I have filled out the EDRS and given it to the floor staff. 


I have also called and notified the patient's daughter as well and offered them 

my condolences. 





Samson Gaxiola

## 2018-03-23 NOTE — CP.PCM.PN
<Mike Silva - Last Filed: 03/23/18 10:51>





Subjective





- Date & Time of Evaluation


Date of Evaluation: 03/23/18


Time of Evaluation: 09:00





- Subjective


Subjective: 





Medicine progress note for Dr. Gaxiola





Patient seen and examined. Patient is unable to speak due to weakness and 

lethargy. Unable to obtain ROS. Per nursing, patient's blood glucose has been 

elevated. This is likely due to the TPN that is being continually infused.





Objective





- Vital Signs/Intake and Output


Vital Signs (last 24 hours): 


 











Temp Pulse Resp BP Pulse Ox


 


 97.8 F   113 H  20   107/67   95 


 


 03/23/18 00:00  03/23/18 01:00  03/23/18 00:00  03/23/18 00:00  03/23/18 00:00








Intake and Output: 


 











 03/23/18 03/23/18





 06:59 18:59


 


Intake Total 741.2 


 


Balance 741.2 














- Medications


Medications: 


 Current Medications





Albumin Human (Albumin Human 25% (12.5 Gm/50 Ml))  25 gm IV TTS PRN


   PRN Reason: Other


   Last Admin: 03/17/18 14:46 Dose:  25 gm


Albuterol/Ipratropium (Duoneb 3 Mg/0.5 Mg (3 Ml) Ud)  3 ml INH RQ4 PRN


   PRN Reason: Shortness of Breath


   Last Admin: 03/20/18 01:21 Dose:  3 ml


Aspirin (Aspirin Chewable)  81 mg NG DAILY Counts include 234 beds at the [facility]


   Last Admin: 03/22/18 09:42 Dose:  Not Given


Dextrose (Dextrose 50% Inj)  0 ml IV STAT PRN; Protocol


   PRN Reason: Hypoglycemia Protocol


Dextrose (Glutose 15)  0 gm PO ONCE PRN; Protocol


   PRN Reason: Hypoglycemia Protocol


Diltiazem HCl (Cardizem)  30 mg NG Q6H Counts include 234 beds at the [facility]


   Last Admin: 03/23/18 01:26 Dose:  Not Given


Epoetin Vamsi (Procrit)  10,000 unit IV TTS Counts include 234 beds at the [facility]


   Last Admin: 03/22/18 14:47 Dose:  10,000 unit


Epoetin Vamsi (Procrit)  4,000 unit IV TTS Counts include 234 beds at the [facility]


   Last Admin: 03/22/18 14:48 Dose:  4,000 unit


Ferrous Gluconate (Fergon)  324 mg PO TID Counts include 234 beds at the [facility]


   Last Admin: 03/22/18 18:56 Dose:  Not Given


Glucagon (Glucagen Diagnostic Kit)  0 mg IM STAT PRN; Protocol


   PRN Reason: Hypoglycemia Protocol


Heparin Sodium (Porcine) (Heparin)  3,700 units IVP TTS Counts include 234 beds at the [facility]


   Stop: 03/31/18 10:01


   Last Admin: 03/22/18 14:46 Dose:  3,700 units


Aztreonam 1 gm/ Sodium (Chloride)  100 mls @ 100 mls/hr IVPB DAILY ABHILASH


   PRN Reason: Protocol


   Last Admin: 03/22/18 09:42 Dose:  100 mls/hr


Dextrose (Dextrose 5% In Water 1000 Ml)  1,000 mls @ 0 mls/hr IV .Q0M PRN; 

Protocol; Per Protocol


   PRN Reason: Hypoglycemia Protocol


Heparin Sodium/Sodium Chloride (Heparin 41476 Units/250ml 1/2 Normal Saline)  25

,000 units in 250 mls @ 7.908 mls/hr IV .Q24H PRN; Protocol; 12 UNITS/KG/HR


   PRN Reason: PROTOCOL


   Last Admin: 03/23/18 01:36 Dose:  12 units/kg/hr, 7.908 mls/hr


Multivitamins/Vitamin C 10 ml/Heparin Sodium (Porcine) 1, 000 units/ Amino Acids

/Electrolytes/Dextrose  1,011 mls @ 42 mls/hr IV .Q24H ONE


   Stop: 03/23/18 17:59


   Last Admin: 03/22/18 18:20 Dose:  42 mls/hr


Fat Emulsion Intravenous (Intralipid 20%)  250 mls @ 42 mls/hr IV QOD ABHILASH


Insulin Aspart (Novolog)  0 unit SC Q6 ABHILASH


   PRN Reason: Protocol


   Last Admin: 03/23/18 06:50 Dose:  12 unit


Insulin Glargine (Lantus)  10 unit SC HS Counts include 234 beds at the [facility]


   Last Admin: 03/22/18 22:37 Dose:  10 unit


Metoprolol Tartrate (Lopressor)  25 mg NG BID Counts include 234 beds at the [facility]


   Last Admin: 03/22/18 18:21 Dose:  Not Given


Midodrine (Proamatine)  10 mg NG TTS Counts include 234 beds at the [facility]


   Last Admin: 03/20/18 10:06 Dose:  10 mg


Pantoprazole Sodium (Protonix Inj)  40 mg IVP DAILY Counts include 234 beds at the [facility]


   Last Admin: 03/22/18 09:42 Dose:  40 mg


Paricalcitol (Zemplar)  2 mcg IV TTS Counts include 234 beds at the [facility]


   Last Admin: 03/22/18 14:48 Dose:  2 mcg


Rosuvastatin Calcium (Crestor)  5 mg NG HS Counts include 234 beds at the [facility]


   Last Admin: 03/22/18 21:55 Dose:  Not Given


Sennosides (Senokot Tab)  8.6 mg NG DAILY Counts include 234 beds at the [facility]


   Last Admin: 03/21/18 09:08 Dose:  8.6 mg


Sevelamer Carbonate (Renvela)  800 mg NG TIDCC Counts include 234 beds at the [facility]


   Last Admin: 03/22/18 17:50 Dose:  Not Given


Vitamin B Complex/Vit C/Folic Acid (Nephro-Nneka)  1 tab NG 0800 Counts include 234 beds at the [facility]


   Last Admin: 03/22/18 07:40 Dose:  Not Given











- Labs


Labs: 


 





 03/23/18 06:59 





 03/22/18 06:54 





 











PT  21.5 SECONDS (9.7-12.2)  H  03/23/18  06:59    


 


INR  1.9   03/23/18  06:59    


 


APTT  45 SECONDS (21-34)  H  03/22/18  14:11    














- Additional Findings


Additional findings: 





- Constitutional


Appears: No Acute Distress, Chronically Ill





- Eye Exam


Eye Exam: EOMI, Normal appearance





- ENT Exam


ENT Exam: Mucous Membranes Moist





- Respiratory Exam


Additional comments: 





Coarse breath sounds bilaterally with some rales and congestion





- Cardiovascular Exam


Cardiovascular Exam: Irregular Rhythm, +S1, +S2





- GI/Abdominal Exam


GI & Abdominal Exam: Soft, Hypoactive Bowel Sounds.  absent: Distended, Guarding

, Tenderness





- Extremities Exam


Additional comments: 





Evidence of chronic venous stasis


Pedal pulses weak





- Neurological Exam


Neurological Exam: Awake





- Skin


Skin Exam: Dry, Warm





Assessment and Plan





- Assessment and Plan (Free Text)


Plan: 





1. Acute NSTEMI,  Asystole ,CPR/Sustained VT


Previously on Heparin GGT


Cardiology consult, Dr. Nichole. Help appreciated


Family elected against cardiac catheterization despite the advice of multiple 

professionals


Echo shows EF of 15-20%


Cardizem 30 mg PO Q6--cannot give because of lack of NGT


Lopressor 25 mg PO BID--cannot give because of lack of NGT


ASA 81 mg PO daily--cannot give because of lack of NGT


Crestor 5 mg HS--cannot give because of lack of NGT





2. Respiratory failure, s/p intubation and later extubation


Previously intubated in the ICU and now extubated


Saturating well on non-rebreather mask


CXR on 3/20/18 shows new collapse, volume loss of right lower lobe


If patient's saturation declines, we will order BiPAP





3. Atrial Fibrillation 


CHADS-VAsc score 8


Continue Heparin drip


Will rate control as needed with IV Lopressor and IV digoxin





4. Acute renal failure on chronic renal disease/Now ESRD on HD


Nephrology consult, Dr. Wheeler, help appreciated


Dialysis T,Th,S


Renvela TID--cannot give because of lack of NGT





On 3/13:  L IJ permacath placed and left shiley removed








5. Sepsis


Previously on Vancomycin, flagyl, and aztreonam


Now currently only on Aztreonam for pseudomonas in the sputum


Elevated procalcitonin 6.1





6. Acute systolic heart failure


Family refuses cardiac cath. has high risk for arrhythmia and poor prognosis


Family aware of her condition


ECHO shows EF 15 to 20%





7. Pleural effusion


Persistent left loculated pleural effusion





8. Hyperglycemia and uncontrolled DM


Aspart sliding scale


Lantus 10 units HS


NPH/Regular 70/30 10 units Q12





9. Anemia of chronic disease, CKD


Procrit T,Th,S


Ferrous gluconate 324 mg TID





10. Prophylaxis


TPN ordered for nutrition


Protonix 40 mg IV daily


DVT prophylaxis: on Heparin drip.





Disposition: Family has requested DNR/DNI per palliative. Hospice evaluation 

was placed. Family wishes for NH hospice. However, since the patient does not 

have a secondary insurance, family will need to decide if they wish to pay for 

part of the cost for the nursing home out of pocket. We are still awaiting the 

family's decision at this time.





Discussed with Dr. Khalida Silva PGY-1





<Samson Gaxiola H - Last Filed: 03/23/18 14:43>





Objective





- Vital Signs/Intake and Output


Vital Signs (last 24 hours): 


 











Temp Pulse Resp BP Pulse Ox


 


 98.6 F   99 H  20   132/82   100 


 


 03/23/18 08:00  03/23/18 08:00  03/23/18 08:00  03/23/18 08:00  03/23/18 08:00








Intake and Output: 


 











 03/23/18 03/23/18





 06:59 18:59


 


Intake Total 741.2 


 


Balance 741.2 














- Medications


Medications: 


 Current Medications





Albumin Human (Albumin Human 25% (12.5 Gm/50 Ml))  25 gm IV TTS PRN


   PRN Reason: Other


   Last Admin: 03/17/18 14:46 Dose:  25 gm


Albuterol/Ipratropium (Duoneb 3 Mg/0.5 Mg (3 Ml) Ud)  3 ml INH RQ4 PRN


   PRN Reason: Shortness of Breath


   Last Admin: 03/23/18 12:45 Dose:  3 ml


Aspirin (Aspirin Chewable)  81 mg NG DAILY Counts include 234 beds at the [facility]


   Last Admin: 03/23/18 10:15 Dose:  Not Given


Dextrose (Dextrose 50% Inj)  0 ml IV STAT PRN; Protocol


   PRN Reason: Hypoglycemia Protocol


Dextrose (Glutose 15)  0 gm PO ONCE PRN; Protocol


   PRN Reason: Hypoglycemia Protocol


Diltiazem HCl (Cardizem)  30 mg NG Q6H Counts include 234 beds at the [facility]


   Last Admin: 03/23/18 12:42 Dose:  Not Given


Epoetin Vamsi (Procrit)  10,000 unit IV TTS Counts include 234 beds at the [facility]


   Last Admin: 03/22/18 14:47 Dose:  10,000 unit


Epoetin Vamsi (Procrit)  4,000 unit IV TTS Counts include 234 beds at the [facility]


   Last Admin: 03/22/18 14:48 Dose:  4,000 unit


Ferrous Gluconate (Fergon)  324 mg PO TID Counts include 234 beds at the [facility]


   Last Admin: 03/23/18 13:19 Dose:  Not Given


Glucagon (Glucagen Diagnostic Kit)  0 mg IM STAT PRN; Protocol


   PRN Reason: Hypoglycemia Protocol


Heparin Sodium (Porcine) (Heparin)  3,700 units IVP TTS Counts include 234 beds at the [facility]


   Stop: 03/31/18 10:01


   Last Admin: 03/22/18 14:46 Dose:  3,700 units


Aztreonam 1 gm/ Sodium (Chloride)  100 mls @ 100 mls/hr IVPB DAILY Counts include 234 beds at the [facility]


   PRN Reason: Protocol


   Last Admin: 03/23/18 10:23 Dose:  100 mls/hr


Dextrose (Dextrose 5% In Water 1000 Ml)  1,000 mls @ 0 mls/hr IV .Q0M PRN; 

Protocol; Per Protocol


   PRN Reason: Hypoglycemia Protocol


Multivitamins/Vitamin C 10 ml/Heparin Sodium (Porcine) 1, 000 units/ Amino Acids

/Electrolytes/Dextrose  1,011 mls @ 42 mls/hr IV .Q24H ONE


   Stop: 03/23/18 17:59


   Last Admin: 03/22/18 18:20 Dose:  42 mls/hr


Heparin Sodium/Sodium Chloride (Heparin 57413 Units/250ml 1/2 Normal Saline)  25

,000 units in 250 mls @ 9.226 mls/hr IV .Q24H PRN; Protocol; 14 UNITS/KG/HR


   PRN Reason: PROTOCOL


   Last Admin: 03/23/18 09:10 Dose:  14 units/kg/hr, 9.226 mls/hr


Multivitamins/Vitamin C 10 ml/Heparin Sodium (Porcine) 1, 000 units/ Amino Acids

/Electrolytes/Dextrose  1,011 mls @ 42 mls/hr IV .Q24H ONE


   Stop: 03/24/18 17:59


Insulin Aspart (Novolog)  0 unit SC Q6 ABHILASH


   PRN Reason: Protocol


   Last Admin: 03/23/18 12:22 Dose:  8 unit


Insulin Glargine (Lantus)  10 unit SC HS Counts include 234 beds at the [facility]


   Last Admin: 03/22/18 22:37 Dose:  10 unit


Insulin Human Isoph/Insulin Regular (Novolin 70/30 (70/30 Units/Ml) 10 Ml)  10 

units SC Q12H Counts include 234 beds at the [facility]


   Last Admin: 03/23/18 09:46 Dose:  10 units


Metoprolol Tartrate (Lopressor)  25 mg NG BID Counts include 234 beds at the [facility]


   Last Admin: 03/23/18 10:15 Dose:  Not Given


Midodrine (Proamatine)  10 mg NG TTS Counts include 234 beds at the [facility]


   Last Admin: 03/20/18 10:06 Dose:  10 mg


Pantoprazole Sodium (Protonix Inj)  40 mg IVP DAILY Counts include 234 beds at the [facility]


   Last Admin: 03/23/18 10:40 Dose:  40 mg


Paricalcitol (Zemplar)  2 mcg IV TTS Counts include 234 beds at the [facility]


   Last Admin: 03/22/18 14:48 Dose:  2 mcg


Rosuvastatin Calcium (Crestor)  5 mg NG HS Counts include 234 beds at the [facility]


   Last Admin: 03/22/18 21:55 Dose:  Not Given


Sennosides (Senokot Tab)  8.6 mg NG DAILY Counts include 234 beds at the [facility]


   Last Admin: 03/23/18 10:41 Dose:  Not Given


Sevelamer Carbonate (Renvela)  800 mg NG TIDCC Counts include 234 beds at the [facility]


   Last Admin: 03/23/18 12:41 Dose:  Not Given


Vitamin B Complex/Vit C/Folic Acid (Nephro-Nneka)  1 tab NG 0800 Counts include 234 beds at the [facility]


   Last Admin: 03/23/18 08:16 Dose:  Not Given











- Labs


Labs: 


 





 03/23/18 06:59 





 03/23/18 06:59 





 











PT  21.5 SECONDS (9.7-12.2)  H  03/23/18  06:59    


 


INR  1.9   03/23/18  06:59    


 


APTT  35 SECONDS (21-34)  H D 03/23/18  13:33    














Attending/Attestation





- Attestation


I have personally seen and examined this patient.: Yes


I have fully participated in the care of the patient.: Yes


I have reviewed all pertinent clinical information, including history, physical 

exam and plan: Yes


Notes (Text): 





03/23/18 14:38


Medical attending: Patient was seen and examined by me. Agree with the above 

note by the resident. 


The patient's overall prognosis is very poor. We are continuing with the TPN 

for nution as she cannot have an NGT


Also the HR was 110 systolic, if need to we will give IV digoxin to control the 

HR. We can also use IV lopressor


Should her breathing become worse we will use Bipap





When seen today she was not as active as previous times. She looked very tired. 

Opened her eyes but did not raise her arms. She did not indicate yes or no to 

questions. 





Samson Gaxiola

## 2018-03-23 NOTE — CP.PCM.DIS
<Mike Silva - Last Filed: 03/23/18 16:35>





Provider





- Provider


Date of Admission: 


03/02/18 00:10





Attending physician: 


Samson Gaxiola DO





Primary care physician: 





Dr. Vickers


Consults: 





Cardiology: Dr. Nichole, Dr. Correia


Vascular Surgery: Dr. Alvarado


Nephrology: Dr. Wheeler


Infectious Disease: Dr. Tate


Time Spent in preparation of Discharge (in minutes): 50





Diagnosis





- Discharge Diagnosis


(1) Acute non-ST elevation myocardial infarction (NSTEMI)


Status: Acute   Priority: High   





(2) Acute systolic (congestive) heart failure


Status: Acute   Priority: High   





(3) Respiratory failure


Status: Acute   Priority: High   





(4) Atrial fibrillation


Status: Chronic   Priority: High   





(5) End stage renal disease


Status: Chronic   Priority: High   





(6) Sepsis


Status: Acute   Priority: High   





(7) Pneumonia due to Pseudomonas


Status: Acute   Priority: High   





(8) Diabetes


Status: Acute   Priority: High   





(9) Pleural effusion


Status: Acute   Priority: High   





(10) Anemia


Status: Chronic   Priority: Medium   





Hospital Course





- Lab Results


Lab Results: 


 Micro Results





03/15/18 Unknown   Naris   MRSA Culture (Admit) - Final


                                MRSA NOT DETECTED


03/13/18 11:30   Trachasp   Gram Stain - Final


03/13/18 11:30   Trachasp   Sputum Culture - Final


                            Pseudomonas Aeruginosa


03/13/18 12:18   Catheter Tip   Catheter Tip Culture - Final


                            No Growth


03/06/18 Unknown   Trachasp   Gram Stain - Final


03/06/18 Unknown   Trachasp   Sputum Culture - Final


                                NORMAL ORAL GENARO


03/01/18 06:30   Blood-Venous   Blood Culture - Final


                            NO GROWTH AFTER 5 DAYS


03/01/18 06:30   Blood-Venous   Gram Stain - Final


                            TEST NOT PERFORMED


03/01/18 06:00   Blood-Venous   Blood Culture - Final


                            NO GROWTH AFTER 5 DAYS


03/01/18 06:00   Blood-Venous   Gram Stain - Final


                            TEST NOT PERFORMED


03/02/18 06:00   Nose   MRSA Culture (Admit) - Final


                            MRSA NOT DETECTED





 Most Recent Lab Values











WBC  10.0 K/uL (4.8-10.8)   03/23/18  06:59    


 


RBC  2.72 Mil/uL (3.80-5.20)  L  03/23/18  06:59    


 


Hgb  7.6 g/dL (11.0-16.0)  L  03/23/18  06:59    


 


Hct  23.7 % (34.0-47.0)  L  03/23/18  06:59    


 


MCV  87.1 fL (81.0-99.0)   03/23/18  06:59    


 


MCH  27.9 pg (27.0-31.0)   03/23/18  06:59    


 


MCHC  32.0 g/dL (33.0-37.0)  L  03/23/18  06:59    


 


RDW  18.3 % (11.5-14.5)  H  03/23/18  06:59    


 


Plt Count  132 K/uL (130-400)   03/23/18  06:59    


 


MPV  9.9 fL (7.2-11.7)   03/23/18  06:59    


 


Neut % (Auto)  92.3 % (50.0-75.0)  H  03/23/18  06:59    


 


Lymph % (Auto)  3.8 % (20.0-40.0)  L  03/23/18  06:59    


 


Mono % (Auto)  2.9 % (0.0-10.0)   03/23/18  06:59    


 


Eos % (Auto)  0.4 % (0.0-4.0)   03/23/18  06:59    


 


Baso % (Auto)  0.6 % (0.0-2.0)   03/23/18  06:59    


 


Neut # (Auto)  9.2 K/uL (1.8-7.0)  H  03/23/18  06:59    


 


Lymph # (Auto)  0.4 K/uL (1.0-4.3)  L  03/23/18  06:59    


 


Mono # (Auto)  0.3 K/uL (0.0-0.8)   03/23/18  06:59    


 


Eos # (Auto)  0.0 K/uL (0.0-0.7)   03/23/18  06:59    


 


Baso # (Auto)  0.1 K/uL (0.0-0.2)   03/23/18  06:59    


 


Neutrophils % (Manual)  94 % (50-75)  H  03/23/18  06:59    


 


Band Neutrophils %  1 % (0-2)   03/23/18  06:59    


 


Lymphocytes % (Manual)  3 % (20-40)  L  03/23/18  06:59    


 


Reactive Lymphs %  1 % (0-0)  H  03/04/18  06:11    


 


Monocytes % (Manual)  2 % (0-10)   03/23/18  06:59    


 


Eosinophils % (Manual)  1 % (0-4)   03/20/18  07:19    


 


Metamyelocytes %  1 % (0-0)  H  03/04/18  06:11    


 


Myelocytes %  1 % (0-0)  H  03/07/18  06:19    


 


Nucleated RBC %  2 % (0-0)  H  03/13/18  05:46    


 


Differential Comment     03/06/18  06:18    


 


Toxic Granulation  Present   03/18/18  08:26    


 


Platelet Estimate  Normal  (NORMAL)   03/23/18  06:59    


 


Large Platelets  Present   03/19/18  06:51    


 


Giant Platelets  Present   03/18/18  08:26    


 


Polychromasia  Slight   03/22/18  06:54    


 


Hypochromasia (manual)  Moderate   03/23/18  06:59    


 


Poikilocytosis (manual  Slight   03/23/18  06:59    


 


Anisocytosis (manual)  Slight   03/23/18  06:59    


 


Microcytosis (manual)  Slight   03/19/18  06:51    


 


Macrocytosis (manual)  Slight   03/18/18  08:26    


 


Spherocytes  Slight   03/18/18  08:26    


 


Target Cells  Slight   03/23/18  06:59    


 


Tear Drop Cells  Slight   03/23/18  06:59    


 


Ovalocytes  Slight   03/23/18  06:59    


 


Clara Cells  Slight   03/23/18  06:59    


 


Schistocytes  Slight   03/17/18  07:35    


 


PT  21.5 SECONDS (9.7-12.2)  H  03/23/18  06:59    


 


INR  1.9   03/23/18  06:59    


 


APTT  35 SECONDS (21-34)  H D 03/23/18  13:33    


 


D-Dimer, Quantitative  4388 ng/mlDDU (0-243)  H  03/01/18  22:51    


 


Puncture Site  Rra   03/14/18  17:55    


 


pCO2  33 mm/Hg (35-45)  L  03/14/18  17:55    


 


pO2  47 mm/Hg ()  L  03/14/18  17:55    


 


HCO3  21.3 mmol/L (21-28)   03/14/18  17:55    


 


ABG pH  7.39  (7.35-7.45)   03/14/18  17:55    


 


ABG Total CO2  21.0 mmol/L (22-28)  L  03/14/18  17:55    


 


ABG O2 Saturation  85.8 % (95-98)  L  03/14/18  17:55    


 


ABG Base Excess  -4.1 mmol/L (-2.0-3.0)  L  03/14/18  17:55    


 


ABG Hemoglobin  9.1 g/dL (11.7-17.4)  L  03/12/18  04:30    


 


ABG Carboxyhemoglobin  2.3 % (0.5-1.5)  H  03/12/18  04:30    


 


POC ABG HHb (Measured)  8.0 % (0.0-5.0)  H  03/12/18  04:30    


 


ABG Methemoglobin  0.7 % (0.0-3.0)   03/12/18  04:30    


 


Jesus Test  Pos   03/14/18  17:55    


 


ABG Potassium  4.4 mmol/L (3.6-5.2)   03/14/18  17:55    


 


VBG pH  7.20  (7.32-7.43)  L  03/03/18  00:29    


 


VBG pCO2  42 mmHg (40-60)   03/03/18  00:29    


 


VBG HCO3  14.0 mmol/L  03/03/18  00:29    


 


VBG Total CO2  15.5 mmol/L (22-28)  L  03/01/18  23:25    


 


VBG O2 Sat (Calc)  30.5 % (40-65)  L  03/03/18  00:29    


 


VBG Base Excess  -11.2 mmol/L (0.0-2.0)  L  03/03/18  00:29    


 


VBG Potassium  5.8 mmol/L (3.6-5.2)  H  03/01/18  23:25    


 


A-a O2 Difference  625.0 mm/Hg  03/14/18  17:55    


 


Respiratory Index  13.3   03/14/18  17:55    


 


Hgb O2 Saturation  89.0 % (95.0-98.0)  L  03/12/18  04:30    


 


Sodium  137.0 mmol/l (132-148)   03/14/18  17:55    


 


Chloride  102.0 mmol/L ()   03/14/18  17:55    


 


Glucose  249 mg/dl ()  H  03/14/18  17:55    


 


Lactate  2.0 mmol/L (0.7-2.1)   03/14/18  17:55    


 


Vent Mode  Prvc   03/12/18  04:30    


 


Mechanical Rate  16   03/12/18  04:30    


 


FiO2  100.0 %  03/14/18  17:55    


 


Tidal Volume  500   03/12/18  04:30    


 


PEEP  5   03/12/18  04:30    


 


Pressure Support  12   03/08/18  04:35    


 


CPAP  5   03/08/18  04:35    


 


Crit Value Called To  Eli jordan icu rn   03/04/18  05:02    


 


Crit Value Called By  Gillian vazquez rt   03/04/18  05:02    


 


Crit Value Read Back  Y   03/04/18  05:02    


 


Blood Gas Notified Time  542   03/04/18  05:02    


 


Sodium  133 mmol/L (132-148)   03/23/18  06:59    


 


Potassium  3.9 mmol/L (3.6-5.2)   03/23/18  06:59    


 


Chloride  93 mmol/L ()  L  03/23/18  06:59    


 


Carbon Dioxide  26 mmol/L (22-30)   03/23/18  06:59    


 


Anion Gap  18  (10-20)   03/23/18  06:59    


 


BUN  53 mg/dL (7-17)  H  03/23/18  06:59    


 


Creatinine  3.4 mg/dL (0.7-1.2)  H  03/23/18  06:59    


 


Est GFR ( Amer)  16   03/23/18  06:59    


 


Est GFR (Non-Af Amer)  13   03/23/18  06:59    


 


POC Glucose (mg/dL)  300 mg/dL ()  H  03/23/18  11:49    


 


Random Glucose  399 mg/dL ()  H  03/23/18  06:59    


 


Lactic Acid  4.8 mmol/L (0.7-2.1)  H*  03/04/18  06:08    


 


Calcium  8.1 mg/dl (8.6-10.4)  L  03/23/18  06:59    


 


Phosphorus  3.9 mg/dL (2.5-4.5)   03/23/18  06:59    


 


Magnesium  2.0 mg/dL (1.6-2.3)   03/23/18  06:59    


 


Iron  18 ug/dL ()  L  03/02/18  12:19    


 


TIBC  196 ug/dL (250-450)  L  03/02/18  12:19    


 


% Saturation  9  (20-55)  L  03/02/18  12:19    


 


Ferritin  195.0 ng/mL  03/02/18  12:19    


 


Total Bilirubin  1.0 mg/dL (0.2-1.3)   03/23/18  06:59    


 


AST  23 U/L (14-36)   03/23/18  06:59    


 


ALT  59 U/L (9-52)  H  03/23/18  06:59    


 


Alkaline Phosphatase  165 U/L ()  H  03/23/18  06:59    


 


Total Creatine Kinase  912 U/L ()  H  03/03/18  15:29    


 


Troponin I  79.7000 ng/mL (0.00-0.120)  H*  03/02/18  20:41    


 


NT-Pro-B Natriuret Pep  52879 pg/mL (0-900)  H  03/01/18  22:51    


 


Total Protein  6.0 g/dL (6.3-8.3)  L  03/23/18  06:59    


 


Albumin  2.6 g/dL (3.5-5.0)  L  03/23/18  06:59    


 


Globulin  3.4 gm/dL (2.2-3.9)   03/23/18  06:59    


 


Albumin/Globulin Ratio  0.8  (1.0-2.1)  L  03/23/18  06:59    


 


Triglycerides  82 mg/dL (0-149)  D 03/03/18  03:18    


 


Cholesterol  173 mg/dL (0-199)   03/03/18  03:18    


 


LDL Cholesterol Direct  42 mg/dL (0-129)   03/03/18  03:18    


 


HDL Cholesterol  56 mg/dL (30-70)   03/03/18  03:18    


 


25-OH Vitamin D Total  27.1 NG/ML (30.0-100.0)  L  03/02/18  12:19    


 


Procalcitonin  6.11 NG/ML (0.19-0.49)  H  03/13/18  10:19    


 


PTH Intact Whole Molec  1119 pg/mL (14-64)  H  03/02/18  12:19    


 


Arterial Blood Potassium  4.4 mmol/L (3.6-5.2)   03/14/18  17:55    


 


Venous Blood Potassium  5.8 mmol/L (3.6-5.2)  H  03/01/18  23:25    


 


Vancomycin Trough  20.1 ug/mL (5.0-10.0)  H  03/17/18  07:35    


 


Random Vancomycin  < 5.00 ug/mL  03/03/18  15:29    


 


Serum Ketones  Negative  (NEGATIVE)   03/01/18  22:51    


 


Hep Bs Antigen  Negative  (NEGATIVE)   03/02/18  12:19    


 


Hep Bs Antibody  Negative  (NEGATIVE)   03/02/18  12:19    


 


Hep B Core IgM Ab  Negative  (NEGATIVE)   03/02/18  12:19    


 


Hepatitis C Antibody  Negative  (NEGATIVE)   03/02/18  12:19    


 


Influenza Type A Ab  <1:8 titer (<1:8)   03/03/18  11:44    


 


Influenza Type B Ab  <1:8 titer (<1:8)   03/03/18  11:44    


 


Blood Type  O POSITIVE   03/18/18  08:26    


 


Antibody Screen  Negative   03/18/18  08:26    














- Hospital Course


Hospital Course: 





On admission:


"Patient is a 77 year old  female with a PMH significant for 

CKD stage V (not on dialysis), Diastolic CHF, DM, HTN, hypercholesterolemia, MI 

(many years ago, no known intervention), TIA x 3 (years ago), colon CA who 

presents for altered mental status. 





Patient's son noted her blood sugar to be high today (500+) and gave her 

insulin. Patient's daughter came to check on her at 4pm and noted her to be "

not acting right" and "looking at her funny." Blood sugar was noted to be 500+ 

at that time, and patient's daughter decided to bring her to the hospital. 

Daughter states that patient began complaining of chest pain yesterday, 

accompanied by chills, abdominal pain and dry cough. No fever at home. She also 

notes recent weakness, headache, dizziness, and decreased appetite. Patient had 

an AV graft placed in December 2016 but she has never had dialysis (unclear why)

. Patient still makes urine; daughter states that she last urinated and had a 

bowel movement this morning. 





Daughter denies any recent illness, sick contacts, vomiting, melena/hematochezia

, bruising/bleeding, leg swelling."








Hospital Course:


Patient admitted due to NSTEMI and severe sepsis due to pneumonia. Patient with 

significantly elevated troponins. Cardiac catheterization was recommended, but 

the family refused, and therefore medical management was the only option 

remaining. Patient had an NSTEMI leading to severe acute systolic CHF with low 

ejection fraction. Patient also with pseudomonas in the sputum and pleural 

effusions. She was treated with Aztreonam for the pseudomonas. Patient with ESRD

, and a dialysis catheter was inserted on this admission in order to begin a T, 

Th, S dialysis schedule. Patient's family elected to change her code status to 

DNR/DNI and were deliberating on goals of care. Patient's family ultimately 

elected for hospice care given the patient's poor prognosis and for her 

comfort. Patient is being discharged to inpatient hospice care under [facility].





This is a summary of the hospital course. For more information, refer to the 

medical records.





Discharge Exam





- Additional Findings


Additional findings: 





- Constitutional


Appears: No Acute Distress, Chronically Ill





- Eye Exam


Eye Exam: EOMI, Normal appearance





- ENT Exam


ENT Exam: Mucous Membranes Moist





- Respiratory Exam


Additional comments: 





Coarse breath sounds bilaterally with some rales and congestion





- Cardiovascular Exam


Cardiovascular Exam: Irregular Rhythm, +S1, +S2





- GI/Abdominal Exam


GI & Abdominal Exam: Soft, Hypoactive Bowel Sounds.  absent: Distended, Guarding

, Tenderness





- Extremities Exam


Additional comments: 





Evidence of chronic venous stasis


Pedal pulses weak





- Neurological Exam


Neurological Exam: Awake





- Skin


Skin Exam: Dry, Warm





Discharge Plan





- Follow Up Plan


Condition: GUARDED


Disposition: HOSPICE - MEDICAL FACILITY





Clinical Quality Measures





- CQM - Heart Failure


Ejection Fraction: Less Than 40 %


Left Ventricular Function to be assessed after discharge: No


ACE Inhibitor Prescribed: No


Contraindication/Reason for not providing: Hospice


Beta-Blocker Prescribed: None


Contraindication/Reason for not providing: Hospice


Angiotensin II Receptor Blocker Prescribed: No


Contraindication/Reason for not providing: Hospice


AnticoagulationTherapy for Atrial Fibrillation/Atrialflutter: No


Contraindication/Reason for not providing: Hospice


Aldosterone Antagonist Prescribed: No


Contraindication/Reason for not providing: Hospice


Hydralazine Nitrate Prescribed: No


Contraindication/Reason for not providing: Hospice


Implantable Cardioverter Defibrillator Therapy: No


Contraindication/Reason for not providing: Hospice


Cardiac Resynchronization Therapy Prescribed: No


Contraindication/Reason for not providing: Hospice





<Samson Gaxiola - Last Filed: 03/23/18 17:15>





Provider





- Provider


Date of Admission: 


03/02/18 00:10





Attending physician: 


Samson Gaxiola DO








Hospital Course





- Lab Results


Lab Results: 


 Micro Results





03/15/18 Unknown   Naris   MRSA Culture (Admit) - Final


                                MRSA NOT DETECTED


03/13/18 11:30   Trachasp   Gram Stain - Final


03/13/18 11:30   Trachasp   Sputum Culture - Final


                            Pseudomonas Aeruginosa


03/13/18 12:18   Catheter Tip   Catheter Tip Culture - Final


                            No Growth


03/06/18 Unknown   Trachasp   Gram Stain - Final


03/06/18 Unknown   Trachasp   Sputum Culture - Final


                                NORMAL ORAL GENARO


03/01/18 06:30   Blood-Venous   Blood Culture - Final


                            NO GROWTH AFTER 5 DAYS


03/01/18 06:30   Blood-Venous   Gram Stain - Final


                            TEST NOT PERFORMED


03/01/18 06:00   Blood-Venous   Blood Culture - Final


                            NO GROWTH AFTER 5 DAYS


03/01/18 06:00   Blood-Venous   Gram Stain - Final


                            TEST NOT PERFORMED


03/02/18 06:00   Nose   MRSA Culture (Admit) - Final


                            MRSA NOT DETECTED





 Most Recent Lab Values











WBC  10.0 K/uL (4.8-10.8)   03/23/18  06:59    


 


RBC  2.72 Mil/uL (3.80-5.20)  L  03/23/18  06:59    


 


Hgb  7.6 g/dL (11.0-16.0)  L  03/23/18  06:59    


 


Hct  23.7 % (34.0-47.0)  L  03/23/18  06:59    


 


MCV  87.1 fL (81.0-99.0)   03/23/18  06:59    


 


MCH  27.9 pg (27.0-31.0)   03/23/18  06:59    


 


MCHC  32.0 g/dL (33.0-37.0)  L  03/23/18  06:59    


 


RDW  18.3 % (11.5-14.5)  H  03/23/18  06:59    


 


Plt Count  132 K/uL (130-400)   03/23/18  06:59    


 


MPV  9.9 fL (7.2-11.7)   03/23/18  06:59    


 


Neut % (Auto)  92.3 % (50.0-75.0)  H  03/23/18  06:59    


 


Lymph % (Auto)  3.8 % (20.0-40.0)  L  03/23/18  06:59    


 


Mono % (Auto)  2.9 % (0.0-10.0)   03/23/18  06:59    


 


Eos % (Auto)  0.4 % (0.0-4.0)   03/23/18  06:59    


 


Baso % (Auto)  0.6 % (0.0-2.0)   03/23/18  06:59    


 


Neut # (Auto)  9.2 K/uL (1.8-7.0)  H  03/23/18  06:59    


 


Lymph # (Auto)  0.4 K/uL (1.0-4.3)  L  03/23/18  06:59    


 


Mono # (Auto)  0.3 K/uL (0.0-0.8)   03/23/18  06:59    


 


Eos # (Auto)  0.0 K/uL (0.0-0.7)   03/23/18  06:59    


 


Baso # (Auto)  0.1 K/uL (0.0-0.2)   03/23/18  06:59    


 


Neutrophils % (Manual)  94 % (50-75)  H  03/23/18  06:59    


 


Band Neutrophils %  1 % (0-2)   03/23/18  06:59    


 


Lymphocytes % (Manual)  3 % (20-40)  L  03/23/18  06:59    


 


Reactive Lymphs %  1 % (0-0)  H  03/04/18  06:11    


 


Monocytes % (Manual)  2 % (0-10)   03/23/18  06:59    


 


Eosinophils % (Manual)  1 % (0-4)   03/20/18  07:19    


 


Metamyelocytes %  1 % (0-0)  H  03/04/18  06:11    


 


Myelocytes %  1 % (0-0)  H  03/07/18  06:19    


 


Nucleated RBC %  2 % (0-0)  H  03/13/18  05:46    


 


Differential Comment     03/06/18  06:18    


 


Toxic Granulation  Present   03/18/18  08:26    


 


Platelet Estimate  Normal  (NORMAL)   03/23/18  06:59    


 


Large Platelets  Present   03/19/18  06:51    


 


Giant Platelets  Present   03/18/18  08:26    


 


Polychromasia  Slight   03/22/18  06:54    


 


Hypochromasia (manual)  Moderate   03/23/18  06:59    


 


Poikilocytosis (manual  Slight   03/23/18  06:59    


 


Anisocytosis (manual)  Slight   03/23/18  06:59    


 


Microcytosis (manual)  Slight   03/19/18  06:51    


 


Macrocytosis (manual)  Slight   03/18/18  08:26    


 


Spherocytes  Slight   03/18/18  08:26    


 


Target Cells  Slight   03/23/18  06:59    


 


Tear Drop Cells  Slight   03/23/18  06:59    


 


Ovalocytes  Slight   03/23/18  06:59    


 


Wynne Cells  Slight   03/23/18  06:59    


 


Schistocytes  Slight   03/17/18  07:35    


 


PT  21.5 SECONDS (9.7-12.2)  H  03/23/18  06:59    


 


INR  1.9   03/23/18  06:59    


 


APTT  35 SECONDS (21-34)  H D 03/23/18  13:33    


 


D-Dimer, Quantitative  4388 ng/mlDDU (0-243)  H  03/01/18  22:51    


 


Puncture Site  Rra   03/14/18  17:55    


 


pCO2  33 mm/Hg (35-45)  L  03/14/18  17:55    


 


pO2  47 mm/Hg ()  L  03/14/18  17:55    


 


HCO3  21.3 mmol/L (21-28)   03/14/18  17:55    


 


ABG pH  7.39  (7.35-7.45)   03/14/18  17:55    


 


ABG Total CO2  21.0 mmol/L (22-28)  L  03/14/18  17:55    


 


ABG O2 Saturation  85.8 % (95-98)  L  03/14/18  17:55    


 


ABG Base Excess  -4.1 mmol/L (-2.0-3.0)  L  03/14/18  17:55    


 


ABG Hemoglobin  9.1 g/dL (11.7-17.4)  L  03/12/18  04:30    


 


ABG Carboxyhemoglobin  2.3 % (0.5-1.5)  H  03/12/18  04:30    


 


POC ABG HHb (Measured)  8.0 % (0.0-5.0)  H  03/12/18  04:30    


 


ABG Methemoglobin  0.7 % (0.0-3.0)   03/12/18  04:30    


 


Jesus Test  Pos   03/14/18  17:55    


 


ABG Potassium  4.4 mmol/L (3.6-5.2)   03/14/18  17:55    


 


VBG pH  7.20  (7.32-7.43)  L  03/03/18  00:29    


 


VBG pCO2  42 mmHg (40-60)   03/03/18  00:29    


 


VBG HCO3  14.0 mmol/L  03/03/18  00:29    


 


VBG Total CO2  15.5 mmol/L (22-28)  L  03/01/18  23:25    


 


VBG O2 Sat (Calc)  30.5 % (40-65)  L  03/03/18  00:29    


 


VBG Base Excess  -11.2 mmol/L (0.0-2.0)  L  03/03/18  00:29    


 


VBG Potassium  5.8 mmol/L (3.6-5.2)  H  03/01/18  23:25    


 


A-a O2 Difference  625.0 mm/Hg  03/14/18  17:55    


 


Respiratory Index  13.3   03/14/18  17:55    


 


Hgb O2 Saturation  89.0 % (95.0-98.0)  L  03/12/18  04:30    


 


Sodium  137.0 mmol/l (132-148)   03/14/18  17:55    


 


Chloride  102.0 mmol/L ()   03/14/18  17:55    


 


Glucose  249 mg/dl ()  H  03/14/18  17:55    


 


Lactate  2.0 mmol/L (0.7-2.1)   03/14/18  17:55    


 


Vent Mode  Prvc   03/12/18  04:30    


 


Mechanical Rate  16   03/12/18  04:30    


 


FiO2  100.0 %  03/14/18  17:55    


 


Tidal Volume  500   03/12/18  04:30    


 


PEEP  5   03/12/18  04:30    


 


Pressure Support  12   03/08/18  04:35    


 


CPAP  5   03/08/18  04:35    


 


Crit Value Called To  Eli jordan icu rn   03/04/18  05:02    


 


Crit Value Called By  Gillian vazquez rt   03/04/18  05:02    


 


Crit Value Read Back  Y   03/04/18  05:02    


 


Blood Gas Notified Time  542   03/04/18  05:02    


 


Sodium  133 mmol/L (132-148)   03/23/18  06:59    


 


Potassium  3.9 mmol/L (3.6-5.2)   03/23/18  06:59    


 


Chloride  93 mmol/L ()  L  03/23/18  06:59    


 


Carbon Dioxide  26 mmol/L (22-30)   03/23/18  06:59    


 


Anion Gap  18  (10-20)   03/23/18  06:59    


 


BUN  53 mg/dL (7-17)  H  03/23/18  06:59    


 


Creatinine  3.4 mg/dL (0.7-1.2)  H  03/23/18  06:59    


 


Est GFR ( Amer)  16   03/23/18  06:59    


 


Est GFR (Non-Af Amer)  13   03/23/18  06:59    


 


POC Glucose (mg/dL)  102 mg/dL ()   03/23/18  16:27    


 


Random Glucose  399 mg/dL ()  H  03/23/18  06:59    


 


Lactic Acid  4.8 mmol/L (0.7-2.1)  H*  03/04/18  06:08    


 


Calcium  8.1 mg/dl (8.6-10.4)  L  03/23/18  06:59    


 


Phosphorus  3.9 mg/dL (2.5-4.5)   03/23/18  06:59    


 


Magnesium  2.0 mg/dL (1.6-2.3)   03/23/18  06:59    


 


Iron  18 ug/dL ()  L  03/02/18  12:19    


 


TIBC  196 ug/dL (250-450)  L  03/02/18  12:19    


 


% Saturation  9  (20-55)  L  03/02/18  12:19    


 


Ferritin  195.0 ng/mL  03/02/18  12:19    


 


Total Bilirubin  1.0 mg/dL (0.2-1.3)   03/23/18  06:59    


 


AST  23 U/L (14-36)   03/23/18  06:59    


 


ALT  59 U/L (9-52)  H  03/23/18  06:59    


 


Alkaline Phosphatase  165 U/L ()  H  03/23/18  06:59    


 


Total Creatine Kinase  912 U/L ()  H  03/03/18  15:29    


 


Troponin I  79.7000 ng/mL (0.00-0.120)  H*  03/02/18  20:41    


 


NT-Pro-B Natriuret Pep  23265 pg/mL (0-900)  H  03/01/18  22:51    


 


Total Protein  6.0 g/dL (6.3-8.3)  L  03/23/18  06:59    


 


Albumin  2.6 g/dL (3.5-5.0)  L  03/23/18  06:59    


 


Globulin  3.4 gm/dL (2.2-3.9)   03/23/18  06:59    


 


Albumin/Globulin Ratio  0.8  (1.0-2.1)  L  03/23/18  06:59    


 


Triglycerides  82 mg/dL (0-149)  D 03/03/18  03:18    


 


Cholesterol  173 mg/dL (0-199)   03/03/18  03:18    


 


LDL Cholesterol Direct  42 mg/dL (0-129)   03/03/18  03:18    


 


HDL Cholesterol  56 mg/dL (30-70)   03/03/18  03:18    


 


25-OH Vitamin D Total  27.1 NG/ML (30.0-100.0)  L  03/02/18  12:19    


 


Procalcitonin  6.11 NG/ML (0.19-0.49)  H  03/13/18  10:19    


 


PTH Intact Whole Molec  1119 pg/mL (14-64)  H  03/02/18  12:19    


 


Arterial Blood Potassium  4.4 mmol/L (3.6-5.2)   03/14/18  17:55    


 


Venous Blood Potassium  5.8 mmol/L (3.6-5.2)  H  03/01/18  23:25    


 


Vancomycin Trough  20.1 ug/mL (5.0-10.0)  H  03/17/18  07:35    


 


Random Vancomycin  < 5.00 ug/mL  03/03/18  15:29    


 


Serum Ketones  Negative  (NEGATIVE)   03/01/18  22:51    


 


Hep Bs Antigen  Negative  (NEGATIVE)   03/02/18  12:19    


 


Hep Bs Antibody  Negative  (NEGATIVE)   03/02/18  12:19    


 


Hep B Core IgM Ab  Negative  (NEGATIVE)   03/02/18  12:19    


 


Hepatitis C Antibody  Negative  (NEGATIVE)   03/02/18  12:19    


 


Influenza Type A Ab  <1:8 titer (<1:8)   03/03/18  11:44    


 


Influenza Type B Ab  <1:8 titer (<1:8)   03/03/18  11:44    


 


Blood Type  O POSITIVE   03/18/18  08:26    


 


Antibody Screen  Negative   03/18/18  08:26    














Attending/Attestation





- Attestation


I have personally seen and examined this patient.: Yes


I have fully participated in the care of the patient.: Yes


I have reviewed all pertinent clinical information, including history, physical 

exam and plan: Yes


Notes (Text): 





03/23/18 17:10





Medical attending: Patient was seen and examined by me earlier in the day with 

the medical resident.





I later received word that the patient is now being seen by [facility] and 

will remain here in the hospital. 





We will start discontinuing TPN as well as the heparin ggt. Also will 

discontinue the telemetry monitoring





There will be a very low dose morphine ggt order. Probably we will not have to 

titrate this high. 





Also started ativan 0.5 IV PRN Q6 HRS





Our goals now are to keep her as comfortable as possible. 





Samson Gaxiola

## 2018-03-23 NOTE — CP.PCM.DIS
<Mike Silva - Last Filed: 18 18:07>





Provider





- Provider


Date of Admission: 


18 16:33





Attending physician: 


Samson Gaxiola DO





Primary care physician: 





Dr. Vickers


Time Spent in preparation of Discharge (in minutes): 31





Diagnosis





- Discharge Diagnosis


(1) Death in hospice


Status: Acute   





Hospital Course





- Hospital Course


Hospital Course: 





Initial Note:


Patient previously admitted on 3/2/18 to [facility] due to NSTEMI and 

severe sepsis due to pneumonia. Patient with significantly elevated troponins. 

Cardiac catheterization was recommended, but the family refused, and therefore 

medical management was the only option remaining. Patient had an NSTEMI leading 

to severe acute systolic CHF with low ejection fraction. Patient also with 

pseudomonas in the sputum and pleural effusions. She was treated with Aztreonam 

for the pseudomonas. Patient with ESRD, and a dialysis catheter was inserted on 

this admission in order to begin a T, Th, S dialysis schedule. Patient's family 

elected to change her code status to DNR/DNI and were deliberating on goals of 

care. Patient's family ultimately elected for hospice care given the patient's 

poor prognosis and for her comfort. Patient is being discharged to inpatient 

hospice care under [facility]. Cannot obtain ROS because of 

patient's lethargic condition.





Hospital Course:


Patient admitted for inpatient hospice with [facility]. Patient 

 at 18:01 at 3/23/18. Attending notified. Family was notified as well. 

They will be coming to see the patient. [place] EDRS was filled out.





Discharge Exam





- Additional Findings


Additional findings: 





Absence of vital signs.


No corneal reflexes.


Pupils dilated and non-reactive to light.


No carotid or femoral pulses.


No heart or lung sounds auscultated.





Discharge Plan





- Follow Up Plan


Disposition: HOME/ ROUTINE





<Samson Gaxiola - Last Filed: 18 18:29>





Provider





- Provider


Date of Admission: 


18 16:33





Attending physician: 


Samson Gaxiola DO








Attending/Attestation





- Attestation


I have personally seen and examined this patient.: Yes


I have fully participated in the care of the patient.: Yes


I have reviewed all pertinent clinical information, including history, physical 

exam and plan: Yes


Notes (Text): 





18 18:27


Medical attending: Patient  at 18:01, I reached out to the next of kin, 

her daughter Kristel Corral and explained that family member had passed away. I 

offered them my condolences. The [place] EDRS was filled out and printed and was 

placed in the chart. 





thank you


Samson Gaxiola

## 2018-03-23 NOTE — CP.PCM.PRO
Pronouncement of Death Note





- Clinical Findings


Physical Exam: No Response Verbal/Painful Stimuli, Absent Peripheral Pulses{

Carotid & Femoral}, Absent Heart & Breath Sounds, No Corneal Reflex, Pupils 

Fixed & Dilated





- Pronouncement Time


Time of Pronouncement of Death: 18:01





- Notifications


Pronouncement Notifications: Family Notified, Atending Notified


Medical Examiner Notified: No





- Autopsy


Autopsy Requested: No





- [place]: [id number]

## 2020-02-07 NOTE — C.PDOC
History Of Present Illness


77-year-old female with PMHx that includes Hypertension and Diabetes, presents 

to the emergency department BIBA for evaluation of altered mental status. As 

per family, patient woke up this morning, was confused and not acting like 

herself. Patient's blood sugar was in 30's on EMS arrival. She was given two 

AMPs of D50 by ALS en route.  Patient reports she is currently feeling "cold" 

and is shivering during evaluation.  She denies shortness of breath, abdominal 

pain, nausea/vomiting, sensory changes, extremity weakness, facial droop, 

slurred speech, visual changes.  


Time Seen by Provider: 17 09:46


Chief Complaint (Nursing): Altered Mental Status


History Per: Patient, EMS, Family


History/Exam Limitations: Other (ams)


Onset/Duration Of Symptoms: Hrs


Current Symptoms Are (Timing): Better


Severity: Moderate





Past Medical History


Reviewed: Historical Data, Nursing Documentation, Vital Signs


Vital Signs: 


 Last Vital Signs











Temp  98.6 F   17 07:19


 


Pulse  98 H  17 08:00


 


Resp  20   17 07:19


 


BP  154/75 H  17 11:32


 


Pulse Ox  95   17 07:19














- Medical History


PMH: Anemia, Arthritis, Asthma (Dx 15 yrs ago,does not use home o2 anymore), 

Diabetes, Fractures (Right ankle. No sx, casted), HTN, Hypercholesterolemia, 

Peripheral Edema, Pneumonia (x2), Chronic Kidney Disease, TIA (x3 about 20 yrs 

ago, 10 yrs ago, 7 yrs ago)


Surgical History: Endoscopy





- [facility] Procedures








INJECT/INFUSE NEC (01/22/15)








Family History: States: No Known Family Hx





- Social History


Hx Tobacco Use: No


Hx Alcohol Use: No


Hx Substance Use: No





- Immunization History


Hx Tetanus Toxoid Vaccination: No


Hx Influenza Vaccination: No


Hx Pneumococcal Vaccination: No





Review Of Systems


Except As Marked, All Systems Reviewed And Found Negative.


Constitutional: Negative for: Fever, Chills


Cardiovascular: Negative for: Chest Pain, Palpitations


Respiratory: Negative for: Cough, Shortness of Breath


Gastrointestinal: Negative for: Nausea, Vomiting, Abdominal Pain, Diarrhea


Musculoskeletal: Negative for: Back Pain


Neurological: Positive for: Confusion, Altered Mental Status.  Negative for: 

Numbness, Incoordination, Change in Speech, Seizures, Headache





Physical Exam





- Physical Exam


Appears: Well, Non-toxic, No Acute Distress


Skin: Warm, Dry, No Rash


Head: Atraumatic, Normacephalic


Eye(s): bilateral: Normal Inspection, PERRL, EOMI


Oral Mucosa: Moist


Lips: Normal Appearing


Neck: Normal, Normal ROM, Supple


Cardiovascular: Rhythm Regular


Respiratory: Normal Breath Sounds, No Rales, No Rhonchi, No Wheezing


Gastrointestinal/Abdominal: Normal Exam, Bowel Sounds, Soft, No Tenderness


Back: Normal Inspection, No CVA Tenderness


Extremity: Normal ROM, No Pedal Edema, No Calf Tenderness, No Deformity, Other (

AV fistula in left arm with palpable thrill.)


Pulses: Left Dorsalis Pedis: Normal, Right Dorsalis Pedis: Normal


Neurological/Psych: Oriented x3, Normal Speech, Normal Sensation





ED Course And Treatment





- Laboratory Results


Result Diagrams: 


 17 06:30





 17 06:48


ECG: Interpreted By Me, Viewed By Me


ECG Rhythm: Sinus Rhythm


ECG Interpretation: Abnormal


Interpretation Of ECG: Occasional PVC's. Normal Axis. Prolonged QTD:496ms. T 

wave inversion: I, AVL, V5 and V6


Rate From EC (bpm)


O2 Sat by Pulse Oximetry: 96 (RA)


Pulse Ox Interpretation: Normal





- Other Rad


  ** cxr 


X-Ray: Viewed By Me, Read By Radiologist


Interpretation: Accession No. : [id number].  Patient Name / ID : LATRICE URRUTIA  / [id number].  Exam Date : 2017 09:57:46 ( Approved ).  Study 

Comment :  Sex / Age : F  / 77Y.  Creator : Frank Rayo MD.  Dictator : 

Frank Rayo MD.   :  Approver : Frank Rayo MD.  

Approver2 :  Report Date : 2017 10:53:13.  My Comment :  *****************

******************************************************************.  PROCEDURE:

  CHEST RADIOGRAPH, 1 VIEW.  HISTORY:  ams.  COMPARISON:  Comparison chest .  FINDINGS:  LUNGS:  Diffuse bilateral infiltrates likely representing 

pulmonary edema/CHF.  PLEURA:  No pneumothorax or pleural fluid seen.  

CARDIOVASCULAR:  Cardiomegaly.  OSSEOUS STRUCTURES:  No significant 

abnormalities.  VISUALIZED UPPER ABDOMEN:  Normal.  OTHER FINDINGS:  None.  

IMPRESSION:  Diffuse bilateral infiltrates likely representing pulmonary edema/

CHF.


Progress Note: Blood work, EKG, CXR, Blood work, and UA ordered and reviewed.   

Patient given IV azithromycin and IV rocephin for B/L infiltrates.





- Physician Consult Information


Physician Contacted: Samson Gaxiola


Outcome Of Conversation: Discussed patient with hospitalist, he agrees with 

admission for hypoglycemia, pneumonia, CHF, UTI, AMNS, QTc prolonged.





Disposition





- Disposition


Disposition: HOSPITALIZED


Disposition Time: 11:45


Condition: STABLE





- Clinical Impression


Clinical Impression: 


 Pneumonia, UTI (urinary tract infection), Diabetic hypoglycemia, Altered 

mental status, CHF (congestive heart failure)








- Scribe Statement


The provider has reviewed the documentation as recorded by the Scribjay Samuels





All medical record entries made by the Scribe were at my direction and 

personally dictated by me. I have reviewed the chart and agree that the record 

accurately reflects my personal performance of the history, physical exam, 

medical decision making, and the department course for this patient. I have 

also personally directed, reviewed, and agree with the discharge instructions 

and disposition.





Decision To Admit





- Pt Status Changed To:


Hospital Disposition Of: Inpatient





- Admit Certification


Admit to Inpatient:: After my assessment, the patient will require 

hospitalization for at least two midnights.  This is because of the severity of 

symptoms shown, intensity of services needed, and/or the medical risk in this 

patient being treated as an outpatient.





- InPatient:


Physician Admission Certification:: see  notes





- .


Bed Request Type: Telemetry


Admitting Physician: Samson Gaxiola


Patient Diagnosis: 


 Pneumonia, UTI (urinary tract infection), Diabetic hypoglycemia, Altered 

mental status, CHF (congestive heart failure)
no

## 2020-08-21 NOTE — CP.CCUPN
<Abadier,Michael - Last Filed: 03/08/18 15:45>





CCU Subjective





- Physician Review


Subjective (Free Text): 





03/02/18 15:06


Patient seen and examined at bedside


AMS


spoke with nephro and want to wait for dialysis until cardio decides to do cath 

or not





03/02/18 17:16


PMD talked with cardio and Nephro. Medical management for cardiac issues at 

this time. OK to go ahead with dialysis. First session tonight





03/05/18 10:35


Over weekend patient had Vfib arrest


tolerated dialysis for 45 min before becoming hypotensive


Patient will again have dialysis today


Giving blood today





03/06/18 13:42


patient seen and examined today


patient will need cath when more stable


tolerated CPAP for 2 hours today


Dialysis tomorrow





03/07/18 14:07


Patient seen and examined today


Tolerating CPAP trial for majority of day


Dialysis today





03/08/18 13:04


patient seen and examined today


Continues to tolerate CPAP


Continue heparin drip and do not intubate per Cardio for cardiac cath possible 

tomorrow





03/08/18 15:45


Spoke with family and patient, right now they are leaning away from pursuing 

cardiac cath. Pallitive care consult was called. I discussed with the family 

and patient the options including cath, DNR/DNI. No decision is made at this 

time. Patient is still full code.





CCU Objective





- Vital Signs / Intake & Output


Vital Signs (Last 4 hours): 


Vital Signs











  Pulse Resp BP Pulse Ox


 


 03/08/18 11:01  82  18  138/63  100


 


 03/08/18 11:00  72  18   100


 


 03/08/18 10:01  92 H  18  126/69  100


 


 03/08/18 10:00  88  17   100











Intake and Output (Last 8hrs): 


 Intake & Output











 03/07/18 03/08/18 03/08/18





 22:59 06:59 14:59


 


Intake Total 470 380 275


 


Balance 470 380 275


 


Weight  160 lb 7.944 oz 


 


Intake:   


 


  Intake, IV Amount 180 180 50


 


    Right Hand 80 80 50


 


    Right Proximal Port 100 100 





    Internal Jugular   


 


  Oral 60  100


 


  Tube Feeding 200 200 125


 


  Other 30  














- Physical Exam


Head: Positive for: Atraumatic, Normocephalic.  Negative for: Tenderness, 

Contusion, Swelling


Pupils: Positive for: PERRL.  Negative for: Sluggish, Non-Reactive


Extroacular Muscles: Positive for: EOMI


Conjunctiva: Positive for: Normal


Mouth: Positive for: Dry


Pharnyx: Positive for: Normal


Nose (External): Positive for: Atraumatic


Neck: Positive for: Trachea Midline.  Negative for: JVD


Respiratory/Chest: Positive for: Good Air Exchange, Accessory Muscle Use, 

Decreased Breath Sounds (on bilateral bases).  Negative for: Wheezes


Cardiovascular: Positive for: Tachycardic


Abdomen: Positive for: Distention, Normal Bowel Sounds.  Negative for: 

Peritoneal Signs


Upper Extremity: Negative for: Cyanosis, Edema


Lower Extremity: Negative for: Edema


Psychiatric: Positive for: Alert





- Medications


Active Medications: 


Active Medications











Generic Name Dose Route Start Last Admin





  Trade Name Freq  PRN Reason Stop Dose Admin


 


Albuterol/Ipratropium  3 ml  03/07/18 16:00  03/08/18 11:39





  Duoneb 3 Mg/0.5 Mg (3 Ml) Ud  INH   3 ml





  RQ4 ABHILASH   Administration


 


Aspirin  81 mg  03/02/18 10:00  03/08/18 09:12





  Aspirin Chewable  PO   81 mg





  DAILY ABHILASH   Administration


 


Epoetin Vamsi  8,000 unit  03/07/18 09:00  03/07/18 17:35





  Procrit  IV   8,000 unit





  MWF ABHILASH   Administration


 


Ergocalciferol  1 cap  03/03/18 14:45  03/03/18 13:51





  Drisdol 50,000 Intl Units Cap  PO   1 cap





  Q7D ABHILASH   Administration


 


Ferrous Gluconate  324 mg  03/03/18 10:00  03/08/18 09:12





  Fergon  PO   324 mg





  TID ABHILASH   Administration


 


Aztreonam 1 gm/ Sodium  100 mls @ 100 mls/hr  03/03/18 13:30  03/08/18 02:15





  Chloride  IVPB   100 mls/hr





  Q12H ABHILASH   Administration


 


Metronidazole  500 mg in 100 mls @ 100 mls/hr  03/03/18 14:30  03/08/18 05:30





  Flagyl  IVPB   100 mls/hr





  Q8H ABHILASH   Administration


 


Heparin Sodium/Sodium Chloride  25,000 units in 250 mls @ 10.161 mls/hr  03/07/ 18 08:20  03/07/18 09:00





  Heparin 00415 Units/250ml 1/2 Normal Saline  IV   14 units/kg/hr





  .Q24H PRN   10.161 mls/hr





  PROTOCOL   Administration





  Protocol   





  14 UNITS/KG/HR   


 


Insulin Aspart  0 unit  03/04/18 11:49  03/08/18 11:43





  Novolog  SC   Not Given





  Q6 [facility]   





  Protocol   


 


Insulin Glargine  5 unit  03/06/18 22:00  03/07/18 22:00





  Lantus  SC   5 u





  HS ABHILASH   Administration


 


Methylprednisolone  40 mg  03/08/18 12:00  03/08/18 12:18





  Solu-Medrol  IVP   40 mg





  Q8H ABHILASH   Administration


 


Metoprolol Tartrate  12.5 mg  03/05/18 22:00  03/08/18 09:12





  Lopressor  PO   12.5 mg





  BID ABHILASH   Administration


 


Midodrine  10 mg  03/06/18 14:00  03/08/18 05:20





  Proamatine  NG   10 mg





  Q8 ABHILASH   Administration


 


Pantoprazole Sodium  40 mg  03/05/18 10:00  03/08/18 09:12





  Protonix Inj  IVP   40 mg





  DAILY ABHILASH   Administration


 


Rosuvastatin Calcium  5 mg  03/02/18 01:15  03/07/18 22:00





  Crestor  PO   5 mg





  HS ABHILASH   Administration


 


Vitamin B Complex/Vit C/Folic Acid  1 tab  03/03/18 08:00  03/08/18 08:55





  Nephro-Nneka  PO   1 tab





  0800 ABHILASH   Administration














- Patient Studies


Lab Studies: 


 Microbiology Studies











 03/06/18 Unknown Gram Stain - Final





 Trachasp Sputum Culture - Final





    NORMAL ORAL GENARO


 


 03/01/18 06:30 Blood Culture - Final





 Blood-Venous    NO GROWTH AFTER 5 DAYS





 Gram Stain - Final





    TEST NOT PERFORMED


 


 03/01/18 06:00 Blood Culture - Final





 Blood-Venous    NO GROWTH AFTER 5 DAYS





 Gram Stain - Final





    TEST NOT PERFORMED








 Lab Studies











  03/08/18 03/08/18 03/08/18 Range/Units





  11:12 06:32 06:28 


 


WBC    12.2 H  (4.8-10.8)  K/uL


 


RBC    3.19 L  (3.80-5.20)  Mil/uL


 


Hgb    9.0 L  (11.0-16.0)  g/dL


 


Hct    27.4 L  (34.0-47.0)  %


 


MCV    85.9  (81.0-99.0)  fL


 


MCH    28.1  (27.0-31.0)  pg


 


MCHC    32.7 L  (33.0-37.0)  g/dL


 


RDW    16.3 H  (11.5-14.5)  %


 


Plt Count    108 L D  (130-400)  K/uL


 


MPV    10.3  (7.2-11.7)  fL


 


Neut % (Auto)    82.6 H  (50.0-75.0)  %


 


Lymph % (Auto)    8.4 L  (20.0-40.0)  %


 


Mono % (Auto)    8.4  (0.0-10.0)  %


 


Eos % (Auto)    0.3  (0.0-4.0)  %


 


Baso % (Auto)    0.3  (0.0-2.0)  %


 


Neut # (Auto)    10.1 H  (1.8-7.0)  K/uL


 


Lymph # (Auto)    1.0  (1.0-4.3)  K/uL


 


Mono # (Auto)    1.0 H  (0.0-0.8)  K/uL


 


Eos # (Auto)    0.0  (0.0-0.7)  K/uL


 


Baso # (Auto)    0.0  (0.0-0.2)  K/uL


 


Neutrophils % (Manual)    80 H  (50-75)  %


 


Lymphocytes % (Manual)    10 L  (20-40)  %


 


Monocytes % (Manual)    9  (0-10)  %


 


Eosinophils % (Manual)    1  (0-4)  %


 


Platelet Estimate    Slightly decreased L  (NORMAL)  


 


Large Platelets    Present  


 


Polychromasia    Slight  


 


Hypochromasia (manual)    Slight  


 


Poikilocytosis (manual    Slight  


 


Anisocytosis (manual)    Slight  


 


Macrocytosis (manual)    Slight  


 


Target Cells    Slight  


 


Tear Drop Cells    Slight  


 


Ovalocytes    Slight  


 


[place] Cells    Slight  


 


PT   18.0 H   (9.7-12.2)  SECONDS


 


INR   1.6   


 


APTT   64 H   (21-34)  SECONDS


 


Puncture Site     


 


pCO2     (35-45)  mm/Hg


 


pO2     ()  mm/Hg


 


HCO3     (21-28)  mmol/L


 


ABG pH     (7.35-7.45)  


 


ABG Total CO2     (22-28)  mmol/L


 


ABG O2 Saturation     (95-98)  %


 


ABG Base Excess     (-2.0-3.0)  mmol/L


 


Jesus Test     


 


ABG Potassium     (3.6-5.2)  mmol/L


 


A-a O2 Difference     mm/Hg


 


Respiratory Index     


 


Sodium     (132-148)  mmol/l


 


Chloride     ()  mmol/L


 


Glucose     ()  mg/dl


 


Lactate     (0.7-2.1)  mmol/L


 


Vent Mode     


 


FiO2     %


 


Pressure Support     


 


CPAP     


 


Potassium     (3.6-5.2)  mmol/L


 


Carbon Dioxide     (22-30)  mmol/L


 


Anion Gap     (10-20)  


 


BUN     (7-17)  mg/dL


 


Creatinine     (0.7-1.2)  mg/dL


 


Est GFR (African Amer)     


 


Est GFR (Non-Af Amer)     


 


POC Glucose (mg/dL)  98    ()  mg/dL


 


Random Glucose     ()  mg/dL


 


Calcium     (8.6-10.4)  mg/dl


 


Phosphorus     (2.5-4.5)  mg/dL


 


Magnesium     (1.6-2.3)  mg/dL


 


Total Bilirubin     (0.2-1.3)  mg/dL


 


AST     (14-36)  U/L


 


ALT     (9-52)  U/L


 


Alkaline Phosphatase     ()  U/L


 


Total Protein     (6.3-8.3)  g/dL


 


Albumin     (3.5-5.0)  g/dL


 


Globulin     (2.2-3.9)  gm/dL


 


Albumin/Globulin Ratio     (1.0-2.1)  


 


Arterial Blood Potassium     (3.6-5.2)  mmol/L


 


Influenza Type A Ab     (<1:8)  titer


 


Influenza Type B Ab     (<1:8)  titer














  03/08/18 03/08/18 03/08/18 Range/Units





  06:26 05:32 04:35 


 


WBC     (4.8-10.8)  K/uL


 


RBC     (3.80-5.20)  Mil/uL


 


Hgb     (11.0-16.0)  g/dL


 


Hct     (34.0-47.0)  %


 


MCV     (81.0-99.0)  fL


 


MCH     (27.0-31.0)  pg


 


MCHC     (33.0-37.0)  g/dL


 


RDW     (11.5-14.5)  %


 


Plt Count     (130-400)  K/uL


 


MPV     (7.2-11.7)  fL


 


Neut % (Auto)     (50.0-75.0)  %


 


Lymph % (Auto)     (20.0-40.0)  %


 


Mono % (Auto)     (0.0-10.0)  %


 


Eos % (Auto)     (0.0-4.0)  %


 


Baso % (Auto)     (0.0-2.0)  %


 


Neut # (Auto)     (1.8-7.0)  K/uL


 


Lymph # (Auto)     (1.0-4.3)  K/uL


 


Mono # (Auto)     (0.0-0.8)  K/uL


 


Eos # (Auto)     (0.0-0.7)  K/uL


 


Baso # (Auto)     (0.0-0.2)  K/uL


 


Neutrophils % (Manual)     (50-75)  %


 


Lymphocytes % (Manual)     (20-40)  %


 


Monocytes % (Manual)     (0-10)  %


 


Eosinophils % (Manual)     (0-4)  %


 


Platelet Estimate     (NORMAL)  


 


Large Platelets     


 


Polychromasia     


 


Hypochromasia (manual)     


 


Poikilocytosis (manual     


 


Anisocytosis (manual)     


 


Macrocytosis (manual)     


 


Target Cells     


 


Tear Drop Cells     


 


Ovalocytes     


 


[place] Cells     


 


PT     (9.7-12.2)  SECONDS


 


INR     


 


APTT     (21-34)  SECONDS


 


Puncture Site    Rb  


 


pCO2    31 L  (35-45)  mm/Hg


 


pO2    183 H  ()  mm/Hg


 


HCO3    25.3  (21-28)  mmol/L


 


ABG pH    7.48 H  (7.35-7.45)  


 


ABG Total CO2    24.1  (22-28)  mmol/L


 


ABG O2 Saturation    99.9 H  (95-98)  %


 


ABG Base Excess    0.4  (-2.0-3.0)  mmol/L


 


Jesus Test    Na  


 


ABG Potassium    3.4 L  (3.6-5.2)  mmol/L


 


A-a O2 Difference    63.0  mm/Hg


 


Respiratory Index    0.3  


 


Sodium  138   140.0  (132-148)  mmol/l


 


Chloride  99   108.0 H  ()  mmol/L


 


Glucose    186 H  ()  mg/dl


 


Lactate    1.0  (0.7-2.1)  mmol/L


 


Vent Mode    Cpap  


 


FiO2    40.0  %


 


Pressure Support    12  


 


CPAP    5  


 


Potassium  3.9    (3.6-5.2)  mmol/L


 


Carbon Dioxide  29    (22-30)  mmol/L


 


Anion Gap  14    (10-20)  


 


BUN  47 H    (7-17)  mg/dL


 


Creatinine  3.8 H    (0.7-1.2)  mg/dL


 


Est GFR ( Amer)  14    


 


Est GFR (Non-Af Amer)  12    


 


POC Glucose (mg/dL)   208 H   ()  mg/dL


 


Random Glucose  184 H    ()  mg/dL


 


Calcium  8.1 L    (8.6-10.4)  mg/dl


 


Phosphorus  3.6    (2.5-4.5)  mg/dL


 


Magnesium  2.1    (1.6-2.3)  mg/dL


 


Total Bilirubin  1.1    (0.2-1.3)  mg/dL


 


AST  648 H D    (14-36)  U/L


 


ALT  1238 H    (9-52)  U/L


 


Alkaline Phosphatase  222 H    ()  U/L


 


Total Protein  5.9 L    (6.3-8.3)  g/dL


 


Albumin  2.7 L    (3.5-5.0)  g/dL


 


Globulin  3.3    (2.2-3.9)  gm/dL


 


Albumin/Globulin Ratio  0.8 L    (1.0-2.1)  


 


Arterial Blood Potassium    3.4 L  (3.6-5.2)  mmol/L


 


Influenza Type A Ab     (<1:8)  titer


 


Influenza Type B Ab     (<1:8)  titer














  03/07/18 03/07/18 03/03/18 Range/Units





  23:28 17:46 11:44 


 


WBC     (4.8-10.8)  K/uL


 


RBC     (3.80-5.20)  Mil/uL


 


Hgb     (11.0-16.0)  g/dL


 


Hct     (34.0-47.0)  %


 


MCV     (81.0-99.0)  fL


 


MCH     (27.0-31.0)  pg


 


MCHC     (33.0-37.0)  g/dL


 


RDW     (11.5-14.5)  %


 


Plt Count     (130-400)  K/uL


 


MPV     (7.2-11.7)  fL


 


Neut % (Auto)     (50.0-75.0)  %


 


Lymph % (Auto)     (20.0-40.0)  %


 


Mono % (Auto)     (0.0-10.0)  %


 


Eos % (Auto)     (0.0-4.0)  %


 


Baso % (Auto)     (0.0-2.0)  %


 


Neut # (Auto)     (1.8-7.0)  K/uL


 


Lymph # (Auto)     (1.0-4.3)  K/uL


 


Mono # (Auto)     (0.0-0.8)  K/uL


 


Eos # (Auto)     (0.0-0.7)  K/uL


 


Baso # (Auto)     (0.0-0.2)  K/uL


 


Neutrophils % (Manual)     (50-75)  %


 


Lymphocytes % (Manual)     (20-40)  %


 


Monocytes % (Manual)     (0-10)  %


 


Eosinophils % (Manual)     (0-4)  %


 


Platelet Estimate     (NORMAL)  


 


Large Platelets     


 


Polychromasia     


 


Hypochromasia (manual)     


 


Poikilocytosis (manual     


 


Anisocytosis (manual)     


 


Macrocytosis (manual)     


 


Target Cells     


 


Tear Drop Cells     


 


Ovalocytes     


 


[place] Cells     


 


PT     (9.7-12.2)  SECONDS


 


INR     


 


APTT     (21-34)  SECONDS


 


Puncture Site     


 


pCO2     (35-45)  mm/Hg


 


pO2     ()  mm/Hg


 


HCO3     (21-28)  mmol/L


 


ABG pH     (7.35-7.45)  


 


ABG Total CO2     (22-28)  mmol/L


 


ABG O2 Saturation     (95-98)  %


 


ABG Base Excess     (-2.0-3.0)  mmol/L


 


Jesus Test     


 


ABG Potassium     (3.6-5.2)  mmol/L


 


A-a O2 Difference     mm/Hg


 


Respiratory Index     


 


Sodium     (132-148)  mmol/l


 


Chloride     ()  mmol/L


 


Glucose     ()  mg/dl


 


Lactate     (0.7-2.1)  mmol/L


 


Vent Mode     


 


FiO2     %


 


Pressure Support     


 


CPAP     


 


Potassium     (3.6-5.2)  mmol/L


 


Carbon Dioxide     (22-30)  mmol/L


 


Anion Gap     (10-20)  


 


BUN     (7-17)  mg/dL


 


Creatinine     (0.7-1.2)  mg/dL


 


Est GFR (African Amer)     


 


Est GFR (Non-Af Amer)     


 


POC Glucose (mg/dL)  164 H  111 H   ()  mg/dL


 


Random Glucose     ()  mg/dL


 


Calcium     (8.6-10.4)  mg/dl


 


Phosphorus     (2.5-4.5)  mg/dL


 


Magnesium     (1.6-2.3)  mg/dL


 


Total Bilirubin     (0.2-1.3)  mg/dL


 


AST     (14-36)  U/L


 


ALT     (9-52)  U/L


 


Alkaline Phosphatase     ()  U/L


 


Total Protein     (6.3-8.3)  g/dL


 


Albumin     (3.5-5.0)  g/dL


 


Globulin     (2.2-3.9)  gm/dL


 


Albumin/Globulin Ratio     (1.0-2.1)  


 


Arterial Blood Potassium     (3.6-5.2)  mmol/L


 


Influenza Type A Ab    <1:8  (<1:8)  titer


 


Influenza Type B Ab    <1:8  (<1:8)  titer








 Laboratory Results - last 24 hr











  03/03/18 03/07/18 03/07/18





  11:44 17:46 23:28


 


WBC   


 


RBC   


 


Hgb   


 


Hct   


 


MCV   


 


MCH   


 


MCHC   


 


RDW   


 


Plt Count   


 


MPV   


 


Neut % (Auto)   


 


Lymph % (Auto)   


 


Mono % (Auto)   


 


Eos % (Auto)   


 


Baso % (Auto)   


 


Neut # (Auto)   


 


Lymph # (Auto)   


 


Mono # (Auto)   


 


Eos # (Auto)   


 


Baso # (Auto)   


 


Neutrophils % (Manual)   


 


Lymphocytes % (Manual)   


 


Monocytes % (Manual)   


 


Eosinophils % (Manual)   


 


Platelet Estimate   


 


Large Platelets   


 


Polychromasia   


 


Hypochromasia (manual)   


 


Poikilocytosis (manual   


 


Anisocytosis (manual)   


 


Macrocytosis (manual)   


 


Target Cells   


 


Tear Drop Cells   


 


Ovalocytes   


 


[place] Cells   


 


PT   


 


INR   


 


APTT   


 


Puncture Site   


 


pCO2   


 


pO2   


 


HCO3   


 


ABG pH   


 


ABG Total CO2   


 


ABG O2 Saturation   


 


ABG Base Excess   


 


Jesus Test   


 


ABG Potassium   


 


A-a O2 Difference   


 


Respiratory Index   


 


Sodium   


 


Chloride   


 


Glucose   


 


Lactate   


 


Vent Mode   


 


FiO2   


 


Pressure Support   


 


CPAP   


 


Potassium   


 


Carbon Dioxide   


 


Anion Gap   


 


BUN   


 


Creatinine   


 


Est GFR ( Amer)   


 


Est GFR (Non-Af Amer)   


 


POC Glucose (mg/dL)   111 H  164 H


 


Random Glucose   


 


Calcium   


 


Phosphorus   


 


Magnesium   


 


Total Bilirubin   


 


AST   


 


ALT   


 


Alkaline Phosphatase   


 


Total Protein   


 


Albumin   


 


Globulin   


 


Albumin/Globulin Ratio   


 


Arterial Blood Potassium   


 


Influenza Type A Ab  <1:8  


 


Influenza Type B Ab  <1:8  














  03/08/18 03/08/18 03/08/18





  04:35 05:32 06:26


 


WBC   


 


RBC   


 


Hgb   


 


Hct   


 


MCV   


 


MCH   


 


MCHC   


 


RDW   


 


Plt Count   


 


MPV   


 


Neut % (Auto)   


 


Lymph % (Auto)   


 


Mono % (Auto)   


 


Eos % (Auto)   


 


Baso % (Auto)   


 


Neut # (Auto)   


 


Lymph # (Auto)   


 


Mono # (Auto)   


 


Eos # (Auto)   


 


Baso # (Auto)   


 


Neutrophils % (Manual)   


 


Lymphocytes % (Manual)   


 


Monocytes % (Manual)   


 


Eosinophils % (Manual)   


 


Platelet Estimate   


 


Large Platelets   


 


Polychromasia   


 


Hypochromasia (manual)   


 


Poikilocytosis (manual   


 


Anisocytosis (manual)   


 


Macrocytosis (manual)   


 


Target Cells   


 


Tear Drop Cells   


 


Ovalocytes   


 


[place] Cells   


 


PT   


 


INR   


 


APTT   


 


Puncture Site  Rb  


 


pCO2  31 L  


 


pO2  183 H  


 


HCO3  25.3  


 


ABG pH  7.48 H  


 


ABG Total CO2  24.1  


 


ABG O2 Saturation  99.9 H  


 


ABG Base Excess  0.4  


 


Jesus Test  Na  


 


ABG Potassium  3.4 L  


 


A-a O2 Difference  63.0  


 


Respiratory Index  0.3  


 


Sodium  140.0   138


 


Chloride  108.0 H   99


 


Glucose  186 H  


 


Lactate  1.0  


 


Vent Mode  Cpap  


 


FiO2  40.0  


 


Pressure Support  12  


 


CPAP  5  


 


Potassium    3.9


 


Carbon Dioxide    29


 


Anion Gap    14


 


BUN    47 H


 


Creatinine    3.8 H


 


Est GFR ( Amer)    14


 


Est GFR (Non-Af Amer)    12


 


POC Glucose (mg/dL)   208 H 


 


Random Glucose    184 H


 


Calcium    8.1 L


 


Phosphorus    3.6


 


Magnesium    2.1


 


Total Bilirubin    1.1


 


AST    648 H D


 


ALT    1238 H


 


Alkaline Phosphatase    222 H


 


Total Protein    5.9 L


 


Albumin    2.7 L


 


Globulin    3.3


 


Albumin/Globulin Ratio    0.8 L


 


Arterial Blood Potassium  3.4 L  


 


Influenza Type A Ab   


 


Influenza Type B Ab   














  03/08/18 03/08/18 03/08/18





  06:28 06:32 11:12


 


WBC  12.2 H  


 


RBC  3.19 L  


 


Hgb  9.0 L  


 


Hct  27.4 L  


 


MCV  85.9  


 


MCH  28.1  


 


MCHC  32.7 L  


 


RDW  16.3 H  


 


Plt Count  108 L D  


 


MPV  10.3  


 


Neut % (Auto)  82.6 H  


 


Lymph % (Auto)  8.4 L  


 


Mono % (Auto)  8.4  


 


Eos % (Auto)  0.3  


 


Baso % (Auto)  0.3  


 


Neut # (Auto)  10.1 H  


 


Lymph # (Auto)  1.0  


 


Mono # (Auto)  1.0 H  


 


Eos # (Auto)  0.0  


 


Baso # (Auto)  0.0  


 


Neutrophils % (Manual)  80 H  


 


Lymphocytes % (Manual)  10 L  


 


Monocytes % (Manual)  9  


 


Eosinophils % (Manual)  1  


 


Platelet Estimate  Slightly decreased L  


 


Large Platelets  Present  


 


Polychromasia  Slight  


 


Hypochromasia (manual)  Slight  


 


Poikilocytosis (manual  Slight  


 


Anisocytosis (manual)  Slight  


 


Macrocytosis (manual)  Slight  


 


Target Cells  Slight  


 


Tear Drop Cells  Slight  


 


Ovalocytes  Slight  


 


[place] Cells  Slight  


 


PT   18.0 H 


 


INR   1.6 


 


APTT   64 H 


 


Puncture Site   


 


pCO2   


 


pO2   


 


HCO3   


 


ABG pH   


 


ABG Total CO2   


 


ABG O2 Saturation   


 


ABG Base Excess   


 


Jesus Test   


 


ABG Potassium   


 


A-a O2 Difference   


 


Respiratory Index   


 


Sodium   


 


Chloride   


 


Glucose   


 


Lactate   


 


Vent Mode   


 


FiO2   


 


Pressure Support   


 


CPAP   


 


Potassium   


 


Carbon Dioxide   


 


Anion Gap   


 


BUN   


 


Creatinine   


 


Est GFR ( Amer)   


 


Est GFR (Non-Af Amer)   


 


POC Glucose (mg/dL)    98


 


Random Glucose   


 


Calcium   


 


Phosphorus   


 


Magnesium   


 


Total Bilirubin   


 


AST   


 


ALT   


 


Alkaline Phosphatase   


 


Total Protein   


 


Albumin   


 


Globulin   


 


Albumin/Globulin Ratio   


 


Arterial Blood Potassium   


 


Influenza Type A Ab   


 


Influenza Type B Ab   











Fingerstick Blood Sugar Results: 208





Assessment/Plan





- Assessment and Plan (Free Text)


Assessment: 





77F NSTEMI, ESRD on HD


Plan: 





Neuro: Intubated, sedated





Cardio: NSTEMI, Cards ([facility]) consider Cath tomorrow. Wants to leave patient 

intubated for now


* Continue heparin for now. 


* ASA 81 QD


* lopressor 12.5 BID


* midodrine 10 Q8


* crestor 5 PO HS





Pulm: Inutbated. Continues to tolerate CPAP without issue


* Duoneb Q4





GI: No acute issues


* Nepro tube feed at goal





Renal: ESRD on HD MWF. Nephro ([place])


* Procrit


* Vit D


* Fergon


* Vit B





Endo: IDDM


* aspart sc Q6 


* glargine 5u SC HS





ID: Sputum culture pending. ID ([email or web address])


* aztreonam 1g q12


* Flagyl 500 Q8





PPX: Protonix, Heparin drip, CI to SCD (swelling)





<Dolores Au - Last Filed: 03/08/18 18:13>





CCU Subjective





- Physician Review


Critical Care Time Spent (in minutes): 35





CCU Objective





- Vital Signs / Intake & Output


Vital Signs (Last 4 hours): 


Vital Signs











  Temp Pulse Resp BP Pulse Ox


 


 03/08/18 17:00   112 H  19  130/74  100


 


 03/08/18 16:01   110 H  18  132/74  100


 


 03/08/18 16:00  97.5 F L  101 H  19   100


 


 03/08/18 15:00   97 H  18  122/74 











Intake and Output (Last 8hrs): 


 Intake & Output











 03/08/18 03/08/18 03/08/18





 06:59 14:59 22:59


 


Intake Total 380 580 105


 


Balance 380 580 105


 


Weight 160 lb 7.944 oz 160 lb 7.944 oz 


 


Intake:   


 


  Intake, IV Amount 180 280 30


 


    Right Hand 80 80 30


 


    Right Proximal Port 100 200 





    Internal Jugular   


 


  Oral  100 


 


  Tube Feeding 200 200 75














- Medications


Active Medications: 


Active Medications











Generic Name Dose Route Start Last Admin





  Trade Name Freq  PRN Reason Stop Dose Admin


 


Albuterol/Ipratropium  3 ml  03/07/18 16:00  03/08/18 15:32





  Duoneb 3 Mg/0.5 Mg (3 Ml) Ud  INH   3 ml





  RQ4 ABHILASH   Administration


 


Aspirin  81 mg  03/02/18 10:00  03/08/18 09:12





  Aspirin Chewable  PO   81 mg





  DAILY ABHILASH   Administration


 


Epoetin Vamsi  8,000 unit  03/07/18 09:00  03/07/18 17:35





  Procrit  IV   8,000 unit





  MWF ABHILASH   Administration


 


Ergocalciferol  1 cap  03/03/18 14:45  03/03/18 13:51





  Drisdol 50,000 Intl Units Cap  PO   1 cap





  Q7D ABHILASH   Administration


 


Ferrous Gluconate  324 mg  03/03/18 10:00  03/08/18 14:53





  Fergon  PO   324 mg





  TID [facility]   Administration


 


Heparin Sodium/Sodium Chloride  25,000 units in 250 mls @ 10.161 mls/hr  03/07/ 18 08:20  03/07/18 09:00





  Heparin 39745 Units/250ml 1/2 Normal Saline  IV   14 units/kg/hr





  .Q24H PRN   10.161 mls/hr





  PROTOCOL   Administration





  Protocol   





  14 UNITS/KG/HR   


 


Aztreonam 1 gm/ Sodium  100 mls @ 100 mls/hr  03/09/18 01:30  





  Chloride  IVPB   





  Q12H [facility]   


 


Metronidazole  500 mg in 100 mls @ 100 mls/hr  03/08/18 22:30  





  Flagyl  IVPB   





  Q8H [facility]   


 


Insulin Aspart  0 unit  03/04/18 11:49  03/08/18 11:43





  Novolog  SC   Not Given





  Q6 [facility]   





  Protocol   


 


Insulin Glargine  5 unit  03/06/18 22:00  03/07/18 22:00





  Lantus  SC   5 u





  HS [facility]   Administration


 


Methylprednisolone  40 mg  03/08/18 12:00  03/08/18 12:18





  Solu-Medrol  IVP   40 mg





  Q8H [facility]   Administration


 


Metoprolol Tartrate  12.5 mg  03/05/18 22:00  03/08/18 09:12





  Lopressor  PO   12.5 mg





  BID [facility]   Administration


 


Midodrine  10 mg  03/06/18 14:00  03/08/18 14:53





  Proamatine  NG   10 mg





  Q8 ABHILASH   Administration


 


Pantoprazole Sodium  40 mg  03/05/18 10:00  03/08/18 09:12





  Protonix Inj  IVP   40 mg





  DAILY ABHILASH   Administration


 


Rosuvastatin Calcium  5 mg  03/02/18 01:15  03/07/18 22:00





  Crestor  PO   5 mg





  HS ABHILASH   Administration


 


Vitamin B Complex/Vit C/Folic Acid  1 tab  03/03/18 08:00  03/08/18 08:55





  Nephro-Nneka  PO   1 tab





  0800 ABHILASH   Administration














- Patient Studies


Lab Studies: 


 Microbiology Studies











 03/06/18 Unknown Gram Stain - Final





 Trachasp Sputum Culture - Final





    NORMAL ORAL GENARO








 Lab Studies











  03/08/18 03/08/18 03/08/18 Range/Units





  17:36 11:12 06:32 


 


WBC     (4.8-10.8)  K/uL


 


RBC     (3.80-5.20)  Mil/uL


 


Hgb     (11.0-16.0)  g/dL


 


Hct     (34.0-47.0)  %


 


MCV     (81.0-99.0)  fL


 


MCH     (27.0-31.0)  pg


 


MCHC     (33.0-37.0)  g/dL


 


RDW     (11.5-14.5)  %


 


Plt Count     (130-400)  K/uL


 


MPV     (7.2-11.7)  fL


 


Neut % (Auto)     (50.0-75.0)  %


 


Lymph % (Auto)     (20.0-40.0)  %


 


Mono % (Auto)     (0.0-10.0)  %


 


Eos % (Auto)     (0.0-4.0)  %


 


Baso % (Auto)     (0.0-2.0)  %


 


Neut # (Auto)     (1.8-7.0)  K/uL


 


Lymph # (Auto)     (1.0-4.3)  K/uL


 


Mono # (Auto)     (0.0-0.8)  K/uL


 


Eos # (Auto)     (0.0-0.7)  K/uL


 


Baso # (Auto)     (0.0-0.2)  K/uL


 


Neutrophils % (Manual)     (50-75)  %


 


Lymphocytes % (Manual)     (20-40)  %


 


Monocytes % (Manual)     (0-10)  %


 


Eosinophils % (Manual)     (0-4)  %


 


Platelet Estimate     (NORMAL)  


 


Large Platelets     


 


Polychromasia     


 


Hypochromasia (manual)     


 


Poikilocytosis (manual     


 


Anisocytosis (manual)     


 


Macrocytosis (manual)     


 


Target Cells     


 


Tear Drop Cells     


 


Ovalocytes     


 


Clara Cells     


 


PT    18.0 H  (9.7-12.2)  SECONDS


 


INR    1.6  


 


APTT    64 H  (21-34)  SECONDS


 


Puncture Site     


 


pCO2     (35-45)  mm/Hg


 


pO2     ()  mm/Hg


 


HCO3     (21-28)  mmol/L


 


ABG pH     (7.35-7.45)  


 


ABG Total CO2     (22-28)  mmol/L


 


ABG O2 Saturation     (95-98)  %


 


ABG Base Excess     (-2.0-3.0)  mmol/L


 


Jessu Test     


 


ABG Potassium     (3.6-5.2)  mmol/L


 


A-a O2 Difference     mm/Hg


 


Respiratory Index     


 


Sodium     (132-148)  mmol/l


 


Chloride     ()  mmol/L


 


Glucose     ()  mg/dl


 


Lactate     (0.7-2.1)  mmol/L


 


Vent Mode     


 


FiO2     %


 


Pressure Support     


 


CPAP     


 


Potassium     (3.6-5.2)  mmol/L


 


Carbon Dioxide     (22-30)  mmol/L


 


Anion Gap     (10-20)  


 


BUN     (7-17)  mg/dL


 


Creatinine     (0.7-1.2)  mg/dL


 


Est GFR (African Amer)     


 


Est GFR (Non-Af Amer)     


 


POC Glucose (mg/dL)  212 H  98   ()  mg/dL


 


Random Glucose     ()  mg/dL


 


Calcium     (8.6-10.4)  mg/dl


 


Phosphorus     (2.5-4.5)  mg/dL


 


Magnesium     (1.6-2.3)  mg/dL


 


Total Bilirubin     (0.2-1.3)  mg/dL


 


AST     (14-36)  U/L


 


ALT     (9-52)  U/L


 


Alkaline Phosphatase     ()  U/L


 


Total Protein     (6.3-8.3)  g/dL


 


Albumin     (3.5-5.0)  g/dL


 


Globulin     (2.2-3.9)  gm/dL


 


Albumin/Globulin Ratio     (1.0-2.1)  


 


Arterial Blood Potassium     (3.6-5.2)  mmol/L


 


Influenza Type A Ab     (<1:8)  titer


 


Influenza Type B Ab     (<1:8)  titer














  03/08/18 03/08/18 03/08/18 Range/Units





  06:28 06:26 05:32 


 


WBC  12.2 H    (4.8-10.8)  K/uL


 


RBC  3.19 L    (3.80-5.20)  Mil/uL


 


Hgb  9.0 L    (11.0-16.0)  g/dL


 


Hct  27.4 L    (34.0-47.0)  %


 


MCV  85.9    (81.0-99.0)  fL


 


MCH  28.1    (27.0-31.0)  pg


 


MCHC  32.7 L    (33.0-37.0)  g/dL


 


RDW  16.3 H    (11.5-14.5)  %


 


Plt Count  108 L D    (130-400)  K/uL


 


MPV  10.3    (7.2-11.7)  fL


 


Neut % (Auto)  82.6 H    (50.0-75.0)  %


 


Lymph % (Auto)  8.4 L    (20.0-40.0)  %


 


Mono % (Auto)  8.4    (0.0-10.0)  %


 


Eos % (Auto)  0.3    (0.0-4.0)  %


 


Baso % (Auto)  0.3    (0.0-2.0)  %


 


Neut # (Auto)  10.1 H    (1.8-7.0)  K/uL


 


Lymph # (Auto)  1.0    (1.0-4.3)  K/uL


 


Mono # (Auto)  1.0 H    (0.0-0.8)  K/uL


 


Eos # (Auto)  0.0    (0.0-0.7)  K/uL


 


Baso # (Auto)  0.0    (0.0-0.2)  K/uL


 


Neutrophils % (Manual)  80 H    (50-75)  %


 


Lymphocytes % (Manual)  10 L    (20-40)  %


 


Monocytes % (Manual)  9    (0-10)  %


 


Eosinophils % (Manual)  1    (0-4)  %


 


Platelet Estimate  Slightly decreased L    (NORMAL)  


 


Large Platelets  Present    


 


Polychromasia  Slight    


 


Hypochromasia (manual)  Slight    


 


Poikilocytosis (manual  Slight    


 


Anisocytosis (manual)  Slight    


 


Macrocytosis (manual)  Slight    


 


Target Cells  Slight    


 


Tear Drop Cells  Slight    


 


Ovalocytes  Slight    


 


Clara Cells  Slight    


 


PT     (9.7-12.2)  SECONDS


 


INR     


 


APTT     (21-34)  SECONDS


 


Puncture Site     


 


pCO2     (35-45)  mm/Hg


 


pO2     ()  mm/Hg


 


HCO3     (21-28)  mmol/L


 


ABG pH     (7.35-7.45)  


 


ABG Total CO2     (22-28)  mmol/L


 


ABG O2 Saturation     (95-98)  %


 


ABG Base Excess     (-2.0-3.0)  mmol/L


 


Jesus Test     


 


ABG Potassium     (3.6-5.2)  mmol/L


 


A-a O2 Difference     mm/Hg


 


Respiratory Index     


 


Sodium   138   (132-148)  mmol/l


 


Chloride   99   ()  mmol/L


 


Glucose     ()  mg/dl


 


Lactate     (0.7-2.1)  mmol/L


 


Vent Mode     


 


FiO2     %


 


Pressure Support     


 


CPAP     


 


Potassium   3.9   (3.6-5.2)  mmol/L


 


Carbon Dioxide   29   (22-30)  mmol/L


 


Anion Gap   14   (10-20)  


 


BUN   47 H   (7-17)  mg/dL


 


Creatinine   3.8 H   (0.7-1.2)  mg/dL


 


Est GFR ( Amer)   14   


 


Est GFR (Non-Af Amer)   12   


 


POC Glucose (mg/dL)    208 H  ()  mg/dL


 


Random Glucose   184 H   ()  mg/dL


 


Calcium   8.1 L   (8.6-10.4)  mg/dl


 


Phosphorus   3.6   (2.5-4.5)  mg/dL


 


Magnesium   2.1   (1.6-2.3)  mg/dL


 


Total Bilirubin   1.1   (0.2-1.3)  mg/dL


 


AST   648 H D   (14-36)  U/L


 


ALT   1238 H   (9-52)  U/L


 


Alkaline Phosphatase   222 H   ()  U/L


 


Total Protein   5.9 L   (6.3-8.3)  g/dL


 


Albumin   2.7 L   (3.5-5.0)  g/dL


 


Globulin   3.3   (2.2-3.9)  gm/dL


 


Albumin/Globulin Ratio   0.8 L   (1.0-2.1)  


 


Arterial Blood Potassium     (3.6-5.2)  mmol/L


 


Influenza Type A Ab     (<1:8)  titer


 


Influenza Type B Ab     (<1:8)  titer














  03/08/18 03/07/18 03/03/18 Range/Units





  04:35 23:28 11:44 


 


WBC     (4.8-10.8)  K/uL


 


RBC     (3.80-5.20)  Mil/uL


 


Hgb     (11.0-16.0)  g/dL


 


Hct     (34.0-47.0)  %


 


MCV     (81.0-99.0)  fL


 


MCH     (27.0-31.0)  pg


 


MCHC     (33.0-37.0)  g/dL


 


RDW     (11.5-14.5)  %


 


Plt Count     (130-400)  K/uL


 


MPV     (7.2-11.7)  fL


 


Neut % (Auto)     (50.0-75.0)  %


 


Lymph % (Auto)     (20.0-40.0)  %


 


Mono % (Auto)     (0.0-10.0)  %


 


Eos % (Auto)     (0.0-4.0)  %


 


Baso % (Auto)     (0.0-2.0)  %


 


Neut # (Auto)     (1.8-7.0)  K/uL


 


Lymph # (Auto)     (1.0-4.3)  K/uL


 


Mono # (Auto)     (0.0-0.8)  K/uL


 


Eos # (Auto)     (0.0-0.7)  K/uL


 


Baso # (Auto)     (0.0-0.2)  K/uL


 


Neutrophils % (Manual)     (50-75)  %


 


Lymphocytes % (Manual)     (20-40)  %


 


Monocytes % (Manual)     (0-10)  %


 


Eosinophils % (Manual)     (0-4)  %


 


Platelet Estimate     (NORMAL)  


 


Large Platelets     


 


Polychromasia     


 


Hypochromasia (manual)     


 


Poikilocytosis (manual     


 


Anisocytosis (manual)     


 


Macrocytosis (manual)     


 


Target Cells     


 


Tear Drop Cells     


 


Ovalocytes     


 


Calra Cells     


 


PT     (9.7-12.2)  SECONDS


 


INR     


 


APTT     (21-34)  SECONDS


 


Puncture Site  Rb    


 


pCO2  31 L    (35-45)  mm/Hg


 


pO2  183 H    ()  mm/Hg


 


HCO3  25.3    (21-28)  mmol/L


 


ABG pH  7.48 H    (7.35-7.45)  


 


ABG Total CO2  24.1    (22-28)  mmol/L


 


ABG O2 Saturation  99.9 H    (95-98)  %


 


ABG Base Excess  0.4    (-2.0-3.0)  mmol/L


 


Jesus Test  Na    


 


ABG Potassium  3.4 L    (3.6-5.2)  mmol/L


 


A-a O2 Difference  63.0    mm/Hg


 


Respiratory Index  0.3    


 


Sodium  140.0    (132-148)  mmol/l


 


Chloride  108.0 H    ()  mmol/L


 


Glucose  186 H    ()  mg/dl


 


Lactate  1.0    (0.7-2.1)  mmol/L


 


Vent Mode  Cpap    


 


FiO2  40.0    %


 


Pressure Support  12    


 


CPAP  5    


 


Potassium     (3.6-5.2)  mmol/L


 


Carbon Dioxide     (22-30)  mmol/L


 


Anion Gap     (10-20)  


 


BUN     (7-17)  mg/dL


 


Creatinine     (0.7-1.2)  mg/dL


 


Est GFR (African Amer)     


 


Est GFR (Non-Af Amer)     


 


POC Glucose (mg/dL)   164 H   ()  mg/dL


 


Random Glucose     ()  mg/dL


 


Calcium     (8.6-10.4)  mg/dl


 


Phosphorus     (2.5-4.5)  mg/dL


 


Magnesium     (1.6-2.3)  mg/dL


 


Total Bilirubin     (0.2-1.3)  mg/dL


 


AST     (14-36)  U/L


 


ALT     (9-52)  U/L


 


Alkaline Phosphatase     ()  U/L


 


Total Protein     (6.3-8.3)  g/dL


 


Albumin     (3.5-5.0)  g/dL


 


Globulin     (2.2-3.9)  gm/dL


 


Albumin/Globulin Ratio     (1.0-2.1)  


 


Arterial Blood Potassium  3.4 L    (3.6-5.2)  mmol/L


 


Influenza Type A Ab    <1:8  (<1:8)  titer


 


Influenza Type B Ab    <1:8  (<1:8)  titer








 Laboratory Results - last 24 hr











  03/03/18 03/07/18 03/08/18





  11:44 23:28 04:35


 


WBC   


 


RBC   


 


Hgb   


 


Hct   


 


MCV   


 


MCH   


 


MCHC   


 


RDW   


 


Plt Count   


 


MPV   


 


Neut % (Auto)   


 


Lymph % (Auto)   


 


Mono % (Auto)   


 


Eos % (Auto)   


 


Baso % (Auto)   


 


Neut # (Auto)   


 


Lymph # (Auto)   


 


Mono # (Auto)   


 


Eos # (Auto)   


 


Baso # (Auto)   


 


Neutrophils % (Manual)   


 


Lymphocytes % (Manual)   


 


Monocytes % (Manual)   


 


Eosinophils % (Manual)   


 


Platelet Estimate   


 


Large Platelets   


 


Polychromasia   


 


Hypochromasia (manual)   


 


Poikilocytosis (manual   


 


Anisocytosis (manual)   


 


Macrocytosis (manual)   


 


Target Cells   


 


Tear Drop Cells   


 


Ovalocytes   


 


Clara Cells   


 


PT   


 


INR   


 


APTT   


 


Puncture Site    Rb


 


pCO2    31 L


 


pO2    183 H


 


HCO3    25.3


 


ABG pH    7.48 H


 


ABG Total CO2    24.1


 


ABG O2 Saturation    99.9 H


 


ABG Base Excess    0.4


 


Jesus Test    Na


 


ABG Potassium    3.4 L


 


A-a O2 Difference    63.0


 


Respiratory Index    0.3


 


Sodium    140.0


 


Chloride    108.0 H


 


Glucose    186 H


 


Lactate    1.0


 


Vent Mode    Cpap


 


FiO2    40.0


 


Pressure Support    12


 


CPAP    5


 


Potassium   


 


Carbon Dioxide   


 


Anion Gap   


 


BUN   


 


Creatinine   


 


Est GFR ( Amer)   


 


Est GFR (Non-Af Amer)   


 


POC Glucose (mg/dL)   164 H 


 


Random Glucose   


 


Calcium   


 


Phosphorus   


 


Magnesium   


 


Total Bilirubin   


 


AST   


 


ALT   


 


Alkaline Phosphatase   


 


Total Protein   


 


Albumin   


 


Globulin   


 


Albumin/Globulin Ratio   


 


Arterial Blood Potassium    3.4 L


 


Influenza Type A Ab  <1:8  


 


Influenza Type B Ab  <1:8  














  03/08/18 03/08/18 03/08/18





  05:32 06:26 06:28


 


WBC    12.2 H


 


RBC    3.19 L


 


Hgb    9.0 L


 


Hct    27.4 L


 


MCV    85.9


 


MCH    28.1


 


MCHC    32.7 L


 


RDW    16.3 H


 


Plt Count    108 L D


 


MPV    10.3


 


Neut % (Auto)    82.6 H


 


Lymph % (Auto)    8.4 L


 


Mono % (Auto)    8.4


 


Eos % (Auto)    0.3


 


Baso % (Auto)    0.3


 


Neut # (Auto)    10.1 H


 


Lymph # (Auto)    1.0


 


Mono # (Auto)    1.0 H


 


Eos # (Auto)    0.0


 


Baso # (Auto)    0.0


 


Neutrophils % (Manual)    80 H


 


Lymphocytes % (Manual)    10 L


 


Monocytes % (Manual)    9


 


Eosinophils % (Manual)    1


 


Platelet Estimate    Slightly decreased L


 


Large Platelets    Present


 


Polychromasia    Slight


 


Hypochromasia (manual)    Slight


 


Poikilocytosis (manual    Slight


 


Anisocytosis (manual)    Slight


 


Macrocytosis (manual)    Slight


 


Target Cells    Slight


 


Tear Drop Cells    Slight


 


Ovalocytes    Slight


 


Clara Cells    Slight


 


PT   


 


INR   


 


APTT   


 


Puncture Site   


 


pCO2   


 


pO2   


 


HCO3   


 


ABG pH   


 


ABG Total CO2   


 


ABG O2 Saturation   


 


ABG Base Excess   


 


Jesus Test   


 


ABG Potassium   


 


A-a O2 Difference   


 


Respiratory Index   


 


Sodium   138 


 


Chloride   99 


 


Glucose   


 


Lactate   


 


Vent Mode   


 


FiO2   


 


Pressure Support   


 


CPAP   


 


Potassium   3.9 


 


Carbon Dioxide   29 


 


Anion Gap   14 


 


BUN   47 H 


 


Creatinine   3.8 H 


 


Est GFR ( Amer)   14 


 


Est GFR (Non-Af Amer)   12 


 


POC Glucose (mg/dL)  208 H  


 


Random Glucose   184 H 


 


Calcium   8.1 L 


 


Phosphorus   3.6 


 


Magnesium   2.1 


 


Total Bilirubin   1.1 


 


AST   648 H D 


 


ALT   1238 H 


 


Alkaline Phosphatase   222 H 


 


Total Protein   5.9 L 


 


Albumin   2.7 L 


 


Globulin   3.3 


 


Albumin/Globulin Ratio   0.8 L 


 


Arterial Blood Potassium   


 


Influenza Type A Ab   


 


Influenza Type B Ab   














  03/08/18 03/08/18 03/08/18





  06:32 11:12 17:36


 


WBC   


 


RBC   


 


Hgb   


 


Hct   


 


MCV   


 


MCH   


 


MCHC   


 


RDW   


 


Plt Count   


 


MPV   


 


Neut % (Auto)   


 


Lymph % (Auto)   


 


Mono % (Auto)   


 


Eos % (Auto)   


 


Baso % (Auto)   


 


Neut # (Auto)   


 


Lymph # (Auto)   


 


Mono # (Auto)   


 


Eos # (Auto)   


 


Baso # (Auto)   


 


Neutrophils % (Manual)   


 


Lymphocytes % (Manual)   


 


Monocytes % (Manual)   


 


Eosinophils % (Manual)   


 


Platelet Estimate   


 


Large Platelets   


 


Polychromasia   


 


Hypochromasia (manual)   


 


Poikilocytosis (manual   


 


Anisocytosis (manual)   


 


Macrocytosis (manual)   


 


Target Cells   


 


Tear Drop Cells   


 


Ovalocytes   


 


Clara Cells   


 


PT  18.0 H  


 


INR  1.6  


 


APTT  64 H  


 


Puncture Site   


 


pCO2   


 


pO2   


 


HCO3   


 


ABG pH   


 


ABG Total CO2   


 


ABG O2 Saturation   


 


ABG Base Excess   


 


Jesus Test   


 


ABG Potassium   


 


A-a O2 Difference   


 


Respiratory Index   


 


Sodium   


 


Chloride   


 


Glucose   


 


Lactate   


 


Vent Mode   


 


FiO2   


 


Pressure Support   


 


CPAP   


 


Potassium   


 


Carbon Dioxide   


 


Anion Gap   


 


BUN   


 


Creatinine   


 


Est GFR ( Amer)   


 


Est GFR (Non-Af Amer)   


 


POC Glucose (mg/dL)   98  212 H


 


Random Glucose   


 


Calcium   


 


Phosphorus   


 


Magnesium   


 


Total Bilirubin   


 


AST   


 


ALT   


 


Alkaline Phosphatase   


 


Total Protein   


 


Albumin   


 


Globulin   


 


Albumin/Globulin Ratio   


 


Arterial Blood Potassium   


 


Influenza Type A Ab   


 


Influenza Type B Ab   














Assessment/Plan





- Assessment and Plan (Free Text)


Plan: 


Above resident note reviewed and verified. Patient with long hospital course.





78 y/o female with PMH CKD stage V with left arm AV fistula, Diastolic CHF/

NSTEMI, DM, HTN, hypercholesterolemia, h/o TIA, h/o colon CA who presents to 

[facility] with altered mental status. 





-NSTEMI: will benefit from DUal antiplatelet therapy and IV heprain, cardiology 

input





-Shock: suspect 2nd aspiration/cardiogenic shock, now off pressors


 


-sepsis: continue broad spectrum abx, complete abx regimen





-Acute on chronic systolic heart failure: ECHO shows EF 30%, continue to 

optimize heart failure medications as BP tolerates





-Acute NSTEMI: Cardiology consult, Dr Nichole, possible PCI





-Hyperglycemia and uncontrolled DM, lantus + ISS/aspart


   continue Lantus and monitor sugar





-Anemia of chronic disease, continue epogen, hb stable





-DVT ppx on IV heparin 





-PUD ppx pepcid





cc time 35 minutes





Patient tolerated CPAP today; however cardiogy advised to extubate after 

cardiac cath. 


-will optimize lung function, contineu CPAP and extubate on 3/9/2018








- Date & Time


Date: 03/08/18


Time: 13:00 Eucrisa Counseling: Patient may experience a mild burning sensation during topical application. Eucrisa is not approved in children less than 2 years of age.
